# Patient Record
Sex: FEMALE | Race: WHITE | NOT HISPANIC OR LATINO | Employment: OTHER | URBAN - NONMETROPOLITAN AREA
[De-identification: names, ages, dates, MRNs, and addresses within clinical notes are randomized per-mention and may not be internally consistent; named-entity substitution may affect disease eponyms.]

---

## 2017-01-04 RX ORDER — LORAZEPAM 1 MG/1
TABLET ORAL
Qty: 30 TAB | Refills: 3 | Status: SHIPPED
Start: 2017-01-04 | End: 2017-05-03 | Stop reason: SDUPTHER

## 2017-01-30 RX ORDER — BACLOFEN 10 MG/1
TABLET ORAL
Qty: 180 TAB | Refills: 1 | Status: SHIPPED | OUTPATIENT
Start: 2017-01-30 | End: 2017-06-15 | Stop reason: SDUPTHER

## 2017-03-02 ENCOUNTER — OFFICE VISIT (OUTPATIENT)
Dept: MEDICAL GROUP | Facility: PHYSICIAN GROUP | Age: 62
End: 2017-03-02
Payer: COMMERCIAL

## 2017-03-02 VITALS
SYSTOLIC BLOOD PRESSURE: 128 MMHG | DIASTOLIC BLOOD PRESSURE: 76 MMHG | HEIGHT: 59 IN | BODY MASS INDEX: 35.48 KG/M2 | OXYGEN SATURATION: 97 % | WEIGHT: 176 LBS | HEART RATE: 72 BPM | RESPIRATION RATE: 16 BRPM | TEMPERATURE: 98.1 F

## 2017-03-02 DIAGNOSIS — F33.1 MAJOR DEPRESSIVE DISORDER, RECURRENT EPISODE, MODERATE (HCC): ICD-10-CM

## 2017-03-02 DIAGNOSIS — E55.9 VITAMIN D DEFICIENCY DISEASE: ICD-10-CM

## 2017-03-02 DIAGNOSIS — E78.2 MIXED HYPERLIPIDEMIA: ICD-10-CM

## 2017-03-02 DIAGNOSIS — R53.83 FATIGUE, UNSPECIFIED TYPE: ICD-10-CM

## 2017-03-02 DIAGNOSIS — H93.8X2 SENSATION OF FULLNESS IN LEFT EAR: ICD-10-CM

## 2017-03-02 PROCEDURE — 99214 OFFICE O/P EST MOD 30 MIN: CPT | Performed by: INTERNAL MEDICINE

## 2017-03-02 NOTE — MR AVS SNAPSHOT
"        Almaz Cruz Samuel   3/2/2017 2:20 PM   Office Visit   MRN: [id number]    Department:  [facility]   Dept Phone:  [phone]    Description:  Female : 1955   Provider:  Maddie Pedersen M.D.           Reason for Visit     Ear Pain L ear pain, \"popping\"      Allergies as of 3/2/2017     Allergen Noted Reactions    Nkda [No Known Drug Allergy] 2011       Seasonal 2015       Pt states eye irritation      You were diagnosed with     Sensation of fullness in left ear   [056583]       Fatigue, unspecified type   [4617422]       Vitamin D deficiency disease   [810029]       Mixed hyperlipidemia   [272.2.ICD-9-CM]       Major depressive disorder, recurrent episode, moderate (CMS-HCC)   [296.32.ICD-9-CM]         Vital Signs     Blood Pressure Pulse Temperature Respirations Height Weight    128/76 mmHg 72 36.7 °C (98.1 °F) 16 1.499 m (4' 11.02\") 79.833 kg (176 lb)    Body Mass Index Oxygen Saturation Smoking Status             35.53 kg/m2 97% Former Smoker         Basic Information     Date Of Birth Sex Race Ethnicity Preferred Language    1955 Female White Non- English      Your appointments     2017 10:40 AM   Established Patient with Maddie Pedersen M.D.   [address] [address] [phone]           You will be receiving a confirmation call a few days before your appointment from our automated call confirmation system.            2017  2:00 PM   Established Patient with Maddie Pedersen M.D.   [facility] [address] [address] [phone]           You will be receiving a confirmation call a few days before your appointment from our automated call confirmation system.              Problem List              ICD-10-CM Priority Class Noted - Resolved    HTN, goal below 130/80 (Chronic) I10   10/24/2011 - Present    Acquired scoliosis (Chronic) " M41.9   10/24/2011 - Present    Sleep apnea, obstructive (Chronic) G47.33   10/24/2011 - Present    Insomnia, persistent G47.00   10/24/2011 - Present    Moderate major depression (CMS-HCC) F32.1   10/24/2011 - Present    PPD positive R76.11   10/24/2011 - Present    Mixed hyperlipidemia E78.2   12/27/2011 - Present    Post-menopause on HRT (hormone replacement therapy) Z79.890   12/27/2011 - Present    Vitamin D deficiency disease E55.9   9/24/2012 - Present    Carpal tunnel syndrome G56.00   11/14/2012 - Present    Obesity (BMI 30.0-34.9) E66.9   1/25/2013 - Present    Lumbar radiculopathy, chronic M54.16   8/26/2013 - Present    Hypoxemia R09.02   9/1/2013 - Present    Seasonal allergies J30.2   5/2/2014 - Present    Chronic constipation K59.09   9/17/2014 - Present    GERD (gastroesophageal reflux disease) K21.9   9/17/2014 - Present    H/O: CVA (cerebrovascular accident) Z86.73   9/23/2014 - Present    Meniscus tear S83.209A   2/19/2015 - Present    Preop examination Z01.818   3/6/2015 - Present    Mild mitral regurgitation I34.0   3/12/2015 - Present    History of gastrointestinal bleeding Z87.19   6/4/2015 - Present    Hiatal hernia K44.9   6/23/2015 - Present    Diaphragmatic hernia K44.9   6/30/2015 - Present    Memory changes R41.3   8/25/2015 - Present    Stroke-related cognitive dysfunction (CMS-HCC) I63.9, R41.89   8/25/2015 - Present    S/P lumbar fusion Z98.1   10/20/2016 - Present    Cervical radiculopathy M54.12   10/20/2016 - Present    Sensation of fullness in left ear H93.8X2   3/2/2017 - Present    Fatigue R53.83   3/2/2017 - Present      Health Maintenance        Date Due Completion Dates    IMM DTaP/Tdap/Td Vaccine (1 - Tdap) 3/20/1972 ---    IMM ZOSTER VACCINE 3/20/2013 ---    MAMMOGRAM 5/13/2016 5/13/2015, 5/13/2015, 9/17/2013    COLONOSCOPY 10/28/2021 10/28/2011 (Prv Comp)    Override on 10/28/2011: Previously completed (Done at [facility], negative, good for 10 years)               Current Immunizations     Influenza TIV (IM) 10/2/2010    Influenza Vaccine Quad Inj (Preserved) 10/20/2016 10:47 AM, 10/26/2015      Below and/or attached are the medications your provider expects you to take. Review all of your home medications and newly ordered medications with your provider and/or pharmacist. Follow medication instructions as directed by your provider and/or pharmacist. Please keep your medication list with you and share with your provider. Update the information when medications are discontinued, doses are changed, or new medications (including over-the-counter products) are added; and carry medication information at all times in the event of emergency situations     Allergies:  NKDA - (reactions not documented)     SEASONAL - (reactions not documented)               Medications  Valid as of: March 02, 2017 -  2:57 PM    Generic Name Brand Name Tablet Size Instructions for use    Atorvastatin Calcium (Tab) LIPITOR 40 MG TAKE ONE TABLET BY MOUTH ONCE DAILY        Baclofen (Tab) LIORESAL 10 MG TAKE ONE TABLET BY MOUTH TWICE DAILY        Cholecalciferol (Tab) cholecalciferol 1000 UNIT Take 2,000 Units by mouth every day.        Citalopram Hydrobromide (Tab) CELEXA 40 MG TAKE ONE TABLET BY MOUTH ONCE DAILY        Cyanocobalamin   Place 1 Tab under tongue every day.        Digestive Enzymes (Cap) Digestive Enzymes  Take 1 Cap by mouth 2 times a day, with meals.        Docusate Sodium (Cap) DOCQLACE 100 MG TAKE ONE CAPSULE BY MOUTH TWICE DAILY        Furosemide (Tab) LASIX 20 MG TAKE ONE TABLET BY MOUTH ONCE DAILY        Gabapentin (Cap) NEURONTIN 300 MG TAKE ONE CAPSULE BY MOUTH TWICE DAILY        LORazepam (Tab) ATIVAN 1 MG TAKE ONE TABLET BY MOUTH ONCE DAILY AT BEDTIME        Mometasone Furoate (Suspension) NASONEX 50 MCG/ACT USE TWO SPRAY(S) IN EACH NOSTRIL ONCE DAILY        Multiple Vitamins-Minerals   Take 1 Tab by mouth every day.        Pantoprazole Sodium (Tablet Delayed Response) PROTONIX  40 MG TAKE ONE TABLET BY MOUTH ONCE DAILY        Probiotic Product   Take 1 Cap by mouth every day.        Sennosides (Tab) SENOKOT 8.6 MG Take 1 Tab by mouth 2 times a day.        Triamcinolone Acetonide (Cream) KENALOG 0.1 % Apply thin layer to affected area three times daily for one week        .                 Medicines prescribed today were sent to:     [address] [place] [address]    Phone: [phone] Fax: [phone]    Open 24 Hours?: No      Medication refill instructions:       If your prescription bottle indicates you have medication refills left, it is not necessary to call your provider’s office. Please contact your pharmacy and they will refill your medication.    If your prescription bottle indicates you do not have any refills left, you may request refills at any time through one of the following ways: The online [employer] system (except Urgent Care), by calling your provider’s office, or by asking your pharmacy to contact your provider’s office with a refill request. Medication refills are processed only during regular business hours and may not be available until the next business day. Your provider may request additional information or to have a follow-up visit with you prior to refilling your medication.   *Please Note: Medication refills are assigned a new Rx number when refilled electronically. Your pharmacy may indicate that no refills were authorized even though a new prescription for the same medication is available at the pharmacy. Please request the medicine by name with the pharmacy before contacting your provider for a refill.        Your To Do List     Future Labs/Procedures Complete By Expires    CBC WITH DIFFERENTIAL  As directed 3/2/2018    COMP METABOLIC PANEL  As directed 3/2/2018    LIPID PROFILE  As directed 3/2/2018    TSH  As directed 3/2/2018    VITAMIN D,25 HYDROXY  As directed 3/2/2018      Instructions    1.  Have fasting labs checked.    2. Follow up in 3-4 months.       Other Notes About Your Plan     Last UDS: 9/16/2014  Contolled Substance agreement signed: 9/16/2014    Mammogram: 8/2011: normal  GI: Dr. Sindhu Marquez  Neurosurgeon: Dr. Nielsen, Dr. Aracelis Vega  Surgeon: Dr. Kenji Okeefe           MyChart Access Code: Activation code not generated  Current MyChart Status: Active

## 2017-03-03 NOTE — ASSESSMENT & PLAN NOTE
Patient has had sensation of a fullness in her left ear with intermittent popping for the past month. She notes that she is also had some postnasal drip and sinus congestion. She is not had fevers or chills. She previously has used a nasal steroid but is not currently using one. Exam today demonstrates room and impaction. We had her extremities today with a syringe I also recommend she begin Nasonex again.

## 2017-03-03 NOTE — PROGRESS NOTES
"Chief Complaint   Patient presents with   • Ear Pain     L ear pain, \"popping\"       HISTORY OF PRESENT ILLNESS: Patient is a 61 y.o. female established patient who presents today to be seen for acute and chronic issues.    Sensation of fullness in left ear  Patient has had sensation of a fullness in her left ear with intermittent popping for the past month. She notes that she is also had some postnasal drip and sinus congestion. She is not had fevers or chills. She previously has used a nasal steroid but is not currently using one. Exam today demonstrates room and impaction. We had her extremities today with a syringe I also recommend she begin Nasonex again.    Fatigue  Patient has general feelings of malaise and fatigue. She does not always feel well when she wakes up in the morning. She notes her mood has been good. She doesn't history of vitamin D deficiency and we discussed checking the lab. On further questioning, the patient notes that about a month ago she dismantled her CPAP machine to clean it and has not been using it for the past month. I discussed with her that I think that is the most likely cause of her fatigue and recommend she begin using the CPap again.    Moderate major depression  This is a chronic condition which is well controlled on medications. Patient is tolerating medications without side effects.      Patient Active Problem List    Diagnosis Date Noted   • Sensation of fullness in left ear 03/02/2017   • Fatigue 03/02/2017   • S/P lumbar fusion 10/20/2016   • Cervical radiculopathy 10/20/2016   • Memory changes 08/25/2015   • Stroke-related cognitive dysfunction (CMS-HCC) 08/25/2015   • Diaphragmatic hernia 06/30/2015   • Hiatal hernia 06/23/2015   • History of gastrointestinal bleeding 06/04/2015   • Mild mitral regurgitation 03/12/2015   • Preop examination 03/06/2015   • Meniscus tear 02/19/2015   • H/O: CVA (cerebrovascular accident) 09/23/2014   • Chronic constipation 09/17/2014   • " GERD (gastroesophageal reflux disease) 09/17/2014   • Seasonal allergies 05/02/2014   • Hypoxemia 09/01/2013   • Lumbar radiculopathy, chronic 08/26/2013   • Obesity (BMI 30.0-34.9) 01/25/2013   • Carpal tunnel syndrome 11/14/2012   • Vitamin D deficiency disease 09/24/2012   • Mixed hyperlipidemia 12/27/2011   • Post-menopause on HRT (hormone replacement therapy) 12/27/2011   • HTN, goal below 130/80 10/24/2011   • Acquired scoliosis 10/24/2011   • Sleep apnea, obstructive 10/24/2011   • Insomnia, persistent 10/24/2011   • Moderate major depression (CMS-HCC) 10/24/2011   • 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       Allergies:Nkda and Seasonal    Current Outpatient Prescriptions Ordered in [facility]   Medication Sig Dispense Refill   • baclofen (LIORESAL) 10 MG Tab TAKE ONE TABLET BY MOUTH TWICE DAILY 180 Tab 1   • lorazepam (ATIVAN) 1 MG Tab TAKE ONE TABLET BY MOUTH ONCE DAILY AT BEDTIME 30 Tab 3   • pantoprazole (PROTONIX) 40 MG Tablet Delayed Response TAKE ONE TABLET BY MOUTH ONCE DAILY 90 Tab 3   • gabapentin (NEURONTIN) 300 MG Cap TAKE ONE CAPSULE BY MOUTH TWICE DAILY 180 Cap 3   • citalopram (CELEXA) 40 MG Tab TAKE ONE TABLET BY MOUTH ONCE DAILY 90 Tab 3   • DOCQLACE 100 MG Cap TAKE ONE CAPSULE BY MOUTH TWICE DAILY 180 Cap 3   • atorvastatin (LIPITOR) 40 MG Tab TAKE ONE TABLET BY MOUTH ONCE DAILY 90 Tab 3   • furosemide (LASIX) 20 MG Tab TAKE ONE TABLET BY MOUTH ONCE DAILY 90 Tab 3   • NASONEX 50 MCG/ACT nasal spray USE TWO SPRAY(S) IN EACH NOSTRIL ONCE DAILY 17 g 3   • Multiple Vitamins-Minerals (MULTI-VITAMIN/MINERALS PO) Take 1 Tab by mouth every day.     • vitamin D (CHOLECALCIFEROL) 1000 UNIT TABS Take 2,000 Units by mouth every day.     • Cyanocobalamin (VITAMIN B 12 PO) Place 1 Tab under tongue every day.     • Probiotic Product (PROBIOTIC DAILY PO) Take 1 Cap by mouth every day.     • Digestive Enzymes CAPS Take 1 Cap by mouth 2 times a day, with meals.     • triamcinolone acetonide (KENALOG) 0.1 % Cream Apply thin  "layer to affected area three times daily for one week 1 Tube 0   • sennosides (SENOKOT TO GO) 8.6 MG TABS Take 1 Tab by mouth 2 times a day. 60 Tab 6     No current Epic-ordered facility-administered medications on file.       Past Medical History   Diagnosis Date   • Scoliosis    • Diverticulitis    • High cholesterol    • Lung collapse 11     right lung / collpsed    • 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     exposed as a child, told not active   • Mixed hyperlipidemia 2011   • Post-menopause on HRT (hormone replacement therapy) 2011   • Unspecified vitamin D deficiency 2012   • Anesthesia 11     PO N/V, \"slow to come out\"   • Arthritis 11     spine   • Endometriosis of uterus    • HTN, goal below 130/80 10/24/2011   • Hiatus hernia syndrome      not repaired   • Backpain 11     neck and abd. also,    • Fusion of spine    • H/O: CVA (cerebrovascular accident) 2014     Complex event associated with apparent medication reaction but with MRI suggesting possible multiple small infarcts of various ages, UCS   • H/O fall      when in hospital  with low O2 sats   • Breath shortness      with exertion   • Sleep apnea 2015     CPAP   • MRSA exposure 2014     while in hospital   • Infectious disease       Hep C +   • Moderate major depression (CMS-HCC) 10/24/2011       Social History   Substance Use Topics   • Smoking status: Former Smoker -- 0.30 packs/day for 5 years     Types: Cigarettes     Quit date: 1975   • Smokeless tobacco: Never Used      Comment: quit    • Alcohol Use: No       Family Status   Relation Status Death Age   • Mother Alive      84 in    • Father  81     Laryngeal CA   • Sister Alive      56 in      Family History   Problem Relation Age of Onset   • Diabetes     • Heart Disease     • Hypertension     • Lung Disease     • Diabetes Mother    • Cancer Mother 82     breast cancer   • Lung Disease Mother      copd   • " "Hyperlipidemia Mother    • Hypertension Mother    • Cancer Father      Laryngeal CA   • Heart Disease Father 50     CAD with bypasses x 3   • Heart Attack Father    • Hyperlipidemia Father    • Hypertension Father    • Diabetes Sister      type II diabetes       ROS:  Review of Systems   Constitutional: Negative for fever and malaise/fatigue.   HENT: Positive for nasal congestion, ear fullness  Respiratory: Negative for cough  Cardiovascular: Negative for chest pain  Gastrointestinal: Negative for nausea, vomiting and abdominal pain.  All other systems reviewed and are negative except as in HPI.      Exam:  Blood pressure 128/76, pulse 72, temperature 36.7 °C (98.1 °F), resp. rate 16, height 1.499 m (4' 11.02\"), weight 79.833 kg (176 lb), SpO2 97 %.  General: Obese female in NAD  HEENT: Right Tympanic shows clear fluid behind the membrane. Left tympanic membranes occluded by wax. Nasal mucosa is boggy and erythematous. Oropharynx shows moist mucous membranes with mild erythema and no exudate.  Neck: Supple without JVD   Pulmonary: Clear to ausculation and percussion.  Normal effort. No rales, ronchi, or wheezing.  Cardiovascular: Regular rate and rhythm without murmur. Carotid and radial pulses are intact and equal bilaterally.  Extremities: no clubbing, cyanosis, or edema.        Assessment/Plan:  1. Sensation of fullness in left ear      Uncontrolled, will restart nasal steroid   2. Fatigue, unspecified type  CBC WITH DIFFERENTIAL    TSH    Uncontrolled, will check labs   3. Vitamin D deficiency disease  VITAMIN D,25 HYDROXY   4. Mixed hyperlipidemia  LIPID PROFILE    COMP METABOLIC PANEL   5. Major depressive disorder, recurrent episode, moderate (CMS-HCC)      Controlled, on medication     Please note that this dictation was created using voice recognition software. I have made every reasonable attempt to correct obvious errors, but I expect that there are errors of grammar and possibly content that I did not " discover before finalizing the note.

## 2017-03-03 NOTE — ASSESSMENT & PLAN NOTE
Patient has general feelings of malaise and fatigue. She does not always feel well when she wakes up in the morning. She notes her mood has been good. She doesn't history of vitamin D deficiency and we discussed checking the lab. On further questioning, the patient notes that about a month ago she dismantled her CPAP machine to clean it and has not been using it for the past month. I discussed with her that I think that is the most likely cause of her fatigue and recommend she begin using the CPap again.

## 2017-04-20 ENCOUNTER — OFFICE VISIT (OUTPATIENT)
Dept: URGENT CARE | Facility: PHYSICIAN GROUP | Age: 62
End: 2017-04-20
Payer: COMMERCIAL

## 2017-04-20 VITALS
RESPIRATION RATE: 14 BRPM | WEIGHT: 170 LBS | OXYGEN SATURATION: 98 % | DIASTOLIC BLOOD PRESSURE: 80 MMHG | SYSTOLIC BLOOD PRESSURE: 140 MMHG | HEART RATE: 95 BPM | HEIGHT: 59 IN | TEMPERATURE: 99.3 F | BODY MASS INDEX: 34.27 KG/M2

## 2017-04-20 DIAGNOSIS — R50.9 FEVER, UNSPECIFIED FEVER CAUSE: ICD-10-CM

## 2017-04-20 DIAGNOSIS — R06.2 WHEEZE: ICD-10-CM

## 2017-04-20 DIAGNOSIS — J01.90 ACUTE BACTERIAL SINUSITIS: ICD-10-CM

## 2017-04-20 DIAGNOSIS — B96.89 ACUTE BACTERIAL SINUSITIS: ICD-10-CM

## 2017-04-20 DIAGNOSIS — R05.9 COUGH: ICD-10-CM

## 2017-04-20 LAB
FLUAV+FLUBV AG SPEC QL IA: NORMAL
INT CON NEG: NEGATIVE
INT CON POS: POSITIVE

## 2017-04-20 PROCEDURE — 99214 OFFICE O/P EST MOD 30 MIN: CPT | Performed by: PHYSICIAN ASSISTANT

## 2017-04-20 PROCEDURE — 87804 INFLUENZA ASSAY W/OPTIC: CPT | Performed by: PHYSICIAN ASSISTANT

## 2017-04-20 RX ORDER — CODEINE PHOSPHATE AND GUAIFENESIN 10; 100 MG/5ML; MG/5ML
5 SOLUTION ORAL EVERY 4 HOURS PRN
Qty: 120 ML | Refills: 0 | Status: SHIPPED | OUTPATIENT
Start: 2017-04-20 | End: 2017-10-16

## 2017-04-20 RX ORDER — ALBUTEROL SULFATE 90 UG/1
2 AEROSOL, METERED RESPIRATORY (INHALATION) EVERY 4 HOURS PRN
Qty: 1 INHALER | Refills: 0 | Status: SHIPPED | OUTPATIENT
Start: 2017-04-20 | End: 2018-04-11

## 2017-04-20 RX ORDER — AMOXICILLIN AND CLAVULANATE POTASSIUM 875; 125 MG/1; MG/1
1 TABLET, FILM COATED ORAL 2 TIMES DAILY
Qty: 20 TAB | Refills: 0 | Status: SHIPPED | OUTPATIENT
Start: 2017-04-20 | End: 2017-04-28

## 2017-04-20 NOTE — PROGRESS NOTES
Chief Complaint   Patient presents with   • Cough     head ache, ear ache, cough       HISTORY OF PRESENT ILLNESS: Patient is a 62 y.o. female who presents today because she has had wheeze, cough, headache, left ear pain, myalgias, malaise and fatigue and a fever. Her symptoms have been going on for over a week now She states that her temperature is typically 97°, so 98-99° is a fever for her. Nonetheless, she has been taking over-the-counter cough and cold medications without improvement. Feels like she is getting worse instead of better.    Patient Active Problem List    Diagnosis Date Noted   • Sensation of fullness in left ear 03/02/2017   • Fatigue 03/02/2017   • S/P lumbar fusion 10/20/2016   • Cervical radiculopathy 10/20/2016   • Memory changes 08/25/2015   • Stroke-related cognitive dysfunction (CMS-HCC) 08/25/2015   • Diaphragmatic hernia 06/30/2015   • Hiatal hernia 06/23/2015   • History of gastrointestinal bleeding 06/04/2015   • Mild mitral regurgitation 03/12/2015   • Preop examination 03/06/2015   • Meniscus tear 02/19/2015   • H/O: CVA (cerebrovascular accident) 09/23/2014   • Chronic constipation 09/17/2014   • GERD (gastroesophageal reflux disease) 09/17/2014   • Seasonal allergies 05/02/2014   • Hypoxemia 09/01/2013   • Lumbar radiculopathy, chronic 08/26/2013   • Obesity (BMI 30.0-34.9) 01/25/2013   • Carpal tunnel syndrome 11/14/2012   • Vitamin D deficiency disease 09/24/2012   • Mixed hyperlipidemia 12/27/2011   • Post-menopause on HRT (hormone replacement therapy) 12/27/2011   • HTN, goal below 130/80 10/24/2011   • Acquired scoliosis 10/24/2011   • Sleep apnea, obstructive 10/24/2011   • Insomnia, persistent 10/24/2011   • Moderate major depression (CMS-[facility]) 10/24/2011   • 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       Allergies:Nkda and Seasonal    Current Outpatient Prescriptions Ordered in [facility]   Medication Sig Dispense Refill   • amoxicillin-clavulanate (AUGMENTIN) 875-125 MG Tab Take 1 Tab by mouth 2  times a day for 10 days. 20 Tab 0   • guaifenesin-codeine (CHERATUSSIN AC) Solution oral solution Take 5 mL by mouth every four hours as needed for Cough. 120 mL 0   • albuterol 108 (90 BASE) MCG/ACT Aero Soln inhalation aerosol Inhale 2 Puffs by mouth every four hours as needed. 1 Inhaler 0   • baclofen (LIORESAL) 10 MG Tab TAKE ONE TABLET BY MOUTH TWICE DAILY 180 Tab 1   • lorazepam (ATIVAN) 1 MG Tab TAKE ONE TABLET BY MOUTH ONCE DAILY AT BEDTIME 30 Tab 3   • pantoprazole (PROTONIX) 40 MG Tablet Delayed Response TAKE ONE TABLET BY MOUTH ONCE DAILY 90 Tab 3   • gabapentin (NEURONTIN) 300 MG Cap TAKE ONE CAPSULE BY MOUTH TWICE DAILY 180 Cap 3   • citalopram (CELEXA) 40 MG Tab TAKE ONE TABLET BY MOUTH ONCE DAILY 90 Tab 3   • DOCQLACE 100 MG Cap TAKE ONE CAPSULE BY MOUTH TWICE DAILY 180 Cap 3   • atorvastatin (LIPITOR) 40 MG Tab TAKE ONE TABLET BY MOUTH ONCE DAILY 90 Tab 3   • furosemide (LASIX) 20 MG Tab TAKE ONE TABLET BY MOUTH ONCE DAILY 90 Tab 3   • NASONEX 50 MCG/ACT nasal spray USE TWO SPRAY(S) IN EACH NOSTRIL ONCE DAILY 17 g 3   • Multiple Vitamins-Minerals (MULTI-VITAMIN/MINERALS PO) Take 1 Tab by mouth every day.     • vitamin D (CHOLECALCIFEROL) 1000 UNIT TABS Take 2,000 Units by mouth every day.     • Cyanocobalamin (VITAMIN B 12 PO) Place 1 Tab under tongue every day.     • Probiotic Product (PROBIOTIC DAILY PO) Take 1 Cap by mouth every day.     • Digestive Enzymes CAPS Take 1 Cap by mouth 2 times a day, with meals.     • triamcinolone acetonide (KENALOG) 0.1 % Cream Apply thin layer to affected area three times daily for one week 1 Tube 0   • sennosides (SENOKOT TO GO) 8.6 MG TABS Take 1 Tab by mouth 2 times a day. 60 Tab 6     No current Epic-ordered facility-administered medications on file.       Past Medical History   Diagnosis Date   • Scoliosis    • Diverticulitis    • High cholesterol    • Lung collapse 02-14-11     right lung 1/4 collpsed    • 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     exposed as a child, told  "not active   • Mixed hyperlipidemia 2011   • Post-menopause on HRT (hormone replacement therapy) 2011   • Unspecified vitamin D deficiency 2012   • Anesthesia 11     PO N/V, \"slow to come out\"   • Arthritis 11     spine   • Endometriosis of uterus    • HTN, goal below 130/80 10/24/2011   • Hiatus hernia syndrome      not repaired   • Backpain 11     neck and abd. also,    • Fusion of spine    • H/O: CVA (cerebrovascular accident) 2014     Complex event associated with apparent medication reaction but with MRI suggesting possible multiple small infarcts of various ages, [facility]   • H/O fall      when in hospital  with low O2 sats   • Breath shortness      with exertion   • Sleep apnea 2015     CPAP   • MRSA exposure 2014     while in hospital   • Infectious disease       Hep C +   • Moderate major depression (CMS-HCC) 10/24/2011       Social History   Substance Use Topics   • Smoking status: Former Smoker -- 0.30 packs/day for 5 years     Types: Cigarettes     Quit date: 1975   • Smokeless tobacco: Never Used      Comment: quit    • Alcohol Use: No       Family Status   Relation Status Death Age   • Mother Alive      84 in    • Father  81     Laryngeal CA   • Sister Alive      56 in      Family History   Problem Relation Age of Onset   • Diabetes     • Heart Disease     • Hypertension     • Lung Disease     • Diabetes Mother    • Cancer Mother 82     breast cancer   • Lung Disease Mother      copd   • Hyperlipidemia Mother    • Hypertension Mother    • Cancer Father      Laryngeal CA   • Heart Disease Father 50     CAD with bypasses x 3   • Heart Attack Father    • Hyperlipidemia Father    • Hypertension Father    • Diabetes Sister      type II diabetes       ROS:  Review of Systems   Constitutional: Positive for fever, chills, myalgias and malaise/fatigue.   HENT: Negative for ear pain, nosebleeds, positive for nasal and sinus pain, " "pressure, drainage and congestion, sore throat and no neck pain.    Eyes: Negative for blurred vision.   Respiratory: Positive for cough, sputum production, shortness of breath and wheezing.    Cardiovascular: Negative for chest pain, palpitations, orthopnea and leg swelling.   Gastrointestinal: Negative for heartburn, nausea, vomiting and abdominal pain.   Genitourinary: Negative for dysuria, urgency and frequency.     Exam:  Blood pressure 140/80, pulse 95, temperature 37.4 °C (99.3 °F), resp. rate 14, height 1.499 m (4' 11.02\"), weight 77.111 kg (170 lb), SpO2 98 %.  General:  Well nourished, well developed female in NAD, nasal vocal tone, frequent cough  Head:Normocephalic, atraumatic  Eyes: PERRLA, EOM within normal limits, no conjunctival injection, no scleral icterus, visual fields and acuity grossly intact.  Ears: Normal shape and symmetry, no tenderness, no discharge. External canals are without any significant edema or erythema. Tympanic membranes are without any inflammation, however. The left tympanic membrane is withdrawn, no effusion.. Gross auditory acuity is intact  Nose: Symmetrical without tenderness, no discharge. Nasal mucosa is erythematous and edematous on the left, posterior nasal cavity exudate is present  Mouth: reasonable hygiene, no erythema exudates or tonsillar enlargement.  Neck: no masses, range of motion within normal limits, no tracheal deviation. No obvious thyroid enlargement.  Pulmonary: chest is symmetrical with respiration, no wheezes, crackles, or rhonchi.  Cardiovascular: regular rate and rhythm without murmurs, rubs, or gallops.  Extremities: no clubbing, cyanosis, or edema.    Influenza AB test is negative    Please note that this dictation was created using voice recognition software. I have made every reasonable attempt to correct obvious errors, but I expect that there are errors of grammar and possibly content that I did not discover before finalizing the " note.    Assessment/Plan:  1. Cough  POCT Influenza A/B    guaifenesin-codeine (CHERATUSSIN AC) Solution oral solution   2. Fever, unspecified fever cause  POCT Influenza A/B   3. Acute bacterial sinusitis  amoxicillin-clavulanate (AUGMENTIN) 875-125 MG Tab   4. Wheeze  albuterol 108 (90 BASE) MCG/ACT Aero Soln inhalation aerosol    these over-the-counter Tylenol, ibuprofen and decongestant as tolerated    Followup with primary care in the next 7-10 days if not significantly improving, return to the urgent care or go to the emergency room sooner for any worsening of symptoms.

## 2017-04-20 NOTE — MR AVS SNAPSHOT
"        Almaz Samuel   2017 9:55 AM   Office Visit   MRN: [id number]    Department:  [facility]   Dept Phone:  [phone]    Description:  Female : 1955   Provider:  Joseph Aleman PA-C           Reason for Visit     Cough head ache, ear ache, cough      Allergies as of 2017     Allergen Noted Reactions    Nkda [No Known Drug Allergy] 2011       Seasonal 2015       Pt states eye irritation      You were diagnosed with     Cough   [786.2.ICD-9-CM]       Fever, unspecified fever cause   [7364079]       Acute bacterial sinusitis   [352602]       Wheeze   [328967]         Vital Signs     Blood Pressure Pulse Temperature Respirations Height Weight    140/80 mmHg 95 37.4 °C (99.3 °F) 14 1.499 m (4' 11.02\") 77.111 kg (170 lb)    Body Mass Index Oxygen Saturation Smoking Status             34.32 kg/m2 98% Former Smoker         Basic Information     Date Of Birth Sex Race Ethnicity Preferred Language    1955 Female White Non- English      Your appointments     2017  2:00 PM   Established Patient with RESOURCE PROVIDER FERNLEY   [facility]    [address] [place] [id number] [phone]           You will be receiving a confirmation call a few days before your appointment from our automated call confirmation system.              Problem List              ICD-10-CM Priority Class Noted - Resolved    HTN, goal below 130/80 (Chronic) I10   10/24/2011 - Present    Acquired scoliosis (Chronic) M41.9   10/24/2011 - Present    Sleep apnea, obstructive (Chronic) G47.33   10/24/2011 - Present    Insomnia, persistent G47.00   10/24/2011 - Present    Moderate major depression (CMS-HCC) F32.1   10/24/2011 - Present    PPD positive R76.11   10/24/2011 - Present    Mixed hyperlipidemia E78.2   2011 - Present    Post-menopause on HRT (hormone replacement therapy) Z79.890   2011 - Present    Vitamin D deficiency disease " E55.9   9/24/2012 - Present    Carpal tunnel syndrome G56.00   11/14/2012 - Present    Obesity (BMI 30.0-34.9) E66.9   1/25/2013 - Present    Lumbar radiculopathy, chronic M54.16   8/26/2013 - Present    Hypoxemia R09.02   9/1/2013 - Present    Seasonal allergies J30.2   5/2/2014 - Present    Chronic constipation K59.09   9/17/2014 - Present    GERD (gastroesophageal reflux disease) K21.9   9/17/2014 - Present    H/O: CVA (cerebrovascular accident) Z86.73   9/23/2014 - Present    Meniscus tear S83.209A   2/19/2015 - Present    Preop examination Z01.818   3/6/2015 - Present    Mild mitral regurgitation I34.0   3/12/2015 - Present    History of gastrointestinal bleeding Z87.19   6/4/2015 - Present    Hiatal hernia K44.9   6/23/2015 - Present    Diaphragmatic hernia K44.9   6/30/2015 - Present    Memory changes R41.3   8/25/2015 - Present    Stroke-related cognitive dysfunction (CMS-[facility]) I63.9, R41.89   8/25/2015 - Present    S/P lumbar fusion Z98.1   10/20/2016 - Present    Cervical radiculopathy M54.12   10/20/2016 - Present    Sensation of fullness in left ear H93.8X2   3/2/2017 - Present    Fatigue R53.83   3/2/2017 - Present      Health Maintenance        Date Due Completion Dates    IMM DTaP/Tdap/Td Vaccine (1 - Tdap) 3/20/1972 ---    IMM ZOSTER VACCINE 3/20/2013 ---    MAMMOGRAM 5/13/2016 5/13/2015, 5/13/2015, 9/17/2013    COLONOSCOPY 10/28/2021 10/28/2011 (Prv Comp)    Override on 10/28/2011: Previously completed (Done at [facility], negative, good for 10 years)            Current Immunizations     Influenza TIV (IM) 10/2/2010    Influenza Vaccine Quad Inj (Preserved) 10/20/2016 10:47 AM, 10/26/2015      Below and/or attached are the medications your provider expects you to take. Review all of your home medications and newly ordered medications with your provider and/or pharmacist. Follow medication instructions as directed by your provider and/or pharmacist. Please keep your medication list with you and  share with your provider. Update the information when medications are discontinued, doses are changed, or new medications (including over-the-counter products) are added; and carry medication information at all times in the event of emergency situations     Allergies:  NKDA - (reactions not documented)     SEASONAL - (reactions not documented)               Medications  Valid as of: April 20, 2017 - 10:34 AM    Generic Name Brand Name Tablet Size Instructions for use    Albuterol Sulfate (Aero Soln) albuterol 108 (90 BASE) MCG/ACT Inhale 2 Puffs by mouth every four hours as needed.        Amoxicillin-Pot Clavulanate (Tab) AUGMENTIN 875-125 MG Take 1 Tab by mouth 2 times a day for 10 days.        Atorvastatin Calcium (Tab) LIPITOR 40 MG TAKE ONE TABLET BY MOUTH ONCE DAILY        Baclofen (Tab) LIORESAL 10 MG TAKE ONE TABLET BY MOUTH TWICE DAILY        Cholecalciferol (Tab) cholecalciferol 1000 UNIT Take 2,000 Units by mouth every day.        Citalopram Hydrobromide (Tab) CELEXA 40 MG TAKE ONE TABLET BY MOUTH ONCE DAILY        Cyanocobalamin   Place 1 Tab under tongue every day.        Digestive Enzymes (Cap) Digestive Enzymes  Take 1 Cap by mouth 2 times a day, with meals.        Docusate Sodium (Cap) DOCQLACE 100 MG TAKE ONE CAPSULE BY MOUTH TWICE DAILY        Furosemide (Tab) LASIX 20 MG TAKE ONE TABLET BY MOUTH ONCE DAILY        Gabapentin (Cap) NEURONTIN 300 MG TAKE ONE CAPSULE BY MOUTH TWICE DAILY        Guaifenesin-Codeine (Solution) ROBITUSSIN -10 mg/5mL Take 5 mL by mouth every four hours as needed for Cough.        LORazepam (Tab) ATIVAN 1 MG TAKE ONE TABLET BY MOUTH ONCE DAILY AT BEDTIME        Mometasone Furoate (Suspension) NASONEX 50 MCG/ACT USE TWO SPRAY(S) IN EACH NOSTRIL ONCE DAILY        Multiple Vitamins-Minerals   Take 1 Tab by mouth every day.        Pantoprazole Sodium (Tablet Delayed Response) PROTONIX 40 MG TAKE ONE TABLET BY MOUTH ONCE DAILY        Probiotic Product   Take 1 Cap by mouth  every day.        Sennosides (Tab) SENOKOT 8.6 MG Take 1 Tab by mouth 2 times a day.        Triamcinolone Acetonide (Cream) KENALOG 0.1 % Apply thin layer to affected area three times daily for one week        .                 Medicines prescribed today were sent to:     [address] [place] [address]    Phone: [phone] Fax: [phone]    Open 24 Hours?: No      Medication refill instructions:       If your prescription bottle indicates you have medication refills left, it is not necessary to call your provider’s office. Please contact your pharmacy and they will refill your medication.    If your prescription bottle indicates you do not have any refills left, you may request refills at any time through one of the following ways: The online [facility] system (except Urgent Care), by calling your provider’s office, or by asking your pharmacy to contact your provider’s office with a refill request. Medication refills are processed only during regular business hours and may not be available until the next business day. Your provider may request additional information or to have a follow-up visit with you prior to refilling your medication.   *Please Note: Medication refills are assigned a new Rx number when refilled electronically. Your pharmacy may indicate that no refills were authorized even though a new prescription for the same medication is available at the pharmacy. Please request the medicine by name with the pharmacy before contacting your provider for a refill.        Other Notes About Your Plan     Last UDS: 9/16/2014  Contolled Substance agreement signed: 9/16/2014    Mammogram: 8/2011: normal  GI: Dr. Sindhu Marquez  Neurosurgeon: Dr. Nielsen, Dr. Aracelis Vega  Surgeon: Dr. Kenji Okeefe           MyChart Access Code: Activation code not generated  Current [facility] Status: Active

## 2017-04-27 ENCOUNTER — TELEPHONE (OUTPATIENT)
Dept: URGENT CARE | Facility: PHYSICIAN GROUP | Age: 62
End: 2017-04-27

## 2017-04-27 NOTE — TELEPHONE ENCOUNTER
1. Caller Name: Almaz                                         Call Back Number: [phone]      Patient approves a detailed voicemail message: N\A    2. What are the patient's symptoms (location & severity)? Fever, cough, still wheezing    3. Is this a new symptom No    4. When did it start? Started around 2 wks ago.  Saw Greg Aleman PA-C on 4.20.17 . First she thought they were resolving, then it came back worse    5. Action taken per Active Symptom Guide: Urgent Care recommended    6.   Pt called and lm on vm advising us on what was going on.  I returned her call and left her a message to return to urgent care for a re evaluation or to make an appointment with her primary or one of our family group providers.

## 2017-04-28 ENCOUNTER — TELEPHONE (OUTPATIENT)
Dept: MEDICAL GROUP | Facility: PHYSICIAN GROUP | Age: 62
End: 2017-04-28

## 2017-04-28 ENCOUNTER — OFFICE VISIT (OUTPATIENT)
Dept: MEDICAL GROUP | Facility: PHYSICIAN GROUP | Age: 62
End: 2017-04-28
Payer: COMMERCIAL

## 2017-04-28 VITALS
HEIGHT: 59 IN | DIASTOLIC BLOOD PRESSURE: 80 MMHG | HEART RATE: 92 BPM | BODY MASS INDEX: 34.68 KG/M2 | WEIGHT: 172 LBS | RESPIRATION RATE: 16 BRPM | OXYGEN SATURATION: 96 % | SYSTOLIC BLOOD PRESSURE: 124 MMHG | TEMPERATURE: 98.4 F

## 2017-04-28 DIAGNOSIS — B96.89 BACTERIAL SINUSITIS: ICD-10-CM

## 2017-04-28 DIAGNOSIS — J32.9 BACTERIAL SINUSITIS: ICD-10-CM

## 2017-04-28 DIAGNOSIS — J01.00 ACUTE MAXILLARY SINUSITIS, RECURRENCE NOT SPECIFIED: ICD-10-CM

## 2017-04-28 PROCEDURE — 99214 OFFICE O/P EST MOD 30 MIN: CPT | Performed by: NURSE PRACTITIONER

## 2017-04-28 RX ORDER — CLINDAMYCIN HYDROCHLORIDE 300 MG/1
300 CAPSULE ORAL 3 TIMES DAILY
Qty: 21 CAP | Refills: 0 | Status: SHIPPED | OUTPATIENT
Start: 2017-04-28 | End: 2017-04-28

## 2017-04-28 RX ORDER — DOXYCYCLINE HYCLATE 100 MG
100 TABLET ORAL 2 TIMES DAILY
Qty: 14 TAB | Refills: 0 | Status: SHIPPED | OUTPATIENT
Start: 2017-04-28 | End: 2017-05-05

## 2017-04-28 NOTE — TELEPHONE ENCOUNTER
Call patient, doxycycline sent, twice a day with food, do not take with multvitamin or iron, she can resume these after completing antibiotic.

## 2017-04-28 NOTE — PATIENT INSTRUCTIONS
To do:     Nasal wash twice daily - then following with nasonex.     Continue to use theraflu to dry the nose     Albuterol inhaler 4 times a day as needed for cough and wheezing     You can use your liquid cough medicine at night if needed.

## 2017-04-28 NOTE — PROGRESS NOTES
"[facility]  Primary Care Office Visit - Problem-Oriented        History:     Almaz Samuel is a 62 y.o. female who is here today to discuss Sinus Problem      No problem-specific assessment & plan notes found for this encounter.   patient was seen in the urgent care on 4/20/17 and diagnosed with sinusitis, she was given Augmentin and has been taking this daily ×6 days. Since starting Augmentin she has developed diarrhea which is only somewhat resolved with over-the-counter antidiarrheals. She continues to have ear pain, ear pressure, sinus pressure, sinus headaches, nasal congestion, runny nose, cough, postnasal drip.    She has tried Mucinex and antihistamine without improvement. She has also tried Sudafed without improvement in her symptoms.    She does not have a fever.    Due to the diarrhea she has stopped taking all of her normal prescription medications.       Past Medical History   Diagnosis Date   • Scoliosis    • Diverticulitis    • High cholesterol    • Lung collapse 02-14-11     right lung 1/4 collpsed    • 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     exposed as a child, told not active   • Mixed hyperlipidemia 12/27/2011   • Post-menopause on HRT (hormone replacement therapy) 12/27/2011   • Unspecified vitamin D deficiency 9/24/2012   • Anesthesia 02-14-11     PO N/V, \"slow to come out\"   • Arthritis 02-14-11     spine   • Endometriosis of uterus    • HTN, goal below 130/80 10/24/2011   • Hiatus hernia syndrome      not repaired   • Backpain 02-14-11     neck and abd. also,    • Fusion of spine 2014   • H/O: CVA (cerebrovascular accident) 8/22/2014     Complex event associated with apparent medication reaction but with MRI suggesting possible multiple small infarcts of various ages, [facility]   • H/O fall      when in hospital  with low O2 sats   • Breath shortness      with exertion   • Sleep apnea 6/2015     CPAP   • MRSA exposure 8/2014     while in hospital   • Infectious disease       Hep C +   • " Moderate major depression (CMS-HCC) 10/24/2011     Past Surgical History   Procedure Laterality Date   • Tonsillectomy and adenoidectomy  1959   • Tubal ligation  1988   • Low anterior resection laparoscopic  3/2/2011     Performed by AYAZ OKEEFE at SURGERY [facility]   • Cervical disk and fusion anterior  6/22/2010     Performed by JOSEPH DARLING at SURGERY [facility]   • Carpal tunnel release  11/14/2012     Performed by Holly Martin M.D. at SURGERY [facility]   • Gastroscopy-endo  6/24/2015     Procedure: GASTROSCOPY-ENDO;  Surgeon: Ayaz Okeefe M.D.;  Location: ENDOSCOPY [facility];  Service:    • Gyn surgery       partial hysterectomy   • Appendectomy     • Nissen fundoplication laparoscopic N/A 6/30/2015     Procedure: NISSEN FUNDOPLICATION LAPAROSCOPIC;  Surgeon: Ayaz Okeefe M.D.;  Location: SURGERY SAME DAY [facility];  Service:      Social History     Social History   • Marital Status:      Spouse Name: N/A   • Number of Children: N/A   • Years of Education: N/A     Occupational History   • Not on file.     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[employer]     Other Topics Concern   •  Service No   • Blood Transfusions No   • Exercise No   • Bike Helmet No   • Seat Belt Yes   • Self-Exams Yes     Social History Narrative     History   Smoking status   • Former Smoker -- 0.30 packs/day for 5 years   • Types: Cigarettes   • Quit date: 01/01/1975   Smokeless tobacco   • Never Used     Comment: quit 1975     Family History   Problem Relation Age of Onset   • Diabetes     • Heart Disease     • Hypertension     • Lung Disease     • Diabetes Mother    • Cancer Mother 82     breast cancer   • Lung  Disease Mother      copd   • Hyperlipidemia Mother    • Hypertension Mother    • Cancer Father      Laryngeal CA   • Heart Disease Father 50     CAD with bypasses x 3   • Heart Attack Father    • Hyperlipidemia Father    • Hypertension Father    • Diabetes Sister      type II diabetes     Allergies   Allergen Reactions   • Augmentin [Amoxicillin-Pot Clavulanate]    • Nkda [No Known Drug Allergy]    • Seasonal      Pt states eye irritation       Problem List:     Patient Active Problem List    Diagnosis Date Noted   • Sensation of fullness in left ear 03/02/2017   • Fatigue 03/02/2017   • S/P lumbar fusion 10/20/2016   • Cervical radiculopathy 10/20/2016   • Memory changes 08/25/2015   • Stroke-related cognitive dysfunction (CMS-HCC) 08/25/2015   • Diaphragmatic hernia 06/30/2015   • Hiatal hernia 06/23/2015   • History of gastrointestinal bleeding 06/04/2015   • Mild mitral regurgitation 03/12/2015   • Preop examination 03/06/2015   • Meniscus tear 02/19/2015   • H/O: CVA (cerebrovascular accident) 09/23/2014   • Chronic constipation 09/17/2014   • GERD (gastroesophageal reflux disease) 09/17/2014   • Seasonal allergies 05/02/2014   • Hypoxemia 09/01/2013   • Lumbar radiculopathy, chronic 08/26/2013   • Obesity (BMI 30.0-34.9) 01/25/2013   • Carpal tunnel syndrome 11/14/2012   • Vitamin D deficiency disease 09/24/2012   • Mixed hyperlipidemia 12/27/2011   • Post-menopause on HRT (hormone replacement therapy) 12/27/2011   • HTN, goal below 130/80 10/24/2011   • Acquired scoliosis 10/24/2011   • Sleep apnea, obstructive 10/24/2011   • Insomnia, persistent 10/24/2011   • Moderate major depression (CMS-[facility]) 10/24/2011   • 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         Medications:     Current outpatient prescriptions:   •  clindamycin (CLEOCIN) 300 MG Cap, Take 1 Cap by mouth 3 times a day for 7 days., Disp: 21 Cap, Rfl: 0  •  amoxicillin-clavulanate (AUGMENTIN) 875-125 MG Tab, Take 1 Tab by mouth 2 times a day for 10 days., Disp: 20  Tab, Rfl: 0  •  guaifenesin-codeine (CHERATUSSIN AC) Solution oral solution, Take 5 mL by mouth every four hours as needed for Cough., Disp: 120 mL, Rfl: 0  •  albuterol 108 (90 BASE) MCG/ACT Aero Soln inhalation aerosol, Inhale 2 Puffs by mouth every four hours as needed., Disp: 1 Inhaler, Rfl: 0  •  baclofen (LIORESAL) 10 MG Tab, TAKE ONE TABLET BY MOUTH TWICE DAILY, Disp: 180 Tab, Rfl: 1  •  lorazepam (ATIVAN) 1 MG Tab, TAKE ONE TABLET BY MOUTH ONCE DAILY AT BEDTIME, Disp: 30 Tab, Rfl: 3  •  pantoprazole (PROTONIX) 40 MG Tablet Delayed Response, TAKE ONE TABLET BY MOUTH ONCE DAILY, Disp: 90 Tab, Rfl: 3  •  gabapentin (NEURONTIN) 300 MG Cap, TAKE ONE CAPSULE BY MOUTH TWICE DAILY, Disp: 180 Cap, Rfl: 3  •  citalopram (CELEXA) 40 MG Tab, TAKE ONE TABLET BY MOUTH ONCE DAILY, Disp: 90 Tab, Rfl: 3  •  triamcinolone acetonide (KENALOG) 0.1 % Cream, Apply thin layer to affected area three times daily for one week, Disp: 1 Tube, Rfl: 0  •  DOCQLACE 100 MG Cap, TAKE ONE CAPSULE BY MOUTH TWICE DAILY, Disp: 180 Cap, Rfl: 3  •  atorvastatin (LIPITOR) 40 MG Tab, TAKE ONE TABLET BY MOUTH ONCE DAILY, Disp: 90 Tab, Rfl: 3  •  furosemide (LASIX) 20 MG Tab, TAKE ONE TABLET BY MOUTH ONCE DAILY, Disp: 90 Tab, Rfl: 3  •  NASONEX 50 MCG/ACT nasal spray, USE TWO SPRAY(S) IN EACH NOSTRIL ONCE DAILY, Disp: 17 g, Rfl: 3  •  Multiple Vitamins-Minerals (MULTI-VITAMIN/MINERALS PO), Take 1 Tab by mouth every day., Disp: , Rfl:   •  vitamin D (CHOLECALCIFEROL) 1000 UNIT TABS, Take 2,000 Units by mouth every day., Disp: , Rfl:   •  Cyanocobalamin (VITAMIN B 12 PO), Place 1 Tab under tongue every day., Disp: , Rfl:   •  Probiotic Product (PROBIOTIC DAILY PO), Take 1 Cap by mouth every day., Disp: , Rfl:   •  Digestive Enzymes CAPS, Take 1 Cap by mouth 2 times a day, with meals., Disp: , Rfl:   •  sennosides (SENOKOT TO GO) 8.6 MG TABS, Take 1 Tab by mouth 2 times a day., Disp: 60 Tab, Rfl: 6      Review of Systems:   Positives as per HPI, all  "other systems reviewed and WNL       Physical Assessment:     VS: /80 mmHg  Pulse 92  Temp(Src) 36.9 °C (98.4 °F)  Resp 16  Ht 1.499 m (4' 11.02\")  Wt 78.019 kg (172 lb)  BMI 34.72 kg/m2  SpO2 96%    General: Well-developed, well-nourished, female, ill appearing     Head: PERRL, EOMI. Normocephalic,post oropharynx otherwise WNL. Nasal mucosa erythematous and edematous bilaterally, purulent discharge noted. Maxillary sinus pain with tap bilaterally.   Ears:Bilateral TMs wnl.  Bilateral EACs wnl.  Cardiovasc:No JVD.  RRR, no MRG. No thrills or bruits. Pulses 2+ and symmetric at all distal extremities.  Pulmonary: coughing throughout exam, deep breath elicits cough. Normal respiratory effort. No wheeze or crackles.   Neuro: Alert and oriented  Skin:No rashes noted. Skin warm, dry, intact    Lymph: No cervical, pre- or post-auricular, submandibular, occipital lymphadenopathy.    Psych: Dressed appropriately for the weather, pleasant and conversant.  Affect, mood & judgment appropriate.      Assessment/Plan:   Almaz was seen today for sinus problem.    Diagnoses and all orders for this visit:    Bacterial sinusitis, ongoing, uncontrolled   - Discontinue Augmentin, start clindamycin 3 times a day ×7 days, start nasal wash twice a day plus nasal steroid spray daily. May try over-the-counter multisymptom with decongestant. May use albuterol inhaler as needed for cough and wheeze, she also has prescription cough suppressant from urgent care which she may use at night for coughing. She is to contact the office in 3-5 days to let us know how she is doing, she will follow up to the urgent care if her symptoms worsen over the weekend.  -     clindamycin (CLEOCIN) 300 MG Cap; Take 1 Cap by mouth 3 times a day for 7 days.      Patient is agreeable to the above plan and voiced understanding. All questions answered.     Please note that this dictation was created using voice recognition software. I have made every " reasonable attempt to correct obvious errors, but I expect that there are errors of grammar and possibly content that I did not discover before finalizing the note.      JOI Cisneros  4/28/2017, 11:25 AM

## 2017-04-28 NOTE — MR AVS SNAPSHOT
"        Almaz Cruz Jimmie   2017 11:20 AM   Office Visit   MRN: [id number]    Department:  [facility]   Dept Phone:  [phone]    Description:  Female : 1955   Provider:  TASHA Gentile           Reason for Visit     Sinus Problem seen   4.20.17      Allergies as of 2017     Allergen Noted Reactions    Augmentin [Amoxicillin-Pot Clavulanate] 2017       Nkda [No Known Drug Allergy] 2011       Seasonal 2015       Pt states eye irritation      You were diagnosed with     Bacterial sinusitis   [937214]         Vital Signs     Blood Pressure Pulse Temperature Respirations Height Weight    124/80 mmHg 92 36.9 °C (98.4 °F) 16 1.499 m (4' 11.02\") 78.019 kg (172 lb)    Body Mass Index Oxygen Saturation Smoking Status             34.72 kg/m2 96% Former Smoker         Basic Information     Date Of Birth Sex Race Ethnicity Preferred Language    1955 Female White Non- English      Your appointments     2017  2:20 PM   NEW TO YOU with Rudy Olivares M.D.   [facility] [place]    [address] [place] [id number] [phone]              Problem List              ICD-10-CM Priority Class Noted - Resolved    HTN, goal below 130/80 (Chronic) I10   10/24/2011 - Present    Acquired scoliosis (Chronic) M41.9   10/24/2011 - Present    Sleep apnea, obstructive (Chronic) G47.33   10/24/2011 - Present    Insomnia, persistent G47.00   10/24/2011 - Present    Moderate major depression (CMS-HCC) F32.1   10/24/2011 - Present    PPD positive R76.11   10/24/2011 - Present    Mixed hyperlipidemia E78.2   2011 - Present    Post-menopause on HRT (hormone replacement therapy) Z79.890   2011 - Present    Vitamin D deficiency disease E55.9   2012 - Present    Carpal tunnel syndrome G56.00   2012 - Present    Obesity (BMI 30.0-34.9) E66.9   2013 - Present    Lumbar radiculopathy, chronic M54.16   2013 - " Present    Hypoxemia R09.02   9/1/2013 - Present    Seasonal allergies J30.2   5/2/2014 - Present    Chronic constipation K59.09   9/17/2014 - Present    GERD (gastroesophageal reflux disease) K21.9   9/17/2014 - Present    H/O: CVA (cerebrovascular accident) Z86.73   9/23/2014 - Present    Meniscus tear S83.209A   2/19/2015 - Present    Preop examination Z01.818   3/6/2015 - Present    Mild mitral regurgitation I34.0   3/12/2015 - Present    History of gastrointestinal bleeding Z87.19   6/4/2015 - Present    Hiatal hernia K44.9   6/23/2015 - Present    Diaphragmatic hernia K44.9   6/30/2015 - Present    Memory changes R41.3   8/25/2015 - Present    Stroke-related cognitive dysfunction (CMS-HCC) I63.9, R41.89   8/25/2015 - Present    S/P lumbar fusion Z98.1   10/20/2016 - Present    Cervical radiculopathy M54.12   10/20/2016 - Present    Sensation of fullness in left ear H93.8X2   3/2/2017 - Present    Fatigue R53.83   3/2/2017 - Present      Health Maintenance        Date Due Completion Dates    IMM DTaP/Tdap/Td Vaccine (1 - Tdap) 3/20/1972 ---    IMM ZOSTER VACCINE 3/20/2013 ---    MAMMOGRAM 5/13/2016 5/13/2015, 5/13/2015, 9/17/2013    COLONOSCOPY 10/28/2021 10/28/2011 (Prv Comp)    Override on 10/28/2011: Previously completed (Done at [facility], negative, good for 10 years)            Current Immunizations     Influenza TIV (IM) 10/2/2010    Influenza Vaccine Quad Inj (Preserved) 10/20/2016 10:47 AM, 10/26/2015      Below and/or attached are the medications your provider expects you to take. Review all of your home medications and newly ordered medications with your provider and/or pharmacist. Follow medication instructions as directed by your provider and/or pharmacist. Please keep your medication list with you and share with your provider. Update the information when medications are discontinued, doses are changed, or new medications (including over-the-counter products) are added; and carry medication  information at all times in the event of emergency situations     Allergies:  AUGMENTIN - (reactions not documented)     NKDA - (reactions not documented)     SEASONAL - (reactions not documented)               Medications  Valid as of: April 28, 2017 - 11:43 AM    Generic Name Brand Name Tablet Size Instructions for use    Albuterol Sulfate (Aero Soln) albuterol 108 (90 BASE) MCG/ACT Inhale 2 Puffs by mouth every four hours as needed.        Amoxicillin-Pot Clavulanate (Tab) AUGMENTIN 875-125 MG Take 1 Tab by mouth 2 times a day for 10 days.        Atorvastatin Calcium (Tab) LIPITOR 40 MG TAKE ONE TABLET BY MOUTH ONCE DAILY        Baclofen (Tab) LIORESAL 10 MG TAKE ONE TABLET BY MOUTH TWICE DAILY        Cholecalciferol (Tab) cholecalciferol 1000 UNIT Take 2,000 Units by mouth every day.        Citalopram Hydrobromide (Tab) CELEXA 40 MG TAKE ONE TABLET BY MOUTH ONCE DAILY        Clindamycin HCl (Cap) CLEOCIN 300 MG Take 1 Cap by mouth 3 times a day for 7 days.        Cyanocobalamin   Place 1 Tab under tongue every day.        Digestive Enzymes (Cap) Digestive Enzymes  Take 1 Cap by mouth 2 times a day, with meals.        Docusate Sodium (Cap) DOCQLACE 100 MG TAKE ONE CAPSULE BY MOUTH TWICE DAILY        Furosemide (Tab) LASIX 20 MG TAKE ONE TABLET BY MOUTH ONCE DAILY        Gabapentin (Cap) NEURONTIN 300 MG TAKE ONE CAPSULE BY MOUTH TWICE DAILY        Guaifenesin-Codeine (Solution) ROBITUSSIN -10 mg/5mL Take 5 mL by mouth every four hours as needed for Cough.        LORazepam (Tab) ATIVAN 1 MG TAKE ONE TABLET BY MOUTH ONCE DAILY AT BEDTIME        Mometasone Furoate (Suspension) NASONEX 50 MCG/ACT USE TWO SPRAY(S) IN EACH NOSTRIL ONCE DAILY        Multiple Vitamins-Minerals   Take 1 Tab by mouth every day.        Pantoprazole Sodium (Tablet Delayed Response) PROTONIX 40 MG TAKE ONE TABLET BY MOUTH ONCE DAILY        Probiotic Product   Take 1 Cap by mouth every day.        Sennosides (Tab) SENOKOT 8.6 MG Take 1  Tab by mouth 2 times a day.        Triamcinolone Acetonide (Cream) KENALOG 0.1 % Apply thin layer to affected area three times daily for one week        .                 Medicines prescribed today were sent to:     [address] [place] [id number]    Phone: [phone] Fax: [phone]    Open 24 Hours?: No      Medication refill instructions:       If your prescription bottle indicates you have medication refills left, it is not necessary to call your provider’s office. Please contact your pharmacy and they will refill your medication.    If your prescription bottle indicates you do not have any refills left, you may request refills at any time through one of the following ways: The online [employer] system (except Urgent Care), by calling your provider’s office, or by asking your pharmacy to contact your provider’s office with a refill request. Medication refills are processed only during regular business hours and may not be available until the next business day. Your provider may request additional information or to have a follow-up visit with you prior to refilling your medication.   *Please Note: Medication refills are assigned a new Rx number when refilled electronically. Your pharmacy may indicate that no refills were authorized even though a new prescription for the same medication is available at the pharmacy. Please request the medicine by name with the pharmacy before contacting your provider for a refill.        Instructions    To do:     Nasal wash twice daily - then following with nasonex.     Continue to use theraflu to dry the nose     Albuterol inhaler 4 times a day as needed for cough and wheezing     You can use your liquid cough medicine at night if needed.            Other Notes About Your Plan     Last UDS: 9/16/2014  Contolled Substance agreement signed: 9/16/2014    Mammogram: 8/2011: normal  GI: Dr. Sindhu Marquez  Neurosurgeon:  Dr. Nielsen, Dr. Aracelis Vega  Surgeon: Dr. Kenji Okeefe           MyChart Access Code: Activation code not generated  Current MyChart Status: Active

## 2017-04-28 NOTE — TELEPHONE ENCOUNTER
Paulino at [facility] called and stated pt was worried about taking Clindamycin and getting CDiff, because she already has diarrhea, Pt wants to know if there is anything else that can be given. Please advise

## 2017-05-03 ENCOUNTER — PATIENT MESSAGE (OUTPATIENT)
Dept: MEDICAL GROUP | Facility: PHYSICIAN GROUP | Age: 62
End: 2017-05-03

## 2017-05-03 DIAGNOSIS — G47.00 INSOMNIA, PERSISTENT: ICD-10-CM

## 2017-05-03 RX ORDER — LORAZEPAM 1 MG/1
TABLET ORAL
Qty: 30 TAB | Refills: 0 | Status: SHIPPED
Start: 2017-05-03 | End: 2017-06-15 | Stop reason: SDUPTHER

## 2017-05-03 NOTE — TELEPHONE ENCOUNTER
Azalea,  Please get patient an sooner with Dr. Olivares, at least within the month. She has an appointment scheduled with her in July. Dr. Olivares has okayed this.

## 2017-05-05 ENCOUNTER — PATIENT MESSAGE (OUTPATIENT)
Dept: MEDICAL GROUP | Facility: PHYSICIAN GROUP | Age: 62
End: 2017-05-05

## 2017-05-05 RX ORDER — LORAZEPAM 0.5 MG/1
0.5 TABLET ORAL
Qty: 30 TAB | Refills: 1 | Status: SHIPPED | OUTPATIENT
Start: 2017-06-01 | End: 2017-06-15

## 2017-05-05 NOTE — TELEPHONE ENCOUNTER
I've gone ahead and given her enough to last until her appointment with me, but I'll try to talk to her about the addictive nature and see if she can try some other sleep medication.  Rudy Olivares M.D.

## 2017-05-08 ENCOUNTER — TELEPHONE (OUTPATIENT)
Dept: MEDICAL GROUP | Facility: PHYSICIAN GROUP | Age: 62
End: 2017-05-08

## 2017-05-09 NOTE — TELEPHONE ENCOUNTER
Pt called and stated that she has been on a ABX for 2 wks.  She has been off of them since last Friday.  She is breaking out in sweats, lightheaded, nausea.  Drainage from nose is clear.  Allergies?  She said that she has been dealing with all of this for 1 month and it has gotten worse with the later symptoms for a couple of days.      I gave her an appt for 5.10.17 to be seen again, but I told her if it gets even worse then go to ED or urgent care

## 2017-05-10 ENCOUNTER — HOSPITAL ENCOUNTER (OUTPATIENT)
Dept: LAB | Facility: MEDICAL CENTER | Age: 62
End: 2017-05-10
Attending: INTERNAL MEDICINE
Payer: COMMERCIAL

## 2017-05-10 ENCOUNTER — OFFICE VISIT (OUTPATIENT)
Dept: MEDICAL GROUP | Facility: PHYSICIAN GROUP | Age: 62
End: 2017-05-10
Payer: COMMERCIAL

## 2017-05-10 VITALS
WEIGHT: 172 LBS | HEIGHT: 59 IN | HEART RATE: 82 BPM | RESPIRATION RATE: 16 BRPM | DIASTOLIC BLOOD PRESSURE: 76 MMHG | OXYGEN SATURATION: 96 % | BODY MASS INDEX: 34.68 KG/M2 | SYSTOLIC BLOOD PRESSURE: 122 MMHG | TEMPERATURE: 97.9 F

## 2017-05-10 DIAGNOSIS — R42 LIGHTHEADEDNESS: ICD-10-CM

## 2017-05-10 DIAGNOSIS — E55.9 VITAMIN D DEFICIENCY DISEASE: ICD-10-CM

## 2017-05-10 DIAGNOSIS — J30.9 ALLERGIC RHINITIS, UNSPECIFIED ALLERGIC RHINITIS TRIGGER, UNSPECIFIED RHINITIS SEASONALITY: ICD-10-CM

## 2017-05-10 DIAGNOSIS — E78.2 MIXED HYPERLIPIDEMIA: ICD-10-CM

## 2017-05-10 DIAGNOSIS — R53.83 FATIGUE, UNSPECIFIED TYPE: ICD-10-CM

## 2017-05-10 LAB
25(OH)D3 SERPL-MCNC: 30 NG/ML (ref 30–100)
ALBUMIN SERPL BCP-MCNC: 4.5 G/DL (ref 3.2–4.9)
ALBUMIN/GLOB SERPL: 1.5 G/DL
ALP SERPL-CCNC: 100 U/L (ref 30–99)
ALT SERPL-CCNC: 46 U/L (ref 2–50)
ANION GAP SERPL CALC-SCNC: 7 MMOL/L (ref 0–11.9)
AST SERPL-CCNC: 30 U/L (ref 12–45)
BASOPHILS # BLD AUTO: 0.8 % (ref 0–1.8)
BASOPHILS # BLD: 0.04 K/UL (ref 0–0.12)
BILIRUB SERPL-MCNC: 0.6 MG/DL (ref 0.1–1.5)
BUN SERPL-MCNC: 14 MG/DL (ref 8–22)
CALCIUM SERPL-MCNC: 9.7 MG/DL (ref 8.5–10.5)
CHLORIDE SERPL-SCNC: 103 MMOL/L (ref 96–112)
CHOLEST SERPL-MCNC: 312 MG/DL (ref 100–199)
CO2 SERPL-SCNC: 28 MMOL/L (ref 20–33)
CREAT SERPL-MCNC: 0.87 MG/DL (ref 0.5–1.4)
EOSINOPHIL # BLD AUTO: 0.09 K/UL (ref 0–0.51)
EOSINOPHIL NFR BLD: 1.7 % (ref 0–6.9)
ERYTHROCYTE [DISTWIDTH] IN BLOOD BY AUTOMATED COUNT: 47.2 FL (ref 35.9–50)
GFR SERPL CREATININE-BSD FRML MDRD: >60 ML/MIN/1.73 M 2
GLOBULIN SER CALC-MCNC: 3.1 G/DL (ref 1.9–3.5)
GLUCOSE SERPL-MCNC: 105 MG/DL (ref 65–99)
HCT VFR BLD AUTO: 45.6 % (ref 37–47)
HDLC SERPL-MCNC: 55 MG/DL
HGB BLD-MCNC: 15.1 G/DL (ref 12–16)
IMM GRANULOCYTES # BLD AUTO: 0.02 K/UL (ref 0–0.11)
IMM GRANULOCYTES NFR BLD AUTO: 0.4 % (ref 0–0.9)
LDLC SERPL CALC-MCNC: 216 MG/DL
LYMPHOCYTES # BLD AUTO: 2.03 K/UL (ref 1–4.8)
LYMPHOCYTES NFR BLD: 38.5 % (ref 22–41)
MCH RBC QN AUTO: 30.9 PG (ref 27–33)
MCHC RBC AUTO-ENTMCNC: 33.1 G/DL (ref 33.6–35)
MCV RBC AUTO: 93.4 FL (ref 81.4–97.8)
MONOCYTES # BLD AUTO: 0.43 K/UL (ref 0–0.85)
MONOCYTES NFR BLD AUTO: 8.2 % (ref 0–13.4)
NEUTROPHILS # BLD AUTO: 2.66 K/UL (ref 2–7.15)
NEUTROPHILS NFR BLD: 50.4 % (ref 44–72)
NRBC # BLD AUTO: 0 K/UL
NRBC BLD AUTO-RTO: 0 /100 WBC
PLATELET # BLD AUTO: 362 K/UL (ref 164–446)
PMV BLD AUTO: 9.1 FL (ref 9–12.9)
POTASSIUM SERPL-SCNC: 4.2 MMOL/L (ref 3.6–5.5)
PROT SERPL-MCNC: 7.6 G/DL (ref 6–8.2)
RBC # BLD AUTO: 4.88 M/UL (ref 4.2–5.4)
SODIUM SERPL-SCNC: 138 MMOL/L (ref 135–145)
TRIGL SERPL-MCNC: 206 MG/DL (ref 0–149)
TSH SERPL DL<=0.005 MIU/L-ACNC: 1.8 UIU/ML (ref 0.3–3.7)
WBC # BLD AUTO: 5.3 K/UL (ref 4.8–10.8)

## 2017-05-10 PROCEDURE — 84443 ASSAY THYROID STIM HORMONE: CPT

## 2017-05-10 PROCEDURE — 82306 VITAMIN D 25 HYDROXY: CPT

## 2017-05-10 PROCEDURE — 85025 COMPLETE CBC W/AUTO DIFF WBC: CPT

## 2017-05-10 PROCEDURE — 80061 LIPID PANEL: CPT

## 2017-05-10 PROCEDURE — 36415 COLL VENOUS BLD VENIPUNCTURE: CPT

## 2017-05-10 PROCEDURE — 80053 COMPREHEN METABOLIC PANEL: CPT

## 2017-05-10 PROCEDURE — 99214 OFFICE O/P EST MOD 30 MIN: CPT | Performed by: NURSE PRACTITIONER

## 2017-05-10 RX ORDER — CETIRIZINE HYDROCHLORIDE 10 MG/1
10 TABLET ORAL DAILY
Qty: 90 TAB | Refills: 1 | Status: SHIPPED | OUTPATIENT
Start: 2017-05-10 | End: 2017-09-24 | Stop reason: SDUPTHER

## 2017-05-10 RX ORDER — FLUTICASONE PROPIONATE 50 MCG
2 SPRAY, SUSPENSION (ML) NASAL DAILY
Qty: 3 BOTTLE | Refills: 3 | Status: SHIPPED | OUTPATIENT
Start: 2017-05-10 | End: 2018-05-01

## 2017-05-10 NOTE — PATIENT INSTRUCTIONS
Zrytec 10 mg 1 pill daily--after a week can increase to 2 pills daily-take at bedtime    Flonase--2 sprays to each nostril once day    Nasal saline--2 sprays to each nostril up to 3-4 times a day    Call or send a message to me in 5 days, if not better, will call in new antibiotic--likely [id number]    Follow up as needed

## 2017-05-10 NOTE — PROGRESS NOTES
Chief Complaint   Patient presents with   • Sinus Problem     hot/cold sweats         This is a 62 y.o.female patient that presents today with the following: Follow-up visit,  continued symptoms of upper respiratory infection, lightheadedness and sweating    Lightheadedness  Pt recently started on antibiotics for sinusitis, initially Augmentin then Clindamycin, then ultimately doxycycline, as she was not tolerating the clindamycin. She took enough doxycycline to ensure she took 2 weeks worth of antibiotics, thus she did not take the entire course.  Today she complains of lightheadedness, dizziness, headache and occasional sweating. She does not complain of chest pain or shortness of breath. She still has a cough, but states that overall this has improved. She does not complain of fever or chills. She still complains of nasal congestion and clear nasal drainage. She is also sneezing.  Upon exam her nasal mucosa was found to be erythematous and edematous and she had very mild sinus tenderness to her frontal sinuses bilaterally. I discussed with her that her symptoms are very consistent with allergic rhinitis versus lingering sinusitis. I would like to aggressively treat her sinuses for the next 5 days and if her symptoms do not improve I would like her to call or send me a message and we will send over antibiotic. I have advised her to take Zyrtec, 10 mg daily, she can increase this to 2 pills daily after a week or so. I would also like her to take Flonase, 2 sprays to each nostril once a day, she can change this to one spray to each nostril twice a day if she experiences epistaxis. She is also to use nasal saline, 2 sprays each nostril up to 3-4 times a day for congestion and nasal rinsing. I would like her to use acetaminophen or ibuprofen as needed for headache. She is to rest when able and to stay well hydrated. Patient was agreeable to this plan.    Allergic rhinitis  See additional notes some  "lightheadedness.      Office Visit on 04/20/2017   Component Date Value   • Rapid Influenza A-B 04/20/2017 neg    • Internal Control Positive 04/20/2017 Positive    • Internal Control Negative 04/20/2017 Negative          clinical course has fluctuated    Past Medical History   Diagnosis Date   • Scoliosis    • Diverticulitis    • High cholesterol    • Lung collapse 02-14-11     right lung 1/4 collpsed    • 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     exposed as a child, told not active   • Mixed hyperlipidemia 12/27/2011   • Post-menopause on HRT (hormone replacement therapy) 12/27/2011   • Unspecified vitamin D deficiency 9/24/2012   • Anesthesia 02-14-11     PO N/V, \"slow to come out\"   • Arthritis 02-14-11     spine   • Endometriosis of uterus    • HTN, goal below 130/80 10/24/2011   • Hiatus hernia syndrome      not repaired   • Backpain 02-14-11     neck and abd. also,    • Fusion of spine 2014   • H/O: CVA (cerebrovascular accident) 8/22/2014     Complex event associated with apparent medication reaction but with MRI suggesting possible multiple small infarcts of various ages, [facility]   • H/O fall      when in hospital  with low O2 sats   • Breath shortness      with exertion   • Sleep apnea 6/2015     CPAP   • MRSA exposure 8/2014     while in hospital   • Infectious disease       Hep C +   • Moderate major depression (CMS-HCC) 10/24/2011       Past Surgical History   Procedure Laterality Date   • Tonsillectomy and adenoidectomy  1959   • Tubal ligation  1988   • Low anterior resection laparoscopic  3/2/2011     Performed by KENJI OKEEFE at SURGERY [facility]   • Cervical disk and fusion anterior  6/22/2010     Performed by JOSEPH DARLING at SURGERY [facility]   • Carpal tunnel release  11/14/2012     Performed by Holly Martin M.D. at SURGERY [facility]   • Gastroscopy-endo  6/24/2015     Procedure: GASTROSCOPY-ENDO;  Surgeon: Kenji Okeefe M.D.;  Location: ENDOSCOPY [facility];  Service:    • " Gyn surgery       partial hysterectomy   • Appendectomy     • Nissen fundoplication laparoscopic N/A 6/30/2015     Procedure: NISSEN FUNDOPLICATION LAPAROSCOPIC;  Surgeon: Kenji Okeefe M.D.;  Location: SURGERY SAME DAY [facility];  Service:        Family History   Problem Relation Age of Onset   • Diabetes     • Heart Disease     • Hypertension     • Lung Disease     • Diabetes Mother    • Cancer Mother 82     breast cancer   • Lung Disease Mother      copd   • Hyperlipidemia Mother    • Hypertension Mother    • Cancer Father      Laryngeal CA   • Heart Disease Father 50     CAD with bypasses x 3   • Heart Attack Father    • Hyperlipidemia Father    • Hypertension Father    • Diabetes Sister      type II diabetes       Nkda and Seasonal    Current Outpatient Prescriptions Ordered in [facility]   Medication Sig Dispense Refill   • cetirizine (ZYRTEC) 10 MG Tab Take 1 Tab by mouth every day. 90 Tab 1   • fluticasone (FLONASE) 50 MCG/ACT nasal spray Spray 2 Sprays in nose every day. 3 Bottle 3   • [START ON 6/1/2017] lorazepam (ATIVAN) 0.5 MG Tab Take 1 Tab by mouth at bedtime as needed for Anxiety for up to 30 days. 30 Tab 1   • lorazepam (ATIVAN) 1 MG Tab TAKE ONE TABLET BY MOUTH ONCE DAILY AT BEDTIME 30 Tab 0   • baclofen (LIORESAL) 10 MG Tab TAKE ONE TABLET BY MOUTH TWICE DAILY 180 Tab 1   • pantoprazole (PROTONIX) 40 MG Tablet Delayed Response TAKE ONE TABLET BY MOUTH ONCE DAILY 90 Tab 3   • guaifenesin-codeine (CHERATUSSIN AC) Solution oral solution Take 5 mL by mouth every four hours as needed for Cough. 120 mL 0   • albuterol 108 (90 BASE) MCG/ACT Aero Soln inhalation aerosol Inhale 2 Puffs by mouth every four hours as needed. 1 Inhaler 0   • gabapentin (NEURONTIN) 300 MG Cap TAKE ONE CAPSULE BY MOUTH TWICE DAILY 180 Cap 3   • citalopram (CELEXA) 40 MG Tab TAKE ONE TABLET BY MOUTH ONCE DAILY 90 Tab 3   • triamcinolone acetonide (KENALOG) 0.1 % Cream Apply thin layer to affected area three times daily for one week  "1 Tube 0   • DOCQLACE 100 MG Cap TAKE ONE CAPSULE BY MOUTH TWICE DAILY 180 Cap 3   • atorvastatin (LIPITOR) 40 MG Tab TAKE ONE TABLET BY MOUTH ONCE DAILY 90 Tab 3   • furosemide (LASIX) 20 MG Tab TAKE ONE TABLET BY MOUTH ONCE DAILY 90 Tab 3   • NASONEX 50 MCG/ACT nasal spray USE TWO SPRAY(S) IN EACH NOSTRIL ONCE DAILY 17 g 3   • Multiple Vitamins-Minerals (MULTI-VITAMIN/MINERALS PO) Take 1 Tab by mouth every day.     • vitamin D (CHOLECALCIFEROL) 1000 UNIT TABS Take 2,000 Units by mouth every day.     • Cyanocobalamin (VITAMIN B 12 PO) Place 1 Tab under tongue every day.     • Probiotic Product (PROBIOTIC DAILY PO) Take 1 Cap by mouth every day.     • Digestive Enzymes CAPS Take 1 Cap by mouth 2 times a day, with meals.     • sennosides (SENOKOT TO GO) 8.6 MG TABS Take 1 Tab by mouth 2 times a day. 60 Tab 6     No current Epic-ordered facility-administered medications on file.       Constitutional ROS: No unexpected change in weight, No weakness, No unexplained fevers, or chills. Positive for sweats  Pulmonary ROS: Positive per history of present illness  Cardiovascular ROS: No chest pain, No edema, No palpitations  Gastrointestinal ROS: No abdominal pain, No nausea, vomiting, diarrhea, or constipation, no blood in stool  Musculoskeletal/Extremities ROS: No clubbing, No peripheral edema, No pain, redness or swelling on the joints  Neurologic ROS: Normal development, No seizures, No weakness, Positive for headaches     Physical exam:  /76 mmHg  Pulse 82  Temp(Src) 36.6 °C (97.9 °F)  Resp 16  Ht 1.499 m (4' 11.02\")  Wt 78.019 kg (172 lb)  BMI 34.72 kg/m2  SpO2 96%  General Appearance: Older female, alert, no distress, obese, well-groomed  Skin: Skin color, texture, turgor normal. No rashes or lesions.  Eyes: conjunctivae/corneas clear. PERRL, EOM's intact.   Ears: positive findings: R TM - retracted, L TM - retracted--no signs of infection  Nose/Sinuses: positive findings: mucosa erythematous and swollen, " sinus tenderness bilateral bilateral frontal  Oropharynx: positive findings: mild oropharyngeal erythema  Lungs: negative findings: normal respiratory rate and rhythm, lungs clear to auscultation  Heart: negative. RRR without murmur, gallop, or rubs.  No ectopy.  Abdomen: Abdomen soft, non-tender. BS normal. No masses,  No organomegaly  Musculoskeletal: negative findings: no erythema, induration, or nodules, no evidence of joint effusion, strength normal, no deformities present  Neurologic: intact, oriented, and appropriate, judgment intact. Cranial nerves II-12 grossly intact    Medical decision making/discussion: Patient here for follow-up, presenting with signs and symptoms consistent with allergic rhinitis. She is to start Zyrtec, Flonase and nasal saline as discussed. She is to call or send a message to me in 5 days if no improvement, otherwise will call in new antibiotic. She is to stay hydrated, take acetaminophen or ibuprofen for headache. Rest when able. She is to keep her upcoming appointment with new physician provider to establish care.    Almaz was seen today for sinus problem.    Diagnoses and all orders for this visit:    Lightheadedness    Allergic rhinitis, unspecified allergic rhinitis trigger, unspecified rhinitis seasonality  -     cetirizine (ZYRTEC) 10 MG Tab; Take 1 Tab by mouth every day.  -     fluticasone (FLONASE) 50 MCG/ACT nasal spray; Spray 2 Sprays in nose every day.          Please note that this dictation was created using voice recognition software. I have made every reasonable attempt to correct obvious errors, but I expect that there are errors of grammar and possibly content that I did not discover before finalizing the note.

## 2017-05-10 NOTE — ASSESSMENT & PLAN NOTE
Pt recently started on antibiotics for sinusitis, initially Augmentin then Clindamycin, then ultimately doxycycline, as she was not tolerating the clindamycin. She took enough doxycycline to ensure she took 2 weeks worth of antibiotics, thus she did not take the entire course.  Today she complains of lightheadedness, dizziness, headache and occasional sweating. She does not complain of chest pain or shortness of breath. She still has a cough, but states that overall this has improved. She does not complain of fever or chills. She still complains of nasal congestion and clear nasal drainage. She is also sneezing.  Upon exam her nasal mucosa was found to be erythematous and edematous and she had very mild sinus tenderness to her frontal sinuses bilaterally. I discussed with her that her symptoms are very consistent with allergic rhinitis versus lingering sinusitis. I would like to aggressively treat her sinuses for the next 5 days and if her symptoms do not improve I would like her to call or send me a message and we will send over antibiotic. I have advised her to take Zyrtec, 10 mg daily, she can increase this to 2 pills daily after a week or so. I would also like her to take Flonase, 2 sprays to each nostril once a day, she can change this to one spray to each nostril twice a day if she experiences epistaxis. She is also to use nasal saline, 2 sprays each nostril up to 3-4 times a day for congestion and nasal rinsing. I would like her to use acetaminophen or ibuprofen as needed for headache. She is to rest when able and to stay well hydrated. Patient was agreeable to this plan.

## 2017-05-15 ENCOUNTER — TELEPHONE (OUTPATIENT)
Dept: MEDICAL GROUP | Facility: PHYSICIAN GROUP | Age: 62
End: 2017-05-15

## 2017-05-15 NOTE — TELEPHONE ENCOUNTER
----- Message from Rudy Olivares M.D. sent at 5/11/2017  6:41 PM PDT -----  Almaz,  Your labs are all normal except slightly high glucose and significant increase in your cholesterols from 7 months ago. I see that you were on Lipitor 40 mg a day. Did you discontinue this.? Do need a new prescription?  Rudy Olivares M.D.

## 2017-05-16 NOTE — TELEPHONE ENCOUNTER
Message given to Almaz. She became very ill and stopped taking lipitor awhile ago. Almaz has appt scheduled 6/15 and would like to discuss with you at this time

## 2017-06-15 ENCOUNTER — TELEPHONE (OUTPATIENT)
Dept: URGENT CARE | Facility: PHYSICIAN GROUP | Age: 62
End: 2017-06-15

## 2017-06-15 ENCOUNTER — OFFICE VISIT (OUTPATIENT)
Dept: MEDICAL GROUP | Facility: PHYSICIAN GROUP | Age: 62
End: 2017-06-15
Payer: COMMERCIAL

## 2017-06-15 VITALS
DIASTOLIC BLOOD PRESSURE: 78 MMHG | SYSTOLIC BLOOD PRESSURE: 130 MMHG | OXYGEN SATURATION: 95 % | HEART RATE: 80 BPM | TEMPERATURE: 98.2 F | HEIGHT: 59 IN | BODY MASS INDEX: 32.86 KG/M2 | WEIGHT: 163 LBS | RESPIRATION RATE: 14 BRPM

## 2017-06-15 DIAGNOSIS — M41.9 ACQUIRED SCOLIOSIS: Chronic | ICD-10-CM

## 2017-06-15 DIAGNOSIS — E66.09 NON MORBID OBESITY DUE TO EXCESS CALORIES: ICD-10-CM

## 2017-06-15 DIAGNOSIS — J30.1 SEASONAL ALLERGIC RHINITIS DUE TO POLLEN: ICD-10-CM

## 2017-06-15 DIAGNOSIS — F41.9 ANXIETY: ICD-10-CM

## 2017-06-15 DIAGNOSIS — E78.5 DYSLIPIDEMIA: ICD-10-CM

## 2017-06-15 DIAGNOSIS — F32.1 MODERATE MAJOR DEPRESSION (HCC): ICD-10-CM

## 2017-06-15 DIAGNOSIS — E78.2 MIXED HYPERLIPIDEMIA: ICD-10-CM

## 2017-06-15 DIAGNOSIS — I10 HTN, GOAL BELOW 130/80: Chronic | ICD-10-CM

## 2017-06-15 DIAGNOSIS — F33.1 MAJOR DEPRESSIVE DISORDER, RECURRENT EPISODE, MODERATE (HCC): ICD-10-CM

## 2017-06-15 DIAGNOSIS — G47.00 INSOMNIA, PERSISTENT: ICD-10-CM

## 2017-06-15 PROCEDURE — 99214 OFFICE O/P EST MOD 30 MIN: CPT | Performed by: FAMILY MEDICINE

## 2017-06-15 RX ORDER — ATORVASTATIN CALCIUM 20 MG/1
20 TABLET, FILM COATED ORAL DAILY
Qty: 90 TAB | Refills: 3 | Status: SHIPPED | OUTPATIENT
Start: 2017-06-15 | End: 2017-12-15

## 2017-06-15 RX ORDER — CITALOPRAM 40 MG/1
TABLET ORAL
Qty: 90 TAB | Refills: 3 | Status: SHIPPED | OUTPATIENT
Start: 2017-06-15 | End: 2017-07-10 | Stop reason: SDUPTHER

## 2017-06-15 RX ORDER — LORAZEPAM 1 MG/1
TABLET ORAL
Qty: 30 TAB | Refills: 0 | Status: SHIPPED | OUTPATIENT
Start: 2017-08-15 | End: 2017-08-21

## 2017-06-15 RX ORDER — CITALOPRAM 40 MG/1
TABLET ORAL
Qty: 90 TAB | Refills: 3 | Status: SHIPPED | OUTPATIENT
Start: 2017-06-15 | End: 2017-06-15 | Stop reason: SDUPTHER

## 2017-06-15 RX ORDER — LORAZEPAM 1 MG/1
TABLET ORAL
Qty: 30 TAB | Refills: 0 | Status: SHIPPED | OUTPATIENT
Start: 2017-06-15 | End: 2017-06-15 | Stop reason: SDUPTHER

## 2017-06-15 RX ORDER — BACLOFEN 10 MG/1
10 TABLET ORAL DAILY
Qty: 90 TAB | Refills: 0 | Status: SHIPPED | OUTPATIENT
Start: 2017-06-15 | End: 2017-08-01 | Stop reason: SDUPTHER

## 2017-06-15 RX ORDER — LORAZEPAM 1 MG/1
TABLET ORAL
Qty: 30 TAB | Refills: 0 | Status: SHIPPED | OUTPATIENT
Start: 2017-07-15 | End: 2017-06-15 | Stop reason: SDUPTHER

## 2017-06-15 NOTE — MR AVS SNAPSHOT
"        Almaz Samuel   6/15/2017 8:00 AM   Office Visit   MRN: [id number]    Department:  [facility]   Dept Phone:  [phone]    Description:  Female : 1955   Provider:  Rudy Olivares M.D.           Reason for Visit     Medication Refill           Allergies as of 6/15/2017     Allergen Noted Reactions    Nkda [No Known Drug Allergy] 2011       Seasonal 2015       Pt states eye irritation      You were diagnosed with     HTN, goal below 130/80   [402665]       Seasonal allergic rhinitis due to pollen   [9731209]       Anxiety   [660330]       Dyslipidemia   [972057]       Major depressive disorder, recurrent episode, moderate (CMS-HCC)   [296.32.ICD-9-CM]   Controlled, on citalopram    Mixed hyperlipidemia   [272.2.ICD-9-CM]       Insomnia, persistent   [004611]       Non morbid obesity due to excess calories   [1452984]       Acquired scoliosis   [159424]       Moderate major depression (CMS-HCC)   [959681]         Vital Signs     Blood Pressure Pulse Temperature Respirations Height Weight    130/78 mmHg 80 36.8 °C (98.2 °F) 14 1.499 m (4' 11\") 73.936 kg (163 lb)    Body Mass Index Oxygen Saturation Smoking Status             32.90 kg/m2 95% Former Smoker         Basic Information     Date Of Birth Sex Race Ethnicity Preferred Language    1955 Female White Non- English      Your appointments     Jul 10, 2017  7:40 AM   ANNUAL EXAM PREVENTATIVE with CHIARA Bowers [address] [address] [phone]            2017  3:00 PM   FOLLOW UP with Martin Mendoza M.D.   [facility] (--)    [address] [facility] [address] [phone]            Sep 18, 2017  8:20 AM   Established Patient with CHIARA Bowers [facility] [address] [address] [phone]           You " will be receiving a confirmation call a few days before your appointment from our automated call confirmation system.              Problem List              ICD-10-CM Priority Class Noted - Resolved    HTN, goal below 130/80 (Chronic) I10   10/24/2011 - Present    Acquired scoliosis (Chronic) M41.9   10/24/2011 - Present    Sleep apnea, obstructive (Chronic) G47.33   10/24/2011 - Present    Insomnia, persistent G47.00   10/24/2011 - Present    Moderate major depression (CMS-HCC) F32.1   10/24/2011 - Present    PPD positive R76.11   10/24/2011 - Present    Mixed hyperlipidemia E78.2   12/27/2011 - Present    Post-menopause on HRT (hormone replacement therapy) Z79.890   12/27/2011 - Present    Vitamin D deficiency disease E55.9   9/24/2012 - Present    Carpal tunnel syndrome G56.00   11/14/2012 - Present    Obesity (BMI 30.0-34.9) E66.9   1/25/2013 - Present    Lumbar radiculopathy, chronic M54.16   8/26/2013 - Present    Hypoxemia R09.02   9/1/2013 - Present    Seasonal allergies J30.2   5/2/2014 - Present    Chronic constipation K59.09   9/17/2014 - Present    GERD (gastroesophageal reflux disease) K21.9   9/17/2014 - Present    H/O: CVA (cerebrovascular accident) Z86.73   9/23/2014 - Present    Meniscus tear S83.209A   2/19/2015 - Present    Preop examination Z01.818   3/6/2015 - Present    Mild mitral regurgitation I34.0   3/12/2015 - Present    History of gastrointestinal bleeding Z87.19   6/4/2015 - Present    Hiatal hernia K44.9   6/23/2015 - Present    Diaphragmatic hernia K44.9   6/30/2015 - Present    Memory changes R41.3   8/25/2015 - Present    Stroke-related cognitive dysfunction (CMS-HCC) I63.9, R41.89   8/25/2015 - Present    S/P lumbar fusion Z98.1   10/20/2016 - Present    Cervical radiculopathy M54.12   10/20/2016 - Present    Sensation of fullness in left ear H93.8X2   3/2/2017 - Present    Fatigue R53.83   3/2/2017 - Present    Lightheadedness R42   5/10/2017 - Present    Allergic rhinitis J30.9    5/10/2017 - Present    Anxiety F41.9   6/15/2017 - Present      Health Maintenance        Date Due Completion Dates    IMM DTaP/Tdap/Td Vaccine (1 - Tdap) 3/20/1972 ---    IMM ZOSTER VACCINE 3/20/2013 ---    MAMMOGRAM 5/13/2016 5/13/2015, 5/13/2015, 9/17/2013    COLONOSCOPY 10/28/2021 10/28/2011 (Prv Comp)    Override on 10/28/2011: Previously completed (Done at [facility], negative, good for 10 years)            Current Immunizations     Influenza TIV (IM) 10/2/2010    Influenza Vaccine Quad Inj (Preserved) 10/20/2016 10:47 AM, 10/26/2015      Below and/or attached are the medications your provider expects you to take. Review all of your home medications and newly ordered medications with your provider and/or pharmacist. Follow medication instructions as directed by your provider and/or pharmacist. Please keep your medication list with you and share with your provider. Update the information when medications are discontinued, doses are changed, or new medications (including over-the-counter products) are added; and carry medication information at all times in the event of emergency situations     Allergies:  NKDA - (reactions not documented)     SEASONAL - (reactions not documented)               Medications  Valid as of: Trudy 15, 2017 -  9:27 AM    Generic Name Brand Name Tablet Size Instructions for use    Albuterol Sulfate (Aero Soln) albuterol 108 (90 BASE) MCG/ACT Inhale 2 Puffs by mouth every four hours as needed.        Atorvastatin Calcium (Tab) LIPITOR 20 MG Take 1 Tab by mouth every day.        Baclofen (Tab) LIORESAL 10 MG Take 1 Tab by mouth every day.        Cetirizine HCl (Tab) ZYRTEC 10 MG Take 1 Tab by mouth every day.        Cholecalciferol (Tab) cholecalciferol 1000 UNIT Take 2 Tabs by mouth every day.        Citalopram Hydrobromide (Tab) CELEXA 40 MG TAKE ONE TABLET BY MOUTH ONCE DAILY        Cyanocobalamin   Place 1 Tab under tongue every day.        Digestive Enzymes (Cap) Digestive Enzymes   Take 1 Cap by mouth 2 times a day, with meals.        Docusate Sodium (Cap) DOCQLACE 100 MG TAKE ONE CAPSULE BY MOUTH TWICE DAILY        Fluticasone Propionate (Suspension) FLONASE 50 MCG/ACT Spray 2 Sprays in nose every day.        Furosemide (Tab) LASIX 20 MG TAKE ONE TABLET BY MOUTH ONCE DAILY        Gabapentin (Cap) NEURONTIN 300 MG TAKE ONE CAPSULE BY MOUTH TWICE DAILY        Guaifenesin-Codeine (Solution) ROBITUSSIN -10 mg/5mL Take 5 mL by mouth every four hours as needed for Cough.        LORazepam (Tab) ATIVAN 1 MG TAKE ONE TABLET BY MOUTH ONCE DAILY AT BEDTIME        Mometasone Furoate (Suspension) NASONEX 50 MCG/ACT USE TWO SPRAY(S) IN EACH NOSTRIL ONCE DAILY        Multiple Vitamins-Minerals   Take 1 Tab by mouth every day.        Pantoprazole Sodium (Tablet Delayed Response) PROTONIX 40 MG TAKE ONE TABLET BY MOUTH ONCE DAILY        Probiotic Product   Take 1 Cap by mouth every day.        Sennosides (Tab) SENOKOT 8.6 MG Take 1 Tab by mouth 2 times a day.        Triamcinolone Acetonide (Cream) KENALOG 0.1 % Apply thin layer to affected area three times daily for one week        .                 Medicines prescribed today were sent to:     [facility] [address] [place] [id number]    Phone: [phone] Fax: [phone]    Open 24 Hours?: No      Medication refill instructions:       If your prescription bottle indicates you have medication refills left, it is not necessary to call your provider’s office. Please contact your pharmacy and they will refill your medication.    If your prescription bottle indicates you do not have any refills left, you may request refills at any time through one of the following ways: The online [employer] system (except Urgent Care), by calling your provider’s office, or by asking your pharmacy to contact your provider’s office with a refill request. Medication refills are processed only during regular business hours  and may not be available until the next business day. Your provider may request additional information or to have a follow-up visit with you prior to refilling your medication.   *Please Note: Medication refills are assigned a new Rx number when refilled electronically. Your pharmacy may indicate that no refills were authorized even though a new prescription for the same medication is available at the pharmacy. Please request the medicine by name with the pharmacy before contacting your provider for a refill.        Your To Do List     Future Labs/Procedures Complete By Expires    LIPID PROFILE  As directed 6/16/2018      Referral     A referral request has been sent to our patient care coordination department. Please allow 3-5 business days for us to process this request and contact you either by phone or mail. If you do not hear from us by the 5th business day, please call us at [phone].        Other Notes About Your Plan     Last UDS: 9/16/2014  Contolled Substance agreement signed: 9/16/2014    Mammogram: 8/2011: normal  GI: Dr. Sindhu Marquez  Neurosurgeon: Dr. Nielsen, Dr. Aracelis Vega  Surgeon: Dr. Kenji Okeefe           MyChart Access Code: Activation code not generated  Current [employer] Status: Active

## 2017-06-15 NOTE — ASSESSMENT & PLAN NOTE
Continues on citalopram 40 mg. She stopped the citalopram also when she was sick with the flu and had significant emotional distress. I advised her to continue at the same dose.

## 2017-06-15 NOTE — ASSESSMENT & PLAN NOTE
Patient had total fusion T3-S1 and cervical fusion surgery also. Followed by Dr. Sullivan in [email or web address] and was seen last Sept 2016 and told everything was stable and no follow up needed.   She uses baclofen once a day. Refills done today.

## 2017-06-15 NOTE — ASSESSMENT & PLAN NOTE
She was very sick with flu like symptoms and sinusitis and stopped taking her lasix.   Blood pressure today is 130/78  She does not report any LE edema or shortness of breath or chest pain.   She has cardiology follow up next month for mild mitral regurgitation. Was taken off ASA due to bruising. She has no complaints of bleeding and normal H/H and platelets.

## 2017-06-15 NOTE — ASSESSMENT & PLAN NOTE
Was well controlled on atorvastatin 40 mg. Stopped all medications due to illness earlier in the year. Explained benefits of statin and elevated TC and LDL patient wants to start on 20 mg, repeat labs in 6 months then increase dose if needed.

## 2017-06-15 NOTE — TELEPHONE ENCOUNTER
"Almaz wants to know if you can write a \"Plan\" for what you guys are doing and what you want her to do so she can remember it. She wants to know if it can be sent to her mychart so she can access it.   "

## 2017-06-15 NOTE — PROGRESS NOTES
Subjective:   Almaz Samuel is a 62 y.o. female here today for HTN, seasonal allergies, dyslipidemia.     HTN, goal below 130/80  She was very sick with flu like symptoms and sinusitis and stopped taking her lasix.   Blood pressure today is 130/78  She does not report any LE edema or shortness of breath or chest pain.   She has cardiology follow up next month for mild mitral regurgitation. Was taken off ASA due to bruising. She has no complaints of bleeding and normal H/H and platelets.     Seasonal allergies  Controlled on certirizine    Anxiety  She takes lorazepam daily for insomnia.   She refuses to take ambien as she has read side effects of it making people do crazy things.  Inability to sleep causes her stress and anxiety.     Mixed hyperlipidemia  Was well controlled on atorvastatin 40 mg. Stopped all medications due to illness earlier in the year. Explained benefits of statin and elevated TC and LDL patient wants to start on 20 mg, repeat labs in 6 months then increase dose if needed.     Acquired scoliosis  Patient had total fusion T3-S1 and cervical fusion surgery also. Followed by Dr. Sullivan in [facility] and was seen last Sept 2016 and told everything was stable and no follow up needed.   She uses baclofen once a day. Refills done today.     Moderate major depression  Continues on citalopram 40 mg. She stopped the citalopram also when she was sick with the flu and had significant emotional distress. I advised her to continue at the same dose.              Current medicines (including changes today)  Current Outpatient Prescriptions   Medication Sig Dispense Refill   • atorvastatin (LIPITOR) 20 MG Tab Take 1 Tab by mouth every day. 90 Tab 3   • [START ON 8/15/2017] lorazepam (ATIVAN) 1 MG Tab TAKE ONE TABLET BY MOUTH ONCE DAILY AT BEDTIME 30 Tab 0   • baclofen (LIORESAL) 10 MG Tab Take 1 Tab by mouth every day. 90 Tab 0   • vitamin D (CHOLECALCIFEROL) 1000 UNIT Tab Take 2 Tabs by mouth every day. 60 Tab 5   •  citalopram (CELEXA) 40 MG Tab TAKE ONE TABLET BY MOUTH ONCE DAILY 90 Tab 3   • cetirizine (ZYRTEC) 10 MG Tab Take 1 Tab by mouth every day. 90 Tab 1   • albuterol 108 (90 BASE) MCG/ACT Aero Soln inhalation aerosol Inhale 2 Puffs by mouth every four hours as needed. 1 Inhaler 0   • pantoprazole (PROTONIX) 40 MG Tablet Delayed Response TAKE ONE TABLET BY MOUTH ONCE DAILY 90 Tab 3   • NASONEX 50 MCG/ACT nasal spray USE TWO SPRAY(S) IN EACH NOSTRIL ONCE DAILY 17 g 3   • Probiotic Product (PROBIOTIC DAILY PO) Take 1 Cap by mouth every day.     • fluticasone (FLONASE) 50 MCG/ACT nasal spray Spray 2 Sprays in nose every day. 3 Bottle 3   • guaifenesin-codeine (CHERATUSSIN AC) Solution oral solution Take 5 mL by mouth every four hours as needed for Cough. 120 mL 0   • gabapentin (NEURONTIN) 300 MG Cap TAKE ONE CAPSULE BY MOUTH TWICE DAILY 180 Cap 3   • triamcinolone acetonide (KENALOG) 0.1 % Cream Apply thin layer to affected area three times daily for one week 1 Tube 0   • DOCQLACE 100 MG Cap TAKE ONE CAPSULE BY MOUTH TWICE DAILY 180 Cap 3   • furosemide (LASIX) 20 MG Tab TAKE ONE TABLET BY MOUTH ONCE DAILY 90 Tab 3   • Multiple Vitamins-Minerals (MULTI-VITAMIN/MINERALS PO) Take 1 Tab by mouth every day.     • Cyanocobalamin (VITAMIN B 12 PO) Place 1 Tab under tongue every day.     • Digestive Enzymes CAPS Take 1 Cap by mouth 2 times a day, with meals.     • sennosides (SENOKOT TO GO) 8.6 MG TABS Take 1 Tab by mouth 2 times a day. 60 Tab 6     No current facility-administered medications for this visit.     She  has a past medical history of Scoliosis; Diverticulitis; High cholesterol; Lung collapse (02-14-11); PPD positive (10/24/2011); Mixed hyperlipidemia (12/27/2011); Post-menopause on HRT (hormone replacement therapy) (12/27/2011); Unspecified vitamin D deficiency (9/24/2012); Anesthesia (02-14-11); Arthritis (02-14-11); Endometriosis of uterus; HTN, goal below 130/80 (10/24/2011); Hiatus hernia syndrome; Backpain  "(02-14-11); Fusion of spine (2014); H/O: CVA (cerebrovascular accident) (8/22/2014); H/O fall; Breath shortness; Sleep apnea (6/2015); MRSA exposure (8/2014); Infectious disease; and Moderate major depression (CMS-HCC) (10/24/2011). She also has no past medical history of Heart murmur, COPD, or Fall.    ROS   No chest pain, no shortness of breath, no abdominal pain       Objective:     Blood pressure 130/78, pulse 80, temperature 36.8 °C (98.2 °F), resp. rate 14, height 1.499 m (4' 11\"), weight 73.936 kg (163 lb), SpO2 95 %. Body mass index is 32.9 kg/(m^2).   Physical Exam:  Constitutional: Alert, no distress.  Skin: Warm, dry, good turgor, no rashes in visible areas.  Eye: Equal, round and reactive, conjunctiva clear, lids normal.  ENMT: Lips without lesions, good dentition, oropharynx clear.  Neck: Trachea midline, no masses, no thyromegaly. No cervical or supraclavicular lymphadenopathy  Respiratory: Unlabored respiratory effort, lungs clear to auscultation, no wheezes, no ronchi.  Cardiovascular: Normal S1, S2, no murmur, no edema.  Abdomen: Soft, non-tender, no masses, no hepatosplenomegaly.  Psych: Alert and oriented x3, normal affect and mood.        Assessment and Plan:   The following treatment plan was discussed    1. HTN, goal below 130/80  Stable, continue to monitor. Follow up with cardiology.   Patient stopped lasix.     2. Seasonal allergic rhinitis due to pollen  Controlled on cetirizine.     3. Anxiety  Patient is taking lorazepam 1 mg daily. No changes in dose.  reviewed.   Needs UDS updated next visit. 3 month refills done. Advised patient to try to wean down gradually to prn instead of taking it every day.     4. Dyslipidemia  Uncontrolled. Patient took herself off lipitor after the flu. She does not want to go back on 40 mg. I advised her of risks and she agreed to start back on 20 mg and recheck lipids.   - atorvastatin (LIPITOR) 20 MG Tab; Take 1 Tab by mouth every day.  Dispense: 90 Tab; " Refill: 3  - LIPID PROFILE; Future    5. Major depressive disorder, recurrent episode, moderate (CMS-HCC)  Controlled on celexa  - citalopram (CELEXA) 40 MG Tab; TAKE ONE TABLET BY MOUTH ONCE DAILY  Dispense: 90 Tab; Refill: 3    6. Insomnia, persistent  She uses lorazepam for insomnia. Advised patient to try ambien but she currently does not want to try it.   - lorazepam (ATIVAN) 1 MG Tab; TAKE ONE TABLET BY MOUTH ONCE DAILY AT BEDTIME  Dispense: 30 Tab; Refill: 0    8. Non morbid obesity due to excess calories  Advised on food diary, regular activity.   - REFERRAL TO NUTRITION SERVICES    9. Acquired scoliosis  Continue with baclofen once a day.       Followup: Return in about 3 months (around 9/15/2017) for insomnia, stress, dyslipidemia.

## 2017-06-15 NOTE — ASSESSMENT & PLAN NOTE
She takes lorazepam daily for insomnia.   She refuses to take ambien as she has read side effects of it making people do crazy things.  Inability to sleep causes her stress and anxiety.

## 2017-07-10 ENCOUNTER — OFFICE VISIT (OUTPATIENT)
Dept: MEDICAL GROUP | Facility: PHYSICIAN GROUP | Age: 62
End: 2017-07-10
Payer: COMMERCIAL

## 2017-07-10 VITALS
OXYGEN SATURATION: 95 % | SYSTOLIC BLOOD PRESSURE: 128 MMHG | BODY MASS INDEX: 33.87 KG/M2 | TEMPERATURE: 97.8 F | DIASTOLIC BLOOD PRESSURE: 74 MMHG | HEIGHT: 59 IN | RESPIRATION RATE: 16 BRPM | WEIGHT: 168 LBS | HEART RATE: 78 BPM

## 2017-07-10 DIAGNOSIS — E55.9 VITAMIN D DEFICIENCY DISEASE: ICD-10-CM

## 2017-07-10 DIAGNOSIS — F33.1 MAJOR DEPRESSIVE DISORDER, RECURRENT EPISODE, MODERATE (HCC): ICD-10-CM

## 2017-07-10 DIAGNOSIS — R41.3 MEMORY CHANGES: ICD-10-CM

## 2017-07-10 DIAGNOSIS — Z87.19 HISTORY OF GASTROINTESTINAL BLEEDING: ICD-10-CM

## 2017-07-10 DIAGNOSIS — G47.33 SLEEP APNEA, OBSTRUCTIVE: Chronic | ICD-10-CM

## 2017-07-10 DIAGNOSIS — J30.1 SEASONAL ALLERGIC RHINITIS DUE TO POLLEN: ICD-10-CM

## 2017-07-10 DIAGNOSIS — Z98.1 S/P LUMBAR FUSION: ICD-10-CM

## 2017-07-10 DIAGNOSIS — F32.1 MODERATE MAJOR DEPRESSION (HCC): ICD-10-CM

## 2017-07-10 DIAGNOSIS — E78.2 MIXED HYPERLIPIDEMIA: ICD-10-CM

## 2017-07-10 DIAGNOSIS — Z78.0 MENOPAUSE: ICD-10-CM

## 2017-07-10 DIAGNOSIS — Z23 NEED FOR VACCINATION: ICD-10-CM

## 2017-07-10 DIAGNOSIS — I10 HTN, GOAL BELOW 130/80: Chronic | ICD-10-CM

## 2017-07-10 DIAGNOSIS — F41.9 ANXIETY: ICD-10-CM

## 2017-07-10 DIAGNOSIS — R76.11 PPD POSITIVE: ICD-10-CM

## 2017-07-10 PROBLEM — H93.8X2 SENSATION OF FULLNESS IN LEFT EAR: Status: RESOLVED | Noted: 2017-03-02 | Resolved: 2017-07-10

## 2017-07-10 PROCEDURE — 99396 PREV VISIT EST AGE 40-64: CPT | Performed by: FAMILY MEDICINE

## 2017-07-10 RX ORDER — CITALOPRAM 40 MG/1
TABLET ORAL
Qty: 90 TAB | Refills: 3 | Status: SHIPPED | OUTPATIENT
Start: 2017-07-10 | End: 2018-05-01

## 2017-07-10 ASSESSMENT — PATIENT HEALTH QUESTIONNAIRE - PHQ9: CLINICAL INTERPRETATION OF PHQ2 SCORE: 0

## 2017-07-10 NOTE — ASSESSMENT & PLAN NOTE
This is a chronic condition. She has no acute changes, no acute weakness or changes in sensation. She is followed by her neurosurgeon team including Dr. Martin  She had surgery in 2014

## 2017-07-10 NOTE — ASSESSMENT & PLAN NOTE
History of osteopenia  Due for dexa. She had spinal fusion due to scoliosis. Also concerned about bone loss due to prior surgery.

## 2017-07-10 NOTE — ASSESSMENT & PLAN NOTE
She takes lorazepam 1mg daily. I reminded her that it is to be used on an as needed basis. She had uds done today.   Advised on sedating effects and addictive. She takes it for sleep.

## 2017-07-10 NOTE — PATIENT INSTRUCTIONS
1. Monitor for pain with urination, blood in urine or abdominal pain: Those are signs of a urinary tract infection.  2. Take celexa 40 mg daily to help with depression, irritability symptoms.   3. Continue on vitamin D supplementation, get DEXA scan done.

## 2017-07-10 NOTE — ASSESSMENT & PLAN NOTE
Still having some episodes of memory loss. She had a stroke in 2014 during back surgery.  She was interested in a urinalysis today but no acute changes, no dysuria, hematuria, abdominal pain, fevers/chills etc.

## 2017-07-10 NOTE — MR AVS SNAPSHOT
"        Almaz Cruz Jimmie   7/10/2017 7:40 AM   Office Visit   MRN: [id number]    Department:  [facility]   Dept Phone:  [phone]    Description:  Female : 1955   Provider:  Rudy Olivares M.D.           Reason for Visit     Annual Exam           Allergies as of 7/10/2017     Allergen Noted Reactions    Nkda [No Known Drug Allergy] 2011       Seasonal 2015       Pt states eye irritation      You were diagnosed with     Menopause   [501962]       Vitamin D deficiency disease   [014830]       Mixed hyperlipidemia   [272.2.ICD-9-CM]       Memory changes   [039520]       Stroke-related cognitive dysfunction (CMS-HCC)   [174197]       Sleep apnea, obstructive   [707776]       Seasonal allergic rhinitis due to pollen   [5856580]       S/P lumbar fusion   [959103]       PPD positive   [275113]       Moderate major depression (CMS-HCC)   [767706]       HTN, goal below 130/80   [286699]       History of gastrointestinal bleeding   [989600]       Anxiety   [732428]       Major depressive disorder, recurrent episode, moderate (CMS-HCC)   [296.32.ICD-9-CM]   Controlled, on citalopram    Need for vaccination   [351198]         Vital Signs     Blood Pressure Pulse Temperature Respirations Height Weight    128/74 mmHg 78 36.6 °C (97.8 °F) 16 1.499 m (4' 11\") 76.204 kg (168 lb)    Body Mass Index Oxygen Saturation Smoking Status             33.91 kg/m2 95% Former Smoker         Basic Information     Date Of Birth Sex Race Ethnicity Preferred Language    1955 Female White Non- English      Your appointments     2017  3:00 PM   FOLLOW UP with Martin Mendoza M.D.   [facility]    [address] [address] [phone]            Sep 18, 2017  8:20 AM   Established Patient with [facility] ([address] [address] [phone]      "    You will be receiving a confirmation call a few days before your appointment from our automated call confirmation system.            Oct 16, 2017  8:00 AM   Established Patient with Rudy Olivares M.D.   [facility]    [address]  [place] [address] [phone]           You will be receiving a confirmation call a few days before your appointment from our automated call confirmation system.              Problem List              ICD-10-CM Priority Class Noted - Resolved    HTN, goal below 130/80 (Chronic) I10   10/24/2011 - Present    Acquired scoliosis (Chronic) M41.9   10/24/2011 - Present    Sleep apnea, obstructive (Chronic) G47.33   10/24/2011 - Present    Insomnia, persistent G47.00   10/24/2011 - Present    Moderate major depression (CMS-HCC) F32.1   10/24/2011 - Present    PPD positive R76.11   10/24/2011 - Present    Mixed hyperlipidemia E78.2   12/27/2011 - Present    Vitamin D deficiency disease E55.9   9/24/2012 - Present    Carpal tunnel syndrome G56.00   11/14/2012 - Present    Lumbar radiculopathy, chronic M54.16   8/26/2013 - Present    Hypoxemia R09.02   9/1/2013 - Present    Seasonal allergies J30.2   5/2/2014 - Present    Chronic constipation K59.09   9/17/2014 - Present    GERD (gastroesophageal reflux disease) K21.9   9/17/2014 - Present    H/O: CVA (cerebrovascular accident) Z86.73   9/23/2014 - Present    Meniscus tear S83.209A   2/19/2015 - Present    Mild mitral regurgitation I34.0   3/12/2015 - Present    History of gastrointestinal bleeding Z87.19   6/4/2015 - Present    Hiatal hernia K44.9   6/23/2015 - Present    Diaphragmatic hernia K44.9   6/30/2015 - Present    Memory changes R41.3   8/25/2015 - Present    Stroke-related cognitive dysfunction (CMS-HCC) I63.9, R41.89   8/25/2015 - Present    S/P lumbar fusion Z98.1   10/20/2016 - Present    Cervical radiculopathy M54.12   10/20/2016 - Present    Fatigue R53.83   3/2/2017 - Present     Lightheadedness R42   5/10/2017 - Present    Allergic rhinitis J30.9   5/10/2017 - Present    Anxiety F41.9   6/15/2017 - Present    Menopause Z78.0   7/10/2017 - Present      Health Maintenance        Date Due Completion Dates    IMM DTaP/Tdap/Td Vaccine (1 - Tdap) 3/20/1972 ---    IMM ZOSTER VACCINE 3/20/2013 ---    MAMMOGRAM 5/13/2016 5/13/2015, 5/13/2015, 9/17/2013    IMM INFLUENZA (1) 9/1/2017 10/20/2016, 10/26/2015, 10/2/2010    COLONOSCOPY 10/28/2021 10/28/2011 (Prv Comp)    Override on 10/28/2011: Previously completed (Done at [facility], negative, good for 10 years)            Current Immunizations     Influenza TIV (IM) 10/2/2010    Influenza Vaccine Quad Inj (Preserved) 10/20/2016 10:47 AM, 10/26/2015      Below and/or attached are the medications your provider expects you to take. Review all of your home medications and newly ordered medications with your provider and/or pharmacist. Follow medication instructions as directed by your provider and/or pharmacist. Please keep your medication list with you and share with your provider. Update the information when medications are discontinued, doses are changed, or new medications (including over-the-counter products) are added; and carry medication information at all times in the event of emergency situations     Allergies:  NKDA - (reactions not documented)     SEASONAL - (reactions not documented)               Medications  Valid as of: July 10, 2017 -  8:51 AM    Generic Name Brand Name Tablet Size Instructions for use    Albuterol Sulfate (Aero Soln) albuterol 108 (90 BASE) MCG/ACT Inhale 2 Puffs by mouth every four hours as needed.        Atorvastatin Calcium (Tab) LIPITOR 20 MG Take 1 Tab by mouth every day.        Baclofen (Tab) LIORESAL 10 MG Take 1 Tab by mouth every day.        Cetirizine HCl (Tab) ZYRTEC 10 MG Take 1 Tab by mouth every day.        Cholecalciferol (Tab) cholecalciferol 1000 UNIT Take 2 Tabs by mouth every day.        Citalopram  Hydrobromide (Tab) CELEXA 40 MG TAKE ONE TABLET BY MOUTH ONCE DAILY        Cyanocobalamin   Place 1 Tab under tongue every day.        Digestive Enzymes (Cap) Digestive Enzymes  Take 1 Cap by mouth 2 times a day, with meals.        Docusate Sodium (Cap) DOCQLACE 100 MG TAKE ONE CAPSULE BY MOUTH TWICE DAILY        Fluticasone Propionate (Suspension) FLONASE 50 MCG/ACT Spray 2 Sprays in nose every day.        Furosemide (Tab) LASIX 20 MG TAKE ONE TABLET BY MOUTH ONCE DAILY        Gabapentin (Cap) NEURONTIN 300 MG TAKE ONE CAPSULE BY MOUTH TWICE DAILY        Guaifenesin-Codeine (Solution) ROBITUSSIN -10 mg/5mL Take 5 mL by mouth every four hours as needed for Cough.        LORazepam (Tab) ATIVAN 1 MG TAKE ONE TABLET BY MOUTH ONCE DAILY AT BEDTIME        Mometasone Furoate (Suspension) NASONEX 50 MCG/ACT USE TWO SPRAY(S) IN EACH NOSTRIL ONCE DAILY        Multiple Vitamins-Minerals   Take 1 Tab by mouth every day.        Pantoprazole Sodium (Tablet Delayed Response) PROTONIX 40 MG TAKE ONE TABLET BY MOUTH ONCE DAILY        Probiotic Product   Take 1 Cap by mouth every day.        Sennosides (Tab) SENOKOT 8.6 MG Take 1 Tab by mouth 2 times a day.        Triamcinolone Acetonide (Cream) KENALOG 0.1 % Apply thin layer to affected area three times daily for one week        Zoster Vaccine Live (Recon Soln) ZOSTAVAX 52979 UNT/0.65ML Inject 0.65 mL as instructed Once for 1 dose.        .                 Medicines prescribed today were sent to:     [address] [address]    Phone: [phone] Fax: [phone]    Open 24 Hours?: No      Medication refill instructions:       If your prescription bottle indicates you have medication refills left, it is not necessary to call your provider’s office. Please contact your pharmacy and they will refill your medication.    If your prescription bottle indicates you do not have any refills left, you may request  refills at any time through one of the following ways: The online [facility] system (except Urgent Care), by calling your provider’s office, or by asking your pharmacy to contact your provider’s office with a refill request. Medication refills are processed only during regular business hours and may not be available until the next business day. Your provider may request additional information or to have a follow-up visit with you prior to refilling your medication.   *Please Note: Medication refills are assigned a new Rx number when refilled electronically. Your pharmacy may indicate that no refills were authorized even though a new prescription for the same medication is available at the pharmacy. Please request the medicine by name with the pharmacy before contacting your provider for a refill.        Your To Do List     Future Labs/Procedures Complete By Expires    DS-BONE DENSITY STUDY (DEXA)  As directed 7/10/2018      Instructions    1. Monitor for pain with urination, blood in urine or abdominal pain: Those are signs of a urinary tract infection.  2. Take celexa 40 mg daily to help with depression, irritability symptoms.   3. Continue on vitamin D supplementation, get DEXA scan done.          Other Notes About Your Plan     Last UDS: 9/16/2014  Contolled Substance agreement signed: 9/16/2014    Mammogram: 8/2011: normal  GI: Dr. Sindhu Marquez  Neurosurgeon: Dr. Nielsen, Dr. Aracelis Vega  Surgeon: Dr. Kenji Okeefe           [facility] Access Code: Activation code not generated  Current [facility] Status: Active

## 2017-07-10 NOTE — ASSESSMENT & PLAN NOTE
She had a GI bleed in 2015. She currently has no changes in stool color. She does have constipation. She had a colonoscopy in 2015. Her GI specialist stated she needs to follow up with 10 year colonoscopy due in 2025.

## 2017-07-10 NOTE — ASSESSMENT & PLAN NOTE
She was on citalopram 40 mg but had stopped all medications due to recent illness and then started back on 20 mg. But recently feels more irritable and depressed and memory changes. Denies any SI. Advised to go back to 40 mg daily.

## 2017-07-10 NOTE — ASSESSMENT & PLAN NOTE
Chronic episodes of memory loss since 2015. This is a chronic condition. There are no acute changes Recently she had an episode where does not remember picking up her sister's phone and also more irritable and depressed. Memory changes could be related to her prior stroke symptoms are worse. No since she has decreased her citalopram to 20 mg we will see if going back to her regular dose of 40 mg helps.

## 2017-07-10 NOTE — PROGRESS NOTES
Subjective:   Almaz Samuel is a 62 y.o. female here today for evaluation and management of:     Vitamin D deficiency disease  Continues on 3000 units vitamin D supplementation. Last vitamin D level was normal at 30.    Menopause  History of osteopenia  Due for dexa. She had spinal fusion due to scoliosis. Also concerned about bone loss due to prior surgery.    Mixed hyperlipidemia  She was on atorvastatin 40 mg and had stopped it due to illness. Recent ast, alt are normal. She is currently back on 20 mg atorvastatin. Will recheck labs in 6-12 months and increase dose if needed.     Memory changes  Chronic episodes of memory loss since 2015. This is a chronic condition. There are no acute changes Recently she had an episode where does not remember picking up her sister's phone and also more irritable and depressed. Memory changes could be related to her prior stroke symptoms are worse. No since she has decreased her citalopram to 20 mg we will see if going back to her regular dose of 40 mg helps.    Stroke-related cognitive dysfunction  Still having some episodes of memory loss. She had a stroke in 2014 during back surgery.  She was interested in a urinalysis today but no acute changes, no dysuria, hematuria, abdominal pain, fevers/chills etc.     Sleep apnea, obstructive  She is waiting for her cpap machine and equipment from the supplier.    Seasonal allergies  Controlled on zyrtec    S/P lumbar fusion  This is a chronic condition. She has no acute changes, no acute weakness or changes in sensation. She is followed by her neurosurgeon team including Dr. Martin  She had surgery in 2014    PPD positive  Has no cough or sweating or fevers     Moderate major depression  She was on citalopram 40 mg but had stopped all medications due to recent illness and then started back on 20 mg. But recently feels more irritable and depressed and memory changes. Denies any SI. Advised to go back to 40 mg daily.     HTN, goal  below 130/80  Her blood pressure is normal without any blood pressure medications except lasix. She has no LE edema or chest pain.   She follows with her cardiologist for mild mitral regurgitation.     History of gastrointestinal bleeding  She had a GI bleed in 2015. She currently has no changes in stool color. She does have constipation. She had a colonoscopy in 2015. Her GI specialist stated she needs to follow up with 10 year colonoscopy due in 2025.       Anxiety  She takes lorazepam 1mg daily. I reminded her that it is to be used on an as needed basis. She had uds done today.   Advised on sedating effects and addictive. She takes it for sleep.                Current medicines (including changes today)  Current Outpatient Prescriptions   Medication Sig Dispense Refill   • citalopram (CELEXA) 40 MG Tab TAKE ONE TABLET BY MOUTH ONCE DAILY 90 Tab 3   • atorvastatin (LIPITOR) 20 MG Tab Take 1 Tab by mouth every day. 90 Tab 3   • [START ON 8/15/2017] lorazepam (ATIVAN) 1 MG Tab TAKE ONE TABLET BY MOUTH ONCE DAILY AT BEDTIME 30 Tab 0   • baclofen (LIORESAL) 10 MG Tab Take 1 Tab by mouth every day. 90 Tab 0   • vitamin D (CHOLECALCIFEROL) 1000 UNIT Tab Take 2 Tabs by mouth every day. 60 Tab 5   • pantoprazole (PROTONIX) 40 MG Tablet Delayed Response TAKE ONE TABLET BY MOUTH ONCE DAILY 90 Tab 3   • DOCQLACE 100 MG Cap TAKE ONE CAPSULE BY MOUTH TWICE DAILY 180 Cap 3   • furosemide (LASIX) 20 MG Tab TAKE ONE TABLET BY MOUTH ONCE DAILY 90 Tab 3   • NASONEX 50 MCG/ACT nasal spray USE TWO SPRAY(S) IN EACH NOSTRIL ONCE DAILY 17 g 3   • Multiple Vitamins-Minerals (MULTI-VITAMIN/MINERALS PO) Take 1 Tab by mouth every day.     • Cyanocobalamin (VITAMIN B 12 PO) Place 1 Tab under tongue every day.     • Probiotic Product (PROBIOTIC DAILY PO) Take 1 Cap by mouth every day.     • sennosides (SENOKOT TO GO) 8.6 MG TABS Take 1 Tab by mouth 2 times a day. 60 Tab 6   • cetirizine (ZYRTEC) 10 MG Tab Take 1 Tab by mouth every day. 90 Tab  "1   • fluticasone (FLONASE) 50 MCG/ACT nasal spray Spray 2 Sprays in nose every day. 3 Bottle 3   • guaifenesin-codeine (CHERATUSSIN AC) Solution oral solution Take 5 mL by mouth every four hours as needed for Cough. 120 mL 0   • albuterol 108 (90 BASE) MCG/ACT Aero Soln inhalation aerosol Inhale 2 Puffs by mouth every four hours as needed. 1 Inhaler 0   • gabapentin (NEURONTIN) 300 MG Cap TAKE ONE CAPSULE BY MOUTH TWICE DAILY 180 Cap 3   • triamcinolone acetonide (KENALOG) 0.1 % Cream Apply thin layer to affected area three times daily for one week 1 Tube 0   • Digestive Enzymes CAPS Take 1 Cap by mouth 2 times a day, with meals.       No current facility-administered medications for this visit.     She  has a past medical history of Scoliosis; Diverticulitis; High cholesterol; Lung collapse (02-14-11); PPD positive (10/24/2011); Mixed hyperlipidemia (12/27/2011); Post-menopause on HRT (hormone replacement therapy) (12/27/2011); Unspecified vitamin D deficiency (9/24/2012); Anesthesia (02-14-11); Arthritis (02-14-11); Endometriosis of uterus; HTN, goal below 130/80 (10/24/2011); Hiatus hernia syndrome; Backpain (02-14-11); Fusion of spine (2014); H/O: CVA (cerebrovascular accident) (8/22/2014); H/O fall; Breath shortness; Sleep apnea (6/2015); MRSA exposure (8/2014); Infectious disease; and Moderate major depression (CMS-HCC) (10/24/2011). She also has no past medical history of Heart murmur, COPD, or Fall.    ROS  No chest pain, no shortness of breath, no abdominal pain       Objective:     Blood pressure 128/74, pulse 78, temperature 36.6 °C (97.8 °F), resp. rate 16, height 1.499 m (4' 11\"), weight 76.204 kg (168 lb), SpO2 95 %. Body mass index is 33.91 kg/(m^2).   Physical Exam:  Constitutional: Alert, no distress.  Skin: Warm, dry, good turgor, no rashes in visible areas.  Eye: Equal, round and reactive, conjunctiva clear, lids normal.  ENMT: Lips without lesions, good dentition, oropharynx clear.  Neck: " Trachea midline, no masses, no thyromegaly. No cervical or supraclavicular lymphadenopathy  Respiratory: Unlabored respiratory effort, lungs clear to auscultation, no wheezes, no ronchi.  Cardiovascular: Normal S1, S2, no murmur, no edema.  Abdomen: Soft, non-tender, no masses, no hepatosplenomegaly.  Psych: Alert and oriented x3, normal affect and mood.        Assessment and Plan:   The following treatment plan was discussed    1. Menopause  DEXA ordered    2. Vitamin D deficiency disease  Continue supplementation with vitamin D    3. Mixed hyperlipidemia  Continue atorvastatin 20 mg daily    4. Memory changes  No changes. Continue to monitor. Possibly may improve when Celexa increased back to her original dose of 40 mg    5. Stroke-related cognitive dysfunction (CMS-HCC)  Stable.    6. Sleep apnea, obstructive  Patient to follow-up with sleep apnea machine and equipment from supplier    7. Seasonal allergic rhinitis due to pollen  Continue Zyrtec    8. S/P lumbar fusion  Stable. Follow-up with neurosurgeon    9. Moderate major depression (CMS-HCC)  Symptoms of irritability and memory loss  Patient will go back to 40 mg citalopram, which had helped her in the past before.    10. HTN, goal below 130/80  Controlled. She is not on any blood pressure medication. She does take Lasix    11. History of gastrointestinal bleeding  Normal colonoscopy done in 2015. Next, June 2025. Monitor for any changes    12. Anxiety  Will increase Celexa to 40 mg  Continues on lorazepam 1 mg daily as needed      Followup: Return in about 3 months (around 10/10/2017) for refill on antianxiety medication. .

## 2017-07-10 NOTE — ASSESSMENT & PLAN NOTE
She was on atorvastatin 40 mg and had stopped it due to illness. Recent ast, alt are normal. She is currently back on 20 mg atorvastatin. Will recheck labs in 6-12 months and increase dose if needed.

## 2017-07-10 NOTE — ASSESSMENT & PLAN NOTE
Her blood pressure is normal without any blood pressure medications except lasix. She has no LE edema or chest pain.   She follows with her cardiologist for mild mitral regurgitation.

## 2017-07-19 RX ORDER — BACLOFEN 10 MG/1
TABLET ORAL
Refills: 0 | OUTPATIENT
Start: 2017-07-19

## 2017-07-25 RX ORDER — BACLOFEN 10 MG/1
TABLET ORAL
Refills: 0 | OUTPATIENT
Start: 2017-07-25

## 2017-08-02 RX ORDER — BACLOFEN 10 MG/1
TABLET ORAL
Qty: 90 TAB | Refills: 0 | Status: SHIPPED | OUTPATIENT
Start: 2017-08-02 | End: 2017-10-16 | Stop reason: SDUPTHER

## 2017-08-08 ENCOUNTER — HOSPITAL ENCOUNTER (OUTPATIENT)
Dept: RADIOLOGY | Facility: MEDICAL CENTER | Age: 62
End: 2017-08-08
Attending: FAMILY MEDICINE
Payer: COMMERCIAL

## 2017-08-08 DIAGNOSIS — Z78.0 MENOPAUSE: ICD-10-CM

## 2017-08-08 PROCEDURE — 77080 DXA BONE DENSITY AXIAL: CPT

## 2017-08-21 ENCOUNTER — TELEPHONE (OUTPATIENT)
Dept: MEDICAL GROUP | Facility: PHYSICIAN GROUP | Age: 62
End: 2017-08-21

## 2017-08-21 RX ORDER — LORAZEPAM 1 MG/1
TABLET ORAL
Qty: 50 TAB | Refills: 0 | Status: SHIPPED
Start: 2017-08-21 | End: 2017-12-15 | Stop reason: SDUPTHER

## 2017-08-21 RX ORDER — TRAZODONE HYDROCHLORIDE 150 MG/1
TABLET ORAL
Qty: 60 TAB | Refills: 3 | Status: SHIPPED | OUTPATIENT
Start: 2017-08-21 | End: 2017-10-16

## 2017-08-21 NOTE — TELEPHONE ENCOUNTER
VOICEMAIL  1. Caller Name: Almaz                      Call Back Number: [phone] (home)       2. Message: Almaz requesting help with billing on her AWV. Message has been sent to Management. Almaz informed.    3. Patient approves office to leave a detailed voicemail/MyChart message: no

## 2017-08-22 ENCOUNTER — TELEPHONE (OUTPATIENT)
Dept: MEDICAL GROUP | Facility: PHYSICIAN GROUP | Age: 62
End: 2017-08-22

## 2017-08-22 NOTE — TELEPHONE ENCOUNTER
Fatuma Kanh [phone], informing Trazodone [facility] may have some contraindications with other medications. Asking if you still want Almaz to take Trazodone

## 2017-08-25 ENCOUNTER — OFFICE VISIT (OUTPATIENT)
Dept: CARDIOLOGY | Facility: MEDICAL CENTER | Age: 62
End: 2017-08-25
Payer: COMMERCIAL

## 2017-08-25 VITALS
HEART RATE: 82 BPM | OXYGEN SATURATION: 94 % | WEIGHT: 168 LBS | BODY MASS INDEX: 33.87 KG/M2 | SYSTOLIC BLOOD PRESSURE: 122 MMHG | DIASTOLIC BLOOD PRESSURE: 68 MMHG | HEIGHT: 59 IN

## 2017-08-25 DIAGNOSIS — I10 HTN, GOAL BELOW 130/80: Chronic | ICD-10-CM

## 2017-08-25 DIAGNOSIS — Z87.19 HISTORY OF GASTROINTESTINAL BLEEDING: ICD-10-CM

## 2017-08-25 DIAGNOSIS — E78.2 MIXED HYPERLIPIDEMIA: ICD-10-CM

## 2017-08-25 DIAGNOSIS — I34.0 MILD MITRAL REGURGITATION: ICD-10-CM

## 2017-08-25 PROBLEM — R42 LIGHTHEADEDNESS: Status: RESOLVED | Noted: 2017-05-10 | Resolved: 2017-08-25

## 2017-08-25 PROCEDURE — 99213 OFFICE O/P EST LOW 20 MIN: CPT | Performed by: INTERNAL MEDICINE

## 2017-08-25 ASSESSMENT — ENCOUNTER SYMPTOMS
WHEEZING: 0
ORTHOPNEA: 0
PND: 0
COUGH: 0
FALLS: 0
BLOOD IN STOOL: 0
MYALGIAS: 0

## 2017-08-25 ASSESSMENT — LIFESTYLE VARIABLES: SUBSTANCE_ABUSE: 0

## 2017-08-25 NOTE — MR AVS SNAPSHOT
"        Almaz Samuel   2017 9:20 AM   Office Visit   MRN: [id number]    Department:  Heart [facility]   Dept Phone:  [phone]    Description:  Female : 1955   Provider:  Martin Mendoza M.D.           Allergies as of 2017     Allergen Noted Reactions    Nkda [No Known Drug Allergy] 2011       Seasonal 2015       Pt states eye irritation      You were diagnosed with     Mixed hyperlipidemia   [272.2.ICD-9-CM]       HTN, goal below 130/80   [447177]       Mild mitral regurgitation   [384507]         Vital Signs     Blood Pressure Pulse Height Weight Body Mass Index Oxygen Saturation    122/68 mmHg 82 1.499 m (4' 11.02\") 76.204 kg (168 lb) 33.91 kg/m2 94%    Smoking Status                   Former Smoker           Basic Information     Date Of Birth Sex Race Ethnicity Preferred Language    1955 Female White Non- English      Your appointments     Sep 14, 2017  9:15 AM   ECHO with ECHO [facility] [place], [facility] EXAM 10   ECHOCARDIOLOGY [facility])    [address] [address]   [phone]            Oct 16, 2017  8:00 AM   Established Patient with Rudy Olivares M.D.   [address] [address] [phone]           You will be receiving a confirmation call a few days before your appointment from our automated call confirmation system.              Problem List              ICD-10-CM Priority Class Noted - Resolved    HTN, goal below 130/80 (Chronic) I10   10/24/2011 - Present    Acquired scoliosis (Chronic) M41.9   10/24/2011 - Present    Sleep apnea, obstructive (Chronic) G47.33   10/24/2011 - Present    Insomnia, persistent G47.00   10/24/2011 - Present    Moderate major depression (CMS-HCC) F32.1   10/24/2011 - Present    PPD positive R76.11   10/24/2011 - Present    Mixed hyperlipidemia E78.2   2011 - Present    Vitamin D deficiency disease E55.9   2012 - Present    Carpal tunnel " syndrome G56.00   11/14/2012 - Present    Lumbar radiculopathy, chronic M54.16   8/26/2013 - Present    Hypoxemia R09.02   9/1/2013 - Present    Seasonal allergies J30.2   5/2/2014 - Present    Chronic constipation K59.09   9/17/2014 - Present    GERD (gastroesophageal reflux disease) K21.9   9/17/2014 - Present    H/O: CVA (cerebrovascular accident) Z86.73   9/23/2014 - Present    Meniscus tear S83.209A   2/19/2015 - Present    Mild mitral regurgitation I34.0   3/12/2015 - Present    History of gastrointestinal bleeding Z87.19   6/4/2015 - Present    Hiatal hernia K44.9   6/23/2015 - Present    Diaphragmatic hernia K44.9   6/30/2015 - Present    Memory changes R41.3   8/25/2015 - Present    Stroke-related cognitive dysfunction (CMS-HCC) I63.9, R41.89   8/25/2015 - Present    S/P lumbar fusion Z98.1   10/20/2016 - Present    Cervical radiculopathy M54.12   10/20/2016 - Present    Fatigue R53.83   3/2/2017 - Present    Lightheadedness R42   5/10/2017 - Present    Allergic rhinitis J30.9   5/10/2017 - Present    Anxiety F41.9   6/15/2017 - Present    Menopause Z78.0   7/10/2017 - Present    Ischemic stroke (CMS-HCC) I63.9   8/23/2014 - Present    Altered mental status R41.82   8/22/2014 - Present    Spinal stenosis of lumbar region M48.06   3/20/2014 - Present    Anuria and oliguria R34   8/22/2014 - Present    History of arthrodesis Z98.1   12/1/2014 - Present    History of spinal surgery Z98.890   10/6/2014 - Present    Scoliosis M41.9   8/19/2014 - Present      Health Maintenance        Date Due Completion Dates    IMM DTaP/Tdap/Td Vaccine (1 - Tdap) 3/20/1972 ---    IMM ZOSTER VACCINE 3/20/2013 ---    MAMMOGRAM 5/13/2016 5/13/2015, 5/13/2015, 9/17/2013    IMM INFLUENZA (1) 9/1/2017 10/20/2016, 10/26/2015, 10/2/2010    COLONOSCOPY 10/28/2021 10/28/2011 (Prv Comp)    Override on 10/28/2011: Previously completed (Done at [facility], negative, good for 10 years)            Current Immunizations     Influenza TIV  (IM) 10/2/2010    Influenza Vaccine Quad Inj (Preserved) 10/20/2016 10:47 AM, 10/26/2015      Below and/or attached are the medications your provider expects you to take. Review all of your home medications and newly ordered medications with your provider and/or pharmacist. Follow medication instructions as directed by your provider and/or pharmacist. Please keep your medication list with you and share with your provider. Update the information when medications are discontinued, doses are changed, or new medications (including over-the-counter products) are added; and carry medication information at all times in the event of emergency situations     Allergies:  NKDA - (reactions not documented)     SEASONAL - (reactions not documented)               Medications  Valid as of: August 25, 2017 -  9:35 AM    Generic Name Brand Name Tablet Size Instructions for use    Albuterol Sulfate (Aero Soln) albuterol 108 (90 Base) MCG/ACT Inhale 2 Puffs by mouth every four hours as needed.        Atorvastatin Calcium (Tab) LIPITOR 20 MG Take 1 Tab by mouth every day.        Baclofen (Tab) LIORESAL 10 MG TAKE ONE TABLET BY MOUTH ONCE DAILY        Cetirizine HCl (Tab) ZYRTEC 10 MG Take 1 Tab by mouth every day.        Cholecalciferol (Tab) cholecalciferol 1000 UNIT Take 2 Tabs by mouth every day.        Citalopram Hydrobromide (Tab) CELEXA 40 MG TAKE ONE TABLET BY MOUTH ONCE DAILY        Cyanocobalamin   Place 1 Tab under tongue every day.        Digestive Enzymes (Cap) Digestive Enzymes  Take 1 Cap by mouth 2 times a day, with meals.        Docusate Sodium (Cap) DOCQLACE 100 MG TAKE ONE CAPSULE BY MOUTH TWICE DAILY        Fluticasone Propionate (Suspension) FLONASE 50 MCG/ACT Spray 2 Sprays in nose every day.        Furosemide (Tab) LASIX 20 MG TAKE ONE TABLET BY MOUTH ONCE DAILY        Gabapentin (Cap) NEURONTIN 300 MG TAKE ONE CAPSULE BY MOUTH TWICE DAILY        Guaifenesin-Codeine (Solution) ROBITUSSIN -10 mg/5mL Take 5 mL  by mouth every four hours as needed for Cough.        LORazepam (Tab) ATIVAN 1 MG Take half to one tablet by mouth at night as needed for insomnia only if no relief with trazodone. Try to gradually decrease amount of ativan. Use trazodone daily for insomnia.  60 day supply 50 tablets.        Mometasone Furoate (Suspension) NASONEX 50 MCG/ACT USE TWO SPRAY(S) IN EACH NOSTRIL ONCE DAILY        Multiple Vitamins-Minerals   Take 1 Tab by mouth every day.        Pantoprazole Sodium (Tablet Delayed Response) PROTONIX 40 MG TAKE ONE TABLET BY MOUTH ONCE DAILY        Probiotic Product   Take 1 Cap by mouth every day.        Sennosides (Tab) SENOKOT 8.6 MG Take 1 Tab by mouth 2 times a day.        TraZODone HCl (Tab) DESYREL 150 MG Take one to two tablets every night as needed for insomnia.        Triamcinolone Acetonide (Cream) KENALOG 0.1 % Apply thin layer to affected area three times daily for one week        .                 Medicines prescribed today were sent to:     [address] [address]    Phone: [phone] Fax: [phone]    Open 24 Hours?: No      Medication refill instructions:       If your prescription bottle indicates you have medication refills left, it is not necessary to call your provider’s office. Please contact your pharmacy and they will refill your medication.    If your prescription bottle indicates you do not have any refills left, you may request refills at any time through one of the following ways: The online [employer] system (except Urgent Care), by calling your provider’s office, or by asking your pharmacy to contact your provider’s office with a refill request. Medication refills are processed only during regular business hours and may not be available until the next business day. Your provider may request additional information or to have a follow-up visit with you prior to refilling your medication.   *Please Note:  Medication refills are assigned a new Rx number when refilled electronically. Your pharmacy may indicate that no refills were authorized even though a new prescription for the same medication is available at the pharmacy. Please request the medicine by name with the pharmacy before contacting your provider for a refill.        Your To Do List     Future Labs/Procedures Complete By Expires    CBC WITH DIFFERENTIAL  As directed 8/25/2018    COMP METABOLIC PANEL  As directed 8/25/2018    ECHOCARDIOGRAM COMP W/O CONT  As directed 8/26/2018    LIPID PROFILE  As directed 8/25/2018      Other Notes About Your Plan     Last UDS: 9/16/2014  Contolled Substance agreement signed: 9/16/2014    Mammogram: 8/2011: normal  GI: Dr. Sindhu Marquez  Neurosurgeon: Dr. Nielsen, Dr. Aracelis Vega  Surgeon: Dr. Kenji Okeefe           MyChart Access Code: Activation code not generated  Current MyChart Status: Active

## 2017-08-26 NOTE — PROGRESS NOTES
"Subjective:   Almaz Samuel is a 62 y.o. female who presents today for follow-up of hypertension and mitral regurgitation.  Cardiac status has been clinically stable since last clinic visit in March 2016.  No chest pain, palpitations, orthopnea, edema, syncope, or near-syncope.  Exertional capacity has been stable.  No interval change otherwise.  Past Medical History   Diagnosis Date   • Scoliosis    • Diverticulitis    • High cholesterol    • Lung collapse 02-14-11     right lung 1/4 collpsed    • 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     exposed as a child, told not active   • Mixed hyperlipidemia 12/27/2011   • Post-menopause on HRT (hormone replacement therapy) 12/27/2011   • Unspecified vitamin D deficiency 9/24/2012   • Anesthesia 02-14-11     PO N/V, \"slow to come out\"   • Arthritis 02-14-11     spine   • Endometriosis of uterus    • HTN, goal below 130/80 10/24/2011   • Hiatus hernia syndrome      not repaired   • Backpain 02-14-11     neck and abd. also,    • Fusion of spine 2014   • H/O: CVA (cerebrovascular accident) 8/22/2014     Complex event associated with apparent medication reaction but with MRI suggesting possible multiple small infarcts of various ages, [facility]   • H/O fall      when in hospital  with low O2 sats   • Breath shortness      with exertion   • Sleep apnea 6/2015     CPAP   • MRSA exposure 8/2014     while in hospital   • Infectious disease       Hep C +   • Moderate major depression (CMS-HCC) 10/24/2011     Past Surgical History   Procedure Laterality Date   • Tonsillectomy and adenoidectomy  1959   • Tubal ligation  1988   • Low anterior resection laparoscopic  3/2/2011     Performed by AYAZ GARCIA at SURGERY [facility] ORS   • Cervical disk and fusion anterior  6/22/2010     Performed by JOSEPH DARLING at SURGERY [facility] ORS   • Carpal tunnel release  11/14/2012     Performed by Holly Martin M.D. at SURGERY [facility] ORS   • Gastroscopy-endo  6/24/2015     Procedure: " GASTROSCOPY-ENDO;  Surgeon: Kenji Okeefe M.D.;  Location: ENDOSCOPY [facility];  Service:    • Gyn surgery       partial hysterectomy   • Appendectomy     • Nissen fundoplication laparoscopic N/A 6/30/2015     Procedure: NISSEN FUNDOPLICATION LAPAROSCOPIC;  Surgeon: Kenji Okeefe M.D.;  Location: SURGERY SAME DAY [facility] ORS;  Service:      Family History   Problem Relation Age of Onset   • Diabetes     • Heart Disease     • Hypertension     • Lung Disease     • Diabetes Mother    • Cancer Mother 82     breast cancer   • Lung Disease Mother      copd   • Hyperlipidemia Mother    • Hypertension Mother    • Cancer Father      Laryngeal CA   • Heart Disease Father 50     CAD with bypasses x 3   • Heart Attack Father    • Hyperlipidemia Father    • Hypertension Father    • Diabetes Sister      type II diabetes     History   Smoking status   • Former Smoker -- 0.30 packs/day for 5 years   • Types: Cigarettes   • Quit date: 01/01/1975   Smokeless tobacco   • Never Used     Comment: quit 1975     Allergies   Allergen Reactions   • Nkda [No Known Drug Allergy]    • Seasonal      Pt states eye irritation     Outpatient Encounter Prescriptions as of 8/25/2017   Medication Sig Dispense Refill   • lorazepam (ATIVAN) 1 MG Tab Take half to one tablet by mouth at night as needed for insomnia only if no relief with trazodone. Try to gradually decrease amount of ativan. Use trazodone daily for insomnia.  60 day supply 50 tablets. 50 Tab 0   • baclofen (LIORESAL) 10 MG Tab TAKE ONE TABLET BY MOUTH ONCE DAILY 90 Tab 0   • citalopram (CELEXA) 40 MG Tab TAKE ONE TABLET BY MOUTH ONCE DAILY 90 Tab 3   • atorvastatin (LIPITOR) 20 MG Tab Take 1 Tab by mouth every day. 90 Tab 3   • vitamin D (CHOLECALCIFEROL) 1000 UNIT Tab Take 2 Tabs by mouth every day. 60 Tab 5   • cetirizine (ZYRTEC) 10 MG Tab Take 1 Tab by mouth every day. 90 Tab 1   • pantoprazole (PROTONIX) 40 MG Tablet Delayed Response TAKE ONE TABLET BY MOUTH ONCE DAILY 90  "Tab 3   • gabapentin (NEURONTIN) 300 MG Cap TAKE ONE CAPSULE BY MOUTH TWICE DAILY 180 Cap 3   • DOCQLACE 100 MG Cap TAKE ONE CAPSULE BY MOUTH TWICE DAILY 180 Cap 3   • furosemide (LASIX) 20 MG Tab TAKE ONE TABLET BY MOUTH ONCE DAILY 90 Tab 3   • NASONEX 50 MCG/ACT nasal spray USE TWO SPRAY(S) IN EACH NOSTRIL ONCE DAILY 17 g 3   • Multiple Vitamins-Minerals (MULTI-VITAMIN/MINERALS PO) Take 1 Tab by mouth every day.     • Cyanocobalamin (VITAMIN B 12 PO) Place 1 Tab under tongue every day.     • Probiotic Product (PROBIOTIC DAILY PO) Take 1 Cap by mouth every day.     • Digestive Enzymes CAPS Take 1 Cap by mouth 2 times a day, with meals.     • sennosides (SENOKOT TO GO) 8.6 MG TABS Take 1 Tab by mouth 2 times a day. 60 Tab 6   • trazodone (DESYREL) 150 MG Tab Take one to two tablets every night as needed for insomnia. 60 Tab 3   • fluticasone (FLONASE) 50 MCG/ACT nasal spray Spray 2 Sprays in nose every day. 3 Bottle 3   • guaifenesin-codeine (CHERATUSSIN AC) Solution oral solution Take 5 mL by mouth every four hours as needed for Cough. 120 mL 0   • albuterol 108 (90 BASE) MCG/ACT Aero Soln inhalation aerosol Inhale 2 Puffs by mouth every four hours as needed. 1 Inhaler 0   • triamcinolone acetonide (KENALOG) 0.1 % Cream Apply thin layer to affected area three times daily for one week 1 Tube 0     No facility-administered encounter medications on file as of 8/25/2017.     Review of Systems   HENT: Negative for nosebleeds.    Respiratory: Negative for cough and wheezing.    Cardiovascular: Negative for orthopnea and PND.   Gastrointestinal: Negative for blood in stool and melena.   Genitourinary: Negative for hematuria.   Musculoskeletal: Negative for myalgias and falls.   Psychiatric/Behavioral: Negative for substance abuse.        Objective:   /68 mmHg  Pulse 82  Ht 1.499 m (4' 11.02\")  Wt 76.204 kg (168 lb)  BMI 33.91 kg/m2  SpO2 94%    Physical Exam   Constitutional: She is oriented to person, place, " and time. She appears well-developed and well-nourished.   Eyes: Conjunctivae are normal.   Neck: No JVD present.   Cardiovascular: Normal rate, regular rhythm, S1 normal, S2 normal and intact distal pulses.  Exam reveals no gallop and no friction rub.    No murmur heard.  Pulmonary/Chest: Effort normal and breath sounds normal.   Musculoskeletal: She exhibits no edema.   Neurological: She is alert and oriented to person, place, and time.   Skin: Skin is warm and dry.   Psychiatric: She has a normal mood and affect. Her behavior is normal. Judgment and thought content normal.       Assessment:     1. Mixed hyperlipidemia  LIPID PROFILE    COMP METABOLIC PANEL   2. HTN, goal below 130/80  CBC WITH DIFFERENTIAL   3. Mild mitral regurgitation  ECHOCARDIOGRAM COMP W/O CONT   4. History of gastrointestinal bleeding     Her lipids were completely out of control but she had stopped her medicines because of acute gastroenteritis for the month preceding that determination and she is due for follow-up. Blood pressure control is satisfactory. Her mitral valve disease is clinically stable but needs objective reassessment. Recall that she had a comprehensive workup for an unexplained neurological event at [facility] etiology of which was never explained and that she had a transient period of antiplatelet therapy but had to quit that because of severe gastrointestinal bleeding. She has had no recurrent neurological symptoms to date. The problem is we have been unable to get all of the imaging data that were done at [facility] at the time but certainly she had an extensive workup at an excellent institution.  Medical Decision Making:  Today's Assessment / Status / Plan:     We need to retest the data noted above and I will notify her with those results. She will also continue primary follow-up with Dr. Olivares.  Immediate reevaluation if any symptoms at all and I discussed the use of the emergency medical system with her. She will continue  annual follow-up here assuming the echocardiogram does not require closer surveillance.

## 2017-08-30 ENCOUNTER — NON-PROVIDER VISIT (OUTPATIENT)
Dept: HEALTH INFORMATION MANAGEMENT | Facility: MEDICAL CENTER | Age: 62
End: 2017-08-30
Payer: COMMERCIAL

## 2017-08-30 VITALS — HEIGHT: 59 IN | WEIGHT: 167.2 LBS | BODY MASS INDEX: 33.71 KG/M2

## 2017-08-30 DIAGNOSIS — E66.09 NON MORBID OBESITY DUE TO EXCESS CALORIES: ICD-10-CM

## 2017-08-30 PROCEDURE — 97802 MEDICAL NUTRITION INDIV IN: CPT | Performed by: DIETITIAN, REGISTERED

## 2017-08-30 NOTE — PROGRESS NOTES
"8/30/2017 62 y.o.   Referring Provider: Rudy Olivares M.D.       Time in/out: 10:30-11:30am     Anthropometrics/Objective  Vitals:    08/30/17 1129   Weight: 75.8 kg (167 lb 3.2 oz)   Height: 1.499 m (4' 11\")       Body mass index is 33.77 kg/m².    Stated Goal Weight: 120lb   Estimated Daily Caloric needs   4070-3174 Kcal   See comprehensive patient history form for further information     Subjective:  Pt would like an exact meal plan to follow with recipes and a grocery list.   She has a difficult time losing weight. Gained 10lb when starting a medication but cannot remember which medication it was.   Does not eat breakfast  Eats 1-2 meals a day. Snacks in afternoon. Dinner is main meal.   Eats steak and beef a few times a week   Does not do any reduced fat or low fat dairy. Everything is full fat/ regular. Uses real butter.   Uses a pea protein powder to make a shake some days.   Drinks water and coffee with agave 1 tsp x2 cups coffee.   Has sweets some nights after dinner.     Nutrition Diagnosis (PES Statement)  · Food/nutrition knowledge deficit related to no previous nutrition education as evidenced by limited nutrition knowledge per discussion with patient    Client history:  Condition(s) associated with a diagnosis or treatment (specify) obesity, h/o CVA, HTN, HLD, GERD     Biochemical data, medical test and procedures  No results found for: HBA1C@  No results found for: POCGLUCOSE  Lab Results   Component Value Date/Time    CHOLSTRLTOT 312 (H) 05/10/2017 07:00 AM     (H) 05/10/2017 07:00 AM    HDL 55 05/10/2017 07:00 AM    TRIGLYCERIDE 206 (H) 05/10/2017 07:00 AM         Nutrition Intervention       Meal and Snack  Recommend a heart healthy diet    Comprehensive Nutrition education Instruction or training leading to in-depth nutrition related knowledge about:  Meal timing and spacing, Menu Planning, Metabolism of carb, protein, fat, Physical activity/exercise, Portion control, Sweets and alcohol " in moderation, Heart-healthy guidelines, Increasing/Decreasing PO intake and Label Reading    Monitoring & Evaluation Plan    Behavioral-Environmental:  Physical activity : Increase physical activity. Goal should be 5 days per week , [id number] each day    Food / Nutrient Intake:  Fluid/Beverage intake : Do not add agave or sugar to coffee   Food intake: Choose whole grains, low fat dairy, lean meats and increase vegetable consumption.  Use plate method to control portions and balance macronutrients at meals. Eat 3 meals per day and healthy snacks in between as needed to avoid going >4hrs without eating.  Provided sample meal plan from ADA for 1500kcal diet with grocery list. Stop eating sweets after dinner; limit dessert to 1 per week or ideally less. Limit portion of cheese to 1 oz. Limit portion of butter to 1 tsp. Eat 3 meals a day; do not skip meals.     Physical Signs / Symptoms:  Lipid profiles WNL and Weight change -0.5-1lb per week to achieve goal weight     Assessment Notes:   Almaz is currently eating an inconsistent diet. She often skips meals and then over eats at the next meal. Recommended she start eating smaller more frequently for better caloric distrubution. She also consumes high amounts of saturated fat from dairy and butter but she is not willing to change to reduced fat; recommended she limit the portion instead. She also would benefit from starting to exercise and we will discuss that in more detail at her follow up appt. Pt would like assistance with meal planning and sample meal plans as well.     Follow up 4-6 weeks   Daly Meléndez, RD, LD, CDE  [phone]

## 2017-09-14 ENCOUNTER — APPOINTMENT (OUTPATIENT)
Dept: CARDIOLOGY | Facility: MEDICAL CENTER | Age: 62
End: 2017-09-14
Attending: INTERNAL MEDICINE
Payer: COMMERCIAL

## 2017-09-24 DIAGNOSIS — J30.9 ALLERGIC RHINITIS, UNSPECIFIED ALLERGIC RHINITIS TRIGGER, UNSPECIFIED RHINITIS SEASONALITY: ICD-10-CM

## 2017-09-26 RX ORDER — CETIRIZINE HYDROCHLORIDE 10 MG/1
TABLET ORAL
Qty: 90 TAB | Refills: 3 | Status: SHIPPED | OUTPATIENT
Start: 2017-09-26 | End: 2018-05-01

## 2017-09-30 DIAGNOSIS — K59.01 SLOW TRANSIT CONSTIPATION: ICD-10-CM

## 2017-10-02 DIAGNOSIS — E78.5 DYSLIPIDEMIA: ICD-10-CM

## 2017-10-03 RX ORDER — ATORVASTATIN CALCIUM 20 MG/1
20 TABLET, FILM COATED ORAL DAILY
Qty: 90 TAB | Refills: 3 | OUTPATIENT
Start: 2017-10-03

## 2017-10-03 RX ORDER — DOCUSATE SODIUM 100 MG/1
TABLET ORAL
Qty: 180 CAP | Refills: 0 | Status: SHIPPED | OUTPATIENT
Start: 2017-10-03 | End: 2018-02-09 | Stop reason: SDUPTHER

## 2017-10-05 RX ORDER — LORAZEPAM 1 MG/1
TABLET ORAL
Qty: 50 TAB | Refills: 0 | OUTPATIENT
Start: 2017-10-05

## 2017-10-09 ENCOUNTER — HOSPITAL ENCOUNTER (OUTPATIENT)
Dept: LAB | Facility: MEDICAL CENTER | Age: 62
End: 2017-10-09
Attending: INTERNAL MEDICINE
Payer: COMMERCIAL

## 2017-10-09 DIAGNOSIS — I10 HTN, GOAL BELOW 130/80: Chronic | ICD-10-CM

## 2017-10-09 DIAGNOSIS — E78.2 MIXED HYPERLIPIDEMIA: ICD-10-CM

## 2017-10-09 LAB
ALBUMIN SERPL BCP-MCNC: 4.3 G/DL (ref 3.2–4.9)
ALBUMIN/GLOB SERPL: 1.6 G/DL
ALP SERPL-CCNC: 87 U/L (ref 30–99)
ALT SERPL-CCNC: 39 U/L (ref 2–50)
ANION GAP SERPL CALC-SCNC: 8 MMOL/L (ref 0–11.9)
AST SERPL-CCNC: 27 U/L (ref 12–45)
BASOPHILS # BLD AUTO: 0.6 % (ref 0–1.8)
BASOPHILS # BLD: 0.03 K/UL (ref 0–0.12)
BILIRUB SERPL-MCNC: 0.6 MG/DL (ref 0.1–1.5)
BUN SERPL-MCNC: 16 MG/DL (ref 8–22)
CALCIUM SERPL-MCNC: 9.6 MG/DL (ref 8.5–10.5)
CHLORIDE SERPL-SCNC: 107 MMOL/L (ref 96–112)
CHOLEST SERPL-MCNC: 213 MG/DL (ref 100–199)
CO2 SERPL-SCNC: 26 MMOL/L (ref 20–33)
CREAT SERPL-MCNC: 0.84 MG/DL (ref 0.5–1.4)
EOSINOPHIL # BLD AUTO: 0.1 K/UL (ref 0–0.51)
EOSINOPHIL NFR BLD: 1.9 % (ref 0–6.9)
ERYTHROCYTE [DISTWIDTH] IN BLOOD BY AUTOMATED COUNT: 47.4 FL (ref 35.9–50)
GFR SERPL CREATININE-BSD FRML MDRD: >60 ML/MIN/1.73 M 2
GLOBULIN SER CALC-MCNC: 2.7 G/DL (ref 1.9–3.5)
GLUCOSE SERPL-MCNC: 104 MG/DL (ref 65–99)
HCT VFR BLD AUTO: 44.2 % (ref 37–47)
HDLC SERPL-MCNC: 57 MG/DL
HGB BLD-MCNC: 14.9 G/DL (ref 12–16)
IMM GRANULOCYTES # BLD AUTO: 0.01 K/UL (ref 0–0.11)
IMM GRANULOCYTES NFR BLD AUTO: 0.2 % (ref 0–0.9)
LDLC SERPL CALC-MCNC: 120 MG/DL
LYMPHOCYTES # BLD AUTO: 1.63 K/UL (ref 1–4.8)
LYMPHOCYTES NFR BLD: 30.8 % (ref 22–41)
MCH RBC QN AUTO: 31.8 PG (ref 27–33)
MCHC RBC AUTO-ENTMCNC: 33.7 G/DL (ref 33.6–35)
MCV RBC AUTO: 94.2 FL (ref 81.4–97.8)
MONOCYTES # BLD AUTO: 0.37 K/UL (ref 0–0.85)
MONOCYTES NFR BLD AUTO: 7 % (ref 0–13.4)
NEUTROPHILS # BLD AUTO: 3.16 K/UL (ref 2–7.15)
NEUTROPHILS NFR BLD: 59.5 % (ref 44–72)
NRBC # BLD AUTO: 0 K/UL
NRBC BLD AUTO-RTO: 0 /100 WBC
PLATELET # BLD AUTO: 322 K/UL (ref 164–446)
PMV BLD AUTO: 9.1 FL (ref 9–12.9)
POTASSIUM SERPL-SCNC: 3.9 MMOL/L (ref 3.6–5.5)
PROT SERPL-MCNC: 7 G/DL (ref 6–8.2)
RBC # BLD AUTO: 4.69 M/UL (ref 4.2–5.4)
SODIUM SERPL-SCNC: 141 MMOL/L (ref 135–145)
TRIGL SERPL-MCNC: 181 MG/DL (ref 0–149)
WBC # BLD AUTO: 5.3 K/UL (ref 4.8–10.8)

## 2017-10-09 PROCEDURE — 80061 LIPID PANEL: CPT

## 2017-10-09 PROCEDURE — 36415 COLL VENOUS BLD VENIPUNCTURE: CPT

## 2017-10-09 PROCEDURE — 80053 COMPREHEN METABOLIC PANEL: CPT

## 2017-10-09 PROCEDURE — 85025 COMPLETE CBC W/AUTO DIFF WBC: CPT

## 2017-10-10 ENCOUNTER — TELEPHONE (OUTPATIENT)
Dept: CARDIOLOGY | Facility: MEDICAL CENTER | Age: 62
End: 2017-10-10

## 2017-10-10 DIAGNOSIS — I10 ESSENTIAL HYPERTENSION: ICD-10-CM

## 2017-10-10 NOTE — TELEPHONE ENCOUNTER
Message to Dr. Mendoza to review 10/9 labs. Last seen in Aug & was off her statin. This is repeat lab after restarting atorvastatin 20 mg. LDL down to 120 from 216.

## 2017-10-11 NOTE — TELEPHONE ENCOUNTER
Was the patient seen in the last year in this department? Yes     Does patient have an active prescription for medications requested? No     Received Request Via: Pharmacy      Pt met protocol?: Yes pt last ov 7/2017

## 2017-10-12 RX ORDER — FUROSEMIDE 20 MG/1
TABLET ORAL
Qty: 90 TAB | Refills: 0 | Status: SHIPPED | OUTPATIENT
Start: 2017-10-12 | End: 2018-02-13 | Stop reason: SDUPTHER

## 2017-10-16 ENCOUNTER — OFFICE VISIT (OUTPATIENT)
Dept: MEDICAL GROUP | Facility: PHYSICIAN GROUP | Age: 62
End: 2017-10-16
Payer: COMMERCIAL

## 2017-10-16 VITALS
DIASTOLIC BLOOD PRESSURE: 74 MMHG | TEMPERATURE: 98.2 F | OXYGEN SATURATION: 97 % | HEART RATE: 74 BPM | SYSTOLIC BLOOD PRESSURE: 124 MMHG | WEIGHT: 167.6 LBS | RESPIRATION RATE: 16 BRPM | HEIGHT: 59 IN | BODY MASS INDEX: 33.79 KG/M2

## 2017-10-16 DIAGNOSIS — J30.2 CHRONIC SEASONAL ALLERGIC RHINITIS, UNSPECIFIED TRIGGER: ICD-10-CM

## 2017-10-16 DIAGNOSIS — K21.00 GASTROESOPHAGEAL REFLUX DISEASE WITH ESOPHAGITIS: ICD-10-CM

## 2017-10-16 DIAGNOSIS — M54.12 CERVICAL RADICULOPATHY: ICD-10-CM

## 2017-10-16 DIAGNOSIS — Z23 NEED FOR VACCINATION: ICD-10-CM

## 2017-10-16 DIAGNOSIS — G63 POLYNEUROPATHY ASSOCIATED WITH UNDERLYING DISEASE (HCC): ICD-10-CM

## 2017-10-16 PROCEDURE — 99214 OFFICE O/P EST MOD 30 MIN: CPT | Mod: 25 | Performed by: FAMILY MEDICINE

## 2017-10-16 PROCEDURE — 90471 IMMUNIZATION ADMIN: CPT | Performed by: FAMILY MEDICINE

## 2017-10-16 PROCEDURE — 90686 IIV4 VACC NO PRSV 0.5 ML IM: CPT | Performed by: FAMILY MEDICINE

## 2017-10-16 RX ORDER — GABAPENTIN 300 MG/1
CAPSULE ORAL
Qty: 180 CAP | Refills: 3 | Status: SHIPPED | OUTPATIENT
Start: 2017-10-16 | End: 2018-05-01

## 2017-10-16 RX ORDER — MOMETASONE FUROATE 50 UG/1
SPRAY, METERED NASAL
Qty: 17 G | Refills: 3 | Status: SHIPPED | OUTPATIENT
Start: 2017-10-16 | End: 2018-03-13

## 2017-10-16 RX ORDER — LORAZEPAM 0.5 MG/1
0.5 TABLET ORAL EVERY 4 HOURS PRN
Qty: 30 TAB | Refills: 2 | Status: SHIPPED | OUTPATIENT
Start: 2017-10-16 | End: 2017-12-15

## 2017-10-16 RX ORDER — CLOBETASOL PROPIONATE 0.5 MG/G
CREAM TOPICAL
Qty: 1 TUBE | Refills: 1 | Status: SHIPPED | OUTPATIENT
Start: 2017-10-16 | End: 2018-05-01

## 2017-10-16 RX ORDER — ZOLPIDEM TARTRATE 10 MG/1
10 TABLET ORAL NIGHTLY PRN
Qty: 10 TAB | Refills: 0 | Status: SHIPPED | OUTPATIENT
Start: 2017-10-16 | End: 2017-12-15

## 2017-10-16 RX ORDER — BACLOFEN 10 MG/1
TABLET ORAL
Qty: 90 TAB | Refills: 0 | Status: SHIPPED | OUTPATIENT
Start: 2017-10-16 | End: 2018-05-01

## 2017-10-16 NOTE — ASSESSMENT & PLAN NOTE
This is a chronic condition, patient notes that numbness, tingling and pain into her right arm has improved with baclofen. She has had cervical fusion in the past.

## 2017-10-18 NOTE — PROGRESS NOTES
OB Subjective:   Almaz Samuel is a 62 y.o. female here today for evaluation and management of:     Cervical radiculopathy  This is a chronic condition, patient notes that numbness, tingling and pain into her right arm has improved with baclofen. She has had cervical fusion in the past.    Allergic rhinitis  Patient has been using Flonase regularly for nasal congestion during the night causing her to have difficulty breathing. She notes that herfeels very sore and she has occasional nosebleeds since using Flonase. Advised her to stop Flonase for a while. Advised her on using some Vaseline in her nostrils and nasal saline spray.  Advised patient she can continue using antiallergy medication as needed.    GERD (gastroesophageal reflux disease)  Chronic condition, controlled with Protonix.         Current medicines (including changes today)  Current Outpatient Prescriptions   Medication Sig Dispense Refill   • lorazepam (ATIVAN) 0.5 MG Tab Take 1 Tab by mouth every four hours as needed for Anxiety. 30 Tab 2   • baclofen (LIORESAL) 10 MG Tab TAKE ONE TABLET BY MOUTH ONCE DAILY 90 Tab 0   • Clobetasol Prop Emollient Base (CLOBETASOL PROPIONATE E) 0.05 % Cream Use 1 gram in a thin film daily to affected skin 1 Tube 1   • zolpidem (AMBIEN) 10 MG Tab Take 1 Tab by mouth at bedtime as needed for Sleep. 10 Tab 0   • mometasone (NASONEX) 50 MCG/ACT nasal spray USE TWO SPRAY(S) IN EACH NOSTRIL ONCE DAILY 17 g 3   • gabapentin (NEURONTIN) 300 MG Cap TAKE ONE CAPSULE BY MOUTH TWICE DAILY 180 Cap 3   • furosemide (LASIX) 20 MG Tab TAKE ONE TABLET BY MOUTH ONCE DAILY 90 Tab 0   • DOCQLACE 100 MG Cap TAKE ONE CAPSULE BY MOUTH TWICE DAILY 180 Cap 0   • cetirizine (ZYRTEC) 10 MG Tab TAKE ONE TABLET BY MOUTH ONCE DAILY 90 Tab 3   • lorazepam (ATIVAN) 1 MG Tab Take half to one tablet by mouth at night as needed for insomnia only if no relief with trazodone. Try to gradually decrease amount of ativan. Use trazodone daily for  insomnia.  60 day supply 50 tablets. 50 Tab 0   • citalopram (CELEXA) 40 MG Tab TAKE ONE TABLET BY MOUTH ONCE DAILY 90 Tab 3   • atorvastatin (LIPITOR) 20 MG Tab Take 1 Tab by mouth every day. 90 Tab 3   • vitamin D (CHOLECALCIFEROL) 1000 UNIT Tab Take 2 Tabs by mouth every day. 60 Tab 5   • fluticasone (FLONASE) 50 MCG/ACT nasal spray Spray 2 Sprays in nose every day. 3 Bottle 3   • pantoprazole (PROTONIX) 40 MG Tablet Delayed Response TAKE ONE TABLET BY MOUTH ONCE DAILY 90 Tab 3   • Multiple Vitamins-Minerals (MULTI-VITAMIN/MINERALS PO) Take 1 Tab by mouth every day.     • Cyanocobalamin (VITAMIN B 12 PO) Place 1 Tab under tongue every day.     • Probiotic Product (PROBIOTIC DAILY PO) Take 1 Cap by mouth every day.     • Digestive Enzymes CAPS Take 1 Cap by mouth 2 times a day, with meals.     • sennosides (SENOKOT TO GO) 8.6 MG TABS Take 1 Tab by mouth 2 times a day. 60 Tab 6   • albuterol 108 (90 BASE) MCG/ACT Aero Soln inhalation aerosol Inhale 2 Puffs by mouth every four hours as needed. 1 Inhaler 0     No current facility-administered medications for this visit.      She  has a past medical history of Anesthesia (02-14-11); Arthritis (02-14-11); Backpain (02-14-11); Breath shortness; Diverticulitis; Endometriosis of uterus; Fusion of spine (2014); H/O fall; H/O: CVA (cerebrovascular accident) (8/22/2014); Hiatus hernia syndrome; High cholesterol; HTN, goal below 130/80 (10/24/2011); Infectious disease; Lung collapse (02-14-11); Mixed hyperlipidemia (12/27/2011); Moderate major depression (CMS-HCC) (10/24/2011); MRSA exposure (8/2014); Post-menopause on HRT (hormone replacement therapy) (12/27/2011); PPD positive (10/24/2011); Scoliosis; Sleep apnea (6/2015); and Unspecified vitamin D deficiency (9/24/2012). She also has no past medical history of COPD; Fall; or Heart murmur.    ROS  No chest pain, no shortness of breath, no abdominal pain       Objective:     Blood pressure 124/74, pulse 74, temperature 36.8  "°C (98.2 °F), resp. rate 16, height 1.499 m (4' 11\"), weight 76 kg (167 lb 9.6 oz), SpO2 97 %. Body mass index is 33.85 kg/m².   Physical Exam:  Constitutional: Alert, no distress.  Skin: Warm, dry, good turgor, no rashes in visible areas.  Eye: Equal, round and reactive, conjunctiva clear, lids normal.  ENMT: Lips without lesions, good dentition, oropharynx clear.  Neck: Trachea midline, no masses, no thyromegaly. No cervical or supraclavicular lymphadenopathy  Respiratory: Unlabored respiratory effort, lungs clear to auscultation, no wheezes, no ronchi.  Cardiovascular: Normal S1, S2, no murmur, no edema.  Abdomen: Soft, non-tender, no masses, no hepatosplenomegaly.  Psych: Alert and oriented x3, normal affect and mood.        Assessment and Plan:   The following treatment plan was discussed    1. Need for vaccination  - INFLUENZA VACCINE QUAD INJ >3Y(PF)    2. Cervical radiculopathy  Chronic condition, improved with baclofen    3. Chronic seasonal allergic rhinitis, unspecified trigger  Chronic condition. Advised on nasal saline mist, refill times for Nasonex  - mometasone (NASONEX) 50 MCG/ACT nasal spray; USE TWO SPRAY(S) IN EACH NOSTRIL ONCE DAILY  Dispense: 17 g; Refill: 3    4. Polyneuropathy associated with underlying disease (CMS-HCC)  Prescription provided for Neurontin to help with neuropathy.  - gabapentin (NEURONTIN) 300 MG Cap; TAKE ONE CAPSULE BY MOUTH TWICE DAILY  Dispense: 180 Cap; Refill: 3      Followup: Return in about 3 months (around 1/16/2018) for insomnia, rash, chronic neck pain.         "

## 2017-10-18 NOTE — ASSESSMENT & PLAN NOTE
Patient has been using Flonase regularly for nasal congestion during the night causing her to have difficulty breathing. She notes that herfeels very sore and she has occasional nosebleeds since using Flonase. Advised her to stop Flonase for a while. Advised her on using some Vaseline in her nostrils and nasal saline spray.  Advised patient she can continue using antiallergy medication as needed.

## 2017-12-07 ENCOUNTER — TELEPHONE (OUTPATIENT)
Dept: MEDICAL GROUP | Facility: PHYSICIAN GROUP | Age: 62
End: 2017-12-07

## 2017-12-07 NOTE — TELEPHONE ENCOUNTER
1. Caller Name: Almaz                      Call Back Number: [phone] (home)     2. Message: Patient would like another blood panel ordered for her annual wellness she has scheduled.      3. Patient approves office to leave a detailed voicemail/MyChart message: N\A

## 2017-12-08 ENCOUNTER — TELEPHONE (OUTPATIENT)
Dept: CARDIOLOGY | Facility: MEDICAL CENTER | Age: 62
End: 2017-12-08

## 2017-12-08 NOTE — TELEPHONE ENCOUNTER
S/W Pt and she is requesting a code change on labs previously done for JW for her health benefits. Pt said that she had this done previously trough billing. This RN asked Pt to contact billing and if they need anything from us we will be happy to help. Pt agreeable.  Ava WADE RN     ----- Message from Ciara Tsang sent at 12/7/2017  3:10 PM PST -----  Regarding: patient wants her lab order re-coded  JUAN LUIS/Ava      Patient wants to speak to you about getting her lab work re-coded. She can be reached at [phone].

## 2017-12-13 NOTE — TELEPHONE ENCOUNTER
I reviewed labs, no new labs needed as last labs done just recently in October. Next labs would be due in 6-12 months.   Rudy Olivares M.D.

## 2017-12-15 ENCOUNTER — OFFICE VISIT (OUTPATIENT)
Dept: MEDICAL GROUP | Facility: MEDICAL CENTER | Age: 62
End: 2017-12-15
Payer: COMMERCIAL

## 2017-12-15 VITALS
SYSTOLIC BLOOD PRESSURE: 122 MMHG | BODY MASS INDEX: 33.82 KG/M2 | DIASTOLIC BLOOD PRESSURE: 70 MMHG | TEMPERATURE: 99.1 F | HEIGHT: 59 IN | RESPIRATION RATE: 14 BRPM | HEART RATE: 87 BPM | OXYGEN SATURATION: 93 % | WEIGHT: 167.77 LBS

## 2017-12-15 DIAGNOSIS — Z00.00 WELLNESS EXAMINATION: ICD-10-CM

## 2017-12-15 DIAGNOSIS — E78.5 DYSLIPIDEMIA: ICD-10-CM

## 2017-12-15 DIAGNOSIS — F41.9 ANXIETY: ICD-10-CM

## 2017-12-15 DIAGNOSIS — I10 HTN, GOAL BELOW 130/80: ICD-10-CM

## 2017-12-15 DIAGNOSIS — E78.2 MIXED HYPERLIPIDEMIA: ICD-10-CM

## 2017-12-15 DIAGNOSIS — G47.00 INSOMNIA, PERSISTENT: ICD-10-CM

## 2017-12-15 DIAGNOSIS — G47.33 SLEEP APNEA, OBSTRUCTIVE: ICD-10-CM

## 2017-12-15 DIAGNOSIS — F32.1 MODERATE MAJOR DEPRESSION (HCC): ICD-10-CM

## 2017-12-15 PROCEDURE — 99214 OFFICE O/P EST MOD 30 MIN: CPT | Performed by: FAMILY MEDICINE

## 2017-12-15 RX ORDER — TRAZODONE HYDROCHLORIDE 100 MG/1
100 TABLET ORAL
Qty: 90 TAB | Refills: 3 | Status: SHIPPED | OUTPATIENT
Start: 2017-12-15 | End: 2018-12-27 | Stop reason: SDUPTHER

## 2017-12-15 RX ORDER — LORAZEPAM 1 MG/1
1 TABLET ORAL
Qty: 90 TAB | Refills: 1 | Status: SHIPPED | OUTPATIENT
Start: 2017-12-15 | End: 2018-03-13 | Stop reason: SDUPTHER

## 2017-12-15 RX ORDER — ATORVASTATIN CALCIUM 20 MG/1
10 TABLET, FILM COATED ORAL DAILY
Qty: 45 TAB | Refills: 3 | Status: ON HOLD | OUTPATIENT
Start: 2017-12-15 | End: 2018-07-25

## 2017-12-15 ASSESSMENT — PATIENT HEALTH QUESTIONNAIRE - PHQ9
6. FEELING BAD ABOUT YOURSELF - OR THAT YOU ARE A FAILURE OR HAVE LET YOURSELF OR YOUR FAMILY DOWN: 1
SUM OF ALL RESPONSES TO PHQ QUESTIONS 1-9: 12
9. THOUGHTS THAT YOU WOULD BE BETTER OFF DEAD, OR OF HURTING YOURSELF: 0
7. TROUBLE CONCENTRATING ON THINGS, SUCH AS READING THE NEWSPAPER OR WATCHING TELEVISION: 0
2. FEELING DOWN, DEPRESSED, IRRITABLE, OR HOPELESS: 2
SUM OF ALL RESPONSES TO PHQ9 QUESTIONS 1 AND 2: 4
3. TROUBLE FALLING OR STAYING ASLEEP OR SLEEPING TOO MUCH: 2
5. POOR APPETITE OR OVEREATING: 2
4. FEELING TIRED OR HAVING LITTLE ENERGY: 2
8. MOVING OR SPEAKING SO SLOWLY THAT OTHER PEOPLE COULD HAVE NOTICED. OR THE OPPOSITE, BEING SO FIGETY OR RESTLESS THAT YOU HAVE BEEN MOVING AROUND A LOT MORE THAN USUAL: 1
1. LITTLE INTEREST OR PLEASURE IN DOING THINGS: 2

## 2017-12-18 NOTE — ASSESSMENT & PLAN NOTE
This is a chronic health problem for this patient for which she is on atorvastatin 20 mg daily. She will continue to follow with her specialists and we will do blood work for her at least twice a year.

## 2017-12-18 NOTE — PROGRESS NOTES
CC: Reestablish care and discuss chronic health problems listed below.    HISTORY OF PRESENT ILLNESS: Patient is a 62 y.o. female established patient who presents today to [facility] previously having seen Dr. kamala Olivares in [place]. I reviewed those records and discussed them with the patient today.    Health Maintenance: Completed    Insomnia, persistent  This is a chronic health problem for this patient that has made it very difficult for her to achieve good sleep. She is utilized Xanax, Ativan and trazodone in various doses to try and get adequate sleep. She finds when she is on trazodone at 100 mg with a 1 mg lorazepam that she gets her best sleep and does not have a hangover the next morning. She would like a prescription for these medications going forward and I will do that for her today.    HTN, goal below 130/80  This is a chronic health problem for this patient that is adequately controlled with current medications. Her blood pressures 122/70. She's had no side effects from her medications.The patient denies chest pain, shortness of breath or dyspnea on exertion.      Anxiety  This is a chronic health problem for this patient that is adequately controlled if she can get adequate sleep. We will try utilizing lorazepam 1 mg at bedtime along with trazodone 100 mg and she will let us know at her follow-up if this is adequate.    Mixed hyperlipidemia  This is a chronic health problem for this patient for which she is on atorvastatin 20 mg daily. She will continue to follow with her specialists and we will do blood work for her at least twice a year.    Moderate major depression  This is a chronic health problem for this patient that I am worried is actually worse because she is not having time with adequate sleep to rest and recover. She continues on citalopram 40 mg daily. She denies suicidal or homicidal ideation.  Depression Screen (PHQ-2/PHQ-9) 10/20/2016 7/10/2017 12/15/2017   PHQ-2 Total  Score - - 4   PHQ-2 Total Score 0 - -   PHQ-2 Total Score 0 0 -   PHQ-9 Total Score - - 12   Interpretation of PHQ-9 Total Score   Score Severity   1-4 No Depression   5-9 Mild Depression   10-14 Moderate Depression   15-19 Moderately Severe Depression   20-27 Severe Depression        Patient Active Problem List    Diagnosis Date Noted   • Menopause 07/10/2017   • Anxiety 06/15/2017   • Allergic rhinitis 05/10/2017   • Fatigue 03/02/2017   • S/P lumbar fusion 10/20/2016   • Cervical radiculopathy 10/20/2016   • Stroke-related cognitive dysfunction (CMS-HCC) 08/25/2015   • Diaphragmatic hernia 06/30/2015   • Hiatal hernia 06/23/2015   • History of gastrointestinal bleeding 06/04/2015   • Mild mitral regurgitation 03/12/2015   • History of arthrodesis 12/01/2014   • History of spinal surgery 10/06/2014   • H/O: CVA (cerebrovascular accident) 09/23/2014   • Chronic constipation 09/17/2014   • GERD (gastroesophageal reflux disease) 09/17/2014   • Seasonal allergies 05/02/2014   • Spinal stenosis of lumbar region 03/20/2014   • Hypoxemia 09/01/2013   • Lumbar radiculopathy, chronic 08/26/2013   • Carpal tunnel syndrome 11/14/2012   • Vitamin D deficiency disease 09/24/2012   • Mixed hyperlipidemia 12/27/2011   • HTN, goal below 130/80 10/24/2011   • Acquired scoliosis 10/24/2011   • Sleep apnea, obstructive 10/24/2011   • Insomnia, persistent 10/24/2011   • Moderate major depression (CMS-[facility]) 10/24/2011   • 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      Allergies:Nkda [no known drug allergy] and Seasonal    Current Outpatient Prescriptions   Medication Sig Dispense Refill   • atorvastatin (LIPITOR) 20 MG Tab Take 1 Tab by mouth every day. 45 Tab 3   • lorazepam (ATIVAN) 1 MG Tab Take 1 Tab by mouth every bedtime for 90 days. Take half to one tablet by mouth at night as needed for insomnia only if no relief with trazodone. Try to gradually decrease amount of ativan. Use trazodone daily for insomnia.  60 day supply 50 tablets. 90 Tab 1   •  trazodone (DESYREL) 100 MG Tab Take 1 Tab by mouth every bedtime. 90 Tab 3   • baclofen (LIORESAL) 10 MG Tab TAKE ONE TABLET BY MOUTH ONCE DAILY 90 Tab 0   • Clobetasol Prop Emollient Base (CLOBETASOL PROPIONATE E) 0.05 % Cream Use 1 gram in a thin film daily to affected skin 1 Tube 1   • mometasone (NASONEX) 50 MCG/ACT nasal spray USE TWO SPRAY(S) IN EACH NOSTRIL ONCE DAILY 17 g 3   • gabapentin (NEURONTIN) 300 MG Cap TAKE ONE CAPSULE BY MOUTH TWICE DAILY 180 Cap 3   • furosemide (LASIX) 20 MG Tab TAKE ONE TABLET BY MOUTH ONCE DAILY 90 Tab 0   • DOCQLACE 100 MG Cap TAKE ONE CAPSULE BY MOUTH TWICE DAILY 180 Cap 0   • vitamin D (CHOLECALCIFEROL) 1000 UNIT Tab Take 2 Tabs by mouth every day. 60 Tab 5   • pantoprazole (PROTONIX) 40 MG Tablet Delayed Response TAKE ONE TABLET BY MOUTH ONCE DAILY 90 Tab 3   • sennosides (SENOKOT TO GO) 8.6 MG TABS Take 1 Tab by mouth 2 times a day. 60 Tab 6   • cetirizine (ZYRTEC) 10 MG Tab TAKE ONE TABLET BY MOUTH ONCE DAILY 90 Tab 3   • citalopram (CELEXA) 40 MG Tab TAKE ONE TABLET BY MOUTH ONCE DAILY 90 Tab 3   • fluticasone (FLONASE) 50 MCG/ACT nasal spray Spray 2 Sprays in nose every day. 3 Bottle 3   • albuterol 108 (90 BASE) MCG/ACT Aero Soln inhalation aerosol Inhale 2 Puffs by mouth every four hours as needed. 1 Inhaler 0   • Multiple Vitamins-Minerals (MULTI-VITAMIN/MINERALS PO) Take 1 Tab by mouth every day.     • Cyanocobalamin (VITAMIN B 12 PO) Place 1 Tab under tongue every day.     • Probiotic Product (PROBIOTIC DAILY PO) Take 1 Cap by mouth every day.     • Digestive Enzymes CAPS Take 1 Cap by mouth 2 times a day, with meals.       No current facility-administered medications for this visit.        Social History   Substance Use Topics   • Smoking status: Former Smoker     Packs/day: 0.30     Years: 5.00     Types: Cigarettes     Quit date: 1/1/1975   • Smokeless tobacco: Never Used      Comment: quit 1975   • Alcohol use No     Social History     Social History Narrative  "  • No narrative on file       Family History   Problem Relation Age of Onset   • Diabetes Mother    • Cancer Mother 82     breast cancer   • Lung Disease Mother      copd   • Hyperlipidemia Mother    • Hypertension Mother    • Cancer Father      Laryngeal CA   • Heart Disease Father 50     CAD with bypasses x 3   • Heart Attack Father    • Hyperlipidemia Father    • Hypertension Father    • Diabetes Sister      type II diabetes   • Diabetes     • Heart Disease     • Hypertension     • Lung Disease         Review of Systems:      - Constitutional: Negative for fever, chills, unexpected weight change, and fatigue/generalized weakness.     - HEENT: Negative for headaches, vision changes, hearing changes, ear pain, ear discharge, rhinorrhea, sinus congestion, sore throat, and neck pain.      - Respiratory: Negative for cough, sputum production, chest congestion, dyspnea, wheezing, and crackles.      - Cardiovascular: Negative for chest pain, palpitations, orthopnea, and bilateral lower extremity edema.     - Gastrointestinal: Negative for heartburn, nausea, vomiting, abdominal pain, hematochezia, melena, diarrhea, constipation, and greasy/foul-smelling stools.     - Genitourinary: Negative for dysuria, polyuria, hematuria, pyuria, urinary urgency, and urinary incontinence.    - Musculoskeletal: Negative for myalgias, back pain, and joint pain.     - Skin: Negative for rash, itching, cyanotic skin color change.     - Neurological: Negative for dizziness, tingling, tremors, focal sensory deficit, focal weakness and headaches.     - Endo/Heme/Allergies: Does not bruise/bleed easily.     - Psychiatric/Behavioral:Positive for depression which appears to be controlled, denies suicidal/homicidal ideation and memory loss.        - NOTE: All other systems reviewed and are negative, except as in HPI.      Exam:    Blood pressure 122/70, pulse 87, temperature 37.3 °C (99.1 °F), resp. rate 14, height 1.499 m (4' 11\"), weight 76.1 " kg (167 lb 12.3 oz), SpO2 93 %. Body mass index is 33.89 kg/m².    General:  Well nourished, well developed female in NAD  Head is grossly normal.  Neck: Supple without JVD or bruit. Thyroid is not enlarged.  Pulmonary: Clear to ausculation and percussion.  Normal effort. No rales, ronchi, or wheezing.  Cardiovascular: Regular rate and rhythm without murmur. Carotid and radial pulses are intact and equal bilaterally.  Extremities: no clubbing, cyanosis, or edema.  Patient was seen for 45 minutes face to face of which, 35 minutes was spent counseling regarding the above mentioned problems.    Please note that this dictation was created using voice recognition software. I have made every reasonable attempt to correct obvious errors, but I expect that there are errors of grammar and possibly content that I did not discover before finalizing the note.    Assessment/Plan:  1. Dyslipidemia  Patient will continue on atorvastatin 20 mg daily new prescriptions were written.  - atorvastatin (LIPITOR) 20 MG Tab; Take 1 Tab by mouth every day.  Dispense: 45 Tab; Refill: 3    2. Sleep apnea, obstructive  Uncontrolled, patient will utilize Ativan 1 mg daily along with trazodone 100 mg daily and we will recheck things in 1-3 weeks.  - lorazepam (ATIVAN) 1 MG Tab; Take 1 Tab by mouth every bedtime for 90 days. Take half to one tablet by mouth at night as needed for insomnia only if no relief with trazodone. Try to gradually decrease amount of ativan. Use trazodone daily for insomnia.  60 day supply 50 tablets.  Dispense: 90 Tab; Refill: 1    3. Insomnia, persistent  Controlled, prescriptions written  - lorazepam (ATIVAN) 1 MG Tab; Take 1 Tab by mouth every bedtime for 90 days. Take half to one tablet by mouth at night as needed for insomnia only if no relief with trazodone. Try to gradually decrease amount of ativan. Use trazodone daily for insomnia.  60 day supply 50 tablets.  Dispense: 90 Tab; Refill: 1    4. Moderate major  depression (CMS-HCC)  Controlled, continue with current meds and lifestyle.    - lorazepam (ATIVAN) 1 MG Tab; Take 1 Tab by mouth every bedtime for 90 days. Take half to one tablet by mouth at night as needed for insomnia only if no relief with trazodone. Try to gradually decrease amount of ativan. Use trazodone daily for insomnia.  60 day supply 50 tablets.  Dispense: 90 Tab; Refill: 1    5. Wellness examination  Plan will be to do a wellness exam for the patient upon her follow-up she needed labs ordered against her wellness  - LIPID PROFILE; Future  - COMP METABOLIC PANEL; Future    6. HTN, goal below 130/80  Controlled, continue with current meds and lifestyle.      7. Anxiety  Controlled, continue with current meds and lifestyle.      8. Mixed hyperlipidemia  Controlled, continue with current meds and lifestyle.

## 2017-12-18 NOTE — ASSESSMENT & PLAN NOTE
This is a chronic health problem for this patient that is adequately controlled with current medications. Her blood pressures 122/70. She's had no side effects from her medications.The patient denies chest pain, shortness of breath or dyspnea on exertion.

## 2017-12-18 NOTE — ASSESSMENT & PLAN NOTE
This is a chronic health problem for this patient that has made it very difficult for her to achieve good sleep. She is utilized Xanax, Ativan and trazodone in various doses to try and get adequate sleep. She finds when she is on trazodone at 100 mg with a 1 mg lorazepam that she gets her best sleep and does not have a hangover the next morning. She would like a prescription for these medications going forward and I will do that for her today.

## 2017-12-18 NOTE — ASSESSMENT & PLAN NOTE
This is a chronic health problem for this patient that is adequately controlled if she can get adequate sleep. We will try utilizing lorazepam 1 mg at bedtime along with trazodone 100 mg and she will let us know at her follow-up if this is adequate.

## 2017-12-18 NOTE — ASSESSMENT & PLAN NOTE
This is a chronic health problem for this patient that I am worried is actually worse because she is not having time with adequate sleep to rest and recover. She continues on citalopram 40 mg daily. She denies suicidal or homicidal ideation.  Depression Screen (PHQ-2/PHQ-9) 10/20/2016 7/10/2017 12/15/2017   PHQ-2 Total Score - - 4   PHQ-2 Total Score 0 - -   PHQ-2 Total Score 0 0 -   PHQ-9 Total Score - - 12   Interpretation of PHQ-9 Total Score   Score Severity   1-4 No Depression   5-9 Mild Depression   10-14 Moderate Depression   15-19 Moderately Severe Depression   20-27 Severe Depression

## 2017-12-22 ENCOUNTER — HOSPITAL ENCOUNTER (OUTPATIENT)
Dept: LAB | Facility: MEDICAL CENTER | Age: 62
End: 2017-12-22
Attending: FAMILY MEDICINE
Payer: COMMERCIAL

## 2017-12-22 ENCOUNTER — HOSPITAL ENCOUNTER (OUTPATIENT)
Facility: MEDICAL CENTER | Age: 62
End: 2017-12-22
Attending: INTERNAL MEDICINE
Payer: COMMERCIAL

## 2017-12-22 ENCOUNTER — OFFICE VISIT (OUTPATIENT)
Dept: MEDICAL GROUP | Facility: PHYSICIAN GROUP | Age: 62
End: 2017-12-22
Payer: COMMERCIAL

## 2017-12-22 VITALS
BODY MASS INDEX: 33.87 KG/M2 | TEMPERATURE: 97.9 F | DIASTOLIC BLOOD PRESSURE: 90 MMHG | WEIGHT: 168 LBS | SYSTOLIC BLOOD PRESSURE: 134 MMHG | HEIGHT: 59 IN | HEART RATE: 76 BPM | RESPIRATION RATE: 16 BRPM | OXYGEN SATURATION: 93 %

## 2017-12-22 DIAGNOSIS — Z00.00 WELLNESS EXAMINATION: ICD-10-CM

## 2017-12-22 DIAGNOSIS — N30.00 ACUTE CYSTITIS WITHOUT HEMATURIA: ICD-10-CM

## 2017-12-22 LAB
ALBUMIN SERPL BCP-MCNC: 4.3 G/DL (ref 3.2–4.9)
ALBUMIN/GLOB SERPL: 1.4 G/DL
ALP SERPL-CCNC: 99 U/L (ref 30–99)
ALT SERPL-CCNC: 34 U/L (ref 2–50)
ANION GAP SERPL CALC-SCNC: 8 MMOL/L (ref 0–11.9)
APPEARANCE UR: CLEAR
AST SERPL-CCNC: 26 U/L (ref 12–45)
BILIRUB SERPL-MCNC: 0.5 MG/DL (ref 0.1–1.5)
BILIRUB UR STRIP-MCNC: NORMAL MG/DL
BUN SERPL-MCNC: 16 MG/DL (ref 8–22)
CALCIUM SERPL-MCNC: 9.6 MG/DL (ref 8.5–10.5)
CHLORIDE SERPL-SCNC: 101 MMOL/L (ref 96–112)
CHOLEST SERPL-MCNC: 183 MG/DL (ref 100–199)
CO2 SERPL-SCNC: 29 MMOL/L (ref 20–33)
COLOR UR AUTO: YELLOW
CREAT SERPL-MCNC: 0.97 MG/DL (ref 0.5–1.4)
GFR SERPL CREATININE-BSD FRML MDRD: 58 ML/MIN/1.73 M 2
GLOBULIN SER CALC-MCNC: 3.1 G/DL (ref 1.9–3.5)
GLUCOSE SERPL-MCNC: 99 MG/DL (ref 65–99)
GLUCOSE UR STRIP.AUTO-MCNC: NORMAL MG/DL
HDLC SERPL-MCNC: 60 MG/DL
KETONES UR STRIP.AUTO-MCNC: NORMAL MG/DL
LDLC SERPL CALC-MCNC: 93 MG/DL
LEUKOCYTE ESTERASE UR QL STRIP.AUTO: NORMAL
NITRITE UR QL STRIP.AUTO: NORMAL
PH UR STRIP.AUTO: 7.5 [PH] (ref 5–8)
POTASSIUM SERPL-SCNC: 4.3 MMOL/L (ref 3.6–5.5)
PROT SERPL-MCNC: 7.4 G/DL (ref 6–8.2)
PROT UR QL STRIP: NORMAL MG/DL
RBC UR QL AUTO: NORMAL
SODIUM SERPL-SCNC: 138 MMOL/L (ref 135–145)
SP GR UR STRIP.AUTO: 1
TRIGL SERPL-MCNC: 148 MG/DL (ref 0–149)
UROBILINOGEN UR STRIP-MCNC: NORMAL MG/DL

## 2017-12-22 PROCEDURE — 81002 URINALYSIS NONAUTO W/O SCOPE: CPT | Performed by: INTERNAL MEDICINE

## 2017-12-22 PROCEDURE — 87086 URINE CULTURE/COLONY COUNT: CPT

## 2017-12-22 PROCEDURE — 80053 COMPREHEN METABOLIC PANEL: CPT

## 2017-12-22 PROCEDURE — 80061 LIPID PANEL: CPT

## 2017-12-22 PROCEDURE — 99212 OFFICE O/P EST SF 10 MIN: CPT | Performed by: INTERNAL MEDICINE

## 2017-12-22 PROCEDURE — 36415 COLL VENOUS BLD VENIPUNCTURE: CPT

## 2017-12-22 RX ORDER — SULFAMETHOXAZOLE AND TRIMETHOPRIM 800; 160 MG/1; MG/1
1 TABLET ORAL 2 TIMES DAILY
Qty: 10 TAB | Refills: 0 | Status: SHIPPED | OUTPATIENT
Start: 2017-12-22 | End: 2018-03-13

## 2017-12-22 NOTE — ASSESSMENT & PLAN NOTE
patient complains of 5 days of cloudy urine, she has been drinking more water and then symptoms improve, however, she did have nocturia a few times, mild increase in frequency, positive urgency and hesitancy.

## 2017-12-22 NOTE — PROGRESS NOTES
Chief Complaint   Patient presents with   • UTI     cloudy urine, painful urination x2days        HISTORY OF PRESENT ILLNESS: Patient is a 62 y.o. female established patient who presents today to discuss below issues      Acute cystitis without hematuria  patient complains of 5 days of cloudy urine, she has been drinking more water and then symptoms improve, however, she did have nocturia a few times, mild increase in frequency, positive urgency and hesitancy. No abdomen pain fevers or chills.      Patient Active Problem List    Diagnosis Date Noted   • Acute cystitis without hematuria 12/22/2017   • Menopause 07/10/2017   • Anxiety 06/15/2017   • Allergic rhinitis 05/10/2017   • Fatigue 03/02/2017   • S/P lumbar fusion 10/20/2016   • Cervical radiculopathy 10/20/2016   • Stroke-related cognitive dysfunction (CMS-HCC) 08/25/2015   • Diaphragmatic hernia 06/30/2015   • Hiatal hernia 06/23/2015   • History of gastrointestinal bleeding 06/04/2015   • Mild mitral regurgitation 03/12/2015   • History of arthrodesis 12/01/2014   • History of spinal surgery 10/06/2014   • H/O: CVA (cerebrovascular accident) 09/23/2014   • Chronic constipation 09/17/2014   • GERD (gastroesophageal reflux disease) 09/17/2014   • Seasonal allergies 05/02/2014   • Spinal stenosis of lumbar region 03/20/2014   • Hypoxemia 09/01/2013   • Lumbar radiculopathy, chronic 08/26/2013   • Carpal tunnel syndrome 11/14/2012   • Vitamin D deficiency disease 09/24/2012   • Mixed hyperlipidemia 12/27/2011   • HTN, goal below 130/80 10/24/2011   • Acquired scoliosis 10/24/2011   • Sleep apnea, obstructive 10/24/2011   • Insomnia, persistent 10/24/2011   • Moderate major depression (CMS-HCC) 10/24/2011   • 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      Allergies:Nkda [no known drug allergy] and Seasonal    Current Outpatient Prescriptions   Medication Sig Dispense Refill   • sulfamethoxazole-trimethoprim (BACTRIM DS) 800-160 MG tablet Take 1 Tab by mouth 2 times a day. 10 Tab  0   • atorvastatin (LIPITOR) 20 MG Tab Take 1 Tab by mouth every day. 45 Tab 3   • lorazepam (ATIVAN) 1 MG Tab Take 1 Tab by mouth every bedtime for 90 days. Take half to one tablet by mouth at night as needed for insomnia only if no relief with trazodone. Try to gradually decrease amount of ativan. Use trazodone daily for insomnia.  60 day supply 50 tablets. 90 Tab 1   • trazodone (DESYREL) 100 MG Tab Take 1 Tab by mouth every bedtime. 90 Tab 3   • baclofen (LIORESAL) 10 MG Tab TAKE ONE TABLET BY MOUTH ONCE DAILY 90 Tab 0   • Clobetasol Prop Emollient Base (CLOBETASOL PROPIONATE E) 0.05 % Cream Use 1 gram in a thin film daily to affected skin 1 Tube 1   • mometasone (NASONEX) 50 MCG/ACT nasal spray USE TWO SPRAY(S) IN EACH NOSTRIL ONCE DAILY 17 g 3   • gabapentin (NEURONTIN) 300 MG Cap TAKE ONE CAPSULE BY MOUTH TWICE DAILY 180 Cap 3   • furosemide (LASIX) 20 MG Tab TAKE ONE TABLET BY MOUTH ONCE DAILY 90 Tab 0   • DOCQLACE 100 MG Cap TAKE ONE CAPSULE BY MOUTH TWICE DAILY 180 Cap 0   • cetirizine (ZYRTEC) 10 MG Tab TAKE ONE TABLET BY MOUTH ONCE DAILY 90 Tab 3   • citalopram (CELEXA) 40 MG Tab TAKE ONE TABLET BY MOUTH ONCE DAILY 90 Tab 3   • vitamin D (CHOLECALCIFEROL) 1000 UNIT Tab Take 2 Tabs by mouth every day. 60 Tab 5   • pantoprazole (PROTONIX) 40 MG Tablet Delayed Response TAKE ONE TABLET BY MOUTH ONCE DAILY 90 Tab 3   • Multiple Vitamins-Minerals (MULTI-VITAMIN/MINERALS PO) Take 1 Tab by mouth every day.     • Cyanocobalamin (VITAMIN B 12 PO) Place 1 Tab under tongue every day.     • Probiotic Product (PROBIOTIC DAILY PO) Take 1 Cap by mouth every day.     • Digestive Enzymes CAPS Take 1 Cap by mouth 2 times a day, with meals.     • sennosides (SENOKOT TO GO) 8.6 MG TABS Take 1 Tab by mouth 2 times a day. 60 Tab 6   • fluticasone (FLONASE) 50 MCG/ACT nasal spray Spray 2 Sprays in nose every day. 3 Bottle 3   • albuterol 108 (90 BASE) MCG/ACT Aero Soln inhalation aerosol Inhale 2 Puffs by mouth every four hours  "as needed. 1 Inhaler 0     No current facility-administered medications for this visit.        Social History   Substance Use Topics   • Smoking status: Former Smoker     Packs/day: 0.30     Years: 5.00     Types: Cigarettes     Quit date: 1/1/1975   • Smokeless tobacco: Never Used      Comment: quit 1975   • Alcohol use No     Social History     Social History Narrative   • No narrative on file       Family History   Problem Relation Age of Onset   • Diabetes Mother    • Cancer Mother 82     breast cancer   • Lung Disease Mother      copd   • Hyperlipidemia Mother    • Hypertension Mother    • Cancer Father      Laryngeal CA   • Heart Disease Father 50     CAD with bypasses x 3   • Heart Attack Father    • Hyperlipidemia Father    • Hypertension Father    • Diabetes Sister      type II diabetes   • Diabetes     • Heart Disease     • Hypertension     • Lung Disease         ROS:  Per HPI    Exam:  Blood pressure 134/90, pulse 76, temperature 36.6 °C (97.9 °F), resp. rate 16, height 1.499 m (4' 11\"), weight 76.2 kg (168 lb), SpO2 93 %. in no apparent distress  Skin: no rashes in visible areas, warm, dry  Cardiovascular: Regular rate and rhythm without murmur.   Pulmonary: Clear to ausculation. No rales, ronchi, or wheezing.  Abdomen: soft non-tender, no palpable liver, spleen, bladder or masses.  Extremities: no clubbing, cyanosis, or edema.  Psych: alert and oriented x 3, judgement and memory intact    UA: Large leukocytes, trace blood      Assessment/Plan:  1. Acute cystitis without hematuria  Monitor with empiric course of antibiotics of Bactrim DS, culture urine, patient to contact us via my chart or return to office when necessary any persistence of symptoms. Discussed fluid intake discussed vitamin C.  - URINE CULTURE(NEW); Future             "

## 2017-12-24 LAB
BACTERIA UR CULT: NORMAL
SIGNIFICANT IND 70042: NORMAL
SITE SITE: NORMAL
SOURCE SOURCE: NORMAL

## 2017-12-29 ENCOUNTER — PATIENT MESSAGE (OUTPATIENT)
Dept: MEDICAL GROUP | Facility: MEDICAL CENTER | Age: 62
End: 2017-12-29

## 2018-01-02 DIAGNOSIS — K21.9 GASTROESOPHAGEAL REFLUX DISEASE, ESOPHAGITIS PRESENCE NOT SPECIFIED: ICD-10-CM

## 2018-01-02 RX ORDER — PANTOPRAZOLE SODIUM 40 MG/1
TABLET, DELAYED RELEASE ORAL
Qty: 90 TAB | Refills: 3 | Status: SHIPPED | OUTPATIENT
Start: 2018-01-02 | End: 2018-05-01

## 2018-01-02 NOTE — TELEPHONE ENCOUNTER
From: Almaz Samuel  To: Sahra Max M.D.  Sent: 12/29/2017 2:34 PM PST  Subject: Prescription Question    Dr. Bocanegra,  I have 2 other scrips. Citalopran 40 and Pantoprazole 40, both a 90 day supply. I will need refills on these. They are not on my scripts. [facility] has sent in a request, but not sure you will get it. It was perscribed by Dr. Pedersen.   Please confirm you recieved this. [facility] in [place].    Thank you,  Almaz Samuel

## 2018-01-07 ENCOUNTER — PATIENT MESSAGE (OUTPATIENT)
Dept: MEDICAL GROUP | Facility: MEDICAL CENTER | Age: 63
End: 2018-01-07

## 2018-02-09 ENCOUNTER — APPOINTMENT (OUTPATIENT)
Dept: MEDICAL GROUP | Facility: MEDICAL CENTER | Age: 63
End: 2018-02-09
Payer: MEDICARE

## 2018-02-09 DIAGNOSIS — I10 ESSENTIAL HYPERTENSION: ICD-10-CM

## 2018-02-09 DIAGNOSIS — K59.01 SLOW TRANSIT CONSTIPATION: ICD-10-CM

## 2018-02-09 RX ORDER — FUROSEMIDE 20 MG/1
20 TABLET ORAL
Qty: 90 TAB | Refills: 0 | Status: CANCELLED | OUTPATIENT
Start: 2018-02-09

## 2018-02-14 RX ORDER — FUROSEMIDE 20 MG/1
TABLET ORAL
Qty: 90 TAB | Refills: 3 | Status: SHIPPED | OUTPATIENT
Start: 2018-02-14 | End: 2018-05-01

## 2018-02-14 RX ORDER — DOCUSATE SODIUM 100 MG/1
100 TABLET ORAL 2 TIMES DAILY
Qty: 180 CAP | Refills: 3 | Status: SHIPPED | OUTPATIENT
Start: 2018-02-14 | End: 2018-05-01

## 2018-02-14 NOTE — TELEPHONE ENCOUNTER
From: Almaz Samuel  Sent: 2/9/2018 7:35 PM PST  Subject: Medication Renewal Request    Almaz Samuel would like a refill of the following medications:     DOCQLACE 100 MG Cap [Tamie Panchal, A.P.N.]   Patient Comment: is there another refill for this as well?    Preferred pharmacy: [address]        Medication renewals requested in this message routed separately:     furosemide (LASIX) 20 MG Tab [Rudy Olivares M.D.]   Patient Comment: is there another refill?

## 2018-02-14 NOTE — TELEPHONE ENCOUNTER
From: Almaz Samuel  Sent: 2/9/2018 7:35 PM PST  Subject: Medication Renewal Request    Almaz Samuel would like a refill of the following medications:     furosemide (LASIX) 20 MG Tab [Rudy Olivares M.D.]   Patient Comment: is there another refill?    Preferred pharmacy: [address]        Medication renewals requested in this message routed separately:     DOCQLACE 100 MG Cap [Tamie Panchal, A.P.N.]   Patient Comment: is there another refill for this as well?

## 2018-03-13 ENCOUNTER — OFFICE VISIT (OUTPATIENT)
Dept: MEDICAL GROUP | Facility: MEDICAL CENTER | Age: 63
End: 2018-03-13
Payer: MEDICARE

## 2018-03-13 VITALS
HEIGHT: 59 IN | TEMPERATURE: 98.2 F | BODY MASS INDEX: 33.83 KG/M2 | RESPIRATION RATE: 14 BRPM | OXYGEN SATURATION: 92 % | DIASTOLIC BLOOD PRESSURE: 70 MMHG | HEART RATE: 89 BPM | SYSTOLIC BLOOD PRESSURE: 116 MMHG | WEIGHT: 167.8 LBS

## 2018-03-13 DIAGNOSIS — I10 HTN, GOAL BELOW 130/80: ICD-10-CM

## 2018-03-13 DIAGNOSIS — G47.33 SLEEP APNEA, OBSTRUCTIVE: ICD-10-CM

## 2018-03-13 DIAGNOSIS — G63 POLYNEUROPATHY ASSOCIATED WITH UNDERLYING DISEASE (HCC): ICD-10-CM

## 2018-03-13 DIAGNOSIS — F32.1 MODERATE MAJOR DEPRESSION (HCC): ICD-10-CM

## 2018-03-13 DIAGNOSIS — E78.2 MIXED HYPERLIPIDEMIA: ICD-10-CM

## 2018-03-13 DIAGNOSIS — E66.9 OBESITY (BMI 30-39.9): ICD-10-CM

## 2018-03-13 DIAGNOSIS — G47.00 INSOMNIA, PERSISTENT: ICD-10-CM

## 2018-03-13 PROCEDURE — 99214 OFFICE O/P EST MOD 30 MIN: CPT | Performed by: FAMILY MEDICINE

## 2018-03-13 RX ORDER — LORAZEPAM 1 MG/1
1 TABLET ORAL
Qty: 90 TAB | Refills: 1 | Status: SHIPPED | OUTPATIENT
Start: 2018-03-13 | End: 2018-04-24 | Stop reason: SDUPTHER

## 2018-03-13 NOTE — ASSESSMENT & PLAN NOTE
This is a chronic health problem that is well controlled with current medications and lifestyle measures. Patient states that her Celexa 40 mg daily keeps her mood stable. She is able to deal with the multiple stressors she has in her personal life. She feels that she is doing well at this time. Denies suicidal or homicidal ideation.

## 2018-03-13 NOTE — ASSESSMENT & PLAN NOTE
This is a chronic health problem for this patient directly related to her multiple back injuries and surgeries. She utilizes gabapentin and does fairly well with that medication.

## 2018-03-13 NOTE — ASSESSMENT & PLAN NOTE
This is a chronic health problems for this patient for which she requires both trazodone 100 mg and lorazepam 1 mg daily at bedtime. Without this combination she is unable to tolerate her CPAP device. Without her CPAP device she starts to develop right heart failure. We will continue with these medications.Obtained and reviewed patient utilization report from [facility] pharmacy database on 3/13/2018 1:31 PM  prior to writing prescription for controlled substance II, III or IV per [place] bill . Based on assessment of the report, the prescription is medically necessary.

## 2018-03-13 NOTE — ASSESSMENT & PLAN NOTE
This is a chronic health problems for this patient but actually well controlled. Patient has added fish oil and CoQ10 to her medications and her lipids have improved greatly. Her total cholesterol is down 183, triglycerides 148, HDL 60 and LDL is 93. She is congratulated and encouraged to continue with lifestyle management along with her medications. We will plan to recheck in 6 months.

## 2018-03-13 NOTE — ASSESSMENT & PLAN NOTE
This is a chronic health problem that is well controlled with current medications and lifestyle measures. Blood pressure today is 116/70. She is taking her medications as directed. She is doing well.The patient denies chest pain, shortness of breath or dyspnea on exertion.

## 2018-03-13 NOTE — PROGRESS NOTES
CC: Insomnia, hypertension, mixed hyperlipidemia, depression and obesity    HISTORY OF PRESENT ILLNESS: Patient is a 62 y.o. female established patient who presents today to follow-up on her blood work that she did prior to the office visit. She also tells me that she is needing to get a new prescription for lorazepam so we will need to do a controlled substance agreement.    Health Maintenance: Completed    HTN, goal below 130/80  This is a chronic health problem that is well controlled with current medications and lifestyle measures. Blood pressure today is 116/70. She is taking her medications as directed. She is doing well.The patient denies chest pain, shortness of breath or dyspnea on exertion.         Insomnia, persistent  This is a chronic health problems for this patient for which she requires both trazodone 100 mg and lorazepam 1 mg daily at bedtime. Without this combination she is unable to tolerate her CPAP device. Without her CPAP device she starts to develop right heart failure. We will continue with these medications.Obtained and reviewed patient utilization report from [facility] pharmacy database on 3/13/2018 1:31 PM  prior to writing prescription for controlled substance II, III or IV per [place] bill . Based on assessment of the report, the prescription is medically necessary.       Mixed hyperlipidemia  This is a chronic health problems for this patient but actually well controlled. Patient has added fish oil and CoQ10 to her medications and her lipids have improved greatly. Her total cholesterol is down 183, triglycerides 148, HDL 60 and LDL is 93. She is congratulated and encouraged to continue with lifestyle management along with her medications. We will plan to recheck in 6 months.    Moderate major depression  This is a chronic health problem that is well controlled with current medications and lifestyle measures. Patient states that her Celexa 40 mg daily keeps her mood stable. She is  able to deal with the multiple stressors she has in her personal life. She feels that she is doing well at this time. Denies suicidal or homicidal ideation.       Obesity (BMI 30-39.9)  This is a chronic health problem for this patient which she is working on. Her weight continues to fluctuate one or 2 pounds but she is at least stabilized in the upper 160s. She is hoping to have the time to start working on her own weight issues in the coming months.    Polyneuropathy associated with underlying disease (CMS-HCC)  This is a chronic health problem for this patient directly related to her multiple back injuries and surgeries. She utilizes gabapentin and does fairly well with that medication.      Patient Active Problem List    Diagnosis Date Noted   • Polyneuropathy associated with underlying disease (CMS-HCC) 03/13/2018   • Obesity (BMI 30-39.9) 03/13/2018   • Acute cystitis without hematuria 12/22/2017   • Menopause 07/10/2017   • Anxiety 06/15/2017   • Allergic rhinitis 05/10/2017   • Fatigue 03/02/2017   • S/P lumbar fusion 10/20/2016   • Cervical radiculopathy 10/20/2016   • Stroke-related cognitive dysfunction (CMS-HCC) 08/25/2015   • Diaphragmatic hernia 06/30/2015   • Hiatal hernia 06/23/2015   • History of gastrointestinal bleeding 06/04/2015   • Mild mitral regurgitation 03/12/2015   • History of arthrodesis 12/01/2014   • History of spinal surgery 10/06/2014   • H/O: CVA (cerebrovascular accident) 09/23/2014   • Chronic constipation 09/17/2014   • GERD (gastroesophageal reflux disease) 09/17/2014   • Seasonal allergies 05/02/2014   • Spinal stenosis of lumbar region 03/20/2014   • Hypoxemia 09/01/2013   • Lumbar radiculopathy, chronic 08/26/2013   • Carpal tunnel syndrome 11/14/2012   • Vitamin D deficiency disease 09/24/2012   • Mixed hyperlipidemia 12/27/2011   • HTN, goal below 130/80 10/24/2011   • Acquired scoliosis 10/24/2011   • Sleep apnea, obstructive 10/24/2011   • Insomnia, persistent 10/24/2011    • Moderate major depression (CMS-[facility]) 10/24/2011   • 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      Allergies:Nkda [no known drug allergy] and Seasonal    Current Outpatient Prescriptions   Medication Sig Dispense Refill   • LORazepam (ATIVAN) 1 MG Tab Take 1 Tab by mouth every bedtime for 180 days. 90 Tab 1   • DOCQLACE 100 MG Cap Take 1 Cap by mouth 2 times a day. TWICE DAILY 180 Cap 3   • furosemide (LASIX) 20 MG Tab TAKE ONE TABLET BY MOUTH ONCE DAILY 90 Tab 3   • pantoprazole (PROTONIX) 40 MG Tablet Delayed Response TAKE ONE TABLET BY MOUTH ONCE DAILY 90 Tab 3   • atorvastatin (LIPITOR) 20 MG Tab Take 1 Tab by mouth every day. 45 Tab 3   • trazodone (DESYREL) 100 MG Tab Take 1 Tab by mouth every bedtime. 90 Tab 3   • baclofen (LIORESAL) 10 MG Tab TAKE ONE TABLET BY MOUTH ONCE DAILY 90 Tab 0   • Clobetasol Prop Emollient Base (CLOBETASOL PROPIONATE E) 0.05 % Cream Use 1 gram in a thin film daily to affected skin 1 Tube 1   • gabapentin (NEURONTIN) 300 MG Cap TAKE ONE CAPSULE BY MOUTH TWICE DAILY 180 Cap 3   • cetirizine (ZYRTEC) 10 MG Tab TAKE ONE TABLET BY MOUTH ONCE DAILY 90 Tab 3   • citalopram (CELEXA) 40 MG Tab TAKE ONE TABLET BY MOUTH ONCE DAILY 90 Tab 3   • vitamin D (CHOLECALCIFEROL) 1000 UNIT Tab Take 2 Tabs by mouth every day. 60 Tab 5   • fluticasone (FLONASE) 50 MCG/ACT nasal spray Spray 2 Sprays in nose every day. 3 Bottle 3   • albuterol 108 (90 BASE) MCG/ACT Aero Soln inhalation aerosol Inhale 2 Puffs by mouth every four hours as needed. 1 Inhaler 0   • Multiple Vitamins-Minerals (MULTI-VITAMIN/MINERALS PO) Take 1 Tab by mouth every day.     • Cyanocobalamin (VITAMIN B 12 PO) Place 1 Tab under tongue every day.     • Probiotic Product (PROBIOTIC DAILY PO) Take 1 Cap by mouth every day.     • Digestive Enzymes CAPS Take 1 Cap by mouth 2 times a day, with meals.     • sennosides (SENOKOT TO GO) 8.6 MG TABS Take 1 Tab by mouth 2 times a day. 60 Tab 6     No current facility-administered medications for this visit.         Social History   Substance Use Topics   • Smoking status: Former Smoker     Packs/day: 0.30     Years: 5.00     Types: Cigarettes     Quit date: 1/1/1975   • Smokeless tobacco: Never Used      Comment: quit 1975   • Alcohol use No     Social History     Social History Narrative   • No narrative on file       Family History   Problem Relation Age of Onset   • Diabetes Mother    • Cancer Mother 82     breast cancer   • Lung Disease Mother      copd   • Hyperlipidemia Mother    • Hypertension Mother    • Cancer Father      Laryngeal CA   • Heart Disease Father 50     CAD with bypasses x 3   • Heart Attack Father    • Hyperlipidemia Father    • Hypertension Father    • Diabetes Sister      type II diabetes   • Diabetes     • Heart Disease     • Hypertension     • Lung Disease          ROS:     - Constitutional:  Negative for fever, chills, unexpected weight change, and fatigue/generalized weakness.    - HEENT:  Negative for headaches, vision changes, hearing changes, ear pain, ear discharge, rhinorrhea, sinus congestion, sore throat, and neck pain.      - Respiratory: Negative for cough, sputum production, chest congestion, dyspnea, wheezing, and crackles.      - Cardiovascular: Negative for chest pain, palpitations, orthopnea, and bilateral lower extremity edema.     - Gastrointestinal: Negative for heartburn, nausea, vomiting, abdominal pain, hematochezia, melena, diarrhea, constipation, and greasy/foul-smelling stools.     - Genitourinary: Negative for dysuria, polyuria, hematuria, pyuria, urinary urgency, and urinary incontinence.     - Musculoskeletal: Negative for myalgias, back pain, and joint pain.     - Skin: Negative for rash, itching, cyanotic skin color change.     - Neurological: Negative for dizziness, tingling, tremors, focal sensory deficit, focal weakness and headaches.     - Endo/Heme/Allergies: Does not bruise/bleed easily.     - Psychiatric/Behavioral: Negative for depression,  "suicidal/homicidal ideation and memory loss.          - NOTE: All other systems reviewed and are negative, except as in HPI.      Exam:    Blood pressure 116/70, pulse 89, temperature 36.8 °C (98.2 °F), resp. rate 14, height 1.499 m (4' 11\"), weight 76.1 kg (167 lb 12.8 oz), SpO2 92 %, not currently breastfeeding. Body mass index is 33.89 kg/m².    General:  Well nourished, well developed female in NAD  Head is grossly normal.  Neck: Supple without JVD or bruit. Thyroid is not enlarged.  Pulmonary: Clear to ausculation and percussion.  Normal effort. No rales, ronchi, or wheezing.  Cardiovascular: Regular rate and rhythm without murmur. Carotid and radial pulses are intact and equal bilaterally.  Extremities: no clubbing, cyanosis, or edema.  LABS: 12/22/17: Results reviewed and discussed with the patient, questions answered.    Please note that this dictation was created using voice recognition software. I have made every reasonable attempt to correct obvious errors, but I expect that there are errors of grammar and possibly content that I did not discover before finalizing the note.    Assessment/Plan:  1. Insomnia, persistent  Controlled, continue with current meds and lifestyle.  Patient requires lorazepam along with trazodone to be able to tolerate her CPAP and get adequate sleep. I will write that prescription today for 6 month supply.  - CONTROLLED SUBSTANCE TREATMENT AGREEMENT  - LORazepam (ATIVAN) 1 MG Tab; Take 1 Tab by mouth every bedtime for 180 days.  Dispense: 90 Tab; Refill: 1    2. Sleep apnea, obstructive  Controlled, continue with current meds and lifestyle.    - CONTROLLED SUBSTANCE TREATMENT AGREEMENT  - LORazepam (ATIVAN) 1 MG Tab; Take 1 Tab by mouth every bedtime for 180 days.  Dispense: 90 Tab; Refill: 1    3. Moderate major depression (CMS-HCC)  Controlled, continue with current meds and lifestyle.      4. HTN, goal below 130/80  Controlled, continue with current meds and lifestyle.      5. " Mixed hyperlipidemia  Controlled, continue with current meds and lifestyle.      6. Polyneuropathy associated with underlying disease (CMS-HCC)  Controlled, continue with current meds and lifestyle.      7. Obesity (BMI 30-39.9)  Uncontrolled, patient working on weight loss in the coming months. We'll plan to see her back for a welcome to Medicare visit and then set her up on regular follow-up.

## 2018-03-13 NOTE — ASSESSMENT & PLAN NOTE
This is a chronic health problem for this patient which she is working on. Her weight continues to fluctuate one or 2 pounds but she is at least stabilized in the upper 160s. She is hoping to have the time to start working on her own weight issues in the coming months.

## 2018-04-11 ENCOUNTER — OFFICE VISIT (OUTPATIENT)
Dept: MEDICAL GROUP | Facility: PHYSICIAN GROUP | Age: 63
End: 2018-04-11
Payer: MEDICARE

## 2018-04-11 VITALS
TEMPERATURE: 100.1 F | BODY MASS INDEX: 35.68 KG/M2 | SYSTOLIC BLOOD PRESSURE: 124 MMHG | OXYGEN SATURATION: 94 % | HEART RATE: 94 BPM | DIASTOLIC BLOOD PRESSURE: 78 MMHG | RESPIRATION RATE: 16 BRPM | HEIGHT: 58 IN | WEIGHT: 170 LBS

## 2018-04-11 DIAGNOSIS — R06.2 WHEEZE: ICD-10-CM

## 2018-04-11 DIAGNOSIS — R05.9 COUGH: ICD-10-CM

## 2018-04-11 DIAGNOSIS — J40 BRONCHITIS: ICD-10-CM

## 2018-04-11 PROCEDURE — 99214 OFFICE O/P EST MOD 30 MIN: CPT | Mod: 25 | Performed by: NURSE PRACTITIONER

## 2018-04-11 PROCEDURE — 94640 AIRWAY INHALATION TREATMENT: CPT | Performed by: NURSE PRACTITIONER

## 2018-04-11 RX ORDER — ALBUTEROL SULFATE 90 UG/1
2 AEROSOL, METERED RESPIRATORY (INHALATION) EVERY 4 HOURS PRN
Qty: 1 INHALER | Refills: 0 | Status: ON HOLD | OUTPATIENT
Start: 2018-04-11 | End: 2018-07-25

## 2018-04-11 RX ORDER — DOXYCYCLINE HYCLATE 100 MG
100 TABLET ORAL 2 TIMES DAILY
Qty: 14 TAB | Refills: 0 | Status: SHIPPED | OUTPATIENT
Start: 2018-04-11 | End: 2018-04-18

## 2018-04-11 RX ORDER — IPRATROPIUM BROMIDE AND ALBUTEROL SULFATE 2.5; .5 MG/3ML; MG/3ML
3 SOLUTION RESPIRATORY (INHALATION) ONCE
Status: COMPLETED | OUTPATIENT
Start: 2018-04-11 | End: 2018-04-11

## 2018-04-11 RX ORDER — BENZONATATE 100 MG/1
100 CAPSULE ORAL 3 TIMES DAILY PRN
Qty: 60 CAP | Refills: 0 | Status: SHIPPED | OUTPATIENT
Start: 2018-04-11 | End: 2018-04-24

## 2018-04-11 RX ADMIN — IPRATROPIUM BROMIDE AND ALBUTEROL SULFATE 3 ML: 2.5; .5 SOLUTION RESPIRATORY (INHALATION) at 10:05

## 2018-04-11 NOTE — PROGRESS NOTES
"[facility]  Primary Care Office Visit - Problem-Oriented        History:     Almaz Samuel is a 63 y.o. female who is here today to discuss URI (x2 weeks)      Cough  Patient is a 63-year-old female with cough ×2 weeks. She describes a second sickening about a week ago, she now has persistent cough, nasal congestion, fevers, chills. She is not using any over-the-counter multisymptom medications that she is taking Zyrtec daily without improvement in her symptoms. She denies itchy watery eyes, sneezing. She does not smoke. She does not have any underlying lung disease.         Past Medical History:   Diagnosis Date   • Anesthesia 02-14-11    PO N/V, \"slow to come out\"   • Arthritis 02-14-11    spine   • Backpain 02-14-11    neck and abd. also,    • Breath shortness     with exertion   • Diverticulitis    • Endometriosis of uterus    • Fusion of spine 2014   • H/O fall     when in hospital  with low O2 sats   • H/O: CVA (cerebrovascular accident) 8/22/2014    Complex event associated with apparent medication reaction but with MRI suggesting possible multiple small infarcts of various ages, [facility]   • Hiatus hernia syndrome     not repaired   • High cholesterol    • HTN, goal below 130/80 10/24/2011   • Infectious disease      Hep C +   • Lung collapse 02-14-11    right lung 1/4 collpsed    • Mixed hyperlipidemia 12/27/2011   • Moderate major depression (CMS-HCC) 10/24/2011   • MRSA exposure 8/2014    while in hospital   • Post-menopause on HRT (hormone replacement therapy) 12/27/2011   • 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    exposed as a child, told not active   • Scoliosis    • Sleep apnea 6/2015    CPAP   • Unspecified vitamin D deficiency 9/24/2012     Past Surgical History:   Procedure Laterality Date   • NISSEN FUNDOPLICATION LAPAROSCOPIC N/A 6/30/2015    Procedure: NISSEN FUNDOPLICATION LAPAROSCOPIC;  Surgeon: Kenji Okeefe M.D.;  Location: SURGERY SAME DAY [facility];  Service:    • GASTROSCOPY-ENDO  " 6/24/2015    Procedure: GASTROSCOPY-ENDO;  Surgeon: Kenji Garcia M.D.;  Location: ENDOSCOPY [facility];  Service:    • CARPAL TUNNEL RELEASE  11/14/2012    Performed by Holly Martin M.D. at SURGERY [facility]   • LOW ANTERIOR RESECTION LAPAROSCOPIC  3/2/2011    Performed by KENJI GARCIA at SURGERY [facility]   • CERVICAL DISK AND FUSION ANTERIOR  6/22/2010    Performed by JOSEPH DARLING at SURGERY [facility]   • TUBAL LIGATION  1988   • TONSILLECTOMY AND ADENOIDECTOMY  1959   • APPENDECTOMY     • GYN SURGERY      partial hysterectomy     Social History     Social History   • Marital status:      Spouse name: N/A   • Number of children: N/A   • Years of education: N/A     Occupational History   • Not on file.     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[employer]     Other Topics Concern   •  Service No   • Blood Transfusions No   • Exercise No   • Bike Helmet No   • Seat Belt Yes   • Self-Exams Yes     Social History Narrative   • No narrative on file     History   Smoking Status   • Former Smoker   • Packs/day: 0.30   • Years: 5.00   • Types: Cigarettes   • Quit date: 1/1/1975   Smokeless Tobacco   • Never Used     Comment: quit 1975     Family History   Problem Relation Age of Onset   • Diabetes Mother    • Cancer Mother 82     breast cancer   • Lung Disease Mother      copd   • Hyperlipidemia Mother    • Hypertension Mother    • Cancer Father      Laryngeal CA   • Heart Disease Father 50     CAD with bypasses x 3   • Heart Attack Father    • Hyperlipidemia Father    • Hypertension Father    • Diabetes Sister      type II diabetes   • Diabetes     • Heart Disease     • Hypertension     • Lung Disease        Allergies   Allergen Reactions   • Nkda [No Known Drug Allergy]    • Seasonal      Pt states eye irritation       Problem List:     Patient Active Problem List    Diagnosis Date Noted   • Cough 04/11/2018   • Polyneuropathy associated with underlying disease (CMS-HCC) 03/13/2018   • Obesity (BMI 30-39.9) 03/13/2018   • Acute cystitis without hematuria 12/22/2017   • Menopause 07/10/2017   • Anxiety 06/15/2017   • Allergic rhinitis 05/10/2017   • Fatigue 03/02/2017   • S/P lumbar fusion 10/20/2016   • Cervical radiculopathy 10/20/2016   • Stroke-related cognitive dysfunction (CMS-HCC) 08/25/2015   • Diaphragmatic hernia 06/30/2015   • Hiatal hernia 06/23/2015   • History of gastrointestinal bleeding 06/04/2015   • Mild mitral regurgitation 03/12/2015   • History of arthrodesis 12/01/2014   • History of spinal surgery 10/06/2014   • H/O: CVA (cerebrovascular accident) 09/23/2014   • Chronic constipation 09/17/2014   • GERD (gastroesophageal reflux disease) 09/17/2014   • Seasonal allergies 05/02/2014   • Spinal stenosis of lumbar region 03/20/2014   • Hypoxemia 09/01/2013   • Lumbar radiculopathy, chronic 08/26/2013   • Carpal tunnel syndrome 11/14/2012   • Vitamin D deficiency disease 09/24/2012   • Mixed hyperlipidemia 12/27/2011   • HTN, goal below 130/80 10/24/2011   • Acquired scoliosis 10/24/2011   • Sleep apnea, obstructive 10/24/2011   • Insomnia, persistent 10/24/2011   • Moderate major depression (CMS-HCC) 10/24/2011   • 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         Medications:     Current Outpatient Prescriptions:   •  LORazepam (ATIVAN) 1 MG Tab, Take 1 Tab by mouth every bedtime for 180 days., Disp: 90 Tab, Rfl: 1  •  DOCQLACE 100 MG Cap, Take 1 Cap by mouth 2 times a day. TWICE DAILY, Disp: 180 Cap, Rfl: 3  •  furosemide (LASIX) 20 MG Tab, TAKE ONE TABLET BY MOUTH ONCE DAILY, Disp: 90 Tab, Rfl: 3  •  pantoprazole (PROTONIX) 40 MG Tablet Delayed Response, TAKE ONE TABLET BY MOUTH ONCE DAILY, Disp: 90 Tab, Rfl:  "3  •  atorvastatin (LIPITOR) 20 MG Tab, Take 1 Tab by mouth every day., Disp: 45 Tab, Rfl: 3  •  trazodone (DESYREL) 100 MG Tab, Take 1 Tab by mouth every bedtime., Disp: 90 Tab, Rfl: 3  •  baclofen (LIORESAL) 10 MG Tab, TAKE ONE TABLET BY MOUTH ONCE DAILY, Disp: 90 Tab, Rfl: 0  •  cetirizine (ZYRTEC) 10 MG Tab, TAKE ONE TABLET BY MOUTH ONCE DAILY, Disp: 90 Tab, Rfl: 3  •  citalopram (CELEXA) 40 MG Tab, TAKE ONE TABLET BY MOUTH ONCE DAILY, Disp: 90 Tab, Rfl: 3  •  vitamin D (CHOLECALCIFEROL) 1000 UNIT Tab, Take 2 Tabs by mouth every day., Disp: 60 Tab, Rfl: 5  •  fluticasone (FLONASE) 50 MCG/ACT nasal spray, Spray 2 Sprays in nose every day., Disp: 3 Bottle, Rfl: 3  •  Multiple Vitamins-Minerals (MULTI-VITAMIN/MINERALS PO), Take 1 Tab by mouth every day., Disp: , Rfl:   •  Cyanocobalamin (VITAMIN B 12 PO), Place 1 Tab under tongue every day., Disp: , Rfl:   •  Digestive Enzymes CAPS, Take 1 Cap by mouth 2 times a day, with meals., Disp: , Rfl:   •  sennosides (SENOKOT TO GO) 8.6 MG TABS, Take 1 Tab by mouth 2 times a day., Disp: 60 Tab, Rfl: 6  •  Clobetasol Prop Emollient Base (CLOBETASOL PROPIONATE E) 0.05 % Cream, Use 1 gram in a thin film daily to affected skin, Disp: 1 Tube, Rfl: 1  •  gabapentin (NEURONTIN) 300 MG Cap, TAKE ONE CAPSULE BY MOUTH TWICE DAILY, Disp: 180 Cap, Rfl: 3  •  albuterol 108 (90 BASE) MCG/ACT Aero Soln inhalation aerosol, Inhale 2 Puffs by mouth every four hours as needed., Disp: 1 Inhaler, Rfl: 0  •  Probiotic Product (PROBIOTIC DAILY PO), Take 1 Cap by mouth every day., Disp: , Rfl:     Current Facility-Administered Medications:   •  ipratropium-albuterol (DUONEB) nebulizer solution 3 mL, 3 mL, Nebulization, Once, TASHA Gentile      Review of Systems:     Pertinent positives as per HPI, all other systems reviewed and WNL   Physical Assessment:     VS: /78   Pulse 94   Temp 37.8 °C (100.1 °F)   Resp 16   Ht 1.473 m (4' 10\")   Wt 77.1 kg (170 lb)   SpO2 94%   BMI " 35.53 kg/m²     General: Well-developed, well-nourished, female     Head: PERRL, EOMI. Normocephalic, posterior oropharynx injected with postnasal drip. Nasal mucosa erythematous and edematous with discharge noted. No facial asymmetry noted.  Ears: Bilateral tympanic membranes with serous effusion.   Cardiovasc: RRR, no MRG. No thrills or bruits. Pulses 2+ and symmetric at all distal extremities.  Pulmonary: Deep breath provokes cough. Expiratory wheeze predominates lung fields.  Post NEB: Lung sounds relatively unchanged although patient does report subjective improvement in airway compliance. No crackles.  Neuro: Alert and oriented  Skin:No rashes noted. Skin warm, dry, intact    Lymph: No cervical, pre- or post-auricular, submandibular, occipital lymphadenopathy.    Psych: Dressed appropriately for the weather, pleasant and conversant.  Affect, mood & judgment appropriate.    Assessment/Plan:   Almaz was seen today for uri.    Diagnoses and all orders for this visit:    Bronchitis, new   - Patient is clinically stable, pulse ox is 98% on room air at this time we will treat with broad-spectrum antibiotic and inhaled MICHAEL, I see no indication for oral steroid at this time, I will ask that she follow up in 3-4 days for lung recheck, should adventitious air sounds persist we will move forward with oral steroid and chest x-ray. Self care instructions given, recommend multi- symptom OTC medications ATC, push fluid and rest. Patient will follow up sooner if fevers or worsening symptoms. ER precautions discussed.   -     doxycycline (VIBRAMYCIN) 100 MG Tab; Take 1 Tab by mouth 2 times a day for 7 days.  -     albuterol 108 (90 Base) MCG/ACT Aero Soln inhalation aerosol; Inhale 2 Puffs by mouth every four hours as needed for Shortness of Breath.    Cough, uncontrolled, see treatment plan above   -     benzonatate (TESSALON) 100 MG Cap; Take 1 Cap by mouth 3 times a day as needed for Cough.    Wheeze, uncontrolled, see  number 1   -     ipratropium-albuterol (DUONEB) nebulizer solution 3 mL; 3 mL by Nebulization route Once.    Patient is agreeable to the above plan and voiced understanding. All questions answered.     Please note that this dictation was created using voice recognition software. I have made every reasonable attempt to correct obvious errors, but I expect that there are errors of grammar and possibly content that I did not discover before finalizing the note.      JOI Cisneros  4/11/2018, 9:59 AM

## 2018-04-11 NOTE — ASSESSMENT & PLAN NOTE
Patient is a 63-year-old female with cough ×2 weeks. She describes a second sickening about a week ago, she now has persistent cough, nasal congestion, fevers, chills. She is not using any over-the-counter multisymptom medications that she is taking Zyrtec daily without improvement in her symptoms. She denies itchy watery eyes, sneezing. She does not smoke. She does not have any underlying lung disease.

## 2018-04-14 ENCOUNTER — OFFICE VISIT (OUTPATIENT)
Dept: URGENT CARE | Facility: PHYSICIAN GROUP | Age: 63
End: 2018-04-14
Payer: MEDICARE

## 2018-04-14 VITALS
SYSTOLIC BLOOD PRESSURE: 126 MMHG | TEMPERATURE: 99.4 F | BODY MASS INDEX: 35.05 KG/M2 | WEIGHT: 167 LBS | OXYGEN SATURATION: 96 % | DIASTOLIC BLOOD PRESSURE: 72 MMHG | HEART RATE: 115 BPM | HEIGHT: 58 IN | RESPIRATION RATE: 16 BRPM

## 2018-04-14 DIAGNOSIS — J98.8 WHEEZING-ASSOCIATED RESPIRATORY INFECTION (WARI): ICD-10-CM

## 2018-04-14 PROCEDURE — 99214 OFFICE O/P EST MOD 30 MIN: CPT | Mod: 25 | Performed by: PHYSICIAN ASSISTANT

## 2018-04-14 RX ORDER — CEFTRIAXONE 1 G/1
1 INJECTION, POWDER, FOR SOLUTION INTRAMUSCULAR; INTRAVENOUS ONCE
Status: COMPLETED | OUTPATIENT
Start: 2018-04-14 | End: 2018-04-14

## 2018-04-14 RX ORDER — METHYLPREDNISOLONE 4 MG/1
4 TABLET ORAL SEE ADMIN INSTRUCTIONS
Qty: 21 TAB | Refills: 0 | Status: SHIPPED | OUTPATIENT
Start: 2018-04-14 | End: 2018-04-16

## 2018-04-14 RX ADMIN — CEFTRIAXONE 1 G: 1 INJECTION, POWDER, FOR SOLUTION INTRAMUSCULAR; INTRAVENOUS at 12:22

## 2018-04-14 NOTE — PROGRESS NOTES
Chief Complaint   Patient presents with   • Cough     Wanted lungs checked        HISTORY OF PRESENT ILLNESS: Patient is a 63 y.o. female who presents today because she was seen by her primary care provider 3 days ago and diagnosed with a respiratory infection, put on doxycycline. She has not been feeling any better. Denies any nausea, vomiting or diarrhea, has had fever, coughing, wheezing, shortness of breath and phlegm production.    Patient Active Problem List    Diagnosis Date Noted   • Cough 04/11/2018   • Polyneuropathy associated with underlying disease (CMS-HCC) 03/13/2018   • Obesity (BMI 30-39.9) 03/13/2018   • Acute cystitis without hematuria 12/22/2017   • Menopause 07/10/2017   • Anxiety 06/15/2017   • Allergic rhinitis 05/10/2017   • Fatigue 03/02/2017   • S/P lumbar fusion 10/20/2016   • Cervical radiculopathy 10/20/2016   • Stroke-related cognitive dysfunction (CMS-HCC) 08/25/2015   • Diaphragmatic hernia 06/30/2015   • Hiatal hernia 06/23/2015   • History of gastrointestinal bleeding 06/04/2015   • Mild mitral regurgitation 03/12/2015   • History of arthrodesis 12/01/2014   • History of spinal surgery 10/06/2014   • H/O: CVA (cerebrovascular accident) 09/23/2014   • Chronic constipation 09/17/2014   • GERD (gastroesophageal reflux disease) 09/17/2014   • Seasonal allergies 05/02/2014   • Spinal stenosis of lumbar region 03/20/2014   • Hypoxemia 09/01/2013   • Lumbar radiculopathy, chronic 08/26/2013   • Carpal tunnel syndrome 11/14/2012   • Vitamin D deficiency disease 09/24/2012   • Mixed hyperlipidemia 12/27/2011   • HTN, goal below 130/80 10/24/2011   • Acquired scoliosis 10/24/2011   • Sleep apnea, obstructive 10/24/2011   • Insomnia, persistent 10/24/2011   • Moderate major depression (CMS-HCC) 10/24/2011   • 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       Allergies:Nkda [no known drug allergy] and Seasonal    Current Outpatient Prescriptions Ordered in [facility]   Medication Sig Dispense Refill   •  MethylPREDNISolone (MEDROL DOSEPAK) 4 MG Tablet Therapy Pack Take 1 Tab by mouth See Admin Instructions. 21 Tab 0   • doxycycline (VIBRAMYCIN) 100 MG Tab Take 1 Tab by mouth 2 times a day for 7 days. 14 Tab 0   • albuterol 108 (90 Base) MCG/ACT Aero Soln inhalation aerosol Inhale 2 Puffs by mouth every four hours as needed for Shortness of Breath. 1 Inhaler 0   • benzonatate (TESSALON) 100 MG Cap Take 1 Cap by mouth 3 times a day as needed for Cough. 60 Cap 0   • LORazepam (ATIVAN) 1 MG Tab Take 1 Tab by mouth every bedtime for 180 days. 90 Tab 1   • DOCQLACE 100 MG Cap Take 1 Cap by mouth 2 times a day. TWICE DAILY 180 Cap 3   • furosemide (LASIX) 20 MG Tab TAKE ONE TABLET BY MOUTH ONCE DAILY 90 Tab 3   • pantoprazole (PROTONIX) 40 MG Tablet Delayed Response TAKE ONE TABLET BY MOUTH ONCE DAILY 90 Tab 3   • atorvastatin (LIPITOR) 20 MG Tab Take 1 Tab by mouth every day. 45 Tab 3   • trazodone (DESYREL) 100 MG Tab Take 1 Tab by mouth every bedtime. 90 Tab 3   • baclofen (LIORESAL) 10 MG Tab TAKE ONE TABLET BY MOUTH ONCE DAILY 90 Tab 0   • Clobetasol Prop Emollient Base (CLOBETASOL PROPIONATE E) 0.05 % Cream Use 1 gram in a thin film daily to affected skin 1 Tube 1   • gabapentin (NEURONTIN) 300 MG Cap TAKE ONE CAPSULE BY MOUTH TWICE DAILY 180 Cap 3   • cetirizine (ZYRTEC) 10 MG Tab TAKE ONE TABLET BY MOUTH ONCE DAILY 90 Tab 3   • citalopram (CELEXA) 40 MG Tab TAKE ONE TABLET BY MOUTH ONCE DAILY 90 Tab 3   • vitamin D (CHOLECALCIFEROL) 1000 UNIT Tab Take 2 Tabs by mouth every day. 60 Tab 5   • fluticasone (FLONASE) 50 MCG/ACT nasal spray Spray 2 Sprays in nose every day. 3 Bottle 3   • Multiple Vitamins-Minerals (MULTI-VITAMIN/MINERALS PO) Take 1 Tab by mouth every day.     • Cyanocobalamin (VITAMIN B 12 PO) Place 1 Tab under tongue every day.     • Probiotic Product (PROBIOTIC DAILY PO) Take 1 Cap by mouth every day.     • Digestive Enzymes CAPS Take 1 Cap by mouth 2 times a day, with meals.     • sennosides (SENOKOT  "TO GO) 8.6 MG TABS Take 1 Tab by mouth 2 times a day. 60 Tab 6     Current Facility-Administered Medications Ordered in Epic   Medication Dose Route Frequency Provider Last Rate Last Dose   • cefTRIAXone (ROCEPHIN) injection 1 g  1 g Intramuscular Once KIYA PatelA.WALKER.           Past Medical History:   Diagnosis Date   • Anesthesia 11    PO N/V, \"slow to come out\"   • Arthritis 11    spine   • Backpain 11    neck and abd. also,    • Breath shortness     with exertion   • Diverticulitis    • Endometriosis of uterus    • Fusion of spine    • H/O fall     when in hospital  with low O2 sats   • H/O: CVA (cerebrovascular accident) 2014    Complex event associated with apparent medication reaction but with MRI suggesting possible multiple small infarcts of various ages, [facility]   • Hiatus hernia syndrome     not repaired   • High cholesterol    • HTN, goal below 130/80 10/24/2011   • Infectious disease      Hep C +   • Lung collapse 11    right lung  collpsed    • Mixed hyperlipidemia 2011   • Moderate major depression (CMS-HCC) 10/24/2011   • MRSA exposure 2014    while in hospital   • Post-menopause on HRT (hormone replacement therapy) 2011   • 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    exposed as a child, told not active   • Scoliosis    • Sleep apnea 2015    CPAP   • Unspecified vitamin D deficiency 2012       Social History   Substance Use Topics   • Smoking status: Former Smoker     Packs/day: 0.30     Years: 5.00     Types: Cigarettes     Quit date: 1975   • Smokeless tobacco: Never Used      Comment: quit    • Alcohol use No       Family Status   Relation Status   • Mother Alive    84 in    • Father  at age 81    Laryngeal CA   • Sister Alive    56 in    •       Family History   Problem Relation Age of Onset   • Diabetes Mother    • Cancer Mother 82     breast cancer   • Lung Disease Mother      copd   • Hyperlipidemia Mother    • " "Hypertension Mother    • Cancer Father      Laryngeal CA   • Heart Disease Father 50     CAD with bypasses x 3   • Heart Attack Father    • Hyperlipidemia Father    • Hypertension Father    • Diabetes Sister      type II diabetes   • Diabetes     • Heart Disease     • Hypertension     • Lung Disease         ROS:  Review of Systems   Constitutional: Positive for fever, chills, no weight loss and malaise/fatigue.   HENT: Negative for ear pain, nosebleeds, positive nasal congestion, no sore throat and neck pain.    Eyes: Negative for blurred vision.   Respiratory: Positive for cough, sputum production, shortness of breath and wheezing.    Cardiovascular: Negative for chest pain, palpitations, orthopnea and leg swelling.   Gastrointestinal: Negative for heartburn, nausea, vomiting and abdominal pain.   Genitourinary: Negative for dysuria, urgency and frequency.     Exam:  Blood pressure 126/72, pulse (!) 115, temperature 37.4 °C (99.4 °F), resp. rate 16, height 1.473 m (4' 10\"), weight 75.8 kg (167 lb), SpO2 96 %.  General:  Well nourished, well developed female in NAD  Head:Normocephalic, atraumatic  Eyes: PERRLA, EOM within normal limits, no conjunctival injection, no scleral icterus, visual fields and acuity grossly intact.  Ears: Normal shape and symmetry, no tenderness, no discharge. External canals are without any significant edema or erythema. Tympanic membranes are without any inflammation, no effusion. Gross auditory acuity is intact  Nose: Symmetrical without tenderness, no discharge.. Nasal mucosa is edematous bilaterally  Mouth: reasonable hygiene, no erythema exudates or tonsillar enlargement.  Neck: no masses, range of motion within normal limits, no tracheal deviation. No obvious thyroid enlargement.  Pulmonary: chest is symmetrical with respiration, bronchial bilaterally with scattered wheezes bilaterally, no rhonchi   Cardiovascular: Tachycardic rate, regular rhythm without murmurs, rubs, or " gallops.  Extremities: no clubbing, cyanosis, or edema.    Please note that this dictation was created using voice recognition software. I have made every reasonable attempt to correct obvious errors, but I expect that there are errors of grammar and possibly content that I did not discover before finalizing the note.    Assessment/Plan:  1. Wheezing-associated respiratory infection (WARI)  MethylPREDNISolone (MEDROL DOSEPAK) 4 MG Tablet Therapy Pack    cefTRIAXone (ROCEPHIN) injection 1 g   . Continue albuterol and doxycycline.    Followup with primary care in the next 7-10 days if not significantly improving, return to the urgent care or go to the emergency room sooner for any worsening of symptoms.

## 2018-04-16 ENCOUNTER — OFFICE VISIT (OUTPATIENT)
Dept: MEDICAL GROUP | Facility: PHYSICIAN GROUP | Age: 63
End: 2018-04-16
Payer: MEDICARE

## 2018-04-16 ENCOUNTER — APPOINTMENT (OUTPATIENT)
Dept: RADIOLOGY | Facility: IMAGING CENTER | Age: 63
End: 2018-04-16
Attending: NURSE PRACTITIONER
Payer: MEDICARE

## 2018-04-16 ENCOUNTER — NON-PROVIDER VISIT (OUTPATIENT)
Dept: URGENT CARE | Facility: PHYSICIAN GROUP | Age: 63
End: 2018-04-16
Payer: MEDICARE

## 2018-04-16 VITALS
DIASTOLIC BLOOD PRESSURE: 68 MMHG | HEIGHT: 58 IN | RESPIRATION RATE: 16 BRPM | WEIGHT: 165.4 LBS | SYSTOLIC BLOOD PRESSURE: 124 MMHG | HEART RATE: 74 BPM | OXYGEN SATURATION: 95 % | BODY MASS INDEX: 34.72 KG/M2 | TEMPERATURE: 98.9 F

## 2018-04-16 DIAGNOSIS — R05.9 COUGH: ICD-10-CM

## 2018-04-16 DIAGNOSIS — R07.81 PLEURITIC PAIN: ICD-10-CM

## 2018-04-16 DIAGNOSIS — R06.2 WHEEZE: ICD-10-CM

## 2018-04-16 DIAGNOSIS — R06.2 WHEEZING: ICD-10-CM

## 2018-04-16 PROCEDURE — 99214 OFFICE O/P EST MOD 30 MIN: CPT | Performed by: NURSE PRACTITIONER

## 2018-04-16 PROCEDURE — 71046 X-RAY EXAM CHEST 2 VIEWS: CPT | Mod: TC,FY | Performed by: FAMILY MEDICINE

## 2018-04-16 RX ORDER — PREDNISONE 10 MG/1
TABLET ORAL
Qty: 32 TAB | Refills: 0 | Status: SHIPPED | OUTPATIENT
Start: 2018-04-16 | End: 2018-04-24

## 2018-04-16 NOTE — PROGRESS NOTES
"[facility]  Primary Care Office Visit - Problem-Oriented        History:     Almaz Samuel is a 63 y.o. female who is here today to discuss Cough (seen in  4/14/18)      Cough  Patient is a 63-year-old female who is here for ongoing cough. She has had a cough for nearly 3 weeks. At our first office visit on 4/11/18 she was prescribed doxycycline and albuterol. She was seen in the urgent care over the weekend and given a shot of Rocephin and a prescription for albuterol inhaler which she has been using every 4 hours. She is using Tessalon Perles as needed for cough suppression at night. She is no longer febrile. Her cough is dry. She has lost about 5 pounds. She reports that she feels that she can breathe better but is frustrated that her symptoms are taking so long to resolve, she also reports fatigue and right sided rib pain with cough and sneezing. She does not smoke, she does not have underlying lung disease. She has no CP.           Past Medical History:   Diagnosis Date   • Anesthesia 02-14-11    PO N/V, \"slow to come out\"   • Arthritis 02-14-11    spine   • Backpain 02-14-11    neck and abd. also,    • Breath shortness     with exertion   • Diverticulitis    • Endometriosis of uterus    • Fusion of spine 2014   • H/O fall     when in hospital  with low O2 sats   • H/O: CVA (cerebrovascular accident) 8/22/2014    Complex event associated with apparent medication reaction but with MRI suggesting possible multiple small infarcts of various ages, [facility]   • Hiatus hernia syndrome     not repaired   • High cholesterol    • HTN, goal below 130/80 10/24/2011   • Infectious disease      Hep C +   • Lung collapse 02-14-11    right lung 1/4 collpsed    • Mixed hyperlipidemia 12/27/2011   • Moderate major depression (CMS-HCC) 10/24/2011   • MRSA exposure 8/2014    while in hospital   • Post-menopause on HRT (hormone replacement therapy) 12/27/2011   • 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    exposed as a child, " told not active   • Scoliosis    • Sleep apnea 6/2015    CPAP   • Unspecified vitamin D deficiency 9/24/2012     Past Surgical History:   Procedure Laterality Date   • NISSEN FUNDOPLICATION LAPAROSCOPIC N/A 6/30/2015    Procedure: NISSEN FUNDOPLICATION LAPAROSCOPIC;  Surgeon: Kenji Garcia M.D.;  Location: SURGERY SAME DAY [facility];  Service:    • GASTROSCOPY-ENDO  6/24/2015    Procedure: GASTROSCOPY-ENDO;  Surgeon: Kenji Garcia M.D.;  Location: ENDOSCOPY [facility];  Service:    • CARPAL TUNNEL RELEASE  11/14/2012    Performed by Holly Martin M.D. at SURGERY [facility]   • LOW ANTERIOR RESECTION LAPAROSCOPIC  3/2/2011    Performed by KENJI GARCIA at SURGERY [facility]   • CERVICAL DISK AND FUSION ANTERIOR  6/22/2010    Performed by JOSEPH DARLING at SURGERY [facility]   • TUBAL LIGATION  1988   • TONSILLECTOMY AND ADENOIDECTOMY  1959   • APPENDECTOMY     • GYN SURGERY      partial hysterectomy     Social History     Social History   • Marital status:      Spouse name: N/A   • Number of children: N/A   • Years of education: N/A     Occupational History   • Not on file.     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[employer]     Other Topics Concern   •  Service No   • Blood Transfusions No   • Exercise No   • Bike Helmet No   • Seat Belt Yes   • Self-Exams Yes     Social History Narrative   • No narrative on file     History   Smoking Status   • Former Smoker   • Packs/day: 0.30   • Years: 5.00   • Types: Cigarettes   • Quit date: 1/1/1975   Smokeless Tobacco   • Never Used     Comment: quit 1975     Family History   Problem Relation Age of Onset   • Diabetes Mother    • Cancer Mother 82     breast  cancer   • Lung Disease Mother      copd   • Hyperlipidemia Mother    • Hypertension Mother    • Cancer Father      Laryngeal CA   • Heart Disease Father 50     CAD with bypasses x 3   • Heart Attack Father    • Hyperlipidemia Father    • Hypertension Father    • Diabetes Sister      type II diabetes   • Diabetes     • Heart Disease     • Hypertension     • Lung Disease       Allergies   Allergen Reactions   • Nkda [No Known Drug Allergy]    • Seasonal      Pt states eye irritation       Problem List:     Patient Active Problem List    Diagnosis Date Noted   • Cough 04/11/2018   • Polyneuropathy associated with underlying disease (CMS-HCC) 03/13/2018   • Obesity (BMI 30-39.9) 03/13/2018   • Acute cystitis without hematuria 12/22/2017   • Menopause 07/10/2017   • Anxiety 06/15/2017   • Allergic rhinitis 05/10/2017   • Fatigue 03/02/2017   • S/P lumbar fusion 10/20/2016   • Cervical radiculopathy 10/20/2016   • Stroke-related cognitive dysfunction (CMS-HCC) 08/25/2015   • Diaphragmatic hernia 06/30/2015   • Hiatal hernia 06/23/2015   • History of gastrointestinal bleeding 06/04/2015   • Mild mitral regurgitation 03/12/2015   • History of arthrodesis 12/01/2014   • History of spinal surgery 10/06/2014   • H/O: CVA (cerebrovascular accident) 09/23/2014   • Chronic constipation 09/17/2014   • GERD (gastroesophageal reflux disease) 09/17/2014   • Seasonal allergies 05/02/2014   • Spinal stenosis of lumbar region 03/20/2014   • Hypoxemia 09/01/2013   • Lumbar radiculopathy, chronic 08/26/2013   • Carpal tunnel syndrome 11/14/2012   • Vitamin D deficiency disease 09/24/2012   • Mixed hyperlipidemia 12/27/2011   • HTN, goal below 130/80 10/24/2011   • Acquired scoliosis 10/24/2011   • Sleep apnea, obstructive 10/24/2011   • Insomnia, persistent 10/24/2011   • Moderate major depression (CMS-HCC) 10/24/2011   • 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         Medications:     Current Outpatient Prescriptions:   •  predniSONE (DELTASONE) 10 MG  Tab, Take 2 tab twice a day x2 days, 3 tabs daily x2 days, 2 tabs daily x2 days, one tab daily until gone. Take with food., Disp: 32 Tab, Rfl: 0  •  doxycycline (VIBRAMYCIN) 100 MG Tab, Take 1 Tab by mouth 2 times a day for 7 days., Disp: 14 Tab, Rfl: 0  •  albuterol 108 (90 Base) MCG/ACT Aero Soln inhalation aerosol, Inhale 2 Puffs by mouth every four hours as needed for Shortness of Breath., Disp: 1 Inhaler, Rfl: 0  •  benzonatate (TESSALON) 100 MG Cap, Take 1 Cap by mouth 3 times a day as needed for Cough., Disp: 60 Cap, Rfl: 0  •  LORazepam (ATIVAN) 1 MG Tab, Take 1 Tab by mouth every bedtime for 180 days., Disp: 90 Tab, Rfl: 1  •  DOCQLACE 100 MG Cap, Take 1 Cap by mouth 2 times a day. TWICE DAILY, Disp: 180 Cap, Rfl: 3  •  furosemide (LASIX) 20 MG Tab, TAKE ONE TABLET BY MOUTH ONCE DAILY, Disp: 90 Tab, Rfl: 3  •  pantoprazole (PROTONIX) 40 MG Tablet Delayed Response, TAKE ONE TABLET BY MOUTH ONCE DAILY, Disp: 90 Tab, Rfl: 3  •  atorvastatin (LIPITOR) 20 MG Tab, Take 1 Tab by mouth every day., Disp: 45 Tab, Rfl: 3  •  trazodone (DESYREL) 100 MG Tab, Take 1 Tab by mouth every bedtime., Disp: 90 Tab, Rfl: 3  •  baclofen (LIORESAL) 10 MG Tab, TAKE ONE TABLET BY MOUTH ONCE DAILY, Disp: 90 Tab, Rfl: 0  •  Clobetasol Prop Emollient Base (CLOBETASOL PROPIONATE E) 0.05 % Cream, Use 1 gram in a thin film daily to affected skin, Disp: 1 Tube, Rfl: 1  •  gabapentin (NEURONTIN) 300 MG Cap, TAKE ONE CAPSULE BY MOUTH TWICE DAILY, Disp: 180 Cap, Rfl: 3  •  cetirizine (ZYRTEC) 10 MG Tab, TAKE ONE TABLET BY MOUTH ONCE DAILY, Disp: 90 Tab, Rfl: 3  •  citalopram (CELEXA) 40 MG Tab, TAKE ONE TABLET BY MOUTH ONCE DAILY, Disp: 90 Tab, Rfl: 3  •  vitamin D (CHOLECALCIFEROL) 1000 UNIT Tab, Take 2 Tabs by mouth every day., Disp: 60 Tab, Rfl: 5  •  fluticasone (FLONASE) 50 MCG/ACT nasal spray, Spray 2 Sprays in nose every day., Disp: 3 Bottle, Rfl: 3  •  Multiple Vitamins-Minerals (MULTI-VITAMIN/MINERALS PO), Take 1 Tab by mouth every  "day., Disp: , Rfl:   •  Cyanocobalamin (VITAMIN B 12 PO), Place 1 Tab under tongue every day., Disp: , Rfl:   •  Probiotic Product (PROBIOTIC DAILY PO), Take 1 Cap by mouth every day., Disp: , Rfl:   •  Digestive Enzymes CAPS, Take 1 Cap by mouth 2 times a day, with meals., Disp: , Rfl:   •  sennosides (SENOKOT TO GO) 8.6 MG TABS, Take 1 Tab by mouth 2 times a day., Disp: 60 Tab, Rfl: 6      Review of Systems:     Pertinent positives as per HPI, all other systems reviewed and WNL    Physical Assessment:     VS: /68   Pulse 74   Temp 37.2 °C (98.9 °F)   Resp 16   Ht 1.473 m (4' 9.99\")   Wt 75 kg (165 lb 6.4 oz)   SpO2 95%   Breastfeeding? No   BMI 34.58 kg/m²     General: Well-developed, well-nourished, female, appears fatigued      Head: PERRL, EOMI. Normocephalic. No facial asymmetry noted.  Cardiovasc:RRR, no MRG. No thrills or bruits. Pulses 2+ and symmetric at all distal extremities.  Pulmonary: Expiratory and expiratory wheeze predominate lung fields posteriorly. Anteriorly upper lung fields are clear. Deep breath does provoke cough. No crackles. Pain with pressure to anterior right lower ribcage.   Normal respiratory effort.   Neuro: Alert and oriented  Skin:No rashes noted. Skin warm, dry, intact    Psych: Dressed appropriately for the weather, pleasant and conversant.  Affect, mood & judgment appropriate.    Assessment/Plan:   Almaz was seen today for cough.    Diagnoses and all orders for this visit:    Wheeze, uncontrolled   - continue and complete all antibiotics, continue albuterol around-the-clock, given severity of wheezing despite ATC albuterol I will extend her treatment interval on oral prednisone, chest x-ray is negative for infiltrates and she is afebrile. We discussed ER precautions. Follow up in 1 week for lung recheck.   -     DX-CHEST-2 VIEWS; Future  -     predniSONE (DELTASONE) 10 MG Tab; Take 2 tab twice a day x2 days, 3 tabs daily x2 days, 2 tabs daily x2 days, one tab daily " until gone. Take with food.    Pleuritic pain, new   - CXR negative for acute/infectious etiology, likely related to persistent cough, pain is reproducible on exam and thus reassuring. Utilize Tessalon Perles around-the-clock as needed. Follow-up in one week, sooner if needed.  -     DX-CHEST-2 VIEWS; Future        Patient is agreeable to the above plan and voiced understanding. All questions answered.     Please note that this dictation was created using voice recognition software. I have made every reasonable attempt to correct obvious errors, but I expect that there are errors of grammar and possibly content that I did not discover before finalizing the note.      JOI Cisneros  4/16/2018, 11:38 AM

## 2018-04-16 NOTE — ASSESSMENT & PLAN NOTE
Patient is a 63-year-old female who is here for ongoing cough. She has had a cough for nearly 3 weeks. At our first office visit on 4/11/18 she was prescribed doxycycline and albuterol. She was seen in the urgent care over the weekend and given a shot of Rocephin and a prescription for albuterol inhaler which she has been using every 4 hours. She is using Tessalon Perles as needed for cough suppression at night. She is no longer febrile. Her cough is dry. She has lost about 5 pounds. She reports that she feels that she can breathe better but is frustrated that her symptoms are taking so long to resolve, she also reports fatigue and right sided rib pain with cough and sneezing. She does not smoke, she does not have underlying lung disease. She has no CP.

## 2018-04-17 ENCOUNTER — TELEPHONE (OUTPATIENT)
Dept: MEDICAL GROUP | Facility: MEDICAL CENTER | Age: 63
End: 2018-04-17

## 2018-04-17 NOTE — TELEPHONE ENCOUNTER
Future Appointments       Provider Department Center    4/24/2018 10:00 AM Sahra Max M.D.; [facility] Michelle Olea      ANNUAL WELLNESS VISIT PRE-VISIT PLANNING WITH OUTREACH    1.  Immunizations were updated in Epic using WebIZ?:Yes       •  WebIZ Recommendations: PREVNAR (PCV13)  and TDAP       •  Is patient due for Tdap? YES. Patient was not notified of copay/out of pocket cost.       •  Is patient due for Shingles?NO    2.  MDX printed for Provider? NO

## 2018-04-24 ENCOUNTER — OFFICE VISIT (OUTPATIENT)
Dept: MEDICAL GROUP | Facility: MEDICAL CENTER | Age: 63
End: 2018-04-24
Payer: MEDICARE

## 2018-04-24 VITALS
DIASTOLIC BLOOD PRESSURE: 82 MMHG | SYSTOLIC BLOOD PRESSURE: 124 MMHG | HEART RATE: 83 BPM | WEIGHT: 165.79 LBS | OXYGEN SATURATION: 93 % | RESPIRATION RATE: 16 BRPM | BODY MASS INDEX: 34.8 KG/M2 | HEIGHT: 58 IN | TEMPERATURE: 98.3 F

## 2018-04-24 DIAGNOSIS — G63 POLYNEUROPATHY ASSOCIATED WITH UNDERLYING DISEASE (HCC): ICD-10-CM

## 2018-04-24 DIAGNOSIS — G47.00 INSOMNIA, PERSISTENT: ICD-10-CM

## 2018-04-24 DIAGNOSIS — E78.2 MIXED HYPERLIPIDEMIA: ICD-10-CM

## 2018-04-24 DIAGNOSIS — F32.5 MAJOR DEPRESSIVE DISORDER WITH SINGLE EPISODE, IN FULL REMISSION (HCC): ICD-10-CM

## 2018-04-24 DIAGNOSIS — E55.9 VITAMIN D DEFICIENCY DISEASE: ICD-10-CM

## 2018-04-24 DIAGNOSIS — I10 HTN, GOAL BELOW 130/80: ICD-10-CM

## 2018-04-24 DIAGNOSIS — M54.12 CERVICAL RADICULOPATHY: ICD-10-CM

## 2018-04-24 DIAGNOSIS — K21.00 GASTROESOPHAGEAL REFLUX DISEASE WITH ESOPHAGITIS: ICD-10-CM

## 2018-04-24 DIAGNOSIS — F41.9 ANXIETY: ICD-10-CM

## 2018-04-24 DIAGNOSIS — M54.16 LUMBAR RADICULOPATHY, CHRONIC: ICD-10-CM

## 2018-04-24 DIAGNOSIS — G47.33 SLEEP APNEA, OBSTRUCTIVE: ICD-10-CM

## 2018-04-24 DIAGNOSIS — M41.9 ACQUIRED SCOLIOSIS: ICD-10-CM

## 2018-04-24 DIAGNOSIS — M48.062 SPINAL STENOSIS OF LUMBAR REGION WITH NEUROGENIC CLAUDICATION: ICD-10-CM

## 2018-04-24 DIAGNOSIS — Z00.00 MEDICARE ANNUAL WELLNESS VISIT, SUBSEQUENT: ICD-10-CM

## 2018-04-24 DIAGNOSIS — G56.01 CARPAL TUNNEL SYNDROME OF RIGHT WRIST: ICD-10-CM

## 2018-04-24 DIAGNOSIS — E66.9 OBESITY (BMI 30-39.9): ICD-10-CM

## 2018-04-24 PROBLEM — R53.83 FATIGUE: Status: RESOLVED | Noted: 2017-03-02 | Resolved: 2018-04-24

## 2018-04-24 PROBLEM — R05.9 COUGH: Status: RESOLVED | Noted: 2018-04-11 | Resolved: 2018-04-24

## 2018-04-24 PROCEDURE — G0402 INITIAL PREVENTIVE EXAM: HCPCS | Performed by: FAMILY MEDICINE

## 2018-04-24 RX ORDER — LORAZEPAM 1 MG/1
1 TABLET ORAL
Qty: 90 TAB | Refills: 1 | Status: SHIPPED | OUTPATIENT
Start: 2018-04-24 | End: 2018-09-06 | Stop reason: SDUPTHER

## 2018-04-24 ASSESSMENT — ACTIVITIES OF DAILY LIVING (ADL): BATHING_REQUIRES_ASSISTANCE: 0

## 2018-04-24 ASSESSMENT — PATIENT HEALTH QUESTIONNAIRE - PHQ9: CLINICAL INTERPRETATION OF PHQ2 SCORE: 0

## 2018-04-24 NOTE — PROGRESS NOTES
"Chief Complaint   Patient presents with   • Annual Exam         HPI:  Almaz is a 63 y.o. here for Medicare Annual Wellness Visit    Acquired scoliosis  This is a chronic health problem that is well controlled with current medications and lifestyle measures. Does live with some back pain from time to time.    Anxiety  This is a chronic health issue for this patient where she tends to worry about most things. She particularly has trouble going to bed at night and \"turning her brain off\". She utilizes lorazepam 1 mg at bedtime to help her get to sleep and stay asleep. This also helps with her generalized anxiety during the day. We will be prescribing new prescription today.  Obtained and reviewed patient utilization report from [facility] pharmacy database on 4/24/2018 10:25 AM  prior to writing prescription for controlled substance II, III or IV per [place] bill . Based on assessment of the report, the prescription is medically necessary.     Carpal tunnel syndrome  Patient still has the symptomatology in the right wrist but had left wrist fixed. She is going to live with it currently.    Cervical radiculopathy  This is a chronic health problem that is uncontrolled with current medications and lifestyle measures.  Mainly involves the right upper extremity.    GERD (gastroesophageal reflux disease)  This is a chronic health problem that is well controlled with current medications and lifestyle measures.   Patient will continue with omeprazole which makes a significant difference.    HTN, goal below 130/80  This is a chronic health problem that is well controlled with current medications and lifestyle measures.   The patient denies chest pain, shortness of breath or dyspnea on exertion.      Insomnia, persistent  This is a chronic health problem that is well controlled with current medications and lifestyle measures.   Patient will continue on lorazepam 1 mg daily at bedtime. Writing a new prescription today that " will carry her through December 2018.    Lumbar radiculopathy, chronic  Patient had spinal fusion with Dr. Sullivan in 8/2014. She still has numbness in that area but will occasionally get radicular pain shooting across the low back.    Mixed hyperlipidemia  This is a chronic health problem that is well controlled with current medications and lifestyle measures.    Major depressive disorder with single episode, in full remission (CMS-HCC)  This is a chronic health problem for this patient that adequately controlled with current medications. Patient reports she is doing well. Denies suicidal or homicidal ideation. She did not show signs of depression on her annual wellness visit screening tests.    Obesity (BMI 30-39.9)  This is a chronic health problem that is uncontrolled with current medications and lifestyle measures. Patient not working on weight loss at this time.    Polyneuropathy associated with underlying disease (CMS-HCC)  This is a chronic health problem that is well controlled with current medications and lifestyle measures.    Sleep apnea, obstructive  This is a chronic health problem that is well controlled with current medications and lifestyle measures. She will continue to follow with Dr. Siddiqui in  and wears her CPAP.    Vitamin D deficiency disease  This is a chronic health problem well controlled with patient's current vitamin D supplementation.    Spinal stenosis of lumbar region  This is a chronic health problem that stable. Patient will continue to manage for now. If she develops numbness or tingling in the lower extremities that is persistent rather than intermittent we will need to consider further imaging and discuss the possibility of back surgery. Patient is reticent to consider back surgery          Patient Active Problem List    Diagnosis Date Noted   • Cough 04/11/2018   • Polyneuropathy associated with underlying disease (CMS-[facility]) 03/13/2018   • Obesity (BMI 30-39.9) 03/13/2018   • Acute  cystitis without hematuria 12/22/2017   • Menopause 07/10/2017   • Anxiety 06/15/2017   • Allergic rhinitis 05/10/2017   • Fatigue 03/02/2017   • S/P lumbar fusion 10/20/2016   • Cervical radiculopathy 10/20/2016   • Stroke-related cognitive dysfunction (CMS-HCC) 08/25/2015   • Diaphragmatic hernia 06/30/2015   • Hiatal hernia 06/23/2015   • History of gastrointestinal bleeding 06/04/2015   • Mild mitral regurgitation 03/12/2015   • History of arthrodesis 12/01/2014   • History of spinal surgery 10/06/2014   • H/O: CVA (cerebrovascular accident) 09/23/2014   • Chronic constipation 09/17/2014   • GERD (gastroesophageal reflux disease) 09/17/2014   • Seasonal allergies 05/02/2014   • Spinal stenosis of lumbar region 03/20/2014   • Hypoxemia 09/01/2013   • Lumbar radiculopathy, chronic 08/26/2013   • Carpal tunnel syndrome 11/14/2012   • Vitamin D deficiency disease 09/24/2012   • Mixed hyperlipidemia 12/27/2011   • HTN, goal below 130/80 10/24/2011   • Acquired scoliosis 10/24/2011   • Sleep apnea, obstructive 10/24/2011   • Insomnia, persistent 10/24/2011   • Moderate major depression (CMS-[facility]) 10/24/2011   • 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       Current Outpatient Prescriptions   Medication Sig Dispense Refill   • albuterol 108 (90 Base) MCG/ACT Aero Soln inhalation aerosol Inhale 2 Puffs by mouth every four hours as needed for Shortness of Breath. 1 Inhaler 0   • benzonatate (TESSALON) 100 MG Cap Take 1 Cap by mouth 3 times a day as needed for Cough. 60 Cap 0   • LORazepam (ATIVAN) 1 MG Tab Take 1 Tab by mouth every bedtime for 180 days. 90 Tab 1   • DOCQLACE 100 MG Cap Take 1 Cap by mouth 2 times a day. TWICE DAILY 180 Cap 3   • furosemide (LASIX) 20 MG Tab TAKE ONE TABLET BY MOUTH ONCE DAILY 90 Tab 3   • pantoprazole (PROTONIX) 40 MG Tablet Delayed Response TAKE ONE TABLET BY MOUTH ONCE DAILY 90 Tab 3   • atorvastatin (LIPITOR) 20 MG Tab Take 1 Tab by mouth every day. 45 Tab 3   • trazodone (DESYREL) 100 MG Tab Take 1  Tab by mouth every bedtime. 90 Tab 3   • baclofen (LIORESAL) 10 MG Tab TAKE ONE TABLET BY MOUTH ONCE DAILY 90 Tab 0   • Clobetasol Prop Emollient Base (CLOBETASOL PROPIONATE E) 0.05 % Cream Use 1 gram in a thin film daily to affected skin 1 Tube 1   • gabapentin (NEURONTIN) 300 MG Cap TAKE ONE CAPSULE BY MOUTH TWICE DAILY 180 Cap 3   • cetirizine (ZYRTEC) 10 MG Tab TAKE ONE TABLET BY MOUTH ONCE DAILY 90 Tab 3   • citalopram (CELEXA) 40 MG Tab TAKE ONE TABLET BY MOUTH ONCE DAILY 90 Tab 3   • vitamin D (CHOLECALCIFEROL) 1000 UNIT Tab Take 2 Tabs by mouth every day. 60 Tab 5   • fluticasone (FLONASE) 50 MCG/ACT nasal spray Spray 2 Sprays in nose every day. 3 Bottle 3   • Multiple Vitamins-Minerals (MULTI-VITAMIN/MINERALS PO) Take 1 Tab by mouth every day.     • Cyanocobalamin (VITAMIN B 12 PO) Place 1 Tab under tongue every day.     • Probiotic Product (PROBIOTIC DAILY PO) Take 1 Cap by mouth every day.     • Digestive Enzymes CAPS Take 1 Cap by mouth 2 times a day, with meals.     • sennosides (SENOKOT TO GO) 8.6 MG TABS Take 1 Tab by mouth 2 times a day. 60 Tab 6   • predniSONE (DELTASONE) 10 MG Tab Take 2 tab twice a day x2 days, 3 tabs daily x2 days, 2 tabs daily x2 days, one tab daily until gone. Take with food. 32 Tab 0     No current facility-administered medications for this visit.         Patient is taking medications as noted in medication list.  Current supplements as per medication list.     Allergies: Nkda [no known drug allergy] and Seasonal    Current social contact/activities: Patient active in [employer].    Patient's perception of their health: fair    Is patient current with immunizations? No, due for PREVNAR (PCV13)  and TDAP. Patient is interested in receiving NONE today.    She  reports that she quit smoking about 43 years ago. Her smoking use included Cigarettes. She has a 1.50 pack-year smoking history. She has never used smokeless tobacco. She reports that she does not drink alcohol or use  drugs.  Counseling given: Not Answered        DPA/Advanced directive: Patient has Durable Power of  on file.     ROS:    Gait: Uses no assistive device   Ostomy: No   Other tubes: No   Amputations: No   Chronic oxygen use: No   Last eye exam: 1 yr   Wears hearing aids: No   : Denies any urinary leakage during the last 6 months        Depression Screening  Little interest or pleasure in doing things?  0 - not at all  Feeling down, depressed, or hopeless? 0 - not at all  Patient Health Questionnaire Score: 0    If depressive symptoms identified deferred to follow up visit unless specifically addressed in assessment and plan.    Interpretation of PHQ-9 Total Score   Score Severity   1-4 No Depression   5-9 Mild Depression   10-14 Moderate Depression   15-19 Moderately Severe Depression   20-27 Severe Depression    Screening for Cognitive Impairment  Three Minute Recall (apple, watch, vinnie)  3/3 Apple,watch,vinnie  Draw clock face with all 12 numbers set to the hand to show 10 minutes past 11 o'clock   5/5  If cognitive concerns identified, deferred for follow up unless specifically addressed in assessment and plan.    Fall Risk Assessment  Has the patient had two or more falls in the last year or any fall with injury in the last year?  No  If fall risk identified, deferred for follow up unless specifically addressed in assessment and plan.    Safety Assessment  Throw rugs on floor.  Yes  Handrails on all stairs.  Yes  Good lighting in all hallways.  Yes  Difficulty hearing.  No  Patient counseled about all safety risks that were identified.    Functional Assessment ADLs  Are there any barriers preventing you from cooking for yourself or meeting nutritional needs?  No.    Are there any barriers preventing you from driving safely or obtaining transportation?  No.    Are there any barriers preventing you from using a telephone or calling for help?  No.    Are there any barriers preventing you from shopping?  No.     Are there any barriers preventing you from taking care of your own finances?  No.    Are there any barriers preventing you from managing your medications?  No.    Are there any barriers preventing you from showering, bathing or dressing yourself?  No.    Are you currently engaging any exercise or physical activity?  No.  Not currently     Health Maintenance Summary                Annual Wellness Visit Overdue 1955     IMM DTaP/Tdap/Td Vaccine Overdue 3/20/1972     IMM PNEUMOCOCCAL 65+ (ADULT) LOW/MEDIUM RISK SERIES Overdue 3/20/2018     MAMMOGRAM Next Due 6/7/2018      Done 6/7/2017 [id number]     Patient has more history with this topic...    COLONOSCOPY Next Due 10/28/2021      Previously completed 10/28/2011 Done at [facility], negative, good for 10 years    BONE DENSITY Next Due 8/8/2022      Done 8/8/2017 DS-BONE DENSITY STUDY (DEXA)          Patient Care Team:  Sahra Max M.D. as PCP - General (Family Medicine)    Social History   Substance Use Topics   • Smoking status: Former Smoker     Packs/day: 0.30     Years: 5.00     Types: Cigarettes     Quit date: 1/1/1975   • Smokeless tobacco: Never Used      Comment: quit 1975   • Alcohol use No     Family History   Problem Relation Age of Onset   • Diabetes Mother    • Cancer Mother 82     breast cancer   • Lung Disease Mother      copd   • Hyperlipidemia Mother    • Hypertension Mother    • Cancer Father      Laryngeal CA   • Heart Disease Father 50     CAD with bypasses x 3   • Heart Attack Father    • Hyperlipidemia Father    • Hypertension Father    • Diabetes Sister      type II diabetes   • Diabetes     • Heart Disease     • Hypertension     • Lung Disease       She  has a past medical history of Anesthesia (02-14-11); Arthritis (02-14-11); Backpain (02-14-11); Breath shortness; Diverticulitis; Endometriosis of uterus; Fusion of spine (2014); H/O fall; H/O: CVA (cerebrovascular accident) (8/22/2014); Hiatus hernia syndrome; High  "cholesterol; HTN, goal below 130/80 (10/24/2011); Infectious disease; Lung collapse (02-14-11); Mixed hyperlipidemia (12/27/2011); Moderate major depression (CMS-HCC) (10/24/2011); MRSA exposure (8/2014); Post-menopause on HRT (hormone replacement therapy) (12/27/2011); PPD positive (10/24/2011); Scoliosis; Sleep apnea (6/2015); and Unspecified vitamin D deficiency (9/24/2012). She also has no past medical history of COPD; Fall; or Heart murmur.   Past Surgical History:   Procedure Laterality Date   • NISSEN FUNDOPLICATION LAPAROSCOPIC N/A 6/30/2015    Procedure: NISSEN FUNDOPLICATION LAPAROSCOPIC;  Surgeon: Kenji Garcia M.D.;  Location: SURGERY SAME DAY [facility];  Service:    • GASTROSCOPY-ENDO  6/24/2015    Procedure: GASTROSCOPY-ENDO;  Surgeon: Kenji Garcia M.D.;  Location: ENDOSCOPY [facility]  Service:    • CARPAL TUNNEL RELEASE  11/14/2012    Performed by Holly Martin M.D. at SURGERY [facility]   • LOW ANTERIOR RESECTION LAPAROSCOPIC  3/2/2011    Performed by KENJI GARCIA at SURGERY [facility]   • CERVICAL DISK AND FUSION ANTERIOR  6/22/2010    Performed by JOSEPH DARLING at SURGERY [facility]   • TUBAL LIGATION  1988   • TONSILLECTOMY AND ADENOIDECTOMY  1959   • APPENDECTOMY     • GYN SURGERY      partial hysterectomy         Exam:   Blood pressure 124/82, pulse 83, temperature 36.8 °C (98.3 °F), resp. rate 16, height 1.473 m (4' 9.99\"), weight 75.2 kg (165 lb 12.6 oz), SpO2 93 %. Body mass index is 34.66 kg/m².    Hearing good.    Dentition good  Alert, oriented in no acute distress.  Eye contact is good, speech goal directed, affect calm      Assessment and Plan. The following treatment and monitoring plan is recommended:    1. Acquired scoliosis  Stable, continue with current lifestyle  - Initial Wellness Visit - Includes PPPS ()    2. Sleep apnea, obstructive  Stable, continue with current meds, refill provided for the rest of 2018  - LORazepam (ATIVAN) 1 MG Tab; " Take 1 Tab by mouth every bedtime for 180 days.  Dispense: 90 Tab; Refill: 1  - Initial Wellness Visit - Includes PPPS ()    3. Insomnia, persistent  Stable, continue with current meds  - LORazepam (ATIVAN) 1 MG Tab; Take 1 Tab by mouth every bedtime for 180 days.  Dispense: 90 Tab; Refill: 1  - Initial Wellness Visit - Includes PPPS ()    4. Anxiety  Stable, continue with current meds  - Initial Wellness Visit - Includes PPPS ()    5. Carpal tunnel syndrome of right wrist  Stable, patient not interested in pursuing treatment at this time  - Initial Wellness Visit - Includes PPPS ()    6. Cervical radiculopathy  Stable, continue conservative therapy  - Initial Wellness Visit - Includes PPPS ()    7. Gastroesophageal reflux disease with esophagitis  Stable, continue current meds  - Initial Wellness Visit - Includes PPPS ()    8. HTN, goal below 130/80  Controlled, continue with current meds and lifestyle.    - Initial Wellness Visit - Includes PPPS ()    9. Lumbar radiculopathy, chronic  Controlled, continue with current meds and lifestyle.    - Initial Wellness Visit - Includes PPPS ()    10. Mixed hyperlipidemia  Controlled, continue with current meds and lifestyle.    - Initial Wellness Visit - Includes PPPS ()    11. Major depressive disorder with single episode, in full remission (CMS-HCC)  Controlled, continue with current meds and lifestyle.    - Initial Wellness Visit - Includes PPPS ()    12. Obesity (BMI 30-39.9)  Controlled, continue with current meds and lifestyle.    - Initial Wellness Visit - Includes PPPS ()    13. Polyneuropathy associated with underlying disease (CMS-[facility])  Controlled, continue with current meds and lifestyle.    - Initial Wellness Visit - Includes PPPS ()    14. Vitamin D deficiency disease  Controlled, continue with current meds and lifestyle.    - Initial Wellness Visit - Includes PPPS ()    15. Spinal stenosis of  lumbar region with neurogenic claudication  Stable, patient not willing to have further intervention at this time.  - Initial Wellness Visit - Includes PPPS ()    Services suggested: No services needed at this time  Health Care Screening: Age-appropriate preventive services recommended by USPTF and ACIP covered by Medicare were discussed today. Services ordered if indicated and agreed upon by the patient.  Referrals offered: PT/OT/Nutrition counseling/Behavioral Health/Smoking cessation as per orders if indicated.    Discussion today about general wellness and lifestyle habits:    · Prevent falls and reduce trip hazards; Cautioned about securing or removing rugs.  · Have a working fire alarm and carbon monoxide detector;   · Engage in regular physical activity and social activities     Follow-up: No Follow-up on file.

## 2018-04-24 NOTE — ASSESSMENT & PLAN NOTE
Patient still has the symptomatology in the right wrist but had left wrist fixed. She is going to live with it currently.

## 2018-04-24 NOTE — ASSESSMENT & PLAN NOTE
This is a chronic health problem that is well controlled with current medications and lifestyle measures.   The patient denies chest pain, shortness of breath or dyspnea on exertion.

## 2018-04-24 NOTE — ASSESSMENT & PLAN NOTE
"This is a chronic health issue for this patient where she tends to worry about most things. She particularly has trouble going to bed at night and \"turning her brain off\". She utilizes lorazepam 1 mg at bedtime to help her get to sleep and stay asleep. This also helps with her generalized anxiety during the day. We will be prescribing new prescription today.  Obtained and reviewed patient utilization report from [facility] pharmacy database on 4/24/2018 10:25 AM  prior to writing prescription for controlled substance II, III or IV per [place] bill . Based on assessment of the report, the prescription is medically necessary.   "

## 2018-04-24 NOTE — ASSESSMENT & PLAN NOTE
This is a chronic health problem that is well controlled with current medications and lifestyle measures.   Patient will continue on lorazepam 1 mg daily at bedtime. Writing a new prescription today that will carry her through December 2018.

## 2018-04-24 NOTE — ASSESSMENT & PLAN NOTE
This is a chronic health problem that is uncontrolled with current medications and lifestyle measures.  Mainly involves the right upper extremity.

## 2018-04-24 NOTE — ASSESSMENT & PLAN NOTE
This is a chronic health problem that is uncontrolled with current medications and lifestyle measures. Patient not working on weight loss at this time.

## 2018-04-24 NOTE — ASSESSMENT & PLAN NOTE
This is a chronic health problem that is well controlled with current medications and lifestyle measures. She will continue to follow with Dr. Siddiqui in  and wears her CPAP.

## 2018-04-24 NOTE — ASSESSMENT & PLAN NOTE
Patient had spinal fusion with Dr. Sullivan in 8/2014. She still has numbness in that area but will occasionally get radicular pain shooting across the low back.

## 2018-04-24 NOTE — ASSESSMENT & PLAN NOTE
This is a chronic health problem that is well controlled with current medications and lifestyle measures.   Patient will continue with omeprazole which makes a significant difference.

## 2018-04-24 NOTE — ASSESSMENT & PLAN NOTE
This is a chronic health problem that is well controlled with current medications and lifestyle measures. Does live with some back pain from time to time.

## 2018-04-24 NOTE — ASSESSMENT & PLAN NOTE
This is a chronic health problem for this patient that adequately controlled with current medications. Patient reports she is doing well. Denies suicidal or homicidal ideation. She did not show signs of depression on her annual wellness visit screening tests.

## 2018-04-24 NOTE — ASSESSMENT & PLAN NOTE
This is a chronic health problem that stable. Patient will continue to manage for now. If she develops numbness or tingling in the lower extremities that is persistent rather than intermittent we will need to consider further imaging and discuss the possibility of back surgery. Patient is reticent to consider back surgery

## 2018-04-24 NOTE — ASSESSMENT & PLAN NOTE
This is a chronic health problem well controlled with patient's current vitamin D supplementation.

## 2018-05-01 ENCOUNTER — PATIENT MESSAGE (OUTPATIENT)
Dept: MEDICAL GROUP | Facility: MEDICAL CENTER | Age: 63
End: 2018-05-01

## 2018-05-01 ENCOUNTER — HOSPITAL ENCOUNTER (EMERGENCY)
Facility: MEDICAL CENTER | Age: 63
End: 2018-05-01
Attending: EMERGENCY MEDICINE
Payer: MEDICARE

## 2018-05-01 ENCOUNTER — APPOINTMENT (OUTPATIENT)
Dept: RADIOLOGY | Facility: MEDICAL CENTER | Age: 63
End: 2018-05-01
Attending: EMERGENCY MEDICINE
Payer: MEDICARE

## 2018-05-01 VITALS
SYSTOLIC BLOOD PRESSURE: 139 MMHG | HEART RATE: 84 BPM | BODY MASS INDEX: 33.26 KG/M2 | DIASTOLIC BLOOD PRESSURE: 82 MMHG | RESPIRATION RATE: 18 BRPM | TEMPERATURE: 97.9 F | WEIGHT: 165 LBS | HEIGHT: 59 IN

## 2018-05-01 DIAGNOSIS — E87.6 HYPOKALEMIA: ICD-10-CM

## 2018-05-01 DIAGNOSIS — R10.84 GENERALIZED ABDOMINAL PAIN: ICD-10-CM

## 2018-05-01 LAB
ALBUMIN SERPL BCP-MCNC: 4.4 G/DL (ref 3.2–4.9)
ALBUMIN/GLOB SERPL: 1.3 G/DL
ALP SERPL-CCNC: 79 U/L (ref 30–99)
ALT SERPL-CCNC: 50 U/L (ref 2–50)
ANION GAP SERPL CALC-SCNC: 7 MMOL/L (ref 0–11.9)
APPEARANCE UR: CLEAR
AST SERPL-CCNC: 39 U/L (ref 12–45)
BASOPHILS # BLD AUTO: 0.3 % (ref 0–1.8)
BASOPHILS # BLD: 0.03 K/UL (ref 0–0.12)
BILIRUB SERPL-MCNC: 0.8 MG/DL (ref 0.1–1.5)
BILIRUB UR QL STRIP.AUTO: ABNORMAL
BUN SERPL-MCNC: 15 MG/DL (ref 8–22)
CALCIUM SERPL-MCNC: 9.3 MG/DL (ref 8.4–10.2)
CHLORIDE SERPL-SCNC: 106 MMOL/L (ref 96–112)
CO2 SERPL-SCNC: 26 MMOL/L (ref 20–33)
COLOR UR: YELLOW
CREAT SERPL-MCNC: 1.05 MG/DL (ref 0.5–1.4)
EOSINOPHIL # BLD AUTO: 0.17 K/UL (ref 0–0.51)
EOSINOPHIL NFR BLD: 2 % (ref 0–6.9)
ERYTHROCYTE [DISTWIDTH] IN BLOOD BY AUTOMATED COUNT: 45.1 FL (ref 35.9–50)
GLOBULIN SER CALC-MCNC: 3.4 G/DL (ref 1.9–3.5)
GLUCOSE SERPL-MCNC: 141 MG/DL (ref 65–99)
GLUCOSE UR STRIP.AUTO-MCNC: NEGATIVE MG/DL
HCT VFR BLD AUTO: 46 % (ref 37–47)
HGB BLD-MCNC: 15.7 G/DL (ref 12–16)
IMM GRANULOCYTES # BLD AUTO: 0.03 K/UL (ref 0–0.11)
IMM GRANULOCYTES NFR BLD AUTO: 0.3 % (ref 0–0.9)
KETONES UR STRIP.AUTO-MCNC: ABNORMAL MG/DL
LEUKOCYTE ESTERASE UR QL STRIP.AUTO: NEGATIVE
LIPASE SERPL-CCNC: 22 U/L (ref 7–58)
LYMPHOCYTES # BLD AUTO: 1.77 K/UL (ref 1–4.8)
LYMPHOCYTES NFR BLD: 20.4 % (ref 22–41)
MCH RBC QN AUTO: 32 PG (ref 27–33)
MCHC RBC AUTO-ENTMCNC: 34.1 G/DL (ref 33.6–35)
MCV RBC AUTO: 93.7 FL (ref 81.4–97.8)
MICRO URNS: ABNORMAL
MONOCYTES # BLD AUTO: 0.45 K/UL (ref 0–0.85)
MONOCYTES NFR BLD AUTO: 5.2 % (ref 0–13.4)
NEUTROPHILS # BLD AUTO: 6.23 K/UL (ref 2–7.15)
NEUTROPHILS NFR BLD: 71.8 % (ref 44–72)
NITRITE UR QL STRIP.AUTO: NEGATIVE
NRBC # BLD AUTO: 0 K/UL
NRBC BLD-RTO: 0 /100 WBC
PH UR STRIP.AUTO: 5 [PH]
PLATELET # BLD AUTO: 384 K/UL (ref 164–446)
PMV BLD AUTO: 8.9 FL (ref 9–12.9)
POTASSIUM SERPL-SCNC: 3.1 MMOL/L (ref 3.6–5.5)
PROT SERPL-MCNC: 7.8 G/DL (ref 6–8.2)
PROT UR QL STRIP: NEGATIVE MG/DL
RBC # BLD AUTO: 4.91 M/UL (ref 4.2–5.4)
RBC UR QL AUTO: NEGATIVE
SODIUM SERPL-SCNC: 139 MMOL/L (ref 135–145)
SP GR UR REFRACTOMETRY: 1.03
WBC # BLD AUTO: 8.7 K/UL (ref 4.8–10.8)

## 2018-05-01 PROCEDURE — 81003 URINALYSIS AUTO W/O SCOPE: CPT

## 2018-05-01 PROCEDURE — 80053 COMPREHEN METABOLIC PANEL: CPT

## 2018-05-01 PROCEDURE — 700117 HCHG RX CONTRAST REV CODE 255: Performed by: EMERGENCY MEDICINE

## 2018-05-01 PROCEDURE — 36415 COLL VENOUS BLD VENIPUNCTURE: CPT

## 2018-05-01 PROCEDURE — 85025 COMPLETE CBC W/AUTO DIFF WBC: CPT

## 2018-05-01 PROCEDURE — 74177 CT ABD & PELVIS W/CONTRAST: CPT

## 2018-05-01 PROCEDURE — 700102 HCHG RX REV CODE 250 W/ 637 OVERRIDE(OP): Performed by: EMERGENCY MEDICINE

## 2018-05-01 PROCEDURE — 99285 EMERGENCY DEPT VISIT HI MDM: CPT

## 2018-05-01 PROCEDURE — A9270 NON-COVERED ITEM OR SERVICE: HCPCS | Performed by: EMERGENCY MEDICINE

## 2018-05-01 PROCEDURE — 83690 ASSAY OF LIPASE: CPT

## 2018-05-01 PROCEDURE — 93005 ELECTROCARDIOGRAM TRACING: CPT | Performed by: EMERGENCY MEDICINE

## 2018-05-01 RX ORDER — DOCUSATE SODIUM 100 MG/1
100 CAPSULE, LIQUID FILLED ORAL 2 TIMES DAILY PRN
COMMUNITY
End: 2019-09-06

## 2018-05-01 RX ORDER — PREDNISONE 10 MG/1
20-40 TABLET ORAL DAILY
Status: SHIPPED | COMMUNITY
Start: 2018-04-16 | End: 2018-07-18

## 2018-05-01 RX ORDER — FUROSEMIDE 20 MG/1
20 TABLET ORAL DAILY
COMMUNITY
End: 2019-09-06

## 2018-05-01 RX ORDER — CITALOPRAM 40 MG/1
40 TABLET ORAL DAILY
Status: SHIPPED | COMMUNITY
End: 2018-12-06 | Stop reason: SDUPTHER

## 2018-05-01 RX ORDER — CETIRIZINE HYDROCHLORIDE 10 MG/1
10 TABLET ORAL PRN
Status: SHIPPED | COMMUNITY
End: 2018-05-14 | Stop reason: SDUPTHER

## 2018-05-01 RX ORDER — POTASSIUM CHLORIDE 20 MEQ/1
40 TABLET, EXTENDED RELEASE ORAL ONCE
Status: COMPLETED | OUTPATIENT
Start: 2018-05-01 | End: 2018-05-01

## 2018-05-01 RX ORDER — PANTOPRAZOLE SODIUM 40 MG/1
40 TABLET, DELAYED RELEASE ORAL DAILY
Status: SHIPPED | COMMUNITY
End: 2018-12-06

## 2018-05-01 RX ORDER — DOXYCYCLINE HYCLATE 100 MG
100 TABLET ORAL 2 TIMES DAILY
Status: SHIPPED | COMMUNITY
Start: 2018-04-11 | End: 2018-07-18

## 2018-05-01 RX ORDER — BACLOFEN 10 MG/1
10 TABLET ORAL
Status: SHIPPED | COMMUNITY
End: 2018-06-06 | Stop reason: SDUPTHER

## 2018-05-01 RX ADMIN — IOHEXOL 100 ML: 350 INJECTION, SOLUTION INTRAVENOUS at 18:22

## 2018-05-01 RX ADMIN — POTASSIUM CHLORIDE 40 MEQ: 1500 TABLET, EXTENDED RELEASE ORAL at 18:57

## 2018-05-01 ASSESSMENT — PAIN SCALES - GENERAL: PAINLEVEL_OUTOF10: 4

## 2018-05-01 NOTE — ED NOTES
Saline lock with blood draw. Urine spec to lab prior to being seen by erp. States 4/10 pain, in no apparent distress

## 2018-05-01 NOTE — ED NOTES
Patient presents with mid epigastric pain for 1 month. Patient states Dr. May told her that was too long to have abdominal pain and she needed to get a CT and blood work. Patient denies n/v/d

## 2018-05-01 NOTE — ED NOTES
Med rec updated and complete  Allergies reviewed  Pt had a list of medications, went over list of medications and returned list of medications back to pt.

## 2018-05-02 NOTE — ED NOTES
Plan of care discussed with pt and family. Aware of pending ct. Vs as charted, denies further needs. Report to ayana madrigal

## 2018-05-02 NOTE — ED NOTES
Iv noted in [facility] not present on this er visit, therefore dc'd in epic at this time

## 2018-05-02 NOTE — ED PROVIDER NOTES
ED Provider Note    Chief Complaint:   Abdominal pain    HPI:  Almaz Samuel is a 63 y.o. female who presents with abdominal pain. Symptoms began 2 months ago, initially with a persistent cough for which was previously evaluated. Cough has improved, however one month ago she developed some persistent mid abdominal pain, neither worsening nor improving. She has had some associated diarrhea over the past 3 days, has not had any episodes of diarrhea today. Additionally she had one episode of vomiting within the past week, this has not been recurrent. She denies associated fevers, no hematemesis, no hematochezia nor melena is described. Denies any associated vaginal bleeding, no vaginal discharge.     She has not had any abnormal bleeding or bruising. No abnormal rashes or lesions. No headaches, no neck or back pain.    She states today she felt mildly fatigued prompting her to message her primary care physician regarding her abdominal pain for the past 2 months. Additionally, she states she took her temperature at home and found it to be 96.0. She states her temperature is always low, and her family in the same household usually has a low temperature as well. Primary care physician instructed her to present to the emergency department today for blood work and CT.    She is followed by Dr. Gonzalez, gastroenterologist, as well as Dr. Okeefe, bariatric surgeon.    Review of Systems:  See HPI for pertinent positives and negatives. All other systems negative.    Past Medical History:   has a past medical history of Anesthesia (02-14-11); Arthritis (02-14-11); Backpain (02-14-11); Breath shortness; Diverticulitis; Endometriosis of uterus; Fusion of spine (2014); H/O fall; H/O: CVA (cerebrovascular accident) (8/22/2014); Hiatus hernia syndrome; High cholesterol; HTN, goal below 130/80 (10/24/2011); Infectious disease; Lung collapse (02-14-11); Major depressive disorder with single episode, in full remission (HCC)  (10/24/2011); Mixed hyperlipidemia (12/27/2011); Moderate major depression (HCC) (10/24/2011); MRSA exposure (8/2014); Post-menopause on HRT (hormone replacement therapy) (12/27/2011); PPD positive (10/24/2011); Scoliosis; Sleep apnea (6/2015); and Unspecified vitamin D deficiency (9/24/2012).    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    Current Medications:  Home Medications     Reviewed by Sarai Argueta (Pharmacy Tech) on 05/01/18 at 1641  Med List Status: Complete   Medication Last Dose Status   albuterol 108 (90 Base) MCG/ACT Aero Soln inhalation aerosol > 1 week Active   atorvastatin (LIPITOR) 20 MG Tab > 3 days Active   baclofen (LIORESAL) 10 MG Tab 4/30/2018 Active   cetirizine (ZYRTEC) 10 MG Tab > 1 week Active   citalopram (CELEXA) 40 MG Tab > 2 days Active   Cyanocobalamin (VITAMIN B 12 PO) > 1 week Active   Digestive Enzymes CAPS > 1 week Active   docusate sodium (COLACE) 100 MG Cap 4/30/2018 Active   doxycycline (VIBRAMYCIN) 100 MG Tab 4/17/2018 Active   furosemide (LASIX) 20 MG Tab > 3 days Active   LORazepam (ATIVAN) 1 MG Tab 4/30/2018 Active   Multiple Vitamins-Minerals (MULTI-VITAMIN/MINERALS PO) > 1 week Active   pantoprazole (PROTONIX) 40 MG Tablet Delayed Response > 3 days Active   predniSONE  "(DELTASONE) 10 MG Tab 4/21/2018 Active   Probiotic Product (PROBIOTIC DAILY PO) 4/30/2018 Active   trazodone (DESYREL) 100 MG Tab 4/30/2018 Active   vitamin D (CHOLECALCIFEROL) 1000 UNIT Tab > 3 days Active                Allergies:  Allergies   Allergen Reactions   • Seasonal Itching     Pt states eye irritation, pollen        Physical Exam:  Vital Signs: /82   Pulse 84   Temp 36.6 °C (97.9 °F)   Resp 18   Ht 1.499 m (4' 11\")   Wt 74.8 kg (165 lb)   BMI 33.33 kg/m²   Constitutional: Alert, no acute distress  HENT: Moist mucus membranes, normal posterior pharynx, no intraoral lesions  Eyes: Pupils equal and reactive, normal conjunctiva  Neck: Supple, normal range of motion, no stridor  Cardiovascular: Extremities are warm and well perfused, no murmer appreciated, normal cardiac auscultation  Pulmonary: No respiratory distress, normal work of breathing, no accessory muscule usage, breath sounds clear and equal bilaterally  Abdomen: Soft, obese, no localizable tenderness to palpation, no peritoneal signs, very mild discomfort on diffuse abdominal palpation, no localizable right lower quadrant tenderness to palpation, no right upper quadrant tenderness to palpation.  Skin: Warm, dry, no rashes or lesions  Musculoskeletal: Normal range of motion in all extremities, no swelling or deformity noted  Neurologic: Alert, oriented, normal speech, normal motor function  Psychiatric: Normal and appropriate mood and affect    Labs:  Labs Reviewed   CBC WITH DIFFERENTIAL - Abnormal; Notable for the following:        Result Value    MPV 8.9 (*)     Lymphocytes 20.40 (*)     All other components within normal limits   COMP METABOLIC PANEL - Abnormal; Notable for the following:     Potassium 3.1 (*)     Glucose 141 (*)     All other components within normal limits   URINALYSIS,CULTURE IF INDICATED - Abnormal; Notable for the following:     Ketones Trace (*)     Bilirubin Small (*)     All other components within normal " limits   ESTIMATED GFR - Abnormal; Notable for the following:     GFR If Non  53 (*)     All other components within normal limits   LIPASE   REFRACTOMETER SG       Radiology:  CT-ABDOMEN-PELVIS WITH   Final Result      1.  Interval extensive posterior thoracolumbar and sacral fixation, levoscoliosis      2.  New herniation of a portion of the stomach into the distal mediastinum with folding of the gastric fundus that is suspicious for prior Nissen fundoplication and/or paraesophageal hernia. Recommend clinical correlation      3.  Severe colonic diverticulosis without evidence of diverticulitis. Partial sigmoidectomy. Appendectomy      4.  Severe atherosclerosis           Medical records reviewed for continuity of care. Primary care clinic note reviewed from 4/24/18. Patient is noted to have a history of anxiety. History of GERD, currently taking omeprazole. No recent CT scans in our system.    EKG:  Rate 96, normal sinus rhythm, no ST elevation or depression, low voltage in precordial leads, PVC present    Differential diagnosis:  Diverticulitis, chronic abdominal pain, surgical adhesions, aortic aneurysm    MDM:  Patient presents with chronic abdominal pain after being sent by her primary care physician out of concern for low temperature, with history of chronic abdominal pain. She is normothermic on arrival to the emergency department. As all of the members of her household generally have low temperatures, I suspect this may be due to thermometer error. She has no tachycardia, no tachypnea, no evidence of SIRS or sepsis by vital signs on arrival. Her physical exam is benign, she has no peritoneal signs, no localizable tenderness to palpation. Abdomen is soft, nondistended, clinically less concerning for bowel obstruction.    On laboratory evaluation potassium is low at 3.1. This was replaced in the emergency department orally. Suspect this may be the cause of her generalized fatigue. Lipase is  within normal limits, she has no evidence of pancreatitis. Urinalysis negative for evidence of infection. She has a normal white blood count, normal hemoglobin. Again less concerning for infection.    CT abdomen and pelvis ordered with no acute process seen. New herniation of a portion of the stomach noted suspicious for paraesophageal hernia. Patient states that she did have an esophageal hernia repair performed in the interim time period between her current CT and most recent prior CT.    At this time, etiology of her chronic abdominal pain is uncertain. This may represent a peptic ulcer disease or esophageal process. She has not undergone endoscopy at any time. Plan at this time is for discharge home. She will follow up with her surgeon for complete recheck. Additionally she will contact her gastroenterologist for complete recheck and endoscopy.    Blood pressure today is greater than 120/80, patient is instructed to follow up with primary care provider for blood pressure recheck.    Disposition:  Discharge home in stable condition    Final Impression:  1. Generalized abdominal pain    2. Hypokalemia        Electronically signed by: Rosalie Powell, 5/1/2018 5:11 PM

## 2018-05-02 NOTE — DISCHARGE INSTRUCTIONS
Please eat foods high in potassium. There is a list of foods with her potassium content below. Return to the emergency department if he develops any new or worsening symptoms including worsening pain, lightheadedness, fatigue, fevers, nausea, vomiting, or any further concerns. Please call your primary care physician in the morning to schedule a repeat potassium check within 5 days. Additionally, please follow-up with Dr. Okeefe for complete abdominal recheck and review of CT scan performed today. Please call his office in the morning for a follow-up appointment within 24-48 hours.    Potassium Content of Foods  Potassium is a mineral found in many foods and drinks. It helps keep fluids and minerals balanced in your body and affects how steadily your heart beats. Potassium also helps control your blood pressure and keep your muscles and nervous system healthy.  Certain health conditions and medicines may change the balance of potassium in your body. When this happens, you can help balance your level of potassium through the foods that you do or do not eat. Your health care provider or dietitian may recommend an amount of potassium that you should have each day. The following lists of foods provide the amount of potassium (in parentheses) per serving in each item.  High in potassium  The following foods and beverages have 200 mg or more of potassium per serving:  · Apricots, 2 raw or 5 dry (200 mg).  · Artichoke, 1 medium (345 mg).  · Avocado, raw, ¼ each (245 mg).  · Banana, 1 medium (425 mg).  · Beans, lima, or baked beans, canned, ½ cup (280 mg).  · Beans, white, canned, ½ cup (595 mg).  · Beef roast, 3 oz (320 mg).  · Beef, ground, 3 oz (270 mg).  · Beets, raw or cooked, ½ cup (260 mg).  · Bran muffin, 2 oz (300 mg).  · Broccoli, ½ cup (230 mg).  · [place] sprouts, ½ cup (250 mg).  · Cantaloupe, ½ cup (215 mg).  · Cereal, 100% bran, ½ cup (200-400 mg).  · Cheeseburger, single, fast food, 1 each (225-400  mg).  · Chicken, 3 oz (220 mg).  · Clams, canned, 3 oz (535 mg).  · Crab, 3 oz (225 mg).  · Dates, 5 each (270 mg).  · Dried beans and peas, ½ cup (300-475 mg).  · Figs, dried, 2 each (260 mg).  · Fish: halibut, tuna, cod, snapper, 3 oz (480 mg).  · Fish: salmon, emanuel, swordfish, perch, 3 oz (300 mg).  · Fish, tuna, canned 3 oz (200 mg).  · French fries, fast food, 3 oz (470 mg).  · Granola with fruit and nuts, ½ cup (200 mg).  · Grapefruit juice, ½ cup (200 mg).  · Greens, beet, ½ cup (655 mg).  · Honeydew melon, ½ cup (200 mg).  · Kale, raw, 1 cup (300 mg).  · Kiwi, 1 medium (240 mg).  · Kohlrabi, rutabaga, parsnips, ½ cup (280 mg).  · Lentils, ½ cup (365 mg).  · Bran, 1 each (325 mg).  · Milk, chocolate, 1 cup (420 mg).  · Milk: nonfat, low-fat, whole, buttermilk, 1 cup (350-380 mg).  · Molasses, 1 Tbsp (295 mg).  · Mushrooms, ½ cup (280) mg.  · Nectarine, 1 each (275 mg).  · Nuts: almonds, peanuts, hazelnuts, [place], cashew, mixed, 1 oz (200 mg).  · Nuts, pistachios, 1 oz (295 mg).  · Orange, 1 each (240 mg).  · Orange juice, ½ cup (235 mg).  · Papaya, medium, ½ fruit (390 mg).  · Peanut butter, chunky, 2 Tbsp (240 mg).  · Peanut butter, smooth, 2 Tbsp (210 mg).  · Pear, 1 medium (200 mg).  · Pomegranate, 1 whole (400 mg).  · Pomegranate juice, ½ cup (215 mg).  · Pork, 3 oz (350 mg).  · Potato chips, salted, 1 oz (465 mg).  · Potato, baked with skin, 1 medium (925 mg).  · Potatoes, boiled, ½ cup (255 mg).  · Potatoes, mashed, ½ cup (330 mg).  · Prune juice, ½ cup (370 mg).  · Prunes, 5 each (305 mg).  · Pudding, chocolate, ½ cup (230 mg).  · Pumpkin, canned, ½ cup (250 mg).  · Raisins, seedless, ¼ cup (270 mg).  · Seeds, sunflower or pumpkin, 1 oz (240 mg).  · Soy milk, 1 cup (300 mg).  · Spinach, ½ cup (420 mg).  · Spinach, canned, ½ cup (370 mg).  · Sweet potato, baked with skin, 1 medium (450 mg).  · Swiss chard, ½ cup (480 mg).  · Tomato or vegetable juice, ½ cup (275 mg).  · Tomato sauce or puree, ½ cup  (400-550 mg).  · Tomato, raw, 1 medium (290 mg).  · Tomatoes, canned, ½ cup (200-300 mg).  · Turkey, 3 oz (250 mg).  · Wheat germ, 1 oz (250 mg).  · Winter squash, ½ cup (250 mg).  · Yogurt, plain or fruited, 6 oz (260-435 mg).  · Zucchini, ½ cup (220 mg).  Moderate in potassium  The following foods and beverages have  mg of potassium per serving:  · Apple, 1 each (150 mg).  · Apple juice, ½ cup (150 mg).  · Applesauce, ½ cup (90 mg).  · Apricot nectar, ½ cup (140 mg).  · Asparagus, small moncada, ½ cup or 6 moncada (155 mg).  · Bagel, cinnamon raisin, 1 each (130 mg).  · Bagel, egg or plain, 4 in., 1 each (70 mg).  · Beans, green, ½ cup (90 mg).  · Beans, yellow, ½ cup (190 mg).  · Beer, regular, 12 oz (100 mg).  · Beets, canned, ½ cup (125 mg).  · Blackberries, ½ cup (115 mg).  · Blueberries, ½ cup (60 mg).  · Bread, whole wheat, 1 slice (70 mg).  · Broccoli, raw, ½ cup (145 mg).  · Cabbage, ½ cup (150 mg).  · Carrots, cooked or raw, ½ cup (180 mg).  · Cauliflower, raw, ½ cup (150 mg).  · Celery, raw, ½ cup (155 mg).  · Cereal, bran flakes, ½cup (120-150 mg).  · Cheese, cottage, ½ cup (110 mg).  · Cherries, 10 each (150 mg).  · Chocolate, 1½ oz bar (165 mg).  · Coffee, brewed 6 oz (90 mg).  · Corn, ½ cup or 1 ear (195 mg).  · Cucumbers, ½ cup (80 mg).  · Egg, large, 1 each (60 mg).  · Eggplant, ½ cup (60 mg).  · Endive, raw, ½cup (80 mg).  · English muffin, 1 each (65 mg).  · Fish, orange roughy, 3 oz (150 mg).  · Frankfurter, beef or pork, 1 each (75 mg).  · Fruit cocktail, ½ cup (115 mg).  · Grape juice, ½ cup (170 mg).  · Grapefruit, ½ fruit (175 mg).  · Grapes, ½ cup (155 mg).  · Greens: kale, turnip, hina, ½ cup (110-150 mg).  · Ice cream or frozen yogurt, chocolate, ½ cup (175 mg).  · Ice cream or frozen yogurt, vanilla, ½ cup (120-150 mg).  · Trey, limes, 1 each (80 mg).  · Lettuce, all types, 1 cup (100 mg).  · Mixed vegetables, ½ cup (150 mg).  · Mushrooms, raw, ½ cup (110 mg).  · Nuts: walnuts,  pecans, or macadamia, 1 oz (125 mg).  · Oatmeal, ½ cup (80 mg).  · Okra, ½ cup (110 mg).  · Onions, raw, ½ cup (120 mg).  · Peach, 1 each (185 mg).  · Peaches, canned, ½ cup (120 mg).  · Pears, canned, ½ cup (120 mg).  · Peas, green, frozen, ½ cup (90 mg).  · Peppers, green, ½ cup (130 mg).  · Peppers, red, ½ cup (160 mg).  · Pineapple juice, ½ cup (165 mg).  · Pineapple, fresh or canned, ½ cup (100 mg).  · Plums, 1 each (105 mg).  · Pudding, vanilla, ½ cup (150 mg).  · Raspberries, ½ cup (90 mg).  · Rhubarb, ½ cup (115 mg).  · Rice, wild, ½ cup (80 mg).  · Shrimp, 3 oz (155 mg).  · Spinach, raw, 1 cup (170 mg).  · Strawberries, ½ cup (125 mg).  · Summer squash ½ cup (175-200 mg).  · Swiss chard, raw, 1 cup (135 mg).  · Tangerines, 1 each (140 mg).  · Tea, brewed, 6 oz (65 mg).  · Turnips, ½ cup (140 mg).  · Watermelon, ½ cup (85 mg).  · Wine, red, table, 5 oz (180 mg).  · Wine, white, table, 5 oz (100 mg).  Low in potassium  The following foods and beverages have less than 50 mg of potassium per serving.  · Bread, white, 1 slice (30 mg).  · Carbonated beverages, 12 oz (less than 5 mg).  · Cheese, 1 oz (20-30 mg).  · Cranberries, ½ cup (45 mg).  · Cranberry juice cocktail, ½ cup (20 mg).  · Fats and oils, 1 Tbsp (less than 5 mg).  · Hummus, 1 Tbsp (32 mg).  · Nectar: papaya, irasema, or pear, ½ cup (35 mg).  · Rice, white or brown, ½ cup (50 mg).  · Spaghetti or macaroni, ½ cup cooked (30 mg).  · Tortilla, flour or corn, 1 each (50 mg).  · Waffle, 4 in., 1 each (50 mg).  · Water chestnuts, ½ cup (40 mg).  This information is not intended to replace advice given to you by your health care provider. Make sure you discuss any questions you have with your health care provider.  Document Released: 08/01/2006 Document Revised: 05/25/2017 Document Reviewed: 11/14/2014  [employer] Interactive Patient Education © 2017 [employer]

## 2018-05-04 ENCOUNTER — PATIENT MESSAGE (OUTPATIENT)
Dept: MEDICAL GROUP | Facility: MEDICAL CENTER | Age: 63
End: 2018-05-04

## 2018-05-07 ENCOUNTER — HOSPITAL ENCOUNTER (OUTPATIENT)
Dept: LAB | Facility: MEDICAL CENTER | Age: 63
End: 2018-05-07
Attending: PHYSICIAN ASSISTANT
Payer: MEDICARE

## 2018-05-07 LAB
ALBUMIN SERPL BCP-MCNC: 4.2 G/DL (ref 3.2–4.9)
ALBUMIN/GLOB SERPL: 1.4 G/DL
ALP SERPL-CCNC: 74 U/L (ref 30–99)
ALT SERPL-CCNC: 37 U/L (ref 2–50)
ANION GAP SERPL CALC-SCNC: 12 MMOL/L (ref 0–11.9)
AST SERPL-CCNC: 28 U/L (ref 12–45)
BASOPHILS # BLD AUTO: 0.8 % (ref 0–1.8)
BASOPHILS # BLD: 0.04 K/UL (ref 0–0.12)
BILIRUB SERPL-MCNC: 0.8 MG/DL (ref 0.1–1.5)
BUN SERPL-MCNC: 14 MG/DL (ref 8–22)
CALCIUM SERPL-MCNC: 9.5 MG/DL (ref 8.5–10.5)
CHLORIDE SERPL-SCNC: 102 MMOL/L (ref 96–112)
CO2 SERPL-SCNC: 27 MMOL/L (ref 20–33)
CREAT SERPL-MCNC: 1.12 MG/DL (ref 0.5–1.4)
EOSINOPHIL # BLD AUTO: 0.15 K/UL (ref 0–0.51)
EOSINOPHIL NFR BLD: 2.9 % (ref 0–6.9)
ERYTHROCYTE [DISTWIDTH] IN BLOOD BY AUTOMATED COUNT: 45.3 FL (ref 35.9–50)
GLOBULIN SER CALC-MCNC: 3 G/DL (ref 1.9–3.5)
GLUCOSE SERPL-MCNC: 183 MG/DL (ref 65–99)
HCT VFR BLD AUTO: 43.8 % (ref 37–47)
HGB BLD-MCNC: 14.8 G/DL (ref 12–16)
IMM GRANULOCYTES # BLD AUTO: 0.01 K/UL (ref 0–0.11)
IMM GRANULOCYTES NFR BLD AUTO: 0.2 % (ref 0–0.9)
LYMPHOCYTES # BLD AUTO: 1.92 K/UL (ref 1–4.8)
LYMPHOCYTES NFR BLD: 36.5 % (ref 22–41)
MCH RBC QN AUTO: 31.8 PG (ref 27–33)
MCHC RBC AUTO-ENTMCNC: 33.8 G/DL (ref 33.6–35)
MCV RBC AUTO: 94.2 FL (ref 81.4–97.8)
MONOCYTES # BLD AUTO: 0.27 K/UL (ref 0–0.85)
MONOCYTES NFR BLD AUTO: 5.1 % (ref 0–13.4)
NEUTROPHILS # BLD AUTO: 2.87 K/UL (ref 2–7.15)
NEUTROPHILS NFR BLD: 54.5 % (ref 44–72)
NRBC # BLD AUTO: 0 K/UL
NRBC BLD-RTO: 0 /100 WBC
PLATELET # BLD AUTO: 325 K/UL (ref 164–446)
PMV BLD AUTO: 9.1 FL (ref 9–12.9)
POTASSIUM SERPL-SCNC: 3.5 MMOL/L (ref 3.6–5.5)
PROT SERPL-MCNC: 7.2 G/DL (ref 6–8.2)
RBC # BLD AUTO: 4.65 M/UL (ref 4.2–5.4)
SODIUM SERPL-SCNC: 141 MMOL/L (ref 135–145)
WBC # BLD AUTO: 5.3 K/UL (ref 4.8–10.8)

## 2018-05-07 PROCEDURE — 80053 COMPREHEN METABOLIC PANEL: CPT

## 2018-05-07 PROCEDURE — 36415 COLL VENOUS BLD VENIPUNCTURE: CPT

## 2018-05-07 PROCEDURE — 85025 COMPLETE CBC W/AUTO DIFF WBC: CPT

## 2018-05-08 ENCOUNTER — APPOINTMENT (OUTPATIENT)
Dept: MEDICAL GROUP | Facility: MEDICAL CENTER | Age: 63
End: 2018-05-08
Payer: MEDICARE

## 2018-05-09 ENCOUNTER — HOSPITAL ENCOUNTER (OUTPATIENT)
Dept: RADIOLOGY | Facility: MEDICAL CENTER | Age: 63
End: 2018-05-09
Attending: PHYSICIAN ASSISTANT
Payer: MEDICARE

## 2018-05-09 DIAGNOSIS — R10.9 STOMACH ACHE: ICD-10-CM

## 2018-05-09 DIAGNOSIS — K57.32 DIVERTICULITIS OF COLON (WITHOUT MENTION OF HEMORRHAGE)(562.11): ICD-10-CM

## 2018-05-09 DIAGNOSIS — K59.00 CONSTIPATION, UNSPECIFIED CONSTIPATION TYPE: ICD-10-CM

## 2018-05-09 DIAGNOSIS — R11.0 NAUSEA: ICD-10-CM

## 2018-05-09 DIAGNOSIS — R19.7 DIARRHEA OF PRESUMED INFECTIOUS ORIGIN: ICD-10-CM

## 2018-05-09 PROCEDURE — 76700 US EXAM ABDOM COMPLETE: CPT

## 2018-05-11 ENCOUNTER — PATIENT MESSAGE (OUTPATIENT)
Dept: MEDICAL GROUP | Facility: MEDICAL CENTER | Age: 63
End: 2018-05-11

## 2018-05-14 NOTE — TELEPHONE ENCOUNTER
Was the patient seen in the last year in this department? Yes     Does patient have an active prescription for medications requested? No     Received Request Via: Pharmacy      Pt met protocol?: Yes pt last ov 4/18

## 2018-05-28 LAB — EKG IMPRESSION: NORMAL

## 2018-06-06 RX ORDER — BACLOFEN 10 MG/1
10 TABLET ORAL
Qty: 90 TAB | Refills: 3 | Status: SHIPPED | OUTPATIENT
Start: 2018-06-06 | End: 2018-12-06 | Stop reason: SDUPTHER

## 2018-06-13 ENCOUNTER — APPOINTMENT (OUTPATIENT)
Dept: MEDICAL GROUP | Facility: MEDICAL CENTER | Age: 63
End: 2018-06-13
Payer: MEDICARE

## 2018-06-20 ENCOUNTER — HOSPITAL ENCOUNTER (OUTPATIENT)
Dept: RADIOLOGY | Facility: MEDICAL CENTER | Age: 63
End: 2018-06-20
Attending: COLON & RECTAL SURGERY
Payer: MEDICARE

## 2018-06-20 DIAGNOSIS — R10.9 STOMACH ACHE: ICD-10-CM

## 2018-06-20 DIAGNOSIS — K21.9 GASTROESOPHAGEAL REFLUX DISEASE, ESOPHAGITIS PRESENCE NOT SPECIFIED: ICD-10-CM

## 2018-06-20 DIAGNOSIS — R44.0 AUDITORY HALLUCINATION: ICD-10-CM

## 2018-06-20 PROCEDURE — 78227 HEPATOBIL SYST IMAGE W/DRUG: CPT

## 2018-06-20 RX ORDER — SINCALIDE 5 UG/5ML
INJECTION, POWDER, LYOPHILIZED, FOR SOLUTION INTRAVENOUS
Status: DISPENSED
Start: 2018-06-20 | End: 2018-06-20

## 2018-06-27 ENCOUNTER — TELEPHONE (OUTPATIENT)
Dept: MEDICAL GROUP | Facility: MEDICAL CENTER | Age: 63
End: 2018-06-27

## 2018-06-27 NOTE — TELEPHONE ENCOUNTER
----- Message from Erin Cartagena sent at 6/27/2018  1:06 PM PDT -----  Patient called with question about the shingles vaccine. I believe we spoke with her yesterday and she seamed confused. Additionally  I happened to see her next f/v is scheduled the same day as a procedure with Dr. Okeefe. Not sure I you want to bring that to her attention! Thanks!

## 2018-06-27 NOTE — TELEPHONE ENCOUNTER
Phone Number Called: [phone] (home)      Message: I have called patient back about the shingle shot. Once we get it in, we will call patient to let her know and she will come in to get it.     Left Message for patient to call back: N\A

## 2018-07-18 ENCOUNTER — TELEPHONE (OUTPATIENT)
Dept: MEDICAL GROUP | Facility: MEDICAL CENTER | Age: 63
End: 2018-07-18

## 2018-07-18 DIAGNOSIS — Z01.812 PRE-PROCEDURAL LABORATORY EXAMINATION: ICD-10-CM

## 2018-07-18 LAB
ANION GAP SERPL CALC-SCNC: 9 MMOL/L (ref 0–11.9)
BUN SERPL-MCNC: 12 MG/DL (ref 8–22)
CALCIUM SERPL-MCNC: 9.7 MG/DL (ref 8.5–10.5)
CHLORIDE SERPL-SCNC: 104 MMOL/L (ref 96–112)
CO2 SERPL-SCNC: 28 MMOL/L (ref 20–33)
CREAT SERPL-MCNC: 0.96 MG/DL (ref 0.5–1.4)
ERYTHROCYTE [DISTWIDTH] IN BLOOD BY AUTOMATED COUNT: 46.9 FL (ref 35.9–50)
GLUCOSE SERPL-MCNC: 152 MG/DL (ref 65–99)
HCT VFR BLD AUTO: 46.7 % (ref 37–47)
HGB BLD-MCNC: 15.7 G/DL (ref 12–16)
MCH RBC QN AUTO: 32.1 PG (ref 27–33)
MCHC RBC AUTO-ENTMCNC: 33.6 G/DL (ref 33.6–35)
MCV RBC AUTO: 95.5 FL (ref 81.4–97.8)
PLATELET # BLD AUTO: 335 K/UL (ref 164–446)
PMV BLD AUTO: 9.3 FL (ref 9–12.9)
POTASSIUM SERPL-SCNC: 3.6 MMOL/L (ref 3.6–5.5)
RBC # BLD AUTO: 4.89 M/UL (ref 4.2–5.4)
SODIUM SERPL-SCNC: 141 MMOL/L (ref 135–145)
WBC # BLD AUTO: 4.6 K/UL (ref 4.8–10.8)

## 2018-07-18 PROCEDURE — 36415 COLL VENOUS BLD VENIPUNCTURE: CPT

## 2018-07-18 PROCEDURE — 80048 BASIC METABOLIC PNL TOTAL CA: CPT

## 2018-07-18 PROCEDURE — 85027 COMPLETE CBC AUTOMATED: CPT

## 2018-07-18 RX ORDER — CETIRIZINE HYDROCHLORIDE 10 MG/1
10 TABLET ORAL PRN
Qty: 30 TAB | Refills: 11 | Status: ON HOLD | OUTPATIENT
Start: 2018-07-18 | End: 2018-07-25

## 2018-07-18 NOTE — TELEPHONE ENCOUNTER
1. Caller Name: Almaz Samuel                        Call Back Number: [phone] (home)      2. Message: Patient would like a script for the shingrix, please write out a script for patient and she will be here to pick it up later today.    Thank you.     3. Patient approves office to leave a detailed voicemail/MyChart message: N\A

## 2018-07-20 ENCOUNTER — PATIENT MESSAGE (OUTPATIENT)
Dept: MEDICAL GROUP | Facility: MEDICAL CENTER | Age: 63
End: 2018-07-20

## 2018-07-25 ENCOUNTER — HOSPITAL ENCOUNTER (OUTPATIENT)
Facility: MEDICAL CENTER | Age: 63
End: 2018-07-27
Attending: COLON & RECTAL SURGERY | Admitting: COLON & RECTAL SURGERY
Payer: MEDICARE

## 2018-07-25 DIAGNOSIS — G89.18 POSTOPERATIVE PAIN: ICD-10-CM

## 2018-07-25 PROCEDURE — 502000 HCHG MISC OR IMPLANTS RC 0278: Performed by: COLON & RECTAL SURGERY

## 2018-07-25 PROCEDURE — 700101 HCHG RX REV CODE 250

## 2018-07-25 PROCEDURE — 160048 HCHG OR STATISTICAL LEVEL 1-5: Performed by: COLON & RECTAL SURGERY

## 2018-07-25 PROCEDURE — 96375 TX/PRO/DX INJ NEW DRUG ADDON: CPT | Mod: XU

## 2018-07-25 PROCEDURE — 500521 HCHG ENDOSTITCH LOAD UNIT: Performed by: COLON & RECTAL SURGERY

## 2018-07-25 PROCEDURE — 700102 HCHG RX REV CODE 250 W/ 637 OVERRIDE(OP): Performed by: NURSE PRACTITIONER

## 2018-07-25 PROCEDURE — 501570 HCHG TROCAR, SEPARATOR: Performed by: COLON & RECTAL SURGERY

## 2018-07-25 PROCEDURE — 96374 THER/PROPH/DIAG INJ IV PUSH: CPT | Mod: XU

## 2018-07-25 PROCEDURE — A9270 NON-COVERED ITEM OR SERVICE: HCPCS | Performed by: NURSE PRACTITIONER

## 2018-07-25 PROCEDURE — 160029 HCHG SURGERY MINUTES - 1ST 30 MINS LEVEL 4: Performed by: COLON & RECTAL SURGERY

## 2018-07-25 PROCEDURE — 501571 HCHG TROCAR, SEPARATOR 12X100: Performed by: COLON & RECTAL SURGERY

## 2018-07-25 PROCEDURE — 160035 HCHG PACU - 1ST 60 MINS PHASE I: Performed by: COLON & RECTAL SURGERY

## 2018-07-25 PROCEDURE — 700111 HCHG RX REV CODE 636 W/ 250 OVERRIDE (IP)

## 2018-07-25 PROCEDURE — 700105 HCHG RX REV CODE 258: Performed by: NURSE PRACTITIONER

## 2018-07-25 PROCEDURE — 160041 HCHG SURGERY MINUTES - EA ADDL 1 MIN LEVEL 4: Performed by: COLON & RECTAL SURGERY

## 2018-07-25 PROCEDURE — 500522 HCHG ENDOSTITCH SUTURING DEVICE: Performed by: COLON & RECTAL SURGERY

## 2018-07-25 PROCEDURE — 96376 TX/PRO/DX INJ SAME DRUG ADON: CPT | Mod: XU

## 2018-07-25 PROCEDURE — G0378 HOSPITAL OBSERVATION PER HR: HCPCS

## 2018-07-25 PROCEDURE — 700111 HCHG RX REV CODE 636 W/ 250 OVERRIDE (IP): Mod: JG

## 2018-07-25 PROCEDURE — 501838 HCHG SUTURE GENERAL: Performed by: COLON & RECTAL SURGERY

## 2018-07-25 PROCEDURE — 160002 HCHG RECOVERY MINUTES (STAT): Performed by: COLON & RECTAL SURGERY

## 2018-07-25 PROCEDURE — 700111 HCHG RX REV CODE 636 W/ 250 OVERRIDE (IP): Performed by: NURSE PRACTITIONER

## 2018-07-25 PROCEDURE — 160036 HCHG PACU - EA ADDL 30 MINS PHASE I: Performed by: COLON & RECTAL SURGERY

## 2018-07-25 PROCEDURE — 502571 HCHG PACK, LAP CHOLE: Performed by: COLON & RECTAL SURGERY

## 2018-07-25 PROCEDURE — 160009 HCHG ANES TIME/MIN: Performed by: COLON & RECTAL SURGERY

## 2018-07-25 PROCEDURE — 501574 HCHG TROCAR, SMTH CAN&SEAL 5: Performed by: COLON & RECTAL SURGERY

## 2018-07-25 DEVICE — MATRIX PLUS SURGICAL MATRISTEM THICK 7CM X 10CM: Type: IMPLANTABLE DEVICE | Status: FUNCTIONAL

## 2018-07-25 RX ORDER — DIPHENHYDRAMINE HYDROCHLORIDE 50 MG/ML
25 INJECTION INTRAMUSCULAR; INTRAVENOUS EVERY 6 HOURS PRN
Status: DISCONTINUED | OUTPATIENT
Start: 2018-07-25 | End: 2018-07-27 | Stop reason: HOSPADM

## 2018-07-25 RX ORDER — BUPIVACAINE HYDROCHLORIDE AND EPINEPHRINE 5; 5 MG/ML; UG/ML
INJECTION, SOLUTION EPIDURAL; INTRACAUDAL; PERINEURAL
Status: DISCONTINUED | OUTPATIENT
Start: 2018-07-25 | End: 2018-07-25 | Stop reason: HOSPADM

## 2018-07-25 RX ORDER — PROMETHAZINE HYDROCHLORIDE 25 MG/1
12.5 SUPPOSITORY RECTAL EVERY 6 HOURS PRN
Status: DISCONTINUED | OUTPATIENT
Start: 2018-07-25 | End: 2018-07-27 | Stop reason: HOSPADM

## 2018-07-25 RX ORDER — SCOLOPAMINE TRANSDERMAL SYSTEM 1 MG/1
1 PATCH, EXTENDED RELEASE TRANSDERMAL
Status: DISCONTINUED | OUTPATIENT
Start: 2018-07-25 | End: 2018-07-27 | Stop reason: HOSPADM

## 2018-07-25 RX ORDER — OXYCODONE HCL 5 MG/5 ML
10 SOLUTION, ORAL ORAL
Status: DISCONTINUED | OUTPATIENT
Start: 2018-07-25 | End: 2018-07-27 | Stop reason: HOSPADM

## 2018-07-25 RX ORDER — SODIUM CHLORIDE, SODIUM LACTATE, POTASSIUM CHLORIDE, CALCIUM CHLORIDE 600; 310; 30; 20 MG/100ML; MG/100ML; MG/100ML; MG/100ML
INJECTION, SOLUTION INTRAVENOUS CONTINUOUS
Status: DISCONTINUED | OUTPATIENT
Start: 2018-07-25 | End: 2018-07-27 | Stop reason: HOSPADM

## 2018-07-25 RX ORDER — SODIUM CHLORIDE, SODIUM LACTATE, POTASSIUM CHLORIDE, CALCIUM CHLORIDE 600; 310; 30; 20 MG/100ML; MG/100ML; MG/100ML; MG/100ML
INJECTION, SOLUTION INTRAVENOUS CONTINUOUS
Status: DISCONTINUED | OUTPATIENT
Start: 2018-07-25 | End: 2018-07-25

## 2018-07-25 RX ORDER — FUROSEMIDE 20 MG/1
20 TABLET ORAL DAILY
Status: DISCONTINUED | OUTPATIENT
Start: 2018-07-26 | End: 2018-07-27 | Stop reason: HOSPADM

## 2018-07-25 RX ORDER — HALOPERIDOL 5 MG/ML
1 INJECTION INTRAMUSCULAR EVERY 6 HOURS PRN
Status: DISCONTINUED | OUTPATIENT
Start: 2018-07-25 | End: 2018-07-27 | Stop reason: HOSPADM

## 2018-07-25 RX ORDER — CALCIUM CARBONATE 500 MG/1
500 TABLET, CHEWABLE ORAL
Status: DISCONTINUED | OUTPATIENT
Start: 2018-07-25 | End: 2018-07-27 | Stop reason: HOSPADM

## 2018-07-25 RX ORDER — LIDOCAINE HYDROCHLORIDE 10 MG/ML
0.5 INJECTION, SOLUTION INFILTRATION; PERINEURAL
Status: ACTIVE | OUTPATIENT
Start: 2018-07-25 | End: 2018-07-26

## 2018-07-25 RX ORDER — ATORVASTATIN CALCIUM 20 MG/1
20 TABLET, FILM COATED ORAL NIGHTLY
Status: SHIPPED | COMMUNITY
End: 2018-12-06 | Stop reason: SDUPTHER

## 2018-07-25 RX ORDER — ONDANSETRON 2 MG/ML
4 INJECTION INTRAMUSCULAR; INTRAVENOUS
Status: DISCONTINUED | OUTPATIENT
Start: 2018-07-25 | End: 2018-07-27 | Stop reason: HOSPADM

## 2018-07-25 RX ORDER — BACITRACIN 50000 [IU]/1
INJECTION, POWDER, FOR SOLUTION INTRAMUSCULAR
Status: DISCONTINUED | OUTPATIENT
Start: 2018-07-25 | End: 2018-07-25 | Stop reason: HOSPADM

## 2018-07-25 RX ORDER — ONDANSETRON 2 MG/ML
INJECTION INTRAMUSCULAR; INTRAVENOUS
Status: COMPLETED
Start: 2018-07-25 | End: 2018-07-25

## 2018-07-25 RX ORDER — OXYCODONE HCL 5 MG/5 ML
5 SOLUTION, ORAL ORAL
Status: DISCONTINUED | OUTPATIENT
Start: 2018-07-25 | End: 2018-07-27 | Stop reason: HOSPADM

## 2018-07-25 RX ORDER — KETOROLAC TROMETHAMINE 30 MG/ML
INJECTION, SOLUTION INTRAMUSCULAR; INTRAVENOUS
Status: COMPLETED
Start: 2018-07-25 | End: 2018-07-25

## 2018-07-25 RX ORDER — ENALAPRILAT 1.25 MG/ML
1.25 INJECTION INTRAVENOUS EVERY 6 HOURS PRN
Status: DISCONTINUED | OUTPATIENT
Start: 2018-07-25 | End: 2018-07-27 | Stop reason: HOSPADM

## 2018-07-25 RX ORDER — LIDOCAINE HYDROCHLORIDE 10 MG/ML
INJECTION, SOLUTION INFILTRATION; PERINEURAL
Status: COMPLETED
Start: 2018-07-25 | End: 2018-07-25

## 2018-07-25 RX ORDER — ACETAMINOPHEN 500 MG
1000 TABLET ORAL EVERY 6 HOURS
Status: DISCONTINUED | OUTPATIENT
Start: 2018-07-25 | End: 2018-07-27 | Stop reason: HOSPADM

## 2018-07-25 RX ORDER — HYDROXYZINE HYDROCHLORIDE 25 MG/1
25 TABLET, FILM COATED ORAL EVERY 6 HOURS PRN
Status: DISCONTINUED | OUTPATIENT
Start: 2018-07-25 | End: 2018-07-27 | Stop reason: HOSPADM

## 2018-07-25 RX ORDER — OMEPRAZOLE 20 MG/1
20 CAPSULE, DELAYED RELEASE ORAL DAILY
Status: DISCONTINUED | OUTPATIENT
Start: 2018-07-26 | End: 2018-07-27 | Stop reason: HOSPADM

## 2018-07-25 RX ORDER — CITALOPRAM 40 MG/1
40 TABLET ORAL DAILY
Status: DISCONTINUED | OUTPATIENT
Start: 2018-07-26 | End: 2018-07-27 | Stop reason: HOSPADM

## 2018-07-25 RX ADMIN — FENTANYL CITRATE 50 MCG: 50 INJECTION, SOLUTION INTRAMUSCULAR; INTRAVENOUS at 11:27

## 2018-07-25 RX ADMIN — HYDROMORPHONE HYDROCHLORIDE 0.5 MG: 10 INJECTION, SOLUTION INTRAMUSCULAR; INTRAVENOUS; SUBCUTANEOUS at 11:54

## 2018-07-25 RX ADMIN — HYDROMORPHONE HYDROCHLORIDE 0.5 MG: 10 INJECTION, SOLUTION INTRAMUSCULAR; INTRAVENOUS; SUBCUTANEOUS at 11:40

## 2018-07-25 RX ADMIN — FENTANYL CITRATE 50 MCG: 50 INJECTION, SOLUTION INTRAMUSCULAR; INTRAVENOUS at 11:15

## 2018-07-25 RX ADMIN — SODIUM CHLORIDE, SODIUM LACTATE, POTASSIUM CHLORIDE, CALCIUM CHLORIDE: 600; 310; 30; 20 INJECTION, SOLUTION INTRAVENOUS at 08:30

## 2018-07-25 RX ADMIN — SODIUM CHLORIDE, POTASSIUM CHLORIDE, SODIUM LACTATE AND CALCIUM CHLORIDE: 600; 310; 30; 20 INJECTION, SOLUTION INTRAVENOUS at 17:53

## 2018-07-25 RX ADMIN — HYDROMORPHONE HYDROCHLORIDE 0.5 MG: 10 INJECTION, SOLUTION INTRAMUSCULAR; INTRAVENOUS; SUBCUTANEOUS at 13:47

## 2018-07-25 RX ADMIN — OXYCODONE HYDROCHLORIDE 5 MG: 5 SOLUTION ORAL at 17:52

## 2018-07-25 RX ADMIN — ONDANSETRON HYDROCHLORIDE 4 MG: 2 INJECTION, SOLUTION INTRAMUSCULAR; INTRAVENOUS at 22:25

## 2018-07-25 RX ADMIN — ONDANSETRON HYDROCHLORIDE 4 MG: 2 INJECTION, SOLUTION INTRAMUSCULAR; INTRAVENOUS at 17:53

## 2018-07-25 RX ADMIN — LIDOCAINE HYDROCHLORIDE 0.5 ML: 10 INJECTION, SOLUTION INFILTRATION; PERINEURAL at 08:15

## 2018-07-25 RX ADMIN — ONDANSETRON 4 MG: 2 INJECTION INTRAMUSCULAR; INTRAVENOUS at 11:16

## 2018-07-25 RX ADMIN — OXYCODONE HYDROCHLORIDE 5 MG: 5 SOLUTION ORAL at 22:30

## 2018-07-25 RX ADMIN — HYDROMORPHONE HYDROCHLORIDE 0.5 MG: 10 INJECTION, SOLUTION INTRAMUSCULAR; INTRAVENOUS; SUBCUTANEOUS at 12:18

## 2018-07-25 RX ADMIN — KETOROLAC TROMETHAMINE 15 MG: 30 INJECTION, SOLUTION INTRAMUSCULAR at 11:13

## 2018-07-25 RX ADMIN — FAMOTIDINE 20 MG: 10 INJECTION, SOLUTION INTRAVENOUS at 17:52

## 2018-07-25 RX ADMIN — HYDROMORPHONE HYDROCHLORIDE 0.5 MG: 10 INJECTION, SOLUTION INTRAMUSCULAR; INTRAVENOUS; SUBCUTANEOUS at 15:24

## 2018-07-25 RX ADMIN — ACETAMINOPHEN 1000 MG: 500 TABLET, FILM COATED ORAL at 17:53

## 2018-07-25 ASSESSMENT — COGNITIVE AND FUNCTIONAL STATUS - GENERAL
DRESSING REGULAR LOWER BODY CLOTHING: A LITTLE
MOBILITY SCORE: 24
SUGGESTED CMS G CODE MODIFIER MOBILITY: CH
HELP NEEDED FOR BATHING: A LITTLE
DAILY ACTIVITIY SCORE: 22
SUGGESTED CMS G CODE MODIFIER DAILY ACTIVITY: CJ

## 2018-07-25 ASSESSMENT — COPD QUESTIONNAIRES
DO YOU EVER COUGH UP ANY MUCUS OR PHLEGM?: NO/ONLY WITH OCCASIONAL COLDS OR INFECTIONS
COPD SCREENING SCORE: 4
HAVE YOU SMOKED AT LEAST 100 CIGARETTES IN YOUR ENTIRE LIFE: YES
DURING THE PAST 4 WEEKS HOW MUCH DID YOU FEEL SHORT OF BREATH: NONE/LITTLE OF THE TIME

## 2018-07-25 ASSESSMENT — PAIN SCALES - GENERAL
PAINLEVEL_OUTOF10: 7
PAINLEVEL_OUTOF10: 5
PAINLEVEL_OUTOF10: 1
PAINLEVEL_OUTOF10: 6
PAINLEVEL_OUTOF10: 10
PAINLEVEL_OUTOF10: 5
PAINLEVEL_OUTOF10: 5
PAINLEVEL_OUTOF10: 9
PAINLEVEL_OUTOF10: 6
PAINLEVEL_OUTOF10: 5
PAINLEVEL_OUTOF10: 2
PAINLEVEL_OUTOF10: 5
PAINLEVEL_OUTOF10: 9
PAINLEVEL_OUTOF10: 7
PAINLEVEL_OUTOF10: 5

## 2018-07-25 ASSESSMENT — LIFESTYLE VARIABLES
ALCOHOL_USE: NO
EVER_SMOKED: YES

## 2018-07-25 ASSESSMENT — PATIENT HEALTH QUESTIONNAIRE - PHQ9
2. FEELING DOWN, DEPRESSED, IRRITABLE, OR HOPELESS: NOT AT ALL
1. LITTLE INTEREST OR PLEASURE IN DOING THINGS: NOT AT ALL
SUM OF ALL RESPONSES TO PHQ9 QUESTIONS 1 AND 2: 0

## 2018-07-25 NOTE — OR NURSING
Pt remains stable in PACU. Reports improvement in discomfort at this time. Reports some referred pain to L shoulder. Educated that this is normal for this type of surgery. Assisted to reposition on gurney for comfort. Nausea remains mild. VSS on 2L FiO2

## 2018-07-25 NOTE — OP REPORT
NAME:  Almaz Samuel  MRN:  [id number]  :  1955      DATE OF OPERATION: 2018    PREOPERATIVE DIAGNOSIS:  Recurrent paraesophageal hiatal hernia    POSTOPERATIVE DIAGNOSIS: Recurrent paraesophageal hiatal hernia     OPERATION PERFORMED:   1. 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   2. Recurrent Hiatal hernia repair with xenograft placement.   3. Laparoscopic Re-do Nissen fundoplication.    SURGEON: Kenji Okeefe MD    ASSISTANT:  Fabienne Blunt PA-C    ANESTHESIOLOGIST:  Prakash Child MD., MD    ANESTHESIA: General endotracheal anesthesia.     SPECIMEN: none    ESTIMATED BLOOD LOSS: <10cc.     INDICATIONS: The patient is a 63 y.o. female with a diagnosis of recurrent hiatal hernia with GERD symptoms. She comes today for surgical repair.  She is taken to the operating room today for Re-do Laparoscopic Nissen Fundoplication.    DETAILS OF PROCEDURE: After an extensive informed consent discussion process, the patient was brought to the operating room. She was placed in the supine position on the operating table. After induction of general anesthesia and placement of an endotracheal tube, the abdomen was prepped and draped in the usual sterile fashion. After administration of intravenous antibiotics, a bladeless optical entry trocar was carefully inserted into the abdomen. Pneumoperitoneum was established in the usual fashion. A bladeless 5 mm separator trocar was introduced. The laparoscope was introduced. Three additional separator trocars were placed in the upper abdomen and a 5 mm epigastric Delia-type liver retractor was placed to elevate the left lateral segment of the liver,   it was secured to the patient's right side with a robot arm.     Adhesiolysis was carfeully performed with scissors. Gradually we were able to expose the hiataus and the wrap. The prior defect had been very large, and the recurrence has occurred anteriorly with evidence of good  Ethibond sutures that had remained in place as the tissues pulled apart. The posterio repair remained intact with a largely intact wrap. Careful examination demonstrated a 4cm anterior recurrent hiatal hernia. The table was placed in reverse Trendelenburg position and gradually the stomach and omentum were reduced. The attachments were slowly divided with the LigaSure device and the hernia sac was gradually freed allowing us to fully reduce the stomach. There was no esophageal shortening. A 42-Vietnamese blunt tipped bougie was passed down well into the stomach. The right jimmy especially exhibited stiffness and poor quality myofascial tissue for the wrap. The left and   right crura were well exposed circumferentially. A series of anterior crural approximating sutures were placed, and a releasing incision was made to the right of the right jimmy to allow some mobility to enable approximation.      A 6-layer Gentrix biological graft was soaked in saline solution and then fashioned with a keyhole cut, so that it would reside nicely around the gastroesophageal junction. It was laparoscopically placed and maneuvered into position, so as to help reinforce the crural closure. The graft was sutured to the anterior crural closure with Polysorb Endo stitches with the rough regenerative side facing toward the muscle closure and the smooth side facing toward the gastric serosa, and then brought down around laterally and approximately 270 degrees wrap around the esophagus, sutured to the posterolateral crura. . The greater curvature attachments and all the short gastric vessels were all confirmed to have been previously  divided with the LigaSure device. We had nice mobility of the greater curve of the stomach, which was well vascularized and still intact and adherent  posterior to the gastroesophageal junction region. At this time, the fundoplication was now reinforced. A very loose floppy Nissen fundoplication was created over the  bougie and loose seromuscular sutures were used to approximate it anteriorly as well as some anchoring sutures from the wrap to the graft and crura.     After final inspections demonstrated a nice result with excellent hemostasis and floppy comfortable wrap, the bougie was removed. The liver retractor was then removed. The pneumoperitoneum was allowed to escape. The ports were removed under direct vision. The port sites were irrigated and closed with Vicryl sutures. Steri-Strips and sterile dressings were applied.    The patient tolerated the procedure well and there were no apparent complications. All sponge, needle, and instrument counts were correct on 2 separate occasions. She was awakened, extubated, and transferred to the recovery room in satisfactory condition.       ____________________________________   Kenji Okeefe MD  DD: 7/25/2018  10:55 AM    CC:  [facility];

## 2018-07-25 NOTE — OR NURSING
Pt to recovery, sleeping, rouses to speech. Medicated per MAR for reported pain and nausea. Incision sites x 5 to upper abdomen with steri-strips and band-aids in place, scant bloody drainage present on arrival to PACU. Cold pack applied. VSS. Pt's  updated by telephone to pt condition and POC.

## 2018-07-26 LAB
ALBUMIN SERPL BCP-MCNC: 4 G/DL (ref 3.2–4.9)
ALBUMIN SERPL BCP-MCNC: 4.3 G/DL (ref 3.2–4.9)
ALBUMIN/GLOB SERPL: 1.7 G/DL
ALBUMIN/GLOB SERPL: 1.7 G/DL
ALP SERPL-CCNC: 62 U/L (ref 30–99)
ALP SERPL-CCNC: 63 U/L (ref 30–99)
ALT SERPL-CCNC: 106 U/L (ref 2–50)
ALT SERPL-CCNC: 85 U/L (ref 2–50)
ANION GAP SERPL CALC-SCNC: 7 MMOL/L (ref 0–11.9)
ANION GAP SERPL CALC-SCNC: 8 MMOL/L (ref 0–11.9)
AST SERPL-CCNC: 63 U/L (ref 12–45)
AST SERPL-CCNC: 90 U/L (ref 12–45)
BILIRUB SERPL-MCNC: 0.8 MG/DL (ref 0.1–1.5)
BILIRUB SERPL-MCNC: 0.9 MG/DL (ref 0.1–1.5)
BUN SERPL-MCNC: 10 MG/DL (ref 8–22)
BUN SERPL-MCNC: 12 MG/DL (ref 8–22)
CALCIUM SERPL-MCNC: 8.8 MG/DL (ref 8.5–10.5)
CALCIUM SERPL-MCNC: 9 MG/DL (ref 8.5–10.5)
CHLORIDE SERPL-SCNC: 101 MMOL/L (ref 96–112)
CHLORIDE SERPL-SCNC: 103 MMOL/L (ref 96–112)
CO2 SERPL-SCNC: 24 MMOL/L (ref 20–33)
CO2 SERPL-SCNC: 27 MMOL/L (ref 20–33)
CREAT SERPL-MCNC: 0.81 MG/DL (ref 0.5–1.4)
CREAT SERPL-MCNC: 0.83 MG/DL (ref 0.5–1.4)
ERYTHROCYTE [DISTWIDTH] IN BLOOD BY AUTOMATED COUNT: 46.7 FL (ref 35.9–50)
GLOBULIN SER CALC-MCNC: 2.4 G/DL (ref 1.9–3.5)
GLOBULIN SER CALC-MCNC: 2.5 G/DL (ref 1.9–3.5)
GLUCOSE SERPL-MCNC: 101 MG/DL (ref 65–99)
GLUCOSE SERPL-MCNC: 97 MG/DL (ref 65–99)
HCT VFR BLD AUTO: 40.7 % (ref 37–47)
HGB BLD-MCNC: 13.6 G/DL (ref 12–16)
MCH RBC QN AUTO: 31.9 PG (ref 27–33)
MCHC RBC AUTO-ENTMCNC: 33.4 G/DL (ref 33.6–35)
MCV RBC AUTO: 95.5 FL (ref 81.4–97.8)
PLATELET # BLD AUTO: 301 K/UL (ref 164–446)
PMV BLD AUTO: 9 FL (ref 9–12.9)
POTASSIUM SERPL-SCNC: 3.6 MMOL/L (ref 3.6–5.5)
POTASSIUM SERPL-SCNC: 4.2 MMOL/L (ref 3.6–5.5)
PROT SERPL-MCNC: 6.4 G/DL (ref 6–8.2)
PROT SERPL-MCNC: 6.8 G/DL (ref 6–8.2)
RBC # BLD AUTO: 4.26 M/UL (ref 4.2–5.4)
SODIUM SERPL-SCNC: 135 MMOL/L (ref 135–145)
SODIUM SERPL-SCNC: 135 MMOL/L (ref 135–145)
WBC # BLD AUTO: 9.2 K/UL (ref 4.8–10.8)

## 2018-07-26 PROCEDURE — 700102 HCHG RX REV CODE 250 W/ 637 OVERRIDE(OP): Performed by: PHYSICIAN ASSISTANT

## 2018-07-26 PROCEDURE — 96372 THER/PROPH/DIAG INJ SC/IM: CPT

## 2018-07-26 PROCEDURE — 36415 COLL VENOUS BLD VENIPUNCTURE: CPT

## 2018-07-26 PROCEDURE — 85027 COMPLETE CBC AUTOMATED: CPT

## 2018-07-26 PROCEDURE — 96375 TX/PRO/DX INJ NEW DRUG ADDON: CPT

## 2018-07-26 PROCEDURE — A9270 NON-COVERED ITEM OR SERVICE: HCPCS | Performed by: NURSE PRACTITIONER

## 2018-07-26 PROCEDURE — 80053 COMPREHEN METABOLIC PANEL: CPT | Mod: 91

## 2018-07-26 PROCEDURE — 96374 THER/PROPH/DIAG INJ IV PUSH: CPT

## 2018-07-26 PROCEDURE — 700105 HCHG RX REV CODE 258: Performed by: NURSE PRACTITIONER

## 2018-07-26 PROCEDURE — 96376 TX/PRO/DX INJ SAME DRUG ADON: CPT

## 2018-07-26 PROCEDURE — G0378 HOSPITAL OBSERVATION PER HR: HCPCS

## 2018-07-26 PROCEDURE — 700102 HCHG RX REV CODE 250 W/ 637 OVERRIDE(OP): Performed by: NURSE PRACTITIONER

## 2018-07-26 PROCEDURE — 700111 HCHG RX REV CODE 636 W/ 250 OVERRIDE (IP): Performed by: NURSE PRACTITIONER

## 2018-07-26 PROCEDURE — A9270 NON-COVERED ITEM OR SERVICE: HCPCS | Performed by: PHYSICIAN ASSISTANT

## 2018-07-26 RX ORDER — ONDANSETRON 4 MG/1
4 TABLET, ORALLY DISINTEGRATING ORAL EVERY 6 HOURS PRN
Qty: 10 TAB | Refills: 0 | Status: SHIPPED | OUTPATIENT
Start: 2018-07-26 | End: 2020-12-03

## 2018-07-26 RX ORDER — OXYCODONE HYDROCHLORIDE 5 MG/1
5 TABLET ORAL EVERY 4 HOURS PRN
Qty: 20 TAB | Refills: 0 | Status: SHIPPED | OUTPATIENT
Start: 2018-07-26 | End: 2018-07-29

## 2018-07-26 RX ORDER — LORAZEPAM 1 MG/1
1 TABLET ORAL
Status: DISCONTINUED | OUTPATIENT
Start: 2018-07-26 | End: 2018-07-26

## 2018-07-26 RX ORDER — LORAZEPAM 1 MG/1
1 TABLET ORAL
Status: DISCONTINUED | OUTPATIENT
Start: 2018-07-26 | End: 2018-07-27 | Stop reason: HOSPADM

## 2018-07-26 RX ADMIN — ONDANSETRON HYDROCHLORIDE 4 MG: 2 INJECTION, SOLUTION INTRAMUSCULAR; INTRAVENOUS at 21:56

## 2018-07-26 RX ADMIN — CITALOPRAM HYDROBROMIDE 40 MG: 40 TABLET ORAL at 05:30

## 2018-07-26 RX ADMIN — ACETAMINOPHEN 1000 MG: 500 TABLET, FILM COATED ORAL at 15:04

## 2018-07-26 RX ADMIN — SODIUM CHLORIDE, POTASSIUM CHLORIDE, SODIUM LACTATE AND CALCIUM CHLORIDE: 600; 310; 30; 20 INJECTION, SOLUTION INTRAVENOUS at 02:55

## 2018-07-26 RX ADMIN — ONDANSETRON HYDROCHLORIDE 4 MG: 2 INJECTION, SOLUTION INTRAMUSCULAR; INTRAVENOUS at 05:31

## 2018-07-26 RX ADMIN — FUROSEMIDE 20 MG: 20 TABLET ORAL at 05:30

## 2018-07-26 RX ADMIN — ONDANSETRON HYDROCHLORIDE 4 MG: 2 INJECTION, SOLUTION INTRAMUSCULAR; INTRAVENOUS at 15:00

## 2018-07-26 RX ADMIN — ONDANSETRON HYDROCHLORIDE 4 MG: 2 INJECTION, SOLUTION INTRAMUSCULAR; INTRAVENOUS at 08:27

## 2018-07-26 RX ADMIN — ENOXAPARIN SODIUM 30 MG: 100 INJECTION SUBCUTANEOUS at 15:00

## 2018-07-26 RX ADMIN — FAMOTIDINE 20 MG: 10 INJECTION, SOLUTION INTRAVENOUS at 17:46

## 2018-07-26 RX ADMIN — OXYCODONE HYDROCHLORIDE 5 MG: 5 SOLUTION ORAL at 02:55

## 2018-07-26 RX ADMIN — ONDANSETRON HYDROCHLORIDE 4 MG: 2 INJECTION, SOLUTION INTRAMUSCULAR; INTRAVENOUS at 12:15

## 2018-07-26 RX ADMIN — PROMETHAZINE HYDROCHLORIDE 12.5 MG: 25 SUPPOSITORY RECTAL at 18:39

## 2018-07-26 RX ADMIN — SODIUM CHLORIDE, POTASSIUM CHLORIDE, SODIUM LACTATE AND CALCIUM CHLORIDE: 600; 310; 30; 20 INJECTION, SOLUTION INTRAVENOUS at 12:17

## 2018-07-26 RX ADMIN — OMEPRAZOLE 20 MG: 20 CAPSULE, DELAYED RELEASE ORAL at 05:29

## 2018-07-26 RX ADMIN — ONDANSETRON HYDROCHLORIDE 4 MG: 2 INJECTION, SOLUTION INTRAMUSCULAR; INTRAVENOUS at 17:47

## 2018-07-26 RX ADMIN — ENOXAPARIN SODIUM 30 MG: 100 INJECTION SUBCUTANEOUS at 05:29

## 2018-07-26 RX ADMIN — ACETAMINOPHEN 1000 MG: 500 TABLET, FILM COATED ORAL at 05:30

## 2018-07-26 RX ADMIN — ACETAMINOPHEN 1000 MG: 500 TABLET, FILM COATED ORAL at 23:43

## 2018-07-26 RX ADMIN — FAMOTIDINE 20 MG: 10 INJECTION, SOLUTION INTRAVENOUS at 05:29

## 2018-07-26 RX ADMIN — LORAZEPAM 1 MG: 1 TABLET ORAL at 23:43

## 2018-07-26 RX ADMIN — SODIUM CHLORIDE, POTASSIUM CHLORIDE, SODIUM LACTATE AND CALCIUM CHLORIDE: 600; 310; 30; 20 INJECTION, SOLUTION INTRAVENOUS at 21:57

## 2018-07-26 RX ADMIN — ACETAMINOPHEN 1000 MG: 500 TABLET, FILM COATED ORAL at 00:08

## 2018-07-26 ASSESSMENT — PAIN SCALES - GENERAL
PAINLEVEL_OUTOF10: 0
PAINLEVEL_OUTOF10: 2
PAINLEVEL_OUTOF10: 2
PAINLEVEL_OUTOF10: 3
PAINLEVEL_OUTOF10: 4
PAINLEVEL_OUTOF10: 1
PAINLEVEL_OUTOF10: 6
PAINLEVEL_OUTOF10: 2

## 2018-07-26 ASSESSMENT — ENCOUNTER SYMPTOMS
HEARTBURN: 0
FEVER: 0
PALPITATIONS: 0
CHILLS: 0
SHORTNESS OF BREATH: 0
VOMITING: 0
ABDOMINAL PAIN: 1
NAUSEA: 1
DIARRHEA: 0
COUGH: 0

## 2018-07-26 NOTE — PROGRESS NOTES
Unable to wean pt off O2. Encouraging IS use. Pt continuing to report nausea. Pt requesting to stay another night. Will page Dr. Okeefe to update.

## 2018-07-26 NOTE — PROGRESS NOTES
Assumed care of patient at 1900    Pt is A&O 4  Pain at comfort level  denies nausea  Tolerating Clear Liquid Diet  Abdominal lap sites with steris and bandaids, CDI  Voiding without difficulty  + flatus  Up with 1 assist  SCD's on  Reviewed plan of care with patient, bed in lowest position and locked, pt resting comfortably now, call light within reach, all needs met at this time

## 2018-07-26 NOTE — DISCHARGE INSTRUCTIONS
Discharge Instructions    Discharged to home by car with relative. Discharged via wheelchair, hospital escort: Yes.  Special equipment needed: Not Applicable    Be sure to schedule a follow-up appointment with your primary care doctor or any specialists as instructed.     Discharge Plan:   Pneumococcal Vaccine Administered/Refused: Not given - Patient refused pneumococcal vaccine  Influenza Vaccine Indication: Patient Refuses    I understand that a diet low in cholesterol, fat, and sodium is recommended for good health. Unless I have been given specific instructions below for another diet, I accept this instruction as my diet prescription.   Other diet: As tolerated      Special Instructions:   Post-Nissen Discharge Instructions:     Please schedule an appointment to be seen in Dr. Okeefe's office in 2 weeks after surgery. It is crucial that you come in for this visit.     Call [phone] for an appointment.     Diet:     Day 1-2: Clear Liquids     These are liquids that are transparent. Examples are broth, water, clear juices like apple and grape, low-calorie, non-carbonated dfrinks such as Crystal Light, warm or cold decaf tea, sugar free popsicles or jello, and clear protein drinks such as Isopure (found at [facility] and other retail stores). Sip small amounts throughout the day, aim for 48 oz. Do not attempt to swallow more than a teaspoon at a time. Avoid carbonated beverages and sodas.     Day 3-7: Full Liquids     Full liquids can pour off a spoon and contain no lumps or food pieces. Examples are all the drinks from the clear liquid list, meal replacement of protein shakes (iMetabolic Perfect Meal), milk, tomato juice, mashed potatoes, thinned with milk or broth, cream soups withouht lumps, yogurt, thinned to pour off a spoon, warm cereal such as cream of wheat, thinned to pour off a spoon. Drink water as well as full liquids for optimial hydration, sip small amounts throughout the day. Do not attempt to swallow  more than a teaspoon at a time.     Next 2-3 weeks: Soft Foods     Soft foods are just that - soft. They should be easy to chew and easy to mash with a fork. You may need to grind, blend, peel or finely chop foods to achieve a soft texture. Chew well. Examples are cottage cheese, soft cheese, refried and other soft cooked beans, white flaky fish, eggs and egg substitutes, oatmeal and cream of wheat,m ground lean meats, potato without skin, soft cooked vegetables, unsweetened canned fruit, soft fresh fruit such as bananas, and whole grain crackers. Start with very soft foods and progress slowly, chewing your food very well. Eat 5-6 small meals a day, no more than one cup of food total each meal.     Week 4 and beyond:     Now you should be resuming a regular diet. Remember to chew your food well and don't introduce more than one or two new foods into your diet per day. You may have difficulty or inability to belch after surgery. This is a normal consequence of the procedure. To decrease symptoms, avoid carbonated beverages and sodas.     Activity:   In general, you may resume normal activity including sports and sex as soon as you are up to it. A few activities that suddenly increase pressure in the abdominal cavity (e.g., popping wheelies on bikes, abdominal crunches) should be avoided for 6 weeks after surgery. You should restrict heavy lifting for 6 weeks (over 15 lbs.). A bloated sensation is common and loose clothes are needed for a few days or week.       Chest and Shoulder Pains:   Sometimes patients will experience shoulder pain, or deep pain in the chest after surgery. This is due in part to the gas used at laparoscopy, but more so to the sutures placed in the diaphragm muscle; and should gradually resolve.     If Food Sticks:   It is not uncommon for patients to experience food sticking -sometimes the only thing you feel is severe pain on swallowing - for a while after surgery. When this happens the best  things to do are to stand up, to walkaround slowly, and to try sipping some lukewarm water. Generally these pains will pass within 10-15 minutes; if they persist longer you should call Dr. Okeefe's office.     Medications:   You have been given a prescription for a pain medication. If you don't need as much pain medicine, you can take two extra strength Tylenol every 6 hours. Drink plenty of liquids. If you don't have a bowel movement for two or three days take Metamucil (one tablespoon in 16 oz. water or juice twice a day), or Mineral Oil (2 tablespoons by mouth twice a day), or Milk of Magnesia (1-2 tablespoons once a day). You may resume other medications you were on prior to surgery. Continue any heartburn medication: Prilosec, Prevacid, Axid, Pepcid, Tagamet, Zantac.       Incisions:   Remove the gauze covered with tape 48 hours after surgery. Leave on the small strips of tape (steri-strips). They will fall of in 5-7 days. You may shower. Some swelling and a lump under the incision will develop and is part of the natural healing process; you needn't be alarmed unless there is drainage more than a Band-Aid will handle. Bruising may occur here too. Do not soak in a bathtub or swimming pool for 2 weeks. After 48 hours it is not necessary to keep your incision covered unless it makes you more comfortable.     Call for:     Call if you have (1) Fevers to more than 101.50 F, (2) Unusual chest or leg pain, (3) Drainage or fluid from incision that may be foul smelling, increased tenderness or soreness at the wound or the wound edges are no longer together, redness or swelling at the incision site. Please do not hesitate to call with any other questions.     Office address:   [address] [place] [address], [address], [place] [address]     [facility]   [phone]    · Is patient discharged on Warfarin / Coumadin?   No     Depression / Suicide Risk    As you are discharged from this [facility],  it is important to learn how to keep safe from harming yourself.    Recognize the warning signs:  · Abrupt changes in personality, positive or negative- including increase in energy   · Giving away possessions  · Change in eating patterns- significant weight changes-  positive or negative  · Change in sleeping patterns- unable to sleep or sleeping all the time   · Unwillingness or inability to communicate  · Depression  · Unusual sadness, discouragement and loneliness  · Talk of wanting to die  · Neglect of personal appearance   · Rebelliousness- reckless behavior  · Withdrawal from people/activities they love  · Confusion- inability to concentrate     If you or a loved one observes any of these behaviors or has concerns about self-harm, here's what you can do:  · Talk about it- your feelings and reasons for harming yourself  · Remove any means that you might use to hurt yourself (examples: pills, rope, extension cords, firearm)  · Get professional help from the community (Mental Health, Substance Abuse, psychological counseling)  · Do not be alone:Call your Safe Contact- someone whom you trust who will be there for you.  · Call your local CRISIS HOTLINE 450-1649 or 497-765-4564  · Call your local Children's Mobile Crisis Response Team [place] (491) 035-3242 or www.Springr  · Call the toll free National Suicide Prevention Hotlines   · National Suicide Prevention Lifeline 184-702-XZZW (1556)  · National Hope Line Network 800-SUICIDE (322-7764)

## 2018-07-26 NOTE — PROGRESS NOTES
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 Pt lying in bed this morning. She admits to nausea after her last dose of pain meds early this morning. She is sipping on clears, no vomiting. Admits to incisional abdominal pain, controlled with medications. She is ambulating some. She is voiding.     Review of Systems   Constitutional: Negative for chills and fever.   Respiratory: Negative for cough and shortness of breath.    Cardiovascular: Negative for chest pain and palpitations.   Gastrointestinal: Positive for abdominal pain (incisional) and nausea (little earlier this morning). Negative for diarrhea, heartburn and vomiting.   Genitourinary: Negative for dysuria.     Hemodynamics:  Temp (24hrs), Av.5 °C (97.7 °F), Min:36.2 °C (97.1 °F), Max:36.9 °C (98.5 °F)  Temperature: 36.3 °C (97.3 °F)  Pulse  Av.8  Min: 61  Max: 80Heart Rate (Monitored): 69  Blood Pressure : 104/78, NIBP: 129/81     Respiratory:    Respiration: 18, Pulse Oximetry: 97 %, O2 Daily Delivery Respiratory : Silicone Nasal Cannula     Work Of Breathing / Effort: Mild  RUL Breath Sounds: Clear, RML Breath Sounds: Clear, RLL Breath Sounds: Diminished, JT Breath Sounds: Clear, LLL Breath Sounds: Diminished  Neuro:  GCS       Fluids:    Intake/Output Summary (Last 24 hours) at 18 0813  Last data filed at 18 0400   Gross per 24 hour   Intake             4400 ml   Output                0 ml   Net             4400 ml        Current Diet Order   Procedures   • Diet Order Clear Liquid     Physical Exam   Constitutional: She is oriented to person, place, and time. She appears well-developed and well-nourished. No distress.   HENT:   Head: Normocephalic and atraumatic.   Eyes: Conjunctivae  are normal.   Neck: Normal range of motion. Neck supple.   Cardiovascular: Normal rate and regular rhythm.    Pulmonary/Chest: Effort normal. No respiratory distress.   Abdominal: Soft. She exhibits distension (mild). She exhibits no mass. There is tenderness (incisional). There is no rebound and no guarding.   Musculoskeletal: Normal range of motion.   Neurological: She is alert and oriented to person, place, and time.   Skin: Skin is warm and dry. No rash noted. No erythema. No pallor.   Incisions with minimal serosanguinous ooze, dressings intact   Psychiatric: She has a normal mood and affect.     Labs:  Recent Results (from the past 24 hour(s))   CBC without Differential (blood)    Collection Time: 07/26/18  2:47 AM   Result Value Ref Range    WBC 9.2 4.8 - 10.8 K/uL    RBC 4.26 4.20 - 5.40 M/uL    Hemoglobin 13.6 12.0 - 16.0 g/dL    Hematocrit 40.7 37.0 - 47.0 %    MCV 95.5 81.4 - 97.8 fL    MCH 31.9 27.0 - 33.0 pg    MCHC 33.4 (L) 33.6 - 35.0 g/dL    RDW 46.7 35.9 - 50.0 fL    Platelet Count 301 164 - 446 K/uL    MPV 9.0 9.0 - 12.9 fL   Comp Metabolic Panel (CMP)    Collection Time: 07/26/18  2:47 AM   Result Value Ref Range    Sodium 135 135 - 145 mmol/L    Potassium 4.2 3.6 - 5.5 mmol/L    Chloride 101 96 - 112 mmol/L    Co2 27 20 - 33 mmol/L    Anion Gap 7.0 0.0 - 11.9    Glucose 101 (H) 65 - 99 mg/dL    Bun 12 8 - 22 mg/dL    Creatinine 0.83 0.50 - 1.40 mg/dL    Calcium 9.0 8.5 - 10.5 mg/dL    AST(SGOT) 90 (H) 12 - 45 U/L    ALT(SGPT) 106 (H) 2 - 50 U/L    Alkaline Phosphatase 63 30 - 99 U/L    Total Bilirubin 0.8 0.1 - 1.5 mg/dL    Albumin 4.3 3.2 - 4.9 g/dL    Total Protein 6.8 6.0 - 8.2 g/dL    Globulin 2.5 1.9 - 3.5 g/dL    A-G Ratio 1.7 g/dL   ESTIMATED GFR    Collection Time: 07/26/18  2:47 AM   Result Value Ref Range    GFR If African American >60 >60 mL/min/1.73 m 2    GFR If Non African American >60 >60 mL/min/1.73 m 2     Medical Decision Making, by Problem:  There are no active hospital  problems to display for this patient.    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 Pt is alert and oriented, NAD. Breathing unlabored. Tolerating PO.  Abdomen is soft, mildly distended, tender at incision sites. Incisions ok. VS stable. Labs reviewed. Encouraged ambulation and incentive spirometry.  Wean O2. Pt may need to stay another night for nausea, hypoxia, will see how the day progresses. Otherwise okay for discharge later this afternoon if tolerating PO with good control of nausea. Pt also seen and examined by Dr. Okeefe.      Quality Measures:  Quality-Core Measures   Reviewed items::  Labs reviewed  Lao catheter::  No Lao  DVT prophylaxis pharmacological::  Enoxaparin (Lovenox)  DVT prophylaxis - mechanical:  SCDs      Discussed patient condition with RN, Patient and Dr. Okeefe

## 2018-07-26 NOTE — CARE PLAN
Problem: Bowel/Gastric:  Goal: Normal bowel function is maintained or improved  Outcome: PROGRESSING AS EXPECTED  Pt on clear liquid diet. +nausea. Anti emetics given. Encouraging ambulation. Pt up ambulating unit. Other PRN anti emetics ordered in case zofran is ineffective.     Problem: Pain Management  Goal: Pain level will decrease to patient's comfort goal  Outcome: PROGRESSING AS EXPECTED  Pain well controlled. Ice pack provided and in use. Providing rest and quiet environment. PRN pain medication available.

## 2018-07-26 NOTE — PROGRESS NOTES
Pt A&o x 4  Declines pain at this time.  Abd lap stab x 5 with bandaids.   Hypoactive bowel sounds upon auscultation. +flatus. +void Pr reporting nausea. Pt medicated with antiemtic.   Pt on 1L O2. Attempted to wean off O2, pt sating 87% on RA. Encouraging use of  IS.  Pt on clear liquid diet. Stating she has no appetite at this time. Encouraging PO use.   IVF running at this time.  Anticipated d/c home when medically cleared.  POC discussed with pt. All needs addressed at this time.

## 2018-07-26 NOTE — CARE PLAN
Problem: Communication  Goal: The ability to communicate needs accurately and effectively will improve  Outcome: PROGRESSING AS EXPECTED  Reviewed plan of care with patient at the beginning of shift    Problem: Safety  Goal: Will remain free from injury  Outcome: PROGRESSING AS EXPECTED  Call light use reinforced. Bed in lowest position and locked. Frequent rounding in place.

## 2018-07-26 NOTE — PROGRESS NOTES
Report received from PACU RN    Assessment complete.  A&O x 4. Patient calls appropriately.  Patient ambualtes with standby assist.   Patient has 2/10 pain. Declines pain medication at this time  Denies N&V. Tolerating ice chips diet.  Surgical lap sites x5 on abdomen.  + void, - flatus  Patient denies SOB.    Review plan with of care with patient. Call light and personal belongings with in reach. Hourly rounding in place. All needs met at this time.

## 2018-07-26 NOTE — PROGRESS NOTES
Pt ambulated around zapata with [id number]. Stable back in bed w/ 1L NC. No complaints at this time.

## 2018-07-27 VITALS
SYSTOLIC BLOOD PRESSURE: 119 MMHG | HEART RATE: 75 BPM | WEIGHT: 166.01 LBS | OXYGEN SATURATION: 92 % | RESPIRATION RATE: 18 BRPM | DIASTOLIC BLOOD PRESSURE: 68 MMHG | BODY MASS INDEX: 33.47 KG/M2 | TEMPERATURE: 98.5 F | HEIGHT: 59 IN

## 2018-07-27 LAB
ALBUMIN SERPL BCP-MCNC: 4 G/DL (ref 3.2–4.9)
ALBUMIN/GLOB SERPL: 1.7 G/DL
ALP SERPL-CCNC: 58 U/L (ref 30–99)
ALT SERPL-CCNC: 59 U/L (ref 2–50)
ANION GAP SERPL CALC-SCNC: 7 MMOL/L (ref 0–11.9)
AST SERPL-CCNC: 33 U/L (ref 12–45)
BILIRUB SERPL-MCNC: 0.8 MG/DL (ref 0.1–1.5)
BUN SERPL-MCNC: 8 MG/DL (ref 8–22)
CALCIUM SERPL-MCNC: 8.8 MG/DL (ref 8.5–10.5)
CHLORIDE SERPL-SCNC: 102 MMOL/L (ref 96–112)
CO2 SERPL-SCNC: 31 MMOL/L (ref 20–33)
CREAT SERPL-MCNC: 0.76 MG/DL (ref 0.5–1.4)
ERYTHROCYTE [DISTWIDTH] IN BLOOD BY AUTOMATED COUNT: 44.6 FL (ref 35.9–50)
GLOBULIN SER CALC-MCNC: 2.4 G/DL (ref 1.9–3.5)
GLUCOSE SERPL-MCNC: 99 MG/DL (ref 65–99)
HCT VFR BLD AUTO: 34.3 % (ref 37–47)
HGB BLD-MCNC: 11.7 G/DL (ref 12–16)
MCH RBC QN AUTO: 32.1 PG (ref 27–33)
MCHC RBC AUTO-ENTMCNC: 34.1 G/DL (ref 33.6–35)
MCV RBC AUTO: 94 FL (ref 81.4–97.8)
PLATELET # BLD AUTO: 218 K/UL (ref 164–446)
PMV BLD AUTO: 9.1 FL (ref 9–12.9)
POTASSIUM SERPL-SCNC: 3.8 MMOL/L (ref 3.6–5.5)
PROT SERPL-MCNC: 6.4 G/DL (ref 6–8.2)
RBC # BLD AUTO: 3.65 M/UL (ref 4.2–5.4)
SODIUM SERPL-SCNC: 140 MMOL/L (ref 135–145)
WBC # BLD AUTO: 6.3 K/UL (ref 4.8–10.8)

## 2018-07-27 PROCEDURE — 96372 THER/PROPH/DIAG INJ SC/IM: CPT

## 2018-07-27 PROCEDURE — G0378 HOSPITAL OBSERVATION PER HR: HCPCS

## 2018-07-27 PROCEDURE — 80053 COMPREHEN METABOLIC PANEL: CPT

## 2018-07-27 PROCEDURE — 36415 COLL VENOUS BLD VENIPUNCTURE: CPT

## 2018-07-27 PROCEDURE — 96376 TX/PRO/DX INJ SAME DRUG ADON: CPT

## 2018-07-27 PROCEDURE — 700111 HCHG RX REV CODE 636 W/ 250 OVERRIDE (IP): Performed by: NURSE PRACTITIONER

## 2018-07-27 PROCEDURE — 700102 HCHG RX REV CODE 250 W/ 637 OVERRIDE(OP): Performed by: NURSE PRACTITIONER

## 2018-07-27 PROCEDURE — 700105 HCHG RX REV CODE 258: Performed by: NURSE PRACTITIONER

## 2018-07-27 PROCEDURE — 85027 COMPLETE CBC AUTOMATED: CPT

## 2018-07-27 PROCEDURE — A9270 NON-COVERED ITEM OR SERVICE: HCPCS | Performed by: NURSE PRACTITIONER

## 2018-07-27 RX ADMIN — ACETAMINOPHEN 1000 MG: 500 TABLET, FILM COATED ORAL at 12:33

## 2018-07-27 RX ADMIN — FAMOTIDINE 20 MG: 10 INJECTION, SOLUTION INTRAVENOUS at 05:24

## 2018-07-27 RX ADMIN — ACETAMINOPHEN 1000 MG: 500 TABLET, FILM COATED ORAL at 05:24

## 2018-07-27 RX ADMIN — ONDANSETRON HYDROCHLORIDE 4 MG: 2 INJECTION, SOLUTION INTRAMUSCULAR; INTRAVENOUS at 09:43

## 2018-07-27 RX ADMIN — SODIUM CHLORIDE, POTASSIUM CHLORIDE, SODIUM LACTATE AND CALCIUM CHLORIDE: 600; 310; 30; 20 INJECTION, SOLUTION INTRAVENOUS at 07:48

## 2018-07-27 RX ADMIN — OMEPRAZOLE 20 MG: 20 CAPSULE, DELAYED RELEASE ORAL at 05:24

## 2018-07-27 RX ADMIN — ONDANSETRON HYDROCHLORIDE 4 MG: 2 INJECTION, SOLUTION INTRAMUSCULAR; INTRAVENOUS at 05:24

## 2018-07-27 RX ADMIN — ENOXAPARIN SODIUM 30 MG: 100 INJECTION SUBCUTANEOUS at 05:24

## 2018-07-27 RX ADMIN — ONDANSETRON HYDROCHLORIDE 4 MG: 2 INJECTION, SOLUTION INTRAMUSCULAR; INTRAVENOUS at 12:33

## 2018-07-27 ASSESSMENT — ENCOUNTER SYMPTOMS
PALPITATIONS: 0
ABDOMINAL PAIN: 1
COUGH: 0
SHORTNESS OF BREATH: 0
VOMITING: 0
NAUSEA: 0
DIARRHEA: 0
HEARTBURN: 0
FEVER: 0
CHILLS: 0

## 2018-07-27 ASSESSMENT — PAIN SCALES - GENERAL
PAINLEVEL_OUTOF10: 2
PAINLEVEL_OUTOF10: 2

## 2018-07-27 NOTE — PROGRESS NOTES
O2 sats monitored on RA while ambulating and resting. Pt stat she is a normally a shallow breather.    At rest on RA: 92%  Ambulating on RA: 91%  Pt had desat x1 to 87% while ambulating. Encouraged deep breathing. O2 sats back up to 91%.

## 2018-07-27 NOTE — CARE PLAN
Problem: Communication  Goal: The ability to communicate needs accurately and effectively will improve  Outcome: PROGRESSING AS EXPECTED  Plan of care discussed at the beginning of shift    Problem: Safety  Goal: Will remain free from injury  Outcome: PROGRESSING AS EXPECTED  Bed in lowest position and locked, call light within reach, frequent rounding in place    Problem: Bowel/Gastric:  Goal: Normal bowel function is maintained or improved  Outcome: PROGRESSING SLOWER THAN EXPECTED  Pt experiencing continuous nausea. Nausea medication schedule discussed as well as non-pharmacologic measures to reduce nausea.

## 2018-07-27 NOTE — CARE PLAN
Problem: Discharge Barriers/Planning  Goal: Patient's continuum of care needs will be met  Outcome: PROGRESSING AS EXPECTED  Plan for pt to d/c home when cleared. Unable to wean off O2 at this time. Possibly d/c home with O2. Pt tolerating clear liquid diet. Pain well controlled.     Problem: Respiratory:  Goal: Respiratory status will improve  Outcome: PROGRESSING AS EXPECTED  Encouraging incentive spirometer. Pt up ambulating this A.M. Attempting to wean off O2 at this time.pt up to chair for breakfast. Will assess walking O2 if unable to get pt off O2 by noon.

## 2018-07-27 NOTE — PROGRESS NOTES
Discharge instructions reviewed with pt. Prescriptions given. Pt dressed ready got d/c home. Pt attempting to get a hold of  to pick her up. Will d/c IV prior to d/c.

## 2018-07-27 NOTE — PROGRESS NOTES
"Pt has flat affect. Encouraged to get up and walk. Standby x1 assist half lap around floor. Pt stated \"I'm starting to feel a little nauseas\" upon returning to the room. Denies SOB. Unable to wean off of oxygen-on 1L NC. Pt left up in chair for dinner. Call light within reach  "

## 2018-07-27 NOTE — PROGRESS NOTES
at bedside. IV d/c. All personal belongings packed. Pt escorted out via wheelchair with this RN.

## 2018-07-27 NOTE — PROGRESS NOTES
Pt A&O x 4.  Reporting 2/10 pain. Ice pack provided.  Abd lap stab x 5 with bandaids. No drainage noted at this time.  +bowel sounds upon ausculation. +flatus. +void.  Nausea improved. Pt denies nausea at this time. Encouraging sips of clears for breakfast.  Pt weaned down to 0.5L O2, sating 92%. Encouraging use of IS/deep breathing and coughing.   Faith IBRAHIM at bedside. Orders for walking O2 study if unable to wean pt off O2 by noon.  Anticipated d/c home later today.  POC discussed with pt. All needs addressed at this time.

## 2018-08-03 NOTE — DISCHARGE SUMMARY
Discharge Summary    CHIEF COMPLAINT ON ADMISSION  No chief complaint on file.      Reason for Admission  HIATAL HERNIA      Admission Date  7/25/2018    CODE STATUS  Prior    HPI & HOSPITAL COURSE  This is a 63 y.o. female here with recurrent paraesophageal hiatal hernia. She was admitted to the hospital and underwent the procedure below. Initially she struggled with N/V. With the help of antinausea medications, she was able to tolerate more oral intake. She gradually ambulated more. On POD 1, we were unable to wean O2 to RA. On the morning of POD1, she required 0.5lpm of oxygen. Nursing was able to wean O2 to room air with good oxygen saturations during the course of the morning. She was discharged home in good condition, tolerating PO, pain controlled, ambulating, voiding, and Oxygen weaned to Room air. Prescriptions given for pain medication and antinausea medication. She should follow the postop diet plan.         Therefore, she is discharged in good and stable condition to home with close outpatient follow-up.    The patient met 2-midnight criteria for an inpatient stay at the time of discharge.    Discharge Date  7/27/2018    FOLLOW UP ITEMS POST DISCHARGE  Follow up in Dr. Okeefe's office in 1-2 weeks.    DISCHARGE DIAGNOSES  Active Problems:    * No active hospital problems. *  Resolved Problems:    * No resolved hospital problems. *      FOLLOW UP  No future appointments.  Kenji Okeefe M.D.  [address] [facility] [address]  [facility] [address]  [phone]    In 2 weeks  Follow up appointment    Sahra Max M.D.  [address] [facility]  [address]  [facility] [id number] [phone]            MEDICATIONS ON DISCHARGE     Medication List      START taking these medications      Instructions   ondansetron 4 MG Tbdp  Commonly known as:  ZOFRAN ODT   Take 1 Tab by mouth every 6 hours as needed for Nausea.  Dose:  4 mg        CONTINUE taking these medications      Instructions   atorvastatin 20 MG Tabs  Commonly known as:   LIPITOR   Take 20 mg by mouth every evening.  Dose:  20 mg     baclofen 10 MG Tabs  Commonly known as:  LIORESAL   Take 1 Tab by mouth every bedtime.  Dose:  10 mg     citalopram 40 MG Tabs  Commonly known as:  CELEXA   Take 40 mg by mouth every day.  Dose:  40 mg     docusate sodium 100 MG Caps  Commonly known as:  COLACE   Take 100 mg by mouth 2 times a day as needed.  Dose:  100 mg     furosemide 20 MG Tabs  Commonly known as:  LASIX   Take 20 mg by mouth every day.  Dose:  20 mg     LORazepam 1 MG Tabs  Commonly known as:  ATIVAN   Take 1 Tab by mouth every bedtime for 180 days.  Dose:  1 mg     MULTI-VITAMIN/MINERALS PO   Take 1 Tab by mouth every day.  Dose:  1 Tab     pantoprazole 40 MG Tbec  Commonly known as:  PROTONIX   Take 40 mg by mouth every day.  Dose:  40 mg     PROBIOTIC DAILY PO   Take 1 Cap by mouth every day.  Dose:  1 Cap     TEARS NATURALE OP   Place 2 Drops in both eyes 2 times a day as needed.  Dose:  2 Drop     traZODone 100 MG Tabs  Commonly known as:  DESYREL   Take 1 Tab by mouth every bedtime.  Dose:  100 mg     VITAMIN B 12 PO   Place 1 Tab under tongue every day.  Dose:  1 Tab     Vitamin D-3 5000 units Tabs   Take 1 Tab by mouth every day.  Dose:  1 Tab        ASK your doctor about these medications      Instructions   oxyCODONE immediate-release 5 MG Tabs  Commonly known as:  ROXICODONE  Ask about: Should I take this medication?   Take 1 Tab by mouth every four hours as needed (pain) for up to 3 days.  Dose:  5 mg            Allergies  Allergies   Allergen Reactions   • Seasonal Itching     Pt states eye irritation, pollen        DIET  No orders of the defined types were placed in this encounter.      ACTIVITY  As tolerated.  20-lb lifting restriction    CONSULTATIONS  none    PROCEDURES  1. 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   2. Recurrent Hiatal hernia repair with xenograft placement.   3. Laparoscopic Re-do Nissen  fundoplication.    LABORATORY  Lab Results   Component Value Date    SODIUM 140 07/27/2018    POTASSIUM 3.8 07/27/2018    CHLORIDE 102 07/27/2018    CO2 31 07/27/2018    GLUCOSE 99 07/27/2018    BUN 8 07/27/2018    CREATININE 0.76 07/27/2018        Lab Results   Component Value Date    WBC 6.3 07/27/2018    HEMOGLOBIN 11.7 (L) 07/27/2018    HEMATOCRIT 34.3 (L) 07/27/2018    PLATELETCT 218 07/27/2018        Total time of the discharge process exceeds 30 minutes.

## 2018-08-10 ENCOUNTER — HOSPITAL ENCOUNTER (OUTPATIENT)
Dept: LAB | Facility: MEDICAL CENTER | Age: 63
End: 2018-08-10
Attending: FAMILY MEDICINE
Payer: MEDICARE

## 2018-08-10 DIAGNOSIS — E78.2 MIXED HYPERLIPIDEMIA: ICD-10-CM

## 2018-08-10 LAB
ALBUMIN SERPL BCP-MCNC: 4.5 G/DL (ref 3.2–4.9)
ALBUMIN/GLOB SERPL: 1.6 G/DL
ALP SERPL-CCNC: 80 U/L (ref 30–99)
ALT SERPL-CCNC: 27 U/L (ref 2–50)
ANION GAP SERPL CALC-SCNC: 10 MMOL/L (ref 0–11.9)
AST SERPL-CCNC: 22 U/L (ref 12–45)
BILIRUB SERPL-MCNC: 0.5 MG/DL (ref 0.1–1.5)
BUN SERPL-MCNC: 11 MG/DL (ref 8–22)
CALCIUM SERPL-MCNC: 9.6 MG/DL (ref 8.5–10.5)
CHLORIDE SERPL-SCNC: 106 MMOL/L (ref 96–112)
CHOLEST SERPL-MCNC: 257 MG/DL (ref 100–199)
CO2 SERPL-SCNC: 26 MMOL/L (ref 20–33)
CREAT SERPL-MCNC: 0.92 MG/DL (ref 0.5–1.4)
GLOBULIN SER CALC-MCNC: 2.9 G/DL (ref 1.9–3.5)
GLUCOSE SERPL-MCNC: 102 MG/DL (ref 65–99)
HDLC SERPL-MCNC: 43 MG/DL
LDLC SERPL CALC-MCNC: 166 MG/DL
POTASSIUM SERPL-SCNC: 4.2 MMOL/L (ref 3.6–5.5)
PROT SERPL-MCNC: 7.4 G/DL (ref 6–8.2)
SODIUM SERPL-SCNC: 142 MMOL/L (ref 135–145)
TRIGL SERPL-MCNC: 238 MG/DL (ref 0–149)

## 2018-08-10 PROCEDURE — 36415 COLL VENOUS BLD VENIPUNCTURE: CPT

## 2018-08-10 PROCEDURE — 80053 COMPREHEN METABOLIC PANEL: CPT

## 2018-08-10 PROCEDURE — 80061 LIPID PANEL: CPT

## 2018-09-06 ENCOUNTER — OFFICE VISIT (OUTPATIENT)
Dept: MEDICAL GROUP | Facility: MEDICAL CENTER | Age: 63
End: 2018-09-06
Payer: MEDICARE

## 2018-09-06 VITALS
TEMPERATURE: 98.7 F | HEIGHT: 58 IN | DIASTOLIC BLOOD PRESSURE: 78 MMHG | HEART RATE: 90 BPM | RESPIRATION RATE: 12 BRPM | SYSTOLIC BLOOD PRESSURE: 108 MMHG | OXYGEN SATURATION: 93 % | WEIGHT: 157.4 LBS | BODY MASS INDEX: 33.04 KG/M2

## 2018-09-06 DIAGNOSIS — I10 HTN, GOAL BELOW 130/80: ICD-10-CM

## 2018-09-06 DIAGNOSIS — E66.9 OBESITY (BMI 30-39.9): ICD-10-CM

## 2018-09-06 DIAGNOSIS — G47.00 INSOMNIA, PERSISTENT: ICD-10-CM

## 2018-09-06 DIAGNOSIS — F41.1 GAD (GENERALIZED ANXIETY DISORDER): ICD-10-CM

## 2018-09-06 DIAGNOSIS — E78.2 MIXED HYPERLIPIDEMIA: ICD-10-CM

## 2018-09-06 PROCEDURE — 99214 OFFICE O/P EST MOD 30 MIN: CPT | Performed by: FAMILY MEDICINE

## 2018-09-06 RX ORDER — LORAZEPAM 1 MG/1
1 TABLET ORAL
Qty: 90 TAB | Refills: 0 | Status: SHIPPED | OUTPATIENT
Start: 2018-09-06 | End: 2019-03-12 | Stop reason: SDUPTHER

## 2018-09-06 NOTE — ASSESSMENT & PLAN NOTE
This is a chronic health condition for this patient for which he utilizes lorazepam 1 mg nightly.  She will be in need of a refill shortly.  We will go ahead and provide that today.  Obtained and reviewed patient utilization report from [facility] pharmacy database on 9/6/2018 1:42 PM  prior to writing prescription for controlled substance II, III or IV per [place] bill . Based on assessment of the report,my physical exam if necessary and the patient's health problem, the prescription is medically necessary.

## 2018-09-06 NOTE — PATIENT INSTRUCTIONS
1.  Start on 9/15/18 with atorvastatin 20 mg on Monday, Wednesday, Friday for 2 weeks.  If tolerating then go up to daily, if not tolerating daily dosing at least stay with Monday Wednesday Friday.  Plan will be recheck cholesterol level before you return in 3 months.  2.  Check blood pressure periodically and as long as it is less than 130/85 you can stay off of Lasix.

## 2018-09-06 NOTE — ASSESSMENT & PLAN NOTE
This is a chronic health problem for which the patient has been unable to take her atorvastatin 20 mg daily.  Her cholesterol level did go up with a total cholesterol of 257, triglycerides 238, HDL 43 and her LDL went up to 166.  Patient is still having some postoperative nausea and really not feeling that great.  She is starting to see some improvement.  September 15 she will start back on her atorvastatin taking it on Monday Wednesday Friday for 2 weeks and then going up to every day after that if she is tolerating it.  If she does not tolerate daily dosing at least stay with 3 times a week and we will plan to recheck things in 3 months and see if we got adequate control.

## 2018-09-06 NOTE — PROGRESS NOTES
CC:Diagnoses of HTN, goal below 130/80, ROBERT (generalized anxiety disorder), Insomnia, persistent, Mixed hyperlipidemia, and Obesity (BMI 30-39.9) were pertinent to this visit.      HISTORY OF PRESENT ILLNESS: Patient is a 63 y.o. female established patient who presents today to follow-up on her chronic health problems, get refills of her meds and go over her blood work.    Health Maintenance: Completed    HTN, goal below 130/80  This is a chronic health problem that is well controlled without current medications but lifestyle measures.  Her bp is good at 108/78.  Patient had a surgery in July where she had a hiatal hernia repair and ended up with quite a bit of nausea after surgery.  Therefore she has lost weight and is down almost 10 pounds from where she previously was.  She is not taking her Lasix at this time.  Her blood pressure is adequately controlled and if she can maintain this weight she could stay off of the medication.  The patient denies chest pain, shortness of breath or dyspnea on exertion.      ROBERT (generalized anxiety disorder)  This is a chronic health condition for this patient for which he utilizes lorazepam 1 mg nightly.  She will be in need of a refill shortly.  We will go ahead and provide that today.  Obtained and reviewed patient utilization report from [facility] pharmacy database on 9/6/2018 1:42 PM  prior to writing prescription for controlled substance II, III or IV per [place] bill . Based on assessment of the report,my physical exam if necessary and the patient's health problem, the prescription is medically necessary.       Mixed hyperlipidemia  This is a chronic health problem for which the patient has been unable to take her atorvastatin 20 mg daily.  Her cholesterol level did go up with a total cholesterol of 257, triglycerides 238, HDL 43 and her LDL went up to 166.  Patient is still having some postoperative nausea and really not feeling that great.  She is starting to see  some improvement.  September 15 she will start back on her atorvastatin taking it on Monday Wednesday Friday for 2 weeks and then going up to every day after that if she is tolerating it.  If she does not tolerate daily dosing at least stay with 3 times a week and we will plan to recheck things in 3 months and see if we got adequate control.    Obesity (BMI 30-39.9)  Uncontrolled, working on weight loss      Patient Active Problem List    Diagnosis Date Noted   • Polyneuropathy associated with underlying disease ([facility]) 03/13/2018   • Obesity (BMI 30-39.9) 03/13/2018   • Acute cystitis without hematuria 12/22/2017   • Menopause 07/10/2017   • ROBERT (generalized anxiety disorder) 06/15/2017   • Allergic rhinitis 05/10/2017   • S/P lumbar fusion 10/20/2016   • Cervical radiculopathy 10/20/2016   • Diaphragmatic hernia 06/30/2015   • Mild mitral regurgitation 03/12/2015   • H/O: CVA (cerebrovascular accident) 09/23/2014   • Chronic constipation 09/17/2014   • GERD (gastroesophageal reflux disease) 09/17/2014   • Spinal stenosis of lumbar region 03/20/2014   • Lumbar radiculopathy, chronic 08/26/2013   • Carpal tunnel syndrome 11/14/2012   • Vitamin D deficiency disease 09/24/2012   • Mixed hyperlipidemia 12/27/2011   • HTN, goal below 130/80 10/24/2011   • Acquired scoliosis 10/24/2011   • Sleep apnea, obstructive 10/24/2011   • Insomnia, persistent 10/24/2011   • Major depressive disorder with single episode, in full remission ([facility]) 10/24/2011   • 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      Allergies:Seasonal    Current Outpatient Prescriptions   Medication Sig Dispense Refill   • LORazepam (ATIVAN) 1 MG Tab Take 1 Tab by mouth every bedtime for 90 days. 90 Tab 0   • baclofen (LIORESAL) 10 MG Tab Take 1 Tab by mouth every bedtime. 90 Tab 3   • trazodone (DESYREL) 100 MG Tab Take 1 Tab by mouth every bedtime. 90 Tab 3   • Probiotic Product (PROBIOTIC DAILY PO) Take 1 Cap by mouth every day.     • ondansetron (ZOFRAN ODT) 4 MG TABLET  DISPERSIBLE Take 1 Tab by mouth every 6 hours as needed for Nausea. 10 Tab 0   • atorvastatin (LIPITOR) 20 MG Tab Take 20 mg by mouth every evening.     • Artificial Tear Solution (TEARS NATURALE OP) Place 2 Drops in both eyes 2 times a day as needed.     • Cholecalciferol (VITAMIN D-3) 5000 units Tab Take 1 Tab by mouth every day.     • citalopram (CELEXA) 40 MG Tab Take 40 mg by mouth every day.     • docusate sodium (COLACE) 100 MG Cap Take 100 mg by mouth 2 times a day as needed.     • furosemide (LASIX) 20 MG Tab Take 20 mg by mouth every day.     • pantoprazole (PROTONIX) 40 MG Tablet Delayed Response Take 40 mg by mouth every day.     • Multiple Vitamins-Minerals (MULTI-VITAMIN/MINERALS PO) Take 1 Tab by mouth every day.     • Cyanocobalamin (VITAMIN B 12 PO) Place 1 Tab under tongue every day.       No current facility-administered medications for this visit.        Social History   Substance Use Topics   • Smoking status: Former Smoker     Packs/day: 0.30     Years: 5.00     Types: Cigarettes     Quit date: 1/1/1975   • Smokeless tobacco: Never Used      Comment: quit 1975   • Alcohol use No     Social History     Social History Narrative   • No narrative on file       Family History   Problem Relation Age of Onset   • Diabetes Mother    • Cancer Mother 82        breast cancer   • Lung Disease Mother         copd   • Hyperlipidemia Mother    • Hypertension Mother    • Cancer Father         Laryngeal CA   • Heart Disease Father 50        CAD with bypasses x 3   • Heart Attack Father    • Hyperlipidemia Father    • Hypertension Father    • Diabetes Sister         type II diabetes   • Diabetes Unknown    • Heart Disease Unknown    • Hypertension Unknown    • Lung Disease Unknown        Review of Systems:      - Constitutional: Negative for fever, chills, unexpected weight change, and fatigue/generalized weakness.     - HEENT: Negative for headaches, vision changes, hearing changes, ear pain, ear discharge,  "rhinorrhea, sinus congestion, sore throat, and neck pain.      - Respiratory: Negative for cough, sputum production, chest congestion, dyspnea, wheezing, and crackles.      - Cardiovascular: Negative for chest pain, palpitations, orthopnea, and bilateral lower extremity edema.     - Gastrointestinal: Recent nausea now seems to have improved in the last week otherwise denies heartburn, vomiting, abdominal pain, hematochezia, melena, diarrhea, constipation, and greasy/foul-smelling stools.     - Genitourinary: Negative for dysuria, polyuria, hematuria, pyuria, urinary urgency, and urinary incontinence.    - Musculoskeletal: Negative for myalgias, back pain, and joint pain.     - Skin: Negative for rash, itching, cyanotic skin color change.     - Neurological: Negative for dizziness, tingling, tremors, focal sensory deficit, focal weakness and headaches.     - Endo/Heme/Allergies: Does not bruise/bleed easily.     - Psychiatric/Behavioral: Negative for depression, suicidal/homicidal ideation and memory loss.          - NOTE: All other systems reviewed and are negative, except as in HPI.    Exam:    Blood pressure 108/78, pulse 90, temperature 37.1 °C (98.7 °F), resp. rate 12, height 1.473 m (4' 9.99\"), weight 71.4 kg (157 lb 6.4 oz), SpO2 93 %. Body mass index is 32.91 kg/m².    General:  Well nourished, well developed female in NAD  LABS: 8/10/18: Results reviewed and discussed with the patient, questions answered.    Patient was seen for 30 minutes face to face of which, 25 minutes was spent counseling regarding her hypercholesterolemia, hypertension and changes in medication along with need for refill on her lorazepam.  Discussion of alternative choices.  Assessment/Plan:  1. HTN, goal below 130/80  Well-controlled without medication secondary to her recent weight loss.  Patient will continue to check her blood pressure 3 times per week and if it is consistently less than 130/80 she does not need to go back on her " Lasix.    2. ROBERT (generalized anxiety disorder)  Controlled, patient needing a refill of her lorazepam.  That prescription was written today after consulting her  report.    3. Insomnia, persistent  Controlled with use of lorazepam.   report consulted and medication is appropriate.  - LORazepam (ATIVAN) 1 MG Tab; Take 1 Tab by mouth every bedtime for 90 days.  Dispense: 90 Tab; Refill: 0    4. Mixed hyperlipidemia  Uncontrolled, patient went off her atorvastatin because of her nausea.  She is ready to try going back on the medication.  We will start with Monday Wednesday Friday dosing and if she tolerates that after 2 weeks she will go to daily dosing.  Plan will be to recheck in 3 months.  Goal is to get her LDL less than 100.  - Patient identified as having weight management issue.  Appropriate orders and counseling given.  - COMP METABOLIC PANEL; Future  - LIPID PROFILE; Future    5. Obesity (BMI 30-39.9)  Patient had recent weight loss after hospitalization and surgery.  She is planning on maintaining that weight loss and trying to achieve more.  - Patient identified as having weight management issue.  Appropriate orders and counseling given.

## 2018-09-06 NOTE — ASSESSMENT & PLAN NOTE
This is a chronic health problem that is well controlled without current medications but lifestyle measures.  Her bp is good at 108/78.  Patient had a surgery in July where she had a hiatal hernia repair and ended up with quite a bit of nausea after surgery.  Therefore she has lost weight and is down almost 10 pounds from where she previously was.  She is not taking her Lasix at this time.  Her blood pressure is adequately controlled and if she can maintain this weight she could stay off of the medication.  The patient denies chest pain, shortness of breath or dyspnea on exertion.

## 2018-09-29 RX ORDER — FLUTICASONE PROPIONATE 50 MCG
SPRAY, SUSPENSION (ML) NASAL
Qty: 48 G | Refills: 0 | Status: SHIPPED | OUTPATIENT
Start: 2018-09-29 | End: 2018-12-06 | Stop reason: SDUPTHER

## 2018-11-28 ENCOUNTER — HOSPITAL ENCOUNTER (OUTPATIENT)
Dept: LAB | Facility: MEDICAL CENTER | Age: 63
End: 2018-11-28
Attending: FAMILY MEDICINE
Payer: MEDICARE

## 2018-11-28 DIAGNOSIS — E78.2 MIXED HYPERLIPIDEMIA: ICD-10-CM

## 2018-11-28 LAB
ALBUMIN SERPL BCP-MCNC: 4.3 G/DL (ref 3.2–4.9)
ALBUMIN/GLOB SERPL: 1.3 G/DL
ALP SERPL-CCNC: 88 U/L (ref 30–99)
ALT SERPL-CCNC: 34 U/L (ref 2–50)
ANION GAP SERPL CALC-SCNC: 7 MMOL/L (ref 0–11.9)
AST SERPL-CCNC: 24 U/L (ref 12–45)
BILIRUB SERPL-MCNC: 0.6 MG/DL (ref 0.1–1.5)
BUN SERPL-MCNC: 13 MG/DL (ref 8–22)
CALCIUM SERPL-MCNC: 9.5 MG/DL (ref 8.5–10.5)
CHLORIDE SERPL-SCNC: 103 MMOL/L (ref 96–112)
CHOLEST SERPL-MCNC: 233 MG/DL (ref 100–199)
CO2 SERPL-SCNC: 29 MMOL/L (ref 20–33)
CREAT SERPL-MCNC: 0.96 MG/DL (ref 0.5–1.4)
FASTING STATUS PATIENT QL REPORTED: NORMAL
GLOBULIN SER CALC-MCNC: 3.2 G/DL (ref 1.9–3.5)
GLUCOSE SERPL-MCNC: 95 MG/DL (ref 65–99)
HDLC SERPL-MCNC: 54 MG/DL
LDLC SERPL CALC-MCNC: 128 MG/DL
POTASSIUM SERPL-SCNC: 4.3 MMOL/L (ref 3.6–5.5)
PROT SERPL-MCNC: 7.5 G/DL (ref 6–8.2)
SODIUM SERPL-SCNC: 139 MMOL/L (ref 135–145)
TRIGL SERPL-MCNC: 257 MG/DL (ref 0–149)

## 2018-11-28 PROCEDURE — 36415 COLL VENOUS BLD VENIPUNCTURE: CPT

## 2018-11-28 PROCEDURE — 80061 LIPID PANEL: CPT

## 2018-11-28 PROCEDURE — 80053 COMPREHEN METABOLIC PANEL: CPT

## 2018-12-06 ENCOUNTER — HOSPITAL ENCOUNTER (OUTPATIENT)
Dept: LAB | Facility: MEDICAL CENTER | Age: 63
End: 2018-12-06
Attending: FAMILY MEDICINE
Payer: MEDICARE

## 2018-12-06 ENCOUNTER — OFFICE VISIT (OUTPATIENT)
Dept: MEDICAL GROUP | Facility: MEDICAL CENTER | Age: 63
End: 2018-12-06
Payer: MEDICARE

## 2018-12-06 VITALS
SYSTOLIC BLOOD PRESSURE: 120 MMHG | WEIGHT: 156.31 LBS | TEMPERATURE: 98.2 F | OXYGEN SATURATION: 98 % | BODY MASS INDEX: 32.81 KG/M2 | HEIGHT: 58 IN | RESPIRATION RATE: 14 BRPM | HEART RATE: 100 BPM | DIASTOLIC BLOOD PRESSURE: 62 MMHG

## 2018-12-06 DIAGNOSIS — M54.16 LUMBAR RADICULOPATHY, CHRONIC: ICD-10-CM

## 2018-12-06 DIAGNOSIS — I10 HTN, GOAL BELOW 130/80: ICD-10-CM

## 2018-12-06 DIAGNOSIS — G47.00 INSOMNIA, PERSISTENT: ICD-10-CM

## 2018-12-06 DIAGNOSIS — L65.9 HAIR LOSS: ICD-10-CM

## 2018-12-06 DIAGNOSIS — F41.1 GAD (GENERALIZED ANXIETY DISORDER): ICD-10-CM

## 2018-12-06 DIAGNOSIS — Z23 NEED FOR VACCINATION: ICD-10-CM

## 2018-12-06 DIAGNOSIS — F32.5 MAJOR DEPRESSIVE DISORDER WITH SINGLE EPISODE, IN FULL REMISSION (HCC): ICD-10-CM

## 2018-12-06 DIAGNOSIS — G47.33 SLEEP APNEA, OBSTRUCTIVE: ICD-10-CM

## 2018-12-06 DIAGNOSIS — E78.2 MIXED HYPERLIPIDEMIA: ICD-10-CM

## 2018-12-06 LAB — TSH SERPL DL<=0.005 MIU/L-ACNC: 2.08 UIU/ML (ref 0.38–5.33)

## 2018-12-06 PROCEDURE — 99214 OFFICE O/P EST MOD 30 MIN: CPT | Mod: 25 | Performed by: FAMILY MEDICINE

## 2018-12-06 PROCEDURE — G0008 ADMIN INFLUENZA VIRUS VAC: HCPCS | Performed by: FAMILY MEDICINE

## 2018-12-06 PROCEDURE — 90662 IIV NO PRSV INCREASED AG IM: CPT | Performed by: FAMILY MEDICINE

## 2018-12-06 PROCEDURE — 84443 ASSAY THYROID STIM HORMONE: CPT

## 2018-12-06 PROCEDURE — 36415 COLL VENOUS BLD VENIPUNCTURE: CPT

## 2018-12-06 RX ORDER — CITALOPRAM 40 MG/1
40 TABLET ORAL DAILY
Qty: 90 TAB | Refills: 3 | Status: SHIPPED | OUTPATIENT
Start: 2018-12-06 | End: 2019-12-04 | Stop reason: SDUPTHER

## 2018-12-06 RX ORDER — FLUTICASONE PROPIONATE 50 MCG
2 SPRAY, SUSPENSION (ML) NASAL
Qty: 48 G | Refills: 11 | Status: SHIPPED | OUTPATIENT
Start: 2018-12-06 | End: 2019-09-06

## 2018-12-06 RX ORDER — LORAZEPAM 1 MG/1
1 TABLET ORAL
Qty: 90 TAB | Refills: 0 | Status: SHIPPED | OUTPATIENT
Start: 2018-12-06 | End: 2019-03-06

## 2018-12-06 RX ORDER — BACLOFEN 10 MG/1
10 TABLET ORAL
Qty: 90 TAB | Refills: 3 | Status: SHIPPED | OUTPATIENT
Start: 2018-12-06 | End: 2019-03-12 | Stop reason: SDUPTHER

## 2018-12-06 RX ORDER — ATORVASTATIN CALCIUM 20 MG/1
20 TABLET, FILM COATED ORAL DAILY
Qty: 90 TAB | Refills: 3 | Status: SHIPPED | OUTPATIENT
Start: 2018-12-06 | End: 2019-01-07

## 2018-12-06 ASSESSMENT — PATIENT HEALTH QUESTIONNAIRE - PHQ9
8. MOVING OR SPEAKING SO SLOWLY THAT OTHER PEOPLE COULD HAVE NOTICED. OR THE OPPOSITE, BEING SO FIGETY OR RESTLESS THAT YOU HAVE BEEN MOVING AROUND A LOT MORE THAN USUAL: 1
7. TROUBLE CONCENTRATING ON THINGS, SUCH AS READING THE NEWSPAPER OR WATCHING TELEVISION: 3
SUM OF ALL RESPONSES TO PHQ9 QUESTIONS 1 AND 2: 5
5. POOR APPETITE OR OVEREATING: 2
6. FEELING BAD ABOUT YOURSELF - OR THAT YOU ARE A FAILURE OR HAVE LET YOURSELF OR YOUR FAMILY DOWN: 2
4. FEELING TIRED OR HAVING LITTLE ENERGY: 3
3. TROUBLE FALLING OR STAYING ASLEEP OR SLEEPING TOO MUCH: 3
SUM OF ALL RESPONSES TO PHQ QUESTIONS 1-9: 19
2. FEELING DOWN, DEPRESSED, IRRITABLE, OR HOPELESS: 2
9. THOUGHTS THAT YOU WOULD BE BETTER OFF DEAD, OR OF HURTING YOURSELF: 0
1. LITTLE INTEREST OR PLEASURE IN DOING THINGS: 3

## 2018-12-06 NOTE — ASSESSMENT & PLAN NOTE
This is a chronic health problem for this patient for which she was to be on atorvastatin 20 mg daily.  Unfortunately she just realized she never restarted the medication.  Her total cholesterol is 233, triglycerides 257, HDL 54 and her LDL is elevated at 128.  We will get her back on her meds and plan to recheck things 3 months down the road.

## 2018-12-06 NOTE — ASSESSMENT & PLAN NOTE
This is a chronic health problem for this patient for which she has to utilize baclofen.  She needs a refill at this time.  We will send that to her pharmacy.

## 2018-12-06 NOTE — ASSESSMENT & PLAN NOTE
This is a new problem for this patient where she has noted over the last few months that she is having problems with hair loss, dry skin, her nails are cracking and she is having some memory loss.  She is worried that she may be developing a hypothyroid problem.  We did lab work on her at the end of November but did not include thyroid testing.  We will get that done for her and then can communicate with her via my chart whether or not she needs to be on thyroid medication.  In talking with her I believe she has confused her medications and this may be side effects of that.

## 2018-12-06 NOTE — PATIENT INSTRUCTIONS
1.  Okay to take lorazepam at bedtime with TRAZODONE  2.  Absolutely never take tramadol or any pain medication with lorazepam it can lead to death.  3.  Restart your atorvastatin for cholesterol.  I sent refills to make certain you had the medication.  4.  Do blood tests and I will notify you via my chart if there is abnormalities.  I am concerned that her memory loss is directly related to medication interactions not thyroid.  5.  Restart your Celexa 40 mg daily I have sent a new prescription to the pharmacy.

## 2018-12-06 NOTE — ASSESSMENT & PLAN NOTE
This is a chronic problem for this patient for which she takes lorazepam on a nightly basis.  She takes this along with trazodone to try and get adequate sleep.  She has a very bad back for which she is also on baclofen.  There is some concern that she possibly had been taking lorazepam with tramadol which may be explaining some of her complaints of memory loss and excessive fatigue.  I have written out instructions for her to be certain that she is not taking tramadol in place of her trazodone.  Patient will check her meds as soon as she gets home.

## 2018-12-06 NOTE — ASSESSMENT & PLAN NOTE
This is a chronic health problem for this patient that is no longer under control.  Patient had stopped taking her Celexa 40 mg daily.  She did not realize that she had stopped taking it until we started questioning her.  She has a PHQ 9 is gone up from 12-19.  We will get her back on her Celexa at 40 mg daily. I will send that rx today.  I want to see her back in 6 weeks to be certain everything is working okay.

## 2018-12-06 NOTE — PROGRESS NOTES
CC:Diagnoses of Hair loss, HTN, goal below 130/80, Insomnia, persistent, Major depressive disorder with single episode, in full remission (HCC), Lumbar radiculopathy, chronic, Sleep apnea, obstructive, ROBERT (generalized anxiety disorder), and Mixed hyperlipidemia were pertinent to this visit.    HISTORY OF PRESENT ILLNESS: Patient is a 63 y.o. female established patient who presents today to complain that she is having problems with hair loss, fatigue, memory loss and fingernails that are splitting and she thinks she may be having a thyroid problem.  But in further clarification of her medications patient has confused her medications    Health Maintenance: Completed    Hair loss  This is a new problem for this patient where she has noted over the last few months that she is having problems with hair loss, dry skin, her nails are cracking and she is having some memory loss.  She is worried that she may be developing a hypothyroid problem.  We did lab work on her at the end of November but did not include thyroid testing.  We will get that done for her and then can communicate with her via my chart whether or not she needs to be on thyroid medication.  In talking with her I believe she has confused her medications and this may be side effects of that.    Insomnia, persistent  This is a chronic problem for this patient for which she takes lorazepam on a nightly basis.  She takes this along with trazodone to try and get adequate sleep.  She has a very bad back for which she is also on baclofen.  There is some concern that she possibly had been taking lorazepam with tramadol which may be explaining some of her complaints of memory loss and excessive fatigue.  I have written out instructions for her to be certain that she is not taking tramadol in place of her trazodone.  Patient will check her meds as soon as she gets home.    Lumbar radiculopathy, chronic  This is a chronic health problem for this patient for which she  has to utilize baclofen.  She needs a refill at this time.  We will send that to her pharmacy.    ROBERT (generalized anxiety disorder)  This is a chronic health problem for this patient for which she utilizes lorazepam 1 mg at bedtime.  She also takes a trazodone as well.  This keeps her anxiety under adequate control.  We will see her every 90 days to continue this medication.    Major depressive disorder with single episode, in full remission ([facility])  This is a chronic health problem for this patient that is no longer under control.  Patient had stopped taking her Celexa 40 mg daily.  She did not realize that she had stopped taking it until we started questioning her.  She has a PHQ 9 is gone up from 12-19.  We will get her back on her Celexa at 40 mg daily. I will send that rx today.  I want to see her back in 6 weeks to be certain everything is working okay.    Mixed hyperlipidemia  This is a chronic health problem for this patient for which she was to be on atorvastatin 20 mg daily.  Unfortunately she just realized she never restarted the medication.  Her total cholesterol is 233, triglycerides 257, HDL 54 and her LDL is elevated at 128.  We will get her back on her meds and plan to recheck things 3 months down the road.    Sleep apnea, obstructive  Chronic problem requiring Lorazepam to be able to use her CPAP.      Patient Active Problem List    Diagnosis Date Noted   • Hair loss 12/06/2018   • Polyneuropathy associated with underlying disease ([facility]) 03/13/2018   • Obesity (BMI 30-39.9) 03/13/2018   • Acute cystitis without hematuria 12/22/2017   • Menopause 07/10/2017   • ROBERT (generalized anxiety disorder) 06/15/2017   • Allergic rhinitis 05/10/2017   • S/P lumbar fusion 10/20/2016   • Cervical radiculopathy 10/20/2016   • Diaphragmatic hernia 06/30/2015   • Mild mitral regurgitation 03/12/2015   • H/O: CVA (cerebrovascular accident) 09/23/2014   • Chronic constipation 09/17/2014   • GERD (gastroesophageal reflux  disease) 09/17/2014   • Spinal stenosis of lumbar region 03/20/2014   • Lumbar radiculopathy, chronic 08/26/2013   • Carpal tunnel syndrome 11/14/2012   • Vitamin D deficiency disease 09/24/2012   • Mixed hyperlipidemia 12/27/2011   • HTN, goal below 130/80 10/24/2011   • Acquired scoliosis 10/24/2011   • Sleep apnea, obstructive 10/24/2011   • Insomnia, persistent 10/24/2011   • Major depressive disorder with single episode, in full remission ([facility]) 10/24/2011   • 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      Allergies:Seasonal    Current Outpatient Prescriptions   Medication Sig Dispense Refill   • fluticasone (FLONASE) 50 MCG/ACT nasal spray Spray 2 Sprays in nose every day. 48 g 11   • baclofen (LIORESAL) 10 MG Tab Take 1 Tab by mouth every bedtime. 90 Tab 3   • citalopram (CELEXA) 40 MG Tab Take 1 Tab by mouth every day. 90 Tab 3   • LORazepam (ATIVAN) 1 MG Tab Take 1 Tab by mouth every bedtime for 90 days. 90 Tab 0   • atorvastatin (LIPITOR) 20 MG Tab Take 1 Tab by mouth every day. 90 Tab 3   • ondansetron (ZOFRAN ODT) 4 MG TABLET DISPERSIBLE Take 1 Tab by mouth every 6 hours as needed for Nausea. 10 Tab 0   • Artificial Tear Solution (TEARS NATURALE OP) Place 2 Drops in both eyes 2 times a day as needed.     • Cholecalciferol (VITAMIN D-3) 5000 units Tab Take 1 Tab by mouth every day.     • docusate sodium (COLACE) 100 MG Cap Take 100 mg by mouth 2 times a day as needed.     • furosemide (LASIX) 20 MG Tab Take 20 mg by mouth every day.     • trazodone (DESYREL) 100 MG Tab Take 1 Tab by mouth every bedtime. 90 Tab 3   • Multiple Vitamins-Minerals (MULTI-VITAMIN/MINERALS PO) Take 1 Tab by mouth every day.     • Cyanocobalamin (VITAMIN B 12 PO) Place 1 Tab under tongue every day.     • Probiotic Product (PROBIOTIC DAILY PO) Take 1 Cap by mouth every day.       No current facility-administered medications for this visit.        Social History   Substance Use Topics   • Smoking status: Former Smoker     Packs/day: 0.30     Years:  "5.00     Types: Cigarettes     Quit date: 1/1/1975   • Smokeless tobacco: Never Used      Comment: quit 1975   • Alcohol use No     Social History     Social History Narrative   • No narrative on file       Family History   Problem Relation Age of Onset   • Diabetes Mother    • Cancer Mother 82        breast cancer   • Lung Disease Mother         copd   • Hyperlipidemia Mother    • Hypertension Mother    • Cancer Father         Laryngeal CA   • Heart Disease Father 50        CAD with bypasses x 3   • Heart Attack Father    • Hyperlipidemia Father    • Hypertension Father    • Diabetes Sister         type II diabetes   • Diabetes Unknown    • Heart Disease Unknown    • Hypertension Unknown    • Lung Disease Unknown         ROS:     - Constitutional: Patient complaining of fatigue but denies fevers and chills.     - HEENT:  Negative for headaches, vision changes, hearing changes, ear pain, ear discharge, rhinorrhea, sinus congestion, sore throat, and neck pain.      - Respiratory: Negative for cough, sputum production, chest congestion, dyspnea, wheezing, and crackles.      - Cardiovascular: Negative for chest pain, palpitations, orthopnea, and bilateral lower extremity edema.     - Gastrointestinal: Negative for heartburn, nausea, vomiting, abdominal pain, hematochezia, melena, diarrhea, constipation, and greasy/foul-smelling stools.      - Skin: Positive for hair loss and dry splitting nails.      - Neurological: Positive for confusion and short-term memory loss otherwise denies dizziness, tingling, tremors, focal sensory deficit, focal weakness and headaches.      Exam:    Blood pressure 120/62, pulse 100, temperature 36.8 °C (98.2 °F), temperature source Temporal, resp. rate 14, height 1.473 m (4' 9.99\"), weight 70.9 kg (156 lb 4.9 oz), SpO2 98 %, not currently breastfeeding. Body mass index is 32.68 kg/m².    General:  Well nourished, well developed female in NAD  Head is grossly normal.  Neck: Supple without JVD " or bruit. Thyroid is not enlarged.  Pulmonary: Clear to ausculation and percussion.  Normal effort. No rales, ronchi, or wheezing.  Cardiovascular: Regular rate and rhythm without murmur. Carotid and radial pulses are intact and equal bilaterally.  Extremities: no clubbing, cyanosis, or edema.  LABS: 11/28/18: Results reviewed and discussed with the patient, questions answered.    Please note that this dictation was created using voice recognition software. I have made every reasonable attempt to correct obvious errors, but I expect that there are errors of grammar and possibly content that I did not discover before finalizing the note.    Assessment/Plan:  1. Hair loss  Uncontrolled, I am not certain if this is due to thyroid problems or the fact that the patient has truly mixed up her medications and the way she is taking them.  I recommended that we have her do some blood work and we will notify her of results via my chart  - TSH WITH REFLEX TO FT4; Future    2. HTN, goal below 130/80  Controlled, continue with current meds and lifestyle.      3. Insomnia, persistent  Uncontrolled, I am very concerned that this patient has been taking lorazepam along with tramadol which may be contributing to her short-term memory loss and confusion.  We will write out her discharge instructions and have her check her medications very carefully.  She will let me know if she sees any improvement with just being on trazodone and lorazepam rather than tramadol.  - LORazepam (ATIVAN) 1 MG Tab; Take 1 Tab by mouth every bedtime for 90 days.  Dispense: 90 Tab; Refill: 0    4. Major depressive disorder with single episode, in full remission (HCC)  Uncontrolled, patient stopped her Celexa and I believe that is why worsening short-term memory get back on her medications.  Sent refills of all of her meds.     .    5. Lumbar radiculopathy, chronic  Adequately controlled with baclofen    6. Sleep apnea, obstructive  Uncontrolled, without  lorazepam this patient cannot tolerate her CPAP.  We will have her continue to utilize that.  - LORazepam (ATIVAN) 1 MG Tab; Take 1 Tab by mouth every bedtime for 90 days.  Dispense: 90 Tab; Refill: 0    7. ROBERT (generalized anxiety disorder)  Adequately controlled with lorazepam    8. Mixed hyperlipidemia  Uncontrolled, patient will get back on her atorvastatin.  We had a discussion that she is increasing her risk of stroke by not getting her lipids under control.  We will plan to see her back in short follow-up and then again in 90 days with blood work prior to the visit 90 days.

## 2018-12-27 RX ORDER — TRAZODONE HYDROCHLORIDE 100 MG/1
TABLET ORAL
Qty: 90 TAB | Refills: 2 | Status: SHIPPED | OUTPATIENT
Start: 2018-12-27 | End: 2019-08-08 | Stop reason: SDUPTHER

## 2019-01-07 ENCOUNTER — OFFICE VISIT (OUTPATIENT)
Dept: CARDIOLOGY | Facility: PHYSICIAN GROUP | Age: 64
End: 2019-01-07
Payer: MEDICARE

## 2019-01-07 VITALS
HEIGHT: 58 IN | WEIGHT: 159 LBS | DIASTOLIC BLOOD PRESSURE: 80 MMHG | SYSTOLIC BLOOD PRESSURE: 126 MMHG | BODY MASS INDEX: 33.37 KG/M2 | OXYGEN SATURATION: 95 % | HEART RATE: 80 BPM

## 2019-01-07 DIAGNOSIS — I34.0 MILD MITRAL REGURGITATION: ICD-10-CM

## 2019-01-07 DIAGNOSIS — E78.5 DYSLIPIDEMIA: ICD-10-CM

## 2019-01-07 DIAGNOSIS — I70.0 AORTIC ATHEROSCLEROSIS (HCC): Chronic | ICD-10-CM

## 2019-01-07 DIAGNOSIS — E78.01 FAMILIAL HYPERCHOLESTEREMIA: Chronic | ICD-10-CM

## 2019-01-07 DIAGNOSIS — R74.8 ELEVATED LIVER ENZYMES: ICD-10-CM

## 2019-01-07 DIAGNOSIS — I10 HTN, GOAL BELOW 130/80: ICD-10-CM

## 2019-01-07 DIAGNOSIS — Z86.73 H/O: CVA (CEREBROVASCULAR ACCIDENT): ICD-10-CM

## 2019-01-07 PROCEDURE — 99214 OFFICE O/P EST MOD 30 MIN: CPT | Performed by: INTERNAL MEDICINE

## 2019-01-07 RX ORDER — CHLORAL HYDRATE 500 MG
1000 CAPSULE ORAL 2 TIMES DAILY
COMMUNITY
Start: 2019-01-07 | End: 2020-12-03

## 2019-01-07 RX ORDER — VITAMIN B COMPLEX
300 TABLET ORAL DAILY
Qty: 30 CAP | COMMUNITY
Start: 2019-01-07 | End: 2019-09-06

## 2019-01-07 RX ORDER — ROSUVASTATIN CALCIUM 10 MG/1
10 TABLET, COATED ORAL EVERY EVENING
Qty: 90 TAB | Refills: 3 | Status: SHIPPED | OUTPATIENT
Start: 2019-01-07 | End: 2019-11-22

## 2019-01-07 ASSESSMENT — ENCOUNTER SYMPTOMS
BRUISES/BLEEDS EASILY: 0
ABDOMINAL PAIN: 0
CHILLS: 0
SHORTNESS OF BREATH: 0
WEAKNESS: 0
FEVER: 0
CLAUDICATION: 0
NAUSEA: 0
FOCAL WEAKNESS: 0
SORE THROAT: 0
DIZZINESS: 0
ROS GI COMMENTS: DYSPEPSIA
BLURRED VISION: 0
PND: 0
PALPITATIONS: 0
COUGH: 0
FALLS: 0

## 2019-01-07 NOTE — PATIENT INSTRUCTIONS
Please look into the following diets and incorporate them into your diet    FOR TREATMENT OR PREVENTION OF CORONARY ARTERY DISEASE    Michelle - [facility]    Dr East - [place] over [place] (book and documentary)    Dr Johnny Lau's Cardiologist    FOR TREATMENT OF BLOOD PRESSURE  DASH DIET - American Heart Association for treatment of HYPERTENSION    KEEP YOUR SODIUM EQUAL TO CALORIES AND NO MORE THAN DOUBLE THE CALORIES FOR A LOW SALT DIET    FOR TREATMENT OF BAD CHOLESTEROL/FATS  REDUCE PROCESSED SUGAR AS MUCH AS POSSIBLE  INCREASE WHOLE GRAINS/VEGETABLES    Lowering total cholesterol and LDL (bad) cholesterol:  - Eat leaner cuts of meat, or eliminate altogether if possible red meat, and frequently substitute fish or chicken.  - Limit saturated fat to no more than 7-10% of total calories no more than 10 g per day is recommended. Some sources of saturated fat include butter, animal fats, hydrogenated vegetable fats and oils, many desserts, whole milk dairy products.  - Replaced saturated fats with polyunsaturated fats and monounsaturated fats. Foods high in monounsaturated fat include nuts, although well, canola oil, avocados, and olives.  - Limit trans fat (processed foods) and replaced with fresh fruits and vegetables  - Recommend nonfat dairy products  - Increase substantially the amount of soluble fiber intake (legumes such as beans, fruit, whole grains).  - Consider nutritional supplements: plant sterile spreads such as Benecol, fish oil,  flaxseed oil, omega-3 acids capsules 1000 mg twice a day, or viscous fiber such as Metamucil  - Attain ideal weight and regular exercise (at least 30 minutes per day of walking)    Lowering triglycerides:  - Reduce intake of simple sugar: Desserts, candy, pastries, honey, sodas, sugared cereals, yogurt, Gatorade, sports bars, canned fruit, smoothies, fruit juice, coffee drinks  - Reduced intake of refined starches: Refined Pasta  - Reduce or  abstain from alcohol  - Increase omega-3 fatty acids: [place], Trout, Mackerel, [place], [place] tuna and supplements  - Attain ideal weight and regular exercise (at least 30 minutes per day of walking)      Elevating HDL (good) cholesterol:  - Increase physical activity  - Seasoned foods with garlic and onions  - Increase omega-3 fatty acids and supplements as listed above  - Incorporating appropriate amounts of monounsaturated fats such as nuts, olive oil, canola oil, avocados, olives  - Stop smoking  - Attain ideal weight and regular exercise (at least 30 minutes per day of walking)

## 2019-01-07 NOTE — PROGRESS NOTES
"Chief Complaint   Patient presents with   • Hypertension     Follow up       Subjective:   Almaz Samuel is a 63 y.o. female who presents today for follow-up of her history of stroke in 2014 with familial dyslipidemia and quite high LDL off medicine    She had repeat Nissen which she reports has been better for her stomach upset she is concerned that her atorvastatin contributes to dyspepsia based on her labs she can see her intermittent compliance but overall mildly on the medicine since the stroke      Past Medical History:   Diagnosis Date   • Anesthesia 02-14-11    PO N/V, \"slow to come out\"   • Aortic atherosclerosis (HCC)    • Arthritis 02-14-11    spine   • Backpain 02-14-11    neck and abd. also,    • Breath shortness     with exertion   • Bronchitis 05/2018   • Cancer (HCC) 07/18/2018    Skin - 15 years ago.   • Cancer (HCC)     April/May 2018   • Diverticulitis    • Endometriosis of uterus    • Familial hypercholesteremia    • Fusion of spine 2014   • ROBERT (generalized anxiety disorder) 6/15/2017   • H/O fall     when in hospital  with low O2 sats   • H/O: CVA (cerebrovascular accident) 8/22/2014    Complex event associated with apparent medication reaction but with MRI suggesting possible multiple small infarcts of various ages, [facility]   • Hair loss 12/6/2018   • Heart burn    • Hiatus hernia syndrome     not repaired   • High cholesterol    • HTN, goal below 130/80 10/24/2011   • Infectious disease      Hep C +   • Lung collapse 02-14-11    right lung 1/4 collpsed    • Major depressive disorder with single episode, in full remission ([facility]) 10/24/2011   • Mixed hyperlipidemia 12/27/2011   • Moderate major depression ([facility]) 10/24/2011   • MRSA exposure 8/2014    while in hospital   • Post-menopause on HRT (hormone replacement therapy) 12/27/2011   • 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    exposed as a child, told not active   • Scoliosis    • Sleep apnea 6/2015    CPAP   • Snoring    • Unspecified vitamin D " deficiency 9/24/2012     Past Surgical History:   Procedure Laterality Date   • NISSEN FUNDOPLICATION LAPAROSCOPIC  7/25/2018    Procedure: NISSEN FUNDOPLICATION LAPAROSCOPIC;  Surgeon: Kenji Garcia M.D.;  Location: SURGERY [facility];  Service: General   • NISSEN FUNDOPLICATION LAPAROSCOPIC N/A 6/30/2015    Procedure: NISSEN FUNDOPLICATION LAPAROSCOPIC;  Surgeon: Kenji Garcia M.D.;  Location: SURGERY SAME DAY [facility];  Service:    • GASTROSCOPY-ENDO  6/24/2015    Procedure: GASTROSCOPY-ENDO;  Surgeon: Kenji Garcia M.D.;  Location: ENDOSCOPY [facility];  Service:    • CARPAL TUNNEL RELEASE  11/14/2012    Performed by Holly Martin M.D. at SURGERY [facility]   • LOW ANTERIOR RESECTION LAPAROSCOPIC  3/2/2011    Performed by KENJI GARCIA at SURGERY [facility]   • CERVICAL DISK AND FUSION ANTERIOR  6/22/2010    Performed by JOSEPH DARLING at SURGERY [facility]   • TUBAL LIGATION  1988   • TONSILLECTOMY AND ADENOIDECTOMY  1959   • APPENDECTOMY     • GYN SURGERY      partial hysterectomy     Family History   Problem Relation Age of Onset   • Diabetes Mother    • Cancer Mother 82        breast cancer   • Lung Disease Mother         copd   • Hyperlipidemia Mother    • Hypertension Mother    • Cancer Father         Laryngeal CA   • Heart Disease Father 50        CAD with bypasses x 3   • Heart Attack Father    • Hyperlipidemia Father    • Hypertension Father    • Diabetes Sister         type II diabetes   • Diabetes Unknown    • Heart Disease Unknown    • Hypertension Unknown    • Lung Disease Unknown      Social History     Social History   • Marital status:      Spouse name: N/A   • Number of children: N/A   • Years of education: N/A     Occupational History   • Not on file.     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 30 Cap    • rosuvastatin (CRESTOR) 10 MG Tab Take 1 Tab by mouth every evening. 90 Tab 3   • Omega-3 Fatty Acids (FISH OIL) 1000 MG Cap capsule Take 1 Cap by mouth 2 Times a Day.     • traZODone (DESYREL) 100 MG Tab TAKE ONE TABLET BY MOUTH AT BEDTIME 90 Tab 2   • fluticasone (FLONASE) 50 MCG/ACT nasal spray Spray 2 Sprays in nose every day. 48 g 11   • baclofen (LIORESAL) 10 MG Tab Take 1 Tab by mouth every bedtime. 90 Tab 3   • citalopram (CELEXA) 40 MG Tab Take 1 Tab by mouth every day. 90 Tab 3   • LORazepam (ATIVAN) 1 MG Tab Take 1 Tab by mouth every bedtime for 90 days. 90 Tab 0   • ondansetron (ZOFRAN ODT) 4 MG TABLET DISPERSIBLE Take 1 Tab by mouth every 6 hours as needed for Nausea. 10 Tab 0   • Artificial Tear Solution (TEARS NATURALE OP) Place 2 Drops in both eyes 2 times a day as needed.     • Cholecalciferol (VITAMIN D-3) 5000 units Tab Take 1 Tab by mouth every day.     • Multiple Vitamins-Minerals (MULTI-VITAMIN/MINERALS PO) Take 1 Tab by mouth every day.     • Cyanocobalamin (VITAMIN B 12 PO) Place 1 Tab under tongue every day.     • Probiotic Product (PROBIOTIC DAILY PO) Take 1 Cap by mouth every day.     • [DISCONTINUED] atorvastatin (LIPITOR) 20 MG Tab Take 1 Tab by mouth every day. 90 Tab 3   • docusate sodium (COLACE) 100 MG Cap Take 100 mg by mouth 2 times a day as needed.     • furosemide (LASIX) 20 MG Tab Take 20 mg by mouth  "every day.       No facility-administered encounter medications on file as of 1/7/2019.      Review of Systems   Constitutional: Negative for chills and fever.   HENT: Negative for sore throat.    Eyes: Negative for blurred vision.   Respiratory: Negative for cough and shortness of breath.    Cardiovascular: Negative for chest pain, palpitations, claudication, leg swelling and PND.   Gastrointestinal: Negative for abdominal pain and nausea.        Dyspepsia   Musculoskeletal: Negative for falls and joint pain.   Skin: Negative for rash.   Neurological: Negative for dizziness, focal weakness and weakness.   Endo/Heme/Allergies: Does not bruise/bleed easily.        Objective:   /80 (BP Location: Left arm, Patient Position: Sitting, BP Cuff Size: Adult)   Pulse 80   Ht 1.473 m (4' 10\")   Wt 72.1 kg (159 lb)   SpO2 95%   BMI 33.23 kg/m²     Physical Exam   Constitutional: No distress.   HENT:   Mouth/Throat: Oropharynx is clear and moist. No oropharyngeal exudate.   Eyes: No scleral icterus.   Neck: No JVD present.   Cardiovascular: Normal rate and normal heart sounds.  Exam reveals no gallop and no friction rub.    No murmur heard.  Pulmonary/Chest: No respiratory distress. She has no wheezes. She has no rales.   Abdominal: Soft. Bowel sounds are normal.   Musculoskeletal: She exhibits no edema.   Neurological: She is alert.   Skin: No rash noted. She is not diaphoretic.   Psychiatric: She has a normal mood and affect.     We reviewed her extensive labs including mild transaminitis back in July up to the 90s her LDL is around 100 on therapy over 200 off of therapy HDL is normal in the 50s      We personally reviewed her CAT scan images from May which show atherosclerosis of the aorta of the coronary arteries were not seen in 2010 she did not have significant coronary calcification      Assessment:     1. H/O: CVA (cerebrovascular accident)     2. HTN, goal below 130/80     3. Dyslipidemia     4. Mild mitral " regurgitation     5. Aortic atherosclerosis (HCC)     6. Familial hypercholesteremia  Lipid Profile    COMP METABOLIC PANEL   7. Elevated liver enzymes  COMP METABOLIC PANEL       Medical Decision Making:  Today's Assessment / Status / Plan:     It was my pleasure to meet with Ms. Samuel.    Given her significant elevation in LDL she would be recommended high-dose statin based on the most recent recommendations she is advised high-dose statin therapy she is concerned that the atorvastatin is contributed to dyspepsia so asked her to switch to rosuvastatin and will monitor the labs transaminases and she is concerned about side effect as well    We did review the report of her echocardiogram at [facility] which was normal    She is unable to tolerate aspirin therapy we did discuss that perhaps there is a role of Plavix but for now she feels comfortable just on fish oil and co-Q10 which is reasonable    I gave her specific advice on diet and lifestyle unfortunately she does not qualify for a cardiac rehab program    I will see Ms. Samuel back in 1 year time and encouraged her to follow up with us over the phone or e-mail using my MyChart as issues arise.    It is my pleasure to participate in the care of Ms. Samuel.  Please do not hesitate to contact me with questions or concerns.    Leonel Bridges MD PhD FACC  Cardiologist [facility]

## 2019-01-07 NOTE — LETTER
"     [facility]-[address] [address]  Phone: [phone]  Fax: [phone]              Almaz Samuel  1955    Encounter Date: 1/7/2019    Leonel Bridges M.D.          PROGRESS NOTE:  Chief Complaint   Patient presents with   • Hypertension     Follow up       Subjective:   Almaz Samuel is a 63 y.o. female who presents today for follow-up of her history of stroke in 2014 with familial dyslipidemia and quite high LDL off medicine    She had repeat Nissen which she reports has been better for her stomach upset she is concerned that her atorvastatin contributes to dyspepsia based on her labs she can see her intermittent compliance but overall mildly on the medicine since the stroke      Past Medical History:   Diagnosis Date   • Anesthesia 02-14-11    PO N/V, \"slow to come out\"   • Aortic atherosclerosis (HCC)    • Arthritis 02-14-11    spine   • Backpain 02-14-11    neck and abd. also,    • Breath shortness     with exertion   • Bronchitis 05/2018   • Cancer (HCC) 07/18/2018    Skin - 15 years ago.   • Cancer (HCC)     April/May 2018   • Diverticulitis    • Endometriosis of uterus    • Familial hypercholesteremia    • Fusion of spine 2014   • ROBERT (generalized anxiety disorder) 6/15/2017   • H/O fall     when in hospital  with low O2 sats   • H/O: CVA (cerebrovascular accident) 8/22/2014    Complex event associated with apparent medication reaction but with MRI suggesting possible multiple small infarcts of various ages, [facility]   • Hair loss 12/6/2018   • Heart burn    • Hiatus hernia syndrome     not repaired   • High cholesterol    • HTN, goal below 130/80 10/24/2011   • Infectious disease      Hep C +   • Lung collapse 02-14-11    right lung 1/4 collpsed    • Major depressive disorder with single episode, in full remission ([facility]) 10/24/2011   • Mixed hyperlipidemia 12/27/2011   • Moderate major depression ([facility]) " 10/24/2011   • MRSA exposure 8/2014    while in hospital   • Post-menopause on HRT (hormone replacement therapy) 12/27/2011   • 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    exposed as a child, told not active   • Scoliosis    • Sleep apnea 6/2015    CPAP   • Snoring    • Unspecified vitamin D deficiency 9/24/2012     Past Surgical History:   Procedure Laterality Date   • NISSEN FUNDOPLICATION LAPAROSCOPIC  7/25/2018    Procedure: NISSEN FUNDOPLICATION LAPAROSCOPIC;  Surgeon: Kenji Garcia M.D.;  Location: SURGERY [facility];  Service: General   • NISSEN FUNDOPLICATION LAPAROSCOPIC N/A 6/30/2015    Procedure: NISSEN FUNDOPLICATION LAPAROSCOPIC;  Surgeon: Kenji Garcia M.D.;  Location: SURGERY SAME DAY [facility];  Service:    • GASTROSCOPY-ENDO  6/24/2015    Procedure: GASTROSCOPY-ENDO;  Surgeon: Kenji Garcia M.D.;  Location: ENDOSCOPY [facility];  Service:    • CARPAL TUNNEL RELEASE  11/14/2012    Performed by Holly Martin M.D. at SURGERY [facility]   • LOW ANTERIOR RESECTION LAPAROSCOPIC  3/2/2011    Performed by KENJI GARCIA at SURGERY [facility]   • CERVICAL DISK AND FUSION ANTERIOR  6/22/2010    Performed by JOSEPH DARLING at SURGERY [facility]   • TUBAL LIGATION  1988   • TONSILLECTOMY AND ADENOIDECTOMY  1959   • APPENDECTOMY     • GYN SURGERY      partial hysterectomy     Family History   Problem Relation Age of Onset   • Diabetes Mother    • Cancer Mother 82        breast cancer   • Lung Disease Mother         copd   • Hyperlipidemia Mother    • Hypertension Mother    • Cancer Father         Laryngeal CA   • Heart Disease Father 50        CAD with bypasses x 3   • Heart Attack Father    • Hyperlipidemia Father    • Hypertension Father    • Diabetes Sister         type II diabetes   • Diabetes Unknown    • Heart Disease Unknown    • Hypertension Unknown    • Lung Disease Unknown      Social History     Social History   • Marital status:      Spouse name: N/A   • Number of children: N/A     • Years of education: N/A     Occupational History   • Not on file.     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 30 Cap    • rosuvastatin (CRESTOR) 10 MG Tab Take 1 Tab by mouth every evening. 90 Tab 3   • Omega-3 Fatty Acids (FISH OIL) 1000 MG Cap capsule Take 1 Cap by mouth 2 Times a Day.     • traZODone (DESYREL) 100 MG Tab TAKE ONE TABLET BY MOUTH AT BEDTIME 90 Tab 2   • fluticasone (FLONASE) 50 MCG/ACT nasal spray Spray 2 Sprays in nose every day. 48 g 11   • baclofen (LIORESAL) 10 MG Tab Take 1 Tab by mouth every bedtime. 90 Tab 3   • citalopram (CELEXA) 40 MG Tab Take 1 Tab by mouth every day. 90 Tab 3   • LORazepam (ATIVAN) 1 MG Tab Take 1 Tab by mouth every bedtime for 90 days. 90 Tab 0   • ondansetron (ZOFRAN ODT) 4 MG TABLET DISPERSIBLE Take 1 Tab by mouth every 6 hours as needed for Nausea. 10 Tab 0   • Artificial Tear Solution (TEARS NATURALE OP) Place 2 Drops in both eyes 2 times a day as needed.     • Cholecalciferol (VITAMIN D-3) 5000 units Tab Take 1 Tab by mouth every day.     • Multiple Vitamins-Minerals (MULTI-VITAMIN/MINERALS PO) Take 1 Tab by mouth every day.     • Cyanocobalamin (VITAMIN B 12 PO) Place 1 Tab under tongue every day.     • Probiotic Product (PROBIOTIC  "DAILY PO) Take 1 Cap by mouth every day.     • [DISCONTINUED] atorvastatin (LIPITOR) 20 MG Tab Take 1 Tab by mouth every day. 90 Tab 3   • docusate sodium (COLACE) 100 MG Cap Take 100 mg by mouth 2 times a day as needed.     • furosemide (LASIX) 20 MG Tab Take 20 mg by mouth every day.       No facility-administered encounter medications on file as of 1/7/2019.      Review of Systems   Constitutional: Negative for chills and fever.   HENT: Negative for sore throat.    Eyes: Negative for blurred vision.   Respiratory: Negative for cough and shortness of breath.    Cardiovascular: Negative for chest pain, palpitations, claudication, leg swelling and PND.   Gastrointestinal: Negative for abdominal pain and nausea.        Dyspepsia   Musculoskeletal: Negative for falls and joint pain.   Skin: Negative for rash.   Neurological: Negative for dizziness, focal weakness and weakness.   Endo/Heme/Allergies: Does not bruise/bleed easily.        Objective:   /80 (BP Location: Left arm, Patient Position: Sitting, BP Cuff Size: Adult)   Pulse 80   Ht 1.473 m (4' 10\")   Wt 72.1 kg (159 lb)   SpO2 95%   BMI 33.23 kg/m²      Physical Exam   Constitutional: No distress.   HENT:   Mouth/Throat: Oropharynx is clear and moist. No oropharyngeal exudate.   Eyes: No scleral icterus.   Neck: No JVD present.   Cardiovascular: Normal rate and normal heart sounds.  Exam reveals no gallop and no friction rub.    No murmur heard.  Pulmonary/Chest: No respiratory distress. She has no wheezes. She has no rales.   Abdominal: Soft. Bowel sounds are normal.   Musculoskeletal: She exhibits no edema.   Neurological: She is alert.   Skin: No rash noted. She is not diaphoretic.   Psychiatric: She has a normal mood and affect.     We reviewed her extensive labs including mild transaminitis back in July up to the 90s her LDL is around 100 on therapy over 200 off of therapy HDL is normal in the 50s      We personally reviewed her CAT scan images " from May which show atherosclerosis of the aorta of the coronary arteries were not seen in 2010 she did not have significant coronary calcification      Assessment:     1. H/O: CVA (cerebrovascular accident)     2. HTN, goal below 130/80     3. Dyslipidemia     4. Mild mitral regurgitation     5. Aortic atherosclerosis (HCC)     6. Familial hypercholesteremia  Lipid Profile    COMP METABOLIC PANEL   7. Elevated liver enzymes  COMP METABOLIC PANEL       Medical Decision Making:  Today's Assessment / Status / Plan:     It was my pleasure to meet with Ms. Samuel.    Given her significant elevation in LDL she would be recommended high-dose statin based on the most recent recommendations she is advised high-dose statin therapy she is concerned that the atorvastatin is contributed to dyspepsia so asked her to switch to rosuvastatin and will monitor the labs transaminases and she is concerned about side effect as well    We did review the report of her echocardiogram at [facility] which was normal    She is unable to tolerate aspirin therapy we did discuss that perhaps there is a role of Plavix but for now she feels comfortable just on fish oil and co-Q10 which is reasonable    I gave her specific advice on diet and lifestyle unfortunately she does not qualify for a cardiac rehab program    I will see Ms. Samuel back in 1 year time and encouraged her to follow up with us over the phone or e-mail using my MyChart as issues arise.    It is my pleasure to participate in the care of Ms. Samuel.  Please do not hesitate to contact me with questions or concerns.    Leonel Bridges MD PhD FACC  Cardiologist [facility]        Jada Marquez M.D.  [address] [place] [id number]  VIA Facsimile: [phone]

## 2019-01-17 ENCOUNTER — APPOINTMENT (OUTPATIENT)
Dept: MEDICAL GROUP | Facility: MEDICAL CENTER | Age: 64
End: 2019-01-17
Payer: MEDICARE

## 2019-03-12 ENCOUNTER — OFFICE VISIT (OUTPATIENT)
Dept: MEDICAL GROUP | Facility: MEDICAL CENTER | Age: 64
End: 2019-03-12
Payer: MEDICARE

## 2019-03-12 VITALS
OXYGEN SATURATION: 92 % | BODY MASS INDEX: 34.43 KG/M2 | RESPIRATION RATE: 14 BRPM | DIASTOLIC BLOOD PRESSURE: 64 MMHG | HEART RATE: 86 BPM | TEMPERATURE: 98.2 F | HEIGHT: 58 IN | SYSTOLIC BLOOD PRESSURE: 120 MMHG | WEIGHT: 164 LBS

## 2019-03-12 DIAGNOSIS — M54.16 LUMBAR RADICULOPATHY, CHRONIC: ICD-10-CM

## 2019-03-12 DIAGNOSIS — E78.01 FAMILIAL HYPERCHOLESTEREMIA: Chronic | ICD-10-CM

## 2019-03-12 DIAGNOSIS — M48.062 SPINAL STENOSIS OF LUMBAR REGION WITH NEUROGENIC CLAUDICATION: ICD-10-CM

## 2019-03-12 DIAGNOSIS — Z12.31 SCREENING MAMMOGRAM, ENCOUNTER FOR: ICD-10-CM

## 2019-03-12 DIAGNOSIS — Z12.31 ENCOUNTER FOR SCREENING MAMMOGRAM FOR MALIGNANT NEOPLASM OF BREAST: ICD-10-CM

## 2019-03-12 DIAGNOSIS — G47.00 INSOMNIA, PERSISTENT: ICD-10-CM

## 2019-03-12 PROCEDURE — 99214 OFFICE O/P EST MOD 30 MIN: CPT | Performed by: FAMILY MEDICINE

## 2019-03-12 RX ORDER — LORAZEPAM 1 MG/1
1 TABLET ORAL
Qty: 90 TAB | Refills: 0 | Status: SHIPPED | OUTPATIENT
Start: 2019-03-12 | End: 2019-06-10

## 2019-03-12 RX ORDER — BACLOFEN 10 MG/1
10 TABLET ORAL
Qty: 90 TAB | Refills: 3 | Status: SHIPPED | OUTPATIENT
Start: 2019-03-12 | End: 2019-06-11 | Stop reason: SDUPTHER

## 2019-03-12 NOTE — ASSESSMENT & PLAN NOTE
This is a chronic health problem for this patient for which she had just started back on rosuvastatin 10 mg daily when we did her blood work back in November.  We have not recheck to make certain that her labs are at acceptable levels.  Were going to see her in 90 days we will order labs prior to that visit.

## 2019-03-12 NOTE — ASSESSMENT & PLAN NOTE
Chronic in nature, stable problem.  Patient is taking baclofen as needed, reports no issues today.  Requesting refill.  report reviewed.

## 2019-03-12 NOTE — PROGRESS NOTES
Chief Complaint   Patient presents with   • Medication Refill   IRASEMA Mcgee and I completed this visit and chart together as part of her training in EPIC.  Sahra Max MD  Provider /educator  [facility]          HISTORY OF PRESENT ILLNESS: Patient is a 63 y.o. female established patient who presents today for the following complaints:    Health Maintenance:  COMPLETED. Mammo ordered.    Familial hypercholesteremia  This is a chronic health problem for this patient for which she had just started back on rosuvastatin 10 mg daily when we did her blood work back in November.  We have not recheck to make certain that her labs are at acceptable levels.  Were going to see her in 90 days we will order labs prior to that visit.    Insomnia, persistent  Chronic in nature.  Patient takes lorazepam nightly.  No complaints of side effects. Requesting refill today.  report reviewed, pt eligible for refill.Obtained and reviewed patient utilization report from [facility] pharmacy database on 3/12/2019 10:21 AM  prior to writing prescription for controlled substance II, III or IV per [place] bill . Based on assessment of the report,my physical exam if necessary and the patient's health problem, the prescription is medically necessary.       Lumbar radiculopathy, chronic  Chronic in nature. Taking baclofen nightly as needed.  Requesting a refill today.  repot reviewed.     Spinal stenosis of lumbar region  Chronic in nature, stable problem.  Patient is taking baclofen as needed, reports no issues today.  Requesting refill.  report reviewed.       Patient Active Problem List    Diagnosis Date Noted   • Aortic atherosclerosis (HCC)    • Familial hypercholesteremia    • Hair loss 12/06/2018   • Polyneuropathy associated with underlying disease (HCC) 03/13/2018   • Obesity (BMI 30-39.9) 03/13/2018   • Acute cystitis without hematuria 12/22/2017   • Menopause 07/10/2017   • ROBERT  (generalized anxiety disorder) 06/15/2017   • Allergic rhinitis 05/10/2017   • S/P lumbar fusion 10/20/2016   • Cervical radiculopathy 10/20/2016   • Diaphragmatic hernia 06/30/2015   • Mild mitral regurgitation 03/12/2015   • H/O: CVA (cerebrovascular accident) 09/23/2014   • Chronic constipation 09/17/2014   • GERD (gastroesophageal reflux disease) 09/17/2014   • Spinal stenosis of lumbar region 03/20/2014   • Lumbar radiculopathy, chronic 08/26/2013   • Carpal tunnel syndrome 11/14/2012   • Vitamin D deficiency disease 09/24/2012   • Dyslipidemia 12/27/2011   • HTN, goal below 130/80 10/24/2011   • Acquired scoliosis 10/24/2011   • Sleep apnea, obstructive 10/24/2011   • Insomnia, persistent 10/24/2011   • Major depressive disorder with single episode, in full remission (HCC) 10/24/2011   • 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        Allergies: Albumin and Seasonal    Current Outpatient Prescriptions   Medication Sig Dispense Refill   • baclofen (LIORESAL) 10 MG Tab Take 1 Tab by mouth every bedtime. 90 Tab 3   • LORazepam (ATIVAN) 1 MG Tab Take 1 Tab by mouth every bedtime for 90 days. 90 Tab 0   • Coenzyme Q10 (COQ10) 100 MG Cap Take 300 mg by mouth every day. 30 Cap    • rosuvastatin (CRESTOR) 10 MG Tab Take 1 Tab by mouth every evening. 90 Tab 3   • Omega-3 Fatty Acids (FISH OIL) 1000 MG Cap capsule Take 1 Cap by mouth 2 Times a Day.     • traZODone (DESYREL) 100 MG Tab TAKE ONE TABLET BY MOUTH AT BEDTIME 90 Tab 2   • fluticasone (FLONASE) 50 MCG/ACT nasal spray Spray 2 Sprays in nose every day. 48 g 11   • citalopram (CELEXA) 40 MG Tab Take 1 Tab by mouth every day. 90 Tab 3   • ondansetron (ZOFRAN ODT) 4 MG TABLET DISPERSIBLE Take 1 Tab by mouth every 6 hours as needed for Nausea. 10 Tab 0   • Artificial Tear Solution (TEARS NATURALE OP) Place 2 Drops in both eyes 2 times a day as needed.     • Cholecalciferol (VITAMIN D-3) 5000 units Tab Take 1 Tab by mouth every day.     • docusate sodium (COLACE) 100 MG Cap Take 100  mg by mouth 2 times a day as needed.     • furosemide (LASIX) 20 MG Tab Take 20 mg by mouth every day.     • Multiple Vitamins-Minerals (MULTI-VITAMIN/MINERALS PO) Take 1 Tab by mouth every day.     • Cyanocobalamin (VITAMIN B 12 PO) Place 1 Tab under tongue every day.     • Probiotic Product (PROBIOTIC DAILY PO) Take 1 Cap by mouth every day.       No current facility-administered medications for this visit.        Social History   Substance Use Topics   • Smoking status: Former Smoker     Packs/day: 0.30     Years: 5.00     Types: Cigarettes     Quit date: 1/1/1975   • Smokeless tobacco: Never Used      Comment: quit 1975   • Alcohol use No     Social History     Social History Narrative   • No narrative on file       Family History   Problem Relation Age of Onset   • Diabetes Mother    • Cancer Mother 82        breast cancer   • Lung Disease Mother         copd   • Hyperlipidemia Mother    • Hypertension Mother    • Cancer Father         Laryngeal CA   • Heart Disease Father 50        CAD with bypasses x 3   • Heart Attack Father    • Hyperlipidemia Father    • Hypertension Father    • Diabetes Sister         type II diabetes   • Diabetes Unknown    • Heart Disease Unknown    • Hypertension Unknown    • Lung Disease Unknown        Allergies, past medical history, past surgical history, family history, social history reviewed and updated.    Review of Systems:      - Constitutional: Negative for fever, chills, unexpected weight change, and fatigue/generalized weakness.     - Respiratory: Negative for cough, sputum production, chest congestion, dyspnea, wheezing, and crackles.      - Cardiovascular: Negative for chest pain, palpitations, orthopnea, and bilateral lower extremity edema.     - Musculoskeletal: Negative for myalgias, back pain, and joint pain.     - Neurological: Negative for dizziness, tingling, tremors, focal sensory deficit, focal weakness and headaches.     All other systems reviewed and are  "negative    Exam:    /64 (BP Location: Left arm, Patient Position: Sitting, BP Cuff Size: Adult)   Pulse 86   Temp 36.8 °C (98.2 °F) (Temporal)   Resp 14   Ht 1.473 m (4' 10\")   Wt 74.4 kg (164 lb)   SpO2 92%   BMI 34.28 kg/m²  Body mass index is 34.28 kg/m².    Physical Exam:  Constitutional: Well-developed and well-nourished. Not diaphoretic. No distress.   Cardiovascular: Regular rate and rhythm, S1 and S2 without murmur, rubs, or gallops.    Chest: Effort normal. Clear to auscultation throughout. No adventitious sounds. No CVA tenderness.  Neurological: Alert and oriented x 3.   Psychiatric:  Behavior, mood, and affect are appropriate.    LABS: 1/7/2019 Results reviewed and discussed with the patient, questions answered.    Assessment/Plan:  1. Insomnia, persistent  Stable, controlled.  Lorazepam reordered.  To reassess in 90 days.  In addition to prescribed medication discussed nonpharmacological management of insomnia, relaxation techniques, deep breathing, meditation.  Discussed avoiding caffeinated beverages, stop consuming caffeine before 2 PM and keep well-hydrated with water.  - LORazepam (ATIVAN) 1 MG Tab; Take 1 Tab by mouth every bedtime for 90 days.  Dispense: 90 Tab; Refill: 0    2. Screening mammogram, encounter for  Last screen completed in 2017.  New order placed.  - MA-SCREENING MAMMO BILAT W/TOMOSYNTHESIS W/CAD; Future    3. Lumbar radiculopathy, chronic  Stable, controlled.  Continue current management.  Reordered baclofen 10 mg at bedtime as needed.  Discussed nonpharmacological management, such as stretching and yoga.    4. Spinal stenosis of lumbar region with neurogenic claudication  Stable, controlled.  Continue current management.  Reordered baclofen 10 mg at bedtime as needed.  Discussed nonpharmacological management, such as stretching and yoga.    5. Familial hypercholesteremia  Uncontrolled.  Patient started taking Crestor 10 mg nightly.  Discussed the importance of " taking this medication, patient verbalizes understanding and interest compliance.  Labs ordered, will reassess next appointment in 3 months.  - Comp Metabolic Panel; Future  - Lipid Profile; Future      Reviewed indication, dosage, usage and potential adverse effects of prescribed medications. Reviewed risks and benefits of treatment plan. Patient verbalizes understanding and verbally agrees to plan of care.    Return in about 3 months (around 6/12/2019).

## 2019-03-12 NOTE — ASSESSMENT & PLAN NOTE
Chronic in nature.  Patient takes lorazepam nightly.  No complaints of side effects. Requesting refill today.  report reviewed, pt eligible for refill.

## 2019-03-12 NOTE — ASSESSMENT & PLAN NOTE
Chronic in nature. Taking baclofen nightly as needed.  Requesting a refill today.  repot reviewed.

## 2019-03-27 ENCOUNTER — HOSPITAL ENCOUNTER (OUTPATIENT)
Dept: RADIOLOGY | Facility: MEDICAL CENTER | Age: 64
End: 2019-03-27

## 2019-04-02 ENCOUNTER — HOSPITAL ENCOUNTER (OUTPATIENT)
Dept: RADIOLOGY | Facility: MEDICAL CENTER | Age: 64
End: 2019-04-02
Attending: FAMILY MEDICINE
Payer: MEDICARE

## 2019-04-02 DIAGNOSIS — Z12.31 SCREENING MAMMOGRAM, ENCOUNTER FOR: ICD-10-CM

## 2019-04-02 PROCEDURE — 77063 BREAST TOMOSYNTHESIS BI: CPT

## 2019-06-05 ENCOUNTER — HOSPITAL ENCOUNTER (OUTPATIENT)
Dept: LAB | Facility: MEDICAL CENTER | Age: 64
End: 2019-06-05
Attending: FAMILY MEDICINE
Payer: MEDICARE

## 2019-06-05 DIAGNOSIS — E78.01 FAMILIAL HYPERCHOLESTEREMIA: Chronic | ICD-10-CM

## 2019-06-05 LAB
ALBUMIN SERPL BCP-MCNC: 4.4 G/DL (ref 3.2–4.9)
ALBUMIN/GLOB SERPL: 1.7 G/DL
ALP SERPL-CCNC: 71 U/L (ref 30–99)
ALT SERPL-CCNC: 31 U/L (ref 2–50)
ANION GAP SERPL CALC-SCNC: 7 MMOL/L (ref 0–11.9)
AST SERPL-CCNC: 26 U/L (ref 12–45)
BILIRUB SERPL-MCNC: 0.7 MG/DL (ref 0.1–1.5)
BUN SERPL-MCNC: 18 MG/DL (ref 8–22)
CALCIUM SERPL-MCNC: 9.6 MG/DL (ref 8.5–10.5)
CHLORIDE SERPL-SCNC: 102 MMOL/L (ref 96–112)
CHOLEST SERPL-MCNC: 146 MG/DL (ref 100–199)
CO2 SERPL-SCNC: 28 MMOL/L (ref 20–33)
CREAT SERPL-MCNC: 0.93 MG/DL (ref 0.5–1.4)
FASTING STATUS PATIENT QL REPORTED: NORMAL
GLOBULIN SER CALC-MCNC: 2.6 G/DL (ref 1.9–3.5)
GLUCOSE SERPL-MCNC: 96 MG/DL (ref 65–99)
HDLC SERPL-MCNC: 47 MG/DL
LDLC SERPL CALC-MCNC: 80 MG/DL
POTASSIUM SERPL-SCNC: 4.2 MMOL/L (ref 3.6–5.5)
PROT SERPL-MCNC: 7 G/DL (ref 6–8.2)
SODIUM SERPL-SCNC: 137 MMOL/L (ref 135–145)
TRIGL SERPL-MCNC: 94 MG/DL (ref 0–149)

## 2019-06-05 PROCEDURE — 80061 LIPID PANEL: CPT

## 2019-06-05 PROCEDURE — 80053 COMPREHEN METABOLIC PANEL: CPT

## 2019-06-05 PROCEDURE — 36415 COLL VENOUS BLD VENIPUNCTURE: CPT

## 2019-06-07 ENCOUNTER — PATIENT MESSAGE (OUTPATIENT)
Dept: MEDICAL GROUP | Facility: MEDICAL CENTER | Age: 64
End: 2019-06-07

## 2019-06-11 ENCOUNTER — OFFICE VISIT (OUTPATIENT)
Dept: MEDICAL GROUP | Facility: MEDICAL CENTER | Age: 64
End: 2019-06-11
Payer: MEDICARE

## 2019-06-11 VITALS
HEART RATE: 69 BPM | DIASTOLIC BLOOD PRESSURE: 72 MMHG | BODY MASS INDEX: 31.79 KG/M2 | TEMPERATURE: 98.2 F | HEIGHT: 58 IN | SYSTOLIC BLOOD PRESSURE: 110 MMHG | RESPIRATION RATE: 12 BRPM | WEIGHT: 151.46 LBS | OXYGEN SATURATION: 95 %

## 2019-06-11 DIAGNOSIS — G47.00 INSOMNIA, PERSISTENT: ICD-10-CM

## 2019-06-11 DIAGNOSIS — I10 HTN, GOAL BELOW 130/80: ICD-10-CM

## 2019-06-11 DIAGNOSIS — E78.5 DYSLIPIDEMIA: ICD-10-CM

## 2019-06-11 DIAGNOSIS — E66.9 OBESITY (BMI 30-39.9): ICD-10-CM

## 2019-06-11 DIAGNOSIS — E78.01 FAMILIAL HYPERCHOLESTEREMIA: Chronic | ICD-10-CM

## 2019-06-11 PROCEDURE — 99214 OFFICE O/P EST MOD 30 MIN: CPT | Performed by: FAMILY MEDICINE

## 2019-06-11 RX ORDER — BACLOFEN 10 MG/1
10 TABLET ORAL
Qty: 90 TAB | Refills: 3 | Status: SHIPPED | OUTPATIENT
Start: 2019-06-11 | End: 2019-12-31 | Stop reason: SDUPTHER

## 2019-06-11 RX ORDER — LORAZEPAM 1 MG/1
1 TABLET ORAL
Qty: 90 TAB | Refills: 0 | Status: SHIPPED | OUTPATIENT
Start: 2019-06-11 | End: 2019-09-06 | Stop reason: SDUPTHER

## 2019-06-11 NOTE — ASSESSMENT & PLAN NOTE
This is a chronic health problem that is well controlled with current medications and lifestyle measures.  Will continue with current meds, BP is excellent at 110/72.

## 2019-06-11 NOTE — ASSESSMENT & PLAN NOTE
This is been a problem for this patient in the past but she is now doing a new program called [employer] which has her doing protein shakes and eating 1 meal a day.  She is last Pap approximately 15 pounds and improved her numbers overall.  She will plan to continue with this program.

## 2019-06-11 NOTE — PROGRESS NOTES
CC:Diagnoses of Insomnia, persistent, Familial hypercholesteremia, HTN, goal below 130/80, Obesity (BMI 30-39.9), and Dyslipidemia were pertinent to this visit.    HISTORY OF PRESENT ILLNESS: Patient is a 64 y.o. female established patient who presents today to talk about her chronic health problems as outlined below.  Patient did her blood work prior to the visit and it shows excellent results from her recent changes in lifestyle.  Comprehensive metabolic panel was completely normal and her lipid panel has now normalized also her kidney function has normalized.  Patient is doing a great job with her new eating program.    Health Maintenance: Completed    Dyslipidemia  This is now under excellent control with meds and medication.  Her total cholesterol is down to 146, triglycerides down to 94, HDL good at 47 and LDL excellent at 80.  Will recheck in 3 months.    HTN, goal below 130/80  This is a chronic health problem that is well controlled with current medications and lifestyle measures.  Will continue with current meds, BP is excellent at 110/72.    Obesity (BMI 30-39.9)  This is been a problem for this patient in the past but she is now doing a new program called [employer] which has her doing protein shakes and eating 1 meal a day.  She is last Pap approximately 15 pounds and improved her numbers overall.  She will plan to continue with this program.    Familial hypercholesteremia  This is a chronic health problem for this patient that her changes in eating habits have made a remarkable improvement in her lipids.  Her total cholesterol came down from a level of 233 to a level of 146, LDL came down from 128 to a level of 80 and her triglycerides came down from 257 to a level of 94.      Patient Active Problem List    Diagnosis Date Noted   • Aortic atherosclerosis (HCC)    • Familial hypercholesteremia    • Hair loss 12/06/2018   • Polyneuropathy associated with underlying disease (HCC) 03/13/2018   • Obesity (BMI  30-39.9) 03/13/2018   • Acute cystitis without hematuria 12/22/2017   • Menopause 07/10/2017   • ROBERT (generalized anxiety disorder) 06/15/2017   • Allergic rhinitis 05/10/2017   • S/P lumbar fusion 10/20/2016   • Cervical radiculopathy 10/20/2016   • Diaphragmatic hernia 06/30/2015   • Mild mitral regurgitation 03/12/2015   • H/O: CVA (cerebrovascular accident) 09/23/2014   • Chronic constipation 09/17/2014   • GERD (gastroesophageal reflux disease) 09/17/2014   • Spinal stenosis of lumbar region 03/20/2014   • Lumbar radiculopathy, chronic 08/26/2013   • Carpal tunnel syndrome 11/14/2012   • Vitamin D deficiency disease 09/24/2012   • Dyslipidemia 12/27/2011   • HTN, goal below 130/80 10/24/2011   • Acquired scoliosis 10/24/2011   • Sleep apnea, obstructive 10/24/2011   • Insomnia, persistent 10/24/2011   • Major depressive disorder with single episode, in full remission (HCC) 10/24/2011   • 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      Allergies:Albumin and Seasonal    Current Outpatient Prescriptions   Medication Sig Dispense Refill   • LORazepam (ATIVAN) 1 MG Tab Take 1 Tab by mouth every bedtime for 90 days. 90 Tab 0   • baclofen (LIORESAL) 10 MG Tab Take 1 Tab by mouth every bedtime. 90 Tab 3   • Coenzyme Q10 (COQ10) 100 MG Cap Take 300 mg by mouth every day. 30 Cap    • rosuvastatin (CRESTOR) 10 MG Tab Take 1 Tab by mouth every evening. 90 Tab 3   • Omega-3 Fatty Acids (FISH OIL) 1000 MG Cap capsule Take 1 Cap by mouth 2 Times a Day.     • traZODone (DESYREL) 100 MG Tab TAKE ONE TABLET BY MOUTH AT BEDTIME 90 Tab 2   • fluticasone (FLONASE) 50 MCG/ACT nasal spray Spray 2 Sprays in nose every day. 48 g 11   • citalopram (CELEXA) 40 MG Tab Take 1 Tab by mouth every day. 90 Tab 3   • ondansetron (ZOFRAN ODT) 4 MG TABLET DISPERSIBLE Take 1 Tab by mouth every 6 hours as needed for Nausea. 10 Tab 0   • Artificial Tear Solution (TEARS NATURALE OP) Place 2 Drops in both eyes 2 times a day as needed.     • Cholecalciferol (VITAMIN D-3)  5000 units Tab Take 1 Tab by mouth every day.     • docusate sodium (COLACE) 100 MG Cap Take 100 mg by mouth 2 times a day as needed.     • furosemide (LASIX) 20 MG Tab Take 20 mg by mouth every day.     • Multiple Vitamins-Minerals (MULTI-VITAMIN/MINERALS PO) Take 1 Tab by mouth every day.     • Cyanocobalamin (VITAMIN B 12 PO) Place 1 Tab under tongue every day.     • Probiotic Product (PROBIOTIC DAILY PO) Take 1 Cap by mouth every day.       No current facility-administered medications for this visit.        Social History   Substance Use Topics   • Smoking status: Former Smoker     Packs/day: 0.30     Years: 5.00     Types: Cigarettes     Quit date: 1/1/1975   • Smokeless tobacco: Never Used      Comment: quit 1975   • Alcohol use No     Social History     Social History Narrative   • No narrative on file       Family History   Problem Relation Age of Onset   • Diabetes Mother    • Cancer Mother 82        breast cancer   • Lung Disease Mother         copd   • Hyperlipidemia Mother    • Hypertension Mother    • Cancer Father         Laryngeal CA   • Heart Disease Father 50        CAD with bypasses x 3   • Heart Attack Father    • Hyperlipidemia Father    • Hypertension Father    • Diabetes Sister         type II diabetes   • Diabetes Unknown    • Heart Disease Unknown    • Hypertension Unknown    • Lung Disease Unknown         ROS:     - Constitutional:  Negative for fever, chills, unexpected weight change, and fatigue/generalized weakness.    - HEENT:  Negative for headaches, vision changes, hearing changes, ear pain, ear discharge, rhinorrhea, sinus congestion, sore throat, and neck pain.      - Respiratory: Negative for cough, sputum production, chest congestion, dyspnea, wheezing, and crackles.      - Cardiovascular: Negative for chest pain, palpitations, orthopnea, and bilateral lower extremity edema.     - Gastrointestinal: Negative for heartburn, nausea, vomiting, abdominal pain, hematochezia, melena,  "diarrhea, constipation, and greasy/foul-smelling stools.     - Genitourinary: Negative for dysuria, polyuria, hematuria, pyuria, urinary urgency, and urinary incontinence.     - Musculoskeletal: Negative for myalgias, back pain, and joint pain.     - Skin: Negative for rash, itching, cyanotic skin color change.     - Neurological: Negative for dizziness, tingling, tremors, focal sensory deficit, focal weakness and headaches.     - Endo/Heme/Allergies: Does not bruise/bleed easily.     - Psychiatric/Behavioral: Negative for depression, suicidal/homicidal ideation and memory loss.          - NOTE: All other systems reviewed and are negative, except as in HPI.      Exam:    /72 (BP Location: Left arm, Patient Position: Sitting, BP Cuff Size: Adult)   Pulse 69   Temp 36.8 °C (98.2 °F) (Temporal)   Resp 12   Ht 1.473 m (4' 10\")   Wt 68.7 kg (151 lb 7.3 oz)   SpO2 95%  Body mass index is 31.65 kg/m².    General:  Well nourished, well developed female in NAD    LABS: 6/5/2019: Results reviewed and discussed with the patient, questions answered.    Please note that this dictation was created using voice recognition software. I have made every reasonable attempt to correct obvious errors, but I expect that there are errors of grammar and possibly content that I did not discover before finalizing the note.    Assessment/Plan:  1. Insomnia, persistent  Uncontrolled without lorazepam.  Patient due for her refill for the coming 90 days.  - LORazepam (ATIVAN) 1 MG Tab; Take 1 Tab by mouth every bedtime for 90 days.  Dispense: 90 Tab; Refill: 0    2. Familial hypercholesteremia  Well-controlled with medication and lifestyle changes.  She will continue these long-term.  - Lipid Profile; Future    3. HTN, goal below 130/80  Controlled, continue with current meds and lifestyle.      4. Obesity (BMI 30-39.9)  Patient showing remarkable improvement in her weight loss efforts with starting [employer].  She is planning to " continue that long-term.    5. Dyslipidemia  Controlled, continue with current meds and lifestyle.

## 2019-06-11 NOTE — ASSESSMENT & PLAN NOTE
This is now under excellent control with meds and medication.  Her total cholesterol is down to 146, triglycerides down to 94, HDL good at 47 and LDL excellent at 80.  Will recheck in 3 months.

## 2019-06-11 NOTE — ASSESSMENT & PLAN NOTE
This is a chronic health problem for this patient that her changes in eating habits have made a remarkable improvement in her lipids.  Her total cholesterol came down from a level of 233 to a level of 146, LDL came down from 128 to a level of 80 and her triglycerides came down from 257 to a level of 94.

## 2019-06-11 NOTE — ASSESSMENT & PLAN NOTE
This is a chronic health problem for this patient for which she utilizes lorazepam 1 mg at bedtime.  Without this medication the patient does not sleep.  Despite all the other lifestyle changes she has made she has not been able to get off this medication.  We will write a new prescription for the coming 90 days.  Obtained and reviewed patient utilization report from [facility] pharmacy database on 6/11/2019 9:48 AM  prior to writing prescription for controlled substance II, III or IV per [place] bill . Based on assessment of the report,my physical exam if necessary and the patient's health problem, the prescription is medically necessary.

## 2019-07-08 ENCOUNTER — OFFICE VISIT (OUTPATIENT)
Dept: URGENT CARE | Facility: PHYSICIAN GROUP | Age: 64
End: 2019-07-08
Payer: MEDICARE

## 2019-07-08 VITALS
HEIGHT: 58 IN | DIASTOLIC BLOOD PRESSURE: 80 MMHG | OXYGEN SATURATION: 94 % | HEART RATE: 84 BPM | WEIGHT: 146 LBS | RESPIRATION RATE: 16 BRPM | TEMPERATURE: 98.5 F | BODY MASS INDEX: 30.64 KG/M2 | SYSTOLIC BLOOD PRESSURE: 122 MMHG

## 2019-07-08 DIAGNOSIS — T25.222A PARTIAL THICKNESS BURN OF LEFT FOOT, INITIAL ENCOUNTER: ICD-10-CM

## 2019-07-08 PROCEDURE — 99214 OFFICE O/P EST MOD 30 MIN: CPT | Performed by: PHYSICIAN ASSISTANT

## 2019-07-08 NOTE — PROGRESS NOTES
Chief Complaint   Patient presents with   • Burn       HISTORY OF PRESENT ILLNESS: Patient is a 64 y.o. female who presents today because she spilled hot coffee on her left foot this morning and put some aloe on it.  However she notes that the blister got larger over the course of the day.  It is tender but the blister has not popped    Patient Active Problem List    Diagnosis Date Noted   • Aortic atherosclerosis ([facility])    • Familial hypercholesteremia    • Hair loss 12/06/2018   • Polyneuropathy associated with underlying disease ([facility]) 03/13/2018   • Obesity (BMI 30-39.9) 03/13/2018   • Acute cystitis without hematuria 12/22/2017   • Menopause 07/10/2017   • ROBERT (generalized anxiety disorder) 06/15/2017   • Allergic rhinitis 05/10/2017   • S/P lumbar fusion 10/20/2016   • Cervical radiculopathy 10/20/2016   • Diaphragmatic hernia 06/30/2015   • Mild mitral regurgitation 03/12/2015   • H/O: CVA (cerebrovascular accident) 09/23/2014   • Chronic constipation 09/17/2014   • GERD (gastroesophageal reflux disease) 09/17/2014   • Spinal stenosis of lumbar region 03/20/2014   • Lumbar radiculopathy, chronic 08/26/2013   • Carpal tunnel syndrome 11/14/2012   • Vitamin D deficiency disease 09/24/2012   • Dyslipidemia 12/27/2011   • HTN, goal below 130/80 10/24/2011   • Acquired scoliosis 10/24/2011   • Sleep apnea, obstructive 10/24/2011   • Insomnia, persistent 10/24/2011   • Major depressive disorder with single episode, in full remission ([facility]) 10/24/2011   • 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       Allergies:Albumin and Seasonal    Current Outpatient Prescriptions Ordered in [facility]   Medication Sig Dispense Refill   • silver sulfADIAZINE (SILVADENE) 1 % Cream Apply 4 g to affected area(s) every day. 400 g 0   • LORazepam (ATIVAN) 1 MG Tab Take 1 Tab by mouth every bedtime for 90 days. 90 Tab 0   • baclofen (LIORESAL) 10 MG Tab Take 1 Tab by mouth every bedtime. 90 Tab 3   • Coenzyme Q10 (COQ10) 100 MG Cap Take 300 mg by mouth every  "day. 30 Cap    • rosuvastatin (CRESTOR) 10 MG Tab Take 1 Tab by mouth every evening. 90 Tab 3   • Omega-3 Fatty Acids (FISH OIL) 1000 MG Cap capsule Take 1 Cap by mouth 2 Times a Day.     • traZODone (DESYREL) 100 MG Tab TAKE ONE TABLET BY MOUTH AT BEDTIME 90 Tab 2   • fluticasone (FLONASE) 50 MCG/ACT nasal spray Spray 2 Sprays in nose every day. 48 g 11   • citalopram (CELEXA) 40 MG Tab Take 1 Tab by mouth every day. 90 Tab 3   • ondansetron (ZOFRAN ODT) 4 MG TABLET DISPERSIBLE Take 1 Tab by mouth every 6 hours as needed for Nausea. 10 Tab 0   • Artificial Tear Solution (TEARS NATURALE OP) Place 2 Drops in both eyes 2 times a day as needed.     • Cholecalciferol (VITAMIN D-3) 5000 units Tab Take 1 Tab by mouth every day.     • docusate sodium (COLACE) 100 MG Cap Take 100 mg by mouth 2 times a day as needed.     • furosemide (LASIX) 20 MG Tab Take 20 mg by mouth every day.     • Multiple Vitamins-Minerals (MULTI-VITAMIN/MINERALS PO) Take 1 Tab by mouth every day.     • Cyanocobalamin (VITAMIN B 12 PO) Place 1 Tab under tongue every day.     • Probiotic Product (PROBIOTIC DAILY PO) Take 1 Cap by mouth every day.       No current [facility]-ordered facility-administered medications on file.        Past Medical History:   Diagnosis Date   • Anesthesia 02-14-11    PO N/V, \"slow to come out\"   • Aortic atherosclerosis (HCC)    • Arthritis 02-14-11    spine   • Backpain 02-14-11    neck and abd. also,    • Breath shortness     with exertion   • Bronchitis 05/2018   • Cancer (HCC) 07/18/2018    Skin - 15 years ago.   • Cancer (HCC)     April/May 2018   • Diverticulitis    • Endometriosis of uterus    • Familial hypercholesteremia    • Fusion of spine 2014   • ROBERT (generalized anxiety disorder) 6/15/2017   • H/O fall     when in hospital  with low O2 sats   • H/O: CVA (cerebrovascular accident) 8/22/2014    Complex event associated with apparent medication reaction but with MRI suggesting possible multiple small infarcts of " various ages, [facility]   • Hair loss 2018   • Heart burn    • Hiatus hernia syndrome     not repaired   • High cholesterol    • HTN, goal below 130/80 10/24/2011   • Infectious disease      Hep C +   • Lung collapse 11    right lung  collpsed    • Major depressive disorder with single episode, in full remission (HCC) 10/24/2011   • Mixed hyperlipidemia 2011   • Moderate major depression (HCC) 10/24/2011   • MRSA exposure 2014    while in hospital   • Post-menopause on HRT (hormone replacement therapy) 2011   • 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    exposed as a child, told not active   • Scoliosis    • Sleep apnea 2015    CPAP   • Snoring    • Unspecified vitamin D deficiency 2012       Social History   Substance Use Topics   • Smoking status: Former Smoker     Packs/day: 0.30     Years: 5.00     Types: Cigarettes     Quit date: 1975   • Smokeless tobacco: Never Used      Comment: quit    • Alcohol use No       Family Status   Relation Status   • Mo Alive        84 in    • Fa  at age 81        Laryngeal CA   • Sis Alive        56 in    • Unknown (Not Specified)     Family History   Problem Relation Age of Onset   • Diabetes Mother    • Cancer Mother 82        breast cancer   • Lung Disease Mother         copd   • Hyperlipidemia Mother    • Hypertension Mother    • Cancer Father         Laryngeal CA   • Heart Disease Father 50        CAD with bypasses x 3   • Heart Attack Father    • Hyperlipidemia Father    • Hypertension Father    • Diabetes Sister         type II diabetes   • Diabetes Unknown    • Heart Disease Unknown    • Hypertension Unknown    • Lung Disease Unknown        ROS:  Review of Systems   Constitutional: Negative for fever, chills, weight loss and malaise/fatigue.   HENT: Negative for ear pain, nosebleeds, congestion, sore throat and neck pain.    Eyes: Negative for blurred vision.   Respiratory: Negative for cough, sputum production, shortness of  "breath and wheezing.    Cardiovascular: Negative for chest pain, palpitations, orthopnea and leg swelling.   Gastrointestinal: Negative for heartburn, nausea, vomiting and abdominal pain.   Genitourinary: Negative for dysuria, urgency and frequency.     Exam:  /80 (BP Location: Right arm, Patient Position: Sitting, BP Cuff Size: Adult)   Pulse 84   Temp 36.9 °C (98.5 °F) (Temporal)   Resp 16   Ht 1.473 m (4' 10\")   Wt 66.2 kg (146 lb)   SpO2 94%   General:  Well nourished, well developed female in NAD  Head:Normocephalic, atraumatic  Eyes: PERRLA, EOM within normal limits, no conjunctival injection, no scleral icterus, visual fields and acuity grossly intact.  Extremities: no clubbing, cyanosis, or edema.  On the top of her left foot she has a 2 x 6 cm semi-linear area of erythema with intact blister    Please note that this dictation was created using voice recognition software. I have made every reasonable attempt to correct obvious errors, but I expect that there are errors of grammar and possibly content that I did not discover before finalizing the note.    Assessment/Plan:  1. Partial thickness burn of left foot, initial encounter  silver sulfADIAZINE (SILVADENE) 1 % Cream   Discussed keeping the blister intact as long as possible, use the Silvadene blister should pop or puncture    Followup with primary care in the next 7-10 days if not significantly improving, return to the urgent care or go to the emergency room sooner for any worsening of symptoms.       "

## 2019-08-09 RX ORDER — TRAZODONE HYDROCHLORIDE 100 MG/1
TABLET ORAL
Qty: 90 TAB | Refills: 3 | Status: SHIPPED | OUTPATIENT
Start: 2019-08-09 | End: 2019-12-06 | Stop reason: SINTOL

## 2019-08-29 ENCOUNTER — HOSPITAL ENCOUNTER (OUTPATIENT)
Dept: LAB | Facility: MEDICAL CENTER | Age: 64
End: 2019-08-29
Attending: FAMILY MEDICINE
Payer: MEDICARE

## 2019-08-29 DIAGNOSIS — E78.01 FAMILIAL HYPERCHOLESTEREMIA: Chronic | ICD-10-CM

## 2019-08-29 LAB
CHOLEST SERPL-MCNC: 235 MG/DL (ref 100–199)
FASTING STATUS PATIENT QL REPORTED: NORMAL
HDLC SERPL-MCNC: 54 MG/DL
LDLC SERPL CALC-MCNC: 155 MG/DL
TRIGL SERPL-MCNC: 132 MG/DL (ref 0–149)

## 2019-08-29 PROCEDURE — 36415 COLL VENOUS BLD VENIPUNCTURE: CPT

## 2019-08-29 PROCEDURE — 80061 LIPID PANEL: CPT

## 2019-09-06 ENCOUNTER — OFFICE VISIT (OUTPATIENT)
Dept: MEDICAL GROUP | Facility: MEDICAL CENTER | Age: 64
End: 2019-09-06
Payer: MEDICARE

## 2019-09-06 VITALS
DIASTOLIC BLOOD PRESSURE: 82 MMHG | TEMPERATURE: 97.9 F | OXYGEN SATURATION: 92 % | WEIGHT: 138 LBS | HEART RATE: 87 BPM | SYSTOLIC BLOOD PRESSURE: 100 MMHG | HEIGHT: 58 IN | RESPIRATION RATE: 12 BRPM | BODY MASS INDEX: 28.97 KG/M2

## 2019-09-06 DIAGNOSIS — I10 HTN, GOAL BELOW 130/80: ICD-10-CM

## 2019-09-06 DIAGNOSIS — F41.1 GAD (GENERALIZED ANXIETY DISORDER): ICD-10-CM

## 2019-09-06 DIAGNOSIS — E78.01 FAMILIAL HYPERCHOLESTEREMIA: Chronic | ICD-10-CM

## 2019-09-06 DIAGNOSIS — Z23 NEED FOR VACCINATION: ICD-10-CM

## 2019-09-06 DIAGNOSIS — G47.00 INSOMNIA, PERSISTENT: ICD-10-CM

## 2019-09-06 PROCEDURE — G0009 ADMIN PNEUMOCOCCAL VACCINE: HCPCS | Performed by: FAMILY MEDICINE

## 2019-09-06 PROCEDURE — 99214 OFFICE O/P EST MOD 30 MIN: CPT | Mod: 25 | Performed by: FAMILY MEDICINE

## 2019-09-06 PROCEDURE — 90670 PCV13 VACCINE IM: CPT | Performed by: FAMILY MEDICINE

## 2019-09-06 RX ORDER — LORAZEPAM 1 MG/1
1 TABLET ORAL
Qty: 90 TAB | Refills: 0 | Status: SHIPPED | OUTPATIENT
Start: 2019-09-06 | End: 2019-12-06 | Stop reason: SDUPTHER

## 2019-09-06 RX ORDER — CEFUROXIME AXETIL 250 MG/1
250 TABLET ORAL 2 TIMES DAILY
Qty: 20 TAB | Refills: 0 | Status: SHIPPED | OUTPATIENT
Start: 2019-09-06 | End: 2019-09-16

## 2019-09-06 NOTE — ASSESSMENT & PLAN NOTE
This is a chronic health problem for this patient for which she utilizes lorazepam 1 mg at bedtime.  It is time for refill of this medication.  Her  report is appropriate for refill.  Obtained and reviewed patient utilization report from [facility] pharmacy database on 9/6/2019 11:30 AM  prior to writing prescription for controlled substance II, III or IV per [place] bill . Based on assessment of the report,my physical exam if necessary and the patient's health problem, the prescription is medically necessary.

## 2019-09-06 NOTE — ASSESSMENT & PLAN NOTE
This is a chronic health problem that is well controlled with current medications and lifestyle measures.  Her blood pressure is excellent 100/82.  She will continue with current medications and lifestyle management.

## 2019-09-06 NOTE — ASSESSMENT & PLAN NOTE
This is a chronic health problem that is well controlled with current medications and lifestyle measures.  Patient utilizes lorazepam 1 mg at bedtime.  We will provide that refill today.  Obtained and reviewed patient utilization report from [facility] pharmacy database on 9/6/2019 11:37 AM  prior to writing prescription for controlled substance II, III or IV per [place] bill . Based on assessment of the report,my physical exam if necessary and the patient's health problem, the prescription is medically necessary.

## 2019-09-06 NOTE — ASSESSMENT & PLAN NOTE
This is a chronic health problem for this patient that she was doing a great job managing this through her lifestyle.  She is lost 26 pounds over the last 6 months.  Her cholesterol though went up to 235, triglycerides 132, HDL 54 and LDL was 155.  Unfortunately the patient forgot she was doing blood work and had a cup of coffee that morning with creamer and sugar.  We will plan to recheck this about 3 months down the road before we see her back.

## 2019-09-09 ENCOUNTER — PATIENT MESSAGE (OUTPATIENT)
Dept: MEDICAL GROUP | Facility: MEDICAL CENTER | Age: 64
End: 2019-09-09

## 2019-09-09 NOTE — TELEPHONE ENCOUNTER
"From: Almaz Samuel  To: Sahra Max M.D.  Sent: 9/9/2019 10:10 AM PDT  Subject: Procedure Question    Good morning,  On the 6 th I received booster shoot. I have a reaction, the shoot sight has about a 2-3\" area. Swelling and several hard lumps within the red and swollen area. Saturday AM I suddenly got sick and dizzy. Arm is still lumpy and swollen. Not as painful now. Should I be worried about this?  Thank you,  Almaz  "

## 2019-09-17 NOTE — PROGRESS NOTES
CC:Diagnoses of ROBERT (generalized anxiety disorder), Familial hypercholesteremia, HTN, goal below 130/80, Insomnia, persistent, and Need for vaccination were pertinent to this visit.    HISTORY OF PRESENT ILLNESS: Patient is a 64 y.o. female established patient who presents today to talk about her chronic health problems as outlined below.  Patient also needs a refill of her lorazepam which is a controlled substance.  Her  report is appropriate.      ROBERT (generalized anxiety disorder)  This is a chronic health problem for this patient for which she utilizes lorazepam 1 mg at bedtime.  It is time for refill of this medication.  Her  report is appropriate for refill.  Obtained and reviewed patient utilization report from [facility] pharmacy database on 9/6/2019 11:30 AM  prior to writing prescription for controlled substance II, III or IV per [place] bill . Based on assessment of the report,my physical exam if necessary and the patient's health problem, the prescription is medically necessary.       Familial hypercholesteremia  This is a chronic health problem for this patient that she was doing a great job managing this through her lifestyle.  She is lost 26 pounds over the last 6 months.  Her cholesterol though went up to 235, triglycerides 132, HDL 54 and LDL was 155.  Unfortunately the patient forgot she was doing blood work and had a cup of coffee that morning with creamer and sugar.  We will plan to recheck this about 3 months down the road before we see her back.    HTN, goal below 130/80  This is a chronic health problem that is well controlled with current medications and lifestyle measures.  Her blood pressure is excellent 100/82.  She will continue with current medications and lifestyle management.    Insomnia, persistent  This is a chronic health problem that is well controlled with current medications and lifestyle measures.  Patient utilizes lorazepam 1 mg at bedtime.  We will provide that  refill today.  Obtained and reviewed patient utilization report from [facility] pharmacy database on 9/6/2019 11:37 AM  prior to writing prescription for controlled substance II, III or IV per [place] bill . Based on assessment of the report,my physical exam if necessary and the patient's health problem, the prescription is medically necessary.         Patient Active Problem List    Diagnosis Date Noted   • Aortic atherosclerosis (HCC)    • Familial hypercholesteremia    • Hair loss 12/06/2018   • Polyneuropathy associated with underlying disease (HCC) 03/13/2018   • Obesity (BMI 30-39.9) 03/13/2018   • Acute cystitis without hematuria 12/22/2017   • Menopause 07/10/2017   • ROBERT (generalized anxiety disorder) 06/15/2017   • Allergic rhinitis 05/10/2017   • S/P lumbar fusion 10/20/2016   • Cervical radiculopathy 10/20/2016   • Diaphragmatic hernia 06/30/2015   • Mild mitral regurgitation 03/12/2015   • H/O: CVA (cerebrovascular accident) 09/23/2014   • Chronic constipation 09/17/2014   • GERD (gastroesophageal reflux disease) 09/17/2014   • Spinal stenosis of lumbar region 03/20/2014   • Lumbar radiculopathy, chronic 08/26/2013   • Carpal tunnel syndrome 11/14/2012   • Vitamin D deficiency disease 09/24/2012   • Dyslipidemia 12/27/2011   • HTN, goal below 130/80 10/24/2011   • Acquired scoliosis 10/24/2011   • Sleep apnea, obstructive 10/24/2011   • Insomnia, persistent 10/24/2011   • Major depressive disorder with single episode, in full remission ([facility]) 10/24/2011   • 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      Allergies:Albumin and Seasonal    Current Outpatient Medications   Medication Sig Dispense Refill   • LORazepam (ATIVAN) 1 MG Tab Take 1 Tab by mouth every bedtime for 90 days. 90 Tab 0   • traZODone (DESYREL) 100 MG Tab TAKE ONE TABLET BY MOUTH AT BEDTIME 90 Tab 3   • baclofen (LIORESAL) 10 MG Tab Take 1 Tab by mouth every bedtime. 90 Tab 3   • rosuvastatin (CRESTOR) 10 MG Tab Take 1 Tab by mouth every evening. 90  Tab 3   • Omega-3 Fatty Acids (FISH OIL) 1000 MG Cap capsule Take 1 Cap by mouth 2 Times a Day.     • citalopram (CELEXA) 40 MG Tab Take 1 Tab by mouth every day. 90 Tab 3   • ondansetron (ZOFRAN ODT) 4 MG TABLET DISPERSIBLE Take 1 Tab by mouth every 6 hours as needed for Nausea. 10 Tab 0   • Artificial Tear Solution (TEARS NATURALE OP) Place 2 Drops in both eyes 2 times a day as needed.       No current facility-administered medications for this visit.        Social History     Tobacco Use   • Smoking status: Former Smoker     Packs/day: 0.30     Years: 5.00     Pack years: 1.50     Types: Cigarettes     Last attempt to quit: 1975     Years since quittin.7   • Smokeless tobacco: Never Used   • Tobacco comment: quit    Substance Use Topics   • Alcohol use: No     Alcohol/week: 0.0 oz   • Drug use: No     Social History     Social History Narrative   • Not on file       Family History   Problem Relation Age of Onset   • Diabetes Mother    • Cancer Mother 82        breast cancer   • Lung Disease Mother         copd   • Hyperlipidemia Mother    • Hypertension Mother    • Cancer Father         Laryngeal CA   • Heart Disease Father 50        CAD with bypasses x 3   • Heart Attack Father    • Hyperlipidemia Father    • Hypertension Father    • Diabetes Sister         type II diabetes   • Diabetes Unknown    • Heart Disease Unknown    • Hypertension Unknown    • Lung Disease Unknown         ROS:     - Constitutional:  Negative for fever, chills, unexpected weight change, and fatigue/generalized weakness.    - HEENT:  Negative for headaches, vision changes, hearing changes, ear pain, ear discharge, rhinorrhea, sinus congestion, sore throat, and neck pain.      - Respiratory: Negative for cough, sputum production, chest congestion, dyspnea, wheezing, and crackles.      - Cardiovascular: Negative for chest pain, palpitations, orthopnea, and bilateral lower extremity edema.     - Gastrointestinal: Negative for  "heartburn, nausea, vomiting, abdominal pain, hematochezia, melena, diarrhea, constipation, and greasy/foul-smelling stools.     - Genitourinary: Negative for dysuria, polyuria, hematuria, pyuria, urinary urgency, and urinary incontinence.     - Musculoskeletal: Patient dealing with osteoarthritis in multiple joints including low back but for the most part doing fairly well.      - Skin: Negative for rash, itching, cyanotic skin color change.     - Neurological: Negative for dizziness, tingling, tremors, focal sensory deficit, focal weakness and headaches.     - Endo/Heme/Allergies: Does not bruise/bleed easily.     - Psychiatric/Behavioral: Negative for depression, suicidal/homicidal ideation and memory loss.          - NOTE: All other systems reviewed and are negative, except as in HPI.      Exam:    /82 (BP Location: Left arm, Patient Position: Sitting, BP Cuff Size: Adult)   Pulse 87   Temp 36.6 °C (97.9 °F) (Temporal)   Resp 12   Ht 1.473 m (4' 10\")   Wt 62.6 kg (138 lb)   SpO2 92%  Body mass index is 28.84 kg/m².    General:  Well nourished, well developed female in NAD  Head is grossly normal.  Neck: Supple without JVD or bruit. Thyroid is not enlarged.  Pulmonary: Clear to ausculation and percussion.  Normal effort. No rales, ronchi, or wheezing.  Cardiovascular: Regular rate and rhythm without murmur. Carotid and radial pulses are intact and equal bilaterally.  Extremities: no clubbing, cyanosis, or edema.  LABS: 8/29/2019: Results reviewed and discussed with the patient, questions answered.    Please note that this dictation was created using voice recognition software. I have made every reasonable attempt to correct obvious errors, but I expect that there are errors of grammar and possibly content that I did not discover before finalizing the note.    Assessment/Plan:  1. ROBERT (generalized anxiety disorder)  Adequately controlled with current meds.  Renewed for the coming 90 days.  - LORazepam " (ATIVAN) 1 MG Tab; Take 1 Tab by mouth every bedtime for 90 days.  Dispense: 90 Tab; Refill: 0    2. Familial hypercholesteremia  Continued improvement.  Patient will work on this and we will recheck in 3 months.  - Lipid Profile; Future    3. HTN, goal below 130/80  Controlled, continue with current meds and lifestyle.      4. Insomnia, persistent  Adequately controlled with lorazepam given today refill for 90 days.  - LORazepam (ATIVAN) 1 MG Tab; Take 1 Tab by mouth every bedtime for 90 days.  Dispense: 90 Tab; Refill: 0    5. Need for vaccination  Given today  - Prevnar 13 PCV-13

## 2019-10-08 ENCOUNTER — OFFICE VISIT (OUTPATIENT)
Dept: URGENT CARE | Facility: PHYSICIAN GROUP | Age: 64
End: 2019-10-08
Payer: MEDICARE

## 2019-10-08 ENCOUNTER — HOSPITAL ENCOUNTER (OUTPATIENT)
Facility: MEDICAL CENTER | Age: 64
End: 2019-10-08
Attending: PHYSICIAN ASSISTANT
Payer: MEDICARE

## 2019-10-08 VITALS
OXYGEN SATURATION: 94 % | RESPIRATION RATE: 14 BRPM | BODY MASS INDEX: 28.55 KG/M2 | TEMPERATURE: 97.8 F | DIASTOLIC BLOOD PRESSURE: 70 MMHG | WEIGHT: 136 LBS | HEIGHT: 58 IN | HEART RATE: 73 BPM | SYSTOLIC BLOOD PRESSURE: 132 MMHG

## 2019-10-08 DIAGNOSIS — R30.0 DYSURIA: ICD-10-CM

## 2019-10-08 DIAGNOSIS — N30.00 ACUTE CYSTITIS WITHOUT HEMATURIA: ICD-10-CM

## 2019-10-08 LAB
APPEARANCE UR: CLEAR
BILIRUB UR STRIP-MCNC: NORMAL MG/DL
COLOR UR AUTO: YELLOW
GLUCOSE UR STRIP.AUTO-MCNC: NORMAL MG/DL
KETONES UR STRIP.AUTO-MCNC: NORMAL MG/DL
LEUKOCYTE ESTERASE UR QL STRIP.AUTO: NORMAL
NITRITE UR QL STRIP.AUTO: NORMAL
PH UR STRIP.AUTO: 7 [PH] (ref 5–8)
PROT UR QL STRIP: NORMAL MG/DL
RBC UR QL AUTO: NORMAL
SP GR UR STRIP.AUTO: 1.01
UROBILINOGEN UR STRIP-MCNC: 0.2 MG/DL

## 2019-10-08 PROCEDURE — 87086 URINE CULTURE/COLONY COUNT: CPT

## 2019-10-08 PROCEDURE — 99214 OFFICE O/P EST MOD 30 MIN: CPT | Performed by: PHYSICIAN ASSISTANT

## 2019-10-08 PROCEDURE — 81002 URINALYSIS NONAUTO W/O SCOPE: CPT | Performed by: PHYSICIAN ASSISTANT

## 2019-10-08 RX ORDER — NITROFURANTOIN 25; 75 MG/1; MG/1
100 CAPSULE ORAL EVERY 12 HOURS
Qty: 10 CAP | Refills: 0 | Status: SHIPPED | OUTPATIENT
Start: 2019-10-08 | End: 2019-10-13

## 2019-10-08 RX ORDER — PHENAZOPYRIDINE HYDROCHLORIDE 200 MG/1
200 TABLET, FILM COATED ORAL 3 TIMES DAILY
Qty: 6 TAB | Refills: 0 | Status: SHIPPED | OUTPATIENT
Start: 2019-10-08 | End: 2019-10-10

## 2019-10-08 NOTE — PROGRESS NOTES
Chief Complaint   Patient presents with   • Dysuria     x2wks abd pain       HISTORY OF PRESENT ILLNESS: Patient is a 64 y.o. female who presents today because She has a 2-week history of waxing and waning increased urinary urgency, frequency, dysuria.  She has been trying to flushwith water, but that has not been helping.  Denies any fevers, chills, nausea, vomiting or diarrhea.    Patient Active Problem List    Diagnosis Date Noted   • Aortic atherosclerosis ([facility])    • Familial hypercholesteremia    • Hair loss 12/06/2018   • Polyneuropathy associated with underlying disease ([facility]) 03/13/2018   • Obesity (BMI 30-39.9) 03/13/2018   • Acute cystitis without hematuria 12/22/2017   • Menopause 07/10/2017   • ROBERT (generalized anxiety disorder) 06/15/2017   • Allergic rhinitis 05/10/2017   • S/P lumbar fusion 10/20/2016   • Cervical radiculopathy 10/20/2016   • Diaphragmatic hernia 06/30/2015   • Mild mitral regurgitation 03/12/2015   • H/O: CVA (cerebrovascular accident) 09/23/2014   • Chronic constipation 09/17/2014   • GERD (gastroesophageal reflux disease) 09/17/2014   • Spinal stenosis of lumbar region 03/20/2014   • Lumbar radiculopathy, chronic 08/26/2013   • Carpal tunnel syndrome 11/14/2012   • Vitamin D deficiency disease 09/24/2012   • Dyslipidemia 12/27/2011   • HTN, goal below 130/80 10/24/2011   • Acquired scoliosis 10/24/2011   • Sleep apnea, obstructive 10/24/2011   • Insomnia, persistent 10/24/2011   • Major depressive disorder with single episode, in full remission ([facility]) 10/24/2011   • 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       Allergies:Albumin and Seasonal    Current Outpatient Medications Ordered in Epic   Medication Sig Dispense Refill   • nitrofurantoin monohyd macro (MACROBID) 100 MG Cap Take 1 Cap by mouth every 12 hours for 5 days. 10 Cap 0   • phenazopyridine (PYRIDIUM) 200 MG Tab Take 1 Tab by mouth 3 times a day for 2 days. 6 Tab 0   • LORazepam (ATIVAN) 1 MG Tab Take 1 Tab by mouth every bedtime for 90  "days. 90 Tab 0   • traZODone (DESYREL) 100 MG Tab TAKE ONE TABLET BY MOUTH AT BEDTIME 90 Tab 3   • baclofen (LIORESAL) 10 MG Tab Take 1 Tab by mouth every bedtime. 90 Tab 3   • rosuvastatin (CRESTOR) 10 MG Tab Take 1 Tab by mouth every evening. 90 Tab 3   • Omega-3 Fatty Acids (FISH OIL) 1000 MG Cap capsule Take 1 Cap by mouth 2 Times a Day.     • citalopram (CELEXA) 40 MG Tab Take 1 Tab by mouth every day. 90 Tab 3   • ondansetron (ZOFRAN ODT) 4 MG TABLET DISPERSIBLE Take 1 Tab by mouth every 6 hours as needed for Nausea. 10 Tab 0   • Artificial Tear Solution (TEARS NATURALE OP) Place 2 Drops in both eyes 2 times a day as needed.       No current Epic-ordered facility-administered medications on file.        Past Medical History:   Diagnosis Date   • Anesthesia 02-14-11    PO N/V, \"slow to come out\"   • Aortic atherosclerosis (HCC)    • Arthritis 02-14-11    spine   • Backpain 02-14-11    neck and abd. also,    • Breath shortness     with exertion   • Bronchitis 05/2018   • Cancer (HCC) 07/18/2018    Skin - 15 years ago.   • Cancer (HCC)     April/May 2018   • Diverticulitis    • Endometriosis of uterus    • Familial hypercholesteremia    • Fusion of spine 2014   • ROBERT (generalized anxiety disorder) 6/15/2017   • H/O fall     when in hospital  with low O2 sats   • H/O: CVA (cerebrovascular accident) 8/22/2014    Complex event associated with apparent medication reaction but with MRI suggesting possible multiple small infarcts of various ages, [facility]   • Hair loss 12/6/2018   • Heart burn    • Hiatus hernia syndrome     not repaired   • High cholesterol    • HTN, goal below 130/80 10/24/2011   • Infectious disease      Hep C +   • Lung collapse 02-14-11    right lung 1/4 collpsed    • Major depressive disorder with single episode, in full remission ([facility]) 10/24/2011   • Mixed hyperlipidemia 12/27/2011   • Moderate major depression ([facility]) 10/24/2011   • MRSA exposure 8/2014    while in hospital   • Post-menopause " on HRT (hormone replacement therapy) 2011   • 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    exposed as a child, told not active   • Scoliosis    • Sleep apnea 2015    CPAP   • Snoring    • Unspecified vitamin D deficiency 2012       Social History     Tobacco Use   • Smoking status: Former Smoker     Packs/day: 0.30     Years: 5.00     Pack years: 1.50     Types: Cigarettes     Last attempt to quit: 1975     Years since quittin.7   • Smokeless tobacco: Never Used   • Tobacco comment: quit    Substance Use Topics   • Alcohol use: No     Alcohol/week: 0.0 oz   • Drug use: No       Family Status   Relation Name Status   • Mo  Alive        84 in    • Fa   at age 81        Laryngeal CA   • Sis  Alive        56 in    • OTHER  (Not Specified)     Family History   Problem Relation Age of Onset   • Diabetes Mother    • Cancer Mother 82        breast cancer   • Lung Disease Mother         copd   • Hyperlipidemia Mother    • Hypertension Mother    • Cancer Father         Laryngeal CA   • Heart Disease Father 50        CAD with bypasses x 3   • Heart Attack Father    • Hyperlipidemia Father    • Hypertension Father    • Diabetes Sister         type II diabetes   • Diabetes Other    • Heart Disease Other    • Hypertension Other    • Lung Disease Other        ROS:  Review of Systems   Constitutional: Negative for fever, chills, weight loss and malaise/fatigue.   HENT: Negative for ear pain, nosebleeds, congestion, sore throat and neck pain.    Eyes: Negative for blurred vision.   Respiratory: Negative for cough, sputum production, shortness of breath and wheezing.    Cardiovascular: Negative for chest pain, palpitations, orthopnea and leg swelling.   Gastrointestinal: Negative for heartburn, nausea, vomiting and abdominal pain.   Genitourinary: Positive for dysuria, urgency and frequency.     Exam:  /70 (BP Location: Right arm, Patient Position: Sitting, BP Cuff Size: Adult)   Pulse 73   Temp 36.6  "°C (97.8 °F)   Resp 14   Ht 1.473 m (4' 10\")   Wt 61.7 kg (136 lb)   SpO2 94%   General:  Well nourished, well developed female in NAD  Head:Normocephalic, atraumatic  Eyes: PERRLA, EOM within normal limits, no conjunctival injection, no scleral icterus, visual fields and acuity grossly intact.  Nose: Symmetrical without tenderness, no discharge.  Mouth: reasonable hygiene, no erythema exudates or tonsillar enlargement.  Neck: no masses, range of motion within normal limits, no tracheal deviation. No obvious thyroid enlargement.  Extremities: no clubbing, cyanosis, or edema.    Urinalysis has moderate amount of leukocyte esterase, no abnormalities    Please note that this dictation was created using voice recognition software. I have made every reasonable attempt to correct obvious errors, but I expect that there are errors of grammar and possibly content that I did not discover before finalizing the note.    Assessment/Plan:  1. Dysuria  POCT Urinalysis    phenazopyridine (PYRIDIUM) 200 MG Tab   2. Acute cystitis without hematuria  Urine Culture    nitrofurantoin monohyd macro (MACROBID) 100 MG Cap   Increase p.o. fluids.    Followup with primary care in the next 7-10 days if not significantly improving, return to the urgent care or go to the emergency room sooner for any worsening of symptoms.       "

## 2019-10-10 LAB
BACTERIA UR CULT: NORMAL
SIGNIFICANT IND 70042: NORMAL
SITE SITE: NORMAL
SOURCE SOURCE: NORMAL

## 2019-11-22 ENCOUNTER — HOSPITAL ENCOUNTER (OUTPATIENT)
Dept: LAB | Facility: MEDICAL CENTER | Age: 64
End: 2019-11-22
Attending: INTERNAL MEDICINE
Payer: MEDICARE

## 2019-11-22 ENCOUNTER — OFFICE VISIT (OUTPATIENT)
Dept: CARDIOLOGY | Facility: PHYSICIAN GROUP | Age: 64
End: 2019-11-22
Payer: MEDICARE

## 2019-11-22 VITALS
DIASTOLIC BLOOD PRESSURE: 70 MMHG | HEART RATE: 68 BPM | SYSTOLIC BLOOD PRESSURE: 128 MMHG | HEIGHT: 59 IN | WEIGHT: 137 LBS | BODY MASS INDEX: 27.62 KG/M2 | OXYGEN SATURATION: 97 %

## 2019-11-22 DIAGNOSIS — R74.8 ELEVATED LIVER ENZYMES: ICD-10-CM

## 2019-11-22 DIAGNOSIS — E78.5 DYSLIPIDEMIA: ICD-10-CM

## 2019-11-22 DIAGNOSIS — I34.0 MILD MITRAL REGURGITATION: ICD-10-CM

## 2019-11-22 DIAGNOSIS — E78.01 FAMILIAL HYPERCHOLESTEREMIA: Chronic | ICD-10-CM

## 2019-11-22 DIAGNOSIS — I10 HTN, GOAL BELOW 130/80: ICD-10-CM

## 2019-11-22 LAB
ALBUMIN SERPL BCP-MCNC: 4.4 G/DL (ref 3.2–4.9)
ALBUMIN/GLOB SERPL: 1.8 G/DL
ALP SERPL-CCNC: 72 U/L (ref 30–99)
ALT SERPL-CCNC: 21 U/L (ref 2–50)
ANION GAP SERPL CALC-SCNC: 8 MMOL/L (ref 0–11.9)
AST SERPL-CCNC: 19 U/L (ref 12–45)
BILIRUB SERPL-MCNC: 0.7 MG/DL (ref 0.1–1.5)
BUN SERPL-MCNC: 19 MG/DL (ref 8–22)
CALCIUM SERPL-MCNC: 9.4 MG/DL (ref 8.5–10.5)
CHLORIDE SERPL-SCNC: 106 MMOL/L (ref 96–112)
CHOLEST SERPL-MCNC: 268 MG/DL (ref 100–199)
CO2 SERPL-SCNC: 29 MMOL/L (ref 20–33)
CREAT SERPL-MCNC: 0.87 MG/DL (ref 0.5–1.4)
FASTING STATUS PATIENT QL REPORTED: NORMAL
GLOBULIN SER CALC-MCNC: 2.4 G/DL (ref 1.9–3.5)
GLUCOSE SERPL-MCNC: 90 MG/DL (ref 65–99)
HDLC SERPL-MCNC: 63 MG/DL
LDLC SERPL CALC-MCNC: 179 MG/DL
POTASSIUM SERPL-SCNC: 4.4 MMOL/L (ref 3.6–5.5)
PROT SERPL-MCNC: 6.8 G/DL (ref 6–8.2)
SODIUM SERPL-SCNC: 143 MMOL/L (ref 135–145)
TRIGL SERPL-MCNC: 131 MG/DL (ref 0–149)

## 2019-11-22 PROCEDURE — 99214 OFFICE O/P EST MOD 30 MIN: CPT | Performed by: INTERNAL MEDICINE

## 2019-11-22 PROCEDURE — 80053 COMPREHEN METABOLIC PANEL: CPT

## 2019-11-22 PROCEDURE — 80061 LIPID PANEL: CPT

## 2019-11-22 PROCEDURE — 36415 COLL VENOUS BLD VENIPUNCTURE: CPT

## 2019-11-22 RX ORDER — SIMVASTATIN 10 MG
10 TABLET ORAL EVERY EVENING
Qty: 30 TAB | Refills: 11 | Status: SHIPPED | OUTPATIENT
Start: 2019-11-22 | End: 2020-03-06 | Stop reason: SDUPTHER

## 2019-11-22 ASSESSMENT — ENCOUNTER SYMPTOMS
FOCAL WEAKNESS: 0
CHILLS: 0
PALPITATIONS: 0
FALLS: 0
DIZZINESS: 0
PND: 0
FEVER: 0
CLAUDICATION: 0
BRUISES/BLEEDS EASILY: 0
SHORTNESS OF BREATH: 0
WEAKNESS: 0
COUGH: 0
NAUSEA: 0
SORE THROAT: 0
BLURRED VISION: 0
ABDOMINAL PAIN: 0

## 2019-11-22 NOTE — PATIENT INSTRUCTIONS
Please look into the following diets and incorporate them into your diet    LOW SALT DIET   KEEP YOUR SODIUM EQUAL TO CALORIES AND NO MORE THAN DOUBLE THE CALORIES FOR A LOW SALT DIET    FOR TREATMENT OR PREVENTION OF CORONARY ARTERY DISEASE    [facility]le - [facility]    Dr. [facility] Reversing [facility] Disease - Russell Lau's Cardiologist    [facility]    Dr [place] - [place] [facility] [place] (book and documentary)      FOR TREATMENT OF BLOOD PRESSURE  DASH DIET - American Heart Association for treatment of HYPERTENSION    FOR TREATMENT OF BAD CHOLESTEROL/FATS  REDUCE PROCESSED SUGAR AS MUCH AS POSSIBLE  INCREASE WHOLE GRAINS/VEGETABLES    Lowering total cholesterol and LDL (bad) cholesterol:  - Eat leaner cuts of meat, or eliminate altogether if possible red meat, and frequently substitute fish or chicken.  - Limit saturated fat to no more than 7-10% of total calories no more than 10 g per day is recommended. Some sources of saturated fat include butter, animal fats, hydrogenated vegetable fats and oils, many desserts, whole milk dairy products.  - Replaced saturated fats with polyunsaturated fats and monounsaturated fats. Foods high in monounsaturated fat include nuts, although well, canola oil, avocados, and olives.  - Limit trans fat (processed foods) and replaced with fresh fruits and vegetables  - Recommend nonfat dairy products  - Increase substantially the amount of soluble fiber intake (legumes such as beans, fruit, whole grains).  - Consider nutritional supplements: plant sterile spreads such as Benecol, fish oil,  flaxseed oil, omega-3 acids capsules 1000 mg twice a day, or viscous fiber such as Metamucil  - Attain ideal weight and regular exercise (at least 30 minutes per day of walking)    Lowering triglycerides:  - Reduce intake of simple sugar: Desserts, candy, pastries, honey, sodas, sugared cereals, yogurt, Gatorade, sports bars, canned  fruit, smoothies, fruit juice, coffee drinks  - Reduced intake of refined starches: Refined Pasta  - Reduce or abstain from alcohol  - Increase omega-3 fatty acids: [place], Trout, Mackerel, Herring, [place] tuna and supplements  - Attain ideal weight and regular exercise (at least 30 minutes per day of walking)      Elevating HDL (good) cholesterol:  - Increase physical activity  - Seasoned foods with garlic and onions  - Increase omega-3 fatty acids and supplements as listed above  - Incorporating appropriate amounts of monounsaturated fats such as nuts, olive oil, canola oil, avocados, olives  - Stop smoking  - Attain ideal weight and regular exercise (at least 30 minutes per day of walking)

## 2019-11-22 NOTE — PROGRESS NOTES
"Chief Complaint   Patient presents with   • Follow-Up       Subjective:   Almaz Samuel is a 64 y.o. female who presents today for follow-up of her history of stroke with familial dyslipidemia    Been doing okay overall tolerating her medications well      Has lost signifiat weight    Rosuvastatin was too expensive      Past Medical History:   Diagnosis Date   • Anesthesia 02-14-11    PO N/V, \"slow to come out\"   • Aortic atherosclerosis (HCC)    • Arthritis 02-14-11    spine   • Backpain 02-14-11    neck and abd. also,    • Breath shortness     with exertion   • Bronchitis 05/2018   • Cancer (HCC) 07/18/2018    Skin - 15 years ago.   • Cancer (HCC)     April/May 2018   • Diverticulitis    • Endometriosis of uterus    • Familial hypercholesteremia    • Fusion of spine 2014   • ROBERT (generalized anxiety disorder) 6/15/2017   • H/O fall     when in hospital  with low O2 sats   • H/O: CVA (cerebrovascular accident) 8/22/2014    Complex event associated with apparent medication reaction but with MRI suggesting possible multiple small infarcts of various ages, [facility]   • Hair loss 12/6/2018   • Heart burn    • Hiatus hernia syndrome     not repaired   • High cholesterol    • HTN, goal below 130/80 10/24/2011   • Infectious disease      Hep C +   • Lung collapse 02-14-11    right lung 1/4 collpsed    • Major depressive disorder with single episode, in full remission ([facility]) 10/24/2011   • Mixed hyperlipidemia 12/27/2011   • Moderate major depression ([facility]) 10/24/2011   • MRSA exposure 8/2014    while in hospital   • Post-menopause on HRT (hormone replacement therapy) 12/27/2011   • 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    exposed as a child, told not active   • Scoliosis    • Sleep apnea 6/2015    CPAP   • Snoring    • Unspecified vitamin D deficiency 9/24/2012     Past Surgical History:   Procedure Laterality Date   • NISSEN FUNDOPLICATION LAPAROSCOPIC  7/25/2018    Procedure: NISSEN FUNDOPLICATION LAPAROSCOPIC;  Surgeon: Kenji MERCER" CHIARA Garcia;  Location: SURGERY [facility];  Service: General   • NISSEN FUNDOPLICATION LAPAROSCOPIC N/A 6/30/2015    Procedure: NISSEN FUNDOPLICATION LAPAROSCOPIC;  Surgeon: Kenji Garcia M.D.;  Location: SURGERY SAME DAY [facility];  Service:    • GASTROSCOPY-ENDO  6/24/2015    Procedure: GASTROSCOPY-ENDO;  Surgeon: Kenij Garcia M.D.;  Location: ENDOSCOPY [facility];  Service:    • CARPAL TUNNEL RELEASE  11/14/2012    Performed by Holly Martin M.D. at SURGERY [facility]   • LOW ANTERIOR RESECTION LAPAROSCOPIC  3/2/2011    Performed by KENJI GARCIA at SURGERY [facility]   • CERVICAL DISK AND FUSION ANTERIOR  6/22/2010    Performed by JOSEPH DARLING at SURGERY [facility]   • TUBAL LIGATION  1988   • TONSILLECTOMY AND ADENOIDECTOMY  1959   • APPENDECTOMY     • GYN SURGERY      partial hysterectomy     Family History   Problem Relation Age of Onset   • Diabetes Mother    • Cancer Mother 82        breast cancer   • Lung Disease Mother         copd   • Hyperlipidemia Mother    • Hypertension Mother    • Cancer Father         Laryngeal CA   • Heart Disease Father 50        CAD with bypasses x 3   • Heart Attack Father    • Hyperlipidemia Father    • Hypertension Father    • Diabetes Sister         type II diabetes   • Diabetes Other    • Heart Disease Other    • Hypertension Other    • Lung Disease Other      Social History     Socioeconomic History   • Marital status:      Spouse name: Not on file   • Number of children: Not on file   • Years of education: Not on file   • Highest education level: Not on file   Occupational History   • Not on file   Social Needs   • Financial resource strain: Not on file   • Food insecurity:     Worry: Not on file     Inability: Not on file   • Transportation needs:     Medical: Not on file     Non-medical: Not on file   Tobacco Use   • Smoking status: Former Smoker     Packs/day: 0.30     Years: 5.00     Pack years: 1.50     Types: Cigarettes     " Last attempt to quit: 1975     Years since quittin.9   • Smokeless tobacco: Never Used   • Tobacco comment: quit    Substance and Sexual Activity   • Alcohol use: No     Alcohol/week: 0.0 oz   • Drug use: No   • Sexual activity: Yes     Partners: Male     Comment: , 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[employer]   Lifestyle   • Physical activity:     Days per week: Not on file     Minutes per session: Not on file   • Stress: Not on file   Relationships   • Social connections:     Talks on phone: Not on file     Gets together: Not on file     Attends [employer] service: Not on file     Active member of club or organization: Not on file     Attends meetings of clubs or organizations: Not on file     Relationship status: Not on file   • Intimate partner violence:     Fear of current or ex partner: Not on file     Emotionally abused: Not on file     Physically abused: Not on file     Forced sexual activity: Not on file   Other Topics Concern   •  Service No   • Blood Transfusions No   • Caffeine Concern Not Asked   • Occupational Exposure Not Asked   • Hobby Hazards Not Asked   • Sleep Concern Not Asked   • Stress Concern Not Asked   • Weight Concern Not Asked   • Special Diet Not Asked   • Back Care Not Asked   • Exercise No   • Bike Helmet No   • Seat Belt Yes   • Self-Exams Yes   Social History Narrative   • Not on file     Allergies   Allergen Reactions   • Albumin      Other reaction(s): Other (See Comments)  \"egg intolerance\"  Reaction:stomach cramps,throwing up for 12 hours,cold sweats   • Seasonal Itching     Pt states eye irritation, pollen      Outpatient Encounter Medications as of 2019   Medication Sig Dispense Refill   • LORazepam (ATIVAN) 1 MG Tab Take 1 Tab by mouth every bedtime for 90 days. 90 Tab 0   • traZODone (DESYREL) 100 MG Tab TAKE ONE TABLET BY MOUTH AT BEDTIME 90 Tab 3   • baclofen (LIORESAL) 10 MG Tab Take 1 Tab by mouth every bedtime. 90 Tab 3   • citalopram (CELEXA) 40 MG Tab Take " "1 Tab by mouth every day. 90 Tab 3   • ondansetron (ZOFRAN ODT) 4 MG TABLET DISPERSIBLE Take 1 Tab by mouth every 6 hours as needed for Nausea. 10 Tab 0   • Artificial Tear Solution (TEARS NATURALE OP) Place 2 Drops in both eyes 2 times a day as needed.     • rosuvastatin (CRESTOR) 10 MG Tab Take 1 Tab by mouth every evening. (Patient not taking: Reported on 11/22/2019) 90 Tab 3   • Omega-3 Fatty Acids (FISH OIL) 1000 MG Cap capsule Take 1 Cap by mouth 2 Times a Day.       No facility-administered encounter medications on file as of 11/22/2019.      Review of Systems   Constitutional: Negative for chills and fever.   HENT: Negative for sore throat.    Eyes: Negative for blurred vision.   Respiratory: Negative for cough and shortness of breath.    Cardiovascular: Negative for chest pain, palpitations, claudication, leg swelling and PND.   Gastrointestinal: Negative for abdominal pain and nausea.   Musculoskeletal: Negative for falls and joint pain.   Skin: Negative for rash.   Neurological: Negative for dizziness, focal weakness and weakness.   Endo/Heme/Allergies: Does not bruise/bleed easily.        Objective:   /70 (BP Location: Left arm, Patient Position: Sitting, BP Cuff Size: Adult)   Pulse 68   Ht 1.499 m (4' 11\")   Wt 62.1 kg (137 lb)   SpO2 97%   BMI 27.67 kg/m²     Physical Exam      We reviewed in person the most recent labs  Recent Results (from the past 4032 hour(s))   FASTING STATUS    Collection Time: 08/29/19  7:04 AM   Result Value Ref Range    Fasting Status Fasting    Lipid Profile    Collection Time: 08/29/19  7:05 AM   Result Value Ref Range    Cholesterol,Tot 235 (H) 100 - 199 mg/dL    Triglycerides 132 0 - 149 mg/dL    HDL 54 >=40 mg/dL     (H) <100 mg/dL   POCT Urinalysis    Collection Time: 10/08/19 12:34 PM   Result Value Ref Range    POC Color yellow Negative    POC Appearance clear Negative    POC Leukocyte Esterase mod Negative    POC Nitrites neg Negative    POC " Urobiligen 0.2 Negative (0.2) mg/dL    POC Protein neg Negative mg/dL    POC Urine PH 7.0 5.0 - 8.0    POC Blood neg Negative    POC Specific Gravity 1.010 <1.005 - >1.030    POC Ketones neg Negative mg/dL    POC Bilirubin neg Negative mg/dL    POC Glucose neg Negative mg/dL   Urine Culture    Collection Time: 10/08/19 12:44 PM   Result Value Ref Range    Significant Indicator NEG     Source UR     Site -     Culture Result Mixed skin mariana 10-50,000 cfu/mL        Assessment:     1. HTN, goal below 130/80     2. Mild mitral regurgitation     3. Dyslipidemia         Medical Decision Making:  Today's Assessment / Status / Plan:     It was my pleasure to meet with Ms. Samuel.    Blood pressure is well controlled.      She will consider statin will try simvastatin for cost issues perhaps best tolerated    I will see Ms. Samuel back in 1 year time and encouraged her to follow up with us over the phone or electronically using my MyChart as issues arise.    It is my pleasure to participate in the care of Ms. Samuel.  Please do not hesitate to contact me with questions or concerns.    Leonel Bridges MD PhD FAC  Cardiologist [facility]    Please note that this dictation was created using voice recognition software. I have worked with consultants from the vendor as well as technical experts from [facility] to optimize the interface. I have made every reasonable attempt to correct obvious errors, but I expect that there are errors of grammar and possibly content I did not discover before finalizing the note.

## 2019-11-25 ENCOUNTER — TELEPHONE (OUTPATIENT)
Dept: CARDIOLOGY | Facility: MEDICAL CENTER | Age: 64
End: 2019-11-25

## 2019-11-25 NOTE — TELEPHONE ENCOUNTER
Associated Results   Result Notes for COMP METABOLIC PANEL   Notes recorded by Leonel Bridges M.D. on 11/25/2019 at 9:54 AM PST    Labs look good except for the LDL, encouraged her to work on diet    please let her know     Thank you     Attempted to call patient, unable to reach.  Left voicemail on pt home voicemail to return this call at her earliest convenience.    Called pt mobile phone, unable to reach.  Left detailed voicemail regarding MD recommendations.    Letter printed with MD recommendations and mailed to pt mailing address.

## 2019-11-25 NOTE — LETTER
November 25, 2019        Almaz Samuel  [address] [place] [address]          Dear Almaz,    We have received the results of your recent: Lab Results.    Your test came back and Dr. Bridges reviewed your results.  Your labs looks good except your LDL, also known as your bad cholesterol.  Pt please follow up as previously discussed with your physician.      Feel free to call us with any questions.        Sincerely,          Jocelynn Flores R.N.  Electronically Signed

## 2019-12-05 RX ORDER — CITALOPRAM 40 MG/1
TABLET ORAL
Qty: 90 TAB | Refills: 2 | Status: SHIPPED | OUTPATIENT
Start: 2019-12-05 | End: 2020-03-06

## 2019-12-06 ENCOUNTER — OFFICE VISIT (OUTPATIENT)
Dept: MEDICAL GROUP | Facility: MEDICAL CENTER | Age: 64
End: 2019-12-06
Payer: MEDICARE

## 2019-12-06 VITALS
TEMPERATURE: 98.2 F | WEIGHT: 138 LBS | DIASTOLIC BLOOD PRESSURE: 64 MMHG | SYSTOLIC BLOOD PRESSURE: 122 MMHG | BODY MASS INDEX: 27.82 KG/M2 | HEART RATE: 80 BPM | OXYGEN SATURATION: 96 % | RESPIRATION RATE: 12 BRPM | HEIGHT: 59 IN

## 2019-12-06 DIAGNOSIS — E78.01 FAMILIAL HYPERCHOLESTEREMIA: Chronic | ICD-10-CM

## 2019-12-06 DIAGNOSIS — I70.0 AORTIC ATHEROSCLEROSIS (HCC): Chronic | ICD-10-CM

## 2019-12-06 DIAGNOSIS — I10 HTN, GOAL BELOW 130/80: ICD-10-CM

## 2019-12-06 DIAGNOSIS — G47.00 INSOMNIA, PERSISTENT: ICD-10-CM

## 2019-12-06 DIAGNOSIS — F41.1 GAD (GENERALIZED ANXIETY DISORDER): ICD-10-CM

## 2019-12-06 DIAGNOSIS — Z23 NEED FOR VACCINATION: ICD-10-CM

## 2019-12-06 PROBLEM — N30.00 ACUTE CYSTITIS WITHOUT HEMATURIA: Status: RESOLVED | Noted: 2017-12-22 | Resolved: 2019-12-06

## 2019-12-06 PROCEDURE — G0008 ADMIN INFLUENZA VIRUS VAC: HCPCS | Performed by: FAMILY MEDICINE

## 2019-12-06 PROCEDURE — 99214 OFFICE O/P EST MOD 30 MIN: CPT | Performed by: FAMILY MEDICINE

## 2019-12-06 PROCEDURE — 90662 IIV NO PRSV INCREASED AG IM: CPT | Performed by: FAMILY MEDICINE

## 2019-12-06 RX ORDER — LORAZEPAM 1 MG/1
1 TABLET ORAL
Qty: 30 TAB | Refills: 2 | Status: SHIPPED
Start: 2019-12-06 | End: 2020-03-06 | Stop reason: SDUPTHER

## 2019-12-06 NOTE — ASSESSMENT & PLAN NOTE
This is a chronic health problem for this patient that she had come off of statins because we thought she was going to be able to conquer this with just diet.  She did talk with her cardiologist who agrees that she needs to be on low-dose statin to stabilize any plaque that she may have.  She is now going to be on simvastatin 10 mg daily which should work for that.  She does not smoke and she has excellent blood pressure control.  We will plan to recheck in 3 months.

## 2019-12-06 NOTE — ASSESSMENT & PLAN NOTE
This is a chronic health problem that is well controlled with current medications and lifestyle measures.  Her BP is excellent at 122/64.  She only gained 2 lbs but her lipids have gotten really bad.

## 2019-12-06 NOTE — ASSESSMENT & PLAN NOTE
"This is a chronic health problem for this patient that she had done a great job getting this under control with a complete lifestyle change.  She realizes that she has been overeating and eating mostly \"junk\".  Her total cholesterol went up to 268, triglycerides are still normal at 131, HDL went up from 54 to a level of 63 but her LDL also went up from 1 55-1 79.  She is already visited with Dr. Bridges who is asking her to go on simvastatin 10 mg and she will start that on Monday.  We will plan to see her back in about 3 months.  In the meantime she is also can get back on her program.  "

## 2019-12-06 NOTE — PROGRESS NOTES
"CC:Diagnoses of Need for vaccination, Familial hypercholesteremia, HTN, goal below 130/80, and Aortic atherosclerosis (HCC) were pertinent to this visit.    HISTORY OF PRESENT ILLNESS: Patient is a 64 y.o. female established patient who presents today to talk about her chronic health problems as outlined below.  She tells me that she is already seen a cardiologist Dr. Bridges who recommended that we get her back on cholesterol-lowering medications.  We had further discussions regarding this problem.      Familial hypercholesteremia  This is a chronic health problem for this patient that she had done a great job getting this under control with a complete lifestyle change.  She realizes that she has been overeating and eating mostly \"junk\".  Her total cholesterol went up to 268, triglycerides are still normal at 131, HDL went up from 54 to a level of 63 but her LDL also went up from 1 55-1 79.  She is already visited with Dr. Bridges who is asking her to go on simvastatin 10 mg and she will start that on Monday.  We will plan to see her back in about 3 months.  In the meantime she is also can get back on her program.    HTN, goal below 130/80  This is a chronic health problem that is well controlled with current medications and lifestyle measures.  Her BP is excellent at 122/64.  She only gained 2 lbs but her lipids have gotten really bad.    Aortic atherosclerosis (HCC)  This is a chronic health problem for this patient that she had come off of statins because we thought she was going to be able to conquer this with just diet.  She did talk with her cardiologist who agrees that she needs to be on low-dose statin to stabilize any plaque that she may have.  She is now going to be on simvastatin 10 mg daily which should work for that.  She does not smoke and she has excellent blood pressure control.  We will plan to recheck in 3 months.      Patient Active Problem List    Diagnosis Date Noted   • Aortic atherosclerosis " ([facility])    • Familial hypercholesteremia    • Hair loss 12/06/2018   • Polyneuropathy associated with underlying disease ([facility]) 03/13/2018   • Obesity (BMI 30-39.9) 03/13/2018   • Menopause 07/10/2017   • ROBERT (generalized anxiety disorder) 06/15/2017   • Allergic rhinitis 05/10/2017   • S/P lumbar fusion 10/20/2016   • Cervical radiculopathy 10/20/2016   • Diaphragmatic hernia 06/30/2015   • Mild mitral regurgitation 03/12/2015   • H/O: CVA (cerebrovascular accident) 09/23/2014   • Chronic constipation 09/17/2014   • GERD (gastroesophageal reflux disease) 09/17/2014   • Spinal stenosis of lumbar region 03/20/2014   • Lumbar radiculopathy, chronic 08/26/2013   • Carpal tunnel syndrome 11/14/2012   • Vitamin D deficiency disease 09/24/2012   • Dyslipidemia 12/27/2011   • HTN, goal below 130/80 10/24/2011   • Acquired scoliosis 10/24/2011   • Sleep apnea, obstructive 10/24/2011   • Insomnia, persistent 10/24/2011   • Major depressive disorder with single episode, in full remission ([facility]) 10/24/2011   • 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      Allergies:Albumin and Seasonal    Current Outpatient Medications   Medication Sig Dispense Refill   • citalopram (CELEXA) 40 MG Tab TAKE 1 TABLET DAILY 90 Tab 2   • simvastatin (ZOCOR) 10 MG Tab Take 1 Tab by mouth every evening. 30 Tab 11   • baclofen (LIORESAL) 10 MG Tab Take 1 Tab by mouth every bedtime. 90 Tab 3   • Omega-3 Fatty Acids (FISH OIL) 1000 MG Cap capsule Take 1 Cap by mouth 2 Times a Day.     • ondansetron (ZOFRAN ODT) 4 MG TABLET DISPERSIBLE Take 1 Tab by mouth every 6 hours as needed for Nausea. 10 Tab 0   • Artificial Tear Solution (TEARS NATURALE OP) Place 2 Drops in both eyes 2 times a day as needed.       No current facility-administered medications for this visit.        Social History     Tobacco Use   • Smoking status: Former Smoker     Packs/day: 0.30     Years: 5.00     Pack years: 1.50     Types: Cigarettes     Last attempt to quit: 1/1/1975     Years since quitting:  44.9   • Smokeless tobacco: Never Used   • Tobacco comment: quit 1975   Substance Use Topics   • Alcohol use: No     Alcohol/week: 0.0 oz   • Drug use: No     Social History     Patient does not qualify to have social determinant information on file (likely too young).   Social History Narrative   • Not on file       Family History   Problem Relation Age of Onset   • Diabetes Mother    • Cancer Mother 82        breast cancer   • Lung Disease Mother         copd   • Hyperlipidemia Mother    • Hypertension Mother    • Cancer Father         Laryngeal CA   • Heart Disease Father 50        CAD with bypasses x 3   • Heart Attack Father    • Hyperlipidemia Father    • Hypertension Father    • Diabetes Sister         type II diabetes   • Diabetes Other    • Heart Disease Other    • Hypertension Other    • Lung Disease Other         ROS:     - Constitutional:  Negative for fever, chills, unexpected weight change, and fatigue/generalized weakness.    - HEENT:  Negative for headaches, vision changes, hearing changes, ear pain, ear discharge, rhinorrhea, sinus congestion, sore throat, and neck pain.      - Respiratory: Negative for cough, sputum production, chest congestion, dyspnea, wheezing, and crackles.      - Cardiovascular: Negative for chest pain, palpitations, orthopnea, and bilateral lower extremity edema.     - Gastrointestinal: Negative for heartburn, nausea, vomiting, abdominal pain, hematochezia, melena, diarrhea, constipation, and greasy/foul-smelling stools.     - Genitourinary: Negative for dysuria, polyuria, hematuria, pyuria, urinary urgency, and urinary incontinence.     - Musculoskeletal: Negative for myalgias, back pain, and joint pain.     - Skin: Negative for rash, itching, cyanotic skin color change.     - Neurological: Negative for dizziness, tingling, tremors, focal sensory deficit, focal weakness and headaches.     - Endo/Heme/Allergies: Does not bruise/bleed easily.     - Psychiatric/Behavioral:  "Negative for depression, suicidal/homicidal ideation and memory loss.          - NOTE: All other systems reviewed and are negative, except as in HPI.      Exam:    /64 (BP Location: Left arm, Patient Position: Sitting, BP Cuff Size: Adult)   Pulse 80   Temp 36.8 °C (98.2 °F) (Temporal)   Resp 12   Ht 1.499 m (4' 11\")   Wt 62.6 kg (138 lb)   SpO2 96%  Body mass index is 27.87 kg/m².    General:  Well nourished, well developed female in NAD  Head is grossly normal.  Neck: Supple without JVD or bruit. Thyroid is not enlarged.  Pulmonary: Clear to ausculation and percussion.  Normal effort. No rales, ronchi, or wheezing.  Cardiovascular: Regular rate and rhythm without murmur. Carotid and radial pulses are intact and equal bilaterally.  Extremities: no clubbing, cyanosis, or edema.  LABS: 11/22/2019: Results reviewed and discussed with the patient, questions answered.    Please note that this dictation was created using voice recognition software. I have made every reasonable attempt to correct obvious errors, but I expect that there are errors of grammar and possibly content that I did not discover before finalizing the note.    Assessment/Plan:  1. Need for vaccination  Given today  - Influenza Vaccine, High Dose (65+ Only)    2. Familial hypercholesteremia  Uncontrolled, patient will start back on simvastatin 10 mg daily.  We will recheck in 3 months  - Lipid Profile; Future  - Comp Metabolic Panel; Future    3. HTN, goal below 130/80  Controlled, continue with current meds and lifestyle.      4. Aortic atherosclerosis (HCC)  This is uncontrolled but the patient now willing to go back on a statin which is the last part of her secondary prevention that is needed.    Addendum    ROBERT (generalized anxiety disorder)  This is a chronic health problem for this patient for which she utilizes lorazepam to try and help keep her anxiety under control.  Currently she has just run out of that prescription.  We will " provide that to her today.

## 2019-12-06 NOTE — ASSESSMENT & PLAN NOTE
This is a chronic health problem for this patient for which she utilizes lorazepam to try and help keep her anxiety under control.  Currently she has just run out of that prescription.  We will provide that to her today.

## 2019-12-30 ENCOUNTER — PATIENT MESSAGE (OUTPATIENT)
Dept: MEDICAL GROUP | Facility: MEDICAL CENTER | Age: 64
End: 2019-12-30

## 2019-12-31 RX ORDER — BACLOFEN 10 MG/1
10 TABLET ORAL
Qty: 90 TAB | Refills: 3 | Status: SHIPPED | OUTPATIENT
Start: 2019-12-31 | End: 2020-12-28

## 2020-03-06 ENCOUNTER — OFFICE VISIT (OUTPATIENT)
Dept: MEDICAL GROUP | Facility: MEDICAL CENTER | Age: 65
End: 2020-03-06
Payer: MEDICARE

## 2020-03-06 VITALS
OXYGEN SATURATION: 92 % | SYSTOLIC BLOOD PRESSURE: 128 MMHG | TEMPERATURE: 99.8 F | WEIGHT: 143.3 LBS | RESPIRATION RATE: 12 BRPM | DIASTOLIC BLOOD PRESSURE: 76 MMHG | HEART RATE: 94 BPM | BODY MASS INDEX: 28.89 KG/M2 | HEIGHT: 59 IN

## 2020-03-06 DIAGNOSIS — F41.1 GAD (GENERALIZED ANXIETY DISORDER): ICD-10-CM

## 2020-03-06 DIAGNOSIS — G47.00 INSOMNIA, PERSISTENT: ICD-10-CM

## 2020-03-06 DIAGNOSIS — I10 HTN, GOAL BELOW 130/80: ICD-10-CM

## 2020-03-06 PROCEDURE — 99214 OFFICE O/P EST MOD 30 MIN: CPT | Performed by: FAMILY MEDICINE

## 2020-03-06 RX ORDER — CITALOPRAM 40 MG/1
TABLET ORAL
Qty: 90 TAB | Refills: 2 | Status: SHIPPED | OUTPATIENT
Start: 2020-03-06 | End: 2020-10-14 | Stop reason: SDUPTHER

## 2020-03-06 RX ORDER — TRIAMCINOLONE ACETONIDE 55 UG/1
2 SPRAY, METERED NASAL DAILY
Qty: 1 BOTTLE | Refills: 3 | Status: SHIPPED | OUTPATIENT
Start: 2020-03-06 | End: 2020-06-02

## 2020-03-06 RX ORDER — LORAZEPAM 1 MG/1
1 TABLET ORAL
Qty: 30 TAB | Refills: 2 | Status: SHIPPED | OUTPATIENT
Start: 2020-03-06 | End: 2020-06-05 | Stop reason: SDUPTHER

## 2020-03-06 RX ORDER — MECLIZINE HCL 12.5 MG/1
12.5 TABLET ORAL 3 TIMES DAILY PRN
Qty: 30 TAB | Refills: 0 | Status: SHIPPED | OUTPATIENT
Start: 2020-03-06 | End: 2020-12-03

## 2020-03-06 RX ORDER — SIMVASTATIN 10 MG
10 TABLET ORAL EVERY EVENING
Qty: 90 TAB | Refills: 3 | Status: SHIPPED | OUTPATIENT
Start: 2020-03-06 | End: 2020-12-03

## 2020-03-06 ASSESSMENT — PATIENT HEALTH QUESTIONNAIRE - PHQ9
8. MOVING OR SPEAKING SO SLOWLY THAT OTHER PEOPLE COULD HAVE NOTICED. OR THE OPPOSITE, BEING SO FIGETY OR RESTLESS THAT YOU HAVE BEEN MOVING AROUND A LOT MORE THAN USUAL: NOT AT ALL
6. FEELING BAD ABOUT YOURSELF - OR THAT YOU ARE A FAILURE OR HAVE LET YOURSELF OR YOUR FAMILY DOWN: NOT AL ALL
SUM OF ALL RESPONSES TO PHQ9 QUESTIONS 1 AND 2: 2
5. POOR APPETITE OR OVEREATING: SEVERAL DAYS
SUM OF ALL RESPONSES TO PHQ QUESTIONS 1-9: 5
1. LITTLE INTEREST OR PLEASURE IN DOING THINGS: SEVERAL DAYS
4. FEELING TIRED OR HAVING LITTLE ENERGY: SEVERAL DAYS
2. FEELING DOWN, DEPRESSED, IRRITABLE, OR HOPELESS: SEVERAL DAYS
7. TROUBLE CONCENTRATING ON THINGS, SUCH AS READING THE NEWSPAPER OR WATCHING TELEVISION: NOT AT ALL
3. TROUBLE FALLING OR STAYING ASLEEP OR SLEEPING TOO MUCH: SEVERAL DAYS
9. THOUGHTS THAT YOU WOULD BE BETTER OFF DEAD, OR OF HURTING YOURSELF: NOT AT ALL

## 2020-03-06 ASSESSMENT — FIBROSIS 4 INDEX: FIB4 SCORE: 1.26

## 2020-03-06 NOTE — ASSESSMENT & PLAN NOTE
This is a chronic health problem that is well controlled with current medications and lifestyle measures.  This patient utilizes lorazepam both to help with sleep and anxiety.  It works well for her.  It is time for her refill.  She typically takes 1 mg at bedtime.  We will renew that prescription for the coming 90 days.  Obtained and reviewed patient utilization report from [facility] pharmacy database on 3/6/2020 11:13 AM  prior to writing prescription for controlled substance II, III or IV per [place] bill . Based on assessment of the report,my physical exam if necessary and the patient's health problem, the prescription is medically necessary.

## 2020-03-06 NOTE — ASSESSMENT & PLAN NOTE
This is a chronic health problem that is well controlled with current medications and lifestyle measures. She finds that the lorazepam is needed to help with insomnia.  Obtained and reviewed patient utilization report from [facility] pharmacy database on 3/6/2020 11:10 AM  prior to writing prescription for controlled substance II, III or IV per [place] bill . Based on assessment of the report,my physical exam if necessary and the patient's health problem, the prescription is medically necessary.

## 2020-03-06 NOTE — PROGRESS NOTES
CC:Diagnoses of Insomnia, persistent, ROBERT (generalized anxiety disorder), and HTN, goal below 130/80 were pertinent to this visit.    HISTORY OF PRESENT ILLNESS: Patient is a 64 y.o. female established patient who presents today to talk about her chronic health problems and some recent intermittent dizziness she is having with change of positions.  It appears that she is having problems with a little bit of labyrinthitis.  We will give her Antivert she is on the tail end of this problem.      Insomnia, persistent  This is a chronic health problem that is well controlled with current medications and lifestyle measures. She finds that the lorazepam is needed to help with insomnia.  Obtained and reviewed patient utilization report from [facility] pharmacy database on 3/6/2020 11:10 AM  prior to writing prescription for controlled substance II, III or IV [facility] . Based on assessment of the report,my physical exam if necessary and the patient's health problem, the prescription is medically necessary.       ROBERT (generalized anxiety disorder)  This is a chronic health problem that is well controlled with current medications and lifestyle measures.  This patient utilizes lorazepam both to help with sleep and anxiety.  It works well for her.  It is time for her refill.  She typically takes 1 mg at bedtime.  We will renew that prescription for the coming 90 days.  Obtained and reviewed patient utilization report from [facility] pharmacy database on 3/6/2020 11:13 AM  prior to writing prescription for controlled substance II, III or IV per [place] bill . Based on assessment of the report,my physical exam if necessary and the patient's health problem, the prescription is medically necessary.       HTN, goal below 130/80  This is a chronic health problem that is well controlled with current medications and lifestyle measures. Her BP is excellent at 128/76.      Patient Active Problem List    Diagnosis Date  Noted   • Aortic atherosclerosis (HCC)    • Familial hypercholesteremia    • Hair loss 12/06/2018   • Polyneuropathy associated with underlying disease ([facility]) 03/13/2018   • Obesity (BMI 30-39.9) 03/13/2018   • Menopause 07/10/2017   • ROBERT (generalized anxiety disorder) 06/15/2017   • Allergic rhinitis 05/10/2017   • S/P lumbar fusion 10/20/2016   • Cervical radiculopathy 10/20/2016   • Diaphragmatic hernia 06/30/2015   • Mild mitral regurgitation 03/12/2015   • H/O: CVA (cerebrovascular accident) 09/23/2014   • Chronic constipation 09/17/2014   • GERD (gastroesophageal reflux disease) 09/17/2014   • Spinal stenosis of lumbar region 03/20/2014   • Lumbar radiculopathy, chronic 08/26/2013   • Carpal tunnel syndrome 11/14/2012   • Vitamin D deficiency disease 09/24/2012   • Dyslipidemia 12/27/2011   • HTN, goal below 130/80 10/24/2011   • Acquired scoliosis 10/24/2011   • Sleep apnea, obstructive 10/24/2011   • Insomnia, persistent 10/24/2011   • Major depressive disorder with single episode, in full remission ([facility]) 10/24/2011   • 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      Allergies:Albumin and Seasonal    Current Outpatient Medications   Medication Sig Dispense Refill   • LORazepam (ATIVAN) 1 MG Tab Take 1 Tab by mouth every bedtime for 90 days. 30 Tab 2   • meclizine (ANTIVERT) 12.5 MG Tab Take 1 Tab by mouth 3 times a day as needed. 30 Tab 0   • triamcinolone (NASACORT) 55 MCG/ACT nasal inhaler Spray 2 Sprays in nose every day. 1 Bottle 3   • simvastatin (ZOCOR) 10 MG Tab Take 1 Tab by mouth every evening. 90 Tab 3   • citalopram (CELEXA) 40 MG Tab TAKE 1 TABLET DAILY 90 Tab 2   • baclofen (LIORESAL) 10 MG Tab Take 1 Tab by mouth every bedtime. 90 Tab 3   • Omega-3 Fatty Acids (FISH OIL) 1000 MG Cap capsule Take 1 Cap by mouth 2 Times a Day.     • ondansetron (ZOFRAN ODT) 4 MG TABLET DISPERSIBLE Take 1 Tab by mouth every 6 hours as needed for Nausea. 10 Tab 0   • Artificial Tear Solution (TEARS NATURALE OP) Place 2 Drops in both  eyes 2 times a day as needed.       No current facility-administered medications for this visit.        Social History     Tobacco Use   • Smoking status: Former Smoker     Packs/day: 0.30     Years: 5.00     Pack years: 1.50     Types: Cigarettes     Last attempt to quit: 1975     Years since quittin.2   • Smokeless tobacco: Never Used   • Tobacco comment: quit    Substance Use Topics   • Alcohol use: No     Alcohol/week: 0.0 oz   • Drug use: No     Social History     Social History Narrative   • Not on file       Family History   Problem Relation Age of Onset   • Diabetes Mother    • Cancer Mother 82        breast cancer   • Lung Disease Mother         copd   • Hyperlipidemia Mother    • Hypertension Mother    • Cancer Father         Laryngeal CA   • Heart Disease Father 50        CAD with bypasses x 3   • Heart Attack Father    • Hyperlipidemia Father    • Hypertension Father    • Diabetes Sister         type II diabetes   • Diabetes Other    • Heart Disease Other    • Hypertension Other    • Lung Disease Other         ROS:     - Constitutional:  Negative for fever, chills, unexpected weight change, and fatigue/generalized weakness.    - HEENT:  Negative for headaches, vision changes, hearing changes, ear pain, ear discharge, rhinorrhea, sinus congestion, sore throat, and neck pain.      - Respiratory: Negative for cough, sputum production, chest congestion, dyspnea, wheezing, and crackles.      - Cardiovascular: Negative for chest pain, palpitations, orthopnea, and bilateral lower extremity edema.     - Gastrointestinal: Negative for heartburn, nausea, vomiting, abdominal pain, hematochezia, melena, diarrhea, constipation, and greasy/foul-smelling stools.     - Genitourinary: Negative for dysuria, polyuria, hematuria, pyuria, urinary urgency, and urinary incontinence.     - Musculoskeletal: Negative for myalgias, back pain, and joint pain.     - Skin: Negative for rash, itching, cyanotic skin color  "change.     - Neurological: Positive for intermittent dizziness with changing positions over the last 5-7 days.  Otherwise denies tingling, tremors, focal sensory deficit, focal weakness and headaches.     - Endo/Heme/Allergies: Does not bruise/bleed easily.     - Psychiatric/Behavioral: Negative for depression, suicidal/homicidal ideation and memory loss.          - NOTE: All other systems reviewed and are negative, except as in HPI.      Exam:    /76 (BP Location: Left arm, Patient Position: Standing, BP Cuff Size: Adult)   Pulse 94   Temp 37.7 °C (99.8 °F)   Resp 12   Ht 1.499 m (4' 11\")   Wt 65 kg (143 lb 4.8 oz)   SpO2 92%  Body mass index is 28.94 kg/m².    General:  Well nourished, well developed female in NAD  Head is grossly normal.  Neck: Supple without JVD or bruit. Thyroid is not enlarged.  Pulmonary: Clear to ausculation and percussion.  Normal effort. No rales, ronchi, or wheezing.  Cardiovascular: Regular rate and rhythm without murmur. Carotid and radial pulses are intact and equal bilaterally.  Extremities: no clubbing, cyanosis, or edema.  Patient was seen for 25 minutes face to face of which, 20 minutes was spent counseling regarding medications interactions and side effects as well as use of benzodiazepines..    Please note that this dictation was created using voice recognition software. I have made every reasonable attempt to correct obvious errors, but I expect that there are errors of grammar and possibly content that I did not discover before finalizing the note.    Assessment/Plan:  1. Insomnia, persistent  Controlled, continue with current meds and lifestyle.    - LORazepam (ATIVAN) 1 MG Tab; Take 1 Tab by mouth every bedtime for 90 days.  Dispense: 30 Tab; Refill: 2    2. ROBERT (generalized anxiety disorder)  Controlled, continue with current meds and lifestyle.    - LORazepam (ATIVAN) 1 MG Tab; Take 1 Tab by mouth every bedtime for 90 days.  Dispense: 30 Tab; Refill: 2    3. " HTN, goal below 130/80  Controlled, continue with current meds and lifestyle.      Plan will be to see this patient back in 3 months and we will see her down at the [place] office and she is well aware of that move.

## 2020-03-06 NOTE — ASSESSMENT & PLAN NOTE
This is a chronic health problem that is well controlled with current medications and lifestyle measures. Her BP is excellent at 128/76.

## 2020-05-25 ENCOUNTER — HOSPITAL ENCOUNTER (OUTPATIENT)
Facility: MEDICAL CENTER | Age: 65
End: 2020-05-25
Attending: PHYSICIAN ASSISTANT
Payer: MEDICARE

## 2020-05-25 ENCOUNTER — OFFICE VISIT (OUTPATIENT)
Dept: URGENT CARE | Facility: PHYSICIAN GROUP | Age: 65
End: 2020-05-25
Payer: MEDICARE

## 2020-05-25 VITALS
WEIGHT: 149 LBS | HEART RATE: 85 BPM | TEMPERATURE: 98.6 F | RESPIRATION RATE: 16 BRPM | OXYGEN SATURATION: 97 % | BODY MASS INDEX: 30.04 KG/M2 | SYSTOLIC BLOOD PRESSURE: 130 MMHG | HEIGHT: 59 IN | DIASTOLIC BLOOD PRESSURE: 82 MMHG

## 2020-05-25 DIAGNOSIS — N30.00 ACUTE CYSTITIS WITHOUT HEMATURIA: ICD-10-CM

## 2020-05-25 DIAGNOSIS — R30.0 DYSURIA: ICD-10-CM

## 2020-05-25 LAB
APPEARANCE UR: NORMAL
BILIRUB UR STRIP-MCNC: NEGATIVE MG/DL
COLOR UR AUTO: YELLOW
GLUCOSE UR STRIP.AUTO-MCNC: NEGATIVE MG/DL
KETONES UR STRIP.AUTO-MCNC: NEGATIVE MG/DL
LEUKOCYTE ESTERASE UR QL STRIP.AUTO: NORMAL
NITRITE UR QL STRIP.AUTO: NEGATIVE
PH UR STRIP.AUTO: 6.5 [PH] (ref 5–8)
PROT UR QL STRIP: NEGATIVE MG/DL
RBC UR QL AUTO: NEGATIVE
SP GR UR STRIP.AUTO: 1.01
UROBILINOGEN UR STRIP-MCNC: 0.2 MG/DL

## 2020-05-25 PROCEDURE — 87086 URINE CULTURE/COLONY COUNT: CPT

## 2020-05-25 PROCEDURE — 99214 OFFICE O/P EST MOD 30 MIN: CPT | Performed by: PHYSICIAN ASSISTANT

## 2020-05-25 PROCEDURE — 81002 URINALYSIS NONAUTO W/O SCOPE: CPT | Performed by: PHYSICIAN ASSISTANT

## 2020-05-25 RX ORDER — NITROFURANTOIN 25; 75 MG/1; MG/1
100 CAPSULE ORAL EVERY 12 HOURS
Qty: 10 CAP | Refills: 0 | Status: SHIPPED | OUTPATIENT
Start: 2020-05-25 | End: 2020-05-30

## 2020-05-25 RX ORDER — NITROFURANTOIN 25; 75 MG/1; MG/1
100 CAPSULE ORAL EVERY 12 HOURS
Qty: 10 CAP | Refills: 0 | Status: SHIPPED | OUTPATIENT
Start: 2020-05-25 | End: 2020-05-25 | Stop reason: SDUPTHER

## 2020-05-25 RX ORDER — PHENAZOPYRIDINE HYDROCHLORIDE 200 MG/1
200 TABLET, FILM COATED ORAL 3 TIMES DAILY
Qty: 6 TAB | Refills: 0 | Status: SHIPPED | OUTPATIENT
Start: 2020-05-25 | End: 2020-05-27

## 2020-05-25 ASSESSMENT — FIBROSIS 4 INDEX: FIB4 SCORE: 1.27

## 2020-05-25 NOTE — PROGRESS NOTES
Chief Complaint   Patient presents with   • UTI     Pt report pelvic pain, foul odor. Onset 4 days.        HISTORY OF PRESENT ILLNESS: Patient is a 65 y.o. female who presents today because She has a 4-day history of urgency and dysuria.  Started with 1 or 2 days of fever which has now resolved.  She has not been taking any medications for it.  Denies any nausea, vomiting or diarrhea.    Patient Active Problem List    Diagnosis Date Noted   • Aortic atherosclerosis ([facility])    • Familial hypercholesteremia    • Hair loss 12/06/2018   • Polyneuropathy associated with underlying disease ([facility]) 03/13/2018   • Obesity (BMI 30-39.9) 03/13/2018   • Menopause 07/10/2017   • ROBERT (generalized anxiety disorder) 06/15/2017   • Allergic rhinitis 05/10/2017   • S/P lumbar fusion 10/20/2016   • Cervical radiculopathy 10/20/2016   • Diaphragmatic hernia 06/30/2015   • Mild mitral regurgitation 03/12/2015   • H/O: CVA (cerebrovascular accident) 09/23/2014   • Chronic constipation 09/17/2014   • GERD (gastroesophageal reflux disease) 09/17/2014   • Spinal stenosis of lumbar region 03/20/2014   • Lumbar radiculopathy, chronic 08/26/2013   • Carpal tunnel syndrome 11/14/2012   • Vitamin D deficiency disease 09/24/2012   • Dyslipidemia 12/27/2011   • HTN, goal below 130/80 10/24/2011   • Acquired scoliosis 10/24/2011   • Sleep apnea, obstructive 10/24/2011   • Insomnia, persistent 10/24/2011   • Major depressive disorder with single episode, in full remission ([facility]) 10/24/2011   • 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       Allergies:Albumin and Seasonal    Current Outpatient Medications Ordered in Epic   Medication Sig Dispense Refill   • phenazopyridine (PYRIDIUM) 200 MG Tab Take 1 Tab by mouth 3 times a day for 2 days. 6 Tab 0   • nitrofurantoin (MACROBID) 100 MG Cap Take 1 Cap by mouth every 12 hours for 5 days. 10 Cap 0   • LORazepam (ATIVAN) 1 MG Tab Take 1 Tab by mouth every bedtime for 90 days. 30 Tab 2   • meclizine (ANTIVERT) 12.5 MG Tab Take 1  "Tab by mouth 3 times a day as needed. 30 Tab 0   • triamcinolone (NASACORT) 55 MCG/ACT nasal inhaler Spray 2 Sprays in nose every day. 1 Bottle 3   • simvastatin (ZOCOR) 10 MG Tab Take 1 Tab by mouth every evening. 90 Tab 3   • citalopram (CELEXA) 40 MG Tab TAKE 1 TABLET DAILY 90 Tab 2   • baclofen (LIORESAL) 10 MG Tab Take 1 Tab by mouth every bedtime. 90 Tab 3   • Omega-3 Fatty Acids (FISH OIL) 1000 MG Cap capsule Take 1 Cap by mouth 2 Times a Day.     • ondansetron (ZOFRAN ODT) 4 MG TABLET DISPERSIBLE Take 1 Tab by mouth every 6 hours as needed for Nausea. 10 Tab 0   • Artificial Tear Solution (TEARS NATURALE OP) Place 2 Drops in both eyes 2 times a day as needed.       No current Epic-ordered facility-administered medications on file.        Past Medical History:   Diagnosis Date   • Anesthesia 02-14-11    PO N/V, \"slow to come out\"   • Aortic atherosclerosis ([facility])    • Arthritis 02-14-11    spine   • Backpain 02-14-11    neck and abd. also,    • Breath shortness     with exertion   • Bronchitis 05/2018   • Cancer ([facility]) 07/18/2018    Skin - 15 years ago.   • Cancer ([facility])     April/May 2018   • Diverticulitis    • Endometriosis of uterus    • Familial hypercholesteremia    • Fusion of spine 2014   • ROBERT (generalized anxiety disorder) 6/15/2017   • H/O fall     when in hospital  with low O2 sats   • H/O: CVA (cerebrovascular accident) 8/22/2014    Complex event associated with apparent medication reaction but with MRI suggesting possible multiple small infarcts of various ages, [facility]   • Hair loss 12/6/2018   • Heart burn    • Hiatus hernia syndrome     not repaired   • High cholesterol    • HTN, goal below 130/80 10/24/2011   • Infectious disease      Hep C +   • Lung collapse 02-14-11    right lung 1/4 collpsed    • Major depressive disorder with single episode, in full remission ([facility]) 10/24/2011   • Mixed hyperlipidemia 12/27/2011   • Moderate major depression ([facility]) 10/24/2011   • MRSA exposure 8/2014    " while in hospital   • Post-menopause on HRT (hormone replacement therapy) 2011   • 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    exposed as a child, told not active   • Scoliosis    • Sleep apnea 2015    CPAP   • Snoring    • Unspecified vitamin D deficiency 2012       Social History     Tobacco Use   • Smoking status: Former Smoker     Packs/day: 0.30     Years: 5.00     Pack years: 1.50     Types: Cigarettes     Last attempt to quit: 1975     Years since quittin.4   • Smokeless tobacco: Never Used   • Tobacco comment: quit    Substance Use Topics   • Alcohol use: No     Alcohol/week: 0.0 oz   • Drug use: No       Family Status   Relation Name Status   • Mo  Alive        84 in    • Fa   at age 81        Laryngeal CA   • Sis  Alive        56 in    • OTHER  (Not Specified)     Family History   Problem Relation Age of Onset   • Diabetes Mother    • Cancer Mother 82        breast cancer   • Lung Disease Mother         copd   • Hyperlipidemia Mother    • Hypertension Mother    • Cancer Father         Laryngeal CA   • Heart Disease Father 50        CAD with bypasses x 3   • Heart Attack Father    • Hyperlipidemia Father    • Hypertension Father    • Diabetes Sister         type II diabetes   • Diabetes Other    • Heart Disease Other    • Hypertension Other    • Lung Disease Other        ROS:  Review of Systems   Constitutional: Positive for resolved fever, Nochills, weight loss and malaise/fatigue.   HENT: Negative for ear pain, nosebleeds, congestion, sore throat and neck pain.    Eyes: Negative for blurred vision.   Respiratory: Negative for cough, sputum production, shortness of breath and wheezing.    Cardiovascular: Negative for chest pain, palpitations, orthopnea and leg swelling.   Gastrointestinal: Negative for heartburn, nausea, vomiting and abdominal pain.   Genitourinary:Positive for dysuria, urgency and frequency.     Exam:  /82 (BP Location: Right arm, Patient Position: Sitting,  "BP Cuff Size: Adult)   Pulse 85   Temp 37 °C (98.6 °F) (Temporal)   Resp 16   Ht 1.499 m (4' 11\")   Wt 67.6 kg (149 lb)   SpO2 97%   General:  Well nourished, well developed female in NAD  Head:Normocephalic, atraumatic  Eyes: PERRLA, EOM within normal limits, no conjunctival injection, no scleral icterus, visual fields and acuity grossly intact.  Nose: Symmetrical without tenderness, no discharge.  Mouth: reasonable hygiene, no erythema exudates or tonsillar enlargement.  Neck: no masses, range of motion within normal limits, no tracheal deviation. No obvious thyroid enlargement.  Cardiovascular: regular rate and rhythm without murmurs, rubs, or gallops.  Extremities: no clubbing, cyanosis, or edema.    Urinalysis has leuks    Please note that this dictation was created using voice recognition software. I have made every reasonable attempt to correct obvious errors, but I expect that there are errors of grammar and possibly content that I did not discover before finalizing the note.    Assessment/Plan:  1. Acute cystitis without hematuria  Urine Culture    nitrofurantoin (MACROBID) 100 MG Cap    DISCONTINUED: nitrofurantoin (MACROBID) 100 MG Cap   2. Dysuria  POCT Urinalysis    phenazopyridine (PYRIDIUM) 200 MG Tab   Increase p.o. fluids    Followup with primary care in the next 7-10 days if not significantly improving, return to the urgent care or go to the emergency room sooner for any worsening of symptoms.       "

## 2020-05-26 DIAGNOSIS — N30.00 ACUTE CYSTITIS WITHOUT HEMATURIA: ICD-10-CM

## 2020-05-28 ENCOUNTER — HOSPITAL ENCOUNTER (OUTPATIENT)
Dept: LAB | Facility: MEDICAL CENTER | Age: 65
End: 2020-05-28
Attending: FAMILY MEDICINE
Payer: MEDICARE

## 2020-05-28 DIAGNOSIS — E78.01 FAMILIAL HYPERCHOLESTEREMIA: Chronic | ICD-10-CM

## 2020-05-28 LAB
ALBUMIN SERPL BCP-MCNC: 4.5 G/DL (ref 3.2–4.9)
ALBUMIN/GLOB SERPL: 1.6 G/DL
ALP SERPL-CCNC: 86 U/L (ref 30–99)
ALT SERPL-CCNC: 33 U/L (ref 2–50)
ANION GAP SERPL CALC-SCNC: 12 MMOL/L (ref 7–16)
AST SERPL-CCNC: 27 U/L (ref 12–45)
BILIRUB SERPL-MCNC: 0.4 MG/DL (ref 0.1–1.5)
BUN SERPL-MCNC: 14 MG/DL (ref 8–22)
CALCIUM SERPL-MCNC: 9.5 MG/DL (ref 8.5–10.5)
CHLORIDE SERPL-SCNC: 99 MMOL/L (ref 96–112)
CHOLEST SERPL-MCNC: 279 MG/DL (ref 100–199)
CO2 SERPL-SCNC: 28 MMOL/L (ref 20–33)
CREAT SERPL-MCNC: 0.72 MG/DL (ref 0.5–1.4)
FASTING STATUS PATIENT QL REPORTED: NORMAL
GLOBULIN SER CALC-MCNC: 2.9 G/DL (ref 1.9–3.5)
GLUCOSE SERPL-MCNC: 101 MG/DL (ref 65–99)
HDLC SERPL-MCNC: 56 MG/DL
LDLC SERPL CALC-MCNC: 177 MG/DL
POTASSIUM SERPL-SCNC: 4.3 MMOL/L (ref 3.6–5.5)
PROT SERPL-MCNC: 7.4 G/DL (ref 6–8.2)
SODIUM SERPL-SCNC: 139 MMOL/L (ref 135–145)
TRIGL SERPL-MCNC: 229 MG/DL (ref 0–149)

## 2020-05-28 PROCEDURE — 80053 COMPREHEN METABOLIC PANEL: CPT

## 2020-05-28 PROCEDURE — 80061 LIPID PANEL: CPT

## 2020-05-28 PROCEDURE — 36415 COLL VENOUS BLD VENIPUNCTURE: CPT

## 2020-05-29 LAB
BACTERIA UR CULT: NORMAL
SIGNIFICANT IND 70042: NORMAL
SITE SITE: NORMAL
SOURCE SOURCE: NORMAL

## 2020-06-01 ENCOUNTER — PATIENT MESSAGE (OUTPATIENT)
Dept: MEDICAL GROUP | Facility: PHYSICIAN GROUP | Age: 65
End: 2020-06-01

## 2020-06-02 RX ORDER — FLUTICASONE PROPIONATE 50 MCG
2 SPRAY, SUSPENSION (ML) NASAL
Qty: 48 G | Refills: 11 | Status: SHIPPED | OUTPATIENT
Start: 2020-06-02 | End: 2022-09-01

## 2020-06-05 ENCOUNTER — OFFICE VISIT (OUTPATIENT)
Dept: MEDICAL GROUP | Facility: PHYSICIAN GROUP | Age: 65
End: 2020-06-05
Payer: MEDICARE

## 2020-06-05 VITALS
WEIGHT: 151.4 LBS | HEIGHT: 59 IN | TEMPERATURE: 98.4 F | OXYGEN SATURATION: 93 % | HEART RATE: 88 BPM | RESPIRATION RATE: 16 BRPM | SYSTOLIC BLOOD PRESSURE: 126 MMHG | DIASTOLIC BLOOD PRESSURE: 88 MMHG | BODY MASS INDEX: 30.52 KG/M2

## 2020-06-05 DIAGNOSIS — F41.1 GENERALIZED ANXIETY DISORDER: ICD-10-CM

## 2020-06-05 DIAGNOSIS — G47.00 INSOMNIA, PERSISTENT: ICD-10-CM

## 2020-06-05 DIAGNOSIS — E66.9 OBESITY (BMI 30-39.9): ICD-10-CM

## 2020-06-05 DIAGNOSIS — Z12.31 ENCOUNTER FOR SCREENING MAMMOGRAM FOR BREAST CANCER: ICD-10-CM

## 2020-06-05 DIAGNOSIS — E78.01 FAMILIAL HYPERCHOLESTEREMIA: Chronic | ICD-10-CM

## 2020-06-05 DIAGNOSIS — I10 HTN, GOAL BELOW 130/80: ICD-10-CM

## 2020-06-05 PROCEDURE — 99214 OFFICE O/P EST MOD 30 MIN: CPT | Performed by: FAMILY MEDICINE

## 2020-06-05 RX ORDER — LORAZEPAM 1 MG/1
1 TABLET ORAL
Qty: 30 TAB | Refills: 2 | Status: SHIPPED | OUTPATIENT
Start: 2020-06-05 | End: 2020-08-28 | Stop reason: SDUPTHER

## 2020-06-05 ASSESSMENT — FIBROSIS 4 INDEX: FIB4 SCORE: 1.44

## 2020-06-05 NOTE — ASSESSMENT & PLAN NOTE
Chronic health problem for this patient that she had stopped taking her simvastatin 10 mg daily she had been doing very well with weight loss but now the [place] crisis is hit and she is mostly home and she is finding she is eating more carbohydrates.  Total cholesterol went up to 279, triglycerides 229, HDL still good at 56 but her LDL is up to 177.  We will plan to recheck a lipid panel when we see her back in 3 months.

## 2020-06-05 NOTE — ASSESSMENT & PLAN NOTE
This is a chronic health problem for which patient utilizes lorazepam 1 mg at bedtime.  This has worked well for her for a number of years.  She is due for her refill at this time and uses the medication appropriately.  Obtained and reviewed patient utilization report from [facility] pharmacy database on 6/5/2020 9:39 AM  prior to writing prescription for controlled substance II, III or IV per [place] bill . Based on assessment of the report,my physical exam if necessary and the patient's health problem, the prescription is medically necessary.

## 2020-06-05 NOTE — ASSESSMENT & PLAN NOTE
Is a chronic health problem for this patient that has been very difficult to manage.  She has been mainly staying at home isolating.  She is gained approximately 8 pounds.  She is getting get back on track and get more serious about her weight loss.  She knows how to eat to do that.

## 2020-06-05 NOTE — ASSESSMENT & PLAN NOTE
This is a chronic health problem that is well controlled with current medications and lifestyle measures.  Patient utilizes lorazepam 1 mg for at bedtime to try and help with both her anxiety and her sleep.  She finds it works fairly well.  She is due for refill her last prescription ended yesterday.  We will will refill and send it directly to her pharmacy.  Obtained and reviewed patient utilization report from [facility] pharmacy database on 6/5/2020 9:38 AM  prior to writing prescription for controlled substance II, III or IV per [place] bill . Based on assessment of the report,my physical exam if necessary and the patient's health problem, the prescription is medically necessary.

## 2020-06-05 NOTE — ASSESSMENT & PLAN NOTE
This is a chronic health problem that is well controlled with current medications and lifestyle measures. Her BP is excellent 126/88.

## 2020-06-05 NOTE — PROGRESS NOTES
CC:Diagnoses of Encounter for screening mammogram for breast cancer, Insomnia, persistent, ROBERT (generalized anxiety disorder), Obesity (BMI 30-39.9), HTN, goal below 130/80, and Familial hypercholesteremia were pertinent to this visit.    HISTORY OF PRESENT ILLNESS: Patient is a 65 y.o. female established patient who presents today to follow-up on blood work and get her refills as needed for her generalized anxiety disorder and insomnia.      ROBERT (generalized anxiety disorder)  This is a chronic health problem that is well controlled with current medications and lifestyle measures.  Patient utilizes lorazepam 1 mg for at bedtime to try and help with both her anxiety and her sleep.  She finds it works fairly well.  She is due for refill her last prescription ended yesterday.  We will will refill and send it directly to her pharmacy.  Obtained and reviewed patient utilization report from [facility] pharmacy database on 6/5/2020 9:38 AM  prior to writing prescription for controlled substance II, III or IV [facility] . Based on assessment of the report,my physical exam if necessary and the patient's health problem, the prescription is medically necessary.       Insomnia, persistent  This is a chronic health problem for which patient utilizes lorazepam 1 mg at bedtime.  This has worked well for her for a number of years.  She is due for her refill at this time and uses the medication appropriately.  Obtained and reviewed patient utilization report from [facility] pharmacy database on 6/5/2020 9:39 AM  prior to writing prescription for controlled substance II, III or IV [facility] . Based on assessment of the report,my physical exam if necessary and the patient's health problem, the prescription is medically necessary.       Obesity (BMI 30-39.9)  Is a chronic health problem for this patient that has been very difficult to manage.  She has been mainly staying at home isolating.  She is gained  approximately 8 pounds.  She is getting get back on track and get more serious about her weight loss.  She knows how to eat to do that.    HTN, goal below 130/80  This is a chronic health problem that is well controlled with current medications and lifestyle measures. Her BP is excellent 126/88.    Familial hypercholesteremia  Chronic health problem for this patient that she had stopped taking her simvastatin 10 mg daily she had been doing very well with weight loss but now the [place] crisis is hit and she is mostly home and she is finding she is eating more carbohydrates.  Total cholesterol went up to 279, triglycerides 229, HDL still good at 56 but her LDL is up to 177.  We will plan to recheck a lipid panel when we see her back in 3 months.      Patient Active Problem List    Diagnosis Date Noted   • Aortic atherosclerosis ([facility])    • Familial hypercholesteremia    • Hair loss 12/06/2018   • Polyneuropathy associated with underlying disease ([facility]) 03/13/2018   • Obesity (BMI 30-39.9) 03/13/2018   • Menopause 07/10/2017   • ROBERT (generalized anxiety disorder) 06/15/2017   • Allergic rhinitis 05/10/2017   • S/P lumbar fusion 10/20/2016   • Cervical radiculopathy 10/20/2016   • Diaphragmatic hernia 06/30/2015   • Mild mitral regurgitation 03/12/2015   • H/O: CVA (cerebrovascular accident) 09/23/2014   • Chronic constipation 09/17/2014   • GERD (gastroesophageal reflux disease) 09/17/2014   • Spinal stenosis of lumbar region 03/20/2014   • Lumbar radiculopathy, chronic 08/26/2013   • Carpal tunnel syndrome 11/14/2012   • Vitamin D deficiency disease 09/24/2012   • Dyslipidemia 12/27/2011   • HTN, goal below 130/80 10/24/2011   • Acquired scoliosis 10/24/2011   • Sleep apnea, obstructive 10/24/2011   • Insomnia, persistent 10/24/2011   • Major depressive disorder with single episode, in full remission ([facility]) 10/24/2011   • 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      Allergies:Albumin and Seasonal    Current Outpatient Medications    Medication Sig Dispense Refill   • LORazepam (ATIVAN) 1 MG Tab Take 1 Tab by mouth every bedtime for 90 days. 30 Tab 2   • fluticasone (FLONASE) 50 MCG/ACT nasal spray Spray 2 Sprays in nose every day. INHALE 2 SPRAYS IN EACH NOSTRIL 48 g 11   • meclizine (ANTIVERT) 12.5 MG Tab Take 1 Tab by mouth 3 times a day as needed. 30 Tab 0   • simvastatin (ZOCOR) 10 MG Tab Take 1 Tab by mouth every evening. 90 Tab 3   • citalopram (CELEXA) 40 MG Tab TAKE 1 TABLET DAILY 90 Tab 2   • baclofen (LIORESAL) 10 MG Tab Take 1 Tab by mouth every bedtime. 90 Tab 3   • Omega-3 Fatty Acids (FISH OIL) 1000 MG Cap capsule Take 1 Cap by mouth 2 Times a Day.     • ondansetron (ZOFRAN ODT) 4 MG TABLET DISPERSIBLE Take 1 Tab by mouth every 6 hours as needed for Nausea. 10 Tab 0   • Artificial Tear Solution (TEARS NATURALE OP) Place 2 Drops in both eyes 2 times a day as needed.       No current facility-administered medications for this visit.        Social History     Tobacco Use   • Smoking status: Former Smoker     Packs/day: 0.30     Years: 5.00     Pack years: 1.50     Types: Cigarettes     Last attempt to quit: 1975     Years since quittin.4   • Smokeless tobacco: Never Used   • Tobacco comment: quit    Substance Use Topics   • Alcohol use: No     Alcohol/week: 0.0 oz   • Drug use: No     Social History     Social History Narrative   • Not on file       Family History   Problem Relation Age of Onset   • Diabetes Mother    • Cancer Mother 82        breast cancer   • Lung Disease Mother         copd   • Hyperlipidemia Mother    • Hypertension Mother    • Cancer Father         Laryngeal CA   • Heart Disease Father 50        CAD with bypasses x 3   • Heart Attack Father    • Hyperlipidemia Father    • Hypertension Father    • Diabetes Sister         type II diabetes   • Diabetes Other    • Heart Disease Other    • Hypertension Other    • Lung Disease Other         ROS:     - Constitutional:  Negative for fever, chills,  "unexpected weight change, and fatigue/generalized weakness.    - HEENT:  Negative for headaches, vision changes, hearing changes, ear pain, ear discharge, rhinorrhea, sinus congestion, sore throat, and neck pain.      - Respiratory: Negative for cough, sputum production, chest congestion, dyspnea, wheezing, and crackles.      - Cardiovascular: Negative for chest pain, palpitations, orthopnea, and bilateral lower extremity edema.     - Gastrointestinal: Negative for heartburn, nausea, vomiting, abdominal pain, hematochezia, melena, diarrhea, constipation, and greasy/foul-smelling stools.     - Genitourinary: Negative for dysuria, polyuria, hematuria, pyuria, urinary urgency, and urinary incontinence.     - Musculoskeletal: Negative for myalgias, back pain, and joint pain.     - Skin: Negative for rash, itching, cyanotic skin color change.     - Neurological: Negative for dizziness, tingling, tremors, focal sensory deficit, focal weakness and headaches.     - Endo/Heme/Allergies: Does not bruise/bleed easily.     - Psychiatric/Behavioral: Negative for depression, suicidal/homicidal ideation and memory loss.          - NOTE: All other systems reviewed and are negative, except as in HPI.      Exam:    /88 (BP Location: Left arm, Patient Position: Sitting, BP Cuff Size: Adult)   Pulse 88   Temp 36.9 °C (98.4 °F) (Temporal)   Resp 16   Ht 1.499 m (4' 11\")   Wt 68.7 kg (151 lb 6.4 oz)   SpO2 93%  Body mass index is 30.58 kg/m².    General:  Well nourished, well developed female in NAD  Head is grossly normal.  Neck: Supple without JVD or bruit. Thyroid is not enlarged.  Pulmonary: Clear to ausculation and percussion.  Normal effort. No rales, ronchi, or wheezing.  Cardiovascular: Regular rate and rhythm without murmur. Carotid and radial pulses are intact and equal bilaterally.  Extremities: no clubbing, cyanosis, or edema.  LABS: 5/28/2020: Results reviewed and discussed with the patient, questions " answered.    Please note that this dictation was created using voice recognition software. I have made every reasonable attempt to correct obvious errors, but I expect that there are errors of grammar and possibly content that I did not discover before finalizing the note.    Assessment/Plan:  1. Encounter for screening mammogram for breast cancer  Patient will schedule her own mammogram  - MA-SCREENING MAMMO BILAT W/TOMOSYNTHESIS W/CAD; Future    2. Insomnia, persistent  Feels provided for the coming 3 months  - LORazepam (ATIVAN) 1 MG Tab; Take 1 Tab by mouth every bedtime for 90 days.  Dispense: 30 Tab; Refill: 2    3. ROBERT (generalized anxiety disorder)  Feels provided for the coming 3 months    4. Obesity (BMI 30-39.9)  Working on weight loss and will get back on track.  - Patient identified as having weight management issue.  Appropriate orders and counseling given.    5. HTN, goal below 130/80  Controlled, continue with current meds and lifestyle.      6. Familial hypercholesteremia  Uncontrolled but patient had gone off of her medication.  She is just refilled it and we will recheck in 3 months.

## 2020-08-21 ENCOUNTER — HOSPITAL ENCOUNTER (OUTPATIENT)
Dept: LAB | Facility: MEDICAL CENTER | Age: 65
End: 2020-08-21
Attending: FAMILY MEDICINE
Payer: MEDICARE

## 2020-08-21 DIAGNOSIS — E78.01 FAMILIAL HYPERCHOLESTEREMIA: Chronic | ICD-10-CM

## 2020-08-21 LAB
ALBUMIN SERPL BCP-MCNC: 4.5 G/DL (ref 3.2–4.9)
ALBUMIN/GLOB SERPL: 1.7 G/DL
ALP SERPL-CCNC: 81 U/L (ref 30–99)
ALT SERPL-CCNC: 21 U/L (ref 2–50)
ANION GAP SERPL CALC-SCNC: 13 MMOL/L (ref 7–16)
AST SERPL-CCNC: 19 U/L (ref 12–45)
BILIRUB SERPL-MCNC: 0.5 MG/DL (ref 0.1–1.5)
BUN SERPL-MCNC: 15 MG/DL (ref 8–22)
CALCIUM SERPL-MCNC: 9.4 MG/DL (ref 8.5–10.5)
CHLORIDE SERPL-SCNC: 103 MMOL/L (ref 96–112)
CHOLEST SERPL-MCNC: 229 MG/DL (ref 100–199)
CO2 SERPL-SCNC: 26 MMOL/L (ref 20–33)
CREAT SERPL-MCNC: 0.76 MG/DL (ref 0.5–1.4)
FASTING STATUS PATIENT QL REPORTED: NORMAL
GLOBULIN SER CALC-MCNC: 2.6 G/DL (ref 1.9–3.5)
GLUCOSE SERPL-MCNC: 97 MG/DL (ref 65–99)
HDLC SERPL-MCNC: 57 MG/DL
LDLC SERPL CALC-MCNC: 146 MG/DL
POTASSIUM SERPL-SCNC: 4.2 MMOL/L (ref 3.6–5.5)
PROT SERPL-MCNC: 7.1 G/DL (ref 6–8.2)
SODIUM SERPL-SCNC: 142 MMOL/L (ref 135–145)
TRIGL SERPL-MCNC: 131 MG/DL (ref 0–149)

## 2020-08-21 PROCEDURE — 36415 COLL VENOUS BLD VENIPUNCTURE: CPT

## 2020-08-21 PROCEDURE — 80061 LIPID PANEL: CPT

## 2020-08-21 PROCEDURE — 80053 COMPREHEN METABOLIC PANEL: CPT

## 2020-08-28 ENCOUNTER — OFFICE VISIT (OUTPATIENT)
Dept: MEDICAL GROUP | Facility: PHYSICIAN GROUP | Age: 65
End: 2020-08-28
Payer: MEDICARE

## 2020-08-28 VITALS
HEIGHT: 59 IN | TEMPERATURE: 98.2 F | SYSTOLIC BLOOD PRESSURE: 120 MMHG | DIASTOLIC BLOOD PRESSURE: 76 MMHG | OXYGEN SATURATION: 96 % | HEART RATE: 69 BPM | BODY MASS INDEX: 30.84 KG/M2 | RESPIRATION RATE: 16 BRPM | WEIGHT: 153 LBS

## 2020-08-28 DIAGNOSIS — I70.0 ATHEROSCLEROSIS OF AORTA (HCC): Chronic | ICD-10-CM

## 2020-08-28 DIAGNOSIS — G47.00 INSOMNIA, PERSISTENT: ICD-10-CM

## 2020-08-28 DIAGNOSIS — F41.1 GENERALIZED ANXIETY DISORDER: ICD-10-CM

## 2020-08-28 DIAGNOSIS — F32.5 MAJOR DEPRESSIVE DISORDER WITH SINGLE EPISODE, IN FULL REMISSION (HCC): ICD-10-CM

## 2020-08-28 PROCEDURE — 99214 OFFICE O/P EST MOD 30 MIN: CPT | Performed by: FAMILY MEDICINE

## 2020-08-28 RX ORDER — LORAZEPAM 1 MG/1
1 TABLET ORAL
Qty: 30 TAB | Refills: 2 | Status: SHIPPED | OUTPATIENT
Start: 2020-09-03 | End: 2020-11-20 | Stop reason: SDUPTHER

## 2020-08-28 NOTE — ASSESSMENT & PLAN NOTE
This is a chronic problem that is in secondary prevention with simvastatin and excellent BP control.

## 2020-08-28 NOTE — ASSESSMENT & PLAN NOTE
This is a chronic health problem that is well controlled with current medications and lifestyle measures. She has gotten a new puppy and this is helping with her  Isolation at [facility] for the [place] crisis.  She will continue with Citalopram 400 mg daily.

## 2020-08-28 NOTE — ASSESSMENT & PLAN NOTE
Is a chronic health problem for this patient for which she utilizes lorazepam 1 mg every night at bedtime.  Been no it is been a really tough time with the [place] crisis and people having to isolate at home she still doing well as long as she has that to help her get to sleep.  We will renew for the coming 90 days.  Obtained and reviewed patient utilization report from [facility] [place] pharmacy database on 8/28/2020 10:07 AM  prior to writing prescription for controlled substance II, III or IV per [place] bill . Based on assessment of the report,my physical exam if necessary and the patient's health problem, the prescription is medically necessary.

## 2020-08-28 NOTE — PROGRESS NOTES
CC:Diagnoses of ROBERT (generalized anxiety disorder), Insomnia, persistent, Major depressive disorder with single episode, in full remission (HCC), and Aortic atherosclerosis (HCC) were pertinent to this visit.    HISTORY OF PRESENT ILLNESS: Patient is a 65 y.o. female established patient who presents today to get her refills on her Lorazepam and to discuss her current health problems.      ROBERT (generalized anxiety disorder)  Is a chronic health problem for this patient for which she utilizes lorazepam 1 mg every night at bedtime.  Been no it is been a really tough time with the [place] crisis and people having to isolate at home she still doing well as long as she has that to help her get to sleep.  We will renew for the coming 90 days.  Obtained and reviewed patient utilization report from [facility] pharmacy database on 8/28/2020 10:07 AM  prior to writing prescription for controlled substance II, III or IV per [place] bill . Based on assessment of the report,my physical exam if necessary and the patient's health problem, the prescription is medically necessary.       Insomnia, persistent  This is a chronic health problem that is well controlled with current medications and lifestyle measures. She continues to use Lorazepam 1mg nightly.  Without this medication she finds that she cannot get adequate sleep without this medication.  Obtained and reviewed patient utilization report from [facility] pharmacy database on 8/28/2020 10:11 AM  prior to writing prescription for controlled substance II, III or IV per [place] bill . Based on assessment of the report,my physical exam if necessary and the patient's health problem, the prescription is medically necessary.       Major depressive disorder with single episode, in full remission (HCC)  This is a chronic health problem that is well controlled with current medications and lifestyle measures. She has gotten a new puppy and this is helping with her  Isolation  at [facility] for the [place] crisis.  She will continue with Citalopram 40 mg daily.    Aortic atherosclerosis (HCC)  This is a chronic problem that is in secondary prevention with simvastatin and excellent BP control.      Patient Active Problem List    Diagnosis Date Noted   • Aortic atherosclerosis (HCC)    • Familial hypercholesteremia    • Hair loss 12/06/2018   • Polyneuropathy associated with underlying disease ([facility]) 03/13/2018   • Obesity (BMI 30-39.9) 03/13/2018   • Menopause 07/10/2017   • ROBERT (generalized anxiety disorder) 06/15/2017   • Allergic rhinitis 05/10/2017   • S/P lumbar fusion 10/20/2016   • Cervical radiculopathy 10/20/2016   • Diaphragmatic hernia 06/30/2015   • Mild mitral regurgitation 03/12/2015   • H/O: CVA (cerebrovascular accident) 09/23/2014   • Chronic constipation 09/17/2014   • GERD (gastroesophageal reflux disease) 09/17/2014   • Spinal stenosis of lumbar region 03/20/2014   • Lumbar radiculopathy, chronic 08/26/2013   • Carpal tunnel syndrome 11/14/2012   • Vitamin D deficiency disease 09/24/2012   • Dyslipidemia 12/27/2011   • HTN, goal below 130/80 10/24/2011   • Acquired scoliosis 10/24/2011   • Sleep apnea, obstructive 10/24/2011   • Insomnia, persistent 10/24/2011   • Major depressive disorder with single episode, in full remission ([facility]) 10/24/2011   • 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      Allergies:Albumin and Seasonal    Current Outpatient Medications   Medication Sig Dispense Refill   • [START ON 9/3/2020] LORazepam (ATIVAN) 1 MG Tab Take 1 Tab by mouth every bedtime for 90 days. 30 Tab 2   • fluticasone (FLONASE) 50 MCG/ACT nasal spray Spray 2 Sprays in nose every day. INHALE 2 SPRAYS IN EACH NOSTRIL 48 g 11   • meclizine (ANTIVERT) 12.5 MG Tab Take 1 Tab by mouth 3 times a day as needed. 30 Tab 0   • simvastatin (ZOCOR) 10 MG Tab Take 1 Tab by mouth every evening. 90 Tab 3   • citalopram (CELEXA) 40 MG Tab TAKE 1 TABLET DAILY 90 Tab 2   • baclofen (LIORESAL) 10 MG Tab Take 1 Tab by  mouth every bedtime. 90 Tab 3   • Omega-3 Fatty Acids (FISH OIL) 1000 MG Cap capsule Take 1 Cap by mouth 2 Times a Day.     • ondansetron (ZOFRAN ODT) 4 MG TABLET DISPERSIBLE Take 1 Tab by mouth every 6 hours as needed for Nausea. 10 Tab 0   • Artificial Tear Solution (TEARS NATURALE OP) Place 2 Drops in both eyes 2 times a day as needed.       No current facility-administered medications for this visit.        Social History     Tobacco Use   • Smoking status: Former Smoker     Packs/day: 0.30     Years: 5.00     Pack years: 1.50     Types: Cigarettes     Quit date: 1975     Years since quittin.6   • Smokeless tobacco: Never Used   • Tobacco comment: quit    Substance Use Topics   • Alcohol use: No     Alcohol/week: 0.0 oz   • Drug use: No     Social History     Social History Narrative   • Not on file       Family History   Problem Relation Age of Onset   • Diabetes Mother    • Cancer Mother 82        breast cancer   • Lung Disease Mother         copd   • Hyperlipidemia Mother    • Hypertension Mother    • Cancer Father         Laryngeal CA   • Heart Disease Father 50        CAD with bypasses x 3   • Heart Attack Father    • Hyperlipidemia Father    • Hypertension Father    • Diabetes Sister         type II diabetes   • Diabetes Other    • Heart Disease Other    • Hypertension Other    • Lung Disease Other         ROS:     - Constitutional:  Negative for fever, chills, unexpected weight change, and fatigue/generalized weakness.    - HEENT:  Negative for headaches, vision changes, hearing changes, ear pain, ear discharge, rhinorrhea, sinus congestion, sore throat, and neck pain.      - Respiratory: Negative for cough, sputum production, chest congestion, dyspnea, wheezing, and crackles.      - Cardiovascular: Negative for chest pain, palpitations, orthopnea, and bilateral lower extremity edema.     - Gastrointestinal: Negative for heartburn, nausea, vomiting, abdominal pain, hematochezia, melena,  "diarrhea, constipation, and greasy/foul-smelling stools.     - Genitourinary: Negative for dysuria, polyuria, hematuria, pyuria, urinary urgency, and urinary incontinence.     - Musculoskeletal: Negative for myalgias, back pain, and joint pain.     - Skin: Negative for rash, itching, cyanotic skin color change.     - Neurological: Negative for dizziness, tingling, tremors, focal sensory deficit, focal weakness and headaches.     - Endo/Heme/Allergies: Does not bruise/bleed easily.     - Psychiatric/Behavioral: Negative for depression, suicidal/homicidal ideation and memory loss.          - NOTE: All other systems reviewed and are negative, except as in HPI.      Exam:    /76 (BP Location: Right arm, Patient Position: Sitting, BP Cuff Size: Adult)   Pulse 69   Temp 36.8 °C (98.2 °F) (Temporal)   Resp 16   Ht 1.499 m (4' 11\")   Wt 69.4 kg (153 lb)   SpO2 96%  Body mass index is 30.9 kg/m².    General:  Well nourished, well developed female in NAD  Head is grossly normal.  Neck: Supple without JVD or bruit. Thyroid is not enlarged.  Pulmonary: Clear to ausculation and percussion.  Normal effort. No rales, ronchi, or wheezing.  Cardiovascular: Regular rate and rhythm without murmur. Carotid and radial pulses are intact and equal bilaterally.  Extremities: no clubbing, cyanosis, or edema.  Patient was seen for 25 minutes face to face of which, 20 minutes was spent counseling regarding discussion of her lorazepam usage and other medications whether or not she feels she has adequate control of her anxiety and depression particularly in light of the corona crisis..    Please note that this dictation was created using voice recognition software. I have made every reasonable attempt to correct obvious errors, but I expect that there are errors of grammar and possibly content that I did not discover before finalizing the note.    Assessment/Plan:  1. ROBERT (generalized anxiety disorder)  Controlled, continue with " current meds and lifestyle.      2. Insomnia, persistent  Controlled, continue with current meds and lifestyle.    - LORazepam (ATIVAN) 1 MG Tab; Take 1 Tab by mouth every bedtime for 90 days.  Dispense: 30 Tab; Refill: 2    3. Major depressive disorder with single episode, in full remission (HCC)  Controlled, continue with current meds and lifestyle.      4. Aortic atherosclerosis (HCC)  Controlled, continue with current meds and lifestyle.

## 2020-08-28 NOTE — ASSESSMENT & PLAN NOTE
This is a chronic health problem that is well controlled with current medications and lifestyle measures. She continues to use Lorazepam 1mg nightly.  Without this medication she finds that she cannot get adequate sleep without this medication.  Obtained and reviewed patient utilization report from [facility] pharmacy database on 8/28/2020 10:11 AM  prior to writing prescription for controlled substance II, III or IV per [place] bill . Based on assessment of the report,my physical exam if necessary and the patient's health problem, the prescription is medically necessary.

## 2020-09-26 ENCOUNTER — OFFICE VISIT (OUTPATIENT)
Dept: URGENT CARE | Facility: PHYSICIAN GROUP | Age: 65
End: 2020-09-26
Payer: MEDICARE

## 2020-09-26 VITALS
HEART RATE: 92 BPM | WEIGHT: 152 LBS | OXYGEN SATURATION: 93 % | TEMPERATURE: 98.2 F | BODY MASS INDEX: 30.64 KG/M2 | SYSTOLIC BLOOD PRESSURE: 136 MMHG | RESPIRATION RATE: 16 BRPM | HEIGHT: 59 IN | DIASTOLIC BLOOD PRESSURE: 72 MMHG

## 2020-09-26 DIAGNOSIS — R06.2 WHEEZE: ICD-10-CM

## 2020-09-26 DIAGNOSIS — R05.9 COUGH: ICD-10-CM

## 2020-09-26 DIAGNOSIS — J06.9 UPPER RESPIRATORY TRACT INFECTION, UNSPECIFIED TYPE: ICD-10-CM

## 2020-09-26 PROCEDURE — 99214 OFFICE O/P EST MOD 30 MIN: CPT | Performed by: PHYSICIAN ASSISTANT

## 2020-09-26 RX ORDER — METHYLPREDNISOLONE 4 MG/1
4 TABLET ORAL SEE ADMIN INSTRUCTIONS
Qty: 21 TAB | Refills: 0 | Status: SHIPPED | OUTPATIENT
Start: 2020-09-26 | End: 2020-12-03

## 2020-09-26 RX ORDER — BENZONATATE 200 MG/1
200 CAPSULE ORAL 3 TIMES DAILY PRN
Qty: 60 CAP | Refills: 0 | Status: SHIPPED | OUTPATIENT
Start: 2020-09-26 | End: 2020-12-03

## 2020-09-26 RX ORDER — ALBUTEROL SULFATE 90 UG/1
2 AEROSOL, METERED RESPIRATORY (INHALATION) EVERY 4 HOURS PRN
Qty: 18 G | Refills: 0 | Status: SHIPPED | OUTPATIENT
Start: 2020-09-26 | End: 2020-12-03

## 2020-09-26 NOTE — PROGRESS NOTES
Chief Complaint   Patient presents with   • Cough       HISTORY OF PRESENT ILLNESS: Patient is a 65 y.o. female who presents today because is a 4-day history of her symptoms.  It started with a 2-day history of sore throat, now resolved.  However now she has had some mild nasal congestion and a cough.  She has been using some holistic medications without improvement    Patient Active Problem List    Diagnosis Date Noted   • Aortic atherosclerosis ([facility])    • Familial hypercholesteremia    • Hair loss 12/06/2018   • Polyneuropathy associated with underlying disease ([facility]) 03/13/2018   • Obesity (BMI 30-39.9) 03/13/2018   • Menopause 07/10/2017   • ROBERT (generalized anxiety disorder) 06/15/2017   • Allergic rhinitis 05/10/2017   • S/P lumbar fusion 10/20/2016   • Cervical radiculopathy 10/20/2016   • Diaphragmatic hernia 06/30/2015   • Mild mitral regurgitation 03/12/2015   • H/O: CVA (cerebrovascular accident) 09/23/2014   • Chronic constipation 09/17/2014   • GERD (gastroesophageal reflux disease) 09/17/2014   • Spinal stenosis of lumbar region 03/20/2014   • Lumbar radiculopathy, chronic 08/26/2013   • Carpal tunnel syndrome 11/14/2012   • Vitamin D deficiency disease 09/24/2012   • Dyslipidemia 12/27/2011   • HTN, goal below 130/80 10/24/2011   • Acquired scoliosis 10/24/2011   • Sleep apnea, obstructive 10/24/2011   • Insomnia, persistent 10/24/2011   • Major depressive disorder with single episode, in full remission ([facility]) 10/24/2011   • 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       Allergies:Albumin and Seasonal    Current Outpatient Medications Ordered in Epic   Medication Sig Dispense Refill   • albuterol 108 (90 Base) MCG/ACT Aero Soln inhalation aerosol Inhale 2 Puffs by mouth every four hours as needed. 18 g 0   • benzonatate (TESSALON) 200 MG capsule Take 1 Cap by mouth 3 times a day as needed for Cough. 60 Cap 0   • methylPREDNISolone (MEDROL DOSEPAK) 4 MG Tablet Therapy Pack Take 1 Tab by mouth See Admin Instructions. 21  "Tab 0   • LORazepam (ATIVAN) 1 MG Tab Take 1 Tab by mouth every bedtime for 90 days. 30 Tab 2   • fluticasone (FLONASE) 50 MCG/ACT nasal spray Spray 2 Sprays in nose every day. INHALE 2 SPRAYS IN EACH NOSTRIL 48 g 11   • simvastatin (ZOCOR) 10 MG Tab Take 1 Tab by mouth every evening. 90 Tab 3   • citalopram (CELEXA) 40 MG Tab TAKE 1 TABLET DAILY 90 Tab 2   • baclofen (LIORESAL) 10 MG Tab Take 1 Tab by mouth every bedtime. 90 Tab 3   • Artificial Tear Solution (TEARS NATURALE OP) Place 2 Drops in both eyes 2 times a day as needed.     • meclizine (ANTIVERT) 12.5 MG Tab Take 1 Tab by mouth 3 times a day as needed. (Patient not taking: Reported on 9/26/2020) 30 Tab 0   • Omega-3 Fatty Acids (FISH OIL) 1000 MG Cap capsule Take 1 Cap by mouth 2 Times a Day.     • ondansetron (ZOFRAN ODT) 4 MG TABLET DISPERSIBLE Take 1 Tab by mouth every 6 hours as needed for Nausea. 10 Tab 0     No current Epic-ordered facility-administered medications on file.        Past Medical History:   Diagnosis Date   • Anesthesia 02-14-11    PO N/V, \"slow to come out\"   • Aortic atherosclerosis (HCC)    • Arthritis 02-14-11    spine   • Backpain 02-14-11    neck and abd. also,    • Breath shortness     with exertion   • Bronchitis 05/2018   • Cancer (HCC) 07/18/2018    Skin - 15 years ago.   • Cancer (HCC)     April/May 2018   • Diverticulitis    • Endometriosis of uterus    • Familial hypercholesteremia    • Fusion of spine 2014   • ROBERT (generalized anxiety disorder) 6/15/2017   • H/O fall     when in hospital  with low O2 sats   • H/O: CVA (cerebrovascular accident) 8/22/2014    Complex event associated with apparent medication reaction but with MRI suggesting possible multiple small infarcts of various ages, [facility]   • Hair loss 12/6/2018   • Heart burn    • Hiatus hernia syndrome     not repaired   • High cholesterol    • HTN, goal below 130/80 10/24/2011   • Infectious disease      Hep C +   • Lung collapse 02-14-11    right lung 1/4 " collpsed    • Major depressive disorder with single episode, in full remission (HCC) 10/24/2011   • Mixed hyperlipidemia 2011   • Moderate major depression (HCC) 10/24/2011   • MRSA exposure 2014    while in hospital   • Post-menopause on HRT (hormone replacement therapy) 2011   • 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    exposed as a child, told not active   • Scoliosis    • Sleep apnea 2015    CPAP   • Snoring    • Unspecified vitamin D deficiency 2012       Social History     Tobacco Use   • Smoking status: Former Smoker     Packs/day: 0.30     Years: 5.00     Pack years: 1.50     Types: Cigarettes     Quit date: 1975     Years since quittin.7   • Smokeless tobacco: Never Used   • Tobacco comment: quit    Substance Use Topics   • Alcohol use: No     Alcohol/week: 0.0 oz   • Drug use: No       Family Status   Relation Name Status   • Mo  Alive        84 in    • Fa   at age 81        Laryngeal CA   • Sis  Alive        56 in    • OTHER  (Not Specified)     Family History   Problem Relation Age of Onset   • Diabetes Mother    • Cancer Mother 82        breast cancer   • Lung Disease Mother         copd   • Hyperlipidemia Mother    • Hypertension Mother    • Cancer Father         Laryngeal CA   • Heart Disease Father 50        CAD with bypasses x 3   • Heart Attack Father    • Hyperlipidemia Father    • Hypertension Father    • Diabetes Sister         type II diabetes   • Diabetes Other    • Heart Disease Other    • Hypertension Other    • Lung Disease Other        ROS:  Review of Systems   Constitutional: Negative for fever, chills, weight loss and malaise/fatigue.   HENT: Negative for ear pain, nosebleeds, positive for congestion, resolved sore throat and no neck pain.    Eyes: Negative for blurred vision.   Respiratory: Positive for cough, no sputum production, shortness of breath and positive for wheezing.    Cardiovascular: Negative for chest pain, palpitations, orthopnea and leg  "swelling.   Gastrointestinal: Negative for heartburn, nausea, vomiting and abdominal pain.   Genitourinary: Negative for dysuria, urgency and frequency.     Exam:  /72 (BP Location: Right arm, Patient Position: Sitting, BP Cuff Size: Adult)   Pulse 92   Temp 36.8 °C (98.2 °F) (Temporal)   Resp 16   Ht 1.499 m (4' 11\")   Wt 68.9 kg (152 lb)   SpO2 93%   General:  Well nourished, well developed female in NAD  Head:Normocephalic, atraumatic  Eyes: PERRLA, EOM within normal limits, no conjunctival injection, no scleral icterus, visual fields and acuity grossly intact.  Ears: Normal shape and symmetry, no tenderness, no discharge. External canals are without any significant edema or erythema. Tympanic membranes are without any inflammation, no effusion. Gross auditory acuity is intact  Nose: Symmetrical without tenderness, no discharge.  Mouth: reasonable hygiene, no erythema exudates or tonsillar enlargement.  Neck: no masses, range of motion within normal limits, no tracheal deviation. No obvious thyroid enlargement.  Pulmonary: chest is symmetrical with respiration, scattered wheezes and bronchial breath sounds bilaterally, no rales or rhonchi   cardiovascular: regular rate and rhythm without murmurs, rubs, or gallops.  Extremities: no clubbing, cyanosis, or edema.    Please note that this dictation was created using voice recognition software. I have made every reasonable attempt to correct obvious errors, but I expect that there are errors of grammar and possibly content that I did not discover before finalizing the note.    Assessment/Plan:  1. Cough  benzonatate (TESSALON) 200 MG capsule    methylPREDNISolone (MEDROL DOSEPAK) 4 MG Tablet Therapy Pack   2. Upper respiratory tract infection, unspecified type  methylPREDNISolone (MEDROL DOSEPAK) 4 MG Tablet Therapy Pack   3. Wheeze  albuterol 108 (90 Base) MCG/ACT Aero Soln inhalation aerosol    methylPREDNISolone (MEDROL DOSEPAK) 4 MG Tablet Therapy Pack "       Followup with primary care in the next 7-10 days if not significantly improving, return to the urgent care or go to the emergency room sooner for any worsening of symptoms.

## 2020-10-08 ENCOUNTER — NON-PROVIDER VISIT (OUTPATIENT)
Dept: MEDICAL GROUP | Facility: PHYSICIAN GROUP | Age: 65
End: 2020-10-08
Payer: MEDICARE

## 2020-10-08 DIAGNOSIS — Z23 NEED FOR VACCINATION: ICD-10-CM

## 2020-10-08 PROCEDURE — G0008 ADMIN INFLUENZA VIRUS VAC: HCPCS | Performed by: NURSE PRACTITIONER

## 2020-10-08 PROCEDURE — 90662 IIV NO PRSV INCREASED AG IM: CPT | Performed by: NURSE PRACTITIONER

## 2020-10-09 ENCOUNTER — APPOINTMENT (OUTPATIENT)
Dept: MEDICAL GROUP | Facility: PHYSICIAN GROUP | Age: 65
End: 2020-10-09
Payer: MEDICARE

## 2020-10-14 RX ORDER — CITALOPRAM 40 MG/1
TABLET ORAL
Qty: 90 TAB | Refills: 2 | Status: SHIPPED | OUTPATIENT
Start: 2020-10-14 | End: 2021-11-17 | Stop reason: SDUPTHER

## 2020-11-20 ENCOUNTER — OFFICE VISIT (OUTPATIENT)
Dept: MEDICAL GROUP | Facility: PHYSICIAN GROUP | Age: 65
End: 2020-11-20
Payer: MEDICARE

## 2020-11-20 VITALS
HEIGHT: 59 IN | SYSTOLIC BLOOD PRESSURE: 122 MMHG | OXYGEN SATURATION: 96 % | RESPIRATION RATE: 12 BRPM | BODY MASS INDEX: 31.25 KG/M2 | DIASTOLIC BLOOD PRESSURE: 72 MMHG | WEIGHT: 155 LBS | HEART RATE: 83 BPM | TEMPERATURE: 98.4 F

## 2020-11-20 DIAGNOSIS — I10 HTN, GOAL BELOW 130/80: ICD-10-CM

## 2020-11-20 DIAGNOSIS — F41.1 GENERALIZED ANXIETY DISORDER: ICD-10-CM

## 2020-11-20 DIAGNOSIS — Z23 NEED FOR VACCINATION: ICD-10-CM

## 2020-11-20 DIAGNOSIS — G47.00 INSOMNIA, PERSISTENT: ICD-10-CM

## 2020-11-20 PROCEDURE — G0009 ADMIN PNEUMOCOCCAL VACCINE: HCPCS | Performed by: FAMILY MEDICINE

## 2020-11-20 PROCEDURE — 90732 PPSV23 VACC 2 YRS+ SUBQ/IM: CPT | Performed by: FAMILY MEDICINE

## 2020-11-20 PROCEDURE — 99214 OFFICE O/P EST MOD 30 MIN: CPT | Mod: 25 | Performed by: FAMILY MEDICINE

## 2020-11-20 RX ORDER — LORAZEPAM 1 MG/1
1 TABLET ORAL
Qty: 30 TAB | Refills: 2 | Status: SHIPPED | OUTPATIENT
Start: 2020-12-02 | End: 2021-07-16 | Stop reason: SDUPTHER

## 2020-11-20 NOTE — ASSESSMENT & PLAN NOTE
This is a chronic health problem that is well controlled with current medications and lifestyle measures.  Pt continues to use Lorazepam with good effects.  It is time to refill her meds today.  Obtained and reviewed patient utilization report from [facility] pharmacy database on 11/20/2020 9:49 AM  prior to writing prescription for controlled substance II, III or IV per [place] bill . Based on assessment of the report,my physical exam if necessary and the patient's health problem, the prescription is medically necessary.

## 2020-11-20 NOTE — PROGRESS NOTES
CC:Diagnoses of Need for vaccination, HTN, goal below 130/80, Insomnia, persistent, and ROBERT (generalized anxiety disorder) were pertinent to this visit.    HISTORY OF PRESENT ILLNESS: Patient is a 65 y.o. female established patient who presents today to talk about her chronic health problems and to renew her controlled substance that she uses for anxiety and insomnia.      HTN, goal below 130/80  This is a chronic health problem for this patient that is well controlled with current medications.  Her blood pressure is excellent 122/72.  She is also maintaining her weight loss which is helping.    ROBERT (generalized anxiety disorder)  This is a chronic health problem that is well controlled with current medications and lifestyle measures.  Pt continues to use Lorazepam with good effects.  It is time to refill her meds today.  Obtained and reviewed patient utilization report from [facility] pharmacy database on 11/20/2020 9:49 AM  prior to writing prescription for controlled substance II, III or IV per [place] bill . Based on assessment of the report,my physical exam if necessary and the patient's health problem, the prescription is medically necessary.     Insomnia, persistent  This is a chronic health problem that is well controlled with current medications and lifestyle measures. She uses lorazepam for adequate sleep and to control her anxiety which seems to worsen at night.      Patient Active Problem List    Diagnosis Date Noted   • Aortic atherosclerosis (HCC)    • Familial hypercholesteremia    • Hair loss 12/06/2018   • Polyneuropathy associated with underlying disease (HCC) 03/13/2018   • Obesity (BMI 30-39.9) 03/13/2018   • Menopause 07/10/2017   • ROBERT (generalized anxiety disorder) 06/15/2017   • Allergic rhinitis 05/10/2017   • S/P lumbar fusion 10/20/2016   • Cervical radiculopathy 10/20/2016   • Diaphragmatic hernia 06/30/2015   • Mild mitral regurgitation 03/12/2015   • H/O: CVA (cerebrovascular  accident) 09/23/2014   • Chronic constipation 09/17/2014   • GERD (gastroesophageal reflux disease) 09/17/2014   • Spinal stenosis of lumbar region 03/20/2014   • Lumbar radiculopathy, chronic 08/26/2013   • Carpal tunnel syndrome 11/14/2012   • Vitamin D deficiency disease 09/24/2012   • Dyslipidemia 12/27/2011   • HTN, goal below 130/80 10/24/2011   • Acquired scoliosis 10/24/2011   • Sleep apnea, obstructive 10/24/2011   • Insomnia, persistent 10/24/2011   • Major depressive disorder with single episode, in full remission (HCC) 10/24/2011   • 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      Allergies:Albumin and Seasonal    Current Outpatient Medications   Medication Sig Dispense Refill   • [START ON 12/2/2020] LORazepam (ATIVAN) 1 MG Tab Take 1 Tab by mouth every bedtime for 90 days. 30 Tab 2   • citalopram (CELEXA) 40 MG Tab TAKE 1 TABLET DAILY 90 Tab 2   • albuterol 108 (90 Base) MCG/ACT Aero Soln inhalation aerosol Inhale 2 Puffs by mouth every four hours as needed. 18 g 0   • benzonatate (TESSALON) 200 MG capsule Take 1 Cap by mouth 3 times a day as needed for Cough. 60 Cap 0   • methylPREDNISolone (MEDROL DOSEPAK) 4 MG Tablet Therapy Pack Take 1 Tab by mouth See Admin Instructions. 21 Tab 0   • fluticasone (FLONASE) 50 MCG/ACT nasal spray Spray 2 Sprays in nose every day. INHALE 2 SPRAYS IN EACH NOSTRIL 48 g 11   • simvastatin (ZOCOR) 10 MG Tab Take 1 Tab by mouth every evening. 90 Tab 3   • baclofen (LIORESAL) 10 MG Tab Take 1 Tab by mouth every bedtime. 90 Tab 3   • Omega-3 Fatty Acids (FISH OIL) 1000 MG Cap capsule Take 1 Cap by mouth 2 Times a Day.     • ondansetron (ZOFRAN ODT) 4 MG TABLET DISPERSIBLE Take 1 Tab by mouth every 6 hours as needed for Nausea. 10 Tab 0   • Artificial Tear Solution (TEARS NATURALE OP) Place 2 Drops in both eyes 2 times a day as needed.     • meclizine (ANTIVERT) 12.5 MG Tab Take 1 Tab by mouth 3 times a day as needed. (Patient not taking: Reported on 9/26/2020) 30 Tab 0     No current  facility-administered medications for this visit.        Social History     Tobacco Use   • Smoking status: Former Smoker     Packs/day: 0.30     Years: 5.00     Pack years: 1.50     Types: Cigarettes     Quit date: 1975     Years since quittin.9   • Smokeless tobacco: Never Used   • Tobacco comment: quit    Substance Use Topics   • Alcohol use: No     Alcohol/week: 0.0 oz   • Drug use: No     Social History     Social History Narrative   • Not on file       Family History   Problem Relation Age of Onset   • Diabetes Mother    • Cancer Mother 82        breast cancer   • Lung Disease Mother         copd   • Hyperlipidemia Mother    • Hypertension Mother    • Cancer Father         Laryngeal CA   • Heart Disease Father 50        CAD with bypasses x 3   • Heart Attack Father    • Hyperlipidemia Father    • Hypertension Father    • Diabetes Sister         type II diabetes   • Diabetes Other    • Heart Disease Other    • Hypertension Other    • Lung Disease Other         ROS:     - Constitutional:  Negative for fever, chills, unexpected weight change, and fatigue/generalized weakness.    - HEENT:  Negative for headaches, vision changes, hearing changes, ear pain, ear discharge, rhinorrhea, sinus congestion, sore throat, and neck pain.      - Respiratory: Negative for cough, sputum production, chest congestion, dyspnea, wheezing, and crackles.      - Cardiovascular: Negative for chest pain, palpitations, orthopnea, and bilateral lower extremity edema.     - Gastrointestinal: Negative for heartburn, nausea, vomiting, abdominal pain, hematochezia, melena, diarrhea, constipation, and greasy/foul-smelling stools.     - Genitourinary: Negative for dysuria, polyuria, hematuria, pyuria, urinary urgency, and urinary incontinence.     - Musculoskeletal: Negative for myalgias, back pain, and joint pain.     - Skin: Negative for rash, itching, cyanotic skin color change.     - Neurological: Negative for dizziness,  "tingling, tremors, focal sensory deficit, focal weakness and headaches.     - Endo/Heme/Allergies: Does not bruise/bleed easily.     - Psychiatric/Behavioral: Negative for depression, suicidal/homicidal ideation and memory loss.          - NOTE: All other systems reviewed and are negative, except as in HPI.      Exam:    /72 (BP Location: Left arm, Patient Position: Sitting, BP Cuff Size: Adult)   Pulse 83   Temp 36.9 °C (98.4 °F) (Temporal)   Resp 12   Ht 1.499 m (4' 11\")   Wt 70.3 kg (155 lb)   SpO2 96%  Body mass index is 31.31 kg/m².    General:  Well nourished, well developed female in NAD  Head is grossly normal.  Neck: Supple without JVD or bruit. Thyroid is not enlarged.  Pulmonary: Clear to ausculation and percussion.  Normal effort. No rales, ronchi, or wheezing.  Cardiovascular: Regular rate and rhythm without murmur. Carotid and radial pulses are intact and equal bilaterally.  Extremities: no clubbing, cyanosis, or edema.    Please note that this dictation was created using voice recognition software. I have made every reasonable attempt to correct obvious errors, but I expect that there are errors of grammar and possibly content that I did not discover before finalizing the note.    Assessment/Plan:  1. Need for vaccination  Given today  - PneumoVax PPV23 =>1yo    2. HTN, goal below 130/80  Controlled, continue with current meds and lifestyle.      3. Insomnia, persistent  Controlled, continue with current meds and lifestyle.    - LORazepam (ATIVAN) 1 MG Tab; Take 1 Tab by mouth every bedtime for 90 days.  Dispense: 30 Tab; Refill: 2    4. ROBERT (generalized anxiety disorder)  Controlled, continue with current meds and lifestyle.           "

## 2020-11-20 NOTE — ASSESSMENT & PLAN NOTE
This is a chronic health problem that is well controlled with current medications and lifestyle measures. She uses lorazepam for adequate sleep and to control her anxiety which seems to worsen at night.

## 2020-11-20 NOTE — ASSESSMENT & PLAN NOTE
This is a chronic health problem for this patient that is well controlled with current medications.  Her blood pressure is excellent 122/72.  She is also maintaining her weight loss which is helping.

## 2020-12-03 ENCOUNTER — OFFICE VISIT (OUTPATIENT)
Dept: CARDIOLOGY | Facility: PHYSICIAN GROUP | Age: 65
End: 2020-12-03
Payer: MEDICARE

## 2020-12-03 VITALS
HEART RATE: 95 BPM | DIASTOLIC BLOOD PRESSURE: 68 MMHG | HEIGHT: 59 IN | WEIGHT: 154 LBS | OXYGEN SATURATION: 96 % | SYSTOLIC BLOOD PRESSURE: 122 MMHG | BODY MASS INDEX: 31.04 KG/M2

## 2020-12-03 DIAGNOSIS — I70.0 ATHEROSCLEROSIS OF AORTA (HCC): Chronic | ICD-10-CM

## 2020-12-03 DIAGNOSIS — Z86.73 H/O: CVA (CEREBROVASCULAR ACCIDENT): ICD-10-CM

## 2020-12-03 DIAGNOSIS — I34.0 MILD MITRAL REGURGITATION: ICD-10-CM

## 2020-12-03 DIAGNOSIS — E78.5 DYSLIPIDEMIA: ICD-10-CM

## 2020-12-03 DIAGNOSIS — E78.01 FAMILIAL HYPERCHOLESTEREMIA: Chronic | ICD-10-CM

## 2020-12-03 DIAGNOSIS — I10 HTN, GOAL BELOW 130/80: ICD-10-CM

## 2020-12-03 PROCEDURE — 99214 OFFICE O/P EST MOD 30 MIN: CPT | Performed by: INTERNAL MEDICINE

## 2020-12-03 RX ORDER — ATORVASTATIN CALCIUM 40 MG/1
40 TABLET, FILM COATED ORAL DAILY
Qty: 90 TAB | Refills: 3 | Status: SHIPPED | OUTPATIENT
Start: 2020-12-03 | End: 2023-08-08 | Stop reason: SDUPTHER

## 2020-12-03 ASSESSMENT — ENCOUNTER SYMPTOMS
FALLS: 0
BRUISES/BLEEDS EASILY: 0
SORE THROAT: 0
ABDOMINAL PAIN: 0
CLAUDICATION: 0
CHILLS: 0
NAUSEA: 0
FEVER: 0
FOCAL WEAKNESS: 0
DIZZINESS: 0
PALPITATIONS: 0
PND: 0
WEAKNESS: 0
BLURRED VISION: 0
COUGH: 0
SHORTNESS OF BREATH: 0

## 2020-12-03 NOTE — PATIENT INSTRUCTIONS
Work on at least 1.5 - 5 hours a week of moderate exercise (typical brisk walking or similar activity)    If you have had a heart attack, stent, bypass or reduced heart strength (EF <35%): cardiac rehab may reduce your risk of dying by 13-24% and need to go to the hospital by 30% within the first year (1)    Please look into the following diets and incorporate them into your diet    LOW SALT DIET   KEEP YOUR SODIUM EQUAL TO CALORIES AND NO MORE THAN DOUBLE THE CALORIES FOR A LOW SALT DIET    [employer]org - great resource for American College of Cardiology on heart disease prevention and treatment    FOR TREATMENT OR PREVENTION OF CORONARY ARTERY DISEASE  These three programs are approved by Medicare/Insurers for those with heart disease  [facility] - [facility]  Dr. Turner[facility] for Reversing Heart Disease - Russell Lau's Cardiologist vegetarian-based  [facility] - [place]-based mind-body Program    This is a commonly referenced Program  Dr East - [place] [facility] [place] (book and documentary) - vegetarian-based    FOR TREATMENT OF BLOOD PRESSURE  DASH DIET - American Heart Association for treatment of HYPERTENSION    FOR TREATMENT OF BAD CHOLESTEROL/FATS  REDUCE PROCESSED SUGAR AS MUCH AS POSSIBLE  INCREASE WHOLE GRAINS/VEGETABLES  INCREASE FIBER    Lowering total cholesterol and LDL (bad) cholesterol:  - Eat leaner cuts of meat, or eliminate altogether if possible red meat, and frequently substitute fish or chicken.  - Limit saturated fat to no more than 7-10% of total calories no more than 10 g per day is recommended. Some sources of saturated fat include butter, animal fats, hydrogenated vegetable fats and oils, many desserts, whole milk dairy products.  - Replaced saturated fats with polyunsaturated fats and monounsaturated fats. Foods high in monounsaturated fat include nuts, canola oil, avocados, and olives.  - Limit trans fat (processed foods)  and replaced with fresh fruits and vegetables  - Recommend nonfat dairy products  - Increase substantially the amount of soluble fiber intake (legumes such as beans, fruit, whole grains).  - Consider nutritional supplements: plant sterile spreads such as Benecol, fish oil,  flaxseed oil, omega-3 acids capsules 1000 mg twice a day, or viscous fiber such as Metamucil  - Attain ideal weight and regular exercise (at least 30 minutes per day of moderate exercise)  ASK ABOUT STATIN OR NON STATIN MEDICATION TO REDUCE YOUR LDL AND HEART RISK    Lowering triglycerides:  - Reduce intake of simple sugar: Desserts, candy, pastries, honey, sodas, sugared cereals, yogurt, Gatorade, sports bars, canned fruit, smoothies, fruit juice, coffee drinks  - Reduced intake of refined starches: Refined Pasta, most bread  - Reduce or abstain from alcohol  - Increase omega-3 fatty acids: [place], Trout, Mackerel, [place], [place] tuna and supplements  - Attain ideal weight and regular exercise (at least 30 minutes per day of moderate exercise)  ASK ABOUT PURIFIED OMEGA 3 EPA or FISH OIL TO REDUCE YOUR TG AND HEART RISK    Elevating HDL (good) cholesterol:  - Increase physical activity  - Increase omega-3 fatty acids and supplements as listed above  - Incorporating appropriate amounts of monounsaturated fats such as nuts, olive oil, canola oil, avocados, olives  - Stop smoking  - Attain ideal weight and regular exercise (at least 30 minutes per day of moderate exercise)

## 2020-12-28 RX ORDER — BACLOFEN 10 MG/1
TABLET ORAL
Qty: 90 TAB | Refills: 0 | Status: SHIPPED | OUTPATIENT
Start: 2020-12-28 | End: 2021-09-09 | Stop reason: SDUPTHER

## 2020-12-31 ENCOUNTER — HOSPITAL ENCOUNTER (OUTPATIENT)
Dept: LAB | Facility: MEDICAL CENTER | Age: 65
End: 2020-12-31
Attending: FAMILY MEDICINE
Payer: MEDICARE

## 2020-12-31 DIAGNOSIS — E78.01 FAMILIAL HYPERCHOLESTEREMIA: Chronic | ICD-10-CM

## 2020-12-31 LAB
ALBUMIN SERPL BCP-MCNC: 4.5 G/DL (ref 3.2–4.9)
ALBUMIN/GLOB SERPL: 1.7 G/DL
ALP SERPL-CCNC: 78 U/L (ref 30–99)
ALT SERPL-CCNC: 31 U/L (ref 2–50)
ANION GAP SERPL CALC-SCNC: 8 MMOL/L (ref 7–16)
AST SERPL-CCNC: 31 U/L (ref 12–45)
BILIRUB SERPL-MCNC: 0.4 MG/DL (ref 0.1–1.5)
BUN SERPL-MCNC: 13 MG/DL (ref 8–22)
CALCIUM SERPL-MCNC: 9.4 MG/DL (ref 8.5–10.5)
CHLORIDE SERPL-SCNC: 104 MMOL/L (ref 96–112)
CHOLEST SERPL-MCNC: 306 MG/DL (ref 100–199)
CO2 SERPL-SCNC: 29 MMOL/L (ref 20–33)
CREAT SERPL-MCNC: 0.81 MG/DL (ref 0.5–1.4)
FASTING STATUS PATIENT QL REPORTED: NORMAL
GLOBULIN SER CALC-MCNC: 2.7 G/DL (ref 1.9–3.5)
GLUCOSE SERPL-MCNC: 102 MG/DL (ref 65–99)
HDLC SERPL-MCNC: 65 MG/DL
LDLC SERPL CALC-MCNC: 208 MG/DL
POTASSIUM SERPL-SCNC: 4.1 MMOL/L (ref 3.6–5.5)
PROT SERPL-MCNC: 7.2 G/DL (ref 6–8.2)
SODIUM SERPL-SCNC: 141 MMOL/L (ref 135–145)
TRIGL SERPL-MCNC: 165 MG/DL (ref 0–149)

## 2020-12-31 PROCEDURE — 80061 LIPID PANEL: CPT

## 2020-12-31 PROCEDURE — 80053 COMPREHEN METABOLIC PANEL: CPT

## 2020-12-31 PROCEDURE — 36415 COLL VENOUS BLD VENIPUNCTURE: CPT

## 2021-05-25 ENCOUNTER — HOSPITAL ENCOUNTER (OUTPATIENT)
Dept: LAB | Facility: MEDICAL CENTER | Age: 66
End: 2021-05-25
Attending: FAMILY MEDICINE
Payer: MEDICARE

## 2021-05-25 PROCEDURE — 80061 LIPID PANEL: CPT

## 2021-05-25 PROCEDURE — 36415 COLL VENOUS BLD VENIPUNCTURE: CPT

## 2021-05-25 PROCEDURE — 80053 COMPREHEN METABOLIC PANEL: CPT

## 2021-05-26 LAB
ALBUMIN SERPL BCP-MCNC: 4.4 G/DL (ref 3.2–4.9)
ALBUMIN/GLOB SERPL: 1.5 G/DL
ALP SERPL-CCNC: 79 U/L (ref 30–99)
ALT SERPL-CCNC: 22 U/L (ref 2–50)
ANION GAP SERPL CALC-SCNC: 9 MMOL/L (ref 7–16)
AST SERPL-CCNC: 25 U/L (ref 12–45)
BILIRUB SERPL-MCNC: 0.4 MG/DL (ref 0.1–1.5)
BUN SERPL-MCNC: 13 MG/DL (ref 8–22)
CALCIUM SERPL-MCNC: 9.3 MG/DL (ref 8.5–10.5)
CHLORIDE SERPL-SCNC: 105 MMOL/L (ref 96–112)
CHOLEST SERPL-MCNC: 285 MG/DL (ref 100–199)
CO2 SERPL-SCNC: 26 MMOL/L (ref 20–33)
CREAT SERPL-MCNC: 0.85 MG/DL (ref 0.5–1.4)
FASTING STATUS PATIENT QL REPORTED: NORMAL
GLOBULIN SER CALC-MCNC: 2.9 G/DL (ref 1.9–3.5)
GLUCOSE SERPL-MCNC: 98 MG/DL (ref 65–99)
HDLC SERPL-MCNC: 67 MG/DL
LDLC SERPL CALC-MCNC: 188 MG/DL
POTASSIUM SERPL-SCNC: 4.1 MMOL/L (ref 3.6–5.5)
PROT SERPL-MCNC: 7.3 G/DL (ref 6–8.2)
SODIUM SERPL-SCNC: 140 MMOL/L (ref 135–145)
TRIGL SERPL-MCNC: 148 MG/DL (ref 0–149)

## 2021-07-16 ENCOUNTER — OFFICE VISIT (OUTPATIENT)
Dept: MEDICAL GROUP | Facility: PHYSICIAN GROUP | Age: 66
End: 2021-07-16
Payer: MEDICARE

## 2021-07-16 ENCOUNTER — HOSPITAL ENCOUNTER (OUTPATIENT)
Dept: LAB | Facility: MEDICAL CENTER | Age: 66
End: 2021-07-16
Attending: NURSE PRACTITIONER
Payer: MEDICARE

## 2021-07-16 VITALS
HEIGHT: 59 IN | TEMPERATURE: 98.9 F | BODY MASS INDEX: 31.29 KG/M2 | RESPIRATION RATE: 16 BRPM | OXYGEN SATURATION: 95 % | WEIGHT: 155.2 LBS | HEART RATE: 73 BPM | SYSTOLIC BLOOD PRESSURE: 110 MMHG | DIASTOLIC BLOOD PRESSURE: 68 MMHG

## 2021-07-16 DIAGNOSIS — R53.83 FATIGUE, UNSPECIFIED TYPE: ICD-10-CM

## 2021-07-16 DIAGNOSIS — F32.5 MAJOR DEPRESSIVE DISORDER WITH SINGLE EPISODE, IN FULL REMISSION (HCC): ICD-10-CM

## 2021-07-16 DIAGNOSIS — G63 POLYNEUROPATHY ASSOCIATED WITH UNDERLYING DISEASE (HCC): ICD-10-CM

## 2021-07-16 DIAGNOSIS — G47.33 SLEEP APNEA, OBSTRUCTIVE: ICD-10-CM

## 2021-07-16 DIAGNOSIS — Z79.899 CHRONIC USE OF BENZODIAZEPINE FOR THERAPEUTIC PURPOSE: ICD-10-CM

## 2021-07-16 DIAGNOSIS — H60.502 ACUTE OTITIS EXTERNA OF LEFT EAR, UNSPECIFIED TYPE: ICD-10-CM

## 2021-07-16 DIAGNOSIS — M48.062 SPINAL STENOSIS OF LUMBAR REGION WITH NEUROGENIC CLAUDICATION: ICD-10-CM

## 2021-07-16 DIAGNOSIS — I70.0 ATHEROSCLEROSIS OF AORTA (HCC): Chronic | ICD-10-CM

## 2021-07-16 DIAGNOSIS — E78.01 FAMILIAL HYPERCHOLESTEREMIA: Chronic | ICD-10-CM

## 2021-07-16 DIAGNOSIS — J30.9 ALLERGIC RHINITIS, UNSPECIFIED SEASONALITY, UNSPECIFIED TRIGGER: ICD-10-CM

## 2021-07-16 DIAGNOSIS — K58.2 IRRITABLE BOWEL SYNDROME WITH BOTH CONSTIPATION AND DIARRHEA: ICD-10-CM

## 2021-07-16 DIAGNOSIS — G47.00 INSOMNIA, PERSISTENT: ICD-10-CM

## 2021-07-16 LAB
ERYTHROCYTE [DISTWIDTH] IN BLOOD BY AUTOMATED COUNT: 46.4 FL (ref 35.9–50)
HCT VFR BLD AUTO: 46.5 % (ref 37–47)
HGB BLD-MCNC: 15.3 G/DL (ref 12–16)
MCH RBC QN AUTO: 31 PG (ref 27–33)
MCHC RBC AUTO-ENTMCNC: 32.9 G/DL (ref 33.6–35)
MCV RBC AUTO: 94.1 FL (ref 81.4–97.8)
PLATELET # BLD AUTO: 321 K/UL (ref 164–446)
PMV BLD AUTO: 9.2 FL (ref 9–12.9)
RBC # BLD AUTO: 4.94 M/UL (ref 4.2–5.4)
TSH SERPL DL<=0.005 MIU/L-ACNC: 2 UIU/ML (ref 0.38–5.33)
WBC # BLD AUTO: 4.9 K/UL (ref 4.8–10.8)

## 2021-07-16 PROCEDURE — 84443 ASSAY THYROID STIM HORMONE: CPT

## 2021-07-16 PROCEDURE — 85027 COMPLETE CBC AUTOMATED: CPT

## 2021-07-16 PROCEDURE — 36415 COLL VENOUS BLD VENIPUNCTURE: CPT

## 2021-07-16 PROCEDURE — 99214 OFFICE O/P EST MOD 30 MIN: CPT | Performed by: NURSE PRACTITIONER

## 2021-07-16 RX ORDER — LORAZEPAM 1 MG/1
TABLET ORAL
COMMUNITY
Start: 2021-07-09 | End: 2021-07-16

## 2021-07-16 RX ORDER — DICYCLOMINE HYDROCHLORIDE 10 MG/1
10 CAPSULE ORAL
COMMUNITY
End: 2021-09-09

## 2021-07-16 RX ORDER — MONTELUKAST SODIUM 10 MG/1
TABLET ORAL
COMMUNITY
Start: 2021-06-15 | End: 2021-11-17

## 2021-07-16 RX ORDER — AZELASTINE HYDROCHLORIDE 137 UG/1
SPRAY, METERED NASAL
COMMUNITY
Start: 2021-06-15 | End: 2021-11-27

## 2021-07-16 RX ORDER — ROSUVASTATIN CALCIUM 5 MG/1
5 TABLET, COATED ORAL
COMMUNITY
Start: 2021-05-27 | End: 2021-07-16

## 2021-07-16 RX ORDER — OFLOXACIN 3 MG/ML
5 SOLUTION AURICULAR (OTIC) DAILY
Qty: 10 ML | Refills: 0 | Status: SHIPPED | OUTPATIENT
Start: 2021-07-16 | End: 2021-07-23

## 2021-07-16 RX ORDER — LORAZEPAM 1 MG/1
1 TABLET ORAL
Qty: 30 TABLET | Refills: 2 | Status: SHIPPED | OUTPATIENT
Start: 2021-09-06 | End: 2021-11-17 | Stop reason: SDUPTHER

## 2021-07-16 NOTE — ASSESSMENT & PLAN NOTE
Patient 66-year-old female here to establish care with me today.  She has been struggling with dyslipidemia/familial hypercholesterolemia for several years.  Her atorvastatin was increased to 40 mg 2 months ago.  She is only able to take statins about 3 times a week due to stomach upset.  Component      Latest Ref Rng & Units 12/31/2020 5/25/2021           7:09 AM  7:19 AM   Cholesterol,Tot      100 - 199 mg/dL 306 (H) 285 (H)   Triglycerides      0 - 149 mg/dL 165 (H) 148   HDL      >=40 mg/dL 65 67   LDL      <100 mg/dL 208 (H) 188 (H)

## 2021-07-16 NOTE — ASSESSMENT & PLAN NOTE
Patient 66-year-old female here to establish care with me today.  Reports this is a chronic health problem that does not bother her very much.  Will take a gabapentin very rarely for symptoms.

## 2021-07-16 NOTE — ASSESSMENT & PLAN NOTE
Patient is 66-year-old female here to establish care with me today.  This is a chronic health problem that is preventatively managed with atorvastatin.

## 2021-07-16 NOTE — PATIENT INSTRUCTIONS
Take generic Claritan, or Zyrtec, or Allegra daily    Take sudafed before going over high altitude

## 2021-07-16 NOTE — ASSESSMENT & PLAN NOTE
Patient 66-year-old female here to establish care with me today. This is a chronic health problem that is well controlled with current medications and lifestyle measures.  Taking citalopram 40 mg daily.

## 2021-07-16 NOTE — PROGRESS NOTES
CC: Establish care, medication refill    HISTORY OF THE PRESENT ILLNESS: Patient is a 66 y.o. female. This pleasant patient is here today to establish care and for evaluation and management of the following health problems.    Patient's previous primary care provider is Dr. hWalen.      Familial hypercholesteremia  Patient 66-year-old female here to establish care with me today.  She has been struggling with dyslipidemia/familial hypercholesterolemia for several years.  Her atorvastatin was increased to 40 mg 2 months ago.  She is only able to take statins about 3 times a week due to stomach upset.  Component      Latest Ref Rng & Units 12/31/2020 5/25/2021           7:09 AM  7:19 AM   Cholesterol,Tot      100 - 199 mg/dL 306 (H) 285 (H)   Triglycerides      0 - 149 mg/dL 165 (H) 148   HDL      >=40 mg/dL 65 67   LDL      <100 mg/dL 208 (H) 188 (H)       Major depressive disorder with single episode, in full remission ([facility])  Patient 66-year-old female here to establish care with me today. This is a chronic health problem that is well controlled with current medications and lifestyle measures.  Taking citalopram 40 mg daily.      Insomnia, persistent  Patient 66-year-old female here to establish care with me today.  This is a chronic health problem for patient.  She has been taking lorazepam 1 mg at bedtime for several years to help with sleep and to control her anxiety which is worse at night.  Patient has 1 more refill from her previous provider and will not need new prescription until early September.  She does not drink alcohol.  Denies any adverse effects from medication.    Aortic atherosclerosis ([facility])  Patient is 66-year-old female here to establish care with me today.  This is a chronic health problem that is preventatively managed with atorvastatin.      Polyneuropathy associated with underlying disease (CMS-HCC) (HCC)  Patient 66-year-old female here to establish care with me today.  Reports this is a  chronic health problem that does not bother her very much.  Will take a gabapentin very rarely for symptoms.    Sleep apnea, obstructive  Patient 66-year-old female here to establish care with me today.  She uses CPAP nightly.  Managed by pulmonology, Dr. Siddiqui.    Irritable bowel syndrome with both constipation and diarrhea  Patient is 66-year-old female who to establish care with me today.  Reports history of irritable bowel syndrome that is well managed with Bentyl as needed.  She is not needing refill today.  Not established with gastroenterology.    Allergic rhinitis  Patient is 66-year-old female here to establish care with me today.  Allergic rhinitis is chronic health problem.  She is working with Dr. Siddiqui her sleep management provider on good control.  She started taking Singulair last month, not sure if it is helping.  Has not taken second-generation antihistamine in the past.  Also manages with Flonase and Astelin nasal spray.  Reports allergies have been worse this spring and summer.    Fatigue  Patient reports worsening fatigue in the last few months.  She thinks it may have to do with Covid vaccine as fatigue started shortly after vaccination.  Denies chest pain, shortness of breath.  Sleeping well with sleep aid and Ativan.    Acute otitis externa of left ear  Patient 66-year-old female here to establish care with me today.  Reports left ear fullness and pain intermittently for the last few weeks.  Hurts when her swelling going over a past traveling out of state.  Denies sore throat, fever.  Does not have worsening allergies in the last few months.    Spinal stenosis of lumbar region  Patient is 66-year-old female here to establish care with me today.  Has chronic low back and neck pain.  History of lumbar fusion.  Takes baclofen once daily with good relief.      Allergies: Albumin and Seasonal    Current Outpatient Medications Ordered in Epic   Medication Sig Dispense Refill   • montelukast  "(SINGULAIR) 10 MG Tab Take  by mouth.     • Azelastine HCl 137 MCG/SPRAY Solution USE 2 SPRAY(S) IN EACH NOSTRIL TWICE DAILY     • dicyclomine (BENTYL) 10 MG Cap Take 10 mg by mouth 4 Times a Day,Before Meals and at Bedtime.     • [START ON 9/6/2021] LORazepam (ATIVAN) 1 MG Tab Take 1 tablet by mouth at bedtime for 90 days. 30 tablet 2   • ofloxacin otic sol (FLOXIN OTIC) 0.3 % Solution Administer 5 Drops into the left ear every day for 7 days. Administer drops to both ears. 10 mL 0   • baclofen (LIORESAL) 10 MG Tab TAKE 1 TABLET BY MOUTH EVERY DAY AT BEDTIME 90 Tab 0   • atorvastatin (LIPITOR) 40 MG Tab Take 1 Tab by mouth every day. 90 Tab 3   • citalopram (CELEXA) 40 MG Tab TAKE 1 TABLET DAILY 90 Tab 2   • fluticasone (FLONASE) 50 MCG/ACT nasal spray Spray 2 Sprays in nose every day. INHALE 2 SPRAYS IN EACH NOSTRIL 48 g 11   • Artificial Tear Solution (TEARS NATURALE OP) Place 2 Drops in both eyes 2 times a day as needed.       No current Epic-ordered facility-administered medications on file.       Past Medical History:   Diagnosis Date   • Anesthesia 02-14-11    PO N/V, \"slow to come out\"   • Aortic atherosclerosis (HCC)    • Arthritis 02-14-11    spine   • Backpain 02-14-11    neck and abd. also,    • Breath shortness     with exertion   • Bronchitis 05/2018   • Cancer (HCC) 07/18/2018    Skin - 15 years ago.   • Cancer (HCC)     April/May 2018   • Diverticulitis    • Endometriosis of uterus    • Familial hypercholesteremia    • Fusion of spine 2014   • ROBERT (generalized anxiety disorder) 6/15/2017   • H/O fall     when in hospital  with low O2 sats   • H/O: CVA (cerebrovascular accident) 8/22/2014    Complex event associated with apparent medication reaction but with MRI suggesting possible multiple small infarcts of various ages, [facility]   • Hair loss 12/6/2018   • Heart burn    • Hiatus hernia syndrome     not repaired   • High cholesterol    • HTN, goal below 130/80 10/24/2011   • Infectious disease     "  Hep C +   • Lung collapse 11    right lung  collpsed    • Major depressive disorder with single episode, in full remission (HCC) 10/24/2011   • Mixed hyperlipidemia 2011   • Moderate major depression (HCC) 10/24/2011   • MRSA exposure 2014    while in hospital   • Post-menopause on HRT (hormone replacement therapy) 2011   • 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    exposed as a child, told not active   • Scoliosis    • Sleep apnea 2015    CPAP   • Snoring    • Unspecified vitamin D deficiency 2012       Past Surgical History:   Procedure Laterality Date   • NISSEN FUNDOPLICATION LAPAROSCOPIC  2018    Procedure: NISSEN FUNDOPLICATION LAPAROSCOPIC;  Surgeon: Kenji Garcia M.D.;  Location: SURGERY [facility];  Service: General   • NISSEN FUNDOPLICATION LAPAROSCOPIC N/A 2015    Procedure: NISSEN FUNDOPLICATION LAPAROSCOPIC;  Surgeon: Kenji Garcia M.D.;  Location: SURGERY SAME DAY [facility];  Service:    • GASTROSCOPY-ENDO  2015    Procedure: GASTROSCOPY-ENDO;  Surgeon: Kenji Garcia M.D.;  Location: ENDOSCOPY [facility];  Service:    • CARPAL TUNNEL RELEASE  2012    Performed by Holly Martin M.D. at SURGERY [facility]   • LOW ANTERIOR RESECTION LAPAROSCOPIC  3/2/2011    Performed by KENJI GARCIA at SURGERY [facility]   • CERVICAL DISK AND FUSION ANTERIOR  2010    Performed by JOSEPH DARLING at SURGERY [facility]   • TUBAL LIGATION     • TONSILLECTOMY AND ADENOIDECTOMY     • APPENDECTOMY     • GYN SURGERY      partial hysterectomy       Social History     Tobacco Use   • Smoking status: Former Smoker     Packs/day: 0.30     Years: 5.00     Pack years: 1.50     Types: Cigarettes     Quit date: 1975     Years since quittin.5   • Smokeless tobacco: Never Used   • Tobacco comment: quit    Vaping Use   • Vaping Use: Never used   Substance Use Topics   • Alcohol use: No     Alcohol/week: 0.0 oz   • Drug use: No  "      Family History   Problem Relation Age of Onset   • Diabetes Mother    • Cancer Mother 82        breast cancer   • Lung Disease Mother         copd   • Hyperlipidemia Mother    • Hypertension Mother    • Cancer Father         Laryngeal CA   • Heart Disease Father 50        CAD with bypasses x 3   • Heart Attack Father    • Hyperlipidemia Father    • Hypertension Father    • Diabetes Sister         type II diabetes   • Diabetes Other    • Heart Disease Other    • Hypertension Other    • Lung Disease Other        ROS:   As in HPI, otherwise negative for chest pain, dyspnea, abdominal pain, dysuria, blood in stool, fever           Exam: /68 (BP Location: Left arm, Patient Position: Sitting, BP Cuff Size: Adult)   Pulse 73   Temp 37.2 °C (98.9 °F) (Temporal)   Resp 16   Ht 1.499 m (4' 11\")   Wt 70.4 kg (155 lb 3.2 oz)   SpO2 95%  Body mass index is 31.35 kg/m².    General: Alert, pleasant, well nourished, well developed female in NAD  HEENT: Normocephalic. Eyes conjunctiva clear lids without ptosis, pupils equal and reactive to light, ears normal shape and contour.  Left ear canal mildly edematous with mild erythema and drainage.  Right canal clear, tympanic membranes are pearly gray cloudy fluid posteriorly, nasal mucosa without erythema and drainage, oropharynx is without erythema, edema or exudates.   Neck: Supple without bruit. Thyroid is not enlarged.  Pulmonary: Clear to ausculation.  Normal effort. No rales, ronchi, or wheezing.  Cardiovascular: Normal rate and rhythm without murmur. Carotid and radial pulses are intact and equal bilaterally.  No lower extremity edema.  Abdomen: Soft, nontender, nondistended. Normal bowel sounds. Liver and spleen are not palpable  Neurologic: Grossly nonfocal  Lymph: No cervical or supraclavicular lymph nodes are palpable  Skin: Warm and dry.    Musculoskeletal: Normal gait.   Psych: Normal mood and affect. Alert and oriented. Judgment and insight is " normal.    Please note that this dictation was created using voice recognition software. I have made every reasonable attempt to correct obvious errors, but I expect that there are errors of grammar and possibly content that I did not discover before finalizing the note.      Assessment/Plan  1. Familial hypercholesteremia  Chronic health problem.  Able to tolerate atorvastatin 3 times a week, continue.  We will continue to monitor.  Continue follow-up with cardiology    2. Major depressive disorder with single episode, in full remission (HCC)  Well-controlled.  Continue citalopram, no changes.    3. Insomnia, persistent  Chronic health problem that is well managed with lorazepam.  Will refill medication to start in early September for 3-month supply.   reviewed.  Previous records reviewed.  Ativan is medically necessary.  - LORazepam (ATIVAN) 1 MG Tab; Take 1 tablet by mouth at bedtime for 90 days.  Dispense: 30 tablet; Refill: 2    4. Aortic atherosclerosis (HCC)  Continue with atorvastatin.    5. Polyneuropathy associated with underlying disease (HCC)  Intermittent problem well managed with gabapentin which she has to take rarely.    6. Acute otitis externa of left ear, unspecified type  Ear canal mildly edematous with erythema.  Will prescribe ofloxacin eardrops.  Advised on Flonase nasal spray daily to help with ear fullness and serous fluid posteriorly bilateral ear drums.  Also advised on Sudafed an hour prior to travel over the past to see if this helps.  Patient will message me in a week if symptoms have not improved.  At that time consider oral antibiotic.  - ofloxacin otic sol (FLOXIN OTIC) 0.3 % Solution; Administer 5 Drops into the left ear every day for 7 days. Administer drops to both ears.  Dispense: 10 mL; Refill: 0    7. Fatigue, unspecified type  Patient having fatigue.  Will rule out anemia and thyroid disease.  If it is from vaccine, will take some time.  - TSH WITH REFLEX TO FT4; Future  -  CBC WITHOUT DIFFERENTIAL; Future    8. Chronic use of benzodiazepine for therapeutic purpose  Reviewed risks of benzodiazepines including addiction and respiratory depression.  Reviewed that risk for respiratory depression is increased taken with alcohol or opioids which patient should not be taking.  Patient reports she is not taking.    Reviewed controlled substance agreement and patient signed.  - Controlled Substance Treatment Agreement    9. Sleep apnea, obstructive  Continue follow-up with sleep management/pulmonologist, CPAP    10. Irritable bowel syndrome with both constipation and diarrhea  Well-controlled with Bentyl as needed.    11. Allergic rhinitis, unspecified seasonality, unspecified trigger  Continue with Singulair, Flonase, Astelin nasal spray.  Advised on second-generation antihistamine.  Continue follow-up with pulmonology.    12. Spinal stenosis of lumbar region with neurogenic claudication  Fairly well controlled with baclofen.  Continue.    Patient not needing refills of medications today.  Patient will return to clinic in 4 months for medication refill or sooner if needed.

## 2021-07-16 NOTE — ASSESSMENT & PLAN NOTE
Patient 66-year-old female here to establish care with me today.  This is a chronic health problem for patient.  She has been taking lorazepam 1 mg at bedtime for several years to help with sleep and to control her anxiety which is worse at night.  Patient has 1 more refill from her previous provider and will not need new prescription until early September.  She does not drink alcohol.  Denies any adverse effects from medication.

## 2021-07-17 PROBLEM — K58.2 IRRITABLE BOWEL SYNDROME WITH BOTH CONSTIPATION AND DIARRHEA: Status: ACTIVE | Noted: 2021-07-17

## 2021-07-17 PROBLEM — L65.9 HAIR LOSS: Status: RESOLVED | Noted: 2018-12-06 | Resolved: 2021-07-17

## 2021-07-17 PROBLEM — H60.502 ACUTE OTITIS EXTERNA OF LEFT EAR: Status: ACTIVE | Noted: 2021-07-17

## 2021-07-17 NOTE — ASSESSMENT & PLAN NOTE
Patient is 66-year-old female here to establish care with me today.  Allergic rhinitis is chronic health problem.  She is working with Dr. Siddiqui her sleep management provider on good control.  She started taking Singulair last month, not sure if it is helping.  Has not taken second-generation antihistamine in the past.  Also manages with Flonase and Astelin nasal spray.  Reports allergies have been worse this spring and summer.

## 2021-07-17 NOTE — ASSESSMENT & PLAN NOTE
Patient 66-year-old female here to establish care with me today.  Reports left ear fullness and pain intermittently for the last few weeks.  Hurts when her swelling going over a past traveling out of state.  Denies sore throat, fever.  Does not have worsening allergies in the last few months.

## 2021-07-17 NOTE — ASSESSMENT & PLAN NOTE
Patient 66-year-old female here to establish care with me today.  She uses CPAP nightly.  Managed by pulmonology, Dr. Siddiqui.

## 2021-07-17 NOTE — ASSESSMENT & PLAN NOTE
Patient reports worsening fatigue in the last few months.  She thinks it may have to do with Covid vaccine as fatigue started shortly after vaccination.  Denies chest pain, shortness of breath.  Sleeping well with sleep aid and Ativan.

## 2021-07-17 NOTE — ASSESSMENT & PLAN NOTE
Patient is 66-year-old female here to establish care with me today.  Has chronic low back and neck pain.  History of lumbar fusion.  Takes baclofen once daily with good relief.

## 2021-07-17 NOTE — ASSESSMENT & PLAN NOTE
Patient is 66-year-old female who to establish care with me today.  Reports history of irritable bowel syndrome that is well managed with Bentyl as needed.  She is not needing refill today.  Not established with gastroenterology.

## 2021-08-03 ENCOUNTER — OFFICE VISIT (OUTPATIENT)
Dept: MEDICAL GROUP | Facility: PHYSICIAN GROUP | Age: 66
End: 2021-08-03
Payer: MEDICARE

## 2021-08-03 VITALS
OXYGEN SATURATION: 94 % | TEMPERATURE: 99.7 F | SYSTOLIC BLOOD PRESSURE: 134 MMHG | BODY MASS INDEX: 30.96 KG/M2 | HEART RATE: 74 BPM | DIASTOLIC BLOOD PRESSURE: 72 MMHG | RESPIRATION RATE: 16 BRPM | HEIGHT: 59 IN | WEIGHT: 153.6 LBS

## 2021-08-03 DIAGNOSIS — R11.0 NAUSEA: ICD-10-CM

## 2021-08-03 DIAGNOSIS — K29.00 ACUTE GASTRITIS WITHOUT HEMORRHAGE, UNSPECIFIED GASTRITIS TYPE: ICD-10-CM

## 2021-08-03 DIAGNOSIS — R53.83 FATIGUE, UNSPECIFIED TYPE: ICD-10-CM

## 2021-08-03 DIAGNOSIS — K58.2 IRRITABLE BOWEL SYNDROME WITH BOTH CONSTIPATION AND DIARRHEA: ICD-10-CM

## 2021-08-03 DIAGNOSIS — R10.32 LEFT LOWER QUADRANT PAIN: ICD-10-CM

## 2021-08-03 PROCEDURE — 99214 OFFICE O/P EST MOD 30 MIN: CPT | Performed by: NURSE PRACTITIONER

## 2021-08-03 RX ORDER — ONDANSETRON 4 MG/1
4 TABLET, ORALLY DISINTEGRATING ORAL EVERY 6 HOURS PRN
Qty: 10 TABLET | Refills: 0 | Status: SHIPPED | OUTPATIENT
Start: 2021-08-03 | End: 2021-09-01 | Stop reason: SDUPTHER

## 2021-08-03 RX ORDER — OMEPRAZOLE 20 MG/1
20 CAPSULE, DELAYED RELEASE ORAL DAILY
Qty: 14 CAPSULE | Refills: 0 | Status: SHIPPED | OUTPATIENT
Start: 2021-08-03 | End: 2021-08-17

## 2021-08-03 ASSESSMENT — FIBROSIS 4 INDEX: FIB4 SCORE: 1.13

## 2021-08-03 NOTE — PROGRESS NOTES
CC: Abdominal pain, nausea, intermittent bilateral ear pain, continued fatigue    HISTORY OF THE PRESENT ILLNESS: Patient is a 66 y.o. female. This pleasant patient is here today for evaluation and management of the following health problems.    Patient reports intermittent bilateral ear pain      Irritable bowel syndrome with both constipation and diarrhea  Patient reports more frequent episodes of IBS.  Taking Bentyl as needed.  Would like referral back to GI, Dr. Gonzalez.    Left lower quadrant pain  Patient reports 2-day onset of left lower quadrant pain associated with nausea.  Has been able to sip liquids and eat some fruit.  Had small bowel movement this morning.  Denies emesis, blood in stool, dark black stool. Nausea mildly improved today.  History of diverticulosis, and bowel resection.  Had episode of cold sweat yesterday.  Low grade fever today.    Fatigue  Patient continues with fatigue.  CBC and thyroid panel normal.  Consider vitamin B12 level, iron level, echocardiogram.      Allergies: Albumin and Seasonal    Current Outpatient Medications Ordered in Epic   Medication Sig Dispense Refill   • omeprazole (PRILOSEC) 20 MG delayed-release capsule Take 1 capsule by mouth every day for 14 days. 14 capsule 0   • ondansetron (ZOFRAN ODT) 4 MG TABLET DISPERSIBLE Take 1 tablet by mouth every 6 hours as needed for Nausea. 10 tablet 0   • montelukast (SINGULAIR) 10 MG Tab Take  by mouth.     • [START ON 9/6/2021] LORazepam (ATIVAN) 1 MG Tab Take 1 tablet by mouth at bedtime for 90 days. 30 tablet 2   • baclofen (LIORESAL) 10 MG Tab TAKE 1 TABLET BY MOUTH EVERY DAY AT BEDTIME 90 Tab 0   • atorvastatin (LIPITOR) 40 MG Tab Take 1 Tab by mouth every day. 90 Tab 3   • citalopram (CELEXA) 40 MG Tab TAKE 1 TABLET DAILY 90 Tab 2   • fluticasone (FLONASE) 50 MCG/ACT nasal spray Spray 2 Sprays in nose every day. INHALE 2 SPRAYS IN EACH NOSTRIL 48 g 11   • Artificial Tear Solution (TEARS NATURALE OP) Place 2 Drops in  "both eyes 2 times a day as needed.     • Azelastine HCl 137 MCG/SPRAY Solution USE 2 SPRAY(S) IN EACH NOSTRIL TWICE DAILY     • dicyclomine (BENTYL) 10 MG Cap Take 10 mg by mouth 4 Times a Day,Before Meals and at Bedtime.       No current Epic-ordered facility-administered medications on file.       Past Medical History:   Diagnosis Date   • Anesthesia 02-14-11    PO N/V, \"slow to come out\"   • Aortic atherosclerosis (HCC)    • Arthritis 02-14-11    spine   • Backpain 02-14-11    neck and abd. also,    • Breath shortness     with exertion   • Bronchitis 05/2018   • Cancer (HCC) 07/18/2018    Skin - 15 years ago.   • Cancer (HCC)     April/May 2018   • Diverticulitis    • Endometriosis of uterus    • Familial hypercholesteremia    • Fusion of spine 2014   • ROBERT (generalized anxiety disorder) 6/15/2017   • H/O fall     when in hospital  with low O2 sats   • H/O: CVA (cerebrovascular accident) 8/22/2014    Complex event associated with apparent medication reaction but with MRI suggesting possible multiple small infarcts of various ages, [facility]   • Hair loss 12/6/2018   • Heart burn    • Hiatus hernia syndrome     not repaired   • High cholesterol    • HTN, goal below 130/80 10/24/2011   • Infectious disease      Hep C +   • Lung collapse 02-14-11    right lung 1/4 collpsed    • Major depressive disorder with single episode, in full remission ([facility]) 10/24/2011   • Mixed hyperlipidemia 12/27/2011   • Moderate major depression ([facility]) 10/24/2011   • MRSA exposure 8/2014    while in hospital   • Post-menopause on HRT (hormone replacement therapy) 12/27/2011   • 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    exposed as a child, told not active   • Scoliosis    • Sleep apnea 6/2015    CPAP   • Snoring    • Unspecified vitamin D deficiency 9/24/2012       Past Surgical History:   Procedure Laterality Date   • NISSEN FUNDOPLICATION LAPAROSCOPIC  7/25/2018    Procedure: NISSEN FUNDOPLICATION LAPAROSCOPIC;  Surgeon: Kenji Okeefe M.D.;  Location: " SURGERY [facility];  Service: General   • NISSEN FUNDOPLICATION LAPAROSCOPIC N/A 2015    Procedure: NISSEN FUNDOPLICATION LAPAROSCOPIC;  Surgeon: Kenji Garcia M.D.;  Location: SURGERY SAME DAY [facility];  Service:    • GASTROSCOPY-ENDO  2015    Procedure: GASTROSCOPY-ENDO;  Surgeon: Kenji Garcia M.D.;  Location: ENDOSCOPY [facility];  Service:    • CARPAL TUNNEL RELEASE  2012    Performed by Holly Martin M.D. at SURGERY [facility]   • LOW ANTERIOR RESECTION LAPAROSCOPIC  3/2/2011    Performed by KENJI GARCIA at SURGERY [facility]   • CERVICAL DISK AND FUSION ANTERIOR  2010    Performed by JOSEPH DARLING at SURGERY [facility]   • TUBAL LIGATION     • TONSILLECTOMY AND ADENOIDECTOMY     • APPENDECTOMY     • GYN SURGERY      partial hysterectomy       Social History     Tobacco Use   • Smoking status: Former Smoker     Packs/day: 0.30     Years: 5.00     Pack years: 1.50     Types: Cigarettes     Quit date: 1975     Years since quittin.6   • Smokeless tobacco: Never Used   • Tobacco comment: quit    Vaping Use   • Vaping Use: Never used   Substance Use Topics   • Alcohol use: No     Alcohol/week: 0.0 oz   • Drug use: No       Family History   Problem Relation Age of Onset   • Diabetes Mother    • Cancer Mother 82        breast cancer   • Lung Disease Mother         copd   • Hyperlipidemia Mother    • Hypertension Mother    • Cancer Father         Laryngeal CA   • Heart Disease Father 50        CAD with bypasses x 3   • Heart Attack Father    • Hyperlipidemia Father    • Hypertension Father    • Diabetes Sister         type II diabetes   • Diabetes Other    • Heart Disease Other    • Hypertension Other    • Lung Disease Other        ROS: As in HPI, otherwise negative for chest pain, dyspnea, abdominal pain, dysuria, blood in stool, fever           Exam: /72 (BP Location: Right arm, Patient Position: Sitting, BP Cuff Size: Adult)    "Pulse 74   Temp 37.6 °C (99.7 °F) (Temporal)   Resp 16   Ht 1.499 m (4' 11\")   Wt 69.7 kg (153 lb 9.6 oz)   SpO2 94%  Body mass index is 31.02 kg/m².    General: Alert, pleasant, well nourished, well developed female in NAD  HEENT: Normocephalic. Eyes conjunctiva clear lids without ptosis, ears normal shape and contour, canals are clear bilaterally, tympanic membranes are pearly gray with good light reflex, nasal mucosa without erythema and drainage, oropharynx is without erythema, edema or exudates.   Neck: Supple without bruit. Thyroid is not enlarged.  Pulmonary: Clear to ausculation.  Normal effort. No rales, ronchi, or wheezing.  Cardiovascular: Normal rate and rhythm without murmur.   Abdomen: Soft, moderate tenderness to epigastrium and left upper quadrant, no tenderness in left lower quadrant, nondistended. Normal bowel sounds. Liver and spleen are not palpable.  No rebound tenderness.  Neurologic: Grossly nonfocal  Lymph: No cervical or supraclavicular lymph nodes are palpable  Skin: Warm and dry.    Musculoskeletal: Normal gait.   Psych: Normal mood and affect. Alert and oriented. Judgment and insight is normal.    Component      Latest Ref Rng & Units 7/16/2021   WBC      4.8 - 10.8 K/uL 4.9   RBC      4.20 - 5.40 M/uL 4.94   Hemoglobin      12.0 - 16.0 g/dL 15.3   Hematocrit      37.0 - 47.0 % 46.5   MCV      81.4 - 97.8 fL 94.1   MCH      27.0 - 33.0 pg 31.0   MCHC      33.6 - 35.0 g/dL 32.9 (L)   RDW      35.9 - 50.0 fL 46.4   Platelet Count      164 - 446 K/uL 321   MPV      9.0 - 12.9 fL 9.2   TSH      0.380 - 5.330 uIU/mL 2.000       Please note that this dictation was created using voice recognition software. I have made every reasonable attempt to correct obvious errors, but I expect that there are errors of grammar and possibly content that I did not discover before finalizing the note.      Assessment/Plan  1. Acute gastritis without hemorrhage, unspecified gastritis type  Patient had some " nausea and moderate tenderness to epigastrium and left upper quadrant.  Will treat with omeprazole burst.  We will also treat nausea with ondansetron.  - omeprazole (PRILOSEC) 20 MG delayed-release capsule; Take 1 capsule by mouth every day for 14 days.  Dispense: 14 capsule; Refill: 0    2. Nausea  In the #1  - ondansetron (ZOFRAN ODT) 4 MG TABLET DISPERSIBLE; Take 1 tablet by mouth every 6 hours as needed for Nausea.  Dispense: 10 tablet; Refill: 0    3. Left lower quadrant pain  Patient having left lower quadrant pain and associative nausea.  Differentials include diverticulitis, bowel obstruction, worsening irritable bowel syndrome, constipation, urinary tract infection.  Will get CT of abdomen.  Reviewed ER precautions with patient including worsening abdominal pain, fever above 100 degrees or chills.   - REFERRAL TO GASTROENTEROLOGY  - CT-ABDOMEN W/O; Future  -URINALYSIS WITH CULTURE IF NEEDED    4. Irritable bowel syndrome with both constipation and diarrhea    - REFERRAL TO GASTROENTEROLOGY    5. Fatigue, unspecified type  Continues with fatigue.  Consider further labs including vitamin B12, iron level, echocardiogram.    Discussed referral to ENT for intermittent bilateral ear pain.  No abnormalities on exam today.  Patient will consider and let me know.    After patient left, ordered urinalysis.  Patient notified of lab order via phone call by me personally.

## 2021-08-04 ENCOUNTER — HOSPITAL ENCOUNTER (OUTPATIENT)
Facility: MEDICAL CENTER | Age: 66
End: 2021-08-04
Attending: NURSE PRACTITIONER
Payer: MEDICARE

## 2021-08-04 DIAGNOSIS — R10.32 LEFT LOWER QUADRANT PAIN: ICD-10-CM

## 2021-08-04 LAB
APPEARANCE UR: CLEAR
BACTERIA #/AREA URNS HPF: NEGATIVE /HPF
BILIRUB UR QL STRIP.AUTO: NEGATIVE
COLOR UR: YELLOW
EPI CELLS #/AREA URNS HPF: NEGATIVE /HPF
GLUCOSE UR STRIP.AUTO-MCNC: NEGATIVE MG/DL
HYALINE CASTS #/AREA URNS LPF: NORMAL /LPF
KETONES UR STRIP.AUTO-MCNC: NEGATIVE MG/DL
LEUKOCYTE ESTERASE UR QL STRIP.AUTO: ABNORMAL
MICRO URNS: ABNORMAL
NITRITE UR QL STRIP.AUTO: NEGATIVE
PH UR STRIP.AUTO: 7.5 [PH] (ref 5–8)
PROT UR QL STRIP: NEGATIVE MG/DL
RBC # URNS HPF: NORMAL /HPF
RBC UR QL AUTO: NEGATIVE
SP GR UR STRIP.AUTO: 1
UROBILINOGEN UR STRIP.AUTO-MCNC: 0.2 MG/DL
WBC #/AREA URNS HPF: NORMAL /HPF

## 2021-08-04 PROCEDURE — 81001 URINALYSIS AUTO W/SCOPE: CPT

## 2021-08-04 NOTE — ASSESSMENT & PLAN NOTE
Patient continues with fatigue.  CBC and thyroid panel normal.  Consider vitamin B12 level, iron level, echocardiogram.

## 2021-08-04 NOTE — ASSESSMENT & PLAN NOTE
Patient reports more frequent episodes of IBS.  Taking Bentyl as needed.  Would like referral back to GI, Dr. Gonzalez.

## 2021-08-04 NOTE — ASSESSMENT & PLAN NOTE
Patient reports 2-day onset of left lower quadrant pain associated with nausea.  Has been able to sip liquids and eat some fruit.  Had small bowel movement this morning.  Denies emesis, blood in stool, dark black stool. Nausea mildly improved today.  History of diverticulosis, and bowel resection.  Had episode of cold sweat yesterday.  Low grade fever today.

## 2021-08-05 ENCOUNTER — HOSPITAL ENCOUNTER (OUTPATIENT)
Dept: RADIOLOGY | Facility: MEDICAL CENTER | Age: 66
End: 2021-08-05
Attending: NURSE PRACTITIONER
Payer: MEDICARE

## 2021-08-05 DIAGNOSIS — R10.32 LEFT LOWER QUADRANT PAIN: ICD-10-CM

## 2021-08-05 PROCEDURE — 74176 CT ABD & PELVIS W/O CONTRAST: CPT | Mod: MG

## 2021-09-01 ENCOUNTER — OFFICE VISIT (OUTPATIENT)
Dept: URGENT CARE | Facility: PHYSICIAN GROUP | Age: 66
End: 2021-09-01
Payer: MEDICARE

## 2021-09-01 VITALS
TEMPERATURE: 97.3 F | HEART RATE: 74 BPM | WEIGHT: 150 LBS | BODY MASS INDEX: 30.24 KG/M2 | DIASTOLIC BLOOD PRESSURE: 80 MMHG | SYSTOLIC BLOOD PRESSURE: 124 MMHG | OXYGEN SATURATION: 95 % | HEIGHT: 59 IN | RESPIRATION RATE: 16 BRPM

## 2021-09-01 DIAGNOSIS — R11.0 NAUSEA: ICD-10-CM

## 2021-09-01 DIAGNOSIS — K21.9 GASTROESOPHAGEAL REFLUX DISEASE, UNSPECIFIED WHETHER ESOPHAGITIS PRESENT: ICD-10-CM

## 2021-09-01 PROCEDURE — 99213 OFFICE O/P EST LOW 20 MIN: CPT | Performed by: PHYSICIAN ASSISTANT

## 2021-09-01 RX ORDER — OMEPRAZOLE 20 MG/1
20 CAPSULE, DELAYED RELEASE ORAL DAILY
Qty: 30 CAPSULE | Refills: 0 | Status: SHIPPED | OUTPATIENT
Start: 2021-09-01 | End: 2021-09-09 | Stop reason: SDUPTHER

## 2021-09-01 RX ORDER — NITROFURANTOIN 25; 75 MG/1; MG/1
CAPSULE ORAL
COMMUNITY
Start: 2021-08-30 | End: 2021-09-09

## 2021-09-01 RX ORDER — POLYETHYLENE GLYCOL 3350, SODIUM SULFATE ANHYDROUS, SODIUM BICARBONATE, SODIUM CHLORIDE, POTASSIUM CHLORIDE 236; 22.74; 6.74; 5.86; 2.97 G/4L; G/4L; G/4L; G/4L; G/4L
POWDER, FOR SOLUTION ORAL
COMMUNITY
Start: 2021-08-17 | End: 2021-09-09

## 2021-09-01 RX ORDER — ONDANSETRON 4 MG/1
4 TABLET, ORALLY DISINTEGRATING ORAL EVERY 6 HOURS PRN
Qty: 10 TABLET | Refills: 2 | Status: SHIPPED | OUTPATIENT
Start: 2021-09-01 | End: 2022-09-29 | Stop reason: SDUPTHER

## 2021-09-01 RX ORDER — ONDANSETRON 4 MG/1
TABLET, FILM COATED ORAL
COMMUNITY
Start: 2021-08-28 | End: 2021-09-09

## 2021-09-01 ASSESSMENT — FIBROSIS 4 INDEX: FIB4 SCORE: 1.13

## 2021-09-01 NOTE — PROGRESS NOTES
Chief Complaint   Patient presents with   • GI Problem     ER on thursday,  pt, refill of omeprazole        HISTORY OF PRESENT ILLNESS: Patient is a 66 y.o. female who presents today because She has been having GI upset, nausea and vomiting.  She does have a gastroenterologist and is awaiting an appointment but is requesting refill on her omeprazole and Zofran.  She went to a local emergency room about 4 days ago and was prescribed a few more Zofran but she is out of it.    Patient Active Problem List    Diagnosis Date Noted   • Left lower quadrant pain 08/03/2021   • Irritable bowel syndrome with both constipation and diarrhea 07/17/2021   • Acute otitis externa of left ear 07/17/2021   • Aortic atherosclerosis ([facility])    • Familial hypercholesteremia    • Polyneuropathy associated with underlying disease ([facility]) 03/13/2018   • Obesity (BMI 30-39.9) 03/13/2018   • Menopause 07/10/2017   • ROBERT (generalized anxiety disorder) 06/15/2017   • Allergic rhinitis 05/10/2017   • Fatigue 03/02/2017   • S/P lumbar fusion 10/20/2016   • Cervical radiculopathy 10/20/2016   • Diaphragmatic hernia 06/30/2015   • Mild mitral regurgitation 03/12/2015   • H/O: CVA (cerebrovascular accident) 09/23/2014   • GERD (gastroesophageal reflux disease) 09/17/2014   • Spinal stenosis of lumbar region 03/20/2014   • Lumbar radiculopathy, chronic 08/26/2013   • Carpal tunnel syndrome 11/14/2012   • Vitamin D deficiency disease 09/24/2012   • Dyslipidemia 12/27/2011   • HTN, goal below 130/80 10/24/2011   • Acquired scoliosis 10/24/2011   • Sleep apnea, obstructive 10/24/2011   • Insomnia, persistent 10/24/2011   • Major depressive disorder with single episode, in full remission ([facility]) 10/24/2011   • 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       Allergies:Albumin and Seasonal    Current Outpatient Medications Ordered in Epic   Medication Sig Dispense Refill   • ondansetron (ZOFRAN ODT) 4 MG TABLET DISPERSIBLE Take 1 Tablet by mouth every 6 hours as needed for  "Nausea. 10 Tablet 2   • omeprazole (PRILOSEC) 20 MG delayed-release capsule Take 1 Capsule by mouth every day. 30 Capsule 0   • ondansetron (ZOFRAN) 4 MG Tab tablet TAKE 1 TABLET BY MOUTH EVERY 8 HOURS INTERVAL     • PEG 3350-KCl-NaBcb-NaCl-NaSulf (PEG-3350/ELECTROLYTES) 236 g Recon Soln FOLLOW [facility]S HANDOUT GIVEN     • nitrofurantoin (MACROBID) 100 MG Cap TAKE 1 CAPSULE BY MOUTH TWICE DAILY FOR 7 DAYS     • montelukast (SINGULAIR) 10 MG Tab Take  by mouth.     • Azelastine HCl 137 MCG/SPRAY Solution USE 2 SPRAY(S) IN EACH NOSTRIL TWICE DAILY     • dicyclomine (BENTYL) 10 MG Cap Take 10 mg by mouth 4 Times a Day,Before Meals and at Bedtime.     • [START ON 9/6/2021] LORazepam (ATIVAN) 1 MG Tab Take 1 tablet by mouth at bedtime for 90 days. 30 tablet 2   • baclofen (LIORESAL) 10 MG Tab TAKE 1 TABLET BY MOUTH EVERY DAY AT BEDTIME 90 Tab 0   • atorvastatin (LIPITOR) 40 MG Tab Take 1 Tab by mouth every day. 90 Tab 3   • citalopram (CELEXA) 40 MG Tab TAKE 1 TABLET DAILY 90 Tab 2   • fluticasone (FLONASE) 50 MCG/ACT nasal spray Spray 2 Sprays in nose every day. INHALE 2 SPRAYS IN EACH NOSTRIL 48 g 11   • Artificial Tear Solution (TEARS NATURALE OP) Place 2 Drops in both eyes 2 times a day as needed.       No current Epic-ordered facility-administered medications on file.       Past Medical History:   Diagnosis Date   • Anesthesia 02-14-11    PO N/V, \"slow to come out\"   • Aortic atherosclerosis (HCC)    • Arthritis 02-14-11    spine   • Backpain 02-14-11    neck and abd. also,    • Breath shortness     with exertion   • Bronchitis 05/2018   • Cancer (HCC) 07/18/2018    Skin - 15 years ago.   • Cancer (HCC)     April/May 2018   • Diverticulitis    • Endometriosis of uterus    • Familial hypercholesteremia    • Fusion of spine 2014   • ROBERT (generalized anxiety disorder) 6/15/2017   • H/O fall     when in hospital  with low O2 sats   • H/O: CVA (cerebrovascular accident) 8/22/2014    Complex event associated with " apparent medication reaction but with MRI suggesting possible multiple small infarcts of various ages, [facility]   • Hair loss 2018   • Heart burn    • Hiatus hernia syndrome     not repaired   • High cholesterol    • HTN, goal below 130/80 10/24/2011   • Infectious disease      Hep C +   • Lung collapse 11    right lung  collpsed    • Major depressive disorder with single episode, in full remission (HCC) 10/24/2011   • Mixed hyperlipidemia 2011   • Moderate major depression (HCC) 10/24/2011   • MRSA exposure 2014    while in hospital   • Post-menopause on HRT (hormone replacement therapy) 2011   • 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    exposed as a child, told not active   • Scoliosis    • Sleep apnea 2015    CPAP   • Snoring    • Unspecified vitamin D deficiency 2012       Social History     Tobacco Use   • Smoking status: Former Smoker     Packs/day: 0.30     Years: 5.00     Pack years: 1.50     Types: Cigarettes     Quit date: 1975     Years since quittin.6   • Smokeless tobacco: Never Used   • Tobacco comment: quit    Vaping Use   • Vaping Use: Never used   Substance Use Topics   • Alcohol use: No     Alcohol/week: 0.0 oz   • Drug use: No       Family Status   Relation Name Status   • Mo  Alive        84 in    • Fa   at age 81        Laryngeal CA   • Sis  Alive        56 in    • OTHER  (Not Specified)     Family History   Problem Relation Age of Onset   • Diabetes Mother    • Cancer Mother 82        breast cancer   • Lung Disease Mother         copd   • Hyperlipidemia Mother    • Hypertension Mother    • Cancer Father         Laryngeal CA   • Heart Disease Father 50        CAD with bypasses x 3   • Heart Attack Father    • Hyperlipidemia Father    • Hypertension Father    • Diabetes Sister         type II diabetes   • Diabetes Other    • Heart Disease Other    • Hypertension Other    • Lung Disease Other        ROS:  Review of Systems   Constitutional:  "Negative for fever, chills, weight loss and malaise/fatigue.   HENT: Negative for ear pain, nosebleeds, congestion, sore throat and neck pain.    Eyes: Negative for blurred vision.   Respiratory: Negative for cough, sputum production, shortness of breath and wheezing.    Cardiovascular: Negative for chest pain, palpitations, orthopnea and leg swelling.   Gastrointestinal: Negative for heartburn, positive for nausea, vomiting and abdominal pain.   Genitourinary: Negative for dysuria, urgency and frequency.     Exam:  /80 (BP Location: Right arm, Patient Position: Sitting, BP Cuff Size: Adult)   Pulse 74   Temp 36.3 °C (97.3 °F) (Temporal)   Resp 16   Ht 1.499 m (4' 11\")   Wt 68 kg (150 lb)   SpO2 95%   General:  Well nourished, well developed female in NAD  Head:Normocephalic, atraumatic  Eyes: PERRLA, EOM within normal limits, no conjunctival injection, no scleral icterus, visual fields and acuity grossly intact.  Nose: Symmetrical without tenderness, no discharge.  Mouth: reasonable hygiene, no erythema exudates or tonsillar enlargement.  Neck: no masses, range of motion within normal limits, no tracheal deviation. No obvious thyroid enlargement.  Extremities: no clubbing, cyanosis, or edema.    Please note that this dictation was created using voice recognition software. I have made every reasonable attempt to correct obvious errors, but I expect that there are errors of grammar and possibly content that I did not discover before finalizing the note.    Assessment/Plan:  1. Nausea  ondansetron (ZOFRAN ODT) 4 MG TABLET DISPERSIBLE   2. Gastroesophageal reflux disease, unspecified whether esophagitis present  omeprazole (PRILOSEC) 20 MG delayed-release capsule     Follow-up with GI as scheduled.  Followup with primary care in the next 7-10 days if not significantly improving, return to the urgent care or go to the emergency room sooner for any worsening of symptoms.       "

## 2021-09-09 ENCOUNTER — OFFICE VISIT (OUTPATIENT)
Dept: MEDICAL GROUP | Facility: PHYSICIAN GROUP | Age: 66
End: 2021-09-09
Payer: MEDICARE

## 2021-09-09 VITALS
SYSTOLIC BLOOD PRESSURE: 116 MMHG | RESPIRATION RATE: 16 BRPM | OXYGEN SATURATION: 95 % | WEIGHT: 150.2 LBS | DIASTOLIC BLOOD PRESSURE: 68 MMHG | TEMPERATURE: 98.1 F | HEART RATE: 88 BPM | BODY MASS INDEX: 30.28 KG/M2 | HEIGHT: 59 IN

## 2021-09-09 DIAGNOSIS — N30.00 ACUTE CYSTITIS WITHOUT HEMATURIA: ICD-10-CM

## 2021-09-09 DIAGNOSIS — K21.9 GASTROESOPHAGEAL REFLUX DISEASE, UNSPECIFIED WHETHER ESOPHAGITIS PRESENT: ICD-10-CM

## 2021-09-09 DIAGNOSIS — M54.16 LUMBAR RADICULOPATHY, CHRONIC: ICD-10-CM

## 2021-09-09 PROCEDURE — 99213 OFFICE O/P EST LOW 20 MIN: CPT | Performed by: NURSE PRACTITIONER

## 2021-09-09 RX ORDER — OMEPRAZOLE 20 MG/1
20 CAPSULE, DELAYED RELEASE ORAL DAILY
Qty: 30 CAPSULE | Refills: 1 | Status: SHIPPED | OUTPATIENT
Start: 2021-09-09 | End: 2021-12-22

## 2021-09-09 RX ORDER — BACLOFEN 10 MG/1
10 TABLET ORAL
Qty: 90 TABLET | Refills: 3 | Status: SHIPPED | OUTPATIENT
Start: 2021-09-09 | End: 2022-12-01 | Stop reason: SDUPTHER

## 2021-09-09 ASSESSMENT — FIBROSIS 4 INDEX: FIB4 SCORE: 1.13

## 2021-09-09 NOTE — LETTER
[facility]  TASHA Mahan  [address] [place] [address]  Fax: [phone]   Authorization for Release/Disclosure of   Protected Health Information   Name: LOW SAMUEL : 1955 SSN: [id number]   Address: [facility] [place] [address] Phone:    [phone] (home)    I authorize the entity listed below to release/disclose the PHI below to:   [facility]/TASHA Mahan and TASHA Mahan   Provider or Entity Name:     Address   City, State, Zip   Phone:      Fax:     Reason for request: continuity of care   Information to be released:    [  ] LAST COLONOSCOPY,  including any PATH REPORT and follow-up  [  ] LAST FIT/COLOGUARD RESULT [  ] LAST DEXA  [  ] LAST MAMMOGRAM  [  ] LAST PAP  [  ] LAST LABS [  ] RETINA EXAM REPORT  [  ] IMMUNIZATION RECORDS  [  ] Release all info      [  ] Check here and initial the line next to each item to release ALL health information INCLUDING  _____ Care and treatment for drug and / or alcohol abuse  _____ HIV testing, infection status, or AIDS  _____ Genetic Testing    DATES OF SERVICE OR TIME PERIOD TO BE DISCLOSED: _____________  I understand and acknowledge that:  * This Authorization may be revoked at any time by you in writing, except if your health information has already been used or disclosed.  * Your health information that will be used or disclosed as a result of you signing this authorization could be re-disclosed by the recipient. If this occurs, your re-disclosed health information may no longer be protected by State or Federal laws.  * You may refuse to sign this Authorization. Your refusal will not affect your ability to obtain treatment.  * This Authorization becomes effective upon signing and will  on (date) __________.      If no date is indicated, this Authorization will  one (1) year from the signature date.    Name: Low Samuel    Signature:   Date:     2021        PLEASE FAX REQUESTED RECORDS BACK TO: [phone]

## 2021-09-09 NOTE — PROGRESS NOTES
CC: ER follow-up, medication refill    HISTORY OF THE PRESENT ILLNESS: Patient is a 66 y.o. female. This pleasant patient is here today for evaluation and management of the following health problems.      Acute cystitis without hematuria  Patient reports that she just completed 7-day antibiotic regimen, unclear of which antibiotic, for a urinary tract infection.  Was prescribed by ER provider at [place].  I have reviewed records, to be scanned into chart.  She was seen at [place] ER on 8/27/2021 for left lower quadrant pain, nausea, vomiting.  CT scan ruled out acute findings.  Did show diverticulosis without diverticulitis, mild hepatic steatosis, small hiatal hernia.  Urine was sent for culture.  Patient got a call a few days later saying that culture was positive and was prescribed antibiotic.  She reports that symptoms have greatly improved, but still feels very fatigued and weak.  Still having occasional nausea.  Managed with ondansetron.  Continues with omeprazole.  Consulted with gastroenterology consultants recently and has pending colonoscopy and EGD scheduled for early October.  Patient is uncertain if she needs to have Covid test done prior to colonoscopy and cannot get a hold of anyone at [facility].  We will try to get a hold of [facility] and let patient know.  Reports she is now having some constipation, taking MiraLAX and yogurt with probiotic.      Allergies: Albumin and Seasonal    Current Outpatient Medications Ordered in Epic   Medication Sig Dispense Refill   • baclofen (LIORESAL) 10 MG Tab Take 1 Tablet by mouth at bedtime. 90 Tablet 3   • omeprazole (PRILOSEC) 20 MG delayed-release capsule Take 1 Capsule by mouth every day. 30 Capsule 1   • ondansetron (ZOFRAN ODT) 4 MG TABLET DISPERSIBLE Take 1 Tablet by mouth every 6 hours as needed for Nausea. 10 Tablet 2   • Azelastine HCl 137 MCG/SPRAY Solution USE 2 SPRAY(S) IN EACH NOSTRIL TWICE DAILY     • LORazepam (ATIVAN) 1 MG Tab Take 1  "tablet by mouth at bedtime for 90 days. 30 tablet 2   • atorvastatin (LIPITOR) 40 MG Tab Take 1 Tab by mouth every day. 90 Tab 3   • citalopram (CELEXA) 40 MG Tab TAKE 1 TABLET DAILY 90 Tab 2   • fluticasone (FLONASE) 50 MCG/ACT nasal spray Spray 2 Sprays in nose every day. INHALE 2 SPRAYS IN EACH NOSTRIL 48 g 11   • Artificial Tear Solution (TEARS NATURALE OP) Place 2 Drops in both eyes 2 times a day as needed.     • montelukast (SINGULAIR) 10 MG Tab Take  by mouth.       No current Epic-ordered facility-administered medications on file.       Past Medical History:   Diagnosis Date   • Anesthesia 02-14-11    PO N/V, \"slow to come out\"   • Aortic atherosclerosis (HCC)    • Arthritis 02-14-11    spine   • Backpain 02-14-11    neck and abd. also,    • Breath shortness     with exertion   • Bronchitis 05/2018   • Cancer (Formerly [facility]) 07/18/2018    Skin - 15 years ago.   • Cancer ([facility])     April/May 2018   • Diverticulitis    • Endometriosis of uterus    • Familial hypercholesteremia    • Fusion of spine 2014   • ROBERT (generalized anxiety disorder) 6/15/2017   • H/O fall     when in hospital  with low O2 sats   • H/O: CVA (cerebrovascular accident) 8/22/2014    Complex event associated with apparent medication reaction but with MRI suggesting possible multiple small infarcts of various ages, [facility]   • Hair loss 12/6/2018   • Heart burn    • Hiatus hernia syndrome     not repaired   • High cholesterol    • HTN, goal below 130/80 10/24/2011   • Infectious disease      Hep C +   • Lung collapse 02-14-11    right lung 1/4 collpsed    • Major depressive disorder with single episode, in full remission ([facility]) 10/24/2011   • Mixed hyperlipidemia 12/27/2011   • Moderate major depression ([facility]) 10/24/2011   • MRSA exposure 8/2014    while in hospital   • Post-menopause on HRT (hormone replacement therapy) 12/27/2011   • 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    exposed as a child, told not active   • Scoliosis    • Sleep apnea 6/2015    CPAP   • " Snoring    • Unspecified vitamin D deficiency 2012       Past Surgical History:   Procedure Laterality Date   • NISSEN FUNDOPLICATION LAPAROSCOPIC  2018    Procedure: NISSEN FUNDOPLICATION LAPAROSCOPIC;  Surgeon: Kenji Garcia M.D.;  Location: SURGERY [facility];  Service: General   • NISSEN FUNDOPLICATION LAPAROSCOPIC N/A 2015    Procedure: NISSEN FUNDOPLICATION LAPAROSCOPIC;  Surgeon: Kenji Garcia M.D.;  Location: SURGERY SAME DAY [facility];  Service:    • GASTROSCOPY-ENDO  2015    Procedure: GASTROSCOPY-ENDO;  Surgeon: Kenji Garcia M.D.;  Location: ENDOSCOPY [facility];  Service:    • CARPAL TUNNEL RELEASE  2012    Performed by Holly Martin M.D. at SURGERY [facility]   • LOW ANTERIOR RESECTION LAPAROSCOPIC  3/2/2011    Performed by KENJI GARCIA at SURGERY [facility]   • CERVICAL DISK AND FUSION ANTERIOR  2010    Performed by JOSEPH DARLING at SURGERY [facility]   • TUBAL LIGATION     • TONSILLECTOMY AND ADENOIDECTOMY     • APPENDECTOMY     • GYN SURGERY      partial hysterectomy       Social History     Tobacco Use   • Smoking status: Former Smoker     Packs/day: 0.30     Years: 5.00     Pack years: 1.50     Types: Cigarettes     Quit date: 1975     Years since quittin.7   • Smokeless tobacco: Never Used   • Tobacco comment: quit    Vaping Use   • Vaping Use: Never used   Substance Use Topics   • Alcohol use: No     Alcohol/week: 0.0 oz   • Drug use: No       Family History   Problem Relation Age of Onset   • Diabetes Mother    • Cancer Mother 82        breast cancer   • Lung Disease Mother         copd   • Hyperlipidemia Mother    • Hypertension Mother    • Cancer Father         Laryngeal CA   • Heart Disease Father 50        CAD with bypasses x 3   • Heart Attack Father    • Hyperlipidemia Father    • Hypertension Father    • Diabetes Sister         type II diabetes   • Diabetes Other    • Heart Disease Other    •  "Hypertension Other    • Lung Disease Other        ROS:   As in HPI, otherwise negative for chest pain, dyspnea, abdominal pain, dysuria, blood in stool, fever           Exam: /68 (BP Location: Right arm, Patient Position: Sitting, BP Cuff Size: Adult)   Pulse 88   Temp 36.7 °C (98.1 °F) (Temporal)   Resp 16   Ht 1.499 m (4' 11\")   Wt 68.1 kg (150 lb 3.2 oz)   SpO2 95%  Body mass index is 30.34 kg/m².    General: Alert, pleasant, well nourished, well developed female in NAD  Neck: Supple without bruit. Thyroid is not enlarged.  Pulmonary: Clear to ausculation.  Normal effort. No rales, ronchi, or wheezing.  Cardiovascular: Normal rate and rhythm without murmur. Carotid and radial pulses are intact and equal bilaterally.  No lower extremity edema.  Abdomen: Soft, mild tenderness to left lower quadrant and left upper quadrant, nondistended. Normal bowel sounds. Liver and spleen are not palpable  Neurologic: Grossly nonfocal  Skin: Warm and dry.   Musculoskeletal: Normal gait.   Psych: Normal mood and affect. Alert and oriented. Judgment and insight is normal.    Please note that this dictation was created using voice recognition software. I have made every reasonable attempt to correct obvious errors, but I expect that there are errors of grammar and possibly content that I did not discover before finalizing the note.      Assessment/Plan  1. Lumbar radiculopathy, chronic  Patient requesting refill today.  - baclofen (LIORESAL) 10 MG Tab; Take 1 Tablet by mouth at bedtime.  Dispense: 90 Tablet; Refill: 3    2. Acute cystitis without hematuria  Likely that symptoms were coming from acute cystitis.  However, she will keep upcoming colonoscopy and EGD with gastroenterology.  We will call to find out if she needs Covid test prior to procedure as patient has been unable to get a hold of them.  We will get updated urinalysis next week.  Advised on Metamucil for constipation.  - URINALYSIS; Future  - URINE " CULTURE(NEW); Future    3. Gastroesophageal reflux disease, unspecified whether esophagitis present    - omeprazole (PRILOSEC) 20 MG delayed-release capsule; Take 1 Capsule by mouth every day.  Dispense: 30 Capsule; Refill: 1

## 2021-09-10 NOTE — ASSESSMENT & PLAN NOTE
Patient reports that she just completed 7-day antibiotic regimen, unclear of which antibiotic, for a urinary tract infection.  Was prescribed by ER provider at [place].  I have reviewed records, to be scanned into chart.  She was seen at [place] ER on 8/27/2021 for left lower quadrant pain, nausea, vomiting.  CT scan ruled out acute findings.  Did show diverticulosis without diverticulitis, mild hepatic steatosis, small hiatal hernia.  Urine was sent for culture.  Patient got a call a few days later saying that culture was positive and was prescribed antibiotic.  She reports that symptoms have greatly improved, but still feels very fatigued and weak.  Still having occasional nausea.  Managed with ondansetron.  Continues with omeprazole.  Consulted with gastroenterology consultants recently and has pending colonoscopy and EGD scheduled for early October.  Patient is uncertain if she needs to have Covid test done prior to colonoscopy and cannot get a hold of [facility].  We will try to get a hold of [facility] and let patient know.  Reports she is now having some constipation, taking MiraLAX and yogurt with probiotic.

## 2021-09-15 ENCOUNTER — TELEPHONE (OUTPATIENT)
Dept: MEDICAL GROUP | Facility: PHYSICIAN GROUP | Age: 66
End: 2021-09-15

## 2021-10-20 ENCOUNTER — HOSPITAL ENCOUNTER (OUTPATIENT)
Facility: MEDICAL CENTER | Age: 66
End: 2021-10-20
Attending: PHYSICIAN ASSISTANT
Payer: MEDICARE

## 2021-10-20 ENCOUNTER — OFFICE VISIT (OUTPATIENT)
Dept: URGENT CARE | Facility: PHYSICIAN GROUP | Age: 66
End: 2021-10-20
Payer: MEDICARE

## 2021-10-20 VITALS
BODY MASS INDEX: 30.24 KG/M2 | SYSTOLIC BLOOD PRESSURE: 108 MMHG | RESPIRATION RATE: 16 BRPM | HEIGHT: 59 IN | WEIGHT: 150 LBS | OXYGEN SATURATION: 95 % | HEART RATE: 109 BPM | DIASTOLIC BLOOD PRESSURE: 66 MMHG | TEMPERATURE: 99.7 F

## 2021-10-20 DIAGNOSIS — J06.9 UPPER RESPIRATORY TRACT INFECTION, UNSPECIFIED TYPE: ICD-10-CM

## 2021-10-20 DIAGNOSIS — J02.9 PHARYNGITIS, UNSPECIFIED ETIOLOGY: ICD-10-CM

## 2021-10-20 LAB
COVID ORDER STATUS COVID19: NORMAL
FLUAV+FLUBV AG SPEC QL IA: NEGATIVE
INT CON NEG: NEGATIVE
INT CON NEG: NEGATIVE
INT CON POS: POSITIVE
INT CON POS: POSITIVE
S PYO AG THROAT QL: NEGATIVE

## 2021-10-20 PROCEDURE — U0005 INFEC AGEN DETEC AMPLI PROBE: HCPCS

## 2021-10-20 PROCEDURE — 87804 INFLUENZA ASSAY W/OPTIC: CPT | Performed by: PHYSICIAN ASSISTANT

## 2021-10-20 PROCEDURE — 99213 OFFICE O/P EST LOW 20 MIN: CPT | Performed by: PHYSICIAN ASSISTANT

## 2021-10-20 PROCEDURE — U0003 INFECTIOUS AGENT DETECTION BY NUCLEIC ACID (DNA OR RNA); SEVERE ACUTE RESPIRATORY SYNDROME CORONAVIRUS 2 (SARS-COV-2) (CORONAVIRUS DISEASE [COVID-19]), AMPLIFIED PROBE TECHNIQUE, MAKING USE OF HIGH THROUGHPUT TECHNOLOGIES AS DESCRIBED BY CMS-2020-01-R: HCPCS

## 2021-10-20 PROCEDURE — 87880 STREP A ASSAY W/OPTIC: CPT | Performed by: PHYSICIAN ASSISTANT

## 2021-10-20 RX ORDER — BENZONATATE 100 MG/1
100 CAPSULE ORAL 3 TIMES DAILY PRN
Qty: 30 CAPSULE | Refills: 0 | Status: SHIPPED | OUTPATIENT
Start: 2021-10-20 | End: 2021-11-27

## 2021-10-20 RX ORDER — ALBUTEROL SULFATE 90 UG/1
2 AEROSOL, METERED RESPIRATORY (INHALATION) EVERY 6 HOURS PRN
Qty: 8.5 G | Refills: 0 | Status: SHIPPED | OUTPATIENT
Start: 2021-10-20 | End: 2021-11-27

## 2021-10-20 ASSESSMENT — ENCOUNTER SYMPTOMS
SWOLLEN GLANDS: 1
MYALGIAS: 1
SORE THROAT: 1
VOMITING: 0
FEVER: 0
NAUSEA: 0
DIARRHEA: 0
COUGH: 1
WHEEZING: 1
HEADACHES: 1
RHINORRHEA: 1
CHILLS: 0
SHORTNESS OF BREATH: 0
ABDOMINAL PAIN: 0

## 2021-10-20 ASSESSMENT — FIBROSIS 4 INDEX: FIB4 SCORE: 1.13

## 2021-10-20 NOTE — PROGRESS NOTES
Subjective     Almaz Samuel is a 66 y.o. female who presents with Cough (x 3 days with nasal congestion, HA, body aches and ST)    URI   This is a new problem. The current episode started in the past 7 days (started ~ 3 days ago). The problem has been unchanged. There has been no fever. Associated symptoms include chest pain (only when coughing), congestion, coughing, headaches, rhinorrhea, a sore throat, swollen glands and wheezing (last night, while laying down). Pertinent negatives include no abdominal pain, diarrhea, nausea, plugged ear sensation, rash or vomiting. She has tried increased fluids, decongestant and steam (Mucinex, vitamin C) for the symptoms. The treatment provided mild relief.       Review of Systems   Constitutional: Positive for malaise/fatigue. Negative for chills and fever.   HENT: Positive for congestion, rhinorrhea and sore throat.    Respiratory: Positive for cough and wheezing (last night, while laying down). Negative for shortness of breath.    Cardiovascular: Positive for chest pain (only when coughing).   Gastrointestinal: Negative for abdominal pain, diarrhea, nausea and vomiting.   Musculoskeletal: Positive for myalgias.   Skin: Negative for rash.   Neurological: Positive for headaches.         PMH:  has a past medical history of Anesthesia (02-14-11), Aortic atherosclerosis (HCC), Arthritis (02-14-11), Backpain (02-14-11), Breath shortness, Bronchitis (05/2018), Cancer (HCC) (07/18/2018), Cancer ([facility]), Diverticulitis, Endometriosis of uterus, Familial hypercholesteremia, Fusion of spine (2014), ROBERT (generalized anxiety disorder) (6/15/2017), H/O fall, H/O: CVA (cerebrovascular accident) (8/22/2014), Hair loss (12/6/2018), Heart burn, Hiatus hernia syndrome, High cholesterol, HTN, goal below 130/80 (10/24/2011), Infectious disease, Lung collapse (02-14-11), Major depressive disorder with single episode, in full remission (HCC) (10/24/2011), Mixed hyperlipidemia (12/27/2011),  "Moderate major depression (HCC) (10/24/2011), MRSA exposure (8/2014), Post-menopause on HRT (hormone replacement therapy) (12/27/2011), PPD positive (10/24/2011), Scoliosis, Sleep apnea (6/2015), Snoring, and Unspecified vitamin D deficiency (9/24/2012). She also has no past medical history of COPD or Fall.  MEDS:   Current Outpatient Medications:   •  benzonatate (TESSALON) 100 MG Cap, Take 1 Capsule by mouth 3 times a day as needed for Cough., Disp: 30 Capsule, Rfl: 0  •  albuterol 108 (90 Base) MCG/ACT Aero Soln inhalation aerosol, Inhale 2 Puffs every 6 hours as needed for Shortness of Breath., Disp: 8.5 g, Rfl: 0  •  baclofen (LIORESAL) 10 MG Tab, Take 1 Tablet by mouth at bedtime., Disp: 90 Tablet, Rfl: 3  •  omeprazole (PRILOSEC) 20 MG delayed-release capsule, Take 1 Capsule by mouth every day., Disp: 30 Capsule, Rfl: 1  •  ondansetron (ZOFRAN ODT) 4 MG TABLET DISPERSIBLE, Take 1 Tablet by mouth every 6 hours as needed for Nausea., Disp: 10 Tablet, Rfl: 2  •  montelukast (SINGULAIR) 10 MG Tab, Take  by mouth., Disp: , Rfl:   •  LORazepam (ATIVAN) 1 MG Tab, Take 1 tablet by mouth at bedtime for 90 days., Disp: 30 tablet, Rfl: 2  •  atorvastatin (LIPITOR) 40 MG Tab, Take 1 Tab by mouth every day., Disp: 90 Tab, Rfl: 3  •  citalopram (CELEXA) 40 MG Tab, TAKE 1 TABLET DAILY, Disp: 90 Tab, Rfl: 2  •  fluticasone (FLONASE) 50 MCG/ACT nasal spray, Spray 2 Sprays in nose every day. INHALE 2 SPRAYS IN EACH NOSTRIL, Disp: 48 g, Rfl: 11  •  Artificial Tear Solution (TEARS NATURALE OP), Place 2 Drops in both eyes 2 times a day as needed., Disp: , Rfl:   •  Azelastine HCl 137 MCG/SPRAY Solution, USE 2 SPRAY(S) IN EACH NOSTRIL TWICE DAILY (Patient not taking: Reported on 10/20/2021), Disp: , Rfl:   ALLERGIES:   Allergies   Allergen Reactions   • Albumin      Other reaction(s): Other (See Comments)  \"egg intolerance\"  Reaction:stomach cramps,throwing up for 12 hours,cold sweats   • Seasonal Itching     Pt states eye " "irritation, pollen      SURGHX:   Past Surgical History:   Procedure Laterality Date   • NISSEN FUNDOPLICATION LAPAROSCOPIC  7/25/2018    Procedure: NISSEN FUNDOPLICATION LAPAROSCOPIC;  Surgeon: Kenji Garcia M.D.;  Location: SURGERY [facility];  Service: General   • NISSEN FUNDOPLICATION LAPAROSCOPIC N/A 6/30/2015    Procedure: NISSEN FUNDOPLICATION LAPAROSCOPIC;  Surgeon: Kenji Garcia M.D.;  Location: SURGERY SAME DAY [facility];  Service:    • GASTROSCOPY-ENDO  6/24/2015    Procedure: GASTROSCOPY-ENDO;  Surgeon: Kenji Garcia M.D.;  Location: ENDOSCOPY [facility];  Service:    • CARPAL TUNNEL RELEASE  11/14/2012    Performed by Holly Martin M.D. at SURGERY [facility]   • LOW ANTERIOR RESECTION LAPAROSCOPIC  3/2/2011    Performed by KENJI GARCIA at SURGERY [facility]   • CERVICAL DISK AND FUSION ANTERIOR  6/22/2010    Performed by JOSEPH DARLING at SURGERY [facility]   • TUBAL LIGATION  1988   • TONSILLECTOMY AND ADENOIDECTOMY  1959   • APPENDECTOMY     • GYN SURGERY      partial hysterectomy     SOCHX:  reports that she quit smoking about 46 years ago. Her smoking use included cigarettes. She has a 1.50 pack-year smoking history. She has never used smokeless tobacco. She reports that she does not drink alcohol and does not use drugs.  FH: Family history was reviewed, no pertinent findings to report      Objective     /66   Pulse (!) 109   Temp 37.6 °C (99.7 °F) (Temporal)   Resp 16   Ht 1.499 m (4' 11\")   Wt 68 kg (150 lb)   SpO2 95%   BMI 30.30 kg/m²      Physical Exam  Constitutional:       Appearance: She is well-developed.   HENT:      Head: Normocephalic and atraumatic.      Right Ear: Tympanic membrane, ear canal and external ear normal.      Left Ear: Tympanic membrane, ear canal and external ear normal.      Nose: Congestion and rhinorrhea present. Rhinorrhea is clear.      Mouth/Throat:      Lips: Pink.      Mouth: Mucous membranes are moist.      Pharynx: " Uvula midline. Posterior oropharyngeal erythema present. No uvula swelling.   Eyes:      Conjunctiva/sclera: Conjunctivae normal.      Pupils: Pupils are equal, round, and reactive to light.   Cardiovascular:      Rate and Rhythm: Normal rate and regular rhythm.      Heart sounds: Normal heart sounds. No murmur heard.     Pulmonary:      Effort: Pulmonary effort is normal.      Breath sounds: Normal breath sounds. No decreased breath sounds, wheezing, rhonchi or rales.   Musculoskeletal:      Cervical back: Normal range of motion.   Lymphadenopathy:      Cervical: Cervical adenopathy present.   Skin:     General: Skin is warm and dry.      Capillary Refill: Capillary refill takes less than 2 seconds.   Neurological:      Mental Status: She is alert and oriented to person, place, and time.   Psychiatric:         Behavior: Behavior normal.         Judgment: Judgment normal.       POCT Rapid Strep A - Negative    POCT Influenza A/B - Negative    Assessment & Plan     1. Pharyngitis, unspecified etiology  - POCT Rapid Strep A  - POCT Influenza A/B  - COVID/SARS CoV-2 PCR; Future    2. Upper respiratory tract infection, unspecified type  - POCT Influenza A/B  - COVID/SARS CoV-2 PCR; Future  - benzonatate (TESSALON) 100 MG Cap; Take 1 Capsule by mouth 3 times a day as needed for Cough.  Dispense: 30 Capsule; Refill: 0  - albuterol 108 (90 Base) MCG/ACT Aero Soln inhalation aerosol; Inhale 2 Puffs every 6 hours as needed for Shortness of Breath.  Dispense: 8.5 g; Refill: 0  - OTC cold/flu medications  - PO fluids  - Rest  - Tylenol or ibuprofen as needed for fever > 100.4 F        *Patient had a nasal swab to test for COVID-19 virus.  Patient was advised to stay home and self isolate/self quarantine while awaiting the results.  Supportive care was reiterated.  Return/ER precautions discussed.               Differential Diagnosis, natural history, and supportive care discussed. Return to the Urgent Care or follow up with  your PCP if symptoms fail to resolve, or for any new or worsening symptoms. Emergency room precautions discussed. Patient and/or family appears understanding of information.

## 2021-10-21 LAB
SARS-COV-2 RNA RESP QL NAA+PROBE: NOTDETECTED
SPECIMEN SOURCE: NORMAL

## 2021-11-17 ENCOUNTER — OFFICE VISIT (OUTPATIENT)
Dept: MEDICAL GROUP | Facility: PHYSICIAN GROUP | Age: 66
End: 2021-11-17
Payer: MEDICARE

## 2021-11-17 ENCOUNTER — HOSPITAL ENCOUNTER (OUTPATIENT)
Facility: MEDICAL CENTER | Age: 66
End: 2021-11-17
Attending: NURSE PRACTITIONER
Payer: MEDICARE

## 2021-11-17 VITALS
HEIGHT: 59 IN | TEMPERATURE: 98.8 F | WEIGHT: 148.8 LBS | RESPIRATION RATE: 16 BRPM | OXYGEN SATURATION: 96 % | HEART RATE: 85 BPM | BODY MASS INDEX: 30 KG/M2 | DIASTOLIC BLOOD PRESSURE: 72 MMHG | SYSTOLIC BLOOD PRESSURE: 118 MMHG

## 2021-11-17 DIAGNOSIS — F41.1 GENERALIZED ANXIETY DISORDER: ICD-10-CM

## 2021-11-17 DIAGNOSIS — N89.8 VAGINAL DISCHARGE: ICD-10-CM

## 2021-11-17 DIAGNOSIS — G47.00 INSOMNIA, PERSISTENT: ICD-10-CM

## 2021-11-17 DIAGNOSIS — R30.0 DYSURIA: ICD-10-CM

## 2021-11-17 DIAGNOSIS — R10.32 LEFT LOWER QUADRANT PAIN: ICD-10-CM

## 2021-11-17 DIAGNOSIS — F32.5 MAJOR DEPRESSIVE DISORDER WITH SINGLE EPISODE, IN FULL REMISSION (HCC): ICD-10-CM

## 2021-11-17 DIAGNOSIS — Z79.899 CHRONIC USE OF BENZODIAZEPINE FOR THERAPEUTIC PURPOSE: ICD-10-CM

## 2021-11-17 DIAGNOSIS — Z23 NEED FOR INFLUENZA VACCINATION: ICD-10-CM

## 2021-11-17 LAB
APPEARANCE UR: CLEAR
BACTERIA #/AREA URNS HPF: NEGATIVE /HPF
BILIRUB UR QL STRIP.AUTO: NEGATIVE
COLOR UR: YELLOW
EPI CELLS #/AREA URNS HPF: NEGATIVE /HPF
GLUCOSE UR STRIP.AUTO-MCNC: NEGATIVE MG/DL
HYALINE CASTS #/AREA URNS LPF: ABNORMAL /LPF
KETONES UR STRIP.AUTO-MCNC: NEGATIVE MG/DL
LEUKOCYTE ESTERASE UR QL STRIP.AUTO: ABNORMAL
MICRO URNS: ABNORMAL
NITRITE UR QL STRIP.AUTO: NEGATIVE
PH UR STRIP.AUTO: 6.5 [PH] (ref 5–8)
PROT UR QL STRIP: NEGATIVE MG/DL
RBC # URNS HPF: ABNORMAL /HPF
RBC UR QL AUTO: NEGATIVE
SP GR UR STRIP.AUTO: 1
UROBILINOGEN UR STRIP.AUTO-MCNC: 0.2 MG/DL
WBC #/AREA URNS HPF: ABNORMAL /HPF

## 2021-11-17 PROCEDURE — 81001 URINALYSIS AUTO W/SCOPE: CPT | Mod: XU

## 2021-11-17 PROCEDURE — 99214 OFFICE O/P EST MOD 30 MIN: CPT | Mod: 25 | Performed by: NURSE PRACTITIONER

## 2021-11-17 PROCEDURE — 87077 CULTURE AEROBIC IDENTIFY: CPT

## 2021-11-17 PROCEDURE — 87510 GARDNER VAG DNA DIR PROBE: CPT

## 2021-11-17 PROCEDURE — G0008 ADMIN INFLUENZA VIRUS VAC: HCPCS | Performed by: NURSE PRACTITIONER

## 2021-11-17 PROCEDURE — 87086 URINE CULTURE/COLONY COUNT: CPT

## 2021-11-17 PROCEDURE — 87480 CANDIDA DNA DIR PROBE: CPT

## 2021-11-17 PROCEDURE — 90662 IIV NO PRSV INCREASED AG IM: CPT | Performed by: NURSE PRACTITIONER

## 2021-11-17 PROCEDURE — 87660 TRICHOMONAS VAGIN DIR PROBE: CPT

## 2021-11-17 PROCEDURE — 80307 DRUG TEST PRSMV CHEM ANLYZR: CPT

## 2021-11-17 RX ORDER — HYDROXYZINE HYDROCHLORIDE 25 MG/1
12.5-25 TABLET, FILM COATED ORAL 3 TIMES DAILY PRN
Qty: 30 TABLET | Refills: 1 | Status: SHIPPED | OUTPATIENT
Start: 2021-11-17 | End: 2022-04-27

## 2021-11-17 RX ORDER — CITALOPRAM HYDROBROMIDE 10 MG/1
TABLET ORAL
Qty: 30 TABLET | Refills: 2 | Status: SHIPPED | OUTPATIENT
Start: 2021-11-17 | End: 2022-03-09

## 2021-11-17 RX ORDER — LORAZEPAM 1 MG/1
1 TABLET ORAL
Qty: 30 TABLET | Refills: 2 | Status: SHIPPED | OUTPATIENT
Start: 2021-12-05 | End: 2022-02-22 | Stop reason: SDUPTHER

## 2021-11-17 ASSESSMENT — FIBROSIS 4 INDEX: FIB4 SCORE: 1.13

## 2021-11-17 ASSESSMENT — PATIENT HEALTH QUESTIONNAIRE - PHQ9
5. POOR APPETITE OR OVEREATING: 1 - SEVERAL DAYS
SUM OF ALL RESPONSES TO PHQ QUESTIONS 1-9: 11
CLINICAL INTERPRETATION OF PHQ2 SCORE: 3

## 2021-11-17 NOTE — PATIENT INSTRUCTIONS
Start citalopram once a day routinely.    Take 1/2 or 1 tab of hydroxyzine up to 3 times a day if needed for anxiety episodes

## 2021-11-17 NOTE — ASSESSMENT & PLAN NOTE
Consulted with GI, colonoscopy. Upcoming follow up appt next month.  Working on high-fiber diet.  Stressful last 2 weeks with daughter, so not eating right.

## 2021-11-17 NOTE — PROGRESS NOTES
"CC: Medication refill, dysuria and vaginal discharge, anxiety    HISTORY OF THE PRESENT ILLNESS: Patient is a 66 y.o. female. This pleasant patient is here today for evaluation and management of the following health problems.    Health Maintenance:due      Left lower quadrant pain  Consulted with GI, colonoscopy. Upcoming follow up appt next month.  Working on high-fiber diet.  Stressful last 2 weeks with daughter, so not eating right.      Dysuria  Patient reports 2 to 3-day onset of dysuria and odorous yellow vaginal discharge.  Denies itching, flank pain, abdominal pain, fever, hematuria.    Insomnia, persistent  Continues with lorazepam 1 mg at bedtime for sleep and help with control of anxiety at night.  She is getting 7 hours of sleep at night.  Does not feel groggy the next day.  Does not drink alcohol.  Requesting refill today.    Major depressive disorder with single episode, in full remission (HCC)  Please see additional notes under general anxiety disorder.    ROBERT (generalized anxiety disorder)  Patient reports today that she has not taken her Celexa in almost a year, though at last appointment she confirmed that she was still taking it.  She says she was mistaken at last appointment.  She is unsure of why she discontinued it.  She has noticed her anxiety has very much increased in the last few weeks.  She is having a lot of stressors at home and cannot seem to \"let it go.\"  She is focusing on small things that really should not matter.  Then it turns into a mild anxiety attack.  Denies SI/HI.      Allergies: Albumin and Seasonal    Current Outpatient Medications Ordered in Epic   Medication Sig Dispense Refill   • [START ON 12/5/2021] LORazepam (ATIVAN) 1 MG Tab Take 1 Tablet by mouth at bedtime for 90 days. 30 Tablet 2   • citalopram (CELEXA) 10 MG tablet TAKE 1 TABLET DAILY 30 Tablet 2   • hydrOXYzine HCl (ATARAX) 25 MG Tab Take 0.5-1 Tablets by mouth 3 times a day as needed for Anxiety. 30 Tablet 1 " "  • benzonatate (TESSALON) 100 MG Cap Take 1 Capsule by mouth 3 times a day as needed for Cough. 30 Capsule 0   • albuterol 108 (90 Base) MCG/ACT Aero Soln inhalation aerosol Inhale 2 Puffs every 6 hours as needed for Shortness of Breath. 8.5 g 0   • baclofen (LIORESAL) 10 MG Tab Take 1 Tablet by mouth at bedtime. 90 Tablet 3   • omeprazole (PRILOSEC) 20 MG delayed-release capsule Take 1 Capsule by mouth every day. 30 Capsule 1   • ondansetron (ZOFRAN ODT) 4 MG TABLET DISPERSIBLE Take 1 Tablet by mouth every 6 hours as needed for Nausea. 10 Tablet 2   • atorvastatin (LIPITOR) 40 MG Tab Take 1 Tab by mouth every day. 90 Tab 3   • fluticasone (FLONASE) 50 MCG/ACT nasal spray Spray 2 Sprays in nose every day. INHALE 2 SPRAYS IN EACH NOSTRIL 48 g 11   • Artificial Tear Solution (TEARS NATURALE OP) Place 2 Drops in both eyes 2 times a day as needed.     • Azelastine HCl 137 MCG/SPRAY Solution USE 2 SPRAY(S) IN EACH NOSTRIL TWICE DAILY (Patient not taking: Reported on 10/20/2021)       No current [facility]-ordered facility-administered medications on file.       Past Medical History:   Diagnosis Date   • Anesthesia 02-14-11    PO N/V, \"slow to come out\"   • Aortic atherosclerosis (HCC)    • Arthritis 02-14-11    spine   • Backpain 02-14-11    neck and abd. also,    • Breath shortness     with exertion   • Bronchitis 05/2018   • Cancer (HCC) 07/18/2018    Skin - 15 years ago.   • Cancer (HCC)     April/May 2018   • Diverticulitis    • Endometriosis of uterus    • Familial hypercholesteremia    • Fusion of spine 2014   • ROBERT (generalized anxiety disorder) 6/15/2017   • H/O fall     when in hospital  with low O2 sats   • H/O: CVA (cerebrovascular accident) 8/22/2014    Complex event associated with apparent medication reaction but with MRI suggesting possible multiple small infarcts of various ages, [email or web address]   • Hair loss 12/6/2018   • Heart burn    • Hiatus hernia syndrome     not repaired   • High cholesterol    • HTN, goal below " 130/80 10/24/2011   • Infectious disease      Hep C +   • Lung collapse 11    right lung  collpsed    • Major depressive disorder with single episode, in full remission (HCC) 10/24/2011   • Mixed hyperlipidemia 2011   • Moderate major depression (HCC) 10/24/2011   • MRSA exposure 2014    while in hospital   • Post-menopause on HRT (hormone replacement therapy) 2011   • 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    exposed as a child, told not active   • Scoliosis    • Sleep apnea 2015    CPAP   • Snoring    • Unspecified vitamin D deficiency 2012       Past Surgical History:   Procedure Laterality Date   • NISSEN FUNDOPLICATION LAPAROSCOPIC  2018    Procedure: NISSEN FUNDOPLICATION LAPAROSCOPIC;  Surgeon: Kenji Garcia M.D.;  Location: SURGERY [facility];  Service: General   • NISSEN FUNDOPLICATION LAPAROSCOPIC N/A 2015    Procedure: NISSEN FUNDOPLICATION LAPAROSCOPIC;  Surgeon: Kenji Garcia M.D.;  Location: SURGERY SAME DAY [facility];  Service:    • GASTROSCOPY-ENDO  2015    Procedure: GASTROSCOPY-ENDO;  Surgeon: Kenji Garcia M.D.;  Location: ENDOSCOPY [facility];  Service:    • CARPAL TUNNEL RELEASE  2012    Performed by Holly Martin M.D. at SURGERY [facility]   • LOW ANTERIOR RESECTION LAPAROSCOPIC  3/2/2011    Performed by KENJI GARCIA at SURGERY [facility]   • CERVICAL DISK AND FUSION ANTERIOR  2010    Performed by JOSEPH DARLING at SURGERY [facility]   • TUBAL LIGATION     • TONSILLECTOMY AND ADENOIDECTOMY     • APPENDECTOMY     • GYN SURGERY      partial hysterectomy       Social History     Tobacco Use   • Smoking status: Former Smoker     Packs/day: 0.30     Years: 5.00     Pack years: 1.50     Types: Cigarettes     Quit date: 1975     Years since quittin.9   • Smokeless tobacco: Never Used   • Tobacco comment: quit    Vaping Use   • Vaping Use: Never used   Substance Use Topics   • Alcohol use: No      "Alcohol/week: 0.0 oz   • Drug use: No       Family History   Problem Relation Age of Onset   • Diabetes Mother    • Cancer Mother 82        breast cancer   • Lung Disease Mother         copd   • Hyperlipidemia Mother    • Hypertension Mother    • Cancer Father         Laryngeal CA   • Heart Disease Father 50        CAD with bypasses x 3   • Heart Attack Father    • Hyperlipidemia Father    • Hypertension Father    • Diabetes Sister         type II diabetes   • Diabetes Other    • Heart Disease Other    • Hypertension Other    • Lung Disease Other        ROS: As in HPI, otherwise negative for chest pain, dyspnea, abdominal pain, dysuria, blood in stool, fever           Exam: /72 (BP Location: Left arm, Patient Position: Sitting, BP Cuff Size: Adult)   Pulse 85   Temp 37.1 °C (98.8 °F) (Temporal)   Resp 16   Ht 1.499 m (4' 11\")   Wt 67.5 kg (148 lb 12.8 oz)   SpO2 96%  Body mass index is 30.05 kg/m².    General: Alert, pleasant, well nourished, well developed female in NAD  Neck: Supple without bruit. Thyroid is not enlarged.  Pulmonary: Clear to ausculation.  Normal effort. No rales, ronchi, or wheezing.  Cardiovascular: Normal rate and rhythm without murmur.   Abdomen: Soft, nontender, nondistended. Normal bowel sounds. Liver and spleen are not palpable  Neurologic: Grossly nonfocal  Lymph: No cervical or supraclavicular lymph nodes are palpable  Skin: Warm and dry.   Musculoskeletal: Normal gait.   Psych: Normal mood and affect. Alert and oriented. Judgment and insight is normal.    Please note that this dictation was created using voice recognition software. I have made every reasonable attempt to correct obvious errors, but I expect that there are errors of grammar and possibly content that I did not discover before finalizing the note.      Assessment/Plan  1. Insomnia, persistent  Well-controlled.  Continue with lorazepam.  Continue with good sleep hygiene.  Reviewed risks of benzodiazepines " including addiction and respiratory depression.  - LORazepam (ATIVAN) 1 MG Tab; Take 1 Tablet by mouth at bedtime for 90 days.  Dispense: 30 Tablet; Refill: 2    2. Chronic use of benzodiazepine for therapeutic purpose  This patient is continuing to use a controlled substance (CS) on a long term basis.  The patient is thoroughly aware of the goals of treatment with the CS  The patient is aware that yearly and random urine drug screens are required.  The patient has been instructed to take the CS only as prescribed.  The patient is prohibited from sharing the CS with any other person.  The patient is instructed to inform the provider if any other CS is taken, of any alcohol or cannabis or other recreational drug use, any treatment for side effects of the CS or complications, if they have CS active rx in other states  The patient has evidence for a reason for the CS  The treatment plan has been discussed with the patient  The  report has been reviewed      - Pain Management Screen; Future    3. Left lower quadrant pain  Continue follow-up with gastroenterology.  Continue with omeprazole and high-fiber diet.    4. Need for influenza vaccination  Given  - INFLUENZA VACCINE, HIGH DOSE (65+ ONLY)    5. Dysuria  We will get urinalysis and culture in addition to vaginal pathogens.  Will wait for results before treating symptoms.  - URINE CULTURE(NEW); Future  - URINALYSIS; Future    6. Vaginal discharge  As in #5  - VAGINAL PATHOGENS DNA PANEL; Future    7. ROBERT (generalized anxiety disorder)  We will restart citalopram at low dose as patient has not taken it in almost a year.  She understands it will take 4 weeks to reach full therapeutic effect.  We will add hydroxyzine to take as needed during the day for anxiety attacks.  Reviewed instructions and side effects including sleepiness.  Patient understands not to drive if it makes her sleepy.  - citalopram (CELEXA) 10 MG tablet; TAKE 1 TABLET DAILY  Dispense: 30 Tablet;  Refill: 2  - hydrOXYzine HCl (ATARAX) 25 MG Tab; Take 0.5-1 Tablets by mouth 3 times a day as needed for Anxiety.  Dispense: 30 Tablet; Refill: 1    8. Major depressive disorder with single episode, in full remission (HCC)  As in #7    Patient will return to clinic in 3 months or sooner if needed.

## 2021-11-18 ENCOUNTER — TELEPHONE (OUTPATIENT)
Dept: MEDICAL GROUP | Facility: PHYSICIAN GROUP | Age: 66
End: 2021-11-18

## 2021-11-18 DIAGNOSIS — B96.89 BACTERIAL VAGINITIS: ICD-10-CM

## 2021-11-18 DIAGNOSIS — N76.0 BACTERIAL VAGINITIS: ICD-10-CM

## 2021-11-18 PROBLEM — H60.502 ACUTE OTITIS EXTERNA OF LEFT EAR: Status: RESOLVED | Noted: 2021-07-17 | Resolved: 2021-11-18

## 2021-11-18 PROBLEM — N30.00 ACUTE CYSTITIS WITHOUT HEMATURIA: Status: RESOLVED | Noted: 2017-12-22 | Resolved: 2021-11-18

## 2021-11-18 PROBLEM — R30.0 DYSURIA: Status: ACTIVE | Noted: 2021-11-18

## 2021-11-18 LAB
CANDIDA DNA VAG QL PROBE+SIG AMP: NEGATIVE
G VAGINALIS DNA VAG QL PROBE+SIG AMP: POSITIVE
T VAGINALIS DNA VAG QL PROBE+SIG AMP: NEGATIVE

## 2021-11-18 RX ORDER — METRONIDAZOLE 500 MG/1
500 TABLET ORAL 2 TIMES DAILY
Qty: 14 TABLET | Refills: 0 | Status: SHIPPED | OUTPATIENT
Start: 2021-11-18 | End: 2021-11-26

## 2021-11-18 NOTE — ASSESSMENT & PLAN NOTE
Patient reports 2 to 3-day onset of dysuria and odorous yellow vaginal discharge.  Denies itching, flank pain, abdominal pain, fever, hematuria.

## 2021-11-18 NOTE — ASSESSMENT & PLAN NOTE
Continues with lorazepam 1 mg at bedtime for sleep and help with control of anxiety at night.  She is getting 7 hours of sleep at night.  Does not feel groggy the next day.  Does not drink alcohol.  Requesting refill today.

## 2021-11-18 NOTE — ASSESSMENT & PLAN NOTE
"Patient reports today that she has not taken her Celexa in almost a year, though at last appointment she confirmed that she was still taking it.  She says she was mistaken at last appointment.  She is unsure of why she discontinued it.  She has noticed her anxiety has very much increased in the last few weeks.  She is having a lot of stressors at home and cannot seem to \"let it go.\"  She is focusing on small things that really should not matter.  Then it turns into a mild anxiety attack.  Denies SI/HI.  "

## 2021-11-20 LAB
BACTERIA UR CULT: NORMAL
SIGNIFICANT IND 70042: NORMAL
SITE SITE: NORMAL
SOURCE SOURCE: NORMAL

## 2021-11-22 LAB
1OH-MIDAZOLAM UR QL SCN: NOT DETECTED
6MAM UR QL: NOT DETECTED
7AMINOCLONAZEPAM UR QL: NOT DETECTED
A-OH ALPRAZ UR QL: NOT DETECTED
ALPRAZ UR QL: NOT DETECTED
AMPHET UR QL SCN: NOT DETECTED
ANNOTATION COMMENT IMP: NORMAL
ANNOTATION COMMENT IMP: NORMAL
BARBITURATES UR QL: NOT DETECTED
BUPRENORPHINE UR QL: NOT DETECTED
BZE UR QL: NOT DETECTED
CARBOXYTHC UR QL: NOT DETECTED
CARISOPRODOL UR QL: NOT DETECTED
CLONAZEPAM UR QL: NOT DETECTED
CODEINE UR QL: NOT DETECTED
DIAZEPAM UR QL: NOT DETECTED
ETHYL GLUCURONIDE UR QL: NOT DETECTED
FENTANYL UR QL: NOT DETECTED
GABAPENTIN UR QL: NOT DETECTED
HYDROCODONE UR QL: NOT DETECTED
HYDROMORPHONE UR QL: NOT DETECTED
LORAZEPAM UR QL: PRESENT
MDA UR QL: NOT DETECTED
MDEA UR QL: NOT DETECTED
MDMA UR QL: NOT DETECTED
MEPERIDINE UR QL: NOT DETECTED
METHADONE UR QL: NOT DETECTED
METHAMPHET UR QL: NOT DETECTED
MIDAZOLAM UR QL SCN: NOT DETECTED
MORPHINE UR QL: NOT DETECTED
NALOXONE UR QL SCN: NOT DETECTED
NORBUPRENORPHINE UR QL CFM: NOT DETECTED
NORDIAZEPAM UR QL: NOT DETECTED
NORFENTANYL UR QL: NOT DETECTED
NORHYDROCODONE UR QL CFM: NOT DETECTED
NOROXYCODONE UR QL CFM: NOT DETECTED
NOROXYMORPH CO100 Q0458: NOT DETECTED
OXAZEPAM UR QL: NOT DETECTED
OXYCODONE UR QL: NOT DETECTED
OXYMORPHONE UR QL: NOT DETECTED
PATHOLOGY STUDY: NORMAL
PCP UR QL: NOT DETECTED
PHENTERMINE UR QL: NOT DETECTED
PPAA UR QL: NOT DETECTED
PREGABALIN UR QL SCN: NOT DETECTED
SERVICE CMNT-IMP: NORMAL
TAPENADOL OSULF CO200 Q0473: NOT DETECTED
TAPENTADOL UR QL SCN: NOT DETECTED
TEMAZEPAM UR QL: NOT DETECTED
TRAMADOL UR QL: NOT DETECTED
ZOLPIDEM PHENYL-4-CARB UR QL SCN: NOT DETECTED
ZOLPIDEM UR QL: NOT DETECTED

## 2021-11-26 ENCOUNTER — HOSPITAL ENCOUNTER (OUTPATIENT)
Facility: MEDICAL CENTER | Age: 66
End: 2021-11-26
Attending: NURSE PRACTITIONER
Payer: MEDICARE

## 2021-11-26 ENCOUNTER — OFFICE VISIT (OUTPATIENT)
Dept: URGENT CARE | Facility: PHYSICIAN GROUP | Age: 66
End: 2021-11-26
Payer: MEDICARE

## 2021-11-26 VITALS
DIASTOLIC BLOOD PRESSURE: 70 MMHG | HEART RATE: 74 BPM | WEIGHT: 150 LBS | HEIGHT: 59 IN | SYSTOLIC BLOOD PRESSURE: 122 MMHG | RESPIRATION RATE: 16 BRPM | BODY MASS INDEX: 30.24 KG/M2 | OXYGEN SATURATION: 94 % | TEMPERATURE: 99.3 F

## 2021-11-26 DIAGNOSIS — N30.01 ACUTE CYSTITIS WITH HEMATURIA: ICD-10-CM

## 2021-11-26 DIAGNOSIS — Z20.822 EXPOSURE TO COVID-19 VIRUS: ICD-10-CM

## 2021-11-26 DIAGNOSIS — N76.0 ACUTE VAGINITIS: ICD-10-CM

## 2021-11-26 LAB
APPEARANCE UR: CLEAR
BILIRUB UR STRIP-MCNC: NEGATIVE MG/DL
CANDIDA DNA VAG QL PROBE+SIG AMP: NEGATIVE
COLOR UR AUTO: YELLOW
COVID ORDER STATUS COVID19: NORMAL
G VAGINALIS DNA VAG QL PROBE+SIG AMP: NEGATIVE
GLUCOSE UR STRIP.AUTO-MCNC: NEGATIVE MG/DL
KETONES UR STRIP.AUTO-MCNC: NEGATIVE MG/DL
LEUKOCYTE ESTERASE UR QL STRIP.AUTO: NORMAL
NITRITE UR QL STRIP.AUTO: NEGATIVE
PH UR STRIP.AUTO: 7 [PH] (ref 5–8)
PROT UR QL STRIP: NEGATIVE MG/DL
RBC UR QL AUTO: NORMAL
SP GR UR STRIP.AUTO: 1.01
T VAGINALIS DNA VAG QL PROBE+SIG AMP: NEGATIVE
UROBILINOGEN UR STRIP-MCNC: 0.2 MG/DL

## 2021-11-26 PROCEDURE — 81002 URINALYSIS NONAUTO W/O SCOPE: CPT | Performed by: NURSE PRACTITIONER

## 2021-11-26 PROCEDURE — 87077 CULTURE AEROBIC IDENTIFY: CPT

## 2021-11-26 PROCEDURE — 87660 TRICHOMONAS VAGIN DIR PROBE: CPT

## 2021-11-26 PROCEDURE — U0005 INFEC AGEN DETEC AMPLI PROBE: HCPCS

## 2021-11-26 PROCEDURE — 99214 OFFICE O/P EST MOD 30 MIN: CPT | Mod: CS | Performed by: NURSE PRACTITIONER

## 2021-11-26 PROCEDURE — 87086 URINE CULTURE/COLONY COUNT: CPT

## 2021-11-26 PROCEDURE — 87510 GARDNER VAG DNA DIR PROBE: CPT

## 2021-11-26 PROCEDURE — 87480 CANDIDA DNA DIR PROBE: CPT

## 2021-11-26 PROCEDURE — U0003 INFECTIOUS AGENT DETECTION BY NUCLEIC ACID (DNA OR RNA); SEVERE ACUTE RESPIRATORY SYNDROME CORONAVIRUS 2 (SARS-COV-2) (CORONAVIRUS DISEASE [COVID-19]), AMPLIFIED PROBE TECHNIQUE, MAKING USE OF HIGH THROUGHPUT TECHNOLOGIES AS DESCRIBED BY CMS-2020-01-R: HCPCS

## 2021-11-26 PROCEDURE — 87186 SC STD MICRODIL/AGAR DIL: CPT

## 2021-11-26 ASSESSMENT — FIBROSIS 4 INDEX: FIB4 SCORE: 1.13

## 2021-11-27 ENCOUNTER — TELEPHONE (OUTPATIENT)
Dept: URGENT CARE | Facility: PHYSICIAN GROUP | Age: 66
End: 2021-11-27

## 2021-11-27 LAB
SARS-COV-2 RNA RESP QL NAA+PROBE: NOTDETECTED
SPECIMEN SOURCE: NORMAL

## 2021-11-27 RX ORDER — CEFDINIR 300 MG/1
300 CAPSULE ORAL 2 TIMES DAILY
Qty: 10 CAPSULE | Refills: 0 | Status: SHIPPED | OUTPATIENT
Start: 2021-11-27 | End: 2021-12-02

## 2021-11-27 ASSESSMENT — ENCOUNTER SYMPTOMS
EYE PAIN: 0
SHORTNESS OF BREATH: 0
MYALGIAS: 0
DIZZINESS: 0
FATIGUE: 0
FEVER: 0
ABDOMINAL PAIN: 0
FLANK PAIN: 0
VOMITING: 0
CHANGE IN BOWEL HABIT: 0
NAUSEA: 0
CHILLS: 0
SORE THROAT: 0

## 2021-11-27 NOTE — PROGRESS NOTES
Subjective:   Almaz Samuel is a 66 y.o. female who presents for UTI (pt states she ended RX on wed. PCP wanted a recheck today. )      UTI  This is a new problem. The current episode started in the past 7 days. The problem occurs constantly. The problem has been gradually worsening. Associated symptoms include urinary symptoms. Pertinent negatives include no abdominal pain, change in bowel habit, chest pain, chills, fatigue, fever, myalgias, nausea, rash, sore throat or vomiting. Nothing aggravates the symptoms. Treatments tried: flagyl. The treatment provided no relief.       Review of Systems   Constitutional: Negative for chills, fatigue and fever.   HENT: Negative for sore throat.    Eyes: Negative for pain.   Respiratory: Negative for shortness of breath.    Cardiovascular: Negative for chest pain.   Gastrointestinal: Negative for abdominal pain, change in bowel habit, nausea and vomiting.   Genitourinary: Positive for dysuria and urgency. Negative for flank pain, frequency and hematuria.   Musculoskeletal: Negative for myalgias.   Skin: Negative for rash.   Neurological: Negative for dizziness.       Medications:    • albuterol Aers  • atorvastatin Tabs  • Azelastine HCl Soln  • baclofen Tabs  • benzonatate Caps  • citalopram  • fluticasone  • hydrOXYzine HCl Tabs  • LORazepam Tabs  • omeprazole  • ondansetron Tbdp  • TEARS NATURALE OP    Allergies: Albumin and Seasonal    Problem List: Almaz Samuel does not have any pertinent problems on file.    Surgical History:  Past Surgical History:   Procedure Laterality Date   • NISSEN FUNDOPLICATION LAPAROSCOPIC  7/25/2018    Procedure: NISSEN FUNDOPLICATION LAPAROSCOPIC;  Surgeon: Kenji Okeefe M.D.;  Location: SURGERY [facility];  Service: General   • NISSEN FUNDOPLICATION LAPAROSCOPIC N/A 6/30/2015    Procedure: NISSEN FUNDOPLICATION LAPAROSCOPIC;  Surgeon: Kenji Okeefe M.D.;  Location: SURGERY SAME DAY [facility];  Service:    • GASTROSCOPY-ENDO   "6/24/2015    Procedure: GASTROSCOPY-ENDO;  Surgeon: Kenji Okeefe M.D.;  Location: ENDOSCOPY [facility];  Service:    • CARPAL TUNNEL RELEASE  11/14/2012    Performed by Holly Martin M.D. at SURGERY [facility]   • LOW ANTERIOR RESECTION LAPAROSCOPIC  3/2/2011    Performed by KENJI OKEEFE at SURGERY [facility]   • CERVICAL DISK AND FUSION ANTERIOR  6/22/2010    Performed by JOSEPH DARLING at SURGERY [facility]   • TUBAL LIGATION  1988   • TONSILLECTOMY AND ADENOIDECTOMY  1959   • APPENDECTOMY     • GYN SURGERY      partial hysterectomy       Past Social Hx: Almaz Samuel  reports that she quit smoking about 46 years ago. Her smoking use included cigarettes. She has a 1.50 pack-year smoking history. She has never used smokeless tobacco. She reports that she does not drink alcohol and does not use drugs.     Past Family Hx:  Almaz Samuel family history includes Cancer in her father; Cancer (age of onset: 82) in her mother; Diabetes in her mother, sister, and another family member; Heart Attack in her father; Heart Disease in an other family member; Heart Disease (age of onset: 50) in her father; Hyperlipidemia in her father and mother; Hypertension in her father, mother, and another family member; Lung Disease in her mother and another family member.     Problem list, medications, and allergies reviewed by myself today in Epic.     Objective:     /70   Pulse 74   Temp 37.4 °C (99.3 °F) (Temporal)   Resp 16   Ht 1.499 m (4' 11\")   Wt 68 kg (150 lb)   SpO2 94%   BMI 30.30 kg/m²     Physical Exam  Constitutional:       Appearance: Normal appearance. She is not ill-appearing or toxic-appearing.   HENT:      Head: Normocephalic.      Right Ear: External ear normal.      Left Ear: External ear normal.      Nose: Nose normal.      Mouth/Throat:      Lips: Pink.      Mouth: Mucous membranes are moist.   Eyes:      General: Lids are normal.         Right eye: No discharge.         " Left eye: No discharge.   Pulmonary:      Effort: Pulmonary effort is normal. No accessory muscle usage or respiratory distress.   Abdominal:      General: Bowel sounds are normal.      Tenderness: There is no abdominal tenderness. There is no right CVA tenderness or left CVA tenderness.   Genitourinary:     Comments: Deferred exam   Musculoskeletal:         General: Normal range of motion.      Cervical back: Full passive range of motion without pain.   Skin:     Coloration: Skin is not pale.   Neurological:      Mental Status: She is alert and oriented to person, place, and time.   Psychiatric:         Mood and Affect: Mood normal.         Thought Content: Thought content normal.         Assessment/Plan:     Diagnosis and associated orders:     1. Acute vaginitis  VAGINAL PATHOGENS DNA PANEL    POCT Urinalysis    URINE CULTURE(NEW)   2. Exposure to COVID-19 virus  SARS-CoV-2 PCR (24 hour In-House): Collect NP swab in VTM   3. Acute cystitis with hematuria  cefdinir (OMNICEF) 300 MG Cap        Comments/MDM:   Results for orders placed or performed in visit on 11/26/21   POCT Urinalysis   Result Value Ref Range    POC Color yellow Negative    POC Appearance clear Negative    POC Leukocyte Esterase small Negative    POC Nitrites negative Negative    POC Urobiligen 0.2 Negative (0.2) mg/dL    POC Protein negative Negative mg/dL    POC Urine PH 7.0 5.0 - 8.0    POC Blood trace Negative    POC Specific Gravity 1.015 <1.005 - >1.030    POC Ketones negative Negative mg/dL    POC Bilirubin negative Negative mg/dL    POC Glucose negative Negative mg/dL     I personally reviewed prior external notes and prior test results pertinent to today's visit.   Discussed management options, risks and benefits, and alternatives to treatment plan agreed upon.   Red flags discussed and indications to immediately call 911 or present to the Emergency Department.   Supportive care, differential diagnoses, and indications for immediate  follow-up discussed with patient.    Patient expresses understanding and agrees to plan. Patient denies any other questions or concerns.                      Please note that this dictation was created using voice recognition software. I have made a reasonable attempt to correct obvious errors, but I expect that there are errors of grammar and possibly content that I did not discover before finalizing the note.    This note was electronically signed by Jeyson VILLALPANDO.

## 2021-11-28 LAB
BACTERIA UR CULT: ABNORMAL
BACTERIA UR CULT: ABNORMAL
SIGNIFICANT IND 70042: ABNORMAL
SITE SITE: ABNORMAL
SOURCE SOURCE: ABNORMAL

## 2021-11-29 ENCOUNTER — PATIENT MESSAGE (OUTPATIENT)
Dept: MEDICAL GROUP | Facility: PHYSICIAN GROUP | Age: 66
End: 2021-11-29

## 2021-11-30 ENCOUNTER — HOSPITAL ENCOUNTER (OUTPATIENT)
Dept: LAB | Facility: MEDICAL CENTER | Age: 66
End: 2021-11-30
Attending: INTERNAL MEDICINE
Payer: MEDICARE

## 2021-11-30 DIAGNOSIS — E78.01 FAMILIAL HYPERCHOLESTEREMIA: Chronic | ICD-10-CM

## 2021-11-30 DIAGNOSIS — E78.5 DYSLIPIDEMIA: ICD-10-CM

## 2021-11-30 LAB
CHOLEST SERPL-MCNC: 293 MG/DL (ref 100–199)
FASTING STATUS PATIENT QL REPORTED: NORMAL
HDLC SERPL-MCNC: 64 MG/DL
LDLC SERPL CALC-MCNC: 198 MG/DL
TRIGL SERPL-MCNC: 155 MG/DL (ref 0–149)

## 2021-11-30 PROCEDURE — 80061 LIPID PANEL: CPT

## 2021-11-30 PROCEDURE — 36415 COLL VENOUS BLD VENIPUNCTURE: CPT

## 2021-12-02 ENCOUNTER — TELEPHONE (OUTPATIENT)
Dept: CARDIOLOGY | Facility: MEDICAL CENTER | Age: 66
End: 2021-12-02

## 2021-12-02 NOTE — TELEPHONE ENCOUNTER
----- Message from Roseline Ware R.N. sent at 12/1/2021  8:34 AM PST -----  Please advise on what to say to ptKortney HARRIS with you 12/16/21. Thank you.

## 2021-12-16 ENCOUNTER — OFFICE VISIT (OUTPATIENT)
Dept: CARDIOLOGY | Facility: PHYSICIAN GROUP | Age: 66
End: 2021-12-16
Payer: MEDICARE

## 2021-12-16 VITALS
BODY MASS INDEX: 30.64 KG/M2 | DIASTOLIC BLOOD PRESSURE: 66 MMHG | OXYGEN SATURATION: 95 % | RESPIRATION RATE: 14 BRPM | HEART RATE: 76 BPM | SYSTOLIC BLOOD PRESSURE: 114 MMHG | WEIGHT: 152 LBS | HEIGHT: 59 IN

## 2021-12-16 DIAGNOSIS — E78.01 FAMILIAL HYPERCHOLESTEREMIA: Chronic | ICD-10-CM

## 2021-12-16 DIAGNOSIS — I34.0 MILD MITRAL REGURGITATION: ICD-10-CM

## 2021-12-16 DIAGNOSIS — I70.0 ATHEROSCLEROSIS OF AORTA (HCC): Chronic | ICD-10-CM

## 2021-12-16 DIAGNOSIS — E78.5 DYSLIPIDEMIA: ICD-10-CM

## 2021-12-16 DIAGNOSIS — I10 HTN, GOAL BELOW 130/80: ICD-10-CM

## 2021-12-16 PROCEDURE — 99214 OFFICE O/P EST MOD 30 MIN: CPT | Performed by: INTERNAL MEDICINE

## 2021-12-16 ASSESSMENT — FIBROSIS 4 INDEX: FIB4 SCORE: 1.13

## 2021-12-16 NOTE — PATIENT INSTRUCTIONS
Look into Repatha or Praulent which are injections for cholesterol (PCSK9 inhibitors)    nexzilent      Work on at least 1.5 - 5 hours a week of moderate exercise (typical brisk walking or similar activity)    If you have had a heart attack, stent, bypass or reduced heart strength (EF <35%): cardiac rehab may reduce your risk of dying by 13-24% and need to go to the hospital by 30% within the first year (1)    Please look into the following diets and incorporate them into your diet    LOW SALT DIET   KEEP YOUR SODIUM EQUAL TO CALORIES AND NO MORE THAN DOUBLE THE CALORIES FOR A LOW SALT DIET    Cardiosmart.org - great resource for American College of Cardiology on heart disease prevention and treatment    FOR TREATMENT OR PREVENTION OF CORONARY ARTERY DISEASE  These three programs are approved by Medicare/Insurers for those with heart disease  [facility]  [facility] for Reversing Heart Disease - Russell Cagles Cardiologist vegetarian-based  [facility] - [place]-based mind-body Program    This is a commonly referenced Program  [facility] - [place] over [place] (book and documentary) - vegetarian-based    FOR TREATMENT OF BLOOD PRESSURE  DASH DIET - American Heart Association for treatment of HYPERTENSION    FOR TREATMENT OF BAD CHOLESTEROL/FATS  REDUCE PROCESSED SUGAR AS MUCH AS POSSIBLE  INCREASE WHOLE GRAINS/VEGETABLES  INCREASE FIBER    Lowering total cholesterol and LDL (bad) cholesterol:  - Eat leaner cuts of meat, or eliminate altogether if possible red meat, and frequently substitute fish or chicken.  - Limit saturated fat to no more than 7-10% of total calories no more than 10 g per day is recommended. Some sources of saturated fat include butter, animal fats, hydrogenated vegetable fats and oils, many desserts, whole milk dairy products.  - Replaced saturated fats with polyunsaturated fats and monounsaturated fats. Foods high in  monounsaturated fat include nuts, canola oil, avocados, and olives.  - Limit trans fat (processed foods) and replaced with fresh fruits and vegetables  - Recommend nonfat dairy products  - Increase substantially the amount of soluble fiber intake (legumes such as beans, fruit, whole grains).  - Consider nutritional supplements: plant sterile spreads such as Benecol, fish oil,  flaxseed oil, omega-3 acids capsules 1000 mg twice a day, or viscous fiber such as Metamucil  - Attain ideal weight and regular exercise (at least 30 minutes per day of moderate exercise)  ASK ABOUT STATIN OR NON STATIN MEDICATION TO REDUCE YOUR LDL AND HEART RISK    Lowering triglycerides:  - Reduce intake of simple sugar: Desserts, candy, pastries, honey, sodas, sugared cereals, yogurt, Gatorade, sports bars, canned fruit, smoothies, fruit juice, coffee drinks  - Reduced intake of refined starches: Refined Pasta, most bread  - Reduce or abstain from alcohol  - Increase omega-3 fatty acids: [place], Trout, Mackerel, Herring, [place] tuna and supplements  - Attain ideal weight and regular exercise (at least 30 minutes per day of moderate exercise)  ASK ABOUT PURIFIED OMEGA 3 EPA or FISH OIL TO REDUCE YOUR TG AND HEART RISK    Elevating HDL (good) cholesterol:  - Increase physical activity  - Increase omega-3 fatty acids and supplements as listed above  - Incorporating appropriate amounts of monounsaturated fats such as nuts, olive oil, canola oil, avocados, olives  - Stop smoking  - Attain ideal weight and regular exercise (at least 30 minutes per day of moderate exercise)

## 2021-12-17 NOTE — PROGRESS NOTES
"Chief Complaint   Patient presents with   • Aortic Stenosis   • Dyslipidemia       Subjective     Almaz Samuel is a 66 y.o. female who presents today for follow-up of her history of stroke    She had recent labs LDL still quite high off of her statin    Past Medical History:   Diagnosis Date   • Anesthesia 02-14-11    PO N/V, \"slow to come out\"   • Aortic atherosclerosis (HCC)    • Arthritis 02-14-11    spine   • Backpain 02-14-11    neck and abd. also,    • Breath shortness     with exertion   • Bronchitis 05/2018   • Cancer (HCC) 07/18/2018    Skin - 15 years ago.   • Cancer (HCC)     April/May 2018   • Diverticulitis    • Endometriosis of uterus    • Familial hypercholesteremia    • Fusion of spine 2014   • ROBERT (generalized anxiety disorder) 6/15/2017   • H/O fall     when in hospital  with low O2 sats   • H/O: CVA (cerebrovascular accident) 8/22/2014    Complex event associated with apparent medication reaction but with MRI suggesting possible multiple small infarcts of various ages, [facility]   • Hair loss 12/6/2018   • Heart burn    • Hiatus hernia syndrome     not repaired   • High cholesterol    • HTN, goal below 130/80 10/24/2011   • Infectious disease      Hep C +   • Lung collapse 02-14-11    right lung 1/4 collpsed    • Major depressive disorder with single episode, in full remission ([facility]) 10/24/2011   • Mixed hyperlipidemia 12/27/2011   • Moderate major depression ([facility]) 10/24/2011   • MRSA exposure 8/2014    while in hospital   • Post-menopause on HRT (hormone replacement therapy) 12/27/2011   • 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    exposed as a child, told not active   • Scoliosis    • Sleep apnea 6/2015    CPAP   • Snoring    • Unspecified vitamin D deficiency 9/24/2012     Past Surgical History:   Procedure Laterality Date   • NISSEN FUNDOPLICATION LAPAROSCOPIC  7/25/2018    Procedure: NISSEN FUNDOPLICATION LAPAROSCOPIC;  Surgeon: Kenji Okeefe M.D.;  Location: SURGERY [facility];  Service: General "   • NISSEN FUNDOPLICATION LAPAROSCOPIC N/A 2015    Procedure: NISSEN FUNDOPLICATION LAPAROSCOPIC;  Surgeon: Kenji Garcia M.D.;  Location: SURGERY SAME DAY [facility];  Service:    • GASTROSCOPY-ENDO  2015    Procedure: GASTROSCOPY-ENDO;  Surgeon: Kenji Garcia M.D.;  Location: ENDOSCOPY [facility];  Service:    • CARPAL TUNNEL RELEASE  2012    Performed by Holly Martin M.D. at SURGERY [facility]   • LOW ANTERIOR RESECTION LAPAROSCOPIC  3/2/2011    Performed by KENJI GARCIA at SURGERY [facility]   • CERVICAL DISK AND FUSION ANTERIOR  2010    Performed by JOSEPH DARLING at SURGERY [facility]   • TUBAL LIGATION     • TONSILLECTOMY AND ADENOIDECTOMY     • APPENDECTOMY     • GYN SURGERY      partial hysterectomy     Family History   Problem Relation Age of Onset   • Diabetes Mother    • Cancer Mother 82        breast cancer   • Lung Disease Mother         copd   • Hyperlipidemia Mother    • Hypertension Mother    • Cancer Father         Laryngeal CA   • Heart Disease Father 50        CAD with bypasses x 3   • Heart Attack Father    • Hyperlipidemia Father    • Hypertension Father    • Diabetes Sister         type II diabetes   • Diabetes Other    • Heart Disease Other    • Hypertension Other    • Lung Disease Other      Social History     Socioeconomic History   • Marital status:      Spouse name: Not on file   • Number of children: Not on file   • Years of education: Not on file   • Highest education level: Not on file   Occupational History   • Not on file   Tobacco Use   • Smoking status: Former Smoker     Packs/day: 0.30     Years: 5.00     Pack years: 1.50     Types: Cigarettes     Quit date: 1975     Years since quittin.9   • Smokeless tobacco: Never Used   • Tobacco comment: quit    Vaping Use   • Vaping Use: Never used   Substance and Sexual Activity   • Alcohol use: No     Alcohol/week: 0.0 oz   • Drug use: No   • Sexual activity: Yes  "    Partners: Male     Comment: , 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[employer]   Other Topics Concern   •  Service No   • Blood Transfusions No   • Caffeine Concern Not Asked   • Occupational Exposure Not Asked   • Hobby Hazards Not Asked   • Sleep Concern Not Asked   • Stress Concern Not Asked   • Weight Concern Not Asked   • Special Diet Not Asked   • Back Care Not Asked   • Exercise No   • Bike Helmet No   • Seat Belt Yes   • Self-Exams Yes   Social History Narrative   • Not on file     Social Determinants of Health     Financial Resource Strain:    • Difficulty of Paying Living Expenses: Not on file   Food Insecurity:    • Worried About Running Out of Food in the Last Year: Not on file   • Ran Out of Food in the Last Year: Not on file   Transportation Needs:    • Lack of Transportation (Medical): Not on file   • Lack of Transportation (Non-Medical): Not on file   Physical Activity:    • Days of Exercise per Week: Not on file   • Minutes of Exercise per Session: Not on file   Stress:    • Feeling of Stress : Not on file   Social Connections:    • Frequency of Communication with Friends and Family: Not on file   • Frequency of Social Gatherings with Friends and Family: Not on file   • Attends [employer] Services: Not on file   • Active Member of Clubs or Organizations: Not on file   • Attends Club or Organization Meetings: Not on file   • Marital Status: Not on file   Intimate Partner Violence:    • Fear of Current or Ex-Partner: Not on file   • Emotionally Abused: Not on file   • Physically Abused: Not on file   • Sexually Abused: Not on file   Housing Stability:    • Unable to Pay for Housing in the Last Year: Not on file   • Number of Places Lived in the Last Year: Not on file   • Unstable Housing in the Last Year: Not on file     Allergies   Allergen Reactions   • Albumin      Other reaction(s): Other (See Comments)  \"egg intolerance\"  Reaction:stomach cramps,throwing up for 12 hours,cold sweats   • Rosuvastatin    • " "Simvastatin    • Seasonal Itching     Pt states eye irritation, pollen      Outpatient Encounter Medications as of 12/16/2021   Medication Sig Dispense Refill   • Coenzyme Q10 300 MG Cap Take 1 Capsule by mouth every day. 30 Capsule    • LORazepam (ATIVAN) 1 MG Tab Take 1 Tablet by mouth at bedtime for 90 days. 30 Tablet 2   • citalopram (CELEXA) 10 MG tablet TAKE 1 TABLET DAILY 30 Tablet 2   • hydrOXYzine HCl (ATARAX) 25 MG Tab Take 0.5-1 Tablets by mouth 3 times a day as needed for Anxiety. 30 Tablet 1   • baclofen (LIORESAL) 10 MG Tab Take 1 Tablet by mouth at bedtime. 90 Tablet 3   • omeprazole (PRILOSEC) 20 MG delayed-release capsule Take 1 Capsule by mouth every day. 30 Capsule 1   • ondansetron (ZOFRAN ODT) 4 MG TABLET DISPERSIBLE Take 1 Tablet by mouth every 6 hours as needed for Nausea. 10 Tablet 2   • atorvastatin (LIPITOR) 40 MG Tab Take 1 Tab by mouth every day. 90 Tab 3   • fluticasone (FLONASE) 50 MCG/ACT nasal spray Spray 2 Sprays in nose every day. INHALE 2 SPRAYS IN EACH NOSTRIL 48 g 11   • Artificial Tear Solution (TEARS NATURALE OP) Place 2 Drops in both eyes 2 times a day as needed.       No facility-administered encounter medications on file as of 12/16/2021.     ROS           Objective     /66 (BP Location: Left arm, Patient Position: Sitting, BP Cuff Size: Adult)   Pulse 76   Resp 14   Ht 1.499 m (4' 11\")   Wt 68.9 kg (152 lb)   SpO2 95%   BMI 30.70 kg/m²     Physical Exam  Constitutional:       General: She is not in acute distress.     Appearance: She is not diaphoretic.   Eyes:      General: No scleral icterus.  Neck:      Vascular: No JVD.   Cardiovascular:      Rate and Rhythm: Normal rate.      Heart sounds: Normal heart sounds. No murmur heard.  No friction rub. No gallop.    Pulmonary:      Effort: No respiratory distress.      Breath sounds: No wheezing or rales.   Abdominal:      General: Bowel sounds are normal.      Palpations: Abdomen is soft.   Skin:     Findings: No " rash.   Neurological:      Mental Status: She is alert.            We reviewed in person the most recent labs  Recent Results (from the past 5040 hour(s))   Lipid Profile    Collection Time: 05/25/21  7:19 AM   Result Value Ref Range    Cholesterol,Tot 285 (H) 100 - 199 mg/dL    Triglycerides 148 0 - 149 mg/dL    HDL 67 >=40 mg/dL     (H) <100 mg/dL   Comp Metabolic Panel    Collection Time: 05/25/21  7:19 AM   Result Value Ref Range    Sodium 140 135 - 145 mmol/L    Potassium 4.1 3.6 - 5.5 mmol/L    Chloride 105 96 - 112 mmol/L    Co2 26 20 - 33 mmol/L    Anion Gap 9.0 7.0 - 16.0    Glucose 98 65 - 99 mg/dL    Bun 13 8 - 22 mg/dL    Creatinine 0.85 0.50 - 1.40 mg/dL    Calcium 9.3 8.5 - 10.5 mg/dL    AST(SGOT) 25 12 - 45 U/L    ALT(SGPT) 22 2 - 50 U/L    Alkaline Phosphatase 79 30 - 99 U/L    Total Bilirubin 0.4 0.1 - 1.5 mg/dL    Albumin 4.4 3.2 - 4.9 g/dL    Total Protein 7.3 6.0 - 8.2 g/dL    Globulin 2.9 1.9 - 3.5 g/dL    A-G Ratio 1.5 g/dL   FASTING STATUS    Collection Time: 05/25/21  7:19 AM   Result Value Ref Range    Fasting Status Fasting    ESTIMATED GFR    Collection Time: 05/25/21  7:19 AM   Result Value Ref Range    GFR If African American >60 >60 mL/min/1.73 m 2    GFR If Non African American >60 >60 mL/min/1.73 m 2   CBC WITHOUT DIFFERENTIAL    Collection Time: 07/16/21  9:41 AM   Result Value Ref Range    WBC 4.9 4.8 - 10.8 K/uL    RBC 4.94 4.20 - 5.40 M/uL    Hemoglobin 15.3 12.0 - 16.0 g/dL    Hematocrit 46.5 37.0 - 47.0 %    MCV 94.1 81.4 - 97.8 fL    MCH 31.0 27.0 - 33.0 pg    MCHC 32.9 (L) 33.6 - 35.0 g/dL    RDW 46.4 35.9 - 50.0 fL    Platelet Count 321 164 - 446 K/uL    MPV 9.2 9.0 - 12.9 fL   TSH WITH REFLEX TO FT4    Collection Time: 07/16/21  9:41 AM   Result Value Ref Range    TSH 2.000 0.380 - 5.330 uIU/mL   URINALYSIS,CULTURE IF INDICATED    Collection Time: 08/04/21 11:00 AM    Specimen: Urine   Result Value Ref Range    Color Yellow     Character Clear     Specific Gravity  1.005 <1.035    Ph 7.5 5.0 - 8.0    Glucose Negative Negative mg/dL    Ketones Negative Negative mg/dL    Protein Negative Negative mg/dL    Bilirubin Negative Negative    Urobilinogen, Urine 0.2 Negative    Nitrite Negative Negative    Leukocyte Esterase Moderate (A) Negative    Occult Blood Negative Negative    Micro Urine Req Microscopic    URINE MICROSCOPIC (W/UA)    Collection Time: 08/04/21 11:00 AM   Result Value Ref Range    WBC 2-5 /hpf    RBC 0-2 /hpf    Bacteria Negative None /hpf    Epithelial Cells Negative /hpf    Hyaline Cast 0-2 /lpf   POCT Influenza A/B    Collection Time: 10/20/21 12:03 PM   Result Value Ref Range    Rapid Influenza A-B negative     Internal Control Positive Positive     Internal Control Negative Negative    POCT Rapid Strep A    Collection Time: 10/20/21 12:04 PM   Result Value Ref Range    Rapid Strep Screen negative     Internal Control Positive Positive     Internal Control Negative Negative    COVID/SARS CoV-2 PCR    Collection Time: 10/20/21 12:05 PM    Specimen: Nasopharyngeal; Respirate   Result Value Ref Range    COVID Order Status Received    SARS-CoV-2, PCR (In-House)    Collection Time: 10/20/21 12:05 PM   Result Value Ref Range    SARS-CoV-2 Source Nasal Swab     SARS-CoV-2 by PCR NotDetected    VAGINAL PATHOGENS DNA PANEL    Collection Time: 11/17/21  9:43 AM   Result Value Ref Range    Candida species DNA Probe Negative Negative    Trichamonas vaginalis DNA Probe Negative Negative    Gardnerella vaginalis DNA Probe POSITIVE (A) Negative   URINALYSIS    Collection Time: 11/17/21  9:43 AM    Specimen: Urine   Result Value Ref Range    Color Yellow     Character Clear     Specific Gravity 1.005 <1.035    Ph 6.5 5.0 - 8.0    Glucose Negative Negative mg/dL    Ketones Negative Negative mg/dL    Protein Negative Negative mg/dL    Bilirubin Negative Negative    Urobilinogen, Urine 0.2 Negative    Nitrite Negative Negative    Leukocyte Esterase Moderate (A) Negative     Occult Blood Negative Negative    Micro Urine Req Microscopic    Pain Management Screen    Collection Time: 11/17/21  9:43 AM   Result Value Ref Range    Drugs Expected See Inter     Pain Management Drug Panel Interp Consistent     Codeine (cutoff 40 ng/mL) Not Detected     Morphine (cutoff 20 ng/mL) Not Detected     6­acetylmorphine (cutoff 20 ng/mL) Not Detected     Oxycodone (cutoff 40 ng/mL) Not Detected     Noroxycodone (cutoff 100 ng/mL) Not Detected     Oxymorphone (cutoff 40 ng/mL) Not Detected     Noroxymorphone (cutoff 100 ng/mL) Not Detected     Hydrocodone (cutoff 40 ng/mL) Not Detected     Norhydrocodone (cutoff 100 ng/mL) Not Detected     Hydromorphone (cutoff 40 ng/mL) Not Detected     Buprenorphine (cutoff 5 ng/mL) Not Detected     Naloxone (cutoff 100 ng/mL) Not Detected     Norbuprenorphine (cutoff 20 ng/mL) Not Detected     Fentanyl (cutoff 2 ng/mL) Not Detected     Norfentanyl (cutoff 2 ng/mL) Not Detected     Meperidine metabolite (cutoff 50 ng/mL) Not Detected     Tapentadol (cutoff 100 ng/mL) Not Detected     Tapentadol­o­Sulf (cutoff 200 ng/mL) Not Detected     Methadone (cutoff 150 ng/mL) Not Detected     Tramadol (cutoff 200 ng/mL) Not Detected     Amphetamine (cutoff 50 ng/mL) Not Detected     Methamphetamine (cutoff 200 ng/mL) Not Detected     MDMA­ Ecstasy (cutoff 200 ng/mL) Not Detected     MDA (cutoff 200 ng/mL) Not Detected     MDEA­ Kindra (cutoff 200 ng/mL) Not Detected     Ritalinic Acid (cutoff 100 ng/mL) Not Detected     Phentermine (cutoff 100 ng/mL) Not Detected     Benzoylecgonine (cutoff 150 ng/mL) Not Detected     Alprazolam (cutoff 40 ng/mL) Not Detected     Alpha­OH­Alprazolam (cutoff 20 ng/mL) Not Detected     Clonazepam (cutoff 20 ng/mL) Not Detected     7­Aminoclonazepam (cutoff 40 ng/mL) Not Detected     Diazepam (cutoff 50 ng/mL) Not Detected     Gabapentin (cutoff 100 ng/mL) Not Detected     Pregabalin (cutoff 100 ng/mL) Not Detected     Nordiazepam (cutoff 50 ng/mL)  Not Detected     Oxazepam (cutoff 50 ng/mL) Not Detected     Temazepam (cutoff 50 ng/mL) Not Detected     Lorazepam (cutoff 60 ng/mL) Present     Midazolam (cutoff 20 ng/mL) Not Detected     Alpha-OH-Midazolam (cutoff 20 ng/mL) Not Detected     Zolpidem (cutoff 20 ng/mL) Not Detected     Zolpidem Metabolite (cutoff 100 ng/mL) Not Detected     Barbiturates (cutoff 200 ng/mL) Not Detected     Ethyl Glucuronide (cutoff 500 ng/mL) Not Detected     Marijuana Metabolite (cutoff 20 ng/mL) Not Detected     PCP (cutoff 25 ng/mL) Not Detected     Carisoprodol (cut­off 100 ng/mL) Not Detected     Pain Management Drug Panel See Below     [facility] Pain Mgt [place], High Res/EMIT, Interp See Note    URINE CULTURE(NEW)    Collection Time: 11/17/21  9:43 AM    Specimen: Urine   Result Value Ref Range    Significant Indicator NEG     Source UR     Site -     Culture Result Usual urogenital mariana 10-50,000 cfu/mL    URINE MICROSCOPIC (W/UA)    Collection Time: 11/17/21  9:43 AM   Result Value Ref Range    WBC 20-50 (A) /hpf    RBC 0-2 /hpf    Bacteria Negative None /hpf    Epithelial Cells Negative /hpf    Hyaline Cast 0-2 /lpf   POCT Urinalysis    Collection Time: 11/26/21 11:30 AM   Result Value Ref Range    POC Color yellow Negative    POC Appearance clear Negative    POC Leukocyte Esterase small Negative    POC Nitrites negative Negative    POC Urobiligen 0.2 Negative (0.2) mg/dL    POC Protein negative Negative mg/dL    POC Urine PH 7.0 5.0 - 8.0    POC Blood trace Negative    POC Specific Gravity 1.015 <1.005 - >1.030    POC Ketones negative Negative mg/dL    POC Bilirubin negative Negative mg/dL    POC Glucose negative Negative mg/dL   VAGINAL PATHOGENS DNA PANEL    Collection Time: 11/26/21 12:05 PM   Result Value Ref Range    Candida species DNA Probe Negative Negative    Trichamonas vaginalis DNA Probe Negative Negative    Gardnerella vaginalis DNA Probe Negative Negative   URINE CULTURE(NEW)    Collection Time: 11/26/21 12:05 PM     Specimen: Urine   Result Value Ref Range    Significant Indicator POS (POS)     Source UR     Site -     Culture Result - (A)     Culture Result Escherichia coli  >100,000 cfu/mL   (A)        Susceptibility    Escherichia coli - TRACE     Ampicillin <=8 Sensitive mcg/mL     Ceftriaxone <=1 Sensitive mcg/mL     Cefazolin <=2 Sensitive mcg/mL     Ciprofloxacin <=0.25 Sensitive mcg/mL     Cefepime <=2 Sensitive mcg/mL     Cefuroxime <=4 Sensitive mcg/mL     Ampicillin/sulbactam <=4/2 Sensitive mcg/mL     Tobramycin <=2 Sensitive mcg/mL     Nitrofurantoin 64 Intermediate mcg/mL     Gentamicin <=2 Sensitive mcg/mL     Levofloxacin <=0.5 Sensitive mcg/mL     Minocycline <=4 Sensitive mcg/mL     Pip/Tazobactam <=8 Sensitive mcg/mL     Trimeth/Sulfa <=0.5/9.5 Sensitive mcg/mL     Tigecycline <=2 Sensitive mcg/mL   SARS-CoV-2 PCR (24 hour In-House): Collect NP swab in VTM    Collection Time: 11/26/21 12:05 PM    Specimen: Respirate   Result Value Ref Range    SARS-CoV-2 Source NP Swab     SARS-CoV-2 by PCR NotDetected    COVID/SARS CoV-2 PCR    Collection Time: 11/26/21 12:05 PM   Result Value Ref Range    COVID Order Status Received    Lipid Profile    Collection Time: 11/30/21  7:13 AM   Result Value Ref Range    Cholesterol,Tot 293 (H) 100 - 199 mg/dL    Triglycerides 155 (H) 0 - 149 mg/dL    HDL 64 >=40 mg/dL     (H) <100 mg/dL   FASTING STATUS    Collection Time: 11/30/21  7:13 AM   Result Value Ref Range    Fasting Status Fasting        Assessment & Plan     1. Aortic atherosclerosis (HCC)     2. Dyslipidemia     3. Familial hypercholesteremia     4. HTN, goal below 130/80     5. Mild mitral regurgitation         Medical Decision Making: Today's Assessment/Status/Plan:        It was my pleasure to meet with Ms. Samuel.    We addressed the management of hypertension at today's visit. Blood pressure is well controlled.  We specifically assessed the labs on hypertension treatment    She will try co-Q10 with statin  otherwise consider PCSK9 or Nexzilent    I will see Ms. Samuel back in 1 year time and encouraged her to follow up with us over the phone or electronically using my MyChart as issues arise.    It is my pleasure to participate in the care of Ms. Samuel.  Please do not hesitate to contact me with questions or concerns.    Leonel Bridges MD PhD [facility]  Cardiologist [facility]    Please note that this dictation was created using voice recognition software. There may be errors I did not discover before finalizing the note.

## 2021-12-20 DIAGNOSIS — K21.9 GASTROESOPHAGEAL REFLUX DISEASE, UNSPECIFIED WHETHER ESOPHAGITIS PRESENT: ICD-10-CM

## 2021-12-22 RX ORDER — OMEPRAZOLE 20 MG/1
CAPSULE, DELAYED RELEASE ORAL
Qty: 30 CAPSULE | Refills: 5 | Status: SHIPPED | OUTPATIENT
Start: 2021-12-22 | End: 2022-08-08

## 2022-02-22 ENCOUNTER — OFFICE VISIT (OUTPATIENT)
Dept: MEDICAL GROUP | Facility: PHYSICIAN GROUP | Age: 67
End: 2022-02-22
Payer: MEDICARE

## 2022-02-22 VITALS
BODY MASS INDEX: 31.55 KG/M2 | OXYGEN SATURATION: 94 % | WEIGHT: 156.5 LBS | HEART RATE: 73 BPM | SYSTOLIC BLOOD PRESSURE: 122 MMHG | HEIGHT: 59 IN | DIASTOLIC BLOOD PRESSURE: 64 MMHG | RESPIRATION RATE: 16 BRPM | TEMPERATURE: 99.5 F

## 2022-02-22 DIAGNOSIS — G63 POLYNEUROPATHY ASSOCIATED WITH UNDERLYING DISEASE (HCC): ICD-10-CM

## 2022-02-22 DIAGNOSIS — G47.00 INSOMNIA, PERSISTENT: ICD-10-CM

## 2022-02-22 DIAGNOSIS — E66.9 OBESITY (BMI 30-39.9): ICD-10-CM

## 2022-02-22 DIAGNOSIS — F32.5 MAJOR DEPRESSIVE DISORDER WITH SINGLE EPISODE, IN FULL REMISSION (HCC): ICD-10-CM

## 2022-02-22 DIAGNOSIS — F41.1 GENERALIZED ANXIETY DISORDER: ICD-10-CM

## 2022-02-22 DIAGNOSIS — M54.12 CERVICAL RADICULOPATHY: ICD-10-CM

## 2022-02-22 DIAGNOSIS — I70.0 ATHEROSCLEROSIS OF AORTA (HCC): Chronic | ICD-10-CM

## 2022-02-22 DIAGNOSIS — Z12.31 ENCOUNTER FOR SCREENING MAMMOGRAM FOR BREAST CANCER: ICD-10-CM

## 2022-02-22 PROCEDURE — 99214 OFFICE O/P EST MOD 30 MIN: CPT | Performed by: NURSE PRACTITIONER

## 2022-02-22 RX ORDER — GABAPENTIN 300 MG/1
300 CAPSULE ORAL NIGHTLY PRN
Qty: 90 CAPSULE | Refills: 1 | Status: SHIPPED | OUTPATIENT
Start: 2022-02-22 | End: 2022-04-27

## 2022-02-22 RX ORDER — LORAZEPAM 1 MG/1
1 TABLET ORAL
Qty: 30 TABLET | Refills: 2 | Status: SHIPPED | OUTPATIENT
Start: 2022-03-08 | End: 2022-05-25 | Stop reason: SDUPTHER

## 2022-02-22 ASSESSMENT — PATIENT HEALTH QUESTIONNAIRE - PHQ9
5. POOR APPETITE OR OVEREATING: 1 - SEVERAL DAYS
CLINICAL INTERPRETATION OF PHQ2 SCORE: 1
SUM OF ALL RESPONSES TO PHQ QUESTIONS 1-9: 10

## 2022-02-22 ASSESSMENT — FIBROSIS 4 INDEX: FIB4 SCORE: 1.13

## 2022-02-22 NOTE — ASSESSMENT & PLAN NOTE
Chronic health problem.  Continues with lorazepam 1 mg at bedtime for sleep and anxiety.  Has been taking lorazepam for many years.  Feels rested the next day.  Does not drink alcohol.  UDS consistent.   reviewed and consistent.

## 2022-02-22 NOTE — PROGRESS NOTES
CC: Medication refill, bilateral arms numb and painful    HISTORY OF THE PRESENT ILLNESS: Patient is a 66 y.o. female. This pleasant patient is here today for evaluation and management of the following health problems.    Health Maintenance: due      Cervical radiculopathy  Patient reports worsening bilateral upper extremity numbness and pain in the last couple months, right worse than left.  Extends from upper arm down to hands.   Associative intermittent upper extremity weakness in  strength.  Has mild chronic neck pain.  Aggravated mostly at night.   Started some old gabapentin at bedtime this week and has noticed some relief and able to sleep better.  Has not tried ice or heat.  Denies injury.  History of cervical spine fusion several years ago.  History of carpal tunnel release and left wrist.  She is requesting referral back to Dr. Martin who she used to see for chronic neck and back pain.    ROBERT (generalized anxiety disorder)  Patient restarted Celexa 3 months ago.  Has noticed some improvement.  PHQ-9 score is mildly elevated today.  Patient reports this is due to not getting good sleep for the last couple months due to bilateral upper extremity pain.  Please see additional notes.  Denies SI/HI.  Takes lorazepam at bedtime.    Major depressive disorder with single episode, in full remission (HCC)  Please see additional notes under general anxiety disorder.    Polyneuropathy associated with underlying disease (CMS-HCC) (HCC)  Please see additional notes under cervical radiculopathy.    Aortic atherosclerosis (HCC)  Chronic health problem.  Taking atorvastatin daily.    Insomnia, persistent  Chronic health problem.  Continues with lorazepam 1 mg at bedtime for sleep and anxiety.  Has been taking lorazepam for many years.  Feels rested the next day.  Does not drink alcohol.  UDS consistent.   reviewed and consistent.      Allergies: Albumin, Rosuvastatin, Simvastatin, and Seasonal    Current Outpatient  "Medications Ordered in Epic   Medication Sig Dispense Refill   • [START ON 3/8/2022] LORazepam (ATIVAN) 1 MG Tab Take 1 Tablet by mouth at bedtime for 90 days. 30 Tablet 2   • gabapentin (NEURONTIN) 300 MG Cap Take 1 Capsule by mouth at bedtime as needed. 90 Capsule 1   • omeprazole (PRILOSEC) 20 MG delayed-release capsule Take 1 capsule by mouth once daily 30 Capsule 5   • Coenzyme Q10 300 MG Cap Take 1 Capsule by mouth every day. 30 Capsule    • citalopram (CELEXA) 10 MG tablet TAKE 1 TABLET DAILY 30 Tablet 2   • hydrOXYzine HCl (ATARAX) 25 MG Tab Take 0.5-1 Tablets by mouth 3 times a day as needed for Anxiety. 30 Tablet 1   • baclofen (LIORESAL) 10 MG Tab Take 1 Tablet by mouth at bedtime. 90 Tablet 3   • ondansetron (ZOFRAN ODT) 4 MG TABLET DISPERSIBLE Take 1 Tablet by mouth every 6 hours as needed for Nausea. 10 Tablet 2   • atorvastatin (LIPITOR) 40 MG Tab Take 1 Tab by mouth every day. 90 Tab 3   • fluticasone (FLONASE) 50 MCG/ACT nasal spray Spray 2 Sprays in nose every day. INHALE 2 SPRAYS IN EACH NOSTRIL 48 g 11   • Artificial Tear Solution (TEARS NATURALE OP) Place 2 Drops in both eyes 2 times a day as needed.       No current Epic-ordered facility-administered medications on file.       Past Medical History:   Diagnosis Date   • Anesthesia 02-14-11    PO N/V, \"slow to come out\"   • Aortic atherosclerosis (HCC)    • Arthritis 02-14-11    spine   • Backpain 02-14-11    neck and abd. also,    • Breath shortness     with exertion   • Bronchitis 05/2018   • Cancer (HCC) 07/18/2018    Skin - 15 years ago.   • Cancer (HCC)     April/May 2018   • Diverticulitis    • Endometriosis of uterus    • Familial hypercholesteremia    • Fusion of spine 2014   • ROBERT (generalized anxiety disorder) 6/15/2017   • H/O fall     when in hospital  with low O2 sats   • H/O: CVA (cerebrovascular accident) 8/22/2014    Complex event associated with apparent medication reaction but with MRI suggesting possible multiple small infarcts " of various ages, [facility]   • Hair loss 2018   • Heart burn    • Hiatus hernia syndrome     not repaired   • High cholesterol    • HTN, goal below 130/80 10/24/2011   • Infectious disease      Hep C +   • Lung collapse 11    right lung  collpsed    • Major depressive disorder with single episode, in full remission (HCC) 10/24/2011   • Mixed hyperlipidemia 2011   • Moderate major depression (HCC) 10/24/2011   • MRSA exposure 2014    while in hospital   • Post-menopause on HRT (hormone replacement therapy) 2011   • 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    exposed as a child, told not active   • Scoliosis    • Sleep apnea 2015    CPAP   • Snoring    • Unspecified vitamin D deficiency 2012       Past Surgical History:   Procedure Laterality Date   • NISSEN FUNDOPLICATION LAPAROSCOPIC  2018    Procedure: NISSEN FUNDOPLICATION LAPAROSCOPIC;  Surgeon: Kenji Garcia M.D.;  Location: SURGERY [facility];  Service: General   • NISSEN FUNDOPLICATION LAPAROSCOPIC N/A 2015    Procedure: NISSEN FUNDOPLICATION LAPAROSCOPIC;  Surgeon: Kenji Garcia M.D.;  Location: SURGERY SAME DAY [facility];  Service:    • GASTROSCOPY-ENDO  2015    Procedure: GASTROSCOPY-ENDO;  Surgeon: Kenji Garcia M.D.;  Location: ENDOSCOPY [facility];  Service:    • CARPAL TUNNEL RELEASE  2012    Performed by Holly Martin M.D. at SURGERY [facility]   • LOW ANTERIOR RESECTION LAPAROSCOPIC  3/2/2011    Performed by KENJI GARCIA at SURGERY [facility]   • CERVICAL DISK AND FUSION ANTERIOR  2010    Performed by JOSEPH DARLING at SURGERY [facility]   • TUBAL LIGATION     • TONSILLECTOMY AND ADENOIDECTOMY     • APPENDECTOMY     • GYN SURGERY      partial hysterectomy       Social History     Tobacco Use   • Smoking status: Former Smoker     Packs/day: 0.30     Years: 5.00     Pack years: 1.50     Types: Cigarettes     Quit date: 1975     Years since quittin.1   •  "Smokeless tobacco: Never Used   • Tobacco comment: quit 1975   Vaping Use   • Vaping Use: Never used   Substance Use Topics   • Alcohol use: No     Alcohol/week: 0.0 oz   • Drug use: No       Family History   Problem Relation Age of Onset   • Diabetes Mother    • Cancer Mother 82        breast cancer   • Lung Disease Mother         copd   • Hyperlipidemia Mother    • Hypertension Mother    • Cancer Father         Laryngeal CA   • Heart Disease Father 50        CAD with bypasses x 3   • Heart Attack Father    • Hyperlipidemia Father    • Hypertension Father    • Diabetes Sister         type II diabetes   • Diabetes Other    • Heart Disease Other    • Hypertension Other    • Lung Disease Other        ROS:   As in HPI, otherwise negative for chest pain, dyspnea, abdominal pain, dysuria, blood in stool, fever          Exam: /64 (BP Location: Right arm, Patient Position: Sitting, BP Cuff Size: Adult)   Pulse 73   Temp 37.5 °C (99.5 °F) (Temporal)   Resp 16   Ht 1.499 m (4' 11\")   Wt 71 kg (156 lb 8 oz)   SpO2 94%  Body mass index is 31.61 kg/m².    General: Alert, pleasant, well nourished, well developed female in NAD  Neck: Supple without bruit.  Pulmonary: Clear to ausculation.  Normal effort. No rales, ronchi, or wheezing.  Cardiovascular: Normal rate and rhythm without murmur.   Cervical spine: No erythema or edema, palpable hardware/scoliosis to the right, nontender, bilateral upper extremity strength 4/5 and equal bilaterally, negative Tinel's, radial pulses palpable and equal bilaterally  Neurologic: Grossly nonfocal  Skin: Warm and dry.  Musculoskeletal: Normal gait.   Psych: Normal mood and affect. Alert and oriented. Judgment and insight is normal.    Please note that this dictation was created using voice recognition software. I have made every reasonable attempt to correct obvious errors, but I expect that there are errors of grammar and possibly content that I did not discover before finalizing " the note.      Assessment/Plan  1. Insomnia, persistent  Well-controlled.  Continue with lorazepam at bedtime.  Reviewed risks of benzodiazepines.  She is aware not to drink alcohol while taking it; does not drink alcohol.   and UDS reviewed.  - LORazepam (ATIVAN) 1 MG Tab; Take 1 Tablet by mouth at bedtime for 90 days.  Dispense: 30 Tablet; Refill: 2    2. Encounter for screening mammogram for breast cancer  Ordered.  - MA-SCREENING MAMMO BILAT W/TOMOSYNTHESIS W/CAD; Future    3. Cervical radiculopathy  Symptoms likely coming from cervical spine.  Negative Tinel's.  We will get cervical spine x-ray and refer to orthopedics.  In the meantime continue with gabapentin, Aleve.  Start ice and heat.  Consider MRI.  - Referral to Orthopedics  - DX-CERVICAL SPINE-2 OR 3 VIEWS; Future  - gabapentin (NEURONTIN) 300 MG Cap; Take 1 Capsule by mouth at bedtime as needed.  Dispense: 90 Capsule; Refill: 1    4. ROBERT (generalized anxiety disorder)  Improved control.  Continue with citalopram at current dose.    5. Major depressive disorder with single episode, in full remission (HCC)  Improved control.  Continue with citalopram at current dose.  Reports worsening symptoms are due to poor sleep secondary to bilateral upper extremity pain.  - Patient has been identified as having a positive depression screening. Appropriate orders and counseling have been given.    6. Polyneuropathy associated with underlying disease (HCC)  As in #3    7. Aortic atherosclerosis (HCC)  Continue with statin.    8. Obesity (BMI 30-39.9)    - Patient identified as having weight management issue.  Appropriate orders and counseling given.    Patient will return to clinic in 3 months or sooner if needed.

## 2022-02-22 NOTE — ASSESSMENT & PLAN NOTE
Patient restarted Celexa 3 months ago.  Has noticed some improvement.  PHQ-9 score is mildly elevated today.  Patient reports this is due to not getting good sleep for the last couple months due to bilateral upper extremity pain.  Please see additional notes.  Denies SI/HI.  Takes lorazepam at bedtime.

## 2022-02-22 NOTE — ASSESSMENT & PLAN NOTE
Patient reports worsening bilateral upper extremity numbness and pain in the last couple months, right worse than left.  Extends from upper arm down to hands.   Associative intermittent upper extremity weakness in  strength.  Has mild chronic neck pain.  Aggravated mostly at night.   Started some old gabapentin at bedtime this week and has noticed some relief and able to sleep better.  Has not tried ice or heat.  Denies injury.  History of cervical spine fusion several years ago.  History of carpal tunnel release and left wrist.  She is requesting referral back to Dr. Martin who she used to see for chronic neck and back pain.

## 2022-02-24 ENCOUNTER — APPOINTMENT (OUTPATIENT)
Dept: URGENT CARE | Facility: PHYSICIAN GROUP | Age: 67
End: 2022-02-24
Payer: MEDICARE

## 2022-02-24 ENCOUNTER — APPOINTMENT (OUTPATIENT)
Dept: RADIOLOGY | Facility: IMAGING CENTER | Age: 67
End: 2022-02-24
Attending: NURSE PRACTITIONER
Payer: MEDICARE

## 2022-02-24 DIAGNOSIS — M54.12 CERVICAL RADICULOPATHY: ICD-10-CM

## 2022-02-24 PROCEDURE — 72040 X-RAY EXAM NECK SPINE 2-3 VW: CPT | Mod: TC,FY | Performed by: RADIOLOGY

## 2022-03-06 ENCOUNTER — PATIENT MESSAGE (OUTPATIENT)
Dept: MEDICAL GROUP | Facility: PHYSICIAN GROUP | Age: 67
End: 2022-03-06
Payer: MEDICARE

## 2022-03-09 DIAGNOSIS — F41.1 GENERALIZED ANXIETY DISORDER: ICD-10-CM

## 2022-03-09 RX ORDER — CITALOPRAM HYDROBROMIDE 10 MG/1
TABLET ORAL
Qty: 90 TABLET | Refills: 3 | Status: SHIPPED | OUTPATIENT
Start: 2022-03-09 | End: 2023-05-16 | Stop reason: SDUPTHER

## 2022-03-15 ENCOUNTER — OFFICE VISIT (OUTPATIENT)
Dept: URGENT CARE | Facility: PHYSICIAN GROUP | Age: 67
End: 2022-03-15
Payer: MEDICARE

## 2022-03-15 VITALS
TEMPERATURE: 98.5 F | HEIGHT: 59 IN | OXYGEN SATURATION: 99 % | RESPIRATION RATE: 16 BRPM | DIASTOLIC BLOOD PRESSURE: 100 MMHG | BODY MASS INDEX: 31.25 KG/M2 | HEART RATE: 84 BPM | WEIGHT: 155 LBS | SYSTOLIC BLOOD PRESSURE: 160 MMHG

## 2022-03-15 DIAGNOSIS — J01.40 ACUTE NON-RECURRENT PANSINUSITIS: ICD-10-CM

## 2022-03-15 PROCEDURE — 99213 OFFICE O/P EST LOW 20 MIN: CPT | Performed by: NURSE PRACTITIONER

## 2022-03-15 RX ORDER — AMOXICILLIN AND CLAVULANATE POTASSIUM 875; 125 MG/1; MG/1
1 TABLET, FILM COATED ORAL 2 TIMES DAILY
Qty: 10 TABLET | Refills: 0 | Status: SHIPPED | OUTPATIENT
Start: 2022-03-15 | End: 2022-03-20

## 2022-03-15 ASSESSMENT — ENCOUNTER SYMPTOMS
HEMOPTYSIS: 0
COUGH: 0
SHORTNESS OF BREATH: 0
SORE THROAT: 1
WHEEZING: 0
CHILLS: 0
FEVER: 0
SPUTUM PRODUCTION: 0
SINUS PAIN: 1

## 2022-03-15 ASSESSMENT — FIBROSIS 4 INDEX: FIB4 SCORE: 1.13

## 2022-03-15 NOTE — PROGRESS NOTES
"Subjective     Almaz Samuel is a 66 y.o. female who presents with Cough (Fever , congestion )            Almaz comes in today with a 2 week history of nasal congestion.  She notes worsening of symptoms x 5 days with ear fullness and sore throat.  She notes a cough intermittently.  No fever, chills, myalgias, or chest pain.  Not taking any meds to treat the symptoms.        Review of Systems   Constitutional: Positive for malaise/fatigue. Negative for chills and fever.   HENT: Positive for congestion, ear pain, sinus pain and sore throat.    Respiratory: Negative for cough, hemoptysis, sputum production, shortness of breath and wheezing.    Skin: Negative for rash.     Medications, Allergies, and current problem list reviewed today in Epic         Objective     Blood Pressure 160/100   Pulse 84   Temperature 36.9 °C (98.5 °F) (Temporal)   Respiration 16   Height 1.499 m (4' 11\")   Weight 70.3 kg (155 lb)   Oxygen Saturation 99%   Body Mass Index 31.31 kg/m²      Physical Exam  Vitals reviewed.   Constitutional:       General: She is not in acute distress.     Appearance: Normal appearance. She is well-developed. She is not ill-appearing, toxic-appearing or diaphoretic.   HENT:      Head: Normocephalic.      Right Ear: Ear canal and external ear normal. There is no impacted cerumen.      Left Ear: Ear canal and external ear normal. There is no impacted cerumen.      Ears:      Comments: Bilateral clear middle ear effusion with slight TM bulge.      Nose: Congestion present. No rhinorrhea.      Comments: Nares patent but congested.  Mild diffuse sinus TTP.     Mouth/Throat:      Mouth: Mucous membranes are moist.      Pharynx: No oropharyngeal exudate or posterior oropharyngeal erythema.      Comments: Post nasal drainage streaking noted.  Eyes:      General: No scleral icterus.        Right eye: No discharge.         Left eye: No discharge.      Conjunctiva/sclera: Conjunctivae normal.   Neck:      Thyroid: " No thyromegaly.      Vascular: No JVD.      Trachea: No tracheal deviation.   Cardiovascular:      Rate and Rhythm: Normal rate and regular rhythm.      Heart sounds: Normal heart sounds. No murmur heard.    No friction rub. No gallop.   Pulmonary:      Effort: Pulmonary effort is normal. No respiratory distress.      Breath sounds: Normal breath sounds. No stridor. No wheezing, rhonchi or rales.   Chest:      Chest wall: No tenderness.   Musculoskeletal:      Cervical back: Neck supple.   Lymphadenopathy:      Cervical: No cervical adenopathy.   Skin:     General: Skin is warm and dry.      Findings: No erythema or rash.   Neurological:      Mental Status: She is alert and oriented to person, place, and time.                             Assessment & Plan        1. Acute non-recurrent pansinusitis  - amoxicillin-clavulanate (AUGMENTIN) 875-125 MG Tab; Take 1 Tablet by mouth 2 times a day for 5 days.  Dispense: 10 Tablet; Refill: 0    Discussed exam findings with Almaz. Differential reviewed.  Take full course of antibiotics.  Maintain adequate po hydration.  RTC in 5-7 days if symptoms persist, sooner if worse.  She verbalized understanding of and agreed with plan of care.

## 2022-04-27 ENCOUNTER — TELEPHONE (OUTPATIENT)
Dept: MEDICAL GROUP | Facility: PHYSICIAN GROUP | Age: 67
End: 2022-04-27
Payer: MEDICARE

## 2022-04-27 DIAGNOSIS — Z11.1 SCREENING FOR TUBERCULOSIS: ICD-10-CM

## 2022-04-27 NOTE — TELEPHONE ENCOUNTER
Pt is having a a carpal tunnel procedure done. PT is needing an order for TB Quantiferon due to inactive TB as a child. They are requesting this be ordered through primary care doc.

## 2022-04-29 ENCOUNTER — HOSPITAL ENCOUNTER (OUTPATIENT)
Dept: LAB | Facility: MEDICAL CENTER | Age: 67
End: 2022-04-29
Attending: ANESTHESIOLOGY
Payer: MEDICARE

## 2022-04-29 ENCOUNTER — HOSPITAL ENCOUNTER (OUTPATIENT)
Dept: LAB | Facility: MEDICAL CENTER | Age: 67
End: 2022-04-29
Payer: MEDICARE

## 2022-04-29 ENCOUNTER — HOSPITAL ENCOUNTER (OUTPATIENT)
Dept: LAB | Facility: MEDICAL CENTER | Age: 67
End: 2022-04-29
Attending: NURSE PRACTITIONER
Payer: MEDICARE

## 2022-04-29 DIAGNOSIS — Z11.1 SCREENING FOR TUBERCULOSIS: ICD-10-CM

## 2022-04-29 DIAGNOSIS — Z01.812 PRE-OPERATIVE LABORATORY EXAMINATION: ICD-10-CM

## 2022-04-29 LAB
ALBUMIN SERPL BCP-MCNC: 4.6 G/DL (ref 3.2–4.9)
ALBUMIN/GLOB SERPL: 1.6 G/DL
ALP SERPL-CCNC: 84 U/L (ref 30–99)
ALT SERPL-CCNC: 25 U/L (ref 2–50)
ANION GAP SERPL CALC-SCNC: 15 MMOL/L (ref 7–16)
AST SERPL-CCNC: 26 U/L (ref 12–45)
BILIRUB SERPL-MCNC: 0.6 MG/DL (ref 0.1–1.5)
BUN SERPL-MCNC: 18 MG/DL (ref 8–22)
CALCIUM SERPL-MCNC: 9.4 MG/DL (ref 8.5–10.5)
CHLORIDE SERPL-SCNC: 102 MMOL/L (ref 96–112)
CO2 SERPL-SCNC: 24 MMOL/L (ref 20–33)
CREAT SERPL-MCNC: 0.89 MG/DL (ref 0.5–1.4)
ERYTHROCYTE [DISTWIDTH] IN BLOOD BY AUTOMATED COUNT: 47.4 FL (ref 35.9–50)
FASTING STATUS PATIENT QL REPORTED: NORMAL
GFR SERPLBLD CREATININE-BSD FMLA CKD-EPI: 70 ML/MIN/1.73 M 2
GLOBULIN SER CALC-MCNC: 2.8 G/DL (ref 1.9–3.5)
GLUCOSE SERPL-MCNC: 80 MG/DL (ref 65–99)
HCT VFR BLD AUTO: 45.1 % (ref 37–47)
HGB BLD-MCNC: 15.2 G/DL (ref 12–16)
INR PPP: 0.94 (ref 0.87–1.13)
MCH RBC QN AUTO: 31 PG (ref 27–33)
MCHC RBC AUTO-ENTMCNC: 33.7 G/DL (ref 33.6–35)
MCV RBC AUTO: 92 FL (ref 81.4–97.8)
PLATELET # BLD AUTO: 351 K/UL (ref 164–446)
PMV BLD AUTO: 9.3 FL (ref 9–12.9)
POTASSIUM SERPL-SCNC: 4 MMOL/L (ref 3.6–5.5)
PROT SERPL-MCNC: 7.4 G/DL (ref 6–8.2)
PROTHROMBIN TIME: 12.3 SEC (ref 12–14.6)
RBC # BLD AUTO: 4.9 M/UL (ref 4.2–5.4)
SODIUM SERPL-SCNC: 141 MMOL/L (ref 135–145)
T4 FREE SERPL-MCNC: 1.08 NG/DL (ref 0.93–1.7)
TSH SERPL DL<=0.005 MIU/L-ACNC: 3.5 UIU/ML (ref 0.38–5.33)
WBC # BLD AUTO: 6 K/UL (ref 4.8–10.8)

## 2022-04-29 PROCEDURE — 84439 ASSAY OF FREE THYROXINE: CPT

## 2022-04-29 PROCEDURE — 85027 COMPLETE CBC AUTOMATED: CPT | Mod: GA

## 2022-04-29 PROCEDURE — 80053 COMPREHEN METABOLIC PANEL: CPT

## 2022-04-29 PROCEDURE — 86480 TB TEST CELL IMMUN MEASURE: CPT | Mod: GY

## 2022-04-29 PROCEDURE — 84443 ASSAY THYROID STIM HORMONE: CPT

## 2022-04-29 PROCEDURE — 36415 COLL VENOUS BLD VENIPUNCTURE: CPT | Mod: GA

## 2022-04-29 PROCEDURE — 85610 PROTHROMBIN TIME: CPT | Mod: GA

## 2022-05-02 LAB
GAMMA INTERFERON BACKGROUND BLD IA-ACNC: 0.04 IU/ML
M TB IFN-G BLD-IMP: NEGATIVE
M TB IFN-G CD4+ BCKGRND COR BLD-ACNC: 0.02 IU/ML
MITOGEN IGNF BCKGRD COR BLD-ACNC: >10 IU/ML
QFT TB2 - NIL TBQ2: -0.01 IU/ML

## 2022-05-25 ENCOUNTER — OFFICE VISIT (OUTPATIENT)
Dept: MEDICAL GROUP | Facility: PHYSICIAN GROUP | Age: 67
End: 2022-05-25
Payer: MEDICARE

## 2022-05-25 VITALS
OXYGEN SATURATION: 96 % | SYSTOLIC BLOOD PRESSURE: 132 MMHG | HEART RATE: 71 BPM | HEIGHT: 59 IN | TEMPERATURE: 98.6 F | BODY MASS INDEX: 33.08 KG/M2 | WEIGHT: 164.1 LBS | RESPIRATION RATE: 12 BRPM | DIASTOLIC BLOOD PRESSURE: 80 MMHG

## 2022-05-25 DIAGNOSIS — G56.03 BILATERAL CARPAL TUNNEL SYNDROME: ICD-10-CM

## 2022-05-25 DIAGNOSIS — M54.12 CERVICAL RADICULOPATHY: ICD-10-CM

## 2022-05-25 DIAGNOSIS — G47.00 INSOMNIA, PERSISTENT: ICD-10-CM

## 2022-05-25 DIAGNOSIS — E78.01 FAMILIAL HYPERCHOLESTEREMIA: Chronic | ICD-10-CM

## 2022-05-25 DIAGNOSIS — R10.32 LEFT LOWER QUADRANT PAIN: ICD-10-CM

## 2022-05-25 PROCEDURE — 99213 OFFICE O/P EST LOW 20 MIN: CPT | Performed by: NURSE PRACTITIONER

## 2022-05-25 RX ORDER — DIAZEPAM 2 MG/1
TABLET ORAL
COMMUNITY
Start: 2022-03-15 | End: 2022-05-25

## 2022-05-25 RX ORDER — LORAZEPAM 1 MG/1
1 TABLET ORAL
Qty: 30 TABLET | Refills: 2 | Status: SHIPPED | OUTPATIENT
Start: 2022-05-25 | End: 2022-09-01 | Stop reason: SDUPTHER

## 2022-05-25 RX ORDER — AZELASTINE HYDROCHLORIDE 137 UG/1
2 SPRAY, METERED NASAL 2 TIMES DAILY
COMMUNITY
Start: 2022-05-02 | End: 2022-12-01

## 2022-05-25 ASSESSMENT — FIBROSIS 4 INDEX: FIB4 SCORE: 1.022222222222222222

## 2022-05-25 NOTE — PROGRESS NOTES
CC: Medication refill, updates    HISTORY OF THE PRESENT ILLNESS: Patient is a 67 y.o. female. This pleasant patient is here today for evaluation and management of the following health problems.        Insomnia, persistent  This is a chronic health problem that is well controlled with current medications and lifestyle measures.  Taking lorazepam 1 mg at bedtime for sleep and anxiety.  Has been taking lorazepam for several years.  Denies adverse effects.  Feels rested the next day.  Does not drink alcohol or take opioids.  Requesting refill today.    Patient does have upcoming carpal tunnel surgery scheduled.  I advised her not to take pain medication and lorazepam within 6 to 8 hours of each other due to risk of respiratory depression.  Patient verbalizes understanding.    Carpal tunnel syndrome  Having right carpal tunnel release surgery next week.  Reports she also has some cubital tunnel syndrome.    Cervical radiculopathy  Now established with spine surgeon.  Will be having some facet injections.  Also taking gabapentin at bedtime.    Left lower quadrant pain  Established with gastroenterology.  Has upcoming gastric emptying study scheduled.    Familial hypercholesteremia  Chronic health problem, uncontrolled.  Established with cardiology.  Patient restarted on atorvastatin a month ago, taking it a few times a week in addition to co-Q10.  Component      Latest Ref Rng & Units 11/30/2021           7:13 AM   Cholesterol,Tot      100 - 199 mg/dL 293 (H)   Triglycerides      0 - 149 mg/dL 155 (H)   HDL      >=40 mg/dL 64   LDL      <100 mg/dL 198 (H)       Allergies: Albumin, Rosuvastatin, Simvastatin, and Seasonal    Current Outpatient Medications Ordered in Epic   Medication Sig Dispense Refill   • Azelastine HCl 137 MCG/SPRAY Solution Administer 2 Sprays into each nostril 2 times a day.     • LORazepam (ATIVAN) 1 MG Tab Take 1 Tablet by mouth at bedtime for 90 days. 30 Tablet 2   • meloxicam (MOBIC) 15 MG tablet  "Take one tablet with breakfast as needed for pain for 14 days, then as needed. No advil/aleve/motrin/ibuprofen on same day. 60 Tablet 5   • gabapentin (NEURONTIN) 300 MG Cap Take 300 mg twice a day. Increase by one cap every 4-5 days to a max of 3 in the morning and 3 at night. 90 Capsule 5   • citalopram (CELEXA) 10 MG tablet Take 1 tablet by mouth once daily 90 Tablet 3   • omeprazole (PRILOSEC) 20 MG delayed-release capsule Take 1 capsule by mouth once daily 30 Capsule 5   • baclofen (LIORESAL) 10 MG Tab Take 1 Tablet by mouth at bedtime. 90 Tablet 3   • ondansetron (ZOFRAN ODT) 4 MG TABLET DISPERSIBLE Take 1 Tablet by mouth every 6 hours as needed for Nausea. 10 Tablet 2   • atorvastatin (LIPITOR) 40 MG Tab Take 1 Tab by mouth every day. 90 Tab 3   • Artificial Tear Solution (TEARS NATURALE OP) Place 2 Drops in both eyes 2 times a day as needed.     • fluticasone (FLONASE) 50 MCG/ACT nasal spray Spray 2 Sprays in nose every day. INHALE 2 SPRAYS IN EACH NOSTRIL (Patient not taking: Reported on 5/25/2022) 48 g 11     No current [facility]-ordered facility-administered medications on file.       Past Medical History:   Diagnosis Date   • Anesthesia 02-14-11    PO N/V, \"slow to come out\"   • Aortic atherosclerosis (HCC)    • Arthritis 02-14-11    spine   • Backpain 02-14-11    neck and abd. also,    • Breath shortness     with exertion   • Bronchitis 05/2018   • Cancer (HCC) 07/18/2018    Skin - 15 years ago.   • Cancer (HCC)     April/May 2018   • Complication of anesthesia    • Diverticulitis    • Endometriosis of uterus    • Familial hypercholesteremia    • Fusion of spine 2014   • ROBERT (generalized anxiety disorder) 6/15/2017   • H/O fall     when in hospital  with low O2 sats   • H/O: CVA (cerebrovascular accident) 8/22/2014    Complex event associated with apparent medication reaction but with MRI suggesting possible multiple small infarcts of various ages, [facility]   • Hair loss 12/6/2018   • Heart burn    • Hiatus " hernia syndrome     not repaired   • High cholesterol    • HTN, goal below 130/80 10/24/2011   • Infectious disease      Hep C +   • Lung collapse 11    right lung  collpsed    • Major depressive disorder with single episode, in full remission (HCC) 10/24/2011   • Mixed hyperlipidemia 2011   • Moderate major depression (HCC) 10/24/2011   • MRSA exposure 2014    while in hospital   • Post-menopause on HRT (hormone replacement therapy) 2011   • 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    exposed as a child, told not active   • Scoliosis    • Sleep apnea 2015    CPAP   • Snoring    • Unspecified vitamin D deficiency 2012       Past Surgical History:   Procedure Laterality Date   • NISSEN FUNDOPLICATION LAPAROSCOPIC  2018    Procedure: NISSEN FUNDOPLICATION LAPAROSCOPIC;  Surgeon: Kenji Garcia M.D.;  Location: SURGERY [facility];  Service: General   • NISSEN FUNDOPLICATION LAPAROSCOPIC N/A 2015    Procedure: NISSEN FUNDOPLICATION LAPAROSCOPIC;  Surgeon: Kenji Garcia M.D.;  Location: SURGERY SAME DAY [facility];  Service:    • GASTROSCOPY-ENDO  2015    Procedure: GASTROSCOPY-ENDO;  Surgeon: Kenji Garcia M.D.;  Location: ENDOSCOPY [facility];  Service:    • CARPAL TUNNEL RELEASE  2012    Performed by Holly Martin M.D. at SURGERY [facility]   • LOW ANTERIOR RESECTION LAPAROSCOPIC  3/2/2011    Performed by KENJI GARCIA at SURGERY [facility]   • CERVICAL DISK AND FUSION ANTERIOR  2010    Performed by JOSEPH DARLING at SURGERY [facility]   • TUBAL LIGATION     • TONSILLECTOMY AND ADENOIDECTOMY     • APPENDECTOMY     • GYN SURGERY      partial hysterectomy       Social History     Tobacco Use   • Smoking status: Former Smoker     Packs/day: 0.30     Years: 5.00     Pack years: 1.50     Types: Cigarettes     Quit date: 1975     Years since quittin.4   • Smokeless tobacco: Never Used   • Tobacco comment: quit    Vaping Use   •  "Vaping Use: Never used   Substance Use Topics   • Alcohol use: No     Alcohol/week: 0.0 oz   • Drug use: No       Family History   Problem Relation Age of Onset   • Diabetes Mother    • Cancer Mother 82        breast cancer   • Lung Disease Mother         copd   • Hyperlipidemia Mother    • Hypertension Mother    • Cancer Father         Laryngeal CA   • Heart Disease Father 50        CAD with bypasses x 3   • Heart Attack Father    • Hyperlipidemia Father    • Hypertension Father    • Diabetes Sister         type II diabetes   • Diabetes Other    • Heart Disease Other    • Hypertension Other    • Lung Disease Other        ROS:   As in HPI, otherwise negative for chest pain, dyspnea, abdominal pain, dysuria, blood in stool, fever          Exam: /80 (BP Location: Left arm, Patient Position: Sitting, BP Cuff Size: Adult)   Pulse 71   Temp 37 °C (98.6 °F) (Temporal)   Resp 12   Ht 1.499 m (4' 11\")   Wt 74.4 kg (164 lb 1.6 oz)   SpO2 96%  Body mass index is 33.14 kg/m².    General: Alert, pleasant, well nourished, well developed female in NAD  Neck: Supple without bruit. Thyroid is not enlarged.  Pulmonary: Clear to ausculation.  Normal effort. No rales, ronchi, or wheezing.  Cardiovascular: Normal rate and rhythm without murmur.  Neurologic: Grossly nonfocal  Skin: Warm and dry.  Musculoskeletal: Normal gait.   Psych: Normal mood and affect. Alert and oriented. Judgment and insight is normal.    Component      Latest Ref Rng & Units 4/29/2022   Sodium      135 - 145 mmol/L 141   Potassium      3.6 - 5.5 mmol/L 4.0   Chloride      96 - 112 mmol/L 102   Co2      20 - 33 mmol/L 24   Anion Gap      7.0 - 16.0 15.0   Glucose      65 - 99 mg/dL 80   Bun      8 - 22 mg/dL 18   Creatinine      0.50 - 1.40 mg/dL 0.89   Calcium      8.5 - 10.5 mg/dL 9.4   AST(SGOT)      12 - 45 U/L 26   ALT(SGPT)      2 - 50 U/L 25   Alkaline Phosphatase      30 - 99 U/L 84   Total Bilirubin      0.1 - 1.5 mg/dL 0.6   Albumin      3.2 " - 4.9 g/dL 4.6   Total Protein      6.0 - 8.2 g/dL 7.4   Globulin      1.9 - 3.5 g/dL 2.8   A-G Ratio      g/dL 1.6   WBC      4.8 - 10.8 K/uL 6.0   RBC      4.20 - 5.40 M/uL 4.90   Hemoglobin      12.0 - 16.0 g/dL 15.2   Hematocrit      37.0 - 47.0 % 45.1   MCV      81.4 - 97.8 fL 92.0   MCH      27.0 - 33.0 pg 31.0   MCHC      33.6 - 35.0 g/dL 33.7   RDW      35.9 - 50.0 fL 47.4   Platelet Count      164 - 446 K/uL 351   MPV      9.0 - 12.9 fL 9.3   QFT NIL      IU/mL 0.04   QFT TB1 - NIL      <=0.34 IU/mL 0.02   QFT TB2 - NIL      <=0.34 IU/mL -0.01   QFT Mitogen - NIL      IU/mL >10.00   QFT Gold Plus      Negative Negative   Cholesterol,Tot      100 - 199 mg/dL    Triglycerides      0 - 149 mg/dL    HDL      >=40 mg/dL    LDL      <100 mg/dL    TSH      0.380 - 5.330 uIU/mL 3.500   Free T-4      0.93 - 1.70 ng/dL 1.08   GFR (CKD-EPI)      >60 mL/min/1.73 m 2 70       Please note that this dictation was created using voice recognition software. I have made every reasonable attempt to correct obvious errors, but I expect that there are errors of grammar and possibly content that I did not discover before finalizing the note.      Assessment/Plan  1. Insomnia, persistent  Well-controlled.  Continue with lorazepam and good sleep hygiene.   reviewed.  UDS up-to-date.  Advised patient on the risks of taking lorazepam with either opiates or alcohol.  Patient verbalizes understanding.    This patient is continuing to use a controlled substance (CS) on a long term basis.  The patient is thoroughly aware of the goals of treatment with the CS  The patient is aware that yearly and random urine drug screens are required.  The patient has been instructed to take the CS only as prescribed.  The patient is prohibited from sharing the CS with any other person.  The patient is instructed to inform the provider if any other CS is taken, of any alcohol or cannabis or other recreational drug use, any treatment for side effects  of the CS or complications, if they have CS active rx in other states  The patient has evidence for a reason for the CS  The treatment plan has been discussed with the patient  The  report has been reviewed      - LORazepam (ATIVAN) 1 MG Tab; Take 1 Tablet by mouth at bedtime for 90 days.  Dispense: 30 Tablet; Refill: 2    2. Bilateral carpal tunnel syndrome  Scheduled for right carpal tunnel release next week.  Patient understands if they are requesting surgical clearance, she will need to get clearance from her cardiologist also.  Otherwise, patient is cleared for surgery.    3. Cervical radiculopathy  Pending facet injections.    4. Left lower quadrant pain  Pending gastric emptying study.    5. Familial hypercholesteremia  Advised patient to titrate up on statin as tolerated.  Continue with co-Q10 and follow-up with cardiology.  Advised on lifestyle measures.    Patient will return to clinic in 3 months for medication refill or sooner if needed.

## 2022-05-26 NOTE — ASSESSMENT & PLAN NOTE
Having right carpal tunnel release surgery next week.  Reports she also has some cubital tunnel syndrome.

## 2022-05-26 NOTE — ASSESSMENT & PLAN NOTE
Now established with spine surgeon.  Will be having some facet injections.  Also taking gabapentin at bedtime.

## 2022-05-26 NOTE — ASSESSMENT & PLAN NOTE
Chronic health problem, uncontrolled.  Established with cardiology.  Patient restarted on atorvastatin a month ago, taking it a few times a week in addition to co-Q10.  Component      Latest Ref Rng & Units 11/30/2021           7:13 AM   Cholesterol,Tot      100 - 199 mg/dL 293 (H)   Triglycerides      0 - 149 mg/dL 155 (H)   HDL      >=40 mg/dL 64   LDL      <100 mg/dL 198 (H)

## 2022-05-26 NOTE — ASSESSMENT & PLAN NOTE
This is a chronic health problem that is well controlled with current medications and lifestyle measures.  Taking lorazepam 1 mg at bedtime for sleep and anxiety.  Has been taking lorazepam for several years.  Denies adverse effects.  Feels rested the next day.  Does not drink alcohol or take opioids.  Requesting refill today.    Patient does have upcoming carpal tunnel surgery scheduled.  I advised her not to take pain medication and lorazepam within 6 to 8 hours of each other due to risk of respiratory depression.  Patient verbalizes understanding.

## 2022-05-31 PROBLEM — G56.01 RIGHT CARPAL TUNNEL SYNDROME: Status: ACTIVE | Noted: 2022-05-31

## 2022-08-06 ENCOUNTER — HOSPITAL ENCOUNTER (OUTPATIENT)
Dept: RADIOLOGY | Facility: MEDICAL CENTER | Age: 67
End: 2022-08-06
Attending: SURGERY
Payer: MEDICARE

## 2022-08-06 DIAGNOSIS — K59.00 CONSTIPATION, UNSPECIFIED CONSTIPATION TYPE: ICD-10-CM

## 2022-08-06 PROCEDURE — 72195 MRI PELVIS W/O DYE: CPT | Mod: MH

## 2022-08-07 DIAGNOSIS — K21.9 GASTROESOPHAGEAL REFLUX DISEASE, UNSPECIFIED WHETHER ESOPHAGITIS PRESENT: ICD-10-CM

## 2022-08-08 RX ORDER — OMEPRAZOLE 20 MG/1
CAPSULE, DELAYED RELEASE ORAL
Qty: 30 CAPSULE | Refills: 11 | Status: SHIPPED | OUTPATIENT
Start: 2022-08-08 | End: 2023-08-08 | Stop reason: SDUPTHER

## 2022-08-08 NOTE — TELEPHONE ENCOUNTER
Received request via: Pharmacy    Was the patient seen in the last year in this department? Yes    Does the patient have an active prescription (recently filled or refills available) for medication(s) requested? No     Last OV 5/28/2022  Next OV 9/1/2022

## 2022-09-01 ENCOUNTER — HOSPITAL ENCOUNTER (OUTPATIENT)
Facility: MEDICAL CENTER | Age: 67
End: 2022-09-01
Attending: NURSE PRACTITIONER
Payer: MEDICARE

## 2022-09-01 ENCOUNTER — OFFICE VISIT (OUTPATIENT)
Dept: MEDICAL GROUP | Facility: PHYSICIAN GROUP | Age: 67
End: 2022-09-01
Payer: MEDICARE

## 2022-09-01 VITALS
OXYGEN SATURATION: 93 % | TEMPERATURE: 98.3 F | RESPIRATION RATE: 14 BRPM | WEIGHT: 166.5 LBS | DIASTOLIC BLOOD PRESSURE: 78 MMHG | HEIGHT: 60 IN | BODY MASS INDEX: 32.69 KG/M2 | SYSTOLIC BLOOD PRESSURE: 128 MMHG | HEART RATE: 75 BPM

## 2022-09-01 DIAGNOSIS — R60.9 FLUID RETENTION: ICD-10-CM

## 2022-09-01 DIAGNOSIS — G47.00 INSOMNIA, PERSISTENT: ICD-10-CM

## 2022-09-01 DIAGNOSIS — Z12.31 ENCOUNTER FOR SCREENING MAMMOGRAM FOR BREAST CANCER: ICD-10-CM

## 2022-09-01 DIAGNOSIS — Z78.0 POST-MENOPAUSAL: ICD-10-CM

## 2022-09-01 DIAGNOSIS — E78.01 FAMILIAL HYPERCHOLESTEREMIA: Chronic | ICD-10-CM

## 2022-09-01 DIAGNOSIS — Z79.899 CHRONIC USE OF BENZODIAZEPINE FOR THERAPEUTIC PURPOSE: ICD-10-CM

## 2022-09-01 DIAGNOSIS — Z23 NEED FOR TDAP VACCINATION: ICD-10-CM

## 2022-09-01 DIAGNOSIS — E78.5 DYSLIPIDEMIA: ICD-10-CM

## 2022-09-01 DIAGNOSIS — M54.12 CERVICAL RADICULOPATHY: ICD-10-CM

## 2022-09-01 DIAGNOSIS — R10.32 LEFT LOWER QUADRANT PAIN: ICD-10-CM

## 2022-09-01 DIAGNOSIS — F41.1 GENERALIZED ANXIETY DISORDER: ICD-10-CM

## 2022-09-01 DIAGNOSIS — G56.01 RIGHT CARPAL TUNNEL SYNDROME: ICD-10-CM

## 2022-09-01 PROBLEM — R30.0 DYSURIA: Status: RESOLVED | Noted: 2021-11-18 | Resolved: 2022-09-01

## 2022-09-01 PROCEDURE — G0481 DRUG TEST DEF 8-14 CLASSES: HCPCS

## 2022-09-01 PROCEDURE — 99214 OFFICE O/P EST MOD 30 MIN: CPT | Mod: 25 | Performed by: NURSE PRACTITIONER

## 2022-09-01 PROCEDURE — 90715 TDAP VACCINE 7 YRS/> IM: CPT | Performed by: NURSE PRACTITIONER

## 2022-09-01 PROCEDURE — 90471 IMMUNIZATION ADMIN: CPT | Performed by: NURSE PRACTITIONER

## 2022-09-01 RX ORDER — LORAZEPAM 1 MG/1
1 TABLET ORAL
Qty: 30 TABLET | Refills: 2 | Status: SHIPPED | OUTPATIENT
Start: 2022-09-05 | End: 2022-12-01 | Stop reason: SDUPTHER

## 2022-09-01 ASSESSMENT — FIBROSIS 4 INDEX: FIB4 SCORE: 1.022222222222222222

## 2022-09-01 NOTE — PROGRESS NOTES
CC: Medication refill, catching up on all changes made    HISTORY OF THE PRESENT ILLNESS: Patient is a 67 y.o. female. This pleasant patient is here today for evaluation and management of the following health problems.    Health Maintenance: due      Insomnia, persistent  Please see additional notes under anxiety.    Dyslipidemia        Right carpal tunnel syndrome  Had carpal tunnel release done in 4/2022.    Left lower quadrant pain  Patient had complete work-up by gastroenterology.  Found to have pelvic floor dysfunction.  Now going to pelvic floor physical therapy and has upcoming appointment with urogynecology for cystocele and rectocele.    Familial hypercholesteremia  Chronic health problem, uncertain of control.  Taking atorvastatin a couple times a week.  We will get updated lipid profile.    Cervical radiculopathy  Patient consulted with spine surgeon at [facility].  Now seeing providers in [place] [facility].  Has pending ablation.    ROBERT (generalized anxiety disorder)  This is a chronic health problem that is well controlled with current medications and lifestyle measures.  Taking Celexa 10 mg daily.  Also taking lorazepam at bedtime for anxiety and sleep.  Sleeps about 5 to 6 hours a night.  Denies adverse effects.  Does not drink alcohol within 6 hours of taking lorazepam.    Allergies: Albumin, Rosuvastatin, Simvastatin, and Seasonal    Current Outpatient Medications Ordered in Epic   Medication Sig Dispense Refill    [START ON 9/5/2022] LORazepam (ATIVAN) 1 MG Tab Take 1 Tablet by mouth at bedtime for 90 days. 30 Tablet 2    omeprazole (PRILOSEC) 20 MG delayed-release capsule Take 1 capsule by mouth once daily 30 Capsule 11    Azelastine HCl 137 MCG/SPRAY Solution Administer 2 Sprays into each nostril 2 times a day.      gabapentin (NEURONTIN) 300 MG Cap Take 300 mg twice a day. Increase by one cap every 4-5 days to a max of 3 in the morning and 3 at night. 90 Capsule 5    citalopram (CELEXA) 10 MG  "tablet Take 1 tablet by mouth once daily 90 Tablet 3    baclofen (LIORESAL) 10 MG Tab Take 1 Tablet by mouth at bedtime. 90 Tablet 3    ondansetron (ZOFRAN ODT) 4 MG TABLET DISPERSIBLE Take 1 Tablet by mouth every 6 hours as needed for Nausea. 10 Tablet 2    atorvastatin (LIPITOR) 40 MG Tab Take 1 Tab by mouth every day. 90 Tab 3    Artificial Tear Solution (TEARS NATURALE OP) Place 2 Drops in both eyes 2 times a day as needed.       No current Epic-ordered facility-administered medications on file.       Past Medical History:   Diagnosis Date    Anesthesia 02-14-11    PO N/V, \"slow to come out\"    Aortic atherosclerosis (HCC)     Arthritis 02-14-11    spine    Backpain 02-14-11    neck and abd. also,     Breath shortness     with exertion    Bronchitis 05/2018    Cancer ([facility]) 07/18/2018    Skin - 15 years ago.    Cancer (HCC)     April/May 2018    Complication of anesthesia     Diverticulitis     Endometriosis of uterus     Familial hypercholesteremia     Fusion of spine 2014    ROBERT (generalized anxiety disorder) 6/15/2017    H/O fall     when in hospital  with low O2 sats    H/O: CVA (cerebrovascular accident) 8/22/2014    Complex event associated with apparent medication reaction but with MRI suggesting possible multiple small infarcts of various ages, [facility]    Hair loss 12/6/2018    Heart burn     Hiatus hernia syndrome     not repaired    High cholesterol     HTN, goal below 130/80 10/24/2011    Infectious disease      Hep C +    Lung collapse 02-14-11    right lung 1/4 collpsed     Major depressive disorder with single episode, in full remission (HCC) 10/24/2011    Mixed hyperlipidemia 12/27/2011    Moderate major depression (HCC) 10/24/2011    MRSA exposure 8/2014    while in hospital    Post-menopause on HRT (hormone replacement therapy) 12/27/2011    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    exposed as a child, told not active    Scoliosis     Sleep apnea 6/2015    CPAP    Snoring     Unspecified vitamin D " deficiency 2012       Past Surgical History:   Procedure Laterality Date    SD NEUROPLASTY & OR TRANSPOS MEDIAN NRV CARPAL SABRINA Right 2022    Procedure: RIGHT HAND OPEN CARPAL TUNNEL DECOMPRESSION;  Surgeon: Holly Martin M.D.;  Location: [facility];  Service: Orthopedics    NISSEN FUNDOPLICATION LAPAROSCOPIC  2018    Procedure: NISSEN FUNDOPLICATION LAPAROSCOPIC;  Surgeon: Ayaz Garcia M.D.;  Location: SURGERY [facility];  Service: General    NISSEN FUNDOPLICATION LAPAROSCOPIC N/A 2015    Procedure: NISSEN FUNDOPLICATION LAPAROSCOPIC;  Surgeon: Ayaz Garcia M.D.;  Location: SURGERY SAME DAY [facility];  Service:     GASTROSCOPY-ENDO  2015    Procedure: GASTROSCOPY-ENDO;  Surgeon: Ayaz Garcia M.D.;  Location: ENDOSCOPY [facility];  Service:     CARPAL TUNNEL RELEASE  2012    Performed by Holly Martin M.D. at SURGERY [facility]    LOW ANTERIOR RESECTION LAPAROSCOPIC  3/2/2011    Performed by AYAZ GARCIA at SURGERY [facility]    CERVICAL DISK AND FUSION ANTERIOR  2010    Performed by JOSEPH DARLING at SURGERY [facility]    TUBAL LIGATION      TONSILLECTOMY AND ADENOIDECTOMY      APPENDECTOMY      GYN SURGERY      partial hysterectomy       Social History     Tobacco Use    Smoking status: Former     Packs/day: 0.30     Years: 5.00     Pack years: 1.50     Types: Cigarettes     Quit date: 1975     Years since quittin.6    Smokeless tobacco: Never    Tobacco comments:     quit    Vaping Use    Vaping Use: Never used   Substance Use Topics    Alcohol use: No     Alcohol/week: 0.0 oz    Drug use: No       Family History   Problem Relation Age of Onset    Diabetes Mother     Cancer Mother 82        breast cancer    Lung Disease Mother         copd    Hyperlipidemia Mother     Hypertension Mother     Cancer Father         Laryngeal CA    Heart Disease Father 50        CAD with bypasses x 3    Heart Attack  Father     Hyperlipidemia Father     Hypertension Father     Diabetes Sister         type II diabetes    Diabetes Other     Heart Disease Other     Hypertension Other     Lung Disease Other        ROS: As in HPI, otherwise negative for chest pain, dyspnea, abdominal pain, dysuria, blood in stool, fever          Exam: /78 (BP Location: Left arm, Patient Position: Sitting, BP Cuff Size: Adult)   Pulse 75   Temp 36.8 °C (98.3 °F) (Temporal)   Resp 14   Ht 1.524 m (5')   Wt 75.5 kg (166 lb 8 oz)   SpO2 93%  Body mass index is 32.52 kg/m².    General: Alert, pleasant, well nourished, well developed female in NAD  Neck: Supple without bruit. Thyroid is not enlarged.  Pulmonary: Clear to ausculation.  Normal effort. No rales, ronchi, or wheezing.  Cardiovascular: Normal rate and rhythm without murmur. Carotid and radial pulses are intact and equal bilaterally.  No lower extremity edema.  Abdomen: firm, nontender, nondistended. Normal bowel sounds. Liver and spleen are not palpable  Neurologic: Grossly nonfocal  Lymph: No cervical or supraclavicular lymph nodes are palpable  Skin: Warm and dry.    Musculoskeletal: Normal gait.   Psych: Normal mood and affect. Alert and oriented. Judgment and insight is normal.    Please note that this dictation was created using voice recognition software. I have made every reasonable attempt to correct obvious errors, but I expect that there are errors of grammar and possibly content that I did not discover before finalizing the note.      Assessment/Plan  1. Insomnia, persistent  Well-controlled.  Continue with lorazepam, medically necessary.  - LORazepam (ATIVAN) 1 MG Tab; Take 1 Tablet by mouth at bedtime for 90 days.  Dispense: 30 Tablet; Refill: 2    2. Chronic use of benzodiazepine for therapeutic purpose  This patient is continuing to use a controlled substance (CS) on a long term basis.  The patient is thoroughly aware of the goals of treatment with the CS  The patient  is aware that yearly and random urine drug screens are required.  The patient has been instructed to take the CS only as prescribed.  The patient is prohibited from sharing the CS with any other person.  The patient is instructed to inform the provider if any other CS is taken, of any alcohol or cannabis or other recreational drug use, any treatment for side effects of the CS or complications, if they have CS active rx in other states  The patient has evidence for a reason for the CS  The treatment plan has been discussed with the patient  The  report has been reviewed    - Controlled Substance Treatment Agreement  - Pain Management Screen; Future    3. Post-menopausal  Ordered  - DS-BONE DENSITY STUDY (DEXA); Future    4. Need for Tdap vaccination  Given  - Tdap =>6yo IM    5. Dyslipidemia  We will get updated lipid profile.  Continue with atorvastatin as tolerated in the meantime  - Comp Metabolic s[place]; Future  - ESTIMATED GFR; Future  - Lipid Profile; Future    6. Encounter for screening mammogram for breast cancer  Ordered  - MA-SCREENING MAMMO BILAT W/TOMOSYNTHESIS W/CAD; Future    7. Fluid retention  Patient reports abdominal swelling.  - proBrain Natriuretic Peptide, NT; Future    8. Right carpal tunnel syndrome  Resolved after surgery    9. Left lower quadrant pain  Continue follow-up with pelvic floor physical therapy and keep upcoming appointment with urogynecology.    10. Familial hypercholesteremia      11. Cervical radiculopathy  Continue follow-up with [place] [facility].    12. ROBERT (generalized anxiety disorder)  Well-controlled.  Continue with citalopram.  Continue with lorazepam at bedtime.    Patient will return to clinic in 4 weeks for lab review or sooner if needed.

## 2022-09-02 NOTE — ASSESSMENT & PLAN NOTE
Chronic health problem, uncertain of control.  Taking atorvastatin a couple times a week.  We will get updated lipid profile.

## 2022-09-02 NOTE — ASSESSMENT & PLAN NOTE
This is a chronic health problem that is well controlled with current medications and lifestyle measures.  Taking Celexa 10 mg daily.  Also taking lorazepam at bedtime for anxiety and sleep.  Sleeps about 5 to 6 hours a night.  Denies adverse effects.  Does not drink alcohol within 6 hours of taking lorazepam.

## 2022-09-02 NOTE — ASSESSMENT & PLAN NOTE
Patient had complete work-up by gastroenterology.  Found to have pelvic floor dysfunction.  Now going to pelvic floor physical therapy and has upcoming appointment with urogynecology for cystocele and rectocele.  Reports some abdominal swelling.

## 2022-09-02 NOTE — ASSESSMENT & PLAN NOTE
Patient consulted with spine surgeon at [facility].  Now seeing providers in [place] [facility].  Has pending ablation.

## 2022-09-05 LAB
1OH-MIDAZOLAM UR QL SCN: NOT DETECTED
6MAM UR QL: NOT DETECTED
7AMINOCLONAZEPAM UR QL: NOT DETECTED
A-OH ALPRAZ UR QL: NOT DETECTED
ALPRAZ UR QL: NOT DETECTED
AMPHET UR QL SCN: NOT DETECTED
ANNOTATION COMMENT IMP: NORMAL
ANNOTATION COMMENT IMP: NORMAL
BARBITURATES UR QL: NOT DETECTED
BUPRENORPHINE UR QL: NOT DETECTED
BZE UR QL: NOT DETECTED
CARBOXYTHC UR QL: NOT DETECTED
CARISOPRODOL UR QL: NOT DETECTED
CLONAZEPAM UR QL: NOT DETECTED
CODEINE UR QL: NOT DETECTED
DIAZEPAM UR QL: NOT DETECTED
ETHYL GLUCURONIDE UR QL: NOT DETECTED
FENTANYL UR QL: NOT DETECTED
GABAPENTIN UR QL: PRESENT
HYDROCODONE UR QL: NOT DETECTED
HYDROMORPHONE UR QL: NOT DETECTED
LORAZEPAM UR QL: PRESENT
MDA UR QL: NOT DETECTED
MDEA UR QL: NOT DETECTED
MDMA UR QL: NOT DETECTED
MEPERIDINE UR QL: NOT DETECTED
METHADONE UR QL: NOT DETECTED
METHAMPHET UR QL: NOT DETECTED
MIDAZOLAM UR QL SCN: NOT DETECTED
MORPHINE UR QL: NOT DETECTED
NALOXONE UR QL SCN: NOT DETECTED
NORBUPRENORPHINE UR QL CFM: NOT DETECTED
NORDIAZEPAM UR QL: NOT DETECTED
NORFENTANYL UR QL: NOT DETECTED
NORHYDROCODONE UR QL CFM: NOT DETECTED
NOROXYCODONE UR QL CFM: NOT DETECTED
NOROXYMORPH CO100 Q0458: NOT DETECTED
OXAZEPAM UR QL: NOT DETECTED
OXYCODONE UR QL: NOT DETECTED
OXYMORPHONE UR QL: NOT DETECTED
PATHOLOGY STUDY: NORMAL
PCP UR QL: NOT DETECTED
PHENTERMINE UR QL: NOT DETECTED
PPAA UR QL: NOT DETECTED
PREGABALIN UR QL SCN: NOT DETECTED
SERVICE CMNT-IMP: NORMAL
TAPENADOL OSULF CO200 Q0473: NOT DETECTED
TAPENTADOL UR QL SCN: NOT DETECTED
TEMAZEPAM UR QL: NOT DETECTED
TRAMADOL UR QL: NOT DETECTED
ZOLPIDEM PHENYL-4-CARB UR QL SCN: NOT DETECTED
ZOLPIDEM UR QL: NOT DETECTED

## 2022-09-06 ENCOUNTER — TELEPHONE (OUTPATIENT)
Dept: HEALTH INFORMATION MANAGEMENT | Facility: OTHER | Age: 67
End: 2022-09-06
Payer: MEDICARE

## 2022-09-06 NOTE — TELEPHONE ENCOUNTER
1. Caller Name: Almaz LOPEZ Jimmie                          Call Back Number: [phone]      How would the patient prefer to be contacted with a response: MyChart message    Member sent a clinical question via the scheduling workbasket.    Good morning Nguyen,  I didn't get the Hep B vac??? I did have one [id number]. Do I need this now? My hushban had Hep C and was treated at that time. So they advised me to get a Hep B shot then.  Thank you,  Almaz Samuel

## 2022-09-08 ENCOUNTER — HOSPITAL ENCOUNTER (OUTPATIENT)
Dept: RADIOLOGY | Facility: MEDICAL CENTER | Age: 67
End: 2022-09-08
Payer: MEDICARE

## 2022-09-09 ENCOUNTER — HOSPITAL ENCOUNTER (OUTPATIENT)
Dept: LAB | Facility: MEDICAL CENTER | Age: 67
End: 2022-09-09
Attending: INTERNAL MEDICINE
Payer: MEDICARE

## 2022-09-09 ENCOUNTER — HOSPITAL ENCOUNTER (OUTPATIENT)
Dept: LAB | Facility: MEDICAL CENTER | Age: 67
End: 2022-09-09
Attending: NURSE PRACTITIONER
Payer: MEDICARE

## 2022-09-09 ENCOUNTER — HOSPITAL ENCOUNTER (OUTPATIENT)
Facility: MEDICAL CENTER | Age: 67
End: 2022-09-09
Attending: INTERNAL MEDICINE
Payer: MEDICARE

## 2022-09-09 DIAGNOSIS — E78.5 DYSLIPIDEMIA: ICD-10-CM

## 2022-09-09 DIAGNOSIS — R60.9 FLUID RETENTION: ICD-10-CM

## 2022-09-09 LAB
ALBUMIN SERPL BCP-MCNC: 4.4 G/DL (ref 3.2–4.9)
ALBUMIN/GLOB SERPL: 1.6 G/DL
ALP SERPL-CCNC: 85 U/L (ref 30–99)
ALT SERPL-CCNC: 23 U/L (ref 2–50)
ANION GAP SERPL CALC-SCNC: 10 MMOL/L (ref 7–16)
AST SERPL-CCNC: 21 U/L (ref 12–45)
BASOPHILS # BLD AUTO: 0.5 % (ref 0–1.8)
BASOPHILS # BLD: 0.04 K/UL (ref 0–0.12)
BILIRUB SERPL-MCNC: 0.8 MG/DL (ref 0.1–1.5)
BUN SERPL-MCNC: 10 MG/DL (ref 8–22)
CALCIUM SERPL-MCNC: 9.5 MG/DL (ref 8.5–10.5)
CHLORIDE SERPL-SCNC: 103 MMOL/L (ref 96–112)
CHOLEST SERPL-MCNC: 264 MG/DL (ref 100–199)
CO2 SERPL-SCNC: 27 MMOL/L (ref 20–33)
CREAT SERPL-MCNC: 0.81 MG/DL (ref 0.5–1.4)
EOSINOPHIL # BLD AUTO: 0.09 K/UL (ref 0–0.51)
EOSINOPHIL NFR BLD: 1.2 % (ref 0–6.9)
ERYTHROCYTE [DISTWIDTH] IN BLOOD BY AUTOMATED COUNT: 45.8 FL (ref 35.9–50)
FASTING STATUS PATIENT QL REPORTED: NORMAL
GFR SERPLBLD CREATININE-BSD FMLA CKD-EPI: 78 ML/MIN/1.73 M 2
GLOBULIN SER CALC-MCNC: 2.8 G/DL (ref 1.9–3.5)
GLUCOSE SERPL-MCNC: 100 MG/DL (ref 65–99)
HCT VFR BLD AUTO: 45.4 % (ref 37–47)
HDLC SERPL-MCNC: 64 MG/DL
HGB BLD-MCNC: 15.2 G/DL (ref 12–16)
IMM GRANULOCYTES # BLD AUTO: 0.04 K/UL (ref 0–0.11)
IMM GRANULOCYTES NFR BLD AUTO: 0.5 % (ref 0–0.9)
LDLC SERPL CALC-MCNC: 172 MG/DL
LYMPHOCYTES # BLD AUTO: 1.58 K/UL (ref 1–4.8)
LYMPHOCYTES NFR BLD: 21.1 % (ref 22–41)
MCH RBC QN AUTO: 31.3 PG (ref 27–33)
MCHC RBC AUTO-ENTMCNC: 33.5 G/DL (ref 33.6–35)
MCV RBC AUTO: 93.6 FL (ref 81.4–97.8)
MONOCYTES # BLD AUTO: 0.5 K/UL (ref 0–0.85)
MONOCYTES NFR BLD AUTO: 6.7 % (ref 0–13.4)
NEUTROPHILS # BLD AUTO: 5.23 K/UL (ref 2–7.15)
NEUTROPHILS NFR BLD: 70 % (ref 44–72)
NRBC # BLD AUTO: 0 K/UL
NRBC BLD-RTO: 0 /100 WBC
NT-PROBNP SERPL IA-MCNC: 69 PG/ML (ref 0–125)
PLATELET # BLD AUTO: 328 K/UL (ref 164–446)
PMV BLD AUTO: 8.9 FL (ref 9–12.9)
POTASSIUM SERPL-SCNC: 4.1 MMOL/L (ref 3.6–5.5)
PROT SERPL-MCNC: 7.2 G/DL (ref 6–8.2)
RBC # BLD AUTO: 4.85 M/UL (ref 4.2–5.4)
SODIUM SERPL-SCNC: 140 MMOL/L (ref 135–145)
TRIGL SERPL-MCNC: 142 MG/DL (ref 0–149)
WBC # BLD AUTO: 7.5 K/UL (ref 4.8–10.8)

## 2022-09-09 PROCEDURE — 83516 IMMUNOASSAY NONANTIBODY: CPT

## 2022-09-09 PROCEDURE — 82784 ASSAY IGA/IGD/IGG/IGM EACH: CPT

## 2022-09-09 PROCEDURE — 36415 COLL VENOUS BLD VENIPUNCTURE: CPT

## 2022-09-09 PROCEDURE — 80053 COMPREHEN METABOLIC PANEL: CPT

## 2022-09-09 PROCEDURE — 85025 COMPLETE CBC W/AUTO DIFF WBC: CPT

## 2022-09-09 PROCEDURE — 87507 IADNA-DNA/RNA PROBE TQ 12-25: CPT

## 2022-09-09 PROCEDURE — 83880 ASSAY OF NATRIURETIC PEPTIDE: CPT | Mod: GA

## 2022-09-09 PROCEDURE — 80061 LIPID PANEL: CPT

## 2022-09-11 LAB — IGA SERPL-MCNC: 181 MG/DL (ref 68–408)

## 2022-09-12 LAB
ADV 40+41 DNA STL QL NAA+NON-PROBE: NOT DETECTED
ASTRO TYP 1-8 RNA STL QL NAA+NON-PROBE: NOT DETECTED
C CAYETANENSIS DNA STL QL NAA+NON-PROBE: NOT DETECTED
C COLI+JEJ+UPSA DNA STL QL NAA+NON-PROBE: NOT DETECTED
C DIF TOX TCDA+TCDB STL QL NAA+NON-PROBE: NOT DETECTED
CRYPTOSP DNA STL QL NAA+NON-PROBE: NOT DETECTED
E COLI O157 DNA STL QL NAA+NON-PROBE: ABNORMAL
E HISTOLYT DNA STL QL NAA+NON-PROBE: NOT DETECTED
EAEC PAA PLAS AGGR+AATA ST NAA+NON-PRB: NOT DETECTED
EC STX1+STX2 GENES STL QL NAA+NON-PROBE: NOT DETECTED
EPEC EAE GENE STL QL NAA+NON-PROBE: NOT DETECTED
ETEC LTA+ST1A+ST1B TOX ST NAA+NON-PROBE: NOT DETECTED
G LAMBLIA DNA STL QL NAA+NON-PROBE: NOT DETECTED
GLIADIN PEPTIDE+TTG IGA+IGG SER QL IA: 6 UNITS (ref 0–19)
NOROVIRUS GI+II RNA STL QL NAA+NON-PROBE: ABNORMAL
P SHIGELLOIDES DNA STL QL NAA+NON-PROBE: NOT DETECTED
RVA RNA STL QL NAA+NON-PROBE: ABNORMAL
S ENT+BONG DNA STL QL NAA+NON-PROBE: DETECTED
SAPO I+II+IV+V RNA STL QL NAA+NON-PROBE: ABNORMAL
SHIGELLA SP+EIEC IPAH ST NAA+NON-PROBE: NOT DETECTED
V CHOL+PARA+VUL DNA STL QL NAA+NON-PROBE: NOT DETECTED
V CHOLERAE DNA STL QL NAA+NON-PROBE: NOT DETECTED
Y ENTEROCOL DNA STL QL NAA+NON-PROBE: NOT DETECTED

## 2022-09-29 ENCOUNTER — OFFICE VISIT (OUTPATIENT)
Dept: MEDICAL GROUP | Facility: PHYSICIAN GROUP | Age: 67
End: 2022-09-29
Payer: MEDICARE

## 2022-09-29 VITALS
SYSTOLIC BLOOD PRESSURE: 112 MMHG | HEIGHT: 60 IN | WEIGHT: 162 LBS | HEART RATE: 96 BPM | OXYGEN SATURATION: 96 % | TEMPERATURE: 97.5 F | RESPIRATION RATE: 18 BRPM | BODY MASS INDEX: 31.8 KG/M2 | DIASTOLIC BLOOD PRESSURE: 72 MMHG

## 2022-09-29 DIAGNOSIS — R11.0 NAUSEA: ICD-10-CM

## 2022-09-29 DIAGNOSIS — E78.01 FAMILIAL HYPERCHOLESTEREMIA: Chronic | ICD-10-CM

## 2022-09-29 DIAGNOSIS — A02.9 SALMONELLA: ICD-10-CM

## 2022-09-29 DIAGNOSIS — R10.30 LOWER ABDOMINAL PAIN: ICD-10-CM

## 2022-09-29 DIAGNOSIS — R19.7 DIARRHEA, UNSPECIFIED TYPE: ICD-10-CM

## 2022-09-29 DIAGNOSIS — R68.83 CHILLS: ICD-10-CM

## 2022-09-29 LAB
EXTERNAL QUALITY CONTROL: NORMAL
FLUAV+FLUBV AG SPEC QL IA: NEGATIVE
INT CON NEG: NORMAL
INT CON NEG: NORMAL
INT CON POS: NORMAL
INT CON POS: NORMAL
SARS-COV+SARS-COV-2 AG RESP QL IA.RAPID: NEGATIVE

## 2022-09-29 PROCEDURE — 87426 SARSCOV CORONAVIRUS AG IA: CPT | Performed by: NURSE PRACTITIONER

## 2022-09-29 PROCEDURE — 99214 OFFICE O/P EST MOD 30 MIN: CPT | Mod: CS | Performed by: NURSE PRACTITIONER

## 2022-09-29 PROCEDURE — 87804 INFLUENZA ASSAY W/OPTIC: CPT | Performed by: NURSE PRACTITIONER

## 2022-09-29 RX ORDER — ONDANSETRON 4 MG/1
4 TABLET, ORALLY DISINTEGRATING ORAL EVERY 6 HOURS PRN
Qty: 10 TABLET | Refills: 2 | Status: SHIPPED | OUTPATIENT
Start: 2022-09-29 | End: 2023-08-08 | Stop reason: SDUPTHER

## 2022-09-29 RX ORDER — SULFAMETHOXAZOLE AND TRIMETHOPRIM 800; 160 MG/1; MG/1
1 TABLET ORAL 2 TIMES DAILY
Refills: 0 | Status: CANCELLED | OUTPATIENT
Start: 2022-09-29

## 2022-09-29 RX ORDER — DICYCLOMINE HYDROCHLORIDE 10 MG/1
CAPSULE ORAL
COMMUNITY
Start: 2022-09-07

## 2022-09-29 RX ORDER — CIPROFLOXACIN 500 MG/1
500 TABLET, FILM COATED ORAL 2 TIMES DAILY
Qty: 6 TABLET | Refills: 0 | Status: SHIPPED | OUTPATIENT
Start: 2022-09-29 | End: 2022-12-01

## 2022-09-29 ASSESSMENT — FIBROSIS 4 INDEX: FIB4 SCORE: 0.92

## 2022-09-29 NOTE — PROGRESS NOTES
CC: Lab review, abdominal pain, diarrhea    HISTORY OF THE PRESENT ILLNESS: Patient is a 67 y.o. female. This pleasant patient is here today for evaluation and management of the following health problems.        Lower abdominal pain  Patient reports increase in lower abdominal pain, both sides about 4 days ago.  Accompanied with diarrhea, chills, nausea, malaise, sinus congestion.  Uncertain if has fever.  Pain was severe 2 days ago, mildly improved.  Tolerating liquids and small bites today.  Took 1 Bentyl with mild relief.  Has been seeing gastroenterology for chronic abdominal pain.  I have reviewed most recent note from 9/8/2022.  Recent work-up showed positive for Salmonella.  Patient wonders if this is related to that.  She has not heard back from her GI regarding the results.  Has follow-up appointment with GI in 2 weeks.  Denies dysuria, hematuria.    Familial hypercholesteremia  Chronic health problem.  Has not taken atorvastatin in a couple months.  Lipid levels are elevated.  Reports that any statin makes her mildly nauseated.  Trying to figure out when is a good time to take it in regards to having nausea.  Managed by cardiology.    Allergies: Albumin, Rosuvastatin, Simvastatin, and Seasonal    Current Outpatient Medications Ordered in Epic   Medication Sig Dispense Refill    ondansetron (ZOFRAN ODT) 4 MG TABLET DISPERSIBLE Take 1 Tablet by mouth every 6 hours as needed for Nausea. 10 Tablet 2    ciprofloxacin (CIPRO) 500 MG Tab Take 1 Tablet by mouth 2 times a day. 6 Tablet 0    LORazepam (ATIVAN) 1 MG Tab Take 1 Tablet by mouth at bedtime for 90 days. 30 Tablet 2    omeprazole (PRILOSEC) 20 MG delayed-release capsule Take 1 capsule by mouth once daily 30 Capsule 11    Azelastine HCl 137 MCG/SPRAY Solution Administer 2 Sprays into each nostril 2 times a day.      gabapentin (NEURONTIN) 300 MG Cap Take 300 mg twice a day. Increase by one cap every 4-5 days to a max of 3 in the morning and 3 at night. 90  "Capsule 5    citalopram (CELEXA) 10 MG tablet Take 1 tablet by mouth once daily 90 Tablet 3    baclofen (LIORESAL) 10 MG Tab Take 1 Tablet by mouth at bedtime. 90 Tablet 3    atorvastatin (LIPITOR) 40 MG Tab Take 1 Tab by mouth every day. 90 Tab 3    Artificial Tear Solution (TEARS NATURALE OP) Place 2 Drops in both eyes 2 times a day as needed.       No current Epic-ordered facility-administered medications on file.       Past Medical History:   Diagnosis Date    Anesthesia 02-14-11    PO N/V, \"slow to come out\"    Aortic atherosclerosis (HCC)     Arthritis 02-14-11    spine    Backpain 02-14-11    neck and abd. also,     Breath shortness     with exertion    Bronchitis 05/2018    Cancer ([facility]) 07/18/2018    Skin - 15 years ago.    Cancer (HCC)     April/May 2018    Complication of anesthesia     Diverticulitis     Endometriosis of uterus     Familial hypercholesteremia     Fusion of spine 2014    ROBERT (generalized anxiety disorder) 6/15/2017    H/O fall     when in hospital  with low O2 sats    H/O: CVA (cerebrovascular accident) 8/22/2014    Complex event associated with apparent medication reaction but with MRI suggesting possible multiple small infarcts of various ages, [facility]    Hair loss 12/6/2018    Heart burn     Hiatus hernia syndrome     not repaired    High cholesterol     HTN, goal below 130/80 10/24/2011    Infectious disease      Hep C +    Lung collapse 02-14-11    right lung 1/4 collpsed     Major depressive disorder with single episode, in full remission (HCC) 10/24/2011    Mixed hyperlipidemia 12/27/2011    Moderate major depression (HCC) 10/24/2011    MRSA exposure 8/2014    while in hospital    Post-menopause on HRT (hormone replacement therapy) 12/27/2011    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    exposed as a child, told not active    Scoliosis     Sleep apnea 6/2015    CPAP    Snoring     Unspecified vitamin D deficiency 9/24/2012       Past Surgical History:   Procedure Laterality Date    NE " NEUROPLASTY & OR TRANSPOS MEDIAN NRV CARPAL SABRINA Right 2022    Procedure: RIGHT HAND OPEN CARPAL TUNNEL DECOMPRESSION;  Surgeon: Holly Martin M.D.;  Location: [facility] [place];  Service: Orthopedics    NISSEN FUNDOPLICATION LAPAROSCOPIC  2018    Procedure: NISSEN FUNDOPLICATION LAPAROSCOPIC;  Surgeon: Ayaz Garcia M.D.;  Location: SURGERY [facility];  Service: General    NISSEN FUNDOPLICATION LAPAROSCOPIC N/A 2015    Procedure: NISSEN FUNDOPLICATION LAPAROSCOPIC;  Surgeon: Ayaz Garcia M.D.;  Location: SURGERY SAME DAY [facility] ORS;  Service:     GASTROSCOPY-ENDO  2015    Procedure: GASTROSCOPY-ENDO;  Surgeon: Ayaz Garcia M.D.;  Location: ENDOSCOPY [facility];  Service:     CARPAL TUNNEL RELEASE  2012    Performed by Holly Martin M.D. at SURGERY [facility] ORS    LOW ANTERIOR RESECTION LAPAROSCOPIC  3/2/2011    Performed by AYAZ GARCIA at SURGERY [facility] ORS    CERVICAL DISK AND FUSION ANTERIOR  2010    Performed by JOSEPH DARLING at SURGERY [facility] ORS    TUBAL LIGATION      TONSILLECTOMY AND ADENOIDECTOMY      APPENDECTOMY      GYN SURGERY      partial hysterectomy       Social History     Tobacco Use    Smoking status: Former     Packs/day: 0.30     Years: 5.00     Pack years: 1.50     Types: Cigarettes     Quit date: 1975     Years since quittin.7    Smokeless tobacco: Never    Tobacco comments:     quit    Vaping Use    Vaping Use: Never used   Substance Use Topics    Alcohol use: No     Alcohol/week: 0.0 oz    Drug use: No       Family History   Problem Relation Age of Onset    Diabetes Mother     Cancer Mother 82        breast cancer    Lung Disease Mother         copd    Hyperlipidemia Mother     Hypertension Mother     Cancer Father         Laryngeal CA    Heart Disease Father 50        CAD with bypasses x 3    Heart Attack Father     Hyperlipidemia Father     Hypertension Father     Diabetes Sister          type II diabetes    Diabetes Other     Heart Disease Other     Hypertension Other     Lung Disease Other        ROS: As in HPI, otherwise negative for chest pain, dyspnea, dysuria, blood in stool, fever         Exam: /72 (BP Location: Left arm, Patient Position: Sitting, BP Cuff Size: Adult)   Pulse 96   Temp 36.4 °C (97.5 °F) (Temporal)   Resp 18   Ht 1.524 m (5')   Wt 73.5 kg (162 lb)   SpO2 96%  Body mass index is 31.64 kg/m².    General: Alert, pleasant, well nourished, well developed female in NAD  Neck: Supple without bruit. Thyroid is not enlarged.  Pulmonary: Clear to ausculation.  Normal effort. No rales, ronchi, or wheezing.  Cardiovascular: Normal rate and rhythm without murmur. Carotid and radial pulses are intact and equal bilaterally.  No lower extremity edema.  Abdomen: Soft, diffuse tenderness, nondistended. Normal bowel sounds.   Neurologic: Grossly nonfocal  Lymph: No cervical or supraclavicular lymph nodes are palpable  Skin: Warm and dry.    Musculoskeletal: Normal gait.   Psych: Normal mood and affect. Alert and oriented. Judgment and insight is normal.    Component      Latest Ref Rng & Units 9/9/2022   WBC      4.8 - 10.8 K/uL 7.5   RBC      4.20 - 5.40 M/uL 4.85   Hemoglobin      12.0 - 16.0 g/dL 15.2   Hematocrit      37.0 - 47.0 % 45.4   MCV      81.4 - 97.8 fL 93.6   MCH      27.0 - 33.0 pg 31.3   MCHC      33.6 - 35.0 g/dL 33.5 (L)   RDW      35.9 - 50.0 fL 45.8   Platelet Count      164 - 446 K/uL 328   MPV      9.0 - 12.9 fL 8.9 (L)   Neutrophils-Polys      44.00 - 72.00 % 70.00   Lymphocytes      22.00 - 41.00 % 21.10 (L)   Monocytes      0.00 - 13.40 % 6.70   Eosinophils      0.00 - 6.90 % 1.20   Basophils      0.00 - 1.80 % 0.50   Immature Granulocytes      0.00 - 0.90 % 0.50   Nucleated RBC      /100 WBC 0.00   Neutrophils (Absolute)      2.00 - 7.15 K/uL 5.23   Lymphs (Absolute)      1.00 - 4.80 K/uL 1.58   Monos (Absolute)      0.00 - 0.85 K/uL 0.50   Eos (Absolute)       0.00 - 0.51 K/uL 0.09   Baso (Absolute)      0.00 - 0.12 K/uL 0.04   Immature Granulocytes (abs)      0.00 - 0.11 K/uL 0.04   NRBC (Absolute)      K/uL 0.00   Campylobacter, PCR       Not Detected   C. Diff Toxin A+B genes, PCR       Not Detected   Plesiomonas shigelloides, PCR       Not Detected   Salmonella sp., PCR       Detected (A)   Vibrio Cholerae       Not Detected   Vibrio cholerae, PCR       Not Detected   Yersinia enterocolitica, PCR       Not Detected   Enteroaggregative E. coli (EAEC) gene, PCR       Not Detected   Enteropathogenic E. coli (EPEC) gene, PCR       Not Detected   Enterotoxigenic E. coli (ETEC) genes, PCR       Not Detected   Shiga - like Toxin 1+2 E. coli (STEC) genes, PCR       Not Detected   E. coli O157, PCR       N/A   Shigella + Enteroinvasive E. coli (EIEC) gene, PCR       Not Detected   Cryptosporidium sp., PCR       Not Detected   Cyclospora cayetanensis, PCR       Not Detected   Entamoeba histolytica, PCR       Not Detected   Giardia lamblia, PCR       Not Detected   Adenovirus F40+41, PCR       Not Detected   Astrovirus, PCR       Not Detected   Norovirus GI+GII, PCR       -   Rotavirus A, PCR       -   Sapovirus I+II+IV+V, PCR       -   Sodium      135 - 145 mmol/L 140   Potassium      3.6 - 5.5 mmol/L 4.1   Chloride      96 - 112 mmol/L 103   Co2      20 - 33 mmol/L 27   Anion Gap      7.0 - 16.0 10.0   Glucose      65 - 99 mg/dL 100 (H)   Bun      8 - 22 mg/dL 10   Creatinine      0.50 - 1.40 mg/dL 0.81   Calcium      8.5 - 10.5 mg/dL 9.5   AST(SGOT)      12 - 45 U/L 21   ALT(SGPT)      2 - 50 U/L 23   Alkaline Phosphatase      30 - 99 U/L 85   Total Bilirubin      0.1 - 1.5 mg/dL 0.8   Albumin      3.2 - 4.9 g/dL 4.4   Total Protein      6.0 - 8.2 g/dL 7.2   Globulin      1.9 - 3.5 g/dL 2.8   A-G Ratio      g/dL 1.6   Cholesterol,Tot      100 - 199 mg/dL 264 (H)   Triglycerides      0 - 149 mg/dL 142   HDL      >=40 mg/dL 64   LDL      <100 mg/dL 172 (H)   Celiac Dual Ag  Screen      0 - 19 Units 6   Immunoglobulin A      68 - 408 mg/dL 181   NT-proBNP      0 - 125 pg/mL 69   GFR (CKD-EPI)      >60 mL/min/1.73 m 2 78       Please note that this dictation was created using voice recognition software. I have made every reasonable attempt to correct obvious errors, but I expect that there are errors of grammar and possibly content that I did not discover before finalizing the note.      Assessment/Plan  1. Salmonella      2. Nausea    - ondansetron (ZOFRAN ODT) 4 MG TABLET DISPERSIBLE; Take 1 Tablet by mouth every 6 hours as needed for Nausea.  Dispense: 10 Tablet; Refill: 2    3. Chills    - POCT SARS-COV Antigen KHANH (Symptomatic only)  - POCT Influenza A/B    4. Diarrhea, unspecified type    - POCT SARS-COV Antigen KHANH (Symptomatic only)  - POCT Influenza A/B    5. Lower abdominal pain      6. Familial hypercholesteremia      Clinical decision making: Point-of-care flu and COVID-negative today.  We will treat for symptomatic Salmonella with Cipro.  Instructions side effects reviewed with patient.  Will take ondansetron as needed for nausea.  Continue with Bentyl as needed.  ER precautions reviewed with patient.  She will follow-up with GI as scheduled.  In regards to cholesterol, will attempt to restart atorvastatin after current GI symptoms have resolved.  Continue follow-up with cardiology.

## 2022-09-30 ENCOUNTER — HOSPITAL ENCOUNTER (OUTPATIENT)
Dept: RADIOLOGY | Facility: MEDICAL CENTER | Age: 67
End: 2022-09-30
Attending: NURSE PRACTITIONER
Payer: MEDICARE

## 2022-09-30 DIAGNOSIS — Z12.31 ENCOUNTER FOR SCREENING MAMMOGRAM FOR BREAST CANCER: ICD-10-CM

## 2022-09-30 DIAGNOSIS — Z78.0 POST-MENOPAUSAL: ICD-10-CM

## 2022-09-30 PROCEDURE — 77063 BREAST TOMOSYNTHESIS BI: CPT

## 2022-09-30 PROCEDURE — 77080 DXA BONE DENSITY AXIAL: CPT

## 2022-10-05 NOTE — PROGRESS NOTES
Urogynecology and Pelvic Reconstructive Surgery Consultation Visit    Almaz Samuel MRN:[id number]    Referred by: Munira Morales MD    Reason for Visit:   Chief Complaint   Patient presents with    New Patient     Consult          Subjective     History of Presenting Illness:    Ms.Peggy Anthony Samuel is a 67 y.o. year old P1 who was referred by her colorectal surgeon Dr. Morales for the evaluation and management of rectocele.     She reports left sided abdominal pain (at area of prior colon recection). She noticed a pelvic bulge, worse with activities. She has difficulty emptying her rectum.     She has some stool leakage over the last months, usually diarrehea but with some solids, both urgency and passively. This corresponds to recent salmonella diagnosis.     She has history of chronic constipation starting 6 months ago    She was started on a regimen of Metamucil and MiraLAX by her colorectal surgeon. She also saw her GI doctor in [place] (Dr. Gilbert) , who took her off of all fiber and placed on soft diet.     She is also referred to pelvic floor physical therapy - Linda Karimi but is transferring to [facility].     She was previously ordered for MR defecography which was notable for rectocele, cystocele, absence of intussusception or rectal prolapse.  She is able to evacuate completely    She has history of diverticulitis, IBS, colonic polyp    Prior Pelvic surgery:   Hysterectomy  Appendectomy  2011 Sigmoid colectomy for diverticulosis  2018 Hiatal hernia repair ([place])     Prior treatment:   Dietary management  Fiber  Physical therapy (Mariela Karimi)        Pelvic floor symptom review:     Bladder:   Voids per day: 5-6 Voids per night: 0      Urinary incontinence episodes per day: a few times in the last few months    Voiding symptoms: None   UTI in last 12 months: No   Other urologic history: none      Prolapse:     Prolapse symptoms: Bulge   Duration of prolapse symptoms: 6 months  "     Bowel:    Constipation: Yes   Bowel movements per day: 1    Straining to empty bowels: Yes   Splinting to evacuate: Yes   Painful evacuation: No    Difficulty emptying rectum: Yes   Incontinence to stool: yes, occasionally previously, but over the last month or so this has been more frequent, both passive and with urgency, solid and liquid.  However, she had a Salmonella infection at this time as well   Incontinence to gas: No         Sexual function:    Sexually active: Yes   Gender of partners: Male   Pain with intercourse: with dryness       Pelvic Pain: yes, left, s/p colectomy      Past medical and surgical history    Past obstetric history   Number of vaginal deliveries: 1   Number of  deliveries: 0   History of vacuum/forceps: No    History of obstetric anal sphincter injury: Yes    Past gynecological history:    Last menstrual period: No LMP recorded. Patient has had a hysterectomy.     Past medical history:  Past Medical History:   Diagnosis Date    Hair loss 2018    Cancer (HCC) 2018    Skin - 15 years ago.    Bronchitis 2018    ROBERT (generalized anxiety disorder) 6/15/2017    Sleep apnea 2015    CPAP    H/O: CVA (cerebrovascular accident) 2014    Complex event associated with apparent medication reaction but with MRI suggesting possible multiple small infarcts of various ages, [facility]    MRSA exposure 2014    while in hospital    Fusion of spine     Unspecified vitamin D deficiency 2012    Mixed hyperlipidemia 2011    Post-menopause on HRT (hormone replacement therapy) 2011    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    exposed as a child, told not active    HTN, goal below 130/80 10/24/2011    Moderate major depression (HCC) 10/24/2011    Major depressive disorder with single episode, in full remission ([facility]) 10/24/2011    Lung collapse 11    right lung  collpsed     Anesthesia 11    PO N/V, \"slow to come out\"    Arthritis 11    spine    Backpain " 02-14-11    neck and abd. also,     Aortic atherosclerosis (HCC)     Breath shortness     with exertion    Cancer (HCC)     April/May 2018    Complication of anesthesia     Diverticulitis     Endometriosis of uterus     Familial hypercholesteremia     H/O fall     when in hospital  with low O2 sats    Heart burn     Hiatus hernia syndrome     not repaired    High cholesterol     Infectious disease      Hep C +    Scoliosis     Snoring      Past surgical history:  Past Surgical History:   Procedure Laterality Date    CT NEUROPLASTY & OR TRANSPOS MEDIAN NRV CARPAL SABRINA Right 05/31/2022    Procedure: RIGHT HAND OPEN CARPAL TUNNEL DECOMPRESSION;  Surgeon: Holly Martin M.D.;  Location: [facility] [place];  Service: Orthopedics    NISSEN FUNDOPLICATION LAPAROSCOPIC  07/25/2018    Procedure: NISSEN FUNDOPLICATION LAPAROSCOPIC;  Surgeon: Ayaz Garcia M.D.;  Location: SURGERY [facility];  Service: General    NISSEN FUNDOPLICATION LAPAROSCOPIC N/A 06/30/2015    Procedure: NISSEN FUNDOPLICATION LAPAROSCOPIC;  Surgeon: Ayaz Garcia M.D.;  Location: SURGERY SAME DAY [facility];  Service:     GASTROSCOPY-ENDO  06/24/2015    Procedure: GASTROSCOPY-ENDO;  Surgeon: Ayaz Garcia M.D.;  Location: ENDOSCOPY [facility];  Service:     CARPAL TUNNEL RELEASE  11/14/2012    Performed by Holly Martin M.D. at SURGERY [facility]    LOW ANTERIOR RESECTION LAPAROSCOPIC  03/02/2011    Performed by AYAZ GARCIA at SURGERY [facility]    CERVICAL DISK AND FUSION ANTERIOR  06/22/2010    Performed by JOSEPH DARLING at SURGERY [facility]    TUBAL LIGATION  01/01/1988    TONSILLECTOMY AND ADENOIDECTOMY  01/01/1959    ABDOMINAL HYSTERECTOMY TOTAL      APPENDECTOMY      GYN SURGERY      partial hysterectomy     Medications:has a current medication list which includes the following prescription(s): estradiol, ondansetron, ciprofloxacin, dicyclomine, lorazepam, omeprazole, azelastine hcl,  gabapentin, citalopram, baclofen, atorvastatin, and artificial tear solution.  Allergies:Albumin, Rosuvastatin, Simvastatin, and Seasonal  Family history:  Family History   Problem Relation Age of Onset    Diabetes Mother     Cancer Mother 82        breast cancer    Lung Disease Mother         copd    Hyperlipidemia Mother     Hypertension Mother     Cancer Father         Laryngeal CA    Heart Disease Father 50        CAD with bypasses x 3    Heart Attack Father     Hyperlipidemia Father     Hypertension Father     Diabetes Sister         type II diabetes    Diabetes Other     Heart Disease Other     Hypertension Other     Lung Disease Other      Social history: reports that she quit smoking about 47 years ago. Her smoking use included cigarettes. She has a 1.50 pack-year smoking history. She has never used smokeless tobacco. She reports that she does not drink alcohol and does not use drugs.    Review of systems: A full review of systems was performed, and negative with the exception of want is noted above in the HPI.        Objective        BP (!) 144/84 (BP Location: Right arm, Patient Position: Sitting, BP Cuff Size: Adult)   Pulse 76   Wt 164 lb   BMI 32.03 kg/m²     Physical Exam  Vitals reviewed. Exam conducted with a chaperone present (MA - see notes.).   Constitutional:       Appearance: Normal appearance. She is obese.   HENT:      Head: Normocephalic.      Mouth/Throat:      Mouth: Mucous membranes are moist.   Cardiovascular:      Rate and Rhythm: Normal rate.   Pulmonary:      Effort: Pulmonary effort is normal.   Abdominal:      Palpations: Abdomen is soft. There is no mass.      Tenderness: There is no abdominal tenderness.   Skin:     General: Skin is warm and dry.   Neurological:      Mental Status: She is alert.   Psychiatric:         Mood and Affect: Mood normal.       Genitourinary:    External female genitalia: WNL   Vulva: WNL   Bulbocavernosus reflex: Intact   Anal wink reflex:  Intact   Perineal sensation: WNL   Urethra: Atrophic   Vagina: Atrophic   Atrophy: Moderate   Cough stress test: Negative    Pelvic floor:    POP-Q: Aa -2 / Ba -2 / TVL 8.5 / C -5.5 / D x / Ap -1 / Bp -1 / GH 3.5 / PB 2   Palpable rectocele and palpable perineocele   Prolapse stage: 2   Paravaginal defect:  none   Cervical elongation: No    Urethral tenderness: No    Bladder/ suprapubic tenderness: No    Levator tenderness: None   Levator muscle tone: Hypotonic   Pelvic floor contraction strength (modified Oxford scale): 2=Weak   Pelvic floor contraction duration: Brief    Bimanual exam: Uterus surgically absent, no palpable Nexa masses   Anal resting tone: Decreased   Anal squeeze tone: Decreased   Palpable anal sphincter defect: Very thin anterior anal sphincter   Granulation tissue: No    Epithelial erosions: No    Epithelial ulcerations: No    Fistula: None   Vaginal band/stricture: No     Procedure Performed: none    Diagnostic test and records review:       Post-void residual: 122mL, performed by Bladder Scanner    Labs:     Radiology:       MR defecography 8/6/2022: Images reviewed  Anatomic evaluation:  Levator anatomy: Symmetric levator plates.     Functional evaluation:  Patient was able to successfully evacuate rectal gel during the examination.  [place] used for evaluation of prolapse: Pubococcygeal line (PCL).     Anterior compartment:  Bladder neck relative to the pubococcygeal line:  Rest: 1.7 cm above PCL.  Evacuation: 1.6 cm below PCL.  Urethral hypermobility: Possible     Middle compartment:  Vaginal apex location relative to the pubococcygeal line:  Rest: 3 cm above PCL.  Evacuation:  At the level of the PCL.     Posterior compartment:  Anorectal/levator plate angle:  Rest: 96 degrees; Kegel: 96 degrees; evacuation: 121 degrees  Rectal intussusception: Absent  Rectocele: Present, anterior.  Peritoneocele/enterocele/sigmoidocele: Absent.     There is pelvic floor perineal descent with  evacuation.     Other findings: The uterus is surgically absent. Bladder demonstrates a right posterior small bladder diverticulum. Visualized sigmoid colon demonstrates minimal diverticulosis. There is no pelvic adenopathy or free fluid.     IMPRESSION:     1.  Abnormal MR defecography with pelvic floor dysfunction and global perineal descent.  2.  There is a rectocele.  3.  There is a cystocele.    Documentation reviewed: Prior EMR Records    Outside records reviewed: 22 pages           Assessment & Plan     Ms.Peggy Anthony Samuel is a 67 y.o. year old P1 with rectocele, perineocele, outlet dysfunction constipation.  We discussed my recommendations for further diagnosis and treatment at length today.     1. Rectocele  2. Perineocele  3. Outlet dysfunction constipation  Ms. Samuel has symptomatic pelvic organ prolapse, posterior predominant with rectocele/perineocele. This is consistent with MR defecography findings. I reviewed the clinical findings and discussed the pathogenesis extensively, including genetic tendency, aging, menopause and childbirth injuries. Also discussed options for management, including both nonsurgical and surgical options.   Nonsurgical options include both expectant management and pessary use.  She has no apical prolapse and only distal rectocele/perineocele, pessary therapy is unlikely to be successful for her.  Due to the minimal apical/anterior descent as well as palpable perineocele/rectocele, I would recommend a native tissue site-speicic posterior repair and perineorrhaphy. The goals of this surgery would be to reduce the vaginal bulge, and to fix the structure contributing to the incomplete defecation. It will not treat her pelvic pain, general constipation/bowel issues. She was counseled on 10% risk of new dyspareunia due to scar formation, however this often improves or responds to pelvic floor PT. Counseled on same day discharge, recovery, avoidance of constipation  Recommended  natasha arora  Continue GI follow up for upper GI/ functional bowel management  She would like to continue with PT first (transfer to [facility]), and consider surgical options        4. Incomplete bladder emptying  Higher PVR today without subjective retention, hesitancy, incontinence, UTIs. No prolapse on exam. Recommend observation      5. Atrophic vaginitis  6. Vaginal dryness, menopausal  Her exam confirms vaginal atrophy / genitorurinary syndrome of menopause. This is very common and due to low estrogen levels, which render the vaginal tissue thin, irritated, and open to colonization with gut mariana. This can lead to irritation, dryness, painful sex, urinary infections and urinary urgency. Discussed risks, benefits, and indications for vaginal estrogen therapy.  Vaginal estrogen has negligible absorption into the bloodstream and is not associated with increased risks for uterine or breast cancers. Prescription given for estrace vaginal cream to be placed inside vagina nightly for 2 weeks, then twice weekly thereafter. The effects of vaginal estrogen can take weeks to months.      - estradiol (ESTRACE VAGINAL) 0.1 MG/GM vaginal cream; Apply 1g cream inside vagina twice per week  Dispense: 1 Each; Refill: 3       F/u as needed or for surgical therapy      Rajinder Myles MD, FACOG    Female Pelvic Medicine and Reconstructive Surgery  Department of Obstetrics and Gynecology  [facility]    CC: Munira Morales    This medical record contains text that has been entered with the assistance of computer voice recognition and dictation software.  Therefore, it may contain unintended errors in text, spelling, punctuation, or grammar

## 2022-10-06 ENCOUNTER — TELEPHONE (OUTPATIENT)
Dept: OBGYN | Facility: CLINIC | Age: 67
End: 2022-10-06

## 2022-10-06 ENCOUNTER — GYNECOLOGY VISIT (OUTPATIENT)
Dept: OBGYN | Facility: CLINIC | Age: 67
End: 2022-10-06
Payer: MEDICARE

## 2022-10-06 ENCOUNTER — HOSPITAL ENCOUNTER (OUTPATIENT)
Dept: RADIOLOGY | Facility: MEDICAL CENTER | Age: 67
End: 2022-10-06
Attending: NURSE PRACTITIONER
Payer: MEDICARE

## 2022-10-06 VITALS
WEIGHT: 164 LBS | HEART RATE: 76 BPM | SYSTOLIC BLOOD PRESSURE: 144 MMHG | BODY MASS INDEX: 32.03 KG/M2 | DIASTOLIC BLOOD PRESSURE: 84 MMHG

## 2022-10-06 DIAGNOSIS — N81.81 PERINEOCELE: ICD-10-CM

## 2022-10-06 DIAGNOSIS — K59.02 OUTLET DYSFUNCTION CONSTIPATION: ICD-10-CM

## 2022-10-06 DIAGNOSIS — R33.9 INCOMPLETE BLADDER EMPTYING: Primary | ICD-10-CM

## 2022-10-06 DIAGNOSIS — R92.8 ABNORMAL MAMMOGRAM: ICD-10-CM

## 2022-10-06 DIAGNOSIS — N81.6 RECTOCELE: ICD-10-CM

## 2022-10-06 DIAGNOSIS — N95.2 ATROPHIC VAGINITIS: ICD-10-CM

## 2022-10-06 DIAGNOSIS — N95.1 VAGINAL DRYNESS, MENOPAUSAL: ICD-10-CM

## 2022-10-06 LAB
APPEARANCE UR: NORMAL
BILIRUB UR STRIP-MCNC: NORMAL MG/DL
COLOR UR AUTO: NORMAL
GLUCOSE UR STRIP.AUTO-MCNC: NEGATIVE MG/DL
KETONES UR STRIP.AUTO-MCNC: NEGATIVE MG/DL
LEUKOCYTE ESTERASE UR QL STRIP.AUTO: NORMAL
NITRITE UR QL STRIP.AUTO: NEGATIVE
PH UR STRIP.AUTO: 5.5 [PH] (ref 5–8)
PROT UR QL STRIP: NEGATIVE MG/DL
RBC UR QL AUTO: NEGATIVE
SP GR UR STRIP.AUTO: >=1.03
UROBILINOGEN UR STRIP-MCNC: NORMAL MG/DL

## 2022-10-06 PROCEDURE — 81002 URINALYSIS NONAUTO W/O SCOPE: CPT | Performed by: STUDENT IN AN ORGANIZED HEALTH CARE EDUCATION/TRAINING PROGRAM

## 2022-10-06 PROCEDURE — 99204 OFFICE O/P NEW MOD 45 MIN: CPT | Performed by: STUDENT IN AN ORGANIZED HEALTH CARE EDUCATION/TRAINING PROGRAM

## 2022-10-06 PROCEDURE — 76642 ULTRASOUND BREAST LIMITED: CPT | Mod: RT

## 2022-10-06 RX ORDER — ESTRADIOL 0.1 MG/G
CREAM VAGINAL
Qty: 1 EACH | Refills: 3 | Status: SHIPPED | OUTPATIENT
Start: 2022-10-06

## 2022-10-06 ASSESSMENT — FIBROSIS 4 INDEX: FIB4 SCORE: 0.92

## 2022-10-06 NOTE — PROGRESS NOTES
PT here today for consult   PT States rectal and vaginal prolapse   Hysterectomy? Partial   Good #: [phone] (home)   PVR : 122 mL  Pharmacy Verified

## 2022-10-06 NOTE — TELEPHONE ENCOUNTER
Attempted to contact pt to get information regarding her medicare insurance in order to complete PA, If pt returns back please ask for her Medicare Member ID and/or RxGroup # or RX Bin #.

## 2022-10-06 NOTE — TELEPHONE ENCOUNTER
Pt called because the price of her medication is very expensive. I spoke with ADAM Stephenson she stated she will complete a PA I told pt a PA will be submitted and she will be contacted when results. Pt stated she understands and call was disconnected.

## 2022-10-06 NOTE — Clinical Note
Please fax consult note to (1) Dr. Munira Morales (referring MD, [facility]), and (2) Dr. Gilbert (GI consultants [place], at patient request  Thanks!

## 2022-10-07 ENCOUNTER — TELEPHONE (OUTPATIENT)
Dept: OBGYN | Facility: CLINIC | Age: 67
End: 2022-10-07
Payer: MEDICARE

## 2022-10-07 NOTE — TELEPHONE ENCOUNTER
Contacted PT to inform her that I submitted a PA for her Estradiol cream as she states it was too expensive, after submitting PA I see [employer] already covers this medication but at the cost of $90. I informed her this tube should last about 3 months if applied correctly, she can either  as is for the $90 or try good RX which if this medication is on Good Rx should bring the cost down to about $50. Pt verbalized understanding.

## 2022-10-11 ENCOUNTER — HOSPITAL ENCOUNTER (OUTPATIENT)
Facility: MEDICAL CENTER | Age: 67
End: 2022-10-11
Attending: INTERNAL MEDICINE
Payer: MEDICARE

## 2022-10-11 PROCEDURE — 87507 IADNA-DNA/RNA PROBE TQ 12-25: CPT

## 2022-10-14 LAB — TEST NAME 95000: NORMAL

## 2022-12-01 ENCOUNTER — OFFICE VISIT (OUTPATIENT)
Dept: MEDICAL GROUP | Facility: PHYSICIAN GROUP | Age: 67
End: 2022-12-01
Payer: MEDICARE

## 2022-12-01 VITALS
SYSTOLIC BLOOD PRESSURE: 140 MMHG | BODY MASS INDEX: 32.59 KG/M2 | TEMPERATURE: 99.1 F | DIASTOLIC BLOOD PRESSURE: 90 MMHG | WEIGHT: 166 LBS | RESPIRATION RATE: 16 BRPM | HEIGHT: 60 IN | HEART RATE: 118 BPM | OXYGEN SATURATION: 93 %

## 2022-12-01 DIAGNOSIS — G47.00 INSOMNIA, PERSISTENT: ICD-10-CM

## 2022-12-01 DIAGNOSIS — R68.89 FLU-LIKE SYMPTOMS: ICD-10-CM

## 2022-12-01 DIAGNOSIS — M54.16 LUMBAR RADICULOPATHY, CHRONIC: ICD-10-CM

## 2022-12-01 DIAGNOSIS — Z79.899 CHRONIC USE OF BENZODIAZEPINE FOR THERAPEUTIC PURPOSE: ICD-10-CM

## 2022-12-01 DIAGNOSIS — F41.1 GENERALIZED ANXIETY DISORDER: ICD-10-CM

## 2022-12-01 DIAGNOSIS — R05.1 ACUTE COUGH: ICD-10-CM

## 2022-12-01 LAB
EXTERNAL QUALITY CONTROL: NORMAL
FLUAV+FLUBV AG SPEC QL IA: NORMAL
INT CON NEG: NORMAL
INT CON POS: NORMAL
S PYO AG THROAT QL: NORMAL
SARS-COV+SARS-COV-2 AG RESP QL IA.RAPID: NEGATIVE

## 2022-12-01 PROCEDURE — 99214 OFFICE O/P EST MOD 30 MIN: CPT | Mod: CS | Performed by: NURSE PRACTITIONER

## 2022-12-01 PROCEDURE — 87804 INFLUENZA ASSAY W/OPTIC: CPT | Performed by: NURSE PRACTITIONER

## 2022-12-01 PROCEDURE — 87880 STREP A ASSAY W/OPTIC: CPT | Performed by: NURSE PRACTITIONER

## 2022-12-01 PROCEDURE — 87426 SARSCOV CORONAVIRUS AG IA: CPT | Performed by: NURSE PRACTITIONER

## 2022-12-01 RX ORDER — AZELASTINE HYDROCHLORIDE 137 UG/1
2 SPRAY, METERED NASAL 2 TIMES DAILY
Qty: 30 ML | Refills: 5 | Status: SHIPPED | OUTPATIENT
Start: 2022-12-01

## 2022-12-01 RX ORDER — BENZONATATE 100 MG/1
100 CAPSULE ORAL 3 TIMES DAILY PRN
Qty: 60 CAPSULE | Refills: 0 | Status: SHIPPED | OUTPATIENT
Start: 2022-12-01 | End: 2023-09-19

## 2022-12-01 RX ORDER — MELOXICAM 15 MG/1
15 TABLET ORAL PRN
COMMUNITY
Start: 2022-11-04 | End: 2024-01-09

## 2022-12-01 RX ORDER — LORAZEPAM 1 MG/1
1 TABLET ORAL
Qty: 30 TABLET | Refills: 2 | Status: SHIPPED | OUTPATIENT
Start: 2022-12-01 | End: 2023-02-23 | Stop reason: SDUPTHER

## 2022-12-01 RX ORDER — BACLOFEN 10 MG/1
10 TABLET ORAL
Qty: 90 TABLET | Refills: 3 | Status: SHIPPED | OUTPATIENT
Start: 2022-12-01 | End: 2024-01-04 | Stop reason: SDUPTHER

## 2022-12-01 ASSESSMENT — FIBROSIS 4 INDEX: FIB4 SCORE: 0.92

## 2022-12-01 NOTE — ASSESSMENT & PLAN NOTE
Patient reports a 6-day onset of worsening symptoms of sore throat, sinus congestion, eye pressure, chills, cough, right otalgia.  Denies fever, bowel changes, nausea.  Has been taking holistic patients to help with cold symptoms, Mucinex.   has been sick with similar symptoms for 2 weeks.  Tested negative for COVID.

## 2022-12-01 NOTE — ASSESSMENT & PLAN NOTE
This is a chronic health problem that is well controlled with current medications and lifestyle measures.  Use with Celexa 10 mg daily.  Continues to take lorazepam at bedtime for both sleep and anxiety.  Has been taking medication for several years.  Denies adverse effects.  Sleeping about 6 hours a night.  Understands not to drink alcohol due to increased risk of respiratory depression.

## 2022-12-01 NOTE — PROGRESS NOTES
CC: Medication refill follow-up on chronic health problems, flulike symptoms    HISTORY OF THE PRESENT ILLNESS: Patient is a 67 y.o. female. This pleasant patient is here today for evaluation and management of the following health problems.      Flu-like symptoms  Patient reports a 6-day onset of worsening symptoms of sore throat, sinus congestion, eye pressure, chills, cough, right otalgia.  Denies fever, bowel changes, nausea.  Has been taking holistic patients to help with cold symptoms, Mucinex.   has been sick with similar symptoms for 2 weeks.  Tested negative for COVID.    Insomnia, persistent  Please see additional notes under anxiety.    ROBERT (generalized anxiety disorder)  This is a chronic health problem that is well controlled with current medications and lifestyle measures.  Use with Celexa 10 mg daily.  Continues to take lorazepam at bedtime for both sleep and anxiety.  Has been taking medication for several years.  Denies adverse effects.  Sleeping about 6 hours a night.  Understands not to drink alcohol due to increased risk of respiratory depression.    Allergies: Albumin, Rosuvastatin, Simvastatin, and Seasonal    Current Outpatient Medications Ordered in Epic   Medication Sig Dispense Refill    LORazepam (ATIVAN) 1 MG Tab Take 1 Tablet by mouth at bedtime for 90 days. 30 Tablet 2    baclofen (LIORESAL) 10 MG Tab Take 1 Tablet by mouth at bedtime. 90 Tablet 3    Azelastine HCl 137 MCG/SPRAY Solution Administer 2 Sprays into each nostril 2 times a day. 30 mL 5    benzonatate (TESSALON) 100 MG Cap Take 1 Capsule by mouth 3 times a day as needed for Cough. 60 Capsule 0    gabapentin (NEURONTIN) 300 MG Cap Take 300 mg twice a day. Increase by one cap every 4-5 days to a max of 3 in the morning and 3 at night. 90 Capsule 5    ondansetron (ZOFRAN ODT) 4 MG TABLET DISPERSIBLE Take 1 Tablet by mouth every 6 hours as needed for Nausea. 10 Tablet 2    dicyclomine (BENTYL) 10 MG Cap TAKE 1 CAPSULE BY  "MOUTH EVERY 6 HOURS AS NEEDED FOR ABDOMINAL CRAMPS AND OR DISCOMFORT      omeprazole (PRILOSEC) 20 MG delayed-release capsule Take 1 capsule by mouth once daily 30 Capsule 11    citalopram (CELEXA) 10 MG tablet Take 1 tablet by mouth once daily 90 Tablet 3    atorvastatin (LIPITOR) 40 MG Tab Take 1 Tab by mouth every day. 90 Tab 3    Artificial Tear Solution (TEARS NATURALE OP) Place 2 Drops in both eyes 2 times a day as needed.      meloxicam (MOBIC) 15 MG tablet Take 15 mg by mouth as needed.      estradiol (ESTRACE VAGINAL) 0.1 MG/GM vaginal cream Apply 1g cream inside vagina twice per week 1 Each 3     No current Epic-ordered facility-administered medications on file.       Past Medical History:   Diagnosis Date    Anesthesia 02-14-11    PO N/V, \"slow to come out\"    Aortic atherosclerosis (HCC)     Arthritis 02-14-11    spine    Backpain 02-14-11    neck and abd. also,     Breath shortness     with exertion    Bronchitis 05/2018    Cancer ([facility]) 07/18/2018    Skin - 15 years ago.    Cancer (HCC)     April/May 2018    Complication of anesthesia     Diverticulitis     Endometriosis of uterus     Familial hypercholesteremia     Fusion of spine 2014    ROBERT (generalized anxiety disorder) 6/15/2017    H/O fall     when in hospital  with low O2 sats    H/O: CVA (cerebrovascular accident) 8/22/2014    Complex event associated with apparent medication reaction but with MRI suggesting possible multiple small infarcts of various ages, [facility]    Hair loss 12/6/2018    Heart burn     Hiatus hernia syndrome     not repaired    High cholesterol     HTN, goal below 130/80 10/24/2011    Infectious disease      Hep C +    Lung collapse 02-14-11    right lung 1/4 collpsed     Major depressive disorder with single episode, in full remission ([facility]) 10/24/2011    Mixed hyperlipidemia 12/27/2011    Moderate major depression ([facility]) 10/24/2011    MRSA exposure 8/2014    while in hospital    Post-menopause on HRT (hormone replacement " therapy) 2011    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    exposed as a child, told not active    Scoliosis     Sleep apnea 2015    CPAP    Snoring     Unspecified vitamin D deficiency 2012       Past Surgical History:   Procedure Laterality Date    LA NEUROPLASTY & OR TRANSPOS MEDIAN NRV CARPAL SABRINA Right 2022    Procedure: RIGHT HAND OPEN CARPAL TUNNEL DECOMPRESSION;  Surgeon: Holly Martin M.D.;  Location: [facility] [place];  Service: Orthopedics    NISSEN FUNDOPLICATION LAPAROSCOPIC  2018    Procedure: NISSEN FUNDOPLICATION LAPAROSCOPIC;  Surgeon: Ayaz Garcia M.D.;  Location: SURGERY [facility];  Service: General    NISSEN FUNDOPLICATION LAPAROSCOPIC N/A 2015    Procedure: NISSEN FUNDOPLICATION LAPAROSCOPIC;  Surgeon: Ayaz Garcia M.D.;  Location: SURGERY SAME DAY [facility];  Service:     GASTROSCOPY-ENDO  2015    Procedure: GASTROSCOPY-ENDO;  Surgeon: Ayaz Garcia M.D.;  Location: ENDOSCOPY [facility];  Service:     CARPAL TUNNEL RELEASE  2012    Performed by Holly Martin M.D. at SURGERY [facility] ORS    LOW ANTERIOR RESECTION LAPAROSCOPIC  2011    Performed by AYAZ GARCIA at SURGERY [facility] ORS    CERVICAL DISK AND FUSION ANTERIOR  2010    Performed by JOSEPH DARLING at SURGERY [facility] ORS    TUBAL LIGATION  1988    TONSILLECTOMY AND ADENOIDECTOMY  1959    ABDOMINAL HYSTERECTOMY TOTAL      APPENDECTOMY      GYN SURGERY      partial hysterectomy       Social History     Tobacco Use    Smoking status: Former     Packs/day: 0.30     Years: 5.00     Pack years: 1.50     Types: Cigarettes     Quit date: 1975     Years since quittin.9    Smokeless tobacco: Never    Tobacco comments:     quit    Vaping Use    Vaping Use: Never used   Substance Use Topics    Alcohol use: No     Alcohol/week: 0.0 oz    Drug use: No       Family History   Problem Relation Age of Onset    Diabetes Mother     Cancer Mother 82         breast cancer    Lung Disease Mother         copd    Hyperlipidemia Mother     Hypertension Mother     Cancer Father         Laryngeal CA    Heart Disease Father 50        CAD with bypasses x 3    Heart Attack Father     Hyperlipidemia Father     Hypertension Father     Diabetes Sister         type II diabetes    Diabetes Other     Heart Disease Other     Hypertension Other     Lung Disease Other        ROS: As in HPI, otherwise negative for chest pain, dyspnea, abdominal pain, dysuria, blood in stool, fever         Exam: BP (!) 140/90 (BP Location: Left arm)   Pulse (!) 118   Temp 37.3 °C (99.1 °F) (Temporal)   Resp 16   Ht 1.524 m (5')   Wt 75.3 kg (166 lb)   SpO2 93%  Body mass index is 32.42 kg/m².    General: Alert, pleasant, well nourished, well developed female in NAD  HEENT: Normocephalic. Eyes conjunctiva clear lids without ptosis, pupils equal and reactive to light, ears normal shape and contour, canals are clear bilaterally, tympanic membranes are pearly gray with good light reflex, nasal mucosa without erythema and drainage, oropharynx is with mild erythema and no edema or exudates.   Neck: Supple without bruit. Thyroid is not enlarged.  Pulmonary: Clear to ausculation.  Normal effort. No rales, ronchi, or wheezing.  Dry cough heard on exam.  Cardiovascular: Normal rate and rhythm without murmur. Carotid and radial pulses are intact and equal bilaterally.  No lower extremity edema.  Abdomen: Soft, nontender, nondistended. Normal bowel sounds. Liver and spleen are not palpable  Neurologic: Grossly nonfocal  Lymph: No cervical or supraclavicular lymph nodes are palpable  Skin: Warm and dry.    Musculoskeletal: Normal gait.   Psych: Normal mood and affect. Alert and oriented. Judgment and insight is normal.    Please note that this dictation was created using voice recognition software. I have made every reasonable attempt to correct obvious errors, but I expect that there are errors of grammar  and possibly content that I did not discover before finalizing the note.      Assessment/Plan  1. Insomnia, persistent  Well-controlled.  Continue with lorazepam.  Reviewed good sleep hygiene.  - LORazepam (ATIVAN) 1 MG Tab; Take 1 Tablet by mouth at bedtime for 90 days.  Dispense: 30 Tablet; Refill: 2    2. Lumbar radiculopathy, chronic  Patient requesting refill  - baclofen (LIORESAL) 10 MG Tab; Take 1 Tablet by mouth at bedtime.  Dispense: 90 Tablet; Refill: 3    3. Flu-like symptoms  Point-of-care strep, influenza, COVID all negative.  Explained to patient she likely has a virus and advised on comfort measures.  Did advise that things can change and it can become bacterial.  If symptoms worsen, patient will return to clinic or go to ER.  Will prescribe benzonatate for cough.  Instructions and side effects reviewed with patient.  - POCT Rapid Strep A  - POCT Influenza A/B  - POCT SARS-COV Antigen KHANH (Symptomatic only)    4. Acute cough    - benzonatate (TESSALON) 100 MG Cap; Take 1 Capsule by mouth 3 times a day as needed for Cough.  Dispense: 60 Capsule; Refill: 0    5. ROBERT (generalized anxiety disorder)  Well-controlled.  Continue with citalopram and lorazepam as needed at bedtime.  - LORazepam (ATIVAN) 1 MG Tab; Take 1 Tablet by mouth at bedtime for 90 days.  Dispense: 30 Tablet; Refill: 2    6. Chronic use of benzodiazepine for therapeutic purpose  This patient is continuing to use a controlled substance (CS) on a long term basis.  The patient is thoroughly aware of the goals of treatment with the CS  The patient is aware that yearly and random urine drug screens are required.  The patient has been instructed to take the CS only as prescribed.  The patient is prohibited from sharing the CS with any other person.  The patient is instructed to inform the provider if any other CS is taken, of any alcohol or cannabis or other recreational drug use, any treatment for side effects of the CS or complications, if  they have CS active rx in other states  The patient has evidence for a reason for the CS  The treatment plan has been discussed with the patient  The  report has been reviewed      Patient will return to clinic in 3 months for medication refill or sooner if needed.

## 2022-12-05 ENCOUNTER — OFFICE VISIT (OUTPATIENT)
Dept: URGENT CARE | Facility: PHYSICIAN GROUP | Age: 67
End: 2022-12-05
Payer: MEDICARE

## 2022-12-05 VITALS
SYSTOLIC BLOOD PRESSURE: 114 MMHG | WEIGHT: 165 LBS | TEMPERATURE: 98.5 F | OXYGEN SATURATION: 95 % | HEIGHT: 60 IN | DIASTOLIC BLOOD PRESSURE: 78 MMHG | HEART RATE: 88 BPM | BODY MASS INDEX: 32.39 KG/M2 | RESPIRATION RATE: 16 BRPM

## 2022-12-05 DIAGNOSIS — J01.40 ACUTE NON-RECURRENT PANSINUSITIS: ICD-10-CM

## 2022-12-05 PROCEDURE — 99213 OFFICE O/P EST LOW 20 MIN: CPT | Performed by: NURSE PRACTITIONER

## 2022-12-05 RX ORDER — AMOXICILLIN AND CLAVULANATE POTASSIUM 875; 125 MG/1; MG/1
1 TABLET, FILM COATED ORAL 2 TIMES DAILY
Qty: 10 TABLET | Refills: 0 | Status: SHIPPED | OUTPATIENT
Start: 2022-12-05 | End: 2022-12-10

## 2022-12-05 ASSESSMENT — ENCOUNTER SYMPTOMS
HEMOPTYSIS: 0
WHEEZING: 0
CHILLS: 0
SINUS PAIN: 1
SORE THROAT: 1
SHORTNESS OF BREATH: 0
COUGH: 1
FEVER: 0
SPUTUM PRODUCTION: 0

## 2022-12-05 ASSESSMENT — FIBROSIS 4 INDEX: FIB4 SCORE: 0.92

## 2022-12-06 NOTE — PROGRESS NOTES
Subjective     Almaz Samuel is a 67 y.o. female who presents with Pharyngitis (X 1 week on an off), Otalgia (X 1 week on an off), and Cough (X 1 week bad in the A.M. an P.M.)            Almaz saw her PCP last week with URI symptoms.  She had a negative POCT rapid strep a, covid and influenza a/b.  She reports persistent symptoms with worsening nasal congestion with diffuse sinus pain, post nasal drainage, ear fullness, and a cough that is worse when supine and on first rising.  No fever, chills, or shortness of breath.        Review of Systems   Constitutional:  Positive for malaise/fatigue. Negative for chills and fever.   HENT:  Positive for congestion, ear pain, sinus pain and sore throat.    Respiratory:  Positive for cough. Negative for hemoptysis, sputum production, shortness of breath and wheezing.    Cardiovascular:  Negative for chest pain.      Medications, Allergies, and current problem list reviewed today in Epic      Objective     Blood Pressure 114/78   Pulse 88   Temperature 36.9 °C (98.5 °F) (Temporal)   Respiration 16   Height 1.524 m (5')   Weight 74.8 kg (165 lb)   Oxygen Saturation 95%   Body Mass Index 32.22 kg/m²      Physical Exam  Vitals reviewed.   Constitutional:       General: She is not in acute distress.     Appearance: Normal appearance. She is well-developed. She is not ill-appearing, toxic-appearing or diaphoretic.   HENT:      Head: Normocephalic.      Right Ear: Tympanic membrane, ear canal and external ear normal. There is no impacted cerumen.      Left Ear: Tympanic membrane, ear canal and external ear normal. There is no impacted cerumen.      Nose: Congestion present. No rhinorrhea.      Comments: Diffuse sinus TTP.     Mouth/Throat:      Mouth: Mucous membranes are moist.      Pharynx: Posterior oropharyngeal erythema present. No oropharyngeal exudate.      Comments: Post nasal drainage streaking noted.  Eyes:      General: No scleral icterus.        Right eye: No  discharge.         Left eye: No discharge.      Conjunctiva/sclera: Conjunctivae normal.   Neck:      Thyroid: No thyromegaly.      Vascular: No JVD.      Trachea: No tracheal deviation.   Cardiovascular:      Rate and Rhythm: Normal rate and regular rhythm.      Heart sounds: Normal heart sounds. No murmur heard.    No friction rub. No gallop.   Pulmonary:      Effort: Pulmonary effort is normal. No respiratory distress.      Breath sounds: Normal breath sounds. No stridor. No wheezing, rhonchi or rales.      Comments: No cough in clinic.  Chest:      Chest wall: No tenderness.   Musculoskeletal:      Cervical back: Neck supple.   Lymphadenopathy:      Cervical: No cervical adenopathy.   Skin:     General: Skin is warm and dry.      Coloration: Skin is not jaundiced or pale.   Neurological:      Mental Status: She is alert and oriented to person, place, and time.                           Assessment & Plan        1. Acute non-recurrent pansinusitis    - amoxicillin-clavulanate (AUGMENTIN) 875-125 MG Tab; Take 1 Tablet by mouth 2 times a day for 5 days.  Dispense: 10 Tablet; Refill: 0    Discussed exam findings with Almaz. Differential reviewed.  Take full course of antibiotics.  OTC cold medications prn symptom management.  Maintain adequate po hydration.  RTC in 5-7 days if symptoms persist, sooner if worse.  She verbalized understanding of and agreed with plan of care.

## 2022-12-19 ENCOUNTER — APPOINTMENT (OUTPATIENT)
Dept: OBGYN | Facility: CLINIC | Age: 67
End: 2022-12-19
Payer: MEDICARE

## 2023-01-26 ENCOUNTER — GYNECOLOGY VISIT (OUTPATIENT)
Dept: OBGYN | Facility: CLINIC | Age: 68
End: 2023-01-26
Payer: MEDICARE

## 2023-01-26 VITALS
SYSTOLIC BLOOD PRESSURE: 147 MMHG | HEART RATE: 89 BPM | DIASTOLIC BLOOD PRESSURE: 77 MMHG | WEIGHT: 169 LBS | BODY MASS INDEX: 33.01 KG/M2

## 2023-01-26 DIAGNOSIS — N95.2 ATROPHIC VAGINITIS: ICD-10-CM

## 2023-01-26 DIAGNOSIS — N81.6 RECTOCELE: ICD-10-CM

## 2023-01-26 DIAGNOSIS — N81.81 PERINEOCELE: ICD-10-CM

## 2023-01-26 DIAGNOSIS — N39.46 URINARY INCONTINENCE, MIXED: ICD-10-CM

## 2023-01-26 DIAGNOSIS — K59.02 OUTLET DYSFUNCTION CONSTIPATION: ICD-10-CM

## 2023-01-26 PROCEDURE — 99214 OFFICE O/P EST MOD 30 MIN: CPT | Performed by: STUDENT IN AN ORGANIZED HEALTH CARE EDUCATION/TRAINING PROGRAM

## 2023-01-26 ASSESSMENT — FIBROSIS 4 INDEX: FIB4 SCORE: 0.92
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
## 2023-01-26 NOTE — PATIENT INSTRUCTIONS
Pre-op: What you should know before your surgery  Vaginal reconstructive surgery for prolapse       You are scheduled for surgery to fix your prolapse (vaginal bulge) using sutures to suspend your own tissues back into a supported position. These surgeries are minimally invasive, meaning that all incisions are small and hidden inside the vagina. The documents in this packet will outline each part of the surgery. There may be a few “possible” surgeries listed. We use the word “possible” because we tailor the surgery based on your needs. This means we won't know how much surgery you'll need until after you receive anesthesia and fall asleep.     When you fall asleep, the pelvic muscles will relax, allowing us to determine how much surgery you need. We will do as few procedures as possible to fix your prolapse but will address each area as needed.     Goals of prolapse surgery: The primary goal of surgery is to repair the prolapse and eliminate the symptoms of a vaginal bulge and pressure. Depending on your symptoms, the surgery may possibly improve bladder and/or rectal emptying, as well as urinary incontinence.      Prolapse recurrence:  No permanent mesh material will be used to fix prolapse in this procedure. However, since we are relying on your own tissues to heal into place and correct the prolapse, there is a risk that your symptoms may return over time. The majority of patients are satisfied with their repair long-term and do not require any further surgery. However, after non-mesh vaginal surgery, prolapse can eventually return in up to half of women. About 10% of women requiring another treatment. Your personal risk of recurrence/retreatment is based on multiple factors including genetics, your body weight, smoking, frequent heavy lifting, and future vaginal deliveries.      Sexual function:  Following surgical repair, most patients experience improvements in their sexual function. Surgery tends to improve  the general discomfort of sex associated with prolapse. However, if you have a long history of pain with sex (either externally or internally), this is unlikely to be due to prolapse. Therefore, surgery may not improve these symptoms.     Surgery involves the cutting and re-sewing of the vaginal tissues . Scar tissue may form after surgery, which can lead to painful sex for some patients. In many patients, this new pain goes away over time or can be improved with pelvic physical therapy, lubrication, dilators, or vaginal estrogen.     Bladder urgency and incontinence after surgery:  Bladder tests may be performed before your surgery to see whether you are at risk for new or worsening urinary leakage after prolapse repair. Our bladder testing is a great tool to find out who is at risk for urinary leakage, but it is not perfect. There is a chance you may have new or increased leakage with coughing, sneezing, or laughing after surgery. Problems with leakage can be addressed in a number of ways. Bladder urgency and/or frequency often improves after surgery, but it may worsen in some patients. We have various medications and therapies that can help with this urgency after surgery, if necessary    Risk of postoperative pain: Pain after prolapse surgery is a normal part of the healing process, but usually well-tolerated. Common areas of pain include the pelvis, buttocks, hips and back, often related to positioning. Expect to have some pain when you are discharged home. This should improve with time and with use of medications. See “after surgery” instructions for more details on pain control.     General risks of pelvic reconstructive surgery: Specific risks for your procedure are discussed separately  Anesthesia: With modern anesthetics and monitoring equipment, complications due to anesthesia are very rare. Your anesthesiologist will discuss what will be most suitable for you on the day of surgery  Bleeding: All surgery  results in bleeding, however this surgery usually amounts to blood loss between a tablespoon and a teacup. Large amounts of blood loss that requires a blood transfusion are not common (only 1-2% of women) in prolapse surgery.  Blood transfusion, if needed, is safe. Minor reactions like fever or allergy occur in less than 1% of transfusions. Risks of an infection or mismatched blood is very rare (less than 1 out of every 100,000 transfusions).   Infection: The most common infection with prolapse surgery is a urinary tract infection (UTI). This is easily treated. Wound infections occur in 2-3% of patients. Rarely, infection of the abdominal/vaginal wounds may occur, or abscesses in the pelvis may develop. These infections may need to be treated with antibiotics or another procedure to fix them. Risk factors for infections and wound complications include diabetes, smoking, obesity, and other chronic illnesses.  Blood Clots: Clots in the blood vessels of the legs and lungs are potentially dangerous and can occur in patients undergoing prolapse surgery. This is prevented with leg compression during surgery, and sometimes, an injected blood thinner. We strongly encourage you to stay mobile because this is an important way to prevent clots.  Damage to surrounding organs. The pelvic organs are all very close together. The risk of damage to other organs increases if you have had prior pelvic surgeries, as well as a history of endometriosis or pelvic infection. These conditions can cause scar tissue to develop in the pelvis. Scar tissue can cause organs to stick together, making the surgery more difficult.    Ureter and bladder damage: The ureters are tubes that carry urine from the kidneys to the bladder. They travel very close to the uterus and vagina. The bladder is attached to both your uterus and the front of the vagina. Damage/injury can happen during prolapse-repair procedures. A cystoscopy (camera in the bladder) will  be done during your surgery to ensure there is no bladder or ureter damage/injury. If injury occurs, it may require temporary placement of a stent (drainage tube). In rare cases, a larger abdominal incision may be needed to repair the injury. A bladder catheter may need to stay in place temporarily after surgery.  Bowel damage: Bowel damage/injury is uncommon during your surgery. Any damage that is seen in surgery will be fixed. Very rarely, a temporary ostomy (connection of bowel to the front of the abdomen and collection of stool with a bag) is required for a higher-risk bowel injury.  Vascular damage: Major damage to blood vessels is uncommon. If this occurs, it may require a larger surgery and/or blood transfusion.  Fistula: A fistula is an abnormal connection between the vagina and either the bladder or rectum. A fistula leads to leakage of urine or stool. These are rare complications that arise during healing from pelvic surgery that sometimes require additional surgeries to correct. We take great care is during your surgery to prevent these fistulas. If they do occur, your Urogynecologist is the leading expert in fixing them.        Main surgical procedure:  Posterior repair, perineorrhaphy  (fix prolapse of the back of the vagina/rectum and pelvic muscles)        The entire procedure will be completed in a minimally invasive manner, with all incisions inside of the vagina. Once you are asleep, a thorough exam will be performed to confirm the areas needing repair. A cut is made along the back wall of the vagina where the rectum is pushing down, and the skin above the rectum is  from the underlying supportive tissue. This stronger tissue underneath is then repaired using sutures that will dissolve over time. Sometimes excess skin is removed. The skin is then closed.     If the area between the vagina and the anus (called the perineum) is weak, then sutures will be placed to make that area stronger.  "This is called a \"perineorrhaphy.\" This will be just like a repair of an episiotomy or a vaginal tear after having a baby.     The risk of these procedures is similar to those mentioned in the “pre-op” leaflet. However, any surgery to the back wall of the vagina carries a higher risk of pain with sexual intercourse after surgery (up to 10%) due to scar formation. Great care is taken not to narrow the vagina more than necessary. The perineorrhaphy (or episiotomy type of repair) can be uncomfortable and may be difficult to sit normally for up to 6 weeks after surgery. You may use a doughnut cushion to sit on if needed.          Possible Procedure:   Apical repair  (fix prolapse of the top of the vagina/uterus)        Often, the pelvic exam in the office is limited by discomfort and pelvic muscle tension. The exam will be repeated in detail when you are relaxed and asleep with anesthesia. At this time, if your surgeon notes that you have significant prolapse of the top (apex) of the vagina, they will proceed with an apical repair. The repair will be done by lifting the top of the vagina with sutures and connecting it to your existing pelvic structures, such as the sacrospinous ligament, uterosacral ligament, and/or iliococcygeus muscle.     In addition to the general surgical risks listed above, this procedure carries the risk of:  Buttock pain: The suture in the ligament sometimes causes an aching buttock discomfort as it heals. This is temporary, and should resolve over a few weeks. Rarely, this leads to severe pain from nerve entrapment, which is repaired on the same day of surgery by removing the suture.  Hematoma (pooling of blood) in the areas behind the vagina. This is due to the many small blood vessels in this area. You may require a vaginal gauze packing in the vagina after the surgery. More rarely, a large blood clot may require another procedure to drain and repair the area.      Possible Procedure: "   Anterior Repair  (fix prolapse of the vagina/bladder)      After the above procedure, if there is still prolapse in the anterior (front) compartment under the bladder, your surgeon will perform an “anterior repair” to reinforce this tissue. A cut is made along the front wall of your vagina where the bladder is pushing down, and your skin is  from the underlying supportive tissue. This stronger tissue underneath is then repaired using sutures that will dissolve over time. Sometimes excess skin is removed. The skin is then closed. A cystoscopy (camera in bladder) will be performed to confirm the repair is successful and no injury has occurred.     Specific risks for this procedure include damage to the bladder, urethra (the tube where you urinate from) or ureters (tubes that carry urine from the kidneys to the bladder). This is uncommon. Bladder damage may result in having a catheter in the bladder for a longer amount of time.       Post-op: What to expect after your surgery      Recovery room  You can expect to stay in the recovery room for a few hours until you are alert. There may be a gauze packing in your vagina, which will be removed before you go home. Pain is normal after surgery but should be tolerable. Your pain will become less over the first 2 weeks. If your pain is difficult to control or you need more nursing care, you will stay in the hospital overnight. Most patients do very well going home on the day of surgery.     Bladder function  You will wake up with a small tube (catheter) in your bladder. A bladder test, called a “void trial”, will be performed by the nurse before you go home. The test is done to make sure you can empty your bladder normally. To do the bladder test, the nurse will fill your bladder with sterile water, remove the catheter, and give you 30 minutes to try and urinate (pee) on your own. If you cannot urinate, or if you only urinate a small amount, you will need to:   Go  home with a catheter that stays in your bladder OR  Learn to put a catheter into your bladder a few times a day to empty it    Use of a catheter is temporary and usually needed for 2-4 days. It is very common for the bladder to work slowly, or sluggishly, after this type of surgery. One in every 3-4 patients will require some type of catheterization. An antibiotic may be prescribed while the catheter is in place to decrease the risk of infection.    Call the surgeon for treatment, if you have signs and symptoms of a urinary tract infection, including:  Burning with urination  Bladder pain  Worsening need to urinate right away,  Urine with a bad smell    Vaginal care  Do not go swimming, take sitting baths, and have sexual intercourse for 6 weeks after surgery Do not place anything in your vagina except vaginal estrogen cream, if instructed to do so by your surgeon.     Vaginal discharge and bleeding/spotting is also normal through the entire 6-week recovery. Sometimes small sutures will fall out of the vagina as they dissolve. This is normal. Contact the office with any heavy bleeding, foul discharge or worsening pain.     Pain management  Surgical pain is controlled in most patients with only acetaminophen (Tylenol) and non-steroidal anti-inflammatory drugs (NSAIDs), such as ibuprofen (Advil). These drugs can be taken together because they do not interact with each other. Check your prescription for specific dosing instructions. A cold compress can help with pain in the vaginal area.  Sometimes, a short course of narcotics, such as oxycodone or hydromorphone is required. We do not recommend using narcotics regularly as it can lead to constipation or dizziness and falls.     Do not drive or operate heavy machinery while using narcotics. You are unlikely to become addicted if you need to take a narcotic medication a few times within the first week of your surgery.  After the first few days to one week, your pain  should decrease and you should not have pain severe enough to need narcotics. If you continue having severe pain, contact your surgeon for re-evaluation.     Bowel function  Constipation is common after surgery. This means it may take several days before having a normal stool, or bowel movement. It is important to take extra steps to keep your stools soft to avoid straining with bowel movements. Straining could damage the prolapse repair before it has healed. Most patients will be given a prescription for a stool softener (docusate) as well as a gentle laxative (Miralax or lactulose). These drugs add water to the stool to make it easier to pass. Take them daily throughout your recovery. Hold for a day if you develop diarrhea.     Call us if you experience any repeated episodes of vomiting, worsening abdominal pain/bloating, or are unable to have a bowel movement for more than 3 days.      Activity restrictions  During the first 6 weeks, avoid any type of heavy lifting that requires straining.  Gentle walking is good exercise. Start with about 10 minutes a day when you feel ready and build up gradually. Avoid repetitive squatting or bending at the waist. Avoid any fitness-type training, aerobics, etc. for at least 6 weeks after surgery. Generally, you will need 4-6 weeks off work. This period may be longer if you have a very physical job.    Return to sex  When your surgeon clears you to resume sex (if desired), begin slowly and use enough lubrication to help ease the discomfort.  Because your vagina and pelvis have been fixed, or re-structured, it can take time to get used to sex after surgery. As scar tissue softens over time, you will feel less discomfort. If the discomfort does not go away, contact the office to schedule an exam and discuss other options. In some cases, your surgeon may prescribe a low dose of vaginal estrogen to help with vaginal dryness and pain that may happen with sex.

## 2023-01-26 NOTE — PROGRESS NOTES
PT here for [facility]  PT states she has not started the estrogen cream because she forgot what it was for. She has only attended two physical therapy sessions as she states it is difficult for her to get down to [place] due to weather.   Phone #: [phone] (home)   Pharmacy Verified.

## 2023-02-23 ENCOUNTER — OFFICE VISIT (OUTPATIENT)
Dept: MEDICAL GROUP | Facility: PHYSICIAN GROUP | Age: 68
End: 2023-02-23
Payer: MEDICARE

## 2023-02-23 VITALS
DIASTOLIC BLOOD PRESSURE: 60 MMHG | WEIGHT: 164.5 LBS | OXYGEN SATURATION: 99 % | RESPIRATION RATE: 18 BRPM | BODY MASS INDEX: 32.3 KG/M2 | HEIGHT: 60 IN | HEART RATE: 69 BPM | TEMPERATURE: 98.3 F | SYSTOLIC BLOOD PRESSURE: 110 MMHG

## 2023-02-23 DIAGNOSIS — F41.1 GENERALIZED ANXIETY DISORDER: ICD-10-CM

## 2023-02-23 DIAGNOSIS — R10.30 LOWER ABDOMINAL PAIN: ICD-10-CM

## 2023-02-23 DIAGNOSIS — M54.12 CERVICAL RADICULOPATHY: ICD-10-CM

## 2023-02-23 DIAGNOSIS — G47.00 INSOMNIA, PERSISTENT: ICD-10-CM

## 2023-02-23 DIAGNOSIS — F32.5 MAJOR DEPRESSIVE DISORDER WITH SINGLE EPISODE, IN FULL REMISSION (HCC): ICD-10-CM

## 2023-02-23 PROBLEM — R68.89 FLU-LIKE SYMPTOMS: Status: RESOLVED | Noted: 2022-12-01 | Resolved: 2023-02-23

## 2023-02-23 PROCEDURE — 99214 OFFICE O/P EST MOD 30 MIN: CPT | Performed by: NURSE PRACTITIONER

## 2023-02-23 RX ORDER — LORAZEPAM 1 MG/1
1 TABLET ORAL
Qty: 30 TABLET | Refills: 2 | Status: SHIPPED | OUTPATIENT
Start: 2023-03-09 | End: 2023-05-16 | Stop reason: SDUPTHER

## 2023-02-23 ASSESSMENT — PATIENT HEALTH QUESTIONNAIRE - PHQ9
SUM OF ALL RESPONSES TO PHQ QUESTIONS 1-9: 12
2. FEELING DOWN, DEPRESSED, IRRITABLE, OR HOPELESS: MORE THAN HALF THE DAYS
6. FEELING BAD ABOUT YOURSELF - OR THAT YOU ARE A FAILURE OR HAVE LET YOURSELF OR YOUR FAMILY DOWN: SEVERAL DAYS
4. FEELING TIRED OR HAVING LITTLE ENERGY: NEARLY EVERY DAY
5. POOR APPETITE OR OVEREATING: SEVERAL DAYS
9. THOUGHTS THAT YOU WOULD BE BETTER OFF DEAD, OR OF HURTING YOURSELF: NOT AT ALL
7. TROUBLE CONCENTRATING ON THINGS, SUCH AS READING THE NEWSPAPER OR WATCHING TELEVISION: NOT AT ALL
SUM OF ALL RESPONSES TO PHQ9 QUESTIONS 1 AND 2: 4
1. LITTLE INTEREST OR PLEASURE IN DOING THINGS: MORE THAN HALF THE DAYS
8. MOVING OR SPEAKING SO SLOWLY THAT OTHER PEOPLE COULD HAVE NOTICED. OR THE OPPOSITE, BEING SO FIGETY OR RESTLESS THAT YOU HAVE BEEN MOVING AROUND A LOT MORE THAN USUAL: NOT AT ALL
3. TROUBLE FALLING OR STAYING ASLEEP OR SLEEPING TOO MUCH: NEARLY EVERY DAY

## 2023-02-23 ASSESSMENT — FIBROSIS 4 INDEX: FIB4 SCORE: 0.92

## 2023-02-23 NOTE — PROGRESS NOTES
CC: Medication refill, follow-up on chronic health problems    HISTORY OF THE PRESENT ILLNESS: Patient is a 67 y.o. female. This pleasant patient is here today for evaluation and management of the following health problems.        Insomnia, persistent  This is a chronic health problem that is well controlled with current medications and lifestyle measures.  Taking lorazepam 1 mg at bedtime for sleep and anxiety.  Has been taking lorazepam for several years.  Denies adverse effects.  Feels rested the next day.  Does not drink alcohol or take opioids.  Requesting refill today.    ROBERT (generalized anxiety disorder)  This is a chronic health problem that is well controlled with current medications and lifestyle measures.  Taking Celexa 10 mg daily.  Also taking lorazepam at bedtime for anxiety and sleep.  Sleeps about 5 to 6 hours a night.  Denies adverse effects.    Cervical radiculopathy  Chronic problem, managed by [facility].  Had nerve ablation.  Reports helped a little.  However developed bilateral trigger thumbs and arm pain after ablation.  Followed up with [facility] and had steroid injections and trigger thumbs.  Still having pain in left 1 and forearm.    Lower abdominal pain  Patient reports episode of right lower quadrant pain following steroid injection and trigger finger about a week ago.  Pain lasted a couple days and had associated of dizziness.  Hurts to breathe.  Reports feels much better today.  Still having some residual discomfort in the right lower quadrant.  Does have history of irritable bowel syndrome.  Denies associated fever, diarrhea.    Major depressive disorder with single episode, in full remission (HCC)  Please see additional notes under anxiety    Allergies: Albumin, Rosuvastatin, Simvastatin, and Seasonal    Current Outpatient Medications Ordered in Epic   Medication Sig Dispense Refill    [START ON 3/9/2023] LORazepam (ATIVAN) 1 MG Tab Take 1 Tablet by mouth at bedtime for  "90 days. 30 Tablet 2    meloxicam (MOBIC) 15 MG tablet Take 15 mg by mouth as needed.      baclofen (LIORESAL) 10 MG Tab Take 1 Tablet by mouth at bedtime. 90 Tablet 3    Azelastine HCl 137 MCG/SPRAY Solution Administer 2 Sprays into each nostril 2 times a day. 30 mL 5    benzonatate (TESSALON) 100 MG Cap Take 1 Capsule by mouth 3 times a day as needed for Cough. 60 Capsule 0    gabapentin (NEURONTIN) 300 MG Cap Take 300 mg twice a day. Increase by one cap every 4-5 days to a max of 3 in the morning and 3 at night. 90 Capsule 5    estradiol (ESTRACE VAGINAL) 0.1 MG/GM vaginal cream Apply 1g cream inside vagina twice per week 1 Each 3    ondansetron (ZOFRAN ODT) 4 MG TABLET DISPERSIBLE Take 1 Tablet by mouth every 6 hours as needed for Nausea. 10 Tablet 2    dicyclomine (BENTYL) 10 MG Cap TAKE 1 CAPSULE BY MOUTH EVERY 6 HOURS AS NEEDED FOR ABDOMINAL CRAMPS AND OR DISCOMFORT      omeprazole (PRILOSEC) 20 MG delayed-release capsule Take 1 capsule by mouth once daily 30 Capsule 11    citalopram (CELEXA) 10 MG tablet Take 1 tablet by mouth once daily 90 Tablet 3    atorvastatin (LIPITOR) 40 MG Tab Take 1 Tab by mouth every day. 90 Tab 3    Artificial Tear Solution (TEARS NATURALE OP) Place 2 Drops in both eyes 2 times a day as needed.       No current Epic-ordered facility-administered medications on file.       Past Medical History:   Diagnosis Date    Anesthesia 02-14-11    PO N/V, \"slow to come out\"    Aortic atherosclerosis (HCC)     Arthritis 02-14-11    spine    Backpain 02-14-11    neck and abd. also,     Breath shortness     with exertion    Bronchitis 05/2018    Cancer (HCC) 07/18/2018    Skin - 15 years ago.    Cancer (HCC)     April/May 2018    Complication of anesthesia     Diverticulitis     Endometriosis of uterus     Familial hypercholesteremia     Fusion of spine 2014    ROBERT (generalized anxiety disorder) 6/15/2017    H/O fall     when in hospital  with low O2 sats    H/O: CVA (cerebrovascular accident) " 8/22/2014    Complex event associated with apparent medication reaction but with MRI suggesting possible multiple small infarcts of various ages, [facility]    Hair loss 12/6/2018    Heart burn     Hiatus hernia syndrome     not repaired    High cholesterol     HTN, goal below 130/80 10/24/2011    Infectious disease      Hep C +    Lung collapse 02-14-11    right lung 1/4 collpsed     Major depressive disorder with single episode, in full remission (HCC) 10/24/2011    Mixed hyperlipidemia 12/27/2011    Moderate major depression (HCC) 10/24/2011    MRSA exposure 8/2014    while in hospital    Post-menopause on HRT (hormone replacement therapy) 12/27/2011    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    exposed as a child, told not active    Scoliosis     Sleep apnea 6/2015    CPAP    Snoring     Unspecified vitamin D deficiency 9/24/2012       Past Surgical History:   Procedure Laterality Date    UT NEUROPLASTY & OR TRANSPOS MEDIAN NRV CARPAL SABRINA Right 05/31/2022    Procedure: RIGHT HAND OPEN CARPAL TUNNEL DECOMPRESSION;  Surgeon: Holly Martin M.D.;  Location: [facility] [place];  Service: Orthopedics    NISSEN FUNDOPLICATION LAPAROSCOPIC  07/25/2018    Procedure: NISSEN FUNDOPLICATION LAPAROSCOPIC;  Surgeon: Ayaz Garcia M.D.;  Location: SURGERY [facility];  Service: General    NISSEN FUNDOPLICATION LAPAROSCOPIC N/A 06/30/2015    Procedure: NISSEN FUNDOPLICATION LAPAROSCOPIC;  Surgeon: Ayaz Garcia M.D.;  Location: SURGERY SAME DAY [facility];  Service:     GASTROSCOPY-ENDO  06/24/2015    Procedure: GASTROSCOPY-ENDO;  Surgeon: Ayaz Garcia M.D.;  Location: ENDOSCOPY [facility];  Service:     CARPAL TUNNEL RELEASE  11/14/2012    Performed by Holly Martin M.D. at SURGERY [facility]    LOW ANTERIOR RESECTION LAPAROSCOPIC  03/02/2011    Performed by AYAZ GARCIA at SURGERY [facility]    CERVICAL DISK AND FUSION ANTERIOR  06/22/2010    Performed by JOSEPH DARLING at SURGERY [facility]     TUBAL LIGATION  1988    TONSILLECTOMY AND ADENOIDECTOMY  1959    ABDOMINAL HYSTERECTOMY TOTAL      APPENDECTOMY      GYN SURGERY      partial hysterectomy       Social History     Tobacco Use    Smoking status: Former     Packs/day: 0.30     Years: 5.00     Pack years: 1.50     Types: Cigarettes     Quit date: 1975     Years since quittin.1    Smokeless tobacco: Never    Tobacco comments:     quit    Vaping Use    Vaping Use: Never used   Substance Use Topics    Alcohol use: No     Alcohol/week: 0.0 oz    Drug use: No       Family History   Problem Relation Age of Onset    Diabetes Mother     Cancer Mother 82        breast cancer    Lung Disease Mother         copd    Hyperlipidemia Mother     Hypertension Mother     Cancer Father         Laryngeal CA    Heart Disease Father 50        CAD with bypasses x 3    Heart Attack Father     Hyperlipidemia Father     Hypertension Father     Diabetes Sister         type II diabetes    Diabetes Other     Heart Disease Other     Hypertension Other     Lung Disease Other        ROS: As in HPI, otherwise negative for chest pain, dyspnea, abdominal pain, dysuria, blood in stool, fever         Exam: /60   Pulse 69   Temp 36.8 °C (98.3 °F) (Temporal)   Resp 18   Ht 1.524 m (5')   Wt 74.6 kg (164 lb 8 oz)   SpO2 99%  Body mass index is 32.13 kg/m².    General: Alert, well nourished, well developed female in NAD  HEENT: Normocephalic. Eyes conjunctiva clear lids without ptosis  Neck: Supple without bruit. Thyroid is not enlarged.  Pulmonary: Clear to ausculation.  Normal effort. No rales, ronchi, or wheezing.  Cardiovascular: Normal rate and rhythm without murmur.   Abdomen: Soft, mild tenderness right lower quadrant, nondistended. Normal bowel sounds. Liver and spleen are not palpable  Neurologic: Grossly nonfocal  Lymph: No cervical or supraclavicular lymph nodes are palpable  Skin: Warm and dry.    Musculoskeletal: Normal gait.   Psych:  Normal mood and affect. Alert and oriented. Judgment and insight is normal.    Please note that this dictation was created using voice recognition software. I have made every reasonable attempt to correct obvious errors, but I expect that there are errors of grammar and possibly content that I did not discover before finalizing the note.      Assessment/Plan  1. Insomnia, persistent  Well-controlled.  Tolerating lorazepam.  Continue with lorazepam and good sleep hygiene.   reviewed.  - LORazepam (ATIVAN) 1 MG Tab; Take 1 Tablet by mouth at bedtime for 90 days.  Dispense: 30 Tablet; Refill: 2    2. ROBERT (generalized anxiety disorder)  Well-controlled.  Continue with citalopram daily and lorazepam as needed at nighttime for anxiety and sleep.  - LORazepam (ATIVAN) 1 MG Tab; Take 1 Tablet by mouth at bedtime for 90 days.  Dispense: 30 Tablet; Refill: 2    3. Cervical radiculopathy  Continue follow-up with [facility] and [facility].    4. Lower abdominal pain  Reviewed differentials with patient including, but not limited to irritable bowel syndrome, ovarian cyst, inflammation, appendicitis.  As patient is feeling better, will continue to monitor.  ER precautions reviewed with patient.    5. Major depressive disorder with single episode, in full remission (HCC)  As in #2.    Patient will return to clinic in 3 months for medication refill or sooner if needed.

## 2023-02-24 NOTE — ASSESSMENT & PLAN NOTE
Chronic problem, managed by [facility].  Had nerve ablation.  Reports helped a little.  However developed bilateral trigger thumbs and arm pain after ablation.  Followed up with [facility] and had steroid injections and trigger thumbs.  Still having pain in left 1 and forearm.

## 2023-02-24 NOTE — ASSESSMENT & PLAN NOTE
This is a chronic health problem that is well controlled with current medications and lifestyle measures.  Taking Celexa 10 mg daily.  Also taking lorazepam at bedtime for anxiety and sleep.  Sleeps about 5 to 6 hours a night.  Denies adverse effects.

## 2023-02-24 NOTE — ASSESSMENT & PLAN NOTE
Patient reports episode of right lower quadrant pain following steroid injection and trigger finger about a week ago.  Pain lasted a couple days and had associated of dizziness.  Hurts to breathe.  Reports feels much better today.  Still having some residual discomfort in the right lower quadrant.  Does have history of irritable bowel syndrome.  Denies associated fever, diarrhea.

## 2023-02-24 NOTE — ASSESSMENT & PLAN NOTE
This is a chronic health problem that is well controlled with current medications and lifestyle measures.  Taking lorazepam 1 mg at bedtime for sleep and anxiety.  Has been taking lorazepam for several years.  Denies adverse effects.  Feels rested the next day.  Does not drink alcohol or take opioids.  Requesting refill today.

## 2023-02-28 DIAGNOSIS — Z98.890 H/O OVARIAN CYSTECTOMY: ICD-10-CM

## 2023-02-28 DIAGNOSIS — Z87.42 H/O OVARIAN CYSTECTOMY: ICD-10-CM

## 2023-02-28 DIAGNOSIS — Z87.42 HISTORY OF OVARIAN CYST: ICD-10-CM

## 2023-02-28 DIAGNOSIS — R10.2 PELVIC PAIN: ICD-10-CM

## 2023-03-08 ENCOUNTER — PRE-ADMISSION TESTING (OUTPATIENT)
Dept: ADMISSIONS | Facility: MEDICAL CENTER | Age: 68
End: 2023-03-08
Attending: STUDENT IN AN ORGANIZED HEALTH CARE EDUCATION/TRAINING PROGRAM
Payer: MEDICARE

## 2023-03-14 ENCOUNTER — OFFICE VISIT (OUTPATIENT)
Dept: OBGYN | Facility: CLINIC | Age: 68
End: 2023-03-14
Payer: MEDICARE

## 2023-03-14 VITALS
WEIGHT: 166 LBS | BODY MASS INDEX: 32.42 KG/M2 | HEART RATE: 100 BPM | DIASTOLIC BLOOD PRESSURE: 88 MMHG | SYSTOLIC BLOOD PRESSURE: 137 MMHG

## 2023-03-14 DIAGNOSIS — K59.02 OUTLET DYSFUNCTION CONSTIPATION: ICD-10-CM

## 2023-03-14 DIAGNOSIS — N81.81 PERINEOCELE: ICD-10-CM

## 2023-03-14 DIAGNOSIS — R10.2 PELVIC PAIN: ICD-10-CM

## 2023-03-14 DIAGNOSIS — N39.46 URINARY INCONTINENCE, MIXED: ICD-10-CM

## 2023-03-14 DIAGNOSIS — N81.6 RECTOCELE: Primary | ICD-10-CM

## 2023-03-14 LAB
APPEARANCE UR: CLEAR
BILIRUB UR STRIP-MCNC: NORMAL MG/DL
COLOR UR AUTO: YELLOW
GLUCOSE UR STRIP.AUTO-MCNC: NEGATIVE MG/DL
KETONES UR STRIP.AUTO-MCNC: NEGATIVE MG/DL
LEUKOCYTE ESTERASE UR QL STRIP.AUTO: NORMAL
NITRITE UR QL STRIP.AUTO: NEGATIVE
PH UR STRIP.AUTO: 5.5 [PH] (ref 5–8)
PROT UR QL STRIP: NEGATIVE MG/DL
RBC UR QL AUTO: NEGATIVE
SP GR UR STRIP.AUTO: 1.02
UROBILINOGEN UR STRIP-MCNC: NORMAL MG/DL

## 2023-03-14 PROCEDURE — 51729 CYSTOMETROGRAM W/VP&UP: CPT | Performed by: STUDENT IN AN ORGANIZED HEALTH CARE EDUCATION/TRAINING PROGRAM

## 2023-03-14 PROCEDURE — 51741 ELECTRO-UROFLOWMETRY FIRST: CPT | Mod: 51 | Performed by: STUDENT IN AN ORGANIZED HEALTH CARE EDUCATION/TRAINING PROGRAM

## 2023-03-14 PROCEDURE — 51784 ANAL/URINARY MUSCLE STUDY: CPT | Mod: 51 | Performed by: STUDENT IN AN ORGANIZED HEALTH CARE EDUCATION/TRAINING PROGRAM

## 2023-03-14 PROCEDURE — 99214 OFFICE O/P EST MOD 30 MIN: CPT | Mod: 25 | Performed by: STUDENT IN AN ORGANIZED HEALTH CARE EDUCATION/TRAINING PROGRAM

## 2023-03-14 PROCEDURE — 81002 URINALYSIS NONAUTO W/O SCOPE: CPT | Performed by: STUDENT IN AN ORGANIZED HEALTH CARE EDUCATION/TRAINING PROGRAM

## 2023-03-14 PROCEDURE — 51797 INTRAABDOMINAL PRESSURE TEST: CPT | Performed by: STUDENT IN AN ORGANIZED HEALTH CARE EDUCATION/TRAINING PROGRAM

## 2023-03-14 ASSESSMENT — FIBROSIS 4 INDEX: FIB4 SCORE: 0.92
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
## 2023-03-14 NOTE — PROCEDURES
Procedure note: Complex urodynamic testing    Procedure performed:    -     21364 Complex Uroflowmetry  63190 Complex CMG w/ voiding pressure study AND urethral pressure  48972 EMG studies anal or urethral sphincter   69757 Intraabdominal catheter       Indication: Almaz Samuel is a 67 y.o. year old with urinary incontinence throughout the day with unclear etiology, constant tightness/leaking and sometimes without sensation, and with voiding dysfunction/hesitancy.      She presents for complex urodynamic testing today to fully elucidate her bladder function and symptom pathophysiology, in order to guide further treatment, and to evaluate if an anti-incontinence procedure is indicated at her upcoming prolapse repair.     Verbal consent was obtained after review of risk and benefit.     Chaperone: Seema Jeffries MA    Procedure: The patient was taken to the urodynamic suite and placed in the urodynamic chair. She underwent sterile prep with betadine prior to catheterization. There was a negative urinalysis. Air-charged catheters were placed in the urethra/bladder and vagina. Urodynamics were performed using routine techniques. Prolapse was reduced with a cotton scopette for stress testing. There were no complications    Urodynamic findings:     Preliminary Uroflometry     Flow pattern: intermittent  Maximum flow: 13 mL/sec  Average flow: 5.3 mL/sec  Voided volume: 100 mL  Post-void residual: 8 mL  Flow time: 17 sec    Filling cystometrogram    First sensation: 6.2 mL  First desire: 252 mL  Strong Desire: 465 mL  Infusion stopped at: 465 mL  Stress leakage: no  Uninhibited detrusor contractions present: yes, at capacity  Leakage with DO: no  Leak point pressures  At 156mL volume: did not leak to 131 cm H2O  At 298mL volume: did not leak to 128 cm H2O  Compliance: normal    Urethral pressure profile    Maximum urethral closing pressure (MUCP): 100 cm H2O  Morphology: normal, high tone    Pressure voiding  study    The patient's voiding mechanism was accomplished by detrusor contraction and urethral relaxation  Max flow: 16 mL/sec  Average flow: 10 mL/sec  Post-void residual: 0 mL  Pdet at peak flow: 9 cm H2O  Flow time: 46 sec  Pelvic floor EMG silenced during voiding: yes    Pelvic floor EMG: Normal     Assessment:     She has completed urodynamic testing, which was uncomplicated.     Filling phase: Normal capacity, normal compliance, some uninhibited detrusor contractions towards capacity without leaking.  No stress incontinence demonstrated, elevated mid urethral closure pressure consistent likely with hypertonic pelvic floor muscles.  Voiding phase: Complete emptying by detrusor contraction and urethral laxation with low flow pattern.    Plan:   She was counseled on urodynamic findings  See office encounter for full procedural counseling  Counseled on normal post-UDS symptoms including burning and possible hematuria. If this persists after 2 days she should call or send [employer] message.     Rajinder Myles MD, FACOG    Female Pelvic Medicine and Reconstructive Surgery  Department of Obstetrics and Gynecology  [facility]

## 2023-03-26 ENCOUNTER — ANESTHESIA EVENT (OUTPATIENT)
Dept: SURGERY | Facility: MEDICAL CENTER | Age: 68
End: 2023-03-26
Payer: MEDICARE

## 2023-03-27 ENCOUNTER — HOSPITAL ENCOUNTER (OUTPATIENT)
Facility: MEDICAL CENTER | Age: 68
End: 2023-03-28
Attending: STUDENT IN AN ORGANIZED HEALTH CARE EDUCATION/TRAINING PROGRAM | Admitting: HOSPITALIST
Payer: MEDICARE

## 2023-03-27 ENCOUNTER — ANESTHESIA (OUTPATIENT)
Dept: SURGERY | Facility: MEDICAL CENTER | Age: 68
End: 2023-03-27
Payer: MEDICARE

## 2023-03-27 ENCOUNTER — APPOINTMENT (OUTPATIENT)
Dept: RADIOLOGY | Facility: MEDICAL CENTER | Age: 68
End: 2023-03-27
Attending: HOSPITALIST
Payer: MEDICARE

## 2023-03-27 PROBLEM — Z98.890 POST-OPERATIVE STATE: Status: ACTIVE | Noted: 2023-03-27

## 2023-03-27 LAB
BASOPHILS # BLD AUTO: 0 % (ref 0–1.8)
BASOPHILS # BLD: 0 K/UL (ref 0–0.12)
EKG IMPRESSION: NORMAL
EOSINOPHIL # BLD AUTO: 0 K/UL (ref 0–0.51)
EOSINOPHIL NFR BLD: 0 % (ref 0–6.9)
ERYTHROCYTE [DISTWIDTH] IN BLOOD BY AUTOMATED COUNT: 47.7 FL (ref 35.9–50)
HCT VFR BLD AUTO: 43.6 % (ref 37–47)
HGB BLD-MCNC: 14.1 G/DL (ref 12–16)
IMM GRANULOCYTES # BLD AUTO: 0.07 K/UL (ref 0–0.11)
IMM GRANULOCYTES NFR BLD AUTO: 0.6 % (ref 0–0.9)
LYMPHOCYTES # BLD AUTO: 0.71 K/UL (ref 1–4.8)
LYMPHOCYTES NFR BLD: 6 % (ref 22–41)
MCH RBC QN AUTO: 30.6 PG (ref 27–33)
MCHC RBC AUTO-ENTMCNC: 32.3 G/DL (ref 33.6–35)
MCV RBC AUTO: 94.6 FL (ref 81.4–97.8)
MONOCYTES # BLD AUTO: 0.37 K/UL (ref 0–0.85)
MONOCYTES NFR BLD AUTO: 3.1 % (ref 0–13.4)
NEUTROPHILS # BLD AUTO: 10.62 K/UL (ref 2–7.15)
NEUTROPHILS NFR BLD: 90.3 % (ref 44–72)
NRBC # BLD AUTO: 0 K/UL
NRBC BLD-RTO: 0 /100 WBC
NT-PROBNP SERPL IA-MCNC: 248 PG/ML (ref 0–125)
PLATELET # BLD AUTO: 295 K/UL (ref 164–446)
PMV BLD AUTO: 8.7 FL (ref 9–12.9)
RBC # BLD AUTO: 4.61 M/UL (ref 4.2–5.4)
WBC # BLD AUTO: 11.8 K/UL (ref 4.8–10.8)

## 2023-03-27 PROCEDURE — A9270 NON-COVERED ITEM OR SERVICE: HCPCS | Performed by: STUDENT IN AN ORGANIZED HEALTH CARE EDUCATION/TRAINING PROGRAM

## 2023-03-27 PROCEDURE — 160048 HCHG OR STATISTICAL LEVEL 1-5: Performed by: STUDENT IN AN ORGANIZED HEALTH CARE EDUCATION/TRAINING PROGRAM

## 2023-03-27 PROCEDURE — 700101 HCHG RX REV CODE 250: Performed by: STUDENT IN AN ORGANIZED HEALTH CARE EDUCATION/TRAINING PROGRAM

## 2023-03-27 PROCEDURE — 99222 1ST HOSP IP/OBS MODERATE 55: CPT | Performed by: HOSPITALIST

## 2023-03-27 PROCEDURE — 700111 HCHG RX REV CODE 636 W/ 250 OVERRIDE (IP): Performed by: ANESTHESIOLOGY

## 2023-03-27 PROCEDURE — G0378 HOSPITAL OBSERVATION PER HR: HCPCS

## 2023-03-27 PROCEDURE — 160009 HCHG ANES TIME/MIN: Performed by: STUDENT IN AN ORGANIZED HEALTH CARE EDUCATION/TRAINING PROGRAM

## 2023-03-27 PROCEDURE — 160039 HCHG SURGERY MINUTES - EA ADDL 1 MIN LEVEL 3: Performed by: STUDENT IN AN ORGANIZED HEALTH CARE EDUCATION/TRAINING PROGRAM

## 2023-03-27 PROCEDURE — 71045 X-RAY EXAM CHEST 1 VIEW: CPT

## 2023-03-27 PROCEDURE — 700105 HCHG RX REV CODE 258: Performed by: STUDENT IN AN ORGANIZED HEALTH CARE EDUCATION/TRAINING PROGRAM

## 2023-03-27 PROCEDURE — 160035 HCHG PACU - 1ST 60 MINS PHASE I: Performed by: STUDENT IN AN ORGANIZED HEALTH CARE EDUCATION/TRAINING PROGRAM

## 2023-03-27 PROCEDURE — 160028 HCHG SURGERY MINUTES - 1ST 30 MINS LEVEL 3: Performed by: STUDENT IN AN ORGANIZED HEALTH CARE EDUCATION/TRAINING PROGRAM

## 2023-03-27 PROCEDURE — 700102 HCHG RX REV CODE 250 W/ 637 OVERRIDE(OP): Performed by: HOSPITALIST

## 2023-03-27 PROCEDURE — A9270 NON-COVERED ITEM OR SERVICE: HCPCS | Performed by: ANESTHESIOLOGY

## 2023-03-27 PROCEDURE — 57282 COLPOPEXY EXTRAPERITONEAL: CPT | Performed by: STUDENT IN AN ORGANIZED HEALTH CARE EDUCATION/TRAINING PROGRAM

## 2023-03-27 PROCEDURE — 57250 REPAIR RECTUM & VAGINA: CPT | Performed by: STUDENT IN AN ORGANIZED HEALTH CARE EDUCATION/TRAINING PROGRAM

## 2023-03-27 PROCEDURE — 85025 COMPLETE CBC W/AUTO DIFF WBC: CPT

## 2023-03-27 PROCEDURE — 700101 HCHG RX REV CODE 250: Performed by: ANESTHESIOLOGY

## 2023-03-27 PROCEDURE — 160002 HCHG RECOVERY MINUTES (STAT): Performed by: STUDENT IN AN ORGANIZED HEALTH CARE EDUCATION/TRAINING PROGRAM

## 2023-03-27 PROCEDURE — 700102 HCHG RX REV CODE 250 W/ 637 OVERRIDE(OP): Performed by: ANESTHESIOLOGY

## 2023-03-27 PROCEDURE — 00942 ANES VAG PX CLPTMY VGNC CLPR: CPT | Performed by: ANESTHESIOLOGY

## 2023-03-27 PROCEDURE — 700102 HCHG RX REV CODE 250 W/ 637 OVERRIDE(OP): Performed by: STUDENT IN AN ORGANIZED HEALTH CARE EDUCATION/TRAINING PROGRAM

## 2023-03-27 PROCEDURE — 700111 HCHG RX REV CODE 636 W/ 250 OVERRIDE (IP)

## 2023-03-27 PROCEDURE — 110454 HCHG SHELL REV 250: Performed by: STUDENT IN AN ORGANIZED HEALTH CARE EDUCATION/TRAINING PROGRAM

## 2023-03-27 PROCEDURE — 93010 ELECTROCARDIOGRAM REPORT: CPT | Performed by: INTERNAL MEDICINE

## 2023-03-27 PROCEDURE — 83880 ASSAY OF NATRIURETIC PEPTIDE: CPT

## 2023-03-27 PROCEDURE — 99100 ANES PT EXTEME AGE<1 YR&>70: CPT | Performed by: ANESTHESIOLOGY

## 2023-03-27 PROCEDURE — 160036 HCHG PACU - EA ADDL 30 MINS PHASE I: Performed by: STUDENT IN AN ORGANIZED HEALTH CARE EDUCATION/TRAINING PROGRAM

## 2023-03-27 PROCEDURE — A9270 NON-COVERED ITEM OR SERVICE: HCPCS | Performed by: HOSPITALIST

## 2023-03-27 PROCEDURE — 93005 ELECTROCARDIOGRAM TRACING: CPT | Performed by: STUDENT IN AN ORGANIZED HEALTH CARE EDUCATION/TRAINING PROGRAM

## 2023-03-27 RX ORDER — LORAZEPAM 1 MG/1
1 TABLET ORAL
Status: DISCONTINUED | OUTPATIENT
Start: 2023-03-27 | End: 2023-03-28 | Stop reason: HOSPADM

## 2023-03-27 RX ORDER — LIDOCAINE HYDROCHLORIDE 20 MG/ML
INJECTION, SOLUTION EPIDURAL; INFILTRATION; INTRACAUDAL; PERINEURAL PRN
Status: DISCONTINUED | OUTPATIENT
Start: 2023-03-27 | End: 2023-03-27 | Stop reason: SURG

## 2023-03-27 RX ORDER — LIDOCAINE HYDROCHLORIDE 10 MG/ML
INJECTION, SOLUTION INFILTRATION; PERINEURAL
Status: DISPENSED
Start: 2023-03-27 | End: 2023-03-27

## 2023-03-27 RX ORDER — CITALOPRAM HYDROBROMIDE 10 MG/1
10 TABLET ORAL DAILY
Status: DISCONTINUED | OUTPATIENT
Start: 2023-03-27 | End: 2023-03-28 | Stop reason: HOSPADM

## 2023-03-27 RX ORDER — OXYCODONE HCL 5 MG/5 ML
10 SOLUTION, ORAL ORAL
Status: COMPLETED | OUTPATIENT
Start: 2023-03-27 | End: 2023-03-27

## 2023-03-27 RX ORDER — DIPHENHYDRAMINE HYDROCHLORIDE 50 MG/ML
12.5 INJECTION INTRAMUSCULAR; INTRAVENOUS
Status: DISCONTINUED | OUTPATIENT
Start: 2023-03-27 | End: 2023-03-27 | Stop reason: HOSPADM

## 2023-03-27 RX ORDER — HYDROMORPHONE HYDROCHLORIDE 1 MG/ML
0.1 INJECTION, SOLUTION INTRAMUSCULAR; INTRAVENOUS; SUBCUTANEOUS
Status: DISCONTINUED | OUTPATIENT
Start: 2023-03-27 | End: 2023-03-27 | Stop reason: HOSPADM

## 2023-03-27 RX ORDER — MELOXICAM 7.5 MG/1
15 TABLET ORAL DAILY
Status: DISCONTINUED | OUTPATIENT
Start: 2023-03-27 | End: 2023-03-28 | Stop reason: HOSPADM

## 2023-03-27 RX ORDER — EPHEDRINE SULFATE 50 MG/ML
5 INJECTION, SOLUTION INTRAVENOUS
Status: DISCONTINUED | OUTPATIENT
Start: 2023-03-27 | End: 2023-03-27 | Stop reason: HOSPADM

## 2023-03-27 RX ORDER — ACETAMINOPHEN 500 MG
1000 TABLET ORAL
Status: COMPLETED | OUTPATIENT
Start: 2023-03-27 | End: 2023-03-27

## 2023-03-27 RX ORDER — SIMETHICONE 125 MG
125 TABLET,CHEWABLE ORAL 3 TIMES DAILY PRN
Status: DISCONTINUED | OUTPATIENT
Start: 2023-03-27 | End: 2023-03-28 | Stop reason: HOSPADM

## 2023-03-27 RX ORDER — HALOPERIDOL 5 MG/ML
1 INJECTION INTRAMUSCULAR
Status: DISCONTINUED | OUTPATIENT
Start: 2023-03-27 | End: 2023-03-27 | Stop reason: HOSPADM

## 2023-03-27 RX ORDER — ONDANSETRON 2 MG/ML
INJECTION INTRAMUSCULAR; INTRAVENOUS PRN
Status: DISCONTINUED | OUTPATIENT
Start: 2023-03-27 | End: 2023-03-27 | Stop reason: SURG

## 2023-03-27 RX ORDER — LABETALOL HYDROCHLORIDE 5 MG/ML
5 INJECTION, SOLUTION INTRAVENOUS
Status: DISCONTINUED | OUTPATIENT
Start: 2023-03-27 | End: 2023-03-27 | Stop reason: HOSPADM

## 2023-03-27 RX ORDER — SODIUM CHLORIDE, SODIUM LACTATE, POTASSIUM CHLORIDE, CALCIUM CHLORIDE 600; 310; 30; 20 MG/100ML; MG/100ML; MG/100ML; MG/100ML
INJECTION, SOLUTION INTRAVENOUS CONTINUOUS
Status: DISCONTINUED | OUTPATIENT
Start: 2023-03-27 | End: 2023-03-27 | Stop reason: HOSPADM

## 2023-03-27 RX ORDER — SODIUM CHLORIDE, SODIUM LACTATE, POTASSIUM CHLORIDE, CALCIUM CHLORIDE 600; 310; 30; 20 MG/100ML; MG/100ML; MG/100ML; MG/100ML
INJECTION, SOLUTION INTRAVENOUS CONTINUOUS
Status: ACTIVE | OUTPATIENT
Start: 2023-03-27 | End: 2023-03-27

## 2023-03-27 RX ORDER — EPINEPHRINE 1 MG/ML(1)
AMPUL (ML) INJECTION
Status: DISPENSED
Start: 2023-03-27 | End: 2023-03-27

## 2023-03-27 RX ORDER — ATORVASTATIN CALCIUM 40 MG/1
40 TABLET, FILM COATED ORAL DAILY
Status: DISCONTINUED | OUTPATIENT
Start: 2023-03-27 | End: 2023-03-28 | Stop reason: HOSPADM

## 2023-03-27 RX ORDER — AZELASTINE HYDROCHLORIDE 137 UG/1
2 SPRAY, METERED NASAL 2 TIMES DAILY
Status: DISCONTINUED | OUTPATIENT
Start: 2023-03-27 | End: 2023-03-27

## 2023-03-27 RX ORDER — DEXAMETHASONE SODIUM PHOSPHATE 4 MG/ML
INJECTION, SOLUTION INTRA-ARTICULAR; INTRALESIONAL; INTRAMUSCULAR; INTRAVENOUS; SOFT TISSUE PRN
Status: DISCONTINUED | OUTPATIENT
Start: 2023-03-27 | End: 2023-03-27 | Stop reason: SURG

## 2023-03-27 RX ORDER — LIDOCAINE HYDROCHLORIDE 20 MG/ML
JELLY TOPICAL PRN
Status: DISCONTINUED | OUTPATIENT
Start: 2023-03-27 | End: 2023-03-27 | Stop reason: SURG

## 2023-03-27 RX ORDER — OMEPRAZOLE 20 MG/1
20 CAPSULE, DELAYED RELEASE ORAL DAILY
Status: DISCONTINUED | OUTPATIENT
Start: 2023-03-27 | End: 2023-03-28 | Stop reason: HOSPADM

## 2023-03-27 RX ORDER — PHENAZOPYRIDINE HYDROCHLORIDE 200 MG/1
200 TABLET, FILM COATED ORAL
Status: COMPLETED | OUTPATIENT
Start: 2023-03-27 | End: 2023-03-27

## 2023-03-27 RX ORDER — BACLOFEN 10 MG/1
10 TABLET ORAL
Status: DISCONTINUED | OUTPATIENT
Start: 2023-03-27 | End: 2023-03-28 | Stop reason: HOSPADM

## 2023-03-27 RX ORDER — MIDAZOLAM HYDROCHLORIDE 1 MG/ML
INJECTION INTRAMUSCULAR; INTRAVENOUS PRN
Status: DISCONTINUED | OUTPATIENT
Start: 2023-03-27 | End: 2023-03-27 | Stop reason: SURG

## 2023-03-27 RX ORDER — ONDANSETRON 4 MG/1
4 TABLET, ORALLY DISINTEGRATING ORAL EVERY 6 HOURS PRN
Status: DISCONTINUED | OUTPATIENT
Start: 2023-03-27 | End: 2023-03-28 | Stop reason: HOSPADM

## 2023-03-27 RX ORDER — DICYCLOMINE HYDROCHLORIDE 10 MG/1
10 CAPSULE ORAL 3 TIMES DAILY PRN
Status: DISCONTINUED | OUTPATIENT
Start: 2023-03-27 | End: 2023-03-28 | Stop reason: HOSPADM

## 2023-03-27 RX ORDER — ACETAMINOPHEN 325 MG/1
650 TABLET ORAL EVERY 4 HOURS PRN
Status: DISCONTINUED | OUTPATIENT
Start: 2023-03-27 | End: 2023-03-28 | Stop reason: HOSPADM

## 2023-03-27 RX ORDER — CEFAZOLIN SODIUM 1 G/3ML
INJECTION, POWDER, FOR SOLUTION INTRAMUSCULAR; INTRAVENOUS PRN
Status: DISCONTINUED | OUTPATIENT
Start: 2023-03-27 | End: 2023-03-27 | Stop reason: SURG

## 2023-03-27 RX ORDER — PHENYLEPHRINE HCL IN 0.9% NACL 0.5 MG/5ML
SYRINGE (ML) INTRAVENOUS PRN
Status: DISCONTINUED | OUTPATIENT
Start: 2023-03-27 | End: 2023-03-27 | Stop reason: SURG

## 2023-03-27 RX ORDER — HYDROMORPHONE HYDROCHLORIDE 1 MG/ML
0.2 INJECTION, SOLUTION INTRAMUSCULAR; INTRAVENOUS; SUBCUTANEOUS
Status: DISCONTINUED | OUTPATIENT
Start: 2023-03-27 | End: 2023-03-27 | Stop reason: HOSPADM

## 2023-03-27 RX ORDER — SCOLOPAMINE TRANSDERMAL SYSTEM 1 MG/1
1 PATCH, EXTENDED RELEASE TRANSDERMAL
Status: DISCONTINUED | OUTPATIENT
Start: 2023-03-27 | End: 2023-03-28 | Stop reason: HOSPADM

## 2023-03-27 RX ORDER — GABAPENTIN 300 MG/1
300 CAPSULE ORAL 2 TIMES DAILY
Status: DISCONTINUED | OUTPATIENT
Start: 2023-03-27 | End: 2023-03-28 | Stop reason: HOSPADM

## 2023-03-27 RX ORDER — HYDROMORPHONE HYDROCHLORIDE 1 MG/ML
0.4 INJECTION, SOLUTION INTRAMUSCULAR; INTRAVENOUS; SUBCUTANEOUS
Status: DISCONTINUED | OUTPATIENT
Start: 2023-03-27 | End: 2023-03-27 | Stop reason: HOSPADM

## 2023-03-27 RX ORDER — ROCURONIUM BROMIDE 10 MG/ML
INJECTION, SOLUTION INTRAVENOUS PRN
Status: DISCONTINUED | OUTPATIENT
Start: 2023-03-27 | End: 2023-03-27 | Stop reason: SURG

## 2023-03-27 RX ORDER — HYDRALAZINE HYDROCHLORIDE 20 MG/ML
5 INJECTION INTRAMUSCULAR; INTRAVENOUS
Status: DISCONTINUED | OUTPATIENT
Start: 2023-03-27 | End: 2023-03-27 | Stop reason: HOSPADM

## 2023-03-27 RX ORDER — ONDANSETRON 2 MG/ML
4 INJECTION INTRAMUSCULAR; INTRAVENOUS
Status: DISCONTINUED | OUTPATIENT
Start: 2023-03-27 | End: 2023-03-27 | Stop reason: HOSPADM

## 2023-03-27 RX ORDER — EPHEDRINE SULFATE 50 MG/ML
INJECTION, SOLUTION INTRAVENOUS PRN
Status: DISCONTINUED | OUTPATIENT
Start: 2023-03-27 | End: 2023-03-27 | Stop reason: SURG

## 2023-03-27 RX ORDER — LIDOCAINE HYDROCHLORIDE 10 MG/ML
INJECTION, SOLUTION EPIDURAL; INFILTRATION; INTRACAUDAL; PERINEURAL
Status: COMPLETED
Start: 2023-03-27 | End: 2023-03-27

## 2023-03-27 RX ORDER — OXYCODONE HCL 5 MG/5 ML
5 SOLUTION, ORAL ORAL
Status: COMPLETED | OUTPATIENT
Start: 2023-03-27 | End: 2023-03-27

## 2023-03-27 RX ADMIN — LIDOCAINE HYDROCHLORIDE 0.1 ML: 10 INJECTION, SOLUTION EPIDURAL; INFILTRATION; INTRACAUDAL at 06:35

## 2023-03-27 RX ADMIN — ONDANSETRON 4 MG: 2 INJECTION INTRAMUSCULAR; INTRAVENOUS at 08:34

## 2023-03-27 RX ADMIN — LORAZEPAM 1 MG: 1 TABLET ORAL at 20:40

## 2023-03-27 RX ADMIN — LIDOCAINE HYDROCHLORIDE 100 MG: 20 INJECTION, SOLUTION EPIDURAL; INFILTRATION; INTRACAUDAL at 07:35

## 2023-03-27 RX ADMIN — ACETAMINOPHEN 650 MG: 325 TABLET, FILM COATED ORAL at 17:46

## 2023-03-27 RX ADMIN — ROCURONIUM BROMIDE 50 MG: 10 INJECTION, SOLUTION INTRAVENOUS at 07:35

## 2023-03-27 RX ADMIN — GABAPENTIN 300 MG: 300 CAPSULE ORAL at 18:41

## 2023-03-27 RX ADMIN — MELOXICAM 15 MG: 7.5 TABLET ORAL at 18:42

## 2023-03-27 RX ADMIN — Medication 100 MCG: at 08:05

## 2023-03-27 RX ADMIN — SCOLOPAMINE TRANSDERMAL SYSTEM 1 PATCH: 1 PATCH, EXTENDED RELEASE TRANSDERMAL at 06:40

## 2023-03-27 RX ADMIN — OXYCODONE HYDROCHLORIDE 10 MG: 5 SOLUTION ORAL at 11:33

## 2023-03-27 RX ADMIN — SODIUM CHLORIDE, POTASSIUM CHLORIDE, SODIUM LACTATE AND CALCIUM CHLORIDE: 600; 310; 30; 20 INJECTION, SOLUTION INTRAVENOUS at 08:34

## 2023-03-27 RX ADMIN — PHENAZOPYRIDINE 200 MG: 200 TABLET ORAL at 06:40

## 2023-03-27 RX ADMIN — EPHEDRINE SULFATE 10 MG: 50 INJECTION, SOLUTION INTRAVENOUS at 07:49

## 2023-03-27 RX ADMIN — SUGAMMADEX 200 MG: 100 INJECTION, SOLUTION INTRAVENOUS at 08:34

## 2023-03-27 RX ADMIN — SODIUM CHLORIDE, POTASSIUM CHLORIDE, SODIUM LACTATE AND CALCIUM CHLORIDE: 600; 310; 30; 20 INJECTION, SOLUTION INTRAVENOUS at 06:40

## 2023-03-27 RX ADMIN — BACLOFEN 10 MG: 10 TABLET ORAL at 20:40

## 2023-03-27 RX ADMIN — ACETAMINOPHEN 1000 MG: 500 TABLET, FILM COATED ORAL at 06:39

## 2023-03-27 RX ADMIN — CEFAZOLIN 2 G: 330 INJECTION, POWDER, FOR SOLUTION INTRAMUSCULAR; INTRAVENOUS at 07:35

## 2023-03-27 RX ADMIN — MIDAZOLAM HYDROCHLORIDE 2 MG: 1 INJECTION, SOLUTION INTRAMUSCULAR; INTRAVENOUS at 07:32

## 2023-03-27 RX ADMIN — DEXAMETHASONE SODIUM PHOSPHATE 8 MG: 4 INJECTION, SOLUTION INTRA-ARTICULAR; INTRALESIONAL; INTRAMUSCULAR; INTRAVENOUS; SOFT TISSUE at 07:44

## 2023-03-27 RX ADMIN — Medication 0.1 ML: at 06:35

## 2023-03-27 RX ADMIN — EPHEDRINE SULFATE 5 MG: 50 INJECTION, SOLUTION INTRAVENOUS at 08:05

## 2023-03-27 RX ADMIN — PROPOFOL 150 MG: 10 INJECTION, EMULSION INTRAVENOUS at 07:35

## 2023-03-27 RX ADMIN — SIMETHICONE 125 MG: 125 TABLET, CHEWABLE ORAL at 18:41

## 2023-03-27 RX ADMIN — Medication 100 MCG: at 07:40

## 2023-03-27 RX ADMIN — FENTANYL CITRATE 100 MCG: 50 INJECTION, SOLUTION INTRAMUSCULAR; INTRAVENOUS at 07:34

## 2023-03-27 RX ADMIN — CITALOPRAM HYDROBROMIDE 10 MG: 10 TABLET ORAL at 20:40

## 2023-03-27 RX ADMIN — LIDOCAINE HYDROCHLORIDE 3 ML: 20 JELLY TOPICAL at 07:36

## 2023-03-27 RX ADMIN — OMEPRAZOLE 20 MG: 20 CAPSULE, DELAYED RELEASE ORAL at 17:46

## 2023-03-27 ASSESSMENT — ENCOUNTER SYMPTOMS
SENSORY CHANGE: 0
SPUTUM PRODUCTION: 0
ORTHOPNEA: 0
PHOTOPHOBIA: 0
DEPRESSION: 0
FEVER: 0
PALPITATIONS: 0
DOUBLE VISION: 0
ABDOMINAL PAIN: 1
TREMORS: 0
WEIGHT LOSS: 0
NECK PAIN: 0
HEMOPTYSIS: 0
TINGLING: 0
MYALGIAS: 0
CHILLS: 0
HEADACHES: 0
DIZZINESS: 0
HALLUCINATIONS: 0
COUGH: 0
BLURRED VISION: 0

## 2023-03-27 ASSESSMENT — LIFESTYLE VARIABLES
EVER FELT BAD OR GUILTY ABOUT YOUR DRINKING: NO
ALCOHOL_USE: NO
HAVE YOU EVER FELT YOU SHOULD CUT DOWN ON YOUR DRINKING: NO
TOTAL SCORE: 0
DOES PATIENT WANT TO STOP DRINKING: NO
HAVE PEOPLE ANNOYED YOU BY CRITICIZING YOUR DRINKING: NO
AVERAGE NUMBER OF DAYS PER WEEK YOU HAVE A DRINK CONTAINING ALCOHOL: 0
ON A TYPICAL DAY WHEN YOU DRINK ALCOHOL HOW MANY DRINKS DO YOU HAVE: 0
SUBSTANCE_ABUSE: 0
HOW MANY TIMES IN THE PAST YEAR HAVE YOU HAD 5 OR MORE DRINKS IN A DAY: 0
TOTAL SCORE: 0
TOTAL SCORE: 0
EVER HAD A DRINK FIRST THING IN THE MORNING TO STEADY YOUR NERVES TO GET RID OF A HANGOVER: NO
CONSUMPTION TOTAL: NEGATIVE

## 2023-03-27 ASSESSMENT — PAIN DESCRIPTION - PAIN TYPE
TYPE: SURGICAL PAIN

## 2023-03-27 ASSESSMENT — PATIENT HEALTH QUESTIONNAIRE - PHQ9
SUM OF ALL RESPONSES TO PHQ9 QUESTIONS 1 AND 2: 0
1. LITTLE INTEREST OR PLEASURE IN DOING THINGS: NOT AT ALL
2. FEELING DOWN, DEPRESSED, IRRITABLE, OR HOPELESS: NOT AT ALL

## 2023-03-27 ASSESSMENT — FIBROSIS 4 INDEX
FIB4 SCORE: 0.93
FIB4 SCORE: 0.93

## 2023-03-27 NOTE — DISCHARGE PLANNING
Case Management Discharge Planning    Admission Date: 3/27/2023  GMLOS:    ALOS: 0    6-Clicks ADL Score:    6-Clicks Mobility Score:        Anticipated Discharge Dispo: Discharge Disposition: Discharged to home/self care (01)  Discharge Address: [address] [place] [address] [place] [address]  Discharge Contact Phone Number: [phone]    DME Needed: No    Action(s) Taken: Updated Provider/Nurse on Discharge Plan    Escalations Completed: None    Medically Clear: No, patient post-op day #0    Next Steps: Continue to monitor for changes and update discharge plan accordingly. Follow-up with Attending and Bedside RN, assist as needed.    Barriers to Discharge: Medical clearance    Is the patient up for discharge tomorrow: Yes    Is transport arranged for discharge disposition: Yes, family to provide transportation.

## 2023-03-27 NOTE — ANESTHESIA PREPROCEDURE EVALUATION
Case: [id number] Date/Time: 03/27/23 0715    Procedures:       PELVIC EXAM UNDER ANESTHESIA, POSTERIOR REPAIR, PERINEORRHAPHY, POSSIBLE APICAL SUSPENSION, POSSIBLE ANTERIOR REPAIR, POSSIBLE MID URETHRAL SLING, CYSTOURETHROSCOPY      COLPORRHAPHY, COMBINED ANTEROPOSTERIOR      BLADDER SLING, FEMALE    Pre-op diagnosis: INCOMPLETE UTEROVAGINAL PROLAPSE, CYSTOCELE MIDLINE, RECTOCELE, STRESS URINARY INCONTINENCE    Location: [facility] ROOM 27 / SURGERY SAME DAY [facility]    Surgeons: Rajinder Myles M.D.          Relevant Problems   ANESTHESIA   (positive) Sleep apnea, obstructive      CARDIAC   (positive) Aortic atherosclerosis (HCC)   (positive) Mild mitral regurgitation      GI   (positive) GERD (gastroesophageal reflux disease)      Other   (positive) Diaphragmatic hernia   (positive) Obesity (BMI 30-39.9)   (positive) Rectocele   SCOTT not currently using bipap  PONV-scope patch preop  Scoliosis s/p fusion  CVA-no residual weakness  GERD    Physical Exam    Airway   Mallampati: III  TM distance: >3 FB  Neck ROM: full       Cardiovascular - normal exam  Rhythm: regular  Rate: normal  (-) murmur     Dental - normal exam           Pulmonary - normal exam  Breath sounds clear to auscultation     Abdominal    Neurological - normal exam               Anesthesia Plan    ASA 3   ASA physical status 3 criteria: CVA or TIA - history (> 3 months)    Plan - general       Airway plan will be ETT          Induction: intravenous    Postoperative Plan: Postoperative administration of opioids is intended.    Pertinent diagnostic labs and testing reviewed    Informed Consent:    Anesthetic plan and risks discussed with patient.    Use of blood products discussed with: patient whom consented to blood products.

## 2023-03-27 NOTE — OR NURSING
0823 received patient from OR. Report from Anesthesiologist and OR RN. Patient on 6L at 91 % O2. VSS. Monitor connected. No advanced airway in place. S/P vaginal repair surgery, packing and zarco in place. To be removed by Dr. Myles per circulator. Peripad in place with small amount of light pink drainage     0930 Updated patient's  and friend.     0940 Packing removed by Dr. Myles    1012 States no pain or nausea, tolerating sips of water, requiring 1L NC when sleeping. Phase II     1115 Backfilled bladder per order, around 250 mL in, able to void 200 mL out. Dressed with assistance, new pad placed.    1133 Oxycodone given for pain, 2L NC applied incentive spirometer given and instructed    1150 Updated patient's ride     1205 Updated Dr. Myles that patient unable to be weened off O2. Handoff to Sara ANN     1302 Assumed care. Awaiting bed placement     1308 Report given to Sujatha ANN on CDU    1318 Patient transported to CDU on 2L O2 via wheelchair with friend and all belongings

## 2023-03-27 NOTE — H&P
Hospital Medicine History & Physical Note    Date of Service  3/27/2023    Primary Care Physician  NATALIE Mahan.    Consultants  obstetrics    Specialist Names: [email or web address]    Code Status  Prior    Chief Complaint  Pelvic pain    History of Presenting Illness  Almaz Samuel is a 68 y.o. female who presented 3/27/2023 with past medical history of obstructive sleep apnea, who was taken to the operating room for an elective procedure patient underwent vaginal colpopexy for a vaginal vault prolapse.  Patient tolerated the procedure well however postoperatively patient was noted to have some mild hypoxia.  She was satting at 88% on room air.  She was was placed on oxygen and referred to me for further evaluation.    Patient complaining of lower abdominal/pelvic pain dull ache intensity 3 out of 10 intermittent with no radiation      Patient does have sleep apnea but does not use her BiPAP machine due to it incorrect settings, as per patient      I discussed the plan of care with patient.    Review of Systems  Review of Systems   Constitutional:  Negative for chills, fever and weight loss.   HENT:  Negative for ear pain, hearing loss and tinnitus.    Eyes:  Negative for blurred vision, double vision and photophobia.   Respiratory:  Negative for cough, hemoptysis and sputum production.    Cardiovascular:  Negative for chest pain, palpitations and orthopnea.   Gastrointestinal:  Positive for abdominal pain.   Genitourinary:  Negative for dysuria, frequency and urgency.   Musculoskeletal:  Negative for myalgias and neck pain.   Skin:  Negative for itching and rash.   Neurological:  Negative for dizziness, tingling, tremors, sensory change and headaches.   Psychiatric/Behavioral:  Negative for depression, hallucinations, substance abuse and suicidal ideas.    All other systems reviewed and are negative.    Past Medical History   has a past medical history of Anesthesia (02/14/2011), Aortic atherosclerosis (HCC),  Arthritis (02/14/2011), Backpain (02/14/2011), Bowel habit changes (03/08/2023), Breath shortness, Bronchitis (05/2018), Cancer (HCC) (07/18/2018), Cancer (HCC), Cataract (03/08/2023), Complication of anesthesia, Diverticulitis, Endometriosis of uterus, Familial hypercholesteremia, Fusion of spine (2014), ROBERT (generalized anxiety disorder) (03/08/2023), H/O fall, H/O: CVA (cerebrovascular accident) (08/22/2014), Hair loss (12/06/2018), Heart burn (03/08/2023), Hiatus hernia syndrome, High cholesterol (03/08/2023), HTN, goal below 130/80 (10/24/2011), Infectious disease, Lung collapse (02/14/2011), Major depressive disorder with single episode, in full remission ([facility]) (10/24/2011), Mixed hyperlipidemia (12/27/2011), Moderate major depression ([facility]) (03/08/2023), MRSA exposure (08/2014), PONV (postoperative nausea and vomiting) (03/08/2023), Post-menopause on HRT (hormone replacement therapy) (12/27/2011), PPD positive (10/24/2011), Scoliosis, Sleep apnea (03/08/2023), Snoring (03/08/2023), Stroke ([facility]) (03/08/2023), Unspecified vitamin D deficiency (09/24/2012), and Urinary incontinence (03/08/2023).    Surgical History   has a past surgical history that includes tonsillectomy and adenoidectomy (01/01/1959); tubal ligation (01/01/1988); low anterior resection laparoscopic (03/02/2011); cervical disk and fusion anterior (06/22/2010); carpal tunnel release (11/14/2012); gastroscopy-endo (06/24/2015); gyn surgery; appendectomy; nissen fundoplication laparoscopic (N/A, 06/30/2015); nissen fundoplication laparoscopic (07/25/2018); pr neuroplasty & or transpos median nrv carpal gabino (Right, 05/31/2022); and abdominal hysterectomy total.     Family History  family history includes Cancer in her father; Cancer (age of onset: 82) in her mother; Diabetes in her mother, sister, and another family member; Heart Attack in her father; Heart Disease in an other family member; Heart Disease (age of onset: 50) in her father;  "Hyperlipidemia in her father and mother; Hypertension in her father, mother, and another family member; Lung Disease in her mother and another family member.   Family history reviewed with patient. There is no family history that is pertinent to the chief complaint.     Social History   reports that she quit smoking about 48 years ago. Her smoking use included cigarettes. She has a 1.50 pack-year smoking history. She has never used smokeless tobacco. She reports that she does not drink alcohol and does not use drugs.    Allergies  Allergies   Allergen Reactions    Albumin Unspecified     Other reaction(s): Other (See Comments)  \"egg intolerance\"  Reaction:stomach cramps,throwing up for 12 hours,cold sweats    Rosuvastatin Nausea    Seasonal Itching     Pt states eye irritation, pollen     Simvastatin Nausea       Medications  Prior to Admission Medications   Prescriptions Last Dose Informant Patient Reported? Taking?   Artificial Tear Solution (TEARS NATURALE OP) 3/26/2023 Patient Yes No   Sig: Place 2 Drops in both eyes 2 times a day as needed.   Azelastine HCl 137 MCG/SPRAY Solution 3/20/2023 Patient No No   Sig: Administer 2 Sprays into each nostril 2 times a day.   LORazepam (ATIVAN) 1 MG Tab 3/26/2023 Patient No No   Sig: Take 1 Tablet by mouth at bedtime for 90 days.   atorvastatin (LIPITOR) 40 MG Tab  Patient No No   Sig: Take 1 Tab by mouth every day.   baclofen (LIORESAL) 10 MG Tab 3/26/2023 Patient No No   Sig: Take 1 Tablet by mouth at bedtime.   benzonatate (TESSALON) 100 MG Cap  Patient No No   Sig: Take 1 Capsule by mouth 3 times a day as needed for Cough.   citalopram (CELEXA) 10 MG tablet 3/26/2023 Patient No No   Sig: Take 1 tablet by mouth once daily   dicyclomine (BENTYL) 10 MG Cap  Patient Yes No   Sig: TAKE 1 CAPSULE BY MOUTH EVERY 6 HOURS AS NEEDED FOR ABDOMINAL CRAMPS AND OR DISCOMFORT   estradiol (ESTRACE VAGINAL) 0.1 MG/GM vaginal cream 3/23/2023 Patient No No   Sig: Apply 1g cream inside " vagina twice per week   gabapentin (NEURONTIN) 300 MG Cap 3/26/2023 Patient No No   Sig: Take 300 mg twice a day. Increase by one cap every 4-5 days to a max of 3 in the morning and 3 at night.   meloxicam (MOBIC) 15 MG tablet  Patient Yes No   Sig: Take 15 mg by mouth as needed.   omeprazole (PRILOSEC) 20 MG delayed-release capsule 3/26/2023 Patient No No   Sig: Take 1 capsule by mouth once daily   ondansetron (ZOFRAN ODT) 4 MG TABLET DISPERSIBLE  Patient No No   Sig: Take 1 Tablet by mouth every 6 hours as needed for Nausea.      Facility-Administered Medications: None       Physical Exam  Temp:  [36.1 °C (97 °F)-36.9 °C (98.4 °F)] 36.1 °C (97 °F)  Pulse:  [] 90  Resp:  [12-20] 16  BP: (116-143)/(60-78) 128/66  SpO2:  [88 %-98 %] 97 %  Blood Pressure : 128/66   Temperature: 36.1 °C (97 °F)   Pulse: 90   Respiration: 16   Pulse Oximetry: 97 %       Physical Exam  Constitutional:       General: She is not in acute distress.     Appearance: She is obese. She is not ill-appearing, toxic-appearing or diaphoretic.   HENT:      Head: Normocephalic.      Mouth/Throat:      Mouth: Mucous membranes are dry.   Eyes:      General: No scleral icterus.        Left eye: No discharge.      Extraocular Movements: Extraocular movements intact.      Pupils: Pupils are equal, round, and reactive to light.   Cardiovascular:      Rate and Rhythm: Normal rate and regular rhythm.      Heart sounds: No murmur heard.    No gallop.   Pulmonary:      Effort: Pulmonary effort is normal. No respiratory distress.      Breath sounds: No stridor. No wheezing or rhonchi.   Chest:      Chest wall: No tenderness.   Abdominal:      General: Abdomen is flat. There is distension.      Palpations: There is no mass.      Tenderness: There is abdominal tenderness (Pelvic). There is no rebound.      Hernia: No hernia is present.   Musculoskeletal:         General: No swelling, tenderness, deformity or signs of injury.      Cervical back: Normal range  of motion and neck supple. No rigidity.      Right lower leg: Edema present.   Lymphadenopathy:      Cervical: No cervical adenopathy.   Skin:     Capillary Refill: Capillary refill takes 2 to 3 seconds.      Coloration: Skin is not jaundiced or pale.      Findings: No bruising or rash.   Neurological:      General: No focal deficit present.      Mental Status: She is oriented to person, place, and time.      Cranial Nerves: No cranial nerve deficit.      Motor: No weakness.   Psychiatric:         Mood and Affect: Mood normal.       Laboratory:          No results for input(s): ALTSGPT, ASTSGOT, ALKPHOSPHAT, TBILIRUBIN, DBILIRUBIN, GAMMAGT, AMYLASE, LIPASE, ALB, PREALBUMIN, GLUCOSE in the last 72 hours.      No results for input(s): NTPROBNP in the last 72 hours.      No results for input(s): TROPONINT in the last 72 hours.    Imaging:  No orders to display       X-Ray:  I have personally reviewed the images and compared with prior images.    Assessment/Plan:  Justification for Admission Status  I anticipate this patient is appropriate for observation status at this time because I expect patient require less than 48 hours for further ration and treatment of her hypoxia        Hypoxia- (present on admission)  Assessment & Plan  Related to her postoperative state and her history of obstructive sleep apnea    Oxygen to maintain a sat greater than 92%    Wean oxygen as tolerated    Incentive spirometry    Ambulate as tolerated    Perineocele- (present on admission)  Assessment & Plan  Status post repair    Plan as per surgery    Rectocele- (present on admission)  Assessment & Plan  Status post repair  Plan as per surgery    Outlet dysfunction constipation- (present on admission)  Assessment & Plan  Bowel protocol    Obstructive sleep apnea- (present on admission)  Assessment & Plan  Patient's current settings are incorrect    Would be difficult to initiate CPAP overnight knowing the correct settings    Continue  supplemental oxygen for now        VTE prophylaxis: SCDs/TEDs

## 2023-03-27 NOTE — ANESTHESIA TIME REPORT
Anesthesia Start and Stop Event Times     Date Time Event    3/27/2023 0721 Ready for Procedure     0729 Anesthesia Start     0900 Anesthesia Stop        Responsible Staff  03/27/23    Name Role Begin End    Martin Ordonez M.D. Anesth 0729 0900        Overtime Reason:  no overtime (within assigned shift)    Comments:

## 2023-03-27 NOTE — OR NURSING
1209 handoff from rayne ANN. Placed patient on placed patient on 1L NC.  Pain tolerable, ice pack in place.     1302 handoff to rayne

## 2023-03-27 NOTE — DISCHARGE INSTRUCTIONS
Post-op: What to expect after your surgery    For urgent post-operative questions or concerns, please call Dr. Myles's direct on-call cell line at [phone].     For less-urgent matters (Monday - Friday), you may send a message through [employer] [facility] or call the general Women's Health line at [phone].     Bladder function  Try to empty your bladder (urinate) at regular intervals by sitting on the toilet and relaxing.  You may need to adjust your positioning (lean forward or back) to empty the bladder fully. It is important that you do not push or strain to empty your bladder.     Call the surgeon at the number above if you cannot urinate. Also, call for treatment if you have signs and symptoms of a urinary tract infection, including:   Burning with urination  Bladder pain  Worsening need to urinate right away  Urine with a bad smell    If you are sent home with a catheter:   Empty the catheter bag when it becomes full. The bag should be kept at a level below your hips to drain properly. When you are asleep, the bag should dangle off the bed. and should dangle off of the bed while you are asleep. You will be called the day after you go home to schedule an office visit to test your bladder and remove the catheter.     Vaginal care:   Do not go swimming, take sitting baths, or have sexual intercourse for 6 weeks after surgery. Do not place anything in the vagina except vaginal estrogen cream, if instructed to do so by your surgeon.     Vaginal discharge and bleeding/spotting is also normal through the entire 6-week recovery. Sometimes small sutures will fall out of the vagina as they dissolve. This is normal. Contact the office with any heavy bleeding soaking through pads, bad-smelling discharge, or worsening pain.     Pain management:  Surgical pain is controlled in most patients with only non-steroidal anti-inflammatory drugs (NSAIDs, such as ibuprofen, “Advil”), and acetaminophen (Tylenol). These drugs can be taken  together without interaction.     In the hospital, your nurse will give you these medications at regular intervals to both treat and to prevent pain. If these are not controlling your pain, you may ask the nurse for additional medication.     When you go home, you will also take NSAIDs and acetaminophen for pain management. I recommend the following at-home pain regimen:    Take meloxicam (mobic) 15mg once per day, along with tylenol 1000mg three times per day, for the first 5-7 days after surgery to prevent and treat pain and inflammation.   After 5-7 days, you may take meloxicam and 500mg tylenol as-needed     Sometimes, a short course of narcotics such as oxycodone and hydromorphone is  required, but this is not routine. We do not recommend using narcotics regularly as it can lead to constipation or dizziness, and falls.     Do not drive or operate heavy machinery while using narcotics. You are unlikely to become addicted if you need to take a narcotic medication a few times within the first week of your surgery.  After the first few days to one week, your pain should decrease and you should not have pain severe enough to need narcotics. If you continue having severe pain, contact your surgeon for re-evaluation.     Bowel function  Constipation is common after surgery. This means it may take several days before having a normal bowel movement. It is important to take extra steps to keep your stools soft to avoid straining with bowel movements. Straining may damage the prolapse repair before it has healed. Most patients will be given a prescription for a stool softener (docusate) as well as a gentle laxative (Miralax or lactulose). These mediations adds water to the stool to make it easier to pass. Take them daily throughout your recovery. Hold for a day if you develop diarrhea.     Call us if you experience any repeated episodes of vomiting, worsening abdominal pain/bloating, or are unable to have a bowel movement  for more than 3 days.     Activity restrictions  During the first 6 weeks avoid any type of heavy lifting that requires you to strain.  Gentle walking is good exercise. Start with about 10 minutes a day when you feel ready and build up gradually. Avoid repetitive squatting or bending at the waist. Avoid any fitness-type training, aerobics, etc. for at least 6 weeks after surgery. Generally, you will need 4-6 weeks off work. This period may be longer if you have a very physical job.    Return to sex  When your surgeon clears you to resume sex (if desired), begin slowly and to use enough lubrication to help ease the discomfort. Because your vagina and pelvis have been re-structured, and it can take time to get used to sex after surgery. As scar tissue softens over time you will feel less discomfort. If discomfort does not go away, contact the office to schedule an exam and to discuss other options. In some cases, your surgeon may prescribe a low dose of vaginal estrogen to help with vaginal dryness and pain that may happen with sex.    If any questions arise, call your provider.  If your provider is not available, please feel free to call the Surgical Center at [phone].    MEDICATIONS: Resume taking daily medication.  Take prescribed pain medication with food.  If no medication is prescribed, you may take non-aspirin pain medication if needed.  PAIN MEDICATION CAN BE VERY CONSTIPATING.  Take a stool softener or laxative such as senokot, pericolace, or milk of magnesia if needed.    Last pain medication given at     Tylenol 1,000 mg at 7:00 am    Scope patch behind ear can remain on for up to three days, wash hands after touching       What to Expect Post Anesthesia    Rest and take it easy for the first 24 hours.  A responsible adult is recommended to remain with you during that time.  It is normal to feel sleepy.  We encourage you to not do anything that requires balance, judgment or coordination.    FOR 24  HOURS DO NOT:  Drive, operate machinery or run household appliances.  Drink beer or alcoholic beverages.  Make important decisions or sign legal documents.    To avoid nausea, slowly advance diet as tolerated, avoiding spicy or greasy foods for the first day.  Add more substantial food to your diet according to your provider's instructions.  Babies can be fed formula or breast milk as soon as they are hungry.  INCREASE FLUIDS AND FIBER TO AVOID CONSTIPATION.    MILD FLU-LIKE SYMPTOMS ARE NORMAL.  YOU MAY EXPERIENCE GENERALIZED MUSCLE ACHES, THROAT IRRITATION, HEADACHE AND/OR SOME NAUSEA.

## 2023-03-27 NOTE — ASSESSMENT & PLAN NOTE
Patient's current settings are incorrect    Would be difficult to initiate CPAP overnight knowing the correct settings    Continue supplemental oxygen for now

## 2023-03-27 NOTE — ANESTHESIA POSTPROCEDURE EVALUATION
Patient: Almaz Samuel    Procedure Summary     Date: 03/27/23 Room / Location: [facility] ROOM 27 / SURGERY SAME DAY [facility]    Anesthesia Start: 0729 Anesthesia Stop: 0900    Procedure: PELVIC EXAM UNDER ANESTHESIA, POSTERIOR REPAIR, sacrospinous vault suspension, PERINEORRHAPHY, (Vagina ) Diagnosis: (INCOMPLETE UTEROVAGINAL PROLAPSE, CYSTOCELE MIDLINE, RECTOCELE, STRESS URINARY INCONTINENCE)    Surgeons: Rajinder Myles M.D. Responsible Provider: Martin Ordonez M.D.    Anesthesia Type: general ASA Status: 3          Final Anesthesia Type: general  Last vitals  BP   Blood Pressure : 128/66    Temp   36.1 °C (97 °F)    Pulse   90   Resp   16    SpO2   97 %      Anesthesia Post Evaluation    Patient location during evaluation: PACU  Patient participation: complete - patient participated  Level of consciousness: awake and alert    Airway patency: patent  Anesthetic complications: no  Cardiovascular status: hemodynamically stable  Respiratory status: acceptable  Hydration status: euvolemic    PONV: none          No notable events documented.     Nurse Pain Score: 4 (NPRS)

## 2023-03-27 NOTE — ANESTHESIA PROCEDURE NOTES
Airway    Date/Time: 3/27/2023 7:36 AM  Performed by: Martin Ordonez M.D.  Authorized by: Martin Ordonez M.D.     Location:  OR  Urgency:  Elective  Difficult Airway: No    Indications for Airway Management:  Anesthesia      Spontaneous Ventilation: absent    Sedation Level:  Deep  Preoxygenated: Yes    Patient Position:  Sniffing  Mask Difficulty Assessment:  0 - not attempted  Final Airway Type:  Endotracheal airway  Final Endotracheal Airway:  ETT  Cuffed: Yes    Technique Used for Successful ETT Placement:  Video laryngoscopy    Insertion Site:  Oral  Blade Type:  Glide  Laryngoscope Blade/Videolaryngoscope Blade Size:  3  ETT Size (mm):  6.5  Measured from:  Teeth  ETT to Teeth (cm):  20  Placement Verified by: auscultation and capnometry    Cormack-Lehane Classification:  Grade I - full view of glottis  Number of Attempts at Approach:  1   Atraumatic DLx1

## 2023-03-27 NOTE — OP REPORT
OPERATIVE REPORT     Name: Almaz Samuel    : 1955    MRN: [id number]       PRE-OP DIAGNOSIS:   Rectocele  Perineocele  Outlet dysfunction constipation  Vaginal vault prolapse            POST-OP DIAGNOSIS:   Rectocele  Perineocele  Outlet dysfunction constipation  Vaginal vault prolapse            PROCEDURE:   Extraperitoneal vaginal colpopexy - sacrospinous ligament, right (44796)  Posterior repair, perineorrhaphy ([id number])              SURGEON:   Surgeon(s) and Role:     * Rajinder Myles M.D. - Primary            ANESTHESIA: General            FINDINGS:       - Exam under anesthesia: Stage 3 prolapse, palpable rectocele and widened genital hiatus with attenuated perineal body and perineocele. Vaginal vault prolaspe noted posteriorly on exam under anesthesia. Small cystocele reduced well with apical support. Bimanual exam with no palpable adnexal masses. At the completion of the procedure there was excellent tricompartmental support, no sutures were palpated rectally.        ESTIMATED BLOOD LOSS: 25 mL            DRAINS: Lao                   SPECIMENS: None            IMPLANTS: None            COMPLICATIONS: None            DISPOSITION: Discharge            CONDITION: Stable            INDICATION FOR SURGERY:     Almaz Samuel is a 67 y.o. year old P1 with rectocele, perineocele, outlet dysfunction constipation. She was counseled on all approaches to prolapse treatment including observation, pessary and surgery. She was counseled on all methods of surgical prolapse repair including vaginal and abdominal/laparoscopic reconstructive approaches, and obliterative approaches. After detailed counseling about all prolapse treatment options and shared decision-making, Ms. Samuel opts for a native tissue vaginal reconstructive approach to prolapse repair via pelvic exam under anesthesia, posterior pair, perineorrhaphy, possible apical suspension, possible anterior pair, possible cystourethroscopy, and all  indicated procedures. Pre-operative urodynamics did not demonstrate stress urinary incontinence with reduction of prolapse and she was counseled against a concomitant JOVAN procedure. Benefits of surgery were reviewed, including functional outcomes (bladder/bowel/sexual). Risks of surgery were  also discussed including anesthesia, bleeding, infection, damage to surrounding organs (bladder, ureter, urethra, bowel, blood vessel, nerves), possible blood transfusion, recurrent prolapse, transient buttock pain if sacrospinous suspension performed, transient voiding dysfunction requiring catheterization, new/worsening urinary incontinence, pain with sex. All questions were answered and consent was signed and witnessed.        TECHNIQUE:    I spoke with the patient in the preoperative holding area, where again risks, benefits, indications, and alternatives were reviewed and informed consent was obtained.  She was then transferred to the operative suite, where she was identified, procedure was confirmed.  She was placed in dorsal supine position, given general endotracheal anesthesia without difficulty.  She was then placed in a high dorsal lithotomy position  in yellowfin stirrups with all pressure points padded.  She was prepared and draped in usual sterile fashion with vaginal chlorhexidine prep with all pressure points padded.  An appropriate time-out was performed, where patient was identified, procedure was confirmed, and the operative team was introduced.  It was also confirmed that patient did receive cefazolin 2 g IV for prophylaxis, and she also received DVT prophylaxis with sequential compression devices bilaterally throughout the case.   A 16-[place] catheter was then placed in the patient's  bladder for drainage throughout the procedure, and a [place] retractor was placed for retraction.     At this time, exam under anesthesia revealed findings noted above. A significant apical component to her rectocele  was noted, and reduction of the apex to the sacrospinous ligament reduced this without undue tension. The optimal sacrospinous suspension location was then marked/tagged with interrupted 2-0 vicryl for future use.     Attention was then turned posteriorly.  There was a palpable distal rectocele.  The posterior repair then proceed in the following fashion: Allis clamps were placed on the posterior introitus in a triangle-shaped fashion on the posterior introitus with care not to over narrow the vaginal introitus.  The area was then infiltrated with 1 % lidocaine with dilute epinephrine.  A triangle-shaped incision was then made over the posterior vaginal wall and the perineal skin with a scalpel.  Skin was then sharply dissected from the underlying central tendon in the perineal body and the rectovaginal fascia out to the level of the levator fascia bilaterally and vaginal apex superiorly.     The sacrospinous vault suspension then proceed in the following fashion: On the patient's right-hand side the dissection plane between the vaginal epithelium and the rectovaginal fibromuscular layer was continued in the paravaginal and eventually pararectal space down both bluntly and sharply down to the level of the right ischial spine.  Using careful blunt dissection with the surgeons right index finger the sacrospinous ligament was cleared of all surrounding tissue.  A WhoWantsMe suture passing device was then brought into the operative field and a 0 [place] PDS suture was then passed through the sacrospinous ligament approximately 1.5 fingerbreadths medial to the ischial spine.  Second Mon[place]k PDS suture was placed in a similar fashion just laterally to the previously placed suture.  It was confirmed by palpation that the sutures were far away from vital structures near the ischial spine.  Excellent hemostasis at this perirectal tunnel was noted.  Both ends of the medial sacrspinous suture and were then passed from the  retroperitoneal space through the previously tagged vaginal apex and into the vaginal canal and held.  The more lateral sacrospinous suture was then placed from the retroperitoneal space to through and through the vaginal epithelium and held.     The posterior repair and perineorrhaphy then continued: The rectocele was then plicated in the midline with 2-0 PDS stratafix in 2 running layers with care to approximate the distal rectovaginal fascia to the central tendon of the perineal body. This was then reinforced with a series of 2-0 vicryl sutures, including some site specific repairs of the rectovaginal fibromuscular layer. The posterior vaginal epithelium closure was started with 2-0 Vicryl in a running fashion with care to include the underlying fibromuscular tissue and obliterate dead space. This was paused FDC. Surgiflo was then placed into dissection planes to aid in venous hemostasis. The sacrospinous sutures were then tied down with care to avoid suture bridging, with excellent elevation of the vaginal apex ot the sacrospinous ligament. These sutures were then cut. 0 Vicryl was then placed in the left deep transverse perineal muscle, and then using the same suture this was reattached to the external anal sphincter, then to the contralateral deep transverse perineal muscle and held.  The superficial transverse perineal muscle was then reapproximated in a similar fashion with 0 Vicryl on the left side and central tendon of the perineal body to the right side and held.  Perineorrhaphy suture were crossed and found not to narrow the introitus. The posterior vaginal epithelium was then trimmed.  A final 2-0 vicryl was placed between the two other perineorrhaphy sutures for reinforcement. The perineorrhaphy sutures were tied serially and the vaginal epithelial layer closure was completed.  There was good lengthening of the perineal body with an adequate genital hiatus diameter.  Another rectal exam was then  performed with no sutures palpated.   Hemostasis was noted at the completion of the procedure. Packing was placed for temporary tamponade in the recovery area, to be removed before discharged.      All counts were correct.  The patient was then wakened from general anesthesia easily, and brought to the PACU in stable condition. Anticipate discharge home today.         Rajinder Myles MD, FACOG  Female Pelvic Medicine and Reconstructive Surgery  Department of Obstetrics and Gynecology  [facility]

## 2023-03-27 NOTE — ASSESSMENT & PLAN NOTE
Related to her postoperative state and her history of obstructive sleep apnea    Oxygen to maintain a sat greater than 92%    Wean oxygen as tolerated    Incentive spirometry    Ambulate as tolerated

## 2023-03-28 VITALS
BODY MASS INDEX: 33.07 KG/M2 | SYSTOLIC BLOOD PRESSURE: 113 MMHG | DIASTOLIC BLOOD PRESSURE: 59 MMHG | HEIGHT: 60 IN | OXYGEN SATURATION: 94 % | RESPIRATION RATE: 16 BRPM | WEIGHT: 168.43 LBS | TEMPERATURE: 98.1 F | HEART RATE: 79 BPM

## 2023-03-28 PROBLEM — N81.81 PERINEOCELE: Status: RESOLVED | Noted: 2023-01-26 | Resolved: 2023-03-28

## 2023-03-28 PROBLEM — N81.6 RECTOCELE: Status: RESOLVED | Noted: 2023-01-26 | Resolved: 2023-03-28

## 2023-03-28 PROCEDURE — A9270 NON-COVERED ITEM OR SERVICE: HCPCS | Performed by: HOSPITALIST

## 2023-03-28 PROCEDURE — 99239 HOSP IP/OBS DSCHRG MGMT >30: CPT | Performed by: HOSPITALIST

## 2023-03-28 PROCEDURE — 700102 HCHG RX REV CODE 250 W/ 637 OVERRIDE(OP): Performed by: HOSPITALIST

## 2023-03-28 PROCEDURE — 700111 HCHG RX REV CODE 636 W/ 250 OVERRIDE (IP): Performed by: HOSPITALIST

## 2023-03-28 PROCEDURE — G0378 HOSPITAL OBSERVATION PER HR: HCPCS

## 2023-03-28 PROCEDURE — 96374 THER/PROPH/DIAG INJ IV PUSH: CPT

## 2023-03-28 RX ORDER — FUROSEMIDE 10 MG/ML
20 INJECTION INTRAMUSCULAR; INTRAVENOUS ONCE
Status: COMPLETED | OUTPATIENT
Start: 2023-03-28 | End: 2023-03-28

## 2023-03-28 RX ORDER — POTASSIUM CHLORIDE 20 MEQ/1
40 TABLET, EXTENDED RELEASE ORAL ONCE
Status: COMPLETED | OUTPATIENT
Start: 2023-03-28 | End: 2023-03-28

## 2023-03-28 RX ADMIN — POTASSIUM CHLORIDE 40 MEQ: 1500 TABLET, EXTENDED RELEASE ORAL at 10:04

## 2023-03-28 RX ADMIN — GABAPENTIN 300 MG: 300 CAPSULE ORAL at 04:49

## 2023-03-28 RX ADMIN — FUROSEMIDE 20 MG: 10 INJECTION, SOLUTION INTRAMUSCULAR; INTRAVENOUS at 10:05

## 2023-03-28 RX ADMIN — MELOXICAM 15 MG: 7.5 TABLET ORAL at 04:50

## 2023-03-28 RX ADMIN — ACETAMINOPHEN 650 MG: 325 TABLET, FILM COATED ORAL at 04:49

## 2023-03-28 RX ADMIN — CITALOPRAM HYDROBROMIDE 10 MG: 10 TABLET ORAL at 04:50

## 2023-03-28 RX ADMIN — OMEPRAZOLE 20 MG: 20 CAPSULE, DELAYED RELEASE ORAL at 04:49

## 2023-03-28 NOTE — PROGRESS NOTES
"Urogynecology and Pelvic Reconstructive Surgery Inpatient Progress Note    Almaz aSmuel  1955  [id number]    Postop Day 1 s/p Posterior repair, perineorrhaphy, sacrospinous vault suspension.  Her surgical course was uncomplicated with normal EBL, however she was satting in the 70s to 80s in the PACU which did not spontaneously recover.  Per the recommendations of her anesthesiologist she was admitted to observation to the hospitalist team in the CDU.    Overnight her saturations were high 80s to mid 90% range, on 0 to 1 L of nasal cannula oxygen.  She has been ambulating without dizziness or shortness of breath.  She is using her incentive spirometer.  She has moderate pain in the buttock and perineal area which is expected after surgery.  Minimal bleeding.  Emptying her bladder normally without difficulty.        Past Medical History:   Diagnosis Date    Anesthesia 02/14/2011    PO N/V, \"slow to come out\", vasovagal response with last ablation/block on her neck    Aortic atherosclerosis (HCC)     Arthritis 02/14/2011    spine    Backpain 02/14/2011    neck and abd. also,     Bowel habit changes 03/08/2023    constipation and diarrhea r/t IBS, medicated    Breath shortness     with exertion    Bronchitis 05/2018    Cancer (HCC) 07/18/2018    Skin - 15 years ago.    Cancer (HCC)     April/May 2018    Cataract 03/08/2023    in both eyes, no surgery at present    Complication of anesthesia     Diverticulitis     Endometriosis of uterus     Familial hypercholesteremia     Fusion of spine 2014    ROBERT (generalized anxiety disorder) 03/08/2023    medicated    H/O fall     when in hospital  with low O2 sats    H/O: CVA (cerebrovascular accident) 08/22/2014    Complex event associated with apparent medication reaction but with MRI suggesting possible multiple small infarcts of various ages, [facility]    Hair loss 12/06/2018    Heart burn 03/08/2023    medicated    Hiatus hernia syndrome     not repaired    High cholesterol " 03/08/2023    medicated    HTN, goal below 130/80 10/24/2011    Infectious disease      Hep C +    Lung collapse 02/14/2011    right lung 1/4 collpsed     Major depressive disorder with single episode, in full remission (HCC) 10/24/2011    Mixed hyperlipidemia 12/27/2011    Moderate major depression (HCC) 03/08/2023    medicated    MRSA exposure 08/2014    while in hospital    PONV (postoperative nausea and vomiting) 03/08/2023    Post-menopause on HRT (hormone replacement therapy) 12/27/2011    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    exposed as a child, told not active    Scoliosis     Sleep apnea 03/08/2023    BIPAP, hasn't used in the last year    Snoring 03/08/2023    Stroke (HCC) 03/08/2023    tia's times 3 in the past    Unspecified vitamin D deficiency 09/24/2012    Urinary incontinence 03/08/2023    at present, wears a pad     Past Surgical History:   Procedure Laterality Date    TX PELVIC EXAMINATION W ANESTH  3/27/2023    Procedure: PELVIC EXAM UNDER ANESTHESIA, POSTERIOR REPAIR, sacrospinous vault suspension, PERINEORRHAPHY,;  Surgeon: Rajinder Myles M.D.;  Location: SURGERY SAME DAY [facility];  Service: Gynecology    TX NEUROPLASTY & OR TRANSPOS MEDIAN NRV CARPAL SABRINA Right 05/31/2022    Procedure: RIGHT HAND OPEN CARPAL TUNNEL DECOMPRESSION;  Surgeon: Holly Martin M.D.;  Location: [facility];  Service: Orthopedics    NISSEN FUNDOPLICATION LAPAROSCOPIC  07/25/2018    Procedure: NISSEN FUNDOPLICATION LAPAROSCOPIC;  Surgeon: Kenji Okeefe M.D.;  Location: SURGERY [facility];  Service: General    NISSEN FUNDOPLICATION LAPAROSCOPIC N/A 06/30/2015    Procedure: NISSEN FUNDOPLICATION LAPAROSCOPIC;  Surgeon: Kenji Okeefe M.D.;  Location: SURGERY SAME DAY [facility];  Service:     GASTROSCOPY-ENDO  06/24/2015    Procedure: GASTROSCOPY-ENDO;  Surgeon: Kenji Okeefe M.D.;  Location: ENDOSCOPY [facility];  Service:     CARPAL TUNNEL RELEASE  11/14/2012    Performed by Holly Martin  M.D. at SURGERY [facility] ORS    LOW ANTERIOR RESECTION LAPAROSCOPIC  03/02/2011    Performed by AYAZ GARCIA at SURGERY [facility] ORS    CERVICAL DISK AND FUSION ANTERIOR  06/22/2010    Performed by JSOEPH DARLING at SURGERY [facility] ORS    TUBAL LIGATION  01/01/1988    TONSILLECTOMY AND ADENOIDECTOMY  01/01/1959    ABDOMINAL HYSTERECTOMY TOTAL      APPENDECTOMY      GYN SURGERY      partial hysterectomy       Current Facility-Administered Medications:     scopolamine (TRANSDERM-SCOP) patch 1 Patch, 1 Patch, Transdermal, Pre-Op Once, Rajinder Myles M.D., 1 Patch at 03/27/23 0640    atorvastatin (LIPITOR) tablet 40 mg, 40 mg, Oral, DAILY, Paulie Fletcher M.D.    baclofen (LIORESAL) tablet 10 mg, 10 mg, Oral, QHS, Paulie Fletcher M.D., 10 mg at 03/27/23 2040    citalopram (CeleXA) tablet 10 mg, 10 mg, Oral, DAILY, Paulie Fletcher M.D., 10 mg at 03/28/23 0450    dicyclomine (BENTYL) capsule 10 mg, 10 mg, Oral, TID PRN, Paulie Fletcher M.D.    gabapentin (NEURONTIN) capsule 300 mg, 300 mg, Oral, BID, Paulie Fletcher M.D., 300 mg at 03/28/23 0449    LORazepam (ATIVAN) tablet 1 mg, 1 mg, Oral, QHS, Paulie Fletcher M.D., 1 mg at 03/27/23 2040    meloxicam (MOBIC) tablet 15 mg, 15 mg, Oral, DAILY, Paulie Fletcher M.D., 15 mg at 03/28/23 0450    omeprazole (PRILOSEC) capsule 20 mg, 20 mg, Oral, DAILY, Paulie Fletcher M.D., 20 mg at 03/28/23 0449    ondansetron (ZOFRAN ODT) dispertab 4 mg, 4 mg, Oral, Q6HRS PRN, Paulie Fletcher M.D.    simethicone (Mylicon) chewable tablet 125 mg, 125 mg, Oral, TID PRN, Paulie Fletcher M.D., 125 mg at 03/27/23 1841    acetaminophen (Tylenol) tablet 650 mg, 650 mg, Oral, Q4HRS PRN, Paulie Fletcher M.D., 650 mg at 03/28/23 0449        Objective :   Vitals:    03/28/23 0400 03/28/23 0412 03/28/23 0612 03/28/23 0722   BP: 110/59   113/59   Pulse: 80   79   Resp: 16   16   Temp: 36.6 °C (97.9 °F)   36.7 °C (98.1 °F)   TempSrc: Temporal   Temporal   SpO2: (!) 86% 91% 92% 94%    Weight:       Height:           Chest: no distress  Abdomen: soft, nontender  Incision: n/a  Pelvic: deferred  Extremities: Normal       LABS:   Recent Labs     03/27/23  2146   WBC 11.8*   RBC 4.61   HEMOGLOBIN 14.1   HEMATOCRIT 43.6   MCV 94.6   MCH 30.6   RDW 47.7   PLATELETCT 295   MPV 8.7*   NEUTSPOLYS 90.30*   LYMPHOCYTES 6.00*   MONOCYTES 3.10   EOSINOPHILS 0.00   BASOPHILS 0.00     Component Ref Range & Units 1 d ago 6 mo ago   NT-proBNP 0 - 125 pg/mL 248 High   69        No results for input(s): SODIUM, POTASSIUM, CHLORIDE, CO2, GLUCOSE, BUN, CPKTOTAL in the last 72 hours.    Imaging:     CXR 3/28/23:     Position of medical devices appears stable. The cardiac silhouette appears within normal limits.     The mediastinal contour appears within normal limits.  The central pulmonary vasculature appears normal.     Bilateral lung volumes are diminished.  Hazy linear density in the left lung base is observed.     No significant pleural effusions are identified.     The bony structures appear age-appropriate.     IMPRESSION:        1.  Left basilar atelectasis, no focal infiltrate          A/P:   POD#  1 s/p posterior repair, perineorrhaphy, sacrospinous suspension, admitted overnight for low oxygen saturations.  She is status post checks x-ray which shows left atelectasis without focal infiltrate, saturations improved with incentive spirometry and ambulation, elevated BNP.  Doing very well from a postsurgical perspective.      -Appreciate care by the hospitalist team, and defer discharge planning and PCP follow-up to their recommendations  -Recommend starting MiraLAX once daily this morning to prevent constipation  -She will follow-up with me as scheduled at 6 weeks, or earlier if needed.  She has my contact information.      Rajinder Myles MD, FACOG    Female Pelvic Medicine and Reconstructive Surgery  Department of Obstetrics and Gynecology  [facility]  [facility]

## 2023-03-28 NOTE — PROGRESS NOTES
4 Eyes Skin Assessment Completed by Tracy MERCER RN and SETH Solares.     Head WDL  Ears WDL  Nose WDL  Mouth WDL  Neck WDL  Breast/Chest WDL  Shoulder Blades WDL  Spine WDL   (L) Arm/Elbow/Hand WDL  Abdomen WDL   Groin WDL  Scrotum/Coccyx/Buttocks WDL  (R) Leg WDL  (L) Leg WDL  (R) Heel/Foot/Toe WDL  (L) Heel/Foot/Toe WDL              Devices In Places Tele Box and Pulse Ox        Interventions In Place NC W/Ear Foams and Pillows     Possible Skin Injury No     Pictures Uploaded Into Epic N/A  Wound Consult Placed N/A  RN Wound Prevention Protocol Ordered No

## 2023-03-29 ENCOUNTER — DOCUMENTATION (OUTPATIENT)
Dept: HEALTH INFORMATION MANAGEMENT | Facility: OTHER | Age: 68
End: 2023-03-29
Payer: MEDICARE

## 2023-03-29 NOTE — DISCHARGE SUMMARY
Discharge Summary    CHIEF COMPLAINT ON ADMISSION  No chief complaint on file.      Reason for Admission  Post-operative state     Admission Date  3/27/2023    CODE STATUS  Prior    HPI & HOSPITAL COURSE  Almaz Samuel is a 68 y.o. female who presented 3/27/2023 with past medical history of obstructive sleep apnea, who was taken to the operating room for an elective procedure patient underwent vaginal colpopexy for a vaginal vault prolapse.  Patient tolerated the procedure well however postoperatively patient was noted to have some mild hypoxia.  She was satting at 88% on room air.  She was was placed on oxygen and referred to me for further evaluation.     Patient complaining of lower abdominal/pelvic pain dull ache intensity 3 out of 10 intermittent with no radiation        Patient does have sleep apnea but does not use her BiPAP machine due to it incorrect settings, as per patient    ======================================    Patient was treated with oxygen O2 weaned during her hospital stay.  Encouraged to ambulate and use incentive spirometry.  Patient having trace edema and just prior to discharge patient was given Lasix 20 mg IV push x1 for pulmonary toilet.  Patient able to ambulate without any need for oxygen.  Patient was cleared by OB/GYN.  Patient to follow-up with OB/GYN in outpatient setting    Therefore, she is discharged in good and stable condition to home with close outpatient follow-up.    The patient recovered much more quickly than anticipated on admission.    Discharge Date  3/28/2023    FOLLOW UP ITEMS POST DISCHARGE      DISCHARGE DIAGNOSES  Principal Problem:    Post-operative state POA: Yes  Active Problems:    Obstructive sleep apnea POA: Yes      Overview: Sees Dr. Siddiqui in [place]  Resolved Problems:    Hypoxia POA: Yes      Overview: ER at [facility] on 8/9/13 and dropped her SaO2 to the low 80s and seeing new       pulmonologist on Wed. 8/28 in [place].  See Dr. Max's  note    Outlet dysfunction constipation POA: Yes    Rectocele POA: Yes    Perineocele POA: Yes      FOLLOW UP  Future Appointments   Date Time Provider Department Center   5/8/2023 10:00 AM Rajinder Myles M.D. OBGYN [address] [place] [address]   5/16/2023  8:20 AM TASHA Mahan None   5/18/2023  8:40 AM TASHA Mahan None     Rajinder Myles M.D.  [address] [place] [id number]  [phone]    Follow up on 5/8/2023        MEDICATIONS ON DISCHARGE     Medication List        CONTINUE taking these medications        Instructions   atorvastatin 40 MG Tabs  Commonly known as: LIPITOR   Take 1 Tab by mouth every day.  Dose: 40 mg     Azelastine HCl 137 MCG/SPRAY Soln   Administer 2 Sprays into each nostril 2 times a day.  Dose: 2 Spray     baclofen 10 MG Tabs  Commonly known as: LIORESAL   Take 1 Tablet by mouth at bedtime.  Dose: 10 mg     benzonatate 100 MG Caps  Commonly known as: TESSALON   Take 1 Capsule by mouth 3 times a day as needed for Cough.  Dose: 100 mg     citalopram 10 MG tablet  Commonly known as: CeleXA   Take 1 tablet by mouth once daily     dicyclomine 10 MG Caps  Commonly known as: BENTYL   TAKE 1 CAPSULE BY MOUTH EVERY 6 HOURS AS NEEDED FOR ABDOMINAL CRAMPS AND OR DISCOMFORT     estradiol 0.1 MG/GM vaginal cream  Commonly known as: ESTRACE VAGINAL   Apply 1g cream inside vagina twice per week     gabapentin 300 MG Caps  Commonly known as: NEURONTIN   Take 300 mg twice a day. Increase by one cap every 4-5 days to a max of 3 in the morning and 3 at night.     LORazepam 1 MG Tabs  Commonly known as: ATIVAN   Doctor's comments: 30 tabs to last 30 days  Take 1 Tablet by mouth at bedtime for 90 days.  Dose: 1 mg     meloxicam 15 MG tablet  Commonly known as: MOBIC   Take 15 mg by mouth as needed.  Dose: 15 mg     omeprazole 20 MG delayed-release capsule  Commonly known as: PRILOSEC   Take 1 capsule by mouth once daily     ondansetron 4 MG Tbdp  Commonly known as: ZOFRAN  "ODT   Take 1 Tablet by mouth every 6 hours as needed for Nausea.  Dose: 4 mg     TEARS NATURALE OP   Place 2 Drops in both eyes 2 times a day as needed.  Dose: 2 Drop              Allergies  Allergies   Allergen Reactions    Albumin Unspecified     Other reaction(s): Other (See Comments)  \"egg intolerance\"  Reaction:stomach cramps,throwing up for 12 hours,cold sweats    Rosuvastatin Nausea    Seasonal Itching     Pt states eye irritation, pollen     Simvastatin Nausea       DIET  No orders of the defined types were placed in this encounter.      ACTIVITY  As tolerated.  Weight bearing as tolerated    CONSULTATIONS  Dr montes de oca ob/gyn    PROCEDURES  Rajinder Montes De Oca M.D.  Physician  Obstetrics & Gynecology  OP Report     Signed  Date of Service:  3/27/2023  7:19 AM       Show:Clear all  [x]Written[x]Templated[x]Copied    Added by:  [x]Rajinder Montes De Oca M.D.    []Hover for details                                                                                                                                                   OPERATIVE REPORT      Name: Almaz Samuel    : 1955    MRN: [id number]         PRE-OP DIAGNOSIS:   Rectocele  Perineocele  Outlet dysfunction constipation  Vaginal vault prolapse            POST-OP DIAGNOSIS:   Rectocele  Perineocele  Outlet dysfunction constipation  Vaginal vault prolapse            PROCEDURE:   Extraperitoneal vaginal colpopexy - sacrospinous ligament, right (75838)  Posterior repair, perineorrhaphy (25371)               SURGEON:   Surgeon(s) and Role:     * Rajinder Montes De Oca M.D. - Primary            ANESTHESIA: General            FINDINGS:       - Exam under anesthesia: Stage 3 prolapse, palpable rectocele and widened genital hiatus with attenuated perineal body and perineocele. Vaginal vault prolaspe noted posteriorly on exam under anesthesia. Small cystocele reduced well with apical support. Bimanual exam with no palpable adnexal masses. At the completion of the procedure there was " excellent tricompartmental support, no sutures were palpated rectally.          ESTIMATED BLOOD LOSS: 25 mL            DRAINS: Lao                   SPECIMENS: None            IMPLANTS: None            COMPLICATIONS: None            DISPOSITION: Discharge            CONDITION: Stable            INDICATION FOR SURGERY:      Almaz Samuel is a 67 y.o. year old P1 with rectocele, perineocele, outlet dysfunction constipation. She was counseled on all approaches to prolapse treatment including observation, pessary and surgery. She was counseled on all methods of surgical prolapse repair including vaginal and abdominal/laparoscopic reconstructive approaches, and obliterative approaches. After detailed counseling about all prolapse treatment options and shared decision-making, Ms. Samuel opts for a native tissue vaginal reconstructive approach to prolapse repair via pelvic exam under anesthesia, posterior pair, perineorrhaphy, possible apical suspension, possible anterior pair, possible cystourethroscopy, and all indicated procedures. Pre-operative urodynamics did not demonstrate stress urinary incontinence with reduction of prolapse and she was counseled against a concomitant JOVAN procedure. Benefits of surgery were reviewed, including functional outcomes (bladder/bowel/sexual). Risks of surgery were  also discussed including anesthesia, bleeding, infection, damage to surrounding organs (bladder, ureter, urethra, bowel, blood vessel, nerves), possible blood transfusion, recurrent prolapse, transient buttock pain if sacrospinous suspension performed, transient voiding dysfunction requiring catheterization, new/worsening urinary incontinence, pain with sex. All questions were answered and consent was signed and witnessed.          TECHNIQUE:    I spoke with the patient in the preoperative holding area, where again risks, benefits, indications, and alternatives were reviewed and informed consent was obtained.  She was  then transferred to the operative suite, where she was identified, procedure was confirmed.  She was placed in dorsal supine position, given general endotracheal anesthesia without difficulty.  She was then placed in a high dorsal lithotomy position  in yellowfin stirrups with all pressure points padded.  She was prepared and draped in usual sterile fashion with vaginal chlorhexidine prep with all pressure points padded.  An appropriate time-out was performed, where patient was identified, procedure was confirmed, and the operative team was introduced.  It was also confirmed that patient did receive cefazolin 2 g IV for prophylaxis, and she also received DVT prophylaxis with sequential compression devices bilaterally throughout the case.   A 16-[place] La[place] catheter was then placed in the patient's  bladder for drainage throughout the procedure, and a [place] retractor was placed for retraction.      At this time, exam under anesthesia revealed findings noted above. A significant apical component to her rectocele was noted, and reduction of the apex to the sacrospinous ligament reduced this without undue tension. The optimal sacrospinous suspension location was then marked/tagged with interrupted 2-0 vicryl for future use.      Attention was then turned posteriorly.  There was a palpable distal rectocele.  The posterior repair then proceed in the following fashion: Allis clamps were placed on the posterior introitus in a triangle-shaped fashion on the posterior introitus with care not to over narrow the vaginal introitus.  The area was then infiltrated with 1 % lidocaine with dilute epinephrine.  A triangle-shaped incision was then made over the posterior vaginal wall and the perineal skin with a scalpel.  Skin was then sharply dissected from the underlying central tendon in the perineal body and the rectovaginal fascia out to the level of the levator fascia bilaterally and vaginal apex superiorly.      The  sacrospinous vault suspension then proceed in the following fashion: On the patient's right-hand side the dissection plane between the vaginal epithelium and the rectovaginal fibromuscular layer was continued in the paravaginal and eventually pararectal space down both bluntly and sharply down to the level of the right ischial spine.  Using careful blunt dissection with the surgeons right index finger the sacrospinous ligament was cleared of all surrounding tissue.  A Capio suture passing device was then brought into the operative field and a 0 [place] PDS suture was then passed through the sacrospinous ligament approximately 1.5 fingerbreadths medial to the ischial spine.  Second [place] PDS suture was placed in a similar fashion just laterally to the previously placed suture.  It was confirmed by palpation that the sutures were far away from vital structures near the ischial spine.  Excellent hemostasis at this perirectal tunnel was noted.  Both ends of the medial sacrspinous suture and were then passed from the retroperitoneal space through the previously tagged vaginal apex and into the vaginal canal and held.  The more lateral sacrospinous suture was then placed from the retroperitoneal space to through and through the vaginal epithelium and held.      The posterior repair and perineorrhaphy then continued: The rectocele was then plicated in the midline with 2-0 PDS stratafix in 2 running layers with care to approximate the distal rectovaginal fascia to the central tendon of the perineal body. This was then reinforced with a series of 2-0 vicryl sutures, including some site specific repairs of the rectovaginal fibromuscular layer. The posterior vaginal epithelium closure was started with 2-0 Vicryl in a running fashion with care to include the underlying fibromuscular tissue and obliterate dead space. This was paused custodial. Surgiflo was then placed into dissection planes to aid in venous hemostasis. The  sacrospinous sutures were then tied down with care to avoid suture bridging, with excellent elevation of the vaginal apex ot the sacrospinous ligament. These sutures were then cut. 0 Vicryl was then placed in the left deep transverse perineal muscle, and then using the same suture this was reattached to the external anal sphincter, then to the contralateral deep transverse perineal muscle and held.  The superficial transverse perineal muscle was then reapproximated in a similar fashion with 0 Vicryl on the left side and central tendon of the perineal body to the right side and held.  Perineorrhaphy suture were crossed and found not to narrow the introitus. The posterior vaginal epithelium was then trimmed.  A final 2-0 vicryl was placed between the two other perineorrhaphy sutures for reinforcement. The perineorrhaphy sutures were tied serially and the vaginal epithelial layer closure was completed.  There was good lengthening of the perineal body with an adequate genital hiatus diameter.  Another rectal exam was then performed with no sutures palpated.   Hemostasis was noted at the completion of the procedure. Packing was placed for temporary tamponade in the recovery area, to be removed before discharged.      All counts were correct.  The patient was then wakened from general anesthesia easily, and brought to the PACU in stable condition. Anticipate discharge home today.            Rajinder Myles MD, FACOG  Female Pelvic Medicine and Reconstructive Surgery  Department of Obstetrics and Gynecology  [facility]                                LABORATORY  Lab Results   Component Value Date    SODIUM 140 09/09/2022    POTASSIUM 4.1 09/09/2022    CHLORIDE 103 09/09/2022    CO2 27 09/09/2022    GLUCOSE 100 (H) 09/09/2022    BUN 10 09/09/2022    CREATININE 0.81 09/09/2022        Lab Results   Component Value Date    WBC 11.8 (H) 03/27/2023    HEMOGLOBIN 14.1 03/27/2023     HEMATOCRIT 43.6 03/27/2023    PLATELETCT 295 03/27/2023        Total time of the discharge process exceeds 38  minutes.

## 2023-04-26 RX ORDER — SULFAMETHOXAZOLE AND TRIMETHOPRIM 800; 160 MG/1; MG/1
1 TABLET ORAL 2 TIMES DAILY
Qty: 6 TABLET | Refills: 0 | Status: SHIPPED | OUTPATIENT
Start: 2023-04-26 | End: 2023-04-29

## 2023-05-08 ENCOUNTER — GYNECOLOGY VISIT (OUTPATIENT)
Dept: OBGYN | Facility: CLINIC | Age: 68
End: 2023-05-08
Payer: MEDICARE

## 2023-05-08 ENCOUNTER — HOSPITAL ENCOUNTER (OUTPATIENT)
Facility: MEDICAL CENTER | Age: 68
End: 2023-05-08
Attending: STUDENT IN AN ORGANIZED HEALTH CARE EDUCATION/TRAINING PROGRAM
Payer: MEDICARE

## 2023-05-08 VITALS
DIASTOLIC BLOOD PRESSURE: 74 MMHG | BODY MASS INDEX: 32.61 KG/M2 | HEART RATE: 99 BPM | SYSTOLIC BLOOD PRESSURE: 142 MMHG | WEIGHT: 167 LBS

## 2023-05-08 DIAGNOSIS — R30.0 DYSURIA: ICD-10-CM

## 2023-05-08 DIAGNOSIS — Z98.890 POSTOPERATIVE STATE: ICD-10-CM

## 2023-05-08 PROCEDURE — 87077 CULTURE AEROBIC IDENTIFY: CPT | Mod: 91

## 2023-05-08 PROCEDURE — 87186 SC STD MICRODIL/AGAR DIL: CPT

## 2023-05-08 PROCEDURE — 99024 POSTOP FOLLOW-UP VISIT: CPT | Performed by: STUDENT IN AN ORGANIZED HEALTH CARE EDUCATION/TRAINING PROGRAM

## 2023-05-08 PROCEDURE — 87086 URINE CULTURE/COLONY COUNT: CPT

## 2023-05-08 ASSESSMENT — FIBROSIS 4 INDEX: FIB4 SCORE: 1.04
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
## 2023-05-11 RX ORDER — CEPHALEXIN 500 MG/1
500 CAPSULE ORAL 2 TIMES DAILY
Qty: 10 CAPSULE | Refills: 0 | Status: SHIPPED | OUTPATIENT
Start: 2023-05-11 | End: 2023-05-17

## 2023-05-16 ENCOUNTER — OFFICE VISIT (OUTPATIENT)
Dept: MEDICAL GROUP | Facility: PHYSICIAN GROUP | Age: 68
End: 2023-05-16
Payer: MEDICARE

## 2023-05-16 VITALS
RESPIRATION RATE: 16 BRPM | HEIGHT: 60 IN | DIASTOLIC BLOOD PRESSURE: 78 MMHG | TEMPERATURE: 97.9 F | HEART RATE: 69 BPM | BODY MASS INDEX: 32.83 KG/M2 | WEIGHT: 167.2 LBS | SYSTOLIC BLOOD PRESSURE: 120 MMHG | OXYGEN SATURATION: 96 %

## 2023-05-16 DIAGNOSIS — Z98.890 POST-OPERATIVE STATE: ICD-10-CM

## 2023-05-16 DIAGNOSIS — K59.02 OUTLET DYSFUNCTION CONSTIPATION: ICD-10-CM

## 2023-05-16 DIAGNOSIS — G47.00 INSOMNIA, PERSISTENT: ICD-10-CM

## 2023-05-16 DIAGNOSIS — F41.1 GENERALIZED ANXIETY DISORDER: ICD-10-CM

## 2023-05-16 DIAGNOSIS — E78.01 FAMILIAL HYPERCHOLESTEREMIA: Chronic | ICD-10-CM

## 2023-05-16 DIAGNOSIS — I70.0 ATHEROSCLEROSIS OF AORTA (HCC): Chronic | ICD-10-CM

## 2023-05-16 DIAGNOSIS — M54.12 CERVICAL RADICULOPATHY: ICD-10-CM

## 2023-05-16 DIAGNOSIS — L50.9 HIVES: ICD-10-CM

## 2023-05-16 DIAGNOSIS — M65.319 TRIGGER FINGER OF THUMB, UNSPECIFIED LATERALITY: ICD-10-CM

## 2023-05-16 DIAGNOSIS — G63 POLYNEUROPATHY ASSOCIATED WITH UNDERLYING DISEASE (HCC): ICD-10-CM

## 2023-05-16 PROCEDURE — 99214 OFFICE O/P EST MOD 30 MIN: CPT | Performed by: NURSE PRACTITIONER

## 2023-05-16 PROCEDURE — 3074F SYST BP LT 130 MM HG: CPT | Performed by: NURSE PRACTITIONER

## 2023-05-16 PROCEDURE — 3078F DIAST BP <80 MM HG: CPT | Performed by: NURSE PRACTITIONER

## 2023-05-16 RX ORDER — METHYLPREDNISOLONE 4 MG/1
TABLET ORAL
Qty: 21 TABLET | Refills: 0 | Status: SHIPPED | OUTPATIENT
Start: 2023-05-16 | End: 2023-08-08

## 2023-05-16 RX ORDER — HYDROXYZINE HYDROCHLORIDE 25 MG/1
25 TABLET, FILM COATED ORAL 3 TIMES DAILY PRN
Qty: 30 TABLET | Refills: 0 | Status: SHIPPED | OUTPATIENT
Start: 2023-05-16

## 2023-05-16 RX ORDER — CITALOPRAM HYDROBROMIDE 10 MG/1
TABLET ORAL
Qty: 90 TABLET | Refills: 3 | Status: SHIPPED | OUTPATIENT
Start: 2023-05-16

## 2023-05-16 RX ORDER — LORAZEPAM 1 MG/1
1 TABLET ORAL
Qty: 30 TABLET | Refills: 2 | Status: SHIPPED | OUTPATIENT
Start: 2023-05-19 | End: 2023-08-08 | Stop reason: SDUPTHER

## 2023-05-16 RX ORDER — GABAPENTIN 300 MG/1
CAPSULE ORAL
Qty: 60 CAPSULE | Refills: 5 | Status: SHIPPED | OUTPATIENT
Start: 2023-05-16 | End: 2023-11-06 | Stop reason: SDUPTHER

## 2023-05-16 ASSESSMENT — FIBROSIS 4 INDEX: FIB4 SCORE: 1.04

## 2023-05-16 NOTE — PROGRESS NOTES
"CC: Medication refill, go over recent surgeries, itching    HISTORY OF THE PRESENT ILLNESS: Patient is a 68 y.o. female. This pleasant patient is here today for evaluation and management of the following health problems.        Polyneuropathy associated with underlying disease (CMS-HCC) (HCC)  Continues with neuropathy in hands and feet that is well controlled with gabapentin 300 mg 1-2 times a day.    Aortic atherosclerosis (HCC)  Chronic health problem.  Admits to not taking her atorvastatin for the last several months.  \"I just got out of the habit.\"  Managed by cardiology, due for follow-up.  At last appointment with cardiology, was considering Praluent.    Post-operative state  Patient had recent surgery under anesthesia for posterior repair.  Had low oxygen saturation postsurgery and was admitted overnight.  No problems since then.  Also reports when she had ablation in her neck, she had a vasovagal episode and quickly recovered.  Denies chest pain, headedness.    Outlet dysfunction constipation  Managed by Dr. Green.  Patient had recent surgery of sacrospinous colpopexy, posterior repair, perineorrhaphy.  Continues with outlet dysfunction constipation.  Now seeing pelvic PT.  Reports some improvement.    ROBERT (generalized anxiety disorder)  This is a chronic health problem that is well controlled with current medications and lifestyle measures.  Continues with Celexa 10 mg daily.  Continues to take lorazepam at bedtime for both sleep and anxiety.  Has been taking medication for several years.  Denies adverse effects.  Sleeping about 6 hours a night.  Understands not to drink alcohol due to increased risk of respiratory depression.    Insomnia, persistent  Please see additional notes under anxiety.    Hives  Patient reports intermittent hives on arms and torso for the last couple weeks.  Very pruritic and scratching a lot.  Denies any new products.  Has tried calamine lotion.    Trigger thumb  Patient reports " "bilateral trigger thumb and left CMC arthritis.  Managed by GIULIA.  Trying to avoid surgery.  Currently using Voltaren gel with moderate management.    Allergies: Albumin, Rosuvastatin, Seasonal, and Simvastatin    Current Outpatient Medications Ordered in Epic   Medication Sig Dispense Refill    methylPREDNISolone (MEDROL DOSEPAK) 4 MG Tablet Therapy Pack As directed on the packaging label. 21 Tablet 0    hydrOXYzine HCl (ATARAX) 25 MG Tab Take 1 Tablet by mouth 3 times a day as needed for Itching. 30 Tablet 0    [START ON 5/19/2023] LORazepam (ATIVAN) 1 MG Tab Take 1 Tablet by mouth at bedtime for 90 days. 30 Tablet 2    citalopram (CELEXA) 10 MG tablet Take 1 tablet by mouth once daily 90 Tablet 3    gabapentin (NEURONTIN) 300 MG Cap Take 300 mg twice a day. 60 Capsule 5    meloxicam (MOBIC) 15 MG tablet Take 15 mg by mouth as needed.      baclofen (LIORESAL) 10 MG Tab Take 1 Tablet by mouth at bedtime. 90 Tablet 3    Azelastine HCl 137 MCG/SPRAY Solution Administer 2 Sprays into each nostril 2 times a day. 30 mL 5    benzonatate (TESSALON) 100 MG Cap Take 1 Capsule by mouth 3 times a day as needed for Cough. 60 Capsule 0    estradiol (ESTRACE VAGINAL) 0.1 MG/GM vaginal cream Apply 1g cream inside vagina twice per week 1 Each 3    ondansetron (ZOFRAN ODT) 4 MG TABLET DISPERSIBLE Take 1 Tablet by mouth every 6 hours as needed for Nausea. 10 Tablet 2    dicyclomine (BENTYL) 10 MG Cap TAKE 1 CAPSULE BY MOUTH EVERY 6 HOURS AS NEEDED FOR ABDOMINAL CRAMPS AND OR DISCOMFORT      omeprazole (PRILOSEC) 20 MG delayed-release capsule Take 1 capsule by mouth once daily 30 Capsule 11    atorvastatin (LIPITOR) 40 MG Tab Take 1 Tab by mouth every day. 90 Tab 3    Artificial Tear Solution (TEARS NATURALE OP) Place 2 Drops in both eyes 2 times a day as needed.       No current Epic-ordered facility-administered medications on file.       Past Medical History:   Diagnosis Date    Anesthesia 02/14/2011    PO N/V, \"slow to come out\", " vasovagal response with last ablation/block on her neck    Aortic atherosclerosis (HCC)     Arthritis 02/14/2011    spine    Backpain 02/14/2011    neck and abd. also,     Bowel habit changes 03/08/2023    constipation and diarrhea r/t IBS, medicated    Breath shortness     with exertion    Bronchitis 05/2018    Cancer (HCC) 07/18/2018    Skin - 15 years ago.    Cancer ([facility])     April/May 2018    Cataract 03/08/2023    in both eyes, no surgery at present    Complication of anesthesia     Diverticulitis     Endometriosis of uterus     Familial hypercholesteremia     Fusion of spine 2014    ROBERT (generalized anxiety disorder) 03/08/2023    medicated    H/O fall     when in hospital  with low O2 sats    H/O: CVA (cerebrovascular accident) 08/22/2014    Complex event associated with apparent medication reaction but with MRI suggesting possible multiple small infarcts of various ages, [facility]    Hair loss 12/06/2018    Heart burn 03/08/2023    medicated    Hiatus hernia syndrome     not repaired    High cholesterol 03/08/2023    medicated    HTN, goal below 130/80 10/24/2011    Infectious disease      Hep C +    Lung collapse 02/14/2011    right lung 1/4 collpsed     Major depressive disorder with single episode, in full remission ([facility]) 10/24/2011    Mixed hyperlipidemia 12/27/2011    Moderate major depression ([facility]) 03/08/2023    medicated    MRSA exposure 08/2014    while in hospital    PONV (postoperative nausea and vomiting) 03/08/2023    Post-menopause on HRT (hormone replacement therapy) 12/27/2011    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    exposed as a child, told not active    Scoliosis     Sleep apnea 03/08/2023    BIPAP, hasn't used in the last year    Snoring 03/08/2023    Stroke ([facility]) 03/08/2023    tia's times 3 in the past    Unspecified vitamin D deficiency 09/24/2012    Urinary incontinence 03/08/2023    at present, wears a pad       Past Surgical History:   Procedure Laterality Date    ID PELVIC EXAMINATION W ANESTH   3/27/2023    Procedure: PELVIC EXAM UNDER ANESTHESIA, POSTERIOR REPAIR, sacrospinous vault suspension, PERINEORRHAPHY,;  Surgeon: Rajinder Myles M.D.;  Location: SURGERY SAME DAY [facility];  Service: Gynecology    NM NEUROPLASTY & OR TRANSPOS MEDIAN NRV CARPAL SABRINA Right 2022    Procedure: RIGHT HAND OPEN CARPAL TUNNEL DECOMPRESSION;  Surgeon: Holly Martin M.D.;  Location: [facility];  Service: Orthopedics    NISSEN FUNDOPLICATION LAPAROSCOPIC  2018    Procedure: NISSEN FUNDOPLICATION LAPAROSCOPIC;  Surgeon: Kenji Okeefe M.D.;  Location: SURGERY [facility];  Service: General    NISSEN FUNDOPLICATION LAPAROSCOPIC N/A 2015    Procedure: NISSEN FUNDOPLICATION LAPAROSCOPIC;  Surgeon: Kenji Okeefe M.D.;  Location: SURGERY SAME DAY [facility];  Service:     GASTROSCOPY-ENDO  2015    Procedure: GASTROSCOPY-ENDO;  Surgeon: Kenji Okeefe M.D.;  Location: ENDOSCOPY [facility];  Service:     CARPAL TUNNEL RELEASE  2012    Performed by Holly Martin M.D. at SURGERY [facility] ORS    LOW ANTERIOR RESECTION LAPAROSCOPIC  2011    Performed by KENJI OKEEFE at SURGERY [facility] ORS    CERVICAL DISK AND FUSION ANTERIOR  2010    Performed by JOSEPH DARLING at SURGERY [facility] ORS    TUBAL LIGATION  1988    TONSILLECTOMY AND ADENOIDECTOMY  1959    ABDOMINAL HYSTERECTOMY TOTAL      APPENDECTOMY      GYN SURGERY      partial hysterectomy       Social History     Tobacco Use    Smoking status: Former     Packs/day: 0.30     Years: 5.00     Pack years: 1.50     Types: Cigarettes     Quit date: 1975     Years since quittin.4    Smokeless tobacco: Never    Tobacco comments:     quit    Vaping Use    Vaping Use: Never used   Substance Use Topics    Alcohol use: No     Alcohol/week: 0.0 oz    Drug use: No       Family History   Problem Relation Age of Onset    Diabetes Mother     Cancer Mother 82        breast cancer    Lung  Disease Mother         copd    Hyperlipidemia Mother     Hypertension Mother     Cancer Father         Laryngeal CA    Heart Disease Father 50        CAD with bypasses x 3    Heart Attack Father     Hyperlipidemia Father     Hypertension Father     Diabetes Sister         type II diabetes    Diabetes Other     Heart Disease Other     Hypertension Other     Lung Disease Other        ROS: As in HPI, otherwise negative for chest pain, dyspnea, dysuria, blood in stool, fever         Exam: /78   Pulse 69   Temp 36.6 °C (97.9 °F) (Temporal)   Resp 16   Ht 1.524 m (5')   Wt 75.8 kg (167 lb 3.2 oz)   SpO2 96%  Body mass index is 32.65 kg/m².    General: Alert, pleasant, well nourished, well developed female in NAD  HEENT: Normocephalic. Eyes conjunctiva clear lids without ptosis  Neck: Supple without bruit. Thyroid is not enlarged.  Pulmonary: Clear to ausculation.  Normal effort. No rales, ronchi, or wheezing.  Cardiovascular: Normal rate and rhythm without murmur.  No lower extremity edema.  Abdomen: Soft, nontender, nondistended.  Neurologic: Grossly nonfocal  Skin: Warm and dry.  Musculoskeletal: Normal gait.   Psych: Normal mood and affect. Alert and oriented. Judgment and insight is normal.    Please note that this dictation was created using voice recognition software. I have made every reasonable attempt to correct obvious errors, but I expect that there are errors of grammar and possibly content that I did not discover before finalizing the note.      Assessment/Plan  1. Hives  We will prescribe steroid burst in addition to hydroxyzine for itching.  Instructions and side effects reviewed with patient.  She will return to clinic if symptoms do not improve.  - methylPREDNISolone (MEDROL DOSEPAK) 4 MG Tablet Therapy Pack; As directed on the packaging label.  Dispense: 21 Tablet; Refill: 0  - hydrOXYzine HCl (ATARAX) 25 MG Tab; Take 1 Tablet by mouth 3 times a day as needed for Itching.  Dispense: 30  Tablet; Refill: 0    2. Insomnia, persistent  As in #3  - LORazepam (ATIVAN) 1 MG Tab; Take 1 Tablet by mouth at bedtime for 90 days.  Dispense: 30 Tablet; Refill: 2    3. ROBERT (generalized anxiety disorder)  Well-controlled.  Continue with lorazepam as needed at bedtime.  Tolerating medication.  Medically necessary.   reviewed.  Up-to-date on controlled substance agreement and UDS which is consistent.  - LORazepam (ATIVAN) 1 MG Tab; Take 1 Tablet by mouth at bedtime for 90 days.  Dispense: 30 Tablet; Refill: 2  - citalopram (CELEXA) 10 MG tablet; Take 1 tablet by mouth once daily  Dispense: 90 Tablet; Refill: 3    4. Cervical radiculopathy  Fairly well controlled.  Continue with gabapentin.  Some improvement since ablation.  - gabapentin (NEURONTIN) 300 MG Cap; Take 300 mg twice a day.  Dispense: 60 Capsule; Refill: 5    5. Familial hypercholesteremia  Advised patient to restart atorvastatin and rationale secondary to ASCVD risk..  She would like to wait until she sees cardiology.  She will call for an appointment.  She will have lipid profile done prior to appointment with cardiology.  - Lipid Profile; Future    6. Polyneuropathy associated with underlying disease (HCC)  As in #4    7. Aortic atherosclerosis (HCC)  As in #5    8. Post-operative state  Some complications after anesthesia and sedation.  She will be sure to inform providers the next time she has a procedure.    9. Outlet dysfunction constipation  Continue follow-up with urogynecology and pelvic physical therapy.    10. Trigger finger of thumb, unspecified laterality  Continue management with orthopedics.    Patient will return to clinic in 3 months for medication refill or sooner if needed.

## 2023-05-17 PROBLEM — Z98.890 POST-OPERATIVE STATE: Status: RESOLVED | Noted: 2023-03-27 | Resolved: 2023-05-17

## 2023-05-17 PROBLEM — K58.2 IRRITABLE BOWEL SYNDROME WITH BOTH CONSTIPATION AND DIARRHEA: Status: RESOLVED | Noted: 2021-07-17 | Resolved: 2023-05-17

## 2023-05-17 PROBLEM — M65.319 TRIGGER THUMB: Status: ACTIVE | Noted: 2023-05-17

## 2023-05-17 PROBLEM — L50.9 HIVES: Status: ACTIVE | Noted: 2023-05-17

## 2023-05-17 NOTE — ASSESSMENT & PLAN NOTE
Patient reports bilateral trigger thumb and left CMC arthritis.  Managed by GIULIA.  Trying to avoid surgery.  Currently using Voltaren gel with moderate management.

## 2023-05-17 NOTE — ASSESSMENT & PLAN NOTE
"Chronic health problem.  Admits to not taking her atorvastatin for the last several months.  \"I just got out of the habit.\"  Managed by cardiology, due for follow-up.  At last appointment with cardiology, was considering Praluent.  "

## 2023-05-17 NOTE — ASSESSMENT & PLAN NOTE
Continues with neuropathy in hands and feet that is well controlled with gabapentin 300 mg 1-2 times a day.

## 2023-05-17 NOTE — ASSESSMENT & PLAN NOTE
Patient reports intermittent hives on arms and torso for the last couple weeks.  Very pruritic and scratching a lot.  Denies any new products.  Has tried calamine lotion.

## 2023-05-17 NOTE — ASSESSMENT & PLAN NOTE
This is a chronic health problem that is well controlled with current medications and lifestyle measures.  Continues with Celexa 10 mg daily.  Continues to take lorazepam at bedtime for both sleep and anxiety.  Has been taking medication for several years.  Denies adverse effects.  Sleeping about 6 hours a night.  Understands not to drink alcohol due to increased risk of respiratory depression.

## 2023-05-17 NOTE — ASSESSMENT & PLAN NOTE
Patient had recent surgery under anesthesia for posterior repair.  Had low oxygen saturation postsurgery and was admitted overnight.  No problems since then.  Also reports when she had ablation in her neck, she had a vasovagal episode and quickly recovered.  Denies chest pain, headedness.

## 2023-08-08 ENCOUNTER — OFFICE VISIT (OUTPATIENT)
Dept: MEDICAL GROUP | Facility: PHYSICIAN GROUP | Age: 68
End: 2023-08-08
Payer: MEDICARE

## 2023-08-08 VITALS
OXYGEN SATURATION: 96 % | DIASTOLIC BLOOD PRESSURE: 70 MMHG | RESPIRATION RATE: 16 BRPM | BODY MASS INDEX: 32.98 KG/M2 | HEART RATE: 84 BPM | SYSTOLIC BLOOD PRESSURE: 130 MMHG | WEIGHT: 168 LBS | TEMPERATURE: 98.7 F | HEIGHT: 60 IN

## 2023-08-08 DIAGNOSIS — Z13.6 SCREENING FOR CARDIOVASCULAR CONDITION: ICD-10-CM

## 2023-08-08 DIAGNOSIS — F41.1 GENERALIZED ANXIETY DISORDER: ICD-10-CM

## 2023-08-08 DIAGNOSIS — E66.9 OBESITY (BMI 30-39.9): ICD-10-CM

## 2023-08-08 DIAGNOSIS — K21.9 GASTROESOPHAGEAL REFLUX DISEASE, UNSPECIFIED WHETHER ESOPHAGITIS PRESENT: ICD-10-CM

## 2023-08-08 DIAGNOSIS — R11.0 NAUSEA: ICD-10-CM

## 2023-08-08 DIAGNOSIS — E78.01 FAMILIAL HYPERCHOLESTEREMIA: Chronic | ICD-10-CM

## 2023-08-08 DIAGNOSIS — G47.00 INSOMNIA, PERSISTENT: ICD-10-CM

## 2023-08-08 PROCEDURE — 3078F DIAST BP <80 MM HG: CPT | Performed by: NURSE PRACTITIONER

## 2023-08-08 PROCEDURE — 3075F SYST BP GE 130 - 139MM HG: CPT | Performed by: NURSE PRACTITIONER

## 2023-08-08 PROCEDURE — 99213 OFFICE O/P EST LOW 20 MIN: CPT | Performed by: NURSE PRACTITIONER

## 2023-08-08 RX ORDER — ONDANSETRON 4 MG/1
4 TABLET, ORALLY DISINTEGRATING ORAL EVERY 6 HOURS PRN
Qty: 10 TABLET | Refills: 2 | Status: SHIPPED | OUTPATIENT
Start: 2023-08-08

## 2023-08-08 RX ORDER — OMEPRAZOLE 20 MG/1
20 CAPSULE, DELAYED RELEASE ORAL DAILY
Qty: 30 CAPSULE | Refills: 11 | Status: SHIPPED | OUTPATIENT
Start: 2023-08-08 | End: 2024-01-09 | Stop reason: SDUPTHER

## 2023-08-08 RX ORDER — LORAZEPAM 1 MG/1
1 TABLET ORAL
Qty: 30 TABLET | Refills: 2 | Status: SHIPPED | OUTPATIENT
Start: 2023-09-02 | End: 2023-11-14

## 2023-08-08 RX ORDER — ATORVASTATIN CALCIUM 40 MG/1
40 TABLET, FILM COATED ORAL DAILY
Qty: 90 TABLET | Refills: 3 | Status: SHIPPED | OUTPATIENT
Start: 2023-08-08 | End: 2024-01-09

## 2023-08-08 ASSESSMENT — FIBROSIS 4 INDEX: FIB4 SCORE: 1.04

## 2023-08-08 NOTE — PROGRESS NOTES
CC: Medication refill    HISTORY OF THE PRESENT ILLNESS: Patient is a 68 y.o. female. This pleasant patient is here today for evaluation and management of the following health problems.        ROBERT (generalized anxiety disorder)  This is a chronic health problem that is well controlled with current medications and lifestyle measures.  Taking Celexa 10 mg daily.  Also taking lorazepam at bedtime for anxiety and sleep.  Sleeps about 5 to 6 hours a night.  Denies adverse effects.    Allergies: Albumin, Rosuvastatin, Seasonal, and Simvastatin    Current Outpatient Medications Ordered in Epic   Medication Sig Dispense Refill    [START ON 9/2/2023] LORazepam (ATIVAN) 1 MG Tab Take 1 Tablet by mouth at bedtime for 90 days. 30 Tablet 2    omeprazole (PRILOSEC) 20 MG delayed-release capsule Take 1 Capsule by mouth every day. 30 Capsule 11    ondansetron (ZOFRAN ODT) 4 MG TABLET DISPERSIBLE Take 1 Tablet by mouth every 6 hours as needed for Nausea/Vomiting. 10 Tablet 2    atorvastatin (LIPITOR) 40 MG Tab Take 1 Tablet by mouth every day. 90 Tablet 3    hydrOXYzine HCl (ATARAX) 25 MG Tab Take 1 Tablet by mouth 3 times a day as needed for Itching. 30 Tablet 0    citalopram (CELEXA) 10 MG tablet Take 1 tablet by mouth once daily 90 Tablet 3    gabapentin (NEURONTIN) 300 MG Cap Take 300 mg twice a day. 60 Capsule 5    meloxicam (MOBIC) 15 MG tablet Take 15 mg by mouth as needed.      baclofen (LIORESAL) 10 MG Tab Take 1 Tablet by mouth at bedtime. 90 Tablet 3    Azelastine HCl 137 MCG/SPRAY Solution Administer 2 Sprays into each nostril 2 times a day. 30 mL 5    benzonatate (TESSALON) 100 MG Cap Take 1 Capsule by mouth 3 times a day as needed for Cough. 60 Capsule 0    estradiol (ESTRACE VAGINAL) 0.1 MG/GM vaginal cream Apply 1g cream inside vagina twice per week 1 Each 3    dicyclomine (BENTYL) 10 MG Cap TAKE 1 CAPSULE BY MOUTH EVERY 6 HOURS AS NEEDED FOR ABDOMINAL CRAMPS AND OR DISCOMFORT      Artificial Tear Solution (TEARS  "NATURALE OP) Place 2 Drops in both eyes 2 times a day as needed.       No current Epic-ordered facility-administered medications on file.       Past Medical History:   Diagnosis Date    Anesthesia 02/14/2011    PO N/V, \"slow to come out\", vasovagal response with last ablation/block on her neck    Aortic atherosclerosis (HCC)     Arthritis 02/14/2011    spine    Backpain 02/14/2011    neck and abd. also,     Bowel habit changes 03/08/2023    constipation and diarrhea r/t IBS, medicated    Breath shortness     with exertion    Bronchitis 05/2018    Cancer (HCC) 07/18/2018    Skin - 15 years ago.    Cancer (HCC)     April/May 2018    Cataract 03/08/2023    in both eyes, no surgery at present    Complication of anesthesia     Diverticulitis     Endometriosis of uterus     Familial hypercholesteremia     Fusion of spine 2014    ROBERT (generalized anxiety disorder) 03/08/2023    medicated    H/O fall     when in hospital  with low O2 sats    H/O: CVA (cerebrovascular accident) 08/22/2014    Complex event associated with apparent medication reaction but with MRI suggesting possible multiple small infarcts of various ages, [facility]    Hair loss 12/06/2018    Heart burn 03/08/2023    medicated    Hiatus hernia syndrome     not repaired    High cholesterol 03/08/2023    medicated    HTN, goal below 130/80 10/24/2011    Infectious disease      Hep C +    Lung collapse 02/14/2011    right lung 1/4 collpsed     Major depressive disorder with single episode, in full remission (HCC) 10/24/2011    Mixed hyperlipidemia 12/27/2011    Moderate major depression ([facility]) 03/08/2023    medicated    MRSA exposure 08/2014    while in hospital    PONV (postoperative nausea and vomiting) 03/08/2023    Post-menopause on HRT (hormone replacement therapy) 12/27/2011    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    exposed as a child, told not active    Scoliosis     Sleep apnea 03/08/2023    BIPAP, hasn't used in the last year    Snoring 03/08/2023    Stroke ([facility]) " 2023    tia's times 3 in the past    Unspecified vitamin D deficiency 2012    Urinary incontinence 2023    at present, wears a pad       Past Surgical History:   Procedure Laterality Date    WI PELVIC EXAMINATION W ANESTH  3/27/2023    Procedure: PELVIC EXAM UNDER ANESTHESIA, POSTERIOR REPAIR, sacrospinous vault suspension, PERINEORRHAPHY,;  Surgeon: Rajinder Myles M.D.;  Location: SURGERY SAME DAY [facility];  Service: Gynecology    W[place] NEUROPLASTY & OR TRANSPOS MEDIAN NRV CARPAL SABRINA Right 2022    Procedure: RIGHT HAND OPEN CARPAL TUNNEL DECOMPRESSION;  Surgeon: Holly Martin M.D.;  Location: [facility];  Service: Orthopedics    NISSEN FUNDOPLICATION LAPAROSCOPIC  2018    Procedure: NISSEN FUNDOPLICATION LAPAROSCOPIC;  Surgeon: Kenji Okeefe M.D.;  Location: SURGERY [facility];  Service: General    NISSEN FUNDOPLICATION LAPAROSCOPIC N/A 2015    Procedure: NISSEN FUNDOPLICATION LAPAROSCOPIC;  Surgeon: Kenji Okeefe M.D.;  Location: SURGERY SAME DAY [facility];  Service:     GASTROSCOPY-ENDO  2015    Procedure: GASTROSCOPY-ENDO;  Surgeon: Kenji Okeefe M.D.;  Location: ENDOSCOPY [facility];  Service:     CARPAL TUNNEL RELEASE  2012    Performed by Holly Martin M.D. at SURGERY [facility] ORS    LOW ANTERIOR RESECTION LAPAROSCOPIC  2011    Performed by KENJI OKEEFE at SURGERY [facility] ORS    CERVICAL DISK AND FUSION ANTERIOR  2010    Performed by JOSEPH DARLING at SURGERY [facility] ORS    TUBAL LIGATION  1988    TONSILLECTOMY AND ADENOIDECTOMY  1959    ABDOMINAL HYSTERECTOMY TOTAL      APPENDECTOMY      GYN SURGERY      partial hysterectomy       Social History     Tobacco Use    Smoking status: Former     Packs/day: 0.30     Years: 5.00     Pack years: 1.50     Types: Cigarettes     Quit date: 1975     Years since quittin.6    Smokeless tobacco: Never    Tobacco comments:     quit    Vaping  Use    Vaping Use: Never used   Substance Use Topics    Alcohol use: No     Alcohol/week: 0.0 oz    Drug use: No       Family History   Problem Relation Age of Onset    Diabetes Mother     Cancer Mother 82        breast cancer    Lung Disease Mother         copd    Hyperlipidemia Mother     Hypertension Mother     Cancer Father         Laryngeal CA    Heart Disease Father 50        CAD with bypasses x 3    Heart Attack Father     Hyperlipidemia Father     Hypertension Father     Diabetes Sister         type II diabetes    Diabetes Other     Heart Disease Other     Hypertension Other     Lung Disease Other        ROS:   As in HPI, otherwise negative for chest pain, dyspnea, fever         Exam: /70   Pulse 84   Temp 37.1 °C (98.7 °F) (Temporal)   Resp 16   Ht 1.524 m (5')   Wt 76.2 kg (168 lb)   SpO2 96%  Body mass index is 32.81 kg/m².    General: Alert, pleasant, well nourished, well developed female in NAD  HEENT: Normocephalic. Eyes conjunctiva clear lids without ptosis  Neck: Supple without bruit. Thyroid is not enlarged.  Pulmonary: Clear to ausculation.  Normal effort. No rales, ronchi, or wheezing.  Cardiovascular: Normal rate and rhythm without murmur.   Neurologic: Grossly nonfocal  Lymph: No cervical or supraclavicular lymph nodes are palpable  Skin: Warm and dry.    Musculoskeletal: Normal gait.   Psych: Normal mood and affect. Alert and oriented. Judgment and insight is normal.    Please note that this dictation was created using voice recognition software. I have made every reasonable attempt to correct obvious errors, but I expect that there are errors of grammar and possibly content that I did not discover before finalizing the note.      Assessment/Plan  1. Insomnia, persistent  Well-controlled.  Continue with lorazepam at bedtime as needed.  Reviewed risks including addiction and respiratory depression and that respiratory depression risk is increased if taken with alcohol.  Patient does  not drink alcohol.   reviewed.  Up-to-date on controlled substance agreement.    - LORazepam (ATIVAN) 1 MG Tab; Take 1 Tablet by mouth at bedtime for 90 days.  Dispense: 30 Tablet; Refill: 2    2. ROBERT (generalized anxiety disorder)  Continue escitalopram routinely and lorazepam as needed at bedtime.  - LORazepam (ATIVAN) 1 MG Tab; Take 1 Tablet by mouth at bedtime for 90 days.  Dispense: 30 Tablet; Refill: 2    3. Gastroesophageal reflux disease, unspecified whether esophagitis present  Requesting refill  - omeprazole (PRILOSEC) 20 MG delayed-release capsule; Take 1 Capsule by mouth every day.  Dispense: 30 Capsule; Refill: 11    4. Nausea  Requesting refill  - ondansetron (ZOFRAN ODT) 4 MG TABLET DISPERSIBLE; Take 1 Tablet by mouth every 6 hours as needed for Nausea/Vomiting.  Dispense: 10 Tablet; Refill: 2    5. Screening for cardiovascular condition    - Comp Metabolic Panel; Future  - ESTIMATED GFR; Future  - Lipid Profile; Future    6. Familial hypercholesteremia    - atorvastatin (LIPITOR) 40 MG Tab; Take 1 Tablet by mouth every day.  Dispense: 90 Tablet; Refill: 3    7. Obesity (BMI 30-39.9)    - Patient identified as having weight management issue.  Appropriate orders and counseling given.      I explained to patient today that I will be leaving the practice.  Patient had already has appointment to establish care with provider at [facility] in [place] next month.

## 2023-08-11 NOTE — ASSESSMENT & PLAN NOTE
Chronic health problem.  Has not taken atorvastatin in a couple months.  Lipid levels are elevated.  Reports that any statin makes her mildly nauseated.  Trying to figure out when is a good time to take it in regards to having nausea.  Managed by cardiology.  
Patient reports increase in lower abdominal pain, both sides about 4 days ago.  Accompanied with diarrhea, chills, nausea, malaise, sinus congestion.  Uncertain if has fever.  Pain was severe 2 days ago, mildly improved.  Tolerating liquids and small bites today.  Took 1 Bentyl with mild relief.  Has been seeing gastroenterology for chronic abdominal pain.  I have reviewed most recent note from 9/8/2022.  Recent work-up showed positive for Salmonella.  Patient wonders if this is related to that.  She has not heard back from her GI regarding the results.  Has follow-up appointment with GI in 2 weeks.  Denies dysuria, hematuria.  
No

## 2023-09-19 ENCOUNTER — OFFICE VISIT (OUTPATIENT)
Dept: MEDICAL GROUP | Facility: PHYSICIAN GROUP | Age: 68
End: 2023-09-19
Payer: MEDICARE

## 2023-09-19 VITALS
OXYGEN SATURATION: 96 % | WEIGHT: 168 LBS | DIASTOLIC BLOOD PRESSURE: 74 MMHG | BODY MASS INDEX: 32.98 KG/M2 | HEART RATE: 75 BPM | SYSTOLIC BLOOD PRESSURE: 102 MMHG | TEMPERATURE: 98.2 F | HEIGHT: 60 IN

## 2023-09-19 DIAGNOSIS — K58.2 IRRITABLE BOWEL SYNDROME WITH BOTH CONSTIPATION AND DIARRHEA: Chronic | ICD-10-CM

## 2023-09-19 DIAGNOSIS — M54.12 CERVICAL RADICULOPATHY: Chronic | ICD-10-CM

## 2023-09-19 DIAGNOSIS — G47.33 OBSTRUCTIVE SLEEP APNEA: Chronic | ICD-10-CM

## 2023-09-19 DIAGNOSIS — E78.01 FAMILIAL HYPERCHOLESTEREMIA: Chronic | ICD-10-CM

## 2023-09-19 DIAGNOSIS — F32.5 MAJOR DEPRESSIVE DISORDER WITH SINGLE EPISODE, IN FULL REMISSION (HCC): Chronic | ICD-10-CM

## 2023-09-19 DIAGNOSIS — Z00.00 ROUTINE HEALTH MAINTENANCE: ICD-10-CM

## 2023-09-19 DIAGNOSIS — Z76.89 ENCOUNTER TO ESTABLISH CARE: ICD-10-CM

## 2023-09-19 DIAGNOSIS — G47.00 INSOMNIA, PERSISTENT: ICD-10-CM

## 2023-09-19 DIAGNOSIS — Z23 NEED FOR VACCINATION: ICD-10-CM

## 2023-09-19 DIAGNOSIS — E55.9 VITAMIN D DEFICIENCY: Chronic | ICD-10-CM

## 2023-09-19 DIAGNOSIS — J30.9 ALLERGIC RHINITIS, UNSPECIFIED SEASONALITY, UNSPECIFIED TRIGGER: ICD-10-CM

## 2023-09-19 DIAGNOSIS — K21.00 GASTROESOPHAGEAL REFLUX DISEASE WITH ESOPHAGITIS WITHOUT HEMORRHAGE: Chronic | ICD-10-CM

## 2023-09-19 PROBLEM — K58.9 IBS (IRRITABLE BOWEL SYNDROME): Status: ACTIVE | Noted: 2021-07-17

## 2023-09-19 PROBLEM — F41.1 GENERALIZED ANXIETY DISORDER: Chronic | Status: ACTIVE | Noted: 2017-06-15

## 2023-09-19 PROBLEM — G63 POLYNEUROPATHY ASSOCIATED WITH UNDERLYING DISEASE (HCC): Chronic | Status: ACTIVE | Noted: 2018-03-13

## 2023-09-19 PROBLEM — K58.9 IBS (IRRITABLE BOWEL SYNDROME): Chronic | Status: ACTIVE | Noted: 2021-07-17

## 2023-09-19 PROCEDURE — 3074F SYST BP LT 130 MM HG: CPT | Performed by: STUDENT IN AN ORGANIZED HEALTH CARE EDUCATION/TRAINING PROGRAM

## 2023-09-19 PROCEDURE — 90662 IIV NO PRSV INCREASED AG IM: CPT | Performed by: STUDENT IN AN ORGANIZED HEALTH CARE EDUCATION/TRAINING PROGRAM

## 2023-09-19 PROCEDURE — 3078F DIAST BP <80 MM HG: CPT | Performed by: STUDENT IN AN ORGANIZED HEALTH CARE EDUCATION/TRAINING PROGRAM

## 2023-09-19 PROCEDURE — G0008 ADMIN INFLUENZA VIRUS VAC: HCPCS | Performed by: STUDENT IN AN ORGANIZED HEALTH CARE EDUCATION/TRAINING PROGRAM

## 2023-09-19 PROCEDURE — 99215 OFFICE O/P EST HI 40 MIN: CPT | Mod: 25 | Performed by: STUDENT IN AN ORGANIZED HEALTH CARE EDUCATION/TRAINING PROGRAM

## 2023-09-19 RX ORDER — AMITRIPTYLINE HYDROCHLORIDE 10 MG/1
TABLET, FILM COATED ORAL
COMMUNITY
Start: 2023-08-10

## 2023-09-19 ASSESSMENT — FIBROSIS 4 INDEX: FIB4 SCORE: 1.04

## 2023-09-19 NOTE — PROGRESS NOTES
"Subjective:     CC:  establish care    HISTORY OF THE PRESENT ILLNESS: Patient is a 68 y.o. female here today to establish care. Prior PCP was Emilia Pedro.    Obstructive sleep apnea  Patient has been unable to use BiPAP for the past 1 year.  Follows up pulmonology.    GERD (gastroesophageal reflux disease)  History of hiatal hernia repair.  Follows up with GI.  Currently on omeprazole 20 mg daily.    Cervical radiculopathy  Follows up with [facility] for ablations.    S/P lumbar fusion  History of scoliosis s/p surgery.  Currently using baclofen 10 mg at bedtime and gabapentin 300 mg 1-2 times daily.    Insomnia, persistent  Patient reports she has been taking lorazepam 1 mg at bedtime for many years.  PDMP reviewed and last filled 9/8.    IBS (irritable bowel syndrome)  Follows up with GI.  Currently on amitriptyline 10 mg daily and Bentyl as needed.    Allergic rhinitis  Well-controlled with azelastine nasal spray as needed.    Familial hypercholesteremia  Early on atorvastatin 40 mg daily.    Major depressive disorder with single episode, in full remission (HCC)  Well-controlled with Celexa 10 mg daily.        Health Maintenance: Completed    Allergies   Allergen Reactions    Albumin Unspecified     Other reaction(s): Other (See Comments)  \"egg intolerance\"  Reaction:stomach cramps,throwing up for 12 hours,cold sweats    Rosuvastatin Nausea    Seasonal Itching     Pt states eye irritation, pollen     Simvastatin Nausea     Patient Active Problem List   Diagnosis    Acquired scoliosis    Obstructive sleep apnea    Insomnia, persistent    Major depressive disorder with single episode, in full remission (HCC)    PPD positive    Vitamin D deficiency disease    Outlet dysfunction constipation    GERD (gastroesophageal reflux disease)    H/O: CVA (cerebrovascular accident)    Mild mitral regurgitation    Diaphragmatic hernia    S/P lumbar fusion    Cervical radiculopathy    Fatigue    Allergic " rhinitis    ROBERT (generalized anxiety disorder)    Spinal stenosis of lumbar region    Polyneuropathy associated with underlying disease (HCC)    Obesity (BMI 30-39.9)    Aortic atherosclerosis (HCC)    Familial hypercholesteremia    IBS (irritable bowel syndrome)    Left lower quadrant pain    Lower abdominal pain    Urinary incontinence, mixed    Atrophic vaginitis    Post-operative state    Hives    Trigger thumb     Current Outpatient Medications   Medication Sig Dispense Refill    amitriptyline (ELAVIL) 10 MG Tab TAKE 1 TABLET BY MOUTH 1 HOUR BEFORE BEDTIME      LORazepam (ATIVAN) 1 MG Tab Take 1 Tablet by mouth at bedtime for 90 days. 30 Tablet 2    omeprazole (PRILOSEC) 20 MG delayed-release capsule Take 1 Capsule by mouth every day. 30 Capsule 11    ondansetron (ZOFRAN ODT) 4 MG TABLET DISPERSIBLE Take 1 Tablet by mouth every 6 hours as needed for Nausea/Vomiting. 10 Tablet 2    atorvastatin (LIPITOR) 40 MG Tab Take 1 Tablet by mouth every day. 90 Tablet 3    hydrOXYzine HCl (ATARAX) 25 MG Tab Take 1 Tablet by mouth 3 times a day as needed for Itching. 30 Tablet 0    citalopram (CELEXA) 10 MG tablet Take 1 tablet by mouth once daily 90 Tablet 3    gabapentin (NEURONTIN) 300 MG Cap Take 300 mg twice a day. 60 Capsule 5    meloxicam (MOBIC) 15 MG tablet Take 15 mg by mouth as needed.      baclofen (LIORESAL) 10 MG Tab Take 1 Tablet by mouth at bedtime. 90 Tablet 3    Azelastine HCl 137 MCG/SPRAY Solution Administer 2 Sprays into each nostril 2 times a day. 30 mL 5    estradiol (ESTRACE VAGINAL) 0.1 MG/GM vaginal cream Apply 1g cream inside vagina twice per week 1 Each 3    dicyclomine (BENTYL) 10 MG Cap TAKE 1 CAPSULE BY MOUTH EVERY 6 HOURS AS NEEDED FOR ABDOMINAL CRAMPS AND OR DISCOMFORT      Artificial Tear Solution (TEARS NATURALE OP) Place 2 Drops in both eyes 2 times a day as needed.       No current facility-administered medications for this visit.     Past Surgical History:   Procedure Laterality Date     MT PELVIC EXAMINATION W ANESTH  3/27/2023    Procedure: PELVIC EXAM UNDER ANESTHESIA, POSTERIOR REPAIR, sacrospinous vault suspension, PERINEORRHAPHY,;  Surgeon: Rajinder Myles M.D.;  Location: SURGERY SAME DAY [facility];  Service: Gynecology    MT NEUROPLASTY & OR TRANSPOS MEDIAN NRV CARPAL SABRINA Right 05/31/2022    Procedure: RIGHT HAND OPEN CARPAL TUNNEL DECOMPRESSION;  Surgeon: Holly Martin M.D.;  Location: [facility] [place];  Service: Orthopedics    NISSEN FUNDOPLICATION LAPAROSCOPIC  07/25/2018    Procedure: NISSEN FUNDOPLICATION LAPAROSCOPIC;  Surgeon: Kneji Okeefe M.D.;  Location: SURGERY [facility];  Service: General    NISSEN FUNDOPLICATION LAPAROSCOPIC N/A 06/30/2015    Procedure: NISSEN FUNDOPLICATION LAPAROSCOPIC;  Surgeon: Kenji Okeefe M.D.;  Location: SURGERY SAME DAY [facility];  Service:     GASTROSCOPY-ENDO  06/24/2015    Procedure: GASTROSCOPY-ENDO;  Surgeon: Kenji Okeefe M.D.;  Location: ENDOSCOPY [facility];  Service:     CARPAL TUNNEL RELEASE  11/14/2012    Performed by Holly Martin M.D. at SURGERY [facility] ORS    LOW ANTERIOR RESECTION LAPAROSCOPIC  03/02/2011    Performed by KENJI OKEEFE at SURGERY [facility] ORS    CERVICAL DISK AND FUSION ANTERIOR  06/22/2010    Performed by JOSEPH DARLING at SURGERY [facility] ORS    TUBAL LIGATION  01/01/1988    TONSILLECTOMY AND ADENOIDECTOMY  01/01/1959    ABDOMINAL HYSTERECTOMY TOTAL      APPENDECTOMY      GYN SURGERY      partial hysterectomy      Social History     Socioeconomic History    Marital status:      Spouse name: Not on file    Number of children: 1    Years of education: Not on file    Highest education level: Not on file   Occupational History     Employer: Retired   Tobacco Use    Smoking status: Former     Current packs/day: 0.00     Average packs/day: 0.3 packs/day for 5.0 years (1.5 ttl pk-yrs)     Types: Cigarettes     Start date: 1/1/1970     Quit date: 1/1/1975     Years since  quittin.7    Smokeless tobacco: Never    Tobacco comments:     quit 1975   Vaping Use    Vaping Use: Never used   Substance and Sexual Activity    Alcohol use: No     Alcohol/week: 0.0 oz    Drug use: No    Sexual activity: Yes     Partners: Male     Comment: , 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[employer]   Other Topics Concern     Service No    Blood Transfusions No    Caffeine Concern Not Asked    Occupational Exposure Not Asked    Hobby Hazards Not Asked    Sleep Concern Not Asked    Stress Concern Not Asked    Weight Concern Not Asked    Special Diet Not Asked    Back Care Not Asked    Exercise No    Bike Helmet No    Seat Belt Yes    Self-Exams Yes   Social History Narrative    Not on file     Social Determinants of Health     Financial Resource Strain: Not on file   Food Insecurity: Not on file   Transportation Needs: Not on file   Physical Activity: Not on file   Stress: Not on file   Social Connections: Not on file   Intimate Partner Violence: Not on file   Housing Stability: Not on file     Family History   Problem Relation Age of Onset    Breast Cancer Mother     Diabetes Mother     Lung Disease Mother         copd    Hyperlipidemia Mother     Hypertension Mother     Cancer Father         Laryngeal CA    Heart Disease Father 50        CAD with bypasses x 3    Heart Attack Father     Hyperlipidemia Father     Hypertension Father     Diabetes Sister         type II diabetes    Diabetes Other     Heart Disease Other     Hypertension Other     Lung Disease Other     Ovarian Cancer Neg Hx     Tubal Cancer Neg Hx     Peritoneal Cancer Neg Hx     Colorectal Cancer Neg Hx          ROS:     Constitutional:  Negative for chills, fever, fatigue, weight loss.  HEENT:  Negative for blurred vision, hearing loss, sore throat.    Respiratory:  Negative for cough, sputum production and shortness of breath.  Cardiovascular:  Negative for chest pain, palpitations and leg swelling.  Gastrointestinal:  Negative for abdominal  pain, blood in stool, constipation, diarrhea and vomiting.   Musculoskeletal:  Negative for falls.   Skin:  Negative for rash.   Neurological:  Negative for dizziness, seizures, weakness and headaches.   Endo/Heme/Allergies:  Does not bruise/bleed easily.   Psychiatric/Behavioral:  Negative for depression, anxiety and suicidal thoughts.      Objective:     Exam: /74 (BP Location: Left arm, Patient Position: Sitting, BP Cuff Size: Adult)   Pulse 75   Temp 36.8 °C (98.2 °F) (Temporal)   Ht 1.524 m (5')   Wt 76.2 kg (168 lb)   SpO2 96%  Body mass index is 32.81 kg/m².    Gen: Alert and oriented, no acute distress.  Eyes:  PERRL, conjunctivae clear, lids normal.   Neck: Neck is supple, trachea middle, no thyromegaly.  Lungs: Normal effort, CTAB, no wheezing / rhonchi / rales.  CV: RRR, normal S1 and S2, no murmurs.  GI:  Abdomen soft, non-tender, non-distended with normal bowel sounds.  Ext: No clubbing, cyanosis, or edema.  Skin:  Warm and dry with no rashes or lesions.  Neuro: AAO x 3, no acute focal deficits.  Psych: Normal affect and mood.      Assessment & Plan:   68 y.o. female with the following -    1. Encounter to establish care  2. Routine health maintenance  Patient presents today to establish care.  Chart was reviewed and history was discussed in detail with the patient.  Previous labs reviewed.  Given flu shot today.  Patient was advised to get COVID and RSV vaccines at the pharmacy.  Annual labs ordered.  Will return for Medicare AW.    3. Obstructive sleep apnea  Chronic, uncontrolled.  Patient reports BiPAP has not been working for the past 1 year.  Patient was advised to schedule appointment with pulmonology.    4. Gastroesophageal reflux disease with esophagitis without hemorrhage  Chronic, controlled.  History of hiatal hernia repair.  Follows up with GI.  Continue omeprazole 20 mg daily.    5. Cervical radiculopathy  Chronic, stable.  Follows up with pain management for ablations.    6.  Insomnia, persistent  Chronic, controlled.  PDMP reviewed.  Patient has been on Ativan 1 mg at bedtime for many years and side effects of benzodiazepines were discussed today.  We will discuss this further at the next visit and consider starting other medication.    7. Irritable bowel syndrome with both constipation and diarrhea  Chronic, controlled.  Follows up with GI.  Continue amitriptyline 10 mg daily and Bentyl 10 mg as needed.    8. Allergic rhinitis, unspecified seasonality, unspecified trigger  Chronic, controlled.  Continue azelastine nasal spray as needed.    9. Need for vaccination  - INFLUENZA VACCINE, HIGH DOSE (65+ ONLY)    10. Vitamin D deficiency disease  - VITAMIN D,25 HYDROXY (DEFICIENCY); Future    11. Familial hypercholesteremia  Chronic, uncontrolled.  September 2022  and  with normal TG and HDL.  Repeat lipid panel ordered.  Continue atorvastatin 40 mg daily.  - Lipid Profile; Future    12. Major depressive disorder with single episode, in full remission (HCC)  Chronic, controlled.  Continue Celexa 10 mg daily.          I spent a total of 40 minutes with record review, exam, communication with the patient, communication with other providers, and documentation of this encounter.    Return in 1 month (on 10/19/2023) for Medicare well visit, Discuss labs.    Please note that this dictation was created using voice recognition software. I have made every reasonable attempt to correct obvious errors, but I expect that there are errors of grammar and possibly content that I did not discover before finalizing the note.

## 2023-09-19 NOTE — ASSESSMENT & PLAN NOTE
History of scoliosis s/p surgery.  Currently using baclofen 10 mg at bedtime and gabapentin 300 mg 1-2 times daily.

## 2023-09-19 NOTE — ASSESSMENT & PLAN NOTE
Patient reports she has been taking lorazepam 1 mg at bedtime for many years.  PDMP reviewed and last filled 9/8.

## 2023-10-10 ENCOUNTER — HOSPITAL ENCOUNTER (OUTPATIENT)
Dept: LAB | Facility: MEDICAL CENTER | Age: 68
End: 2023-10-10
Attending: STUDENT IN AN ORGANIZED HEALTH CARE EDUCATION/TRAINING PROGRAM
Payer: MEDICARE

## 2023-10-10 DIAGNOSIS — E55.9 VITAMIN D DEFICIENCY: Chronic | ICD-10-CM

## 2023-10-10 DIAGNOSIS — Z00.00 ROUTINE HEALTH MAINTENANCE: ICD-10-CM

## 2023-10-10 DIAGNOSIS — E78.01 FAMILIAL HYPERCHOLESTEREMIA: Chronic | ICD-10-CM

## 2023-10-10 LAB
25(OH)D3 SERPL-MCNC: 27 NG/ML (ref 30–100)
ALBUMIN SERPL BCP-MCNC: 4.5 G/DL (ref 3.2–4.9)
ALBUMIN/GLOB SERPL: 1.8 G/DL
ALP SERPL-CCNC: 85 U/L (ref 30–99)
ALT SERPL-CCNC: 29 U/L (ref 2–50)
ANION GAP SERPL CALC-SCNC: 9 MMOL/L (ref 7–16)
AST SERPL-CCNC: 25 U/L (ref 12–45)
BILIRUB SERPL-MCNC: 0.4 MG/DL (ref 0.1–1.5)
BUN SERPL-MCNC: 12 MG/DL (ref 8–22)
CALCIUM ALBUM COR SERPL-MCNC: 8.8 MG/DL (ref 8.5–10.5)
CALCIUM SERPL-MCNC: 9.2 MG/DL (ref 8.5–10.5)
CHLORIDE SERPL-SCNC: 104 MMOL/L (ref 96–112)
CHOLEST SERPL-MCNC: 253 MG/DL (ref 100–199)
CO2 SERPL-SCNC: 27 MMOL/L (ref 20–33)
CREAT SERPL-MCNC: 0.95 MG/DL (ref 0.5–1.4)
ERYTHROCYTE [DISTWIDTH] IN BLOOD BY AUTOMATED COUNT: 46.8 FL (ref 35.9–50)
FASTING STATUS PATIENT QL REPORTED: NORMAL
GFR SERPLBLD CREATININE-BSD FMLA CKD-EPI: 64 ML/MIN/1.73 M 2
GLOBULIN SER CALC-MCNC: 2.5 G/DL (ref 1.9–3.5)
GLUCOSE SERPL-MCNC: 108 MG/DL (ref 65–99)
HCT VFR BLD AUTO: 44.8 % (ref 37–47)
HDLC SERPL-MCNC: 60 MG/DL
HGB BLD-MCNC: 14.9 G/DL (ref 12–16)
LDLC SERPL CALC-MCNC: 165 MG/DL
MCH RBC QN AUTO: 30.8 PG (ref 27–33)
MCHC RBC AUTO-ENTMCNC: 33.3 G/DL (ref 32.2–35.5)
MCV RBC AUTO: 92.6 FL (ref 81.4–97.8)
PLATELET # BLD AUTO: 359 K/UL (ref 164–446)
PMV BLD AUTO: 9.1 FL (ref 9–12.9)
POTASSIUM SERPL-SCNC: 4.5 MMOL/L (ref 3.6–5.5)
PROT SERPL-MCNC: 7 G/DL (ref 6–8.2)
RBC # BLD AUTO: 4.84 M/UL (ref 4.2–5.4)
SODIUM SERPL-SCNC: 140 MMOL/L (ref 135–145)
TRIGL SERPL-MCNC: 138 MG/DL (ref 0–149)
WBC # BLD AUTO: 4.5 K/UL (ref 4.8–10.8)

## 2023-10-10 PROCEDURE — 36415 COLL VENOUS BLD VENIPUNCTURE: CPT

## 2023-10-10 PROCEDURE — 80061 LIPID PANEL: CPT

## 2023-10-10 PROCEDURE — 82306 VITAMIN D 25 HYDROXY: CPT

## 2023-10-10 PROCEDURE — 85027 COMPLETE CBC AUTOMATED: CPT

## 2023-10-10 PROCEDURE — 80053 COMPREHEN METABOLIC PANEL: CPT

## 2023-10-13 ENCOUNTER — OFFICE VISIT (OUTPATIENT)
Dept: MEDICAL GROUP | Facility: PHYSICIAN GROUP | Age: 68
End: 2023-10-13
Payer: MEDICARE

## 2023-10-13 ENCOUNTER — HOSPITAL ENCOUNTER (OUTPATIENT)
Facility: MEDICAL CENTER | Age: 68
End: 2023-10-13
Attending: STUDENT IN AN ORGANIZED HEALTH CARE EDUCATION/TRAINING PROGRAM
Payer: MEDICARE

## 2023-10-13 VITALS
SYSTOLIC BLOOD PRESSURE: 118 MMHG | HEART RATE: 66 BPM | BODY MASS INDEX: 32.79 KG/M2 | DIASTOLIC BLOOD PRESSURE: 88 MMHG | WEIGHT: 167 LBS | OXYGEN SATURATION: 94 % | TEMPERATURE: 97.2 F | HEIGHT: 60 IN

## 2023-10-13 DIAGNOSIS — E55.9 VITAMIN D DEFICIENCY: Chronic | ICD-10-CM

## 2023-10-13 DIAGNOSIS — G47.00 INSOMNIA, PERSISTENT: ICD-10-CM

## 2023-10-13 DIAGNOSIS — Z85.828 HISTORY OF SKIN CANCER: ICD-10-CM

## 2023-10-13 DIAGNOSIS — R73.01 ELEVATED FASTING GLUCOSE: ICD-10-CM

## 2023-10-13 DIAGNOSIS — F13.20 BENZODIAZEPINE DEPENDENCE (HCC): ICD-10-CM

## 2023-10-13 DIAGNOSIS — E78.01 FAMILIAL HYPERCHOLESTEREMIA: Chronic | ICD-10-CM

## 2023-10-13 PROCEDURE — 3079F DIAST BP 80-89 MM HG: CPT | Performed by: STUDENT IN AN ORGANIZED HEALTH CARE EDUCATION/TRAINING PROGRAM

## 2023-10-13 PROCEDURE — 99215 OFFICE O/P EST HI 40 MIN: CPT | Performed by: STUDENT IN AN ORGANIZED HEALTH CARE EDUCATION/TRAINING PROGRAM

## 2023-10-13 PROCEDURE — 3074F SYST BP LT 130 MM HG: CPT | Performed by: STUDENT IN AN ORGANIZED HEALTH CARE EDUCATION/TRAINING PROGRAM

## 2023-10-13 RX ORDER — EZETIMIBE 10 MG/1
10 TABLET ORAL DAILY
Qty: 90 TABLET | Refills: 1 | Status: SHIPPED | OUTPATIENT
Start: 2023-10-13

## 2023-10-13 ASSESSMENT — FIBROSIS 4 INDEX: FIB4 SCORE: 0.91

## 2023-10-13 NOTE — LETTER
[facility]  Tisha Jesus M.D.  [address]  [place] [address]  Fax: [phone]   Authorization for Release/Disclosure of   Protected Health Information   Name: LOW WHITE : 1955 SSN: [id number]   Address: [address] [place], [place] [address]   [place] [address] Phone:    There are no phone numbers on file.   I authorize the entity listed below to release/disclose the PHI below to:   [facility]/Tisha Jesus M.D. and Tisha Jesus M.D.   Provider or Entity Name:  Dr. Jamey Siddiqui (Pulmonology)   Address   [facility], [facility], [facility]  [address] [place] [address]  Phone:      Fax:     Reason for request: continuity of care   Information to be released:    [  ] LAST COLONOSCOPY,  including any PATH REPORT and follow-up  [  ] LAST FIT/COLOGUARD RESULT [  ] LAST DEXA  [  ] LAST MAMMOGRAM  [  ] LAST PAP  [  ] LAST LABS [  ] RETINA EXAM REPORT  [  ] IMMUNIZATION RECORDS  [ X ] Release all info      [  ] Check here and initial the line next to each item to release ALL health information INCLUDING  _____ Care and treatment for drug and / or alcohol abuse  _____ HIV testing, infection status, or AIDS  _____ Genetic Testing    DATES OF SERVICE OR TIME PERIOD TO BE DISCLOSED: _____________  I understand and acknowledge that:  * This Authorization may be revoked at any time by you in writing, except if your health information has already been used or disclosed.  * Your health information that will be used or disclosed as a result of you signing this authorization could be re-disclosed by the recipient. If this occurs, your re-disclosed health information may no longer be protected by State or Federal laws.  * You may refuse to sign this Authorization. Your refusal will not affect your ability to obtain treatment.  * This Authorization becomes effective upon signing and will  on (date) __________.      If no date is indicated, this Authorization will  one (1) year from the signature date.    Name:  Almaz Samuel  Signature: Date:   10/13/2023     PLEASE FAX REQUESTED RECORDS BACK TO: [phone]

## 2023-10-13 NOTE — ASSESSMENT & PLAN NOTE
Follows up with Cardiology.  Patient stopped taking atorvastatin.  Patient reports simvastatin, rosuvastatin, atorvastatin caused nausea.

## 2023-10-13 NOTE — ASSESSMENT & PLAN NOTE
Patient previously saw [facility] (records requested).  Patient reports history of melanoma on right side of nose.  Patient reports she was told to have MELL every 6 months but last time was 1 year ago.  Patient is requesting referral to Dermatology.

## 2023-10-13 NOTE — PROGRESS NOTES
Subjective:     Chief Complaint   Patient presents with    Lab Results       HPI:   Almaz presents today with    Insomnia, persistent  Patient reports she has been taking lorazepam 1 mg at bedtime for many years.  Patient is agreeable to taper off and try different medication.  Patient reports she still has 1 month supply of lorazepam left.  Patient reports she was on trazodone in the past but it did not work.  UDS done today and CS agreement signed.    History of skin cancer  Patient previously saw [facility] (records requested).  Patient reports history of melanoma on right side of nose.  Patient reports she was told to have MELL every 6 months but last time was 1 year ago.  Patient is requesting referral to Dermatology.      Familial hypercholesteremia  Follows up with Cardiology.  Patient stopped taking atorvastatin.  Patient reports simvastatin, rosuvastatin, atorvastatin caused nausea.    Health Maintenance: Completed    ROS:  Negative except as stated above.      Objective:     Exam:  /88 (BP Location: Right leg, Patient Position: Sitting, BP Cuff Size: Adult)   Pulse 66   Temp 36.2 °C (97.2 °F) (Temporal)   Ht 1.524 m (5')   Wt 75.8 kg (167 lb)   SpO2 94%   BMI 32.61 kg/m²  Body mass index is 32.61 kg/m².    Physical Exam    Gen: Alert and oriented, no acute distress.  Lungs: Normal effort, CTAB, no wheezing / rhonchi / rales.  CV: RRR, normal S1 and S2, no murmurs.      Labs:   Hospital Outpatient Visit on 10/10/2023   Component Date Value Ref Range Status    25-Hydroxy   Vitamin D 25 10/10/2023 27 (L)  30 - 100 ng/mL Final    Comment: Adult Ranges:   <20 ng/mL - Deficiency  20-29 ng/mL - Insufficiency   ng/mL - Sufficiency  Electrochemiluminescence binding assay performed using Roche morteza e  immunoassay analyzer.  The Elecsys Vitamin D total II assay is intended for  the quantitative determination of total 25 hydroxyvitamin D in human serum  and plasma. This assay is to be used as  an aid in the assessment of vitamin  D sufficiency in adults.      Cholesterol,Tot 10/10/2023 253 (H)  100 - 199 mg/dL Final    Triglycerides 10/10/2023 138  0 - 149 mg/dL Final    HDL 10/10/2023 60  >=40 mg/dL Final    LDL 10/10/2023 165 (H)  <100 mg/dL Final    Sodium 10/10/2023 140  135 - 145 mmol/L Final    Potassium 10/10/2023 4.5  3.6 - 5.5 mmol/L Final    Chloride 10/10/2023 104  96 - 112 mmol/L Final    Co2 10/10/2023 27  20 - 33 mmol/L Final    Anion Gap 10/10/2023 9.0  7.0 - 16.0 Final    Glucose 10/10/2023 108 (H)  65 - 99 mg/dL Final    Bun 10/10/2023 12  8 - 22 mg/dL Final    Creatinine 10/10/2023 0.95  0.50 - 1.40 mg/dL Final    Calcium 10/10/2023 9.2  8.5 - 10.5 mg/dL Final    Correct Calcium 10/10/2023 8.8  8.5 - 10.5 mg/dL Final    AST(SGOT) 10/10/2023 25  12 - 45 U/L Final    ALT(SGPT) 10/10/2023 29  2 - 50 U/L Final    Alkaline Phosphatase 10/10/2023 85  30 - 99 U/L Final    Total Bilirubin 10/10/2023 0.4  0.1 - 1.5 mg/dL Final    Albumin 10/10/2023 4.5  3.2 - 4.9 g/dL Final    Total Protein 10/10/2023 7.0  6.0 - 8.2 g/dL Final    Globulin 10/10/2023 2.5  1.9 - 3.5 g/dL Final    A-G Ratio 10/10/2023 1.8  g/dL Final    WBC 10/10/2023 4.5 (L)  4.8 - 10.8 K/uL Final    RBC 10/10/2023 4.84  4.20 - 5.40 M/uL Final    Hemoglobin 10/10/2023 14.9  12.0 - 16.0 g/dL Final    Hematocrit 10/10/2023 44.8  37.0 - 47.0 % Final    MCV 10/10/2023 92.6  81.4 - 97.8 fL Final    MCH 10/10/2023 30.8  27.0 - 33.0 pg Final    MCHC 10/10/2023 33.3  32.2 - 35.5 g/dL Final    Please note new reference range effective 05/22/2023.    RDW 10/10/2023 46.8  35.9 - 50.0 fL Final    Platelet Count 10/10/2023 359  164 - 446 K/uL Final    MPV 10/10/2023 9.1  9.0 - 12.9 fL Final    Fasting Status 10/10/2023 Fasting   Final    GFR (CKD-EPI) 10/10/2023 64  >60 mL/min/1.73 m 2 Final    Comment: Estimated Glomerular Filtration Rate is calculated using  race neutral CKD-EPI 2021 equation per NKF-ASN recommendations.           Assessment  & Plan:     68 y.o. female with the following -     1. Familial hypercholesteremia  Chronic, uncontrolled.   and .  Patient reports she stopped taking atorvastatin.  Patient has been intolerant to atorvastatin, simvastatin, rosuvastatin.  Prescribe Zetia 10 mg daily.  Repeat lipid panel in 3 months.  - ezetimibe (ZETIA) 10 MG Tab; Take 1 Tablet by mouth every day.  Dispense: 90 Tablet; Refill: 1  - Lipid Profile; Future    2. Elevated fasting glucose  Chronic, uncontrolled.  Patient had 2 elevated fasting glucose readings - 108 and 100.  A1c ordered.  - HEMOGLOBIN A1C; Future    3. Insomnia, persistent  4. Benzodiazepine dependence (HCC)  Chronic, controlled.  PDMP reviewed.  Patient is agreeable to taper off lorazepam and try alternative medication.  Trazodone has not worked in the past.  Patient reports she still has 1 month prescription of lorazepam 1 mg and was advised to take 0.5 mg at bedtime.  We will do a slow taper and try switching to ambien.  UDS done today and CS agreement signed.   - Controlled Substance Treatment Agreement  - URINE DRUG SCREEN W/CONF (SEND OUT); Future    5. History of skin cancer  - Referral to Dermatology    6. Vitamin D deficiency disease  Chronic, uncontrolled.  Vit D 27.  Patient was advised to start Vit D 1000 units daily.          I spent a total of 40 minutes with record review, exam, communication with the patient, communication with other providers, and documentation of this encounter.      Return in about 1 month (around 11/13/2023).    Please note that this dictation was created using voice recognition software. I have made every reasonable attempt to correct obvious errors, but I expect that there are errors of grammar and possibly content that I did not discover before finalizing the note.

## 2023-10-13 NOTE — ASSESSMENT & PLAN NOTE
Patient reports she has been taking lorazepam 1 mg at bedtime for many years.  Patient is agreeable to taper off and try different medication.  Patient reports she still has 1 month supply of lorazepam left.  Patient reports she was on trazodone in the past but it did not work.  UDS done today and CS agreement signed.

## 2023-11-01 ENCOUNTER — APPOINTMENT (RX ONLY)
Dept: URBAN - METROPOLITAN AREA CLINIC 6 | Facility: CLINIC | Age: 68
Setting detail: DERMATOLOGY
End: 2023-11-01

## 2023-11-01 DIAGNOSIS — D18.0 HEMANGIOMA: ICD-10-CM

## 2023-11-01 DIAGNOSIS — L57.0 ACTINIC KERATOSIS: ICD-10-CM

## 2023-11-01 DIAGNOSIS — L81.4 OTHER MELANIN HYPERPIGMENTATION: ICD-10-CM

## 2023-11-01 DIAGNOSIS — L82.1 OTHER SEBORRHEIC KERATOSIS: ICD-10-CM

## 2023-11-01 DIAGNOSIS — Z71.89 OTHER SPECIFIED COUNSELING: ICD-10-CM

## 2023-11-01 DIAGNOSIS — D22 MELANOCYTIC NEVI: ICD-10-CM

## 2023-11-01 DIAGNOSIS — D69.2 OTHER NONTHROMBOCYTOPENIC PURPURA: ICD-10-CM

## 2023-11-01 PROBLEM — D22.61 MELANOCYTIC NEVI OF RIGHT UPPER LIMB, INCLUDING SHOULDER: Status: ACTIVE | Noted: 2023-11-01

## 2023-11-01 PROBLEM — D22.62 MELANOCYTIC NEVI OF LEFT UPPER LIMB, INCLUDING SHOULDER: Status: ACTIVE | Noted: 2023-11-01

## 2023-11-01 PROBLEM — D22.71 MELANOCYTIC NEVI OF RIGHT LOWER LIMB, INCLUDING HIP: Status: ACTIVE | Noted: 2023-11-01

## 2023-11-01 PROBLEM — D22.5 MELANOCYTIC NEVI OF TRUNK: Status: ACTIVE | Noted: 2023-11-01

## 2023-11-01 PROBLEM — D18.01 HEMANGIOMA OF SKIN AND SUBCUTANEOUS TISSUE: Status: ACTIVE | Noted: 2023-11-01

## 2023-11-01 PROBLEM — D22.72 MELANOCYTIC NEVI OF LEFT LOWER LIMB, INCLUDING HIP: Status: ACTIVE | Noted: 2023-11-01

## 2023-11-01 PROCEDURE — ? COUNSELING

## 2023-11-01 PROCEDURE — 17000 DESTRUCT PREMALG LESION: CPT

## 2023-11-01 PROCEDURE — 99203 OFFICE O/P NEW LOW 30 MIN: CPT | Mod: 25

## 2023-11-01 PROCEDURE — 17003 DESTRUCT PREMALG LES 2-14: CPT

## 2023-11-01 PROCEDURE — ? LIQUID NITROGEN

## 2023-11-01 ASSESSMENT — LOCATION ZONE DERM
LOCATION ZONE: TRUNK
LOCATION ZONE: FACE
LOCATION ZONE: LEG
LOCATION ZONE: SCALP
LOCATION ZONE: ARM

## 2023-11-01 ASSESSMENT — LOCATION SIMPLE DESCRIPTION DERM
LOCATION SIMPLE: LEFT CHEEK
LOCATION SIMPLE: RIGHT THIGH
LOCATION SIMPLE: RIGHT PRETIBIAL REGION
LOCATION SIMPLE: LEFT THIGH
LOCATION SIMPLE: RIGHT UPPER ARM
LOCATION SIMPLE: LEFT PRETIBIAL REGION
LOCATION SIMPLE: CHEST
LOCATION SIMPLE: RIGHT FOREARM
LOCATION SIMPLE: POSTERIOR SCALP
LOCATION SIMPLE: RIGHT POSTERIOR UPPER ARM
LOCATION SIMPLE: LEFT UPPER ARM
LOCATION SIMPLE: RIGHT KNEE
LOCATION SIMPLE: LEFT KNEE
LOCATION SIMPLE: LEFT FOREARM
LOCATION SIMPLE: ABDOMEN

## 2023-11-01 ASSESSMENT — LOCATION DETAILED DESCRIPTION DERM
LOCATION DETAILED: LEFT PROXIMAL PRETIBIAL REGION
LOCATION DETAILED: LEFT PROXIMAL DORSAL FOREARM
LOCATION DETAILED: LEFT KNEE
LOCATION DETAILED: RIGHT KNEE
LOCATION DETAILED: LEFT ANTERIOR DISTAL THIGH
LOCATION DETAILED: RIGHT ANTERIOR DISTAL THIGH
LOCATION DETAILED: RIGHT ANTERIOR PROXIMAL UPPER ARM
LOCATION DETAILED: RIGHT DISTAL DORSAL FOREARM
LOCATION DETAILED: RIGHT ANTERIOR DISTAL UPPER ARM
LOCATION DETAILED: LEFT ANTERIOR DISTAL UPPER ARM
LOCATION DETAILED: LEFT VENTRAL PROXIMAL FOREARM
LOCATION DETAILED: RIGHT ANTECUBITAL SKIN
LOCATION DETAILED: EPIGASTRIC SKIN
LOCATION DETAILED: LOWER STERNUM
LOCATION DETAILED: RIGHT LATERAL DISTAL UPPER ARM
LOCATION DETAILED: POSTERIOR MID-PARIETAL SCALP
LOCATION DETAILED: RIGHT PROXIMAL PRETIBIAL REGION
LOCATION DETAILED: LEFT SUPERIOR CENTRAL MALAR CHEEK
LOCATION DETAILED: RIGHT DISTAL POSTERIOR UPPER ARM
LOCATION DETAILED: RIGHT VENTRAL PROXIMAL FOREARM
LOCATION DETAILED: LEFT ANTERIOR PROXIMAL UPPER ARM
LOCATION DETAILED: PERIUMBILICAL SKIN

## 2023-11-01 NOTE — PROCEDURE: LIQUID NITROGEN
Post-Care Instructions: I reviewed with the patient in detail post-care instructions. Patient is to wear sunprotection, and avoid picking at any of the treated lesions. Pt may apply Vaseline to crusted or scabbing areas.
Number Of Freeze-Thaw Cycles: 3 freeze-thaw cycles
Render Post-Care Instructions In Note?: no
Consent: The patient's consent was obtained including but not limited to risks of crusting, scabbing, blistering, scarring, darker or lighter pigmentary change, recurrence, incomplete removal and infection.
Show Applicator Variable?: Yes
Duration Of Freeze Thaw-Cycle (Seconds): 3
Detail Level: Detailed
Application Tool (Optional): Liquid Nitrogen Sprayer

## 2023-11-06 DIAGNOSIS — M54.12 CERVICAL RADICULOPATHY: ICD-10-CM

## 2023-11-07 RX ORDER — GABAPENTIN 300 MG/1
CAPSULE ORAL
Qty: 180 CAPSULE | Refills: 1 | Status: SHIPPED | OUTPATIENT
Start: 2023-11-07

## 2023-11-08 PROBLEM — Z85.820 HISTORY OF MALIGNANT MELANOMA: Chronic | Status: ACTIVE | Noted: 2023-10-13

## 2023-11-08 PROBLEM — Z85.820 HISTORY OF MALIGNANT MELANOMA: Status: ACTIVE | Noted: 2023-10-13

## 2023-11-09 ENCOUNTER — HOSPITAL ENCOUNTER (OUTPATIENT)
Dept: LAB | Facility: MEDICAL CENTER | Age: 68
End: 2023-11-09
Attending: STUDENT IN AN ORGANIZED HEALTH CARE EDUCATION/TRAINING PROGRAM
Payer: MEDICARE

## 2023-11-09 DIAGNOSIS — E78.01 FAMILIAL HYPERCHOLESTEREMIA: Chronic | ICD-10-CM

## 2023-11-09 DIAGNOSIS — R73.01 ELEVATED FASTING GLUCOSE: ICD-10-CM

## 2023-11-09 PROCEDURE — 83036 HEMOGLOBIN GLYCOSYLATED A1C: CPT | Mod: GA

## 2023-11-09 PROCEDURE — 80061 LIPID PANEL: CPT

## 2023-11-09 PROCEDURE — 36415 COLL VENOUS BLD VENIPUNCTURE: CPT

## 2023-11-10 LAB
CHOLEST SERPL-MCNC: 204 MG/DL (ref 100–199)
EST. AVERAGE GLUCOSE BLD GHB EST-MCNC: 131 MG/DL
FASTING STATUS PATIENT QL REPORTED: NORMAL
HBA1C MFR BLD: 6.2 % (ref 4–5.6)
HDLC SERPL-MCNC: 60 MG/DL
LDLC SERPL CALC-MCNC: 122 MG/DL
TRIGL SERPL-MCNC: 112 MG/DL (ref 0–149)

## 2023-11-14 ENCOUNTER — OFFICE VISIT (OUTPATIENT)
Dept: MEDICAL GROUP | Facility: PHYSICIAN GROUP | Age: 68
End: 2023-11-14
Payer: MEDICARE

## 2023-11-14 VITALS
DIASTOLIC BLOOD PRESSURE: 68 MMHG | WEIGHT: 166 LBS | OXYGEN SATURATION: 94 % | HEIGHT: 60 IN | TEMPERATURE: 98.4 F | BODY MASS INDEX: 32.59 KG/M2 | HEART RATE: 94 BPM | SYSTOLIC BLOOD PRESSURE: 138 MMHG

## 2023-11-14 DIAGNOSIS — R73.03 PREDIABETES: Chronic | ICD-10-CM

## 2023-11-14 DIAGNOSIS — E78.01 FAMILIAL HYPERCHOLESTEREMIA: Chronic | ICD-10-CM

## 2023-11-14 DIAGNOSIS — G47.00 INSOMNIA, PERSISTENT: Chronic | ICD-10-CM

## 2023-11-14 PROBLEM — F13.20 BENZODIAZEPINE DEPENDENCE (HCC): Chronic | Status: ACTIVE | Noted: 2023-10-13

## 2023-11-14 PROCEDURE — 99214 OFFICE O/P EST MOD 30 MIN: CPT | Performed by: STUDENT IN AN ORGANIZED HEALTH CARE EDUCATION/TRAINING PROGRAM

## 2023-11-14 PROCEDURE — 3075F SYST BP GE 130 - 139MM HG: CPT | Performed by: STUDENT IN AN ORGANIZED HEALTH CARE EDUCATION/TRAINING PROGRAM

## 2023-11-14 PROCEDURE — 3078F DIAST BP <80 MM HG: CPT | Performed by: STUDENT IN AN ORGANIZED HEALTH CARE EDUCATION/TRAINING PROGRAM

## 2023-11-14 RX ORDER — ZOLPIDEM TARTRATE 10 MG/1
10 TABLET ORAL NIGHTLY PRN
Qty: 30 TABLET | Refills: 0 | Status: SHIPPED | OUTPATIENT
Start: 2023-11-14 | End: 2023-12-13

## 2023-11-14 ASSESSMENT — FIBROSIS 4 INDEX: FIB4 SCORE: 0.91

## 2023-11-14 NOTE — ASSESSMENT & PLAN NOTE
Patient is intolerant to statins.  At the last visit patient was started on Zetia 10 mg daily but reports she is only taking 5 mg daily.

## 2023-11-14 NOTE — ASSESSMENT & PLAN NOTE
Patient was taking lorazepam 1 mg at bedtime for many years.  Patient was agreeable to taper off and try different medication.   Patient reports she was on trazodone in the past but it did not work.  UDS done 10/13/23 but was cancelled by lab and CS agreement signed 10/13/23.  Patient has been taking lorazepam 0.5 mg at bedtime but is only sleeping 4 hours.  PDMP reviewed, lorazepam 1 mg #30 was last filled by previous PCP on 11/10.

## 2023-11-14 NOTE — LETTER
[facility]  Tisha Jesus M.D.  [address] [place] [address]  Fax: [phone]   Authorization for Release/Disclosure of   Protected Health Information   Name: LOW SAMUEL : 1955 SSN: [id number]   Address: [facility] [place] [address] Phone:    There are no phone numbers on file.   I authorize the entity listed below to release/disclose the PHI below to:   [facility]/Tisha Jesus M.D. and Tisha Jesus M.D.   Provider or Entity Name:  [facility]   Address   City, State, [facility] Phone:      Fax:     Reason for request: continuity of care   Information to be released:    [  ] LAST COLONOSCOPY,  including any PATH REPORT and follow-up  [  ] LAST FIT/COLOGUARD RESULT [  ] LAST DEXA  [  ] LAST MAMMOGRAM  [  ] LAST PAP  [  ] LAST LABS [  ] RETINA EXAM REPORT  [  ] IMMUNIZATION RECORDS  [ X ] Release all info      [  ] Check here and initial the line next to each item to release ALL health information INCLUDING  _____ Care and treatment for drug and / or alcohol abuse  _____ HIV testing, infection status, or AIDS  _____ Genetic Testing    DATES OF SERVICE OR TIME PERIOD TO BE DISCLOSED: _____________  I understand and acknowledge that:  * This Authorization may be revoked at any time by you in writing, except if your health information has already been used or disclosed.  * Your health information that will be used or disclosed as a result of you signing this authorization could be re-disclosed by the recipient. If this occurs, your re-disclosed health information may no longer be protected by State or Federal laws.  * You may refuse to sign this Authorization. Your refusal will not affect your ability to obtain treatment.  * This Authorization becomes effective upon signing and will  on (date) __________.      If no date is indicated, this Authorization will  one (1) year from the signature date.    Name: Low Samuel  Signature: Date:    11/14/2023     PLEASE FAX REQUESTED RECORDS BACK TO: [phone]

## 2023-11-14 NOTE — PROGRESS NOTES
Subjective:     Chief Complaint   Patient presents with    Follow-Up     Follow up for zetia, feels sick to stomach after taking it.          HPI:   Almaz presents today with    Insomnia, persistent  Patient was taking lorazepam 1 mg at bedtime for many years.  Patient was agreeable to taper off and try different medication.   Patient reports she was on trazodone in the past but it did not work.  UDS done 10/13/23 but was cancelled by lab and CS agreement signed 10/13/23.  Patient has been taking lorazepam 0.5 mg at bedtime but is only sleeping 4 hours.  PDMP reviewed, lorazepam 1 mg #30 was last filled by previous PCP on 11/10.    Familial hypercholesteremia  Patient is intolerant to statins.  At the last visit patient was started on Zetia 10 mg daily but reports she is only taking 5 mg daily.      Health Maintenance: Completed    ROS:  Negative except as stated above.      Objective:     Exam:  /68   Pulse 94   Temp 36.9 °C (98.4 °F) (Temporal)   Ht 1.524 m (5')   Wt 75.3 kg (166 lb)   SpO2 94%   BMI 32.42 kg/m²  Body mass index is 32.42 kg/m².    Physical Exam    Gen: Alert and oriented, no acute distress.  Lungs: Normal effort, CTAB, no wheezing / rhonchi / rales.  CV: RRR, normal S1 and S2, no murmurs.      Labs:   Hospital Outpatient Visit on 11/09/2023   Component Date Value Ref Range Status    Cholesterol,Tot 11/09/2023 204 (H)  100 - 199 mg/dL Final    Triglycerides 11/09/2023 112  0 - 149 mg/dL Final    HDL 11/09/2023 60  >=40 mg/dL Final    LDL 11/09/2023 122 (H)  <100 mg/dL Final    Glycohemoglobin 11/09/2023 6.2 (H)  4.0 - 5.6 % Final    Comment: Increased risk for diabetes:  5.7 -6.4%  Diabetes:  >6.4%  Glycemic control for adults with diabetes:  <7.0%    The above interpretations are per ADA guidelines.  Diagnosis  of diabetes mellitus on the basis of elevated Hemoglobin A1c  should be confirmed by repeating the Hb A1c test.      Est Avg Glucose 11/09/2023 131  mg/dL Final    Comment: The  eAG calculation is based on the A1c-Derived Daily Glucose  (ADAG) study.  See the ADA's website for additional information.      Fasting Status 11/09/2023 Fasting   Final         Assessment & Plan:     68 y.o. female with the following -     1. Familial hypercholesteremia  Chronic, uncontrolled.   and .  There has been significant improvement since starting Zetia.  Patient was only taking 5 mg daily and was advised to increase to 10 mg daily.  If she is unable to tolerate the higher dose we will just continue 5 mg daily.    2. Prediabetes  Chronic, uncontrolled.  A1c 6.2.  Medication not indicated at this time.  Advise healthy diet and regular exercise.    3. Insomnia, persistent  Chronic, uncontrolled.  Patient was on lorazepam 1 mg at bedtime for many years but was tapered off and is taking 0.5 mg.  Ativan switched to Ambien 10 mg at bedtime as needed.  Side effects of medication were discussed.  CS agreement signed Oct 2023.  Lab cancelled UDS.  - zolpidem (AMBIEN) 10 MG Tab; Take 1 Tablet by mouth at bedtime as needed for Sleep for up to 30 days.  Dispense: 30 Tablet; Refill: 0          Return in about 1 month (around 12/14/2023) for CS refill.    Please note that this dictation was created using voice recognition software. I have made every reasonable attempt to correct obvious errors, but I expect that there are errors of grammar and possibly content that I did not discover before finalizing the note.

## 2023-12-13 ENCOUNTER — TELEMEDICINE (OUTPATIENT)
Dept: MEDICAL GROUP | Facility: PHYSICIAN GROUP | Age: 68
End: 2023-12-13
Payer: MEDICARE

## 2023-12-13 VITALS
OXYGEN SATURATION: 95 % | WEIGHT: 165 LBS | DIASTOLIC BLOOD PRESSURE: 88 MMHG | SYSTOLIC BLOOD PRESSURE: 124 MMHG | BODY MASS INDEX: 32.39 KG/M2 | HEIGHT: 60 IN

## 2023-12-13 DIAGNOSIS — G47.00 INSOMNIA, PERSISTENT: Chronic | ICD-10-CM

## 2023-12-13 PROCEDURE — 99213 OFFICE O/P EST LOW 20 MIN: CPT | Mod: 95 | Performed by: STUDENT IN AN ORGANIZED HEALTH CARE EDUCATION/TRAINING PROGRAM

## 2023-12-13 RX ORDER — ZOLPIDEM TARTRATE 10 MG/1
10 TABLET ORAL NIGHTLY PRN
Qty: 30 TABLET | Refills: 0 | Status: SHIPPED | OUTPATIENT
Start: 2023-12-13 | End: 2024-01-09 | Stop reason: SDUPTHER

## 2023-12-13 RX ORDER — NICOTINE POLACRILEX 4 MG/1
1 GUM, CHEWING ORAL DAILY
COMMUNITY
End: 2024-01-09

## 2023-12-13 ASSESSMENT — FIBROSIS 4 INDEX: FIB4 SCORE: 0.91

## 2023-12-13 NOTE — PROGRESS NOTES
Virtual Visit: Established Patient   This visit was conducted via Zoom using secure and encrypted videoconferencing technology.   The patient was in their home in the [facility].    The patient's identity was confirmed and verbal consent was obtained for this virtual visit.    Subjective:   CC:   Chief Complaint   Patient presents with    Medication Refill       Ambien refill       Almaz Samuel is a 68 y.o. female presenting for evaluation and management of:    Insomnia, persistent  Patient was taking lorazepam 1 mg at bedtime for many years.  Patient was agreeable to taper off and try different medication.   Patient reports she was on trazodone in the past but it did not work.  UDS done 10/13/23 but was cancelled by lab and CS agreement signed 10/13/23.  At the last visit patient was prescribed Ambien 10 mg at bedtime as needed.  Patient reports she only used it for 1 night and it worked well but she accidentally left it in her rental car and wasn't able to get it back.  Patient is requesting a refill today.  PDMP reviewed and Ambien was last filled 11/14/23 #30.      ROS   Negative except as stated above.      Current medicines (including changes today)  Current Outpatient Medications   Medication Sig Dispense Refill    zolpidem (AMBIEN) 10 MG Tab Take 1 Tablet by mouth at bedtime as needed for Sleep for up to 30 days. 30 Tablet 0    gabapentin (NEURONTIN) 300 MG Cap Take 300 mg twice a day. 180 Capsule 1    ezetimibe (ZETIA) 10 MG Tab Take 1 Tablet by mouth every day. 90 Tablet 1    amitriptyline (ELAVIL) 10 MG Tab TAKE 1 TABLET BY MOUTH 1 HOUR BEFORE BEDTIME      ondansetron (ZOFRAN ODT) 4 MG TABLET DISPERSIBLE Take 1 Tablet by mouth every 6 hours as needed for Nausea/Vomiting. 10 Tablet 2    hydrOXYzine HCl (ATARAX) 25 MG Tab Take 1 Tablet by mouth 3 times a day as needed for Itching. 30 Tablet 0    citalopram (CELEXA) 10 MG tablet Take 1 tablet by mouth once daily 90 Tablet 3    meloxicam (MOBIC) 15  MG tablet Take 15 mg by mouth as needed.      baclofen (LIORESAL) 10 MG Tab Take 1 Tablet by mouth at bedtime. 90 Tablet 3    Azelastine HCl 137 MCG/SPRAY Solution Administer 2 Sprays into each nostril 2 times a day. 30 mL 5    estradiol (ESTRACE VAGINAL) 0.1 MG/GM vaginal cream Apply 1g cream inside vagina twice per week 1 Each 3    dicyclomine (BENTYL) 10 MG Cap TAKE 1 CAPSULE BY MOUTH EVERY 6 HOURS AS NEEDED FOR ABDOMINAL CRAMPS AND OR DISCOMFORT      omeprazole (PRILOSEC) 20 MG Tablet Delayed Response delayed-release tablet Take 1 Capsule by mouth every day. (Patient not taking: Reported on 12/13/2023)      omeprazole (PRILOSEC) 20 MG delayed-release capsule Take 1 Capsule by mouth every day. (Patient not taking: Reported on 12/13/2023) 30 Capsule 11    atorvastatin (LIPITOR) 40 MG Tab Take 1 Tablet by mouth every day. 90 Tablet 3    Artificial Tear Solution (TEARS NATURALE OP) Place 2 Drops in both eyes 2 times a day as needed.       No current facility-administered medications for this visit.       Patient Active Problem List    Diagnosis Date Noted    History of malignant melanoma 10/13/2023    Benzodiazepine dependence (HCC) 10/13/2023    Prediabetes 10/13/2023    Hives 05/17/2023    Trigger thumb 05/17/2023    Post-operative state 03/27/2023    Urinary incontinence, mixed 01/26/2023    Atrophic vaginitis 01/26/2023    Lower abdominal pain 09/29/2022    Left lower quadrant pain 08/03/2021    IBS (irritable bowel syndrome) 07/17/2021    Aortic atherosclerosis (HCC)     Familial hypercholesteremia     Polyneuropathy associated with underlying disease (HCC) 03/13/2018    Obesity (BMI 30-39.9) 03/13/2018    ROBERT (generalized anxiety disorder) 06/15/2017    Allergic rhinitis 05/10/2017    Fatigue 03/02/2017    S/P lumbar fusion 10/20/2016    Cervical radiculopathy 10/20/2016    Diaphragmatic hernia 06/30/2015    Mild mitral regurgitation 03/12/2015    H/O: CVA (cerebrovascular accident) 09/23/2014    Outlet  dysfunction constipation 09/17/2014    GERD (gastroesophageal reflux disease) 09/17/2014    Spinal stenosis of lumbar region 03/20/2014    Vitamin D deficiency disease 09/24/2012    Acquired scoliosis 10/24/2011    Obstructive sleep apnea 10/24/2011    Insomnia, persistent 10/24/2011    Major depressive disorder with single episode, in full remission (HCC) 10/24/2011    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        Objective:   /88 Comment: @home  Ht 1.524 m (5')   Wt 74.8 kg (165 lb)   SpO2 95% Comment: @home  BMI 32.22 kg/m²     Physical Exam:  Constitutional: Alert, no distress, well-groomed.  Skin: No rashes in visible areas.  Eye: Round. Conjunctiva clear, lids normal. No icterus.   ENMT: Lips pink without lesions, good dentition, moist mucous membranes. Phonation normal.  Neck: No masses, no thyromegaly. Moves freely without pain.  Respiratory: Unlabored respiratory effort, no cough or audible wheeze  Psych: Alert and oriented x3, normal affect and mood.     Assessment and Plan:   The following treatment plan was discussed:     1. Insomnia, persistent  Chronic, controlled.  PDMP reviewed.  CS agreement signed 10/13/23 and lab cancelled UDS.  Continue ambien 10 mg at bedtime as needed.  - zolpidem (AMBIEN) 10 MG Tab; Take 1 Tablet by mouth at bedtime as needed for Sleep for up to 30 days.  Dispense: 30 Tablet; Refill: 0      Follow-up: Return in about 1 month (around 1/13/2024) for CS refill.

## 2023-12-13 NOTE — ASSESSMENT & PLAN NOTE
Patient was taking lorazepam 1 mg at bedtime for many years.  Patient was agreeable to taper off and try different medication.   Patient reports she was on trazodone in the past but it did not work.  UDS done 10/13/23 but was cancelled by lab and CS agreement signed 10/13/23.  At the last visit patient was prescribed Ambien 10 mg at bedtime as needed.  Patient reports she only used it for 1 night and it worked well but she accidentally left it in her rental car and wasn't able to get it back.  Patient is requesting a refill today.  PDMP reviewed and Ambien was last filled 11/14/23 #30.

## 2023-12-13 NOTE — LETTER
[facility]  Tisha Jesus M.D.  [address] [place] [address]  Fax: [phone]   Authorization for Release/Disclosure of   Protected Health Information   Name: LOW SAMUEL : 1955 SSN: [id number]   Address: [facility] [place] [address] Phone:    There are no phone numbers on file.   I authorize the entity listed below to release/disclose the PHI below to:   [facility]/Tisha Jesus M.D. and Tisha Jesus M.D.   Provider or Entity Name:  [facility]   Address   City, State, [facility]   Phone:      Fax:     Reason for request: continuity of care   Information to be released:    [  ] LAST COLONOSCOPY,  including any PATH REPORT and follow-up  [  ] LAST FIT/COLOGUARD RESULT [  ] LAST DEXA  [  ] LAST MAMMOGRAM  [  ] LAST PAP  [  ] LAST LABS [  ] RETINA EXAM REPORT  [  ] IMMUNIZATION RECORDS  [ X ] Release all info      [  ] Check here and initial the line next to each item to release ALL health information INCLUDING  _____ Care and treatment for drug and / or alcohol abuse  _____ HIV testing, infection status, or AIDS  _____ Genetic Testing    DATES OF SERVICE OR TIME PERIOD TO BE DISCLOSED: _____________  I understand and acknowledge that:  * This Authorization may be revoked at any time by you in writing, except if your health information has already been used or disclosed.  * Your health information that will be used or disclosed as a result of you signing this authorization could be re-disclosed by the recipient. If this occurs, your re-disclosed health information may no longer be protected by State or Federal laws.  * You may refuse to sign this Authorization. Your refusal will not affect your ability to obtain treatment.  * This Authorization becomes effective upon signing and will  on (date) __________.      If no date is indicated, this Authorization will  one (1) year from the signature date.    Name: Low Samuel  Signature: Date:    12/13/2023     PLEASE FAX REQUESTED RECORDS BACK TO: [phone]

## 2024-01-04 DIAGNOSIS — M54.16 LUMBAR RADICULOPATHY, CHRONIC: ICD-10-CM

## 2024-01-04 RX ORDER — BACLOFEN 10 MG/1
10 TABLET ORAL
Qty: 90 TABLET | Refills: 3 | Status: SHIPPED | OUTPATIENT
Start: 2024-01-04

## 2024-01-04 NOTE — TELEPHONE ENCOUNTER
Requested Prescriptions     Pending Prescriptions Disp Refills    baclofen (LIORESAL) 10 MG Tab 90 Tablet 3     Sig: Take 1 Tablet by mouth at bedtime.

## 2024-01-09 ENCOUNTER — OFFICE VISIT (OUTPATIENT)
Dept: MEDICAL GROUP | Facility: PHYSICIAN GROUP | Age: 69
End: 2024-01-09
Payer: MEDICARE

## 2024-01-09 VITALS
BODY MASS INDEX: 32.59 KG/M2 | TEMPERATURE: 97.8 F | SYSTOLIC BLOOD PRESSURE: 124 MMHG | DIASTOLIC BLOOD PRESSURE: 72 MMHG | WEIGHT: 166 LBS | HEIGHT: 60 IN | OXYGEN SATURATION: 94 % | HEART RATE: 98 BPM

## 2024-01-09 DIAGNOSIS — K21.9 GASTROESOPHAGEAL REFLUX DISEASE, UNSPECIFIED WHETHER ESOPHAGITIS PRESENT: ICD-10-CM

## 2024-01-09 DIAGNOSIS — G47.00 INSOMNIA, PERSISTENT: Chronic | ICD-10-CM

## 2024-01-09 PROCEDURE — 3074F SYST BP LT 130 MM HG: CPT | Performed by: STUDENT IN AN ORGANIZED HEALTH CARE EDUCATION/TRAINING PROGRAM

## 2024-01-09 PROCEDURE — 99213 OFFICE O/P EST LOW 20 MIN: CPT | Performed by: STUDENT IN AN ORGANIZED HEALTH CARE EDUCATION/TRAINING PROGRAM

## 2024-01-09 PROCEDURE — 3078F DIAST BP <80 MM HG: CPT | Performed by: STUDENT IN AN ORGANIZED HEALTH CARE EDUCATION/TRAINING PROGRAM

## 2024-01-09 RX ORDER — ZOLPIDEM TARTRATE 10 MG/1
10 TABLET ORAL NIGHTLY PRN
Qty: 30 TABLET | Refills: 0 | Status: SHIPPED | OUTPATIENT
Start: 2024-01-13 | End: 2024-02-12

## 2024-01-09 RX ORDER — OMEPRAZOLE 20 MG/1
20 CAPSULE, DELAYED RELEASE ORAL DAILY
Qty: 90 CAPSULE | Refills: 3 | Status: SHIPPED | OUTPATIENT
Start: 2024-01-09

## 2024-01-09 RX ORDER — ONDANSETRON 4 MG/1
4 TABLET, FILM COATED ORAL DAILY
COMMUNITY
Start: 2023-12-12 | End: 2024-01-09

## 2024-01-09 RX ORDER — ATORVASTATIN CALCIUM 40 MG/1
1 TABLET, FILM COATED ORAL
COMMUNITY
End: 2024-01-09

## 2024-01-09 RX ORDER — ZOLPIDEM TARTRATE 10 MG/1
10 TABLET ORAL NIGHTLY PRN
Qty: 30 TABLET | Refills: 0 | Status: SHIPPED | OUTPATIENT
Start: 2024-02-12 | End: 2024-03-13

## 2024-01-09 RX ORDER — ZOLPIDEM TARTRATE 10 MG/1
10 TABLET ORAL NIGHTLY PRN
Qty: 30 TABLET | Refills: 0 | Status: SHIPPED | OUTPATIENT
Start: 2024-03-13 | End: 2024-04-12

## 2024-01-09 ASSESSMENT — ENCOUNTER SYMPTOMS
FEVER: 0
CHILLS: 0
SHORTNESS OF BREATH: 0
PALPITATIONS: 0

## 2024-01-09 ASSESSMENT — PATIENT HEALTH QUESTIONNAIRE - PHQ9: CLINICAL INTERPRETATION OF PHQ2 SCORE: 0

## 2024-01-09 ASSESSMENT — FIBROSIS 4 INDEX: FIB4 SCORE: 0.91

## 2024-01-09 NOTE — PROGRESS NOTES
Subjective:     CC: Medication refill    HPI:   Ms. Samuel is a pleasant 68-year-old established patient of Dr. Jesus.    Patient requesting refill for Ambien.  She reports she has been taking it nightly and has been sleeping well.    Has no other acute questions or concerns today.    Patient Active Problem List    Diagnosis Date Noted    History of malignant melanoma 10/13/2023    Benzodiazepine dependence (HCC) 10/13/2023    Prediabetes 10/13/2023    Hives 05/17/2023    Trigger thumb 05/17/2023    Post-operative state 03/27/2023    Urinary incontinence, mixed 01/26/2023    Atrophic vaginitis 01/26/2023    Lower abdominal pain 09/29/2022    Left lower quadrant pain 08/03/2021    IBS (irritable bowel syndrome) 07/17/2021    Aortic atherosclerosis ([facility])     Familial hypercholesteremia     Polyneuropathy associated with underlying disease ([facility]) 03/13/2018    Obesity (BMI 30-39.9) 03/13/2018    ROBERT (generalized anxiety disorder) 06/15/2017    Allergic rhinitis 05/10/2017    Fatigue 03/02/2017    S/P lumbar fusion 10/20/2016    Cervical radiculopathy 10/20/2016    Diaphragmatic hernia 06/30/2015    Mild mitral regurgitation 03/12/2015    H/O: CVA (cerebrovascular accident) 09/23/2014    Outlet dysfunction constipation 09/17/2014    GERD (gastroesophageal reflux disease) 09/17/2014    Spinal stenosis of lumbar region 03/20/2014    Vitamin D deficiency disease 09/24/2012    Acquired scoliosis 10/24/2011    Obstructive sleep apnea 10/24/2011    Insomnia, persistent 10/24/2011    Major depressive disorder with single episode, in full remission (HCC) 10/24/2011    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         Health Maintenance: Completed    ROS:  Review of Systems   Constitutional:  Negative for chills and fever.   Respiratory:  Negative for shortness of breath.    Cardiovascular:  Negative for chest pain and palpitations.       Objective:     Exam:  /72 (BP Location: Right arm, Patient Position: Sitting, BP Cuff Size: Adult)    Pulse 98   Temp 36.6 °C (97.8 °F) (Temporal)   Ht 1.524 m (5')   Wt 75.3 kg (166 lb)   SpO2 94%   BMI 32.42 kg/m²  Body mass index is 32.42 kg/m².    Physical Exam  Constitutional:       Appearance: Normal appearance.   Cardiovascular:      Rate and Rhythm: Normal rate and regular rhythm.      Heart sounds: Normal heart sounds.   Pulmonary:      Effort: Pulmonary effort is normal. No respiratory distress.      Breath sounds: Normal breath sounds. No stridor. No wheezing, rhonchi or rales.   Neurological:      Mental Status: She is alert.             Assessment & Plan:     68 y.o. female with the following -     1. Insomnia, persistent  Chronic, stable.   reviewed.  CS agreement signed.  Next visit will order UDS.  Continue Ambien at bedtime as needed.  - Controlled Substance Treatment Agreement  - zolpidem (AMBIEN) 10 MG Tab; Take 1 Tablet by mouth at bedtime as needed for Sleep for up to 30 days.  Dispense: 30 Tablet; Refill: 0  - zolpidem (AMBIEN) 10 MG Tab; Take 1 Tablet by mouth at bedtime as needed for Sleep for up to 30 days.  Dispense: 30 Tablet; Refill: 0  - zolpidem (AMBIEN) 10 MG Tab; Take 1 Tablet by mouth at bedtime as needed for Sleep for up to 30 days.  Dispense: 30 Tablet; Refill: 0    2. Gastroesophageal reflux disease, unspecified whether esophagitis present  Chronic, stable.  Continue omeprazole 20 mg daily.  - omeprazole (PRILOSEC) 20 MG delayed-release capsule; Take 1 Capsule by mouth every day.  Dispense: 90 Capsule; Refill: 3      HCC Gap Form    Last edited 01/09/24 13:28 PST by Janette Dodd M.D.         Return in about 3 months (around 4/9/2024) for Medication Check.    Please note that this dictation was created using voice recognition software. I have made every reasonable attempt to correct obvious errors, but I expect that there are errors of grammar and possibly content that I did not discover before finalizing the note.

## 2024-02-29 ENCOUNTER — HOSPITAL ENCOUNTER (OUTPATIENT)
Dept: LAB | Facility: MEDICAL CENTER | Age: 69
End: 2024-02-29
Payer: MEDICARE

## 2024-02-29 LAB
ALBUMIN SERPL BCP-MCNC: 3.9 G/DL (ref 3.2–4.9)
ALBUMIN/GLOB SERPL: 1.3 G/DL
ALP SERPL-CCNC: 77 U/L (ref 30–99)
ALT SERPL-CCNC: 27 U/L (ref 2–50)
ANION GAP SERPL CALC-SCNC: 12 MMOL/L (ref 7–16)
AST SERPL-CCNC: 23 U/L (ref 12–45)
BILIRUB SERPL-MCNC: 0.3 MG/DL (ref 0.1–1.5)
BUN SERPL-MCNC: 13 MG/DL (ref 8–22)
CALCIUM ALBUM COR SERPL-MCNC: 9.1 MG/DL (ref 8.5–10.5)
CALCIUM SERPL-MCNC: 9 MG/DL (ref 8.5–10.5)
CHLORIDE SERPL-SCNC: 106 MMOL/L (ref 96–112)
CO2 SERPL-SCNC: 25 MMOL/L (ref 20–33)
CREAT SERPL-MCNC: 0.89 MG/DL (ref 0.5–1.4)
GFR SERPLBLD CREATININE-BSD FMLA CKD-EPI: 69 ML/MIN/1.73 M 2
GLOBULIN SER CALC-MCNC: 3 G/DL (ref 1.9–3.5)
GLUCOSE SERPL-MCNC: 110 MG/DL (ref 65–99)
POTASSIUM SERPL-SCNC: 4.6 MMOL/L (ref 3.6–5.5)
PROT SERPL-MCNC: 6.9 G/DL (ref 6–8.2)
SODIUM SERPL-SCNC: 143 MMOL/L (ref 135–145)

## 2024-02-29 PROCEDURE — 36415 COLL VENOUS BLD VENIPUNCTURE: CPT

## 2024-02-29 PROCEDURE — 80053 COMPREHEN METABOLIC PANEL: CPT

## 2024-03-01 ENCOUNTER — HOSPITAL ENCOUNTER (OUTPATIENT)
Dept: RADIOLOGY | Facility: MEDICAL CENTER | Age: 69
End: 2024-03-01
Payer: MEDICARE

## 2024-03-01 DIAGNOSIS — K58.1 IRRITABLE BOWEL SYNDROME WITH PREDOMINANT CONSTIPATION: ICD-10-CM

## 2024-03-01 DIAGNOSIS — K21.9 GASTROESOPHAGEAL REFLUX DISEASE, UNSPECIFIED WHETHER ESOPHAGITIS PRESENT: ICD-10-CM

## 2024-03-01 DIAGNOSIS — R10.32 LLQ ABDOMINAL PAIN: ICD-10-CM

## 2024-03-01 PROCEDURE — 700117 HCHG RX CONTRAST REV CODE 255

## 2024-03-01 PROCEDURE — 74177 CT ABD & PELVIS W/CONTRAST: CPT

## 2024-03-01 RX ADMIN — IOHEXOL 25 ML: 240 INJECTION, SOLUTION INTRATHECAL; INTRAVASCULAR; INTRAVENOUS; ORAL at 09:15

## 2024-03-01 RX ADMIN — IOHEXOL 100 ML: 350 INJECTION, SOLUTION INTRAVENOUS at 09:15

## 2024-03-11 ENCOUNTER — OFFICE VISIT (OUTPATIENT)
Dept: URGENT CARE | Facility: PHYSICIAN GROUP | Age: 69
End: 2024-03-11
Payer: MEDICARE

## 2024-03-11 VITALS
WEIGHT: 165 LBS | OXYGEN SATURATION: 96 % | TEMPERATURE: 98.6 F | DIASTOLIC BLOOD PRESSURE: 76 MMHG | RESPIRATION RATE: 16 BRPM | HEART RATE: 91 BPM | HEIGHT: 60 IN | BODY MASS INDEX: 32.39 KG/M2 | SYSTOLIC BLOOD PRESSURE: 128 MMHG

## 2024-03-11 DIAGNOSIS — S93.412A SPRAIN OF CALCANEOFIBULAR LIGAMENT OF LEFT ANKLE, INITIAL ENCOUNTER: ICD-10-CM

## 2024-03-11 PROCEDURE — 1125F AMNT PAIN NOTED PAIN PRSNT: CPT

## 2024-03-11 PROCEDURE — 3074F SYST BP LT 130 MM HG: CPT

## 2024-03-11 PROCEDURE — 3078F DIAST BP <80 MM HG: CPT

## 2024-03-11 PROCEDURE — 99213 OFFICE O/P EST LOW 20 MIN: CPT

## 2024-03-11 ASSESSMENT — PAIN SCALES - GENERAL: PAINLEVEL: 5=MODERATE PAIN

## 2024-03-11 ASSESSMENT — FIBROSIS 4 INDEX: FIB4 SCORE: 0.86

## 2024-03-11 ASSESSMENT — ENCOUNTER SYMPTOMS: FALLS: 1

## 2024-03-11 NOTE — PROGRESS NOTES
Subjective:     CHIEF COMPLAINT  Chief Complaint   Patient presents with    Ankle Injury     Left ankle twisted 3 weeks ago when squatting to get laundry. Not getting better. Did try to wrap it three days and felt better but today it's hurting.        HPI  Almaz Samuel is a very pleasant 68 y.o. female who presents with pain to the lateral surface of her left ankle that has been present for the past 3 weeks.  She reports that approximately 3 weeks ago she was squatting down to access her dryer when she lost balance and fell to her left side.  She landed on her left knee and noticed some pain in her left ankle.  She wrapped it with an ACE bandage for the first 3 days and was feeling relatively better, but reports that the ankle has been hurting her.  She reports that the pain worsens with walking and worsens at night.  She has not taken any pain medication such as Tylenol or ibuprofen at this time.  She has not tried icing it at this time.    REVIEW OF SYSTEMS  Review of Systems   Musculoskeletal:  Positive for falls and joint pain (L ankle pain).       PAST MEDICAL HISTORY  Patient Active Problem List    Diagnosis Date Noted    History of malignant melanoma 10/13/2023    Benzodiazepine dependence (HCC) 10/13/2023    Prediabetes 10/13/2023    Hives 05/17/2023    Trigger thumb 05/17/2023    Post-operative state 03/27/2023    Urinary incontinence, mixed 01/26/2023    Atrophic vaginitis 01/26/2023    Lower abdominal pain 09/29/2022    Left lower quadrant pain 08/03/2021    IBS (irritable bowel syndrome) 07/17/2021    Aortic atherosclerosis ([facility])     Familial hypercholesteremia     Polyneuropathy associated with underlying disease ([facility]) 03/13/2018    Obesity (BMI 30-39.9) 03/13/2018    ROBERT (generalized anxiety disorder) 06/15/2017    Allergic rhinitis 05/10/2017    Fatigue 03/02/2017    S/P lumbar fusion 10/20/2016    Cervical radiculopathy 10/20/2016    Diaphragmatic hernia 06/30/2015    Mild mitral regurgitation  "03/12/2015    H/O: CVA (cerebrovascular accident) 09/23/2014    Outlet dysfunction constipation 09/17/2014    GERD (gastroesophageal reflux disease) 09/17/2014    Spinal stenosis of lumbar region 03/20/2014    Vitamin D deficiency disease 09/24/2012    Acquired scoliosis 10/24/2011    Obstructive sleep apnea 10/24/2011    Insomnia, persistent 10/24/2011    Major depressive disorder with single episode, in full remission (HCC) 10/24/2011    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       SURGICAL HISTORY   has a past surgical history that includes tonsillectomy and adenoidectomy (01/01/1959); tubal ligation (01/01/1988); low anterior resection laparoscopic (03/02/2011); cervical disk and fusion anterior (06/22/2010); carpal tunnel release (11/14/2012); gastroscopy-endo (06/24/2015); gyn surgery; appendectomy; nissen fundoplication laparoscopic (N/A, 06/30/2015); nissen fundoplication laparoscopic (07/25/2018); neuroplasty & or transpos median nrv carpal gabino (Right, 05/31/2022); abdominal hysterectomy total; pelvic examination w anesth (03/27/2023); and other surgical procedure (04/2023).    ALLERGIES  Allergies   Allergen Reactions    Albumin Unspecified     Other reaction(s): Other (See Comments)  \"egg intolerance\"  Reaction:stomach cramps,throwing up for 12 hours,cold sweats    Rosuvastatin Nausea    Seasonal Itching     Pt states eye irritation, pollen     Simvastatin Nausea       CURRENT MEDICATIONS  Home Medications       Reviewed by Vicky Mace P.A.-C. (Physician Assistant) on 03/11/24 at 1437  Med List Status: <None>     Medication Last Dose Status   amitriptyline (ELAVIL) 10 MG Tab Taking Active   Artificial Tear Solution (TEARS NATURALE OP) Taking Active   Azelastine HCl 137 MCG/SPRAY Solution Taking Active   baclofen (LIORESAL) 10 MG Tab Taking Active   citalopram (CELEXA) 10 MG tablet Taking Active   dicyclomine (BENTYL) 10 MG Cap Taking Active   estradiol (ESTRACE VAGINAL) 0.1 MG/GM vaginal cream Taking Active "   ezetimibe (ZETIA) 10 MG Tab Taking Active   gabapentin (NEURONTIN) 300 MG Cap Taking Active   hydrOXYzine HCl (ATARAX) 25 MG Tab Taking Active   omeprazole (PRILOSEC) 20 MG delayed-release capsule Taking Active   ondansetron (ZOFRAN ODT) 4 MG TABLET DISPERSIBLE Taking Active   zolpidem (AMBIEN) 10 MG Tab Taking Active   zolpidem (AMBIEN) 10 MG Tab Taking Active                    SOCIAL HISTORY  Social History     Tobacco Use    Smoking status: Former     Current packs/day: 0.00     Average packs/day: 0.3 packs/day for 5.0 years (1.5 ttl pk-yrs)     Types: Cigarettes     Start date: 1970     Quit date: 1975     Years since quittin.2    Smokeless tobacco: Never    Tobacco comments:     quit    Vaping Use    Vaping Use: Never used   Substance and Sexual Activity    Alcohol use: No     Alcohol/week: 0.0 oz    Drug use: No    Sexual activity: Yes     Partners: Male     Comment: , 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[employer]       FAMILY HISTORY  Family History   Problem Relation Age of Onset    Breast Cancer Mother     Diabetes Mother     Lung Disease Mother         copd    Hyperlipidemia Mother     Hypertension Mother     Cancer Father         Laryngeal CA    Heart Disease Father 50        CAD with bypasses x 3    Heart Attack Father     Hyperlipidemia Father     Hypertension Father     Diabetes Sister         type II diabetes    Diabetes Other     Heart Disease Other     Hypertension Other     Lung Disease Other     Ovarian Cancer Neg Hx     Tubal Cancer Neg Hx     Peritoneal Cancer Neg Hx     Colorectal Cancer Neg Hx           Objective:     VITAL SIGNS: /76   Pulse 91   Temp 37 °C (98.6 °F) (Temporal)   Resp 16   Ht 1.524 m (5')   Wt 74.8 kg (165 lb)   SpO2 96%   BMI 32.22 kg/m²     PHYSICAL EXAM  Physical Exam  Vitals reviewed.   Constitutional:       General: She is not in acute distress.     Appearance: Normal appearance. She is not ill-appearing or toxic-appearing.   HENT:      Head:  Normocephalic and atraumatic.      Mouth/Throat:      Mouth: Mucous membranes are moist.   Eyes:      Conjunctiva/sclera: Conjunctivae normal.      Pupils: Pupils are equal, round, and reactive to light.   Cardiovascular:      Pulses:           Dorsalis pedis pulses are 2+ on the left side.   Pulmonary:      Effort: Pulmonary effort is normal. No respiratory distress.   Musculoskeletal:      Left foot: Normal range of motion.        Feet:    Feet:      Left foot:      Skin integrity: Skin integrity normal.      Comments: Localized swelling present over lateral malleolus.  No tenderness to the lateral or medial malleolus.  No metatarsal tenderness.  Tenderness present over the calcaneofibular ligament region.  No bruising, erythema, lacerations, or skin changes.  Pedal pulse 2+.  Capillary refill present less than 2 seconds.  Full active range of motion present.  Skin:     General: Skin is warm and dry.   Neurological:      General: No focal deficit present.      Mental Status: She is alert and oriented to person, place, and time.   Psychiatric:         Mood and Affect: Mood normal.         Assessment/Plan:     1. Sprain of calcaneofibular ligament of left ankle, initial encounter  -Rest, ice, and elevate left ankle  -Tylenol/ibuprofen over-the-counter as needed for discomfort  -Ankle brace to be worn for support and structure until symptoms resolve  -Return to clinic if symptoms worsen or fail to resolve    MDM/Comments:  I have prepared for this visit by personally reviewing the patient's prevous medical records, vitals, and labs including: most recent GFR and CMP. Patient has stable vital signs and is non-toxic appearing.  Patient has localized swelling on the left lateral malleolus with tenderness to inferior structures.  There is no bony tenderness.  Patient has been walking on the ankle for approximately 3 weeks.  Patient has politely declined x-ray imaging at this time.  Patient provided an ankle brace to be  worn for support and structure until symptoms resolve.  Discussed icing and elevating the ankle.  Discussed supportive care with hydration, rest, Tylenol/Ibuprofen as needed. Patient demonstrated understanding of treatment plan at this time and will RTC if symptoms worsen or fail to resolve.       Differential diagnosis, natural history, supportive care, and indications for immediate follow-up discussed. All questions answered. Patient agrees with the plan of care.    Follow-up as needed if symptoms worsen or fail to improve to PCP, Urgent care or Emergency Room.    I have personally reviewed prior external notes and test results pertinent to today's visit.  I have independently reviewed and interpreted all diagnostics ordered during this urgent care acute visit.   Discussed management options (risks,benefits, and alternatives to treatment). Pt expresses understanding and the treatment plan was agreed upon. Questions were encouraged and answered to pt's satisfaction.    Please note that this dictation was created using voice recognition software. I have made a reasonable attempt to correct obvious errors, but I expect that there are errors of grammar and possibly content that I did not discover before finalizing the note.

## 2024-03-28 DIAGNOSIS — M54.12 CERVICAL RADICULOPATHY: ICD-10-CM

## 2024-03-28 DIAGNOSIS — E78.01 FAMILIAL HYPERCHOLESTEREMIA: Chronic | ICD-10-CM

## 2024-03-29 RX ORDER — EZETIMIBE 10 MG/1
10 TABLET ORAL DAILY
Qty: 90 TABLET | Refills: 3 | Status: SHIPPED | OUTPATIENT
Start: 2024-03-29

## 2024-03-29 RX ORDER — GABAPENTIN 300 MG/1
CAPSULE ORAL
Qty: 180 CAPSULE | Refills: 0 | OUTPATIENT
Start: 2024-03-29

## 2024-03-29 NOTE — TELEPHONE ENCOUNTER
Received request via: Patient    Was the patient seen in the last year in this department? Yes    Does the patient have an active prescription (recently filled or refills available) for medication(s) requested? No    Pharmacy Name: [facility]     Does the patient have [facility] Plus and need 100 day supply (blood pressure, diabetes and cholesterol meds only)? Patient does not have SCP

## 2024-04-05 DIAGNOSIS — M54.12 CERVICAL RADICULOPATHY: ICD-10-CM

## 2024-04-08 DIAGNOSIS — F41.1 GENERALIZED ANXIETY DISORDER: ICD-10-CM

## 2024-04-08 DIAGNOSIS — L50.9 HIVES: ICD-10-CM

## 2024-04-08 NOTE — TELEPHONE ENCOUNTER
Requested Prescriptions     Pending Prescriptions Disp Refills    Azelastine (ASTELIN) 137 MCG/SPRAY Solution 30 mL 0     Sig: Administer 2 Sprays into affected nostril(S) 2 times a day.    hydrOXYzine HCl (ATARAX) 25 MG Tab 30 Tablet 0     Sig: Take 1 Tablet by mouth 3 times a day as needed for Itching.    citalopram (CELEXA) 10 MG tablet 90 Tablet 0     Sig: Take 1 tablet by mouth once daily          Last office visit: 12/13/23  Last lab: 2/29/24

## 2024-04-09 ENCOUNTER — OFFICE VISIT (OUTPATIENT)
Dept: MEDICAL GROUP | Facility: PHYSICIAN GROUP | Age: 69
End: 2024-04-09
Payer: MEDICARE

## 2024-04-09 VITALS
SYSTOLIC BLOOD PRESSURE: 102 MMHG | HEIGHT: 61 IN | TEMPERATURE: 98.3 F | WEIGHT: 167 LBS | BODY MASS INDEX: 31.53 KG/M2 | OXYGEN SATURATION: 97 % | HEART RATE: 80 BPM | DIASTOLIC BLOOD PRESSURE: 80 MMHG

## 2024-04-09 DIAGNOSIS — G47.00 INSOMNIA, PERSISTENT: Chronic | ICD-10-CM

## 2024-04-09 DIAGNOSIS — G89.29 CHRONIC PAIN OF LEFT ANKLE: ICD-10-CM

## 2024-04-09 DIAGNOSIS — M25.572 CHRONIC PAIN OF LEFT ANKLE: ICD-10-CM

## 2024-04-09 DIAGNOSIS — N18.2 STAGE 2 CHRONIC KIDNEY DISEASE: ICD-10-CM

## 2024-04-09 PROCEDURE — 3074F SYST BP LT 130 MM HG: CPT | Performed by: STUDENT IN AN ORGANIZED HEALTH CARE EDUCATION/TRAINING PROGRAM

## 2024-04-09 PROCEDURE — 99214 OFFICE O/P EST MOD 30 MIN: CPT | Performed by: STUDENT IN AN ORGANIZED HEALTH CARE EDUCATION/TRAINING PROGRAM

## 2024-04-09 PROCEDURE — 3079F DIAST BP 80-89 MM HG: CPT | Performed by: STUDENT IN AN ORGANIZED HEALTH CARE EDUCATION/TRAINING PROGRAM

## 2024-04-09 RX ORDER — NAPROXEN 500 MG/1
500 TABLET ORAL 2 TIMES DAILY WITH MEALS
Qty: 10 TABLET | Refills: 0 | Status: SHIPPED | OUTPATIENT
Start: 2024-04-09 | End: 2024-04-14

## 2024-04-09 RX ORDER — GABAPENTIN 300 MG/1
CAPSULE ORAL
Qty: 180 CAPSULE | Refills: 3 | Status: SHIPPED | OUTPATIENT
Start: 2024-04-09

## 2024-04-09 RX ORDER — CITALOPRAM HYDROBROMIDE 10 MG/1
TABLET ORAL
Qty: 90 TABLET | Refills: 3 | Status: SHIPPED | OUTPATIENT
Start: 2024-04-09

## 2024-04-09 RX ORDER — HYDROXYZINE HYDROCHLORIDE 25 MG/1
25 TABLET, FILM COATED ORAL 3 TIMES DAILY PRN
Qty: 30 TABLET | Refills: 1 | Status: SHIPPED | OUTPATIENT
Start: 2024-04-09

## 2024-04-09 RX ORDER — AZELASTINE HYDROCHLORIDE 137 UG/1
2 SPRAY, METERED NASAL 2 TIMES DAILY
Qty: 30 ML | Refills: 0 | Status: SHIPPED | OUTPATIENT
Start: 2024-04-09

## 2024-04-09 RX ORDER — RAMELTEON 8 MG/1
8 TABLET ORAL NIGHTLY
Qty: 30 TABLET | Refills: 0 | Status: SHIPPED | OUTPATIENT
Start: 2024-04-09

## 2024-04-09 ASSESSMENT — PATIENT HEALTH QUESTIONNAIRE - PHQ9
SUM OF ALL RESPONSES TO PHQ9 QUESTIONS 1 AND 2: 0
2. FEELING DOWN, DEPRESSED, IRRITABLE, OR HOPELESS: NOT AT ALL
1. LITTLE INTEREST OR PLEASURE IN DOING THINGS: NOT AT ALL

## 2024-04-09 ASSESSMENT — ENCOUNTER SYMPTOMS
PALPITATIONS: 0
CHILLS: 0
SHORTNESS OF BREATH: 0
FEVER: 0

## 2024-04-09 ASSESSMENT — FIBROSIS 4 INDEX: FIB4 SCORE: 0.88

## 2024-04-09 NOTE — PATIENT INSTRUCTIONS
12 Tips for Healthy Sleep:    Stick to a sleep schedule.  Noted bed and wake up at the same time each day.  Is creatures of habit, people have a hard time adjusting changes sleep patterns.  Sleeping later on weekends hopefully make up for lack of sleep during the week and will be harder to wake up early Monday morning.  Set alarm for bedtime.  Often we set alarm for this and wake up at 3 to do so for when it is time to go to sleep.  If there is only 1 piece of ice remember intake and use toetips, that should be it.  Exercise is great, but not too late in the day.  Try to exercise at least 30 minutes on most days but not later than 2 to 3 hours before your bedtime.  Avoid caffeine and nicotine.  Coffee, nash, certain teas, chocolate contain a stimulant caffeine, and its effects can take as long as 8 hours to work fully.  Therefore, cup of coffee late afternoon can make it hard for her full sleep at night.  Nicotine is also stimulant, often causes milligrams of sleep only very lightly.  In addition, smokers often wake up too early in the morning because of nicotine withdrawal.  Avoid alcoholic drinks before bed.  Having a night Or alcoholic beverage before sleep may help you relax, but heavy use rubs you have REM sleep, keeping you in the later stages of sleep.  Heavy alcohol ingestion also may contribute to impairment in breathing at night.  He also tend to wake up in the middle of the night when the effects of the alcohol are worn off.  Avoid large meals and beverages late at night.  A light snack is okay, but a large meal can cause indigestion, which interferes with sleep.  Drinking to any fluids at night can cause frequent awakenings to urinate.  If possible, avoid medications that delay or disrupt her sleep.  Some commonly prescribed heart, blood pressure, or asthma medications, as well as some over-the-counter and herbal remedies for cough, colds, or allergies, can disrupt sleep patterns.  In the event trouble  sleeping, talk to your healthcare provider pharmacist to see whether any drugs or taking might be contributing to your insomnia and ask whether that can be taken at other times during the day early in the evening.  Do not take naps after 3 PM.  Naps can help make up for lost sleep, but late afternoon naps can make it harder to fall asleep at night.  Relax before bed.  Do not over schedule your day so that no time is low for unwinding.  A relaxing activity, such as reading or listening to music, she reported a bedtime ritual.  Take a hot bath before bed.  The drop in body temperature after getting out of the bath may help you feel sleepy, in the bath can help you relax and slow down so you are more ready to sleep.  Dark bedroom, cool bedroom, and gadget free bedroom.  Get rid of anything in your bedroom I distract you from sleep, such as noises, bright lights, an uncomfortable bed, or warm temperatures.  You sleep better if the temperature in the room is kept on the cool side.  A TV, cell phone, or computer in the bedroom can be a distraction and a private if needed sleep.  Having a comfortable mattress and pillow can help promote a good night sleep.  Individuals with insomnia often watch the clock.  Turn the clock face out of UC do not worry about the time while trying to fall asleep.  Have the right sunlight exposure.  Dayligh is white to regulating daily sleep patterns.  Try to get outside in natural sunlight for at least 30 minutes each day.  If possible, wake up with the sun or use very bright lights in the morning.  Sleep experts recommend that, if you have problems falling asleep, you should get an hour of exposure to morning sunlight and turn down the lights before bedtime.  Do not lie in bed awake.  If you find yourself still awake after staying in bed for more than 20 minutes or if you are starting to feel anxious or worried, get up and do some relaxing activity until you feel sleepy.  The anxiety of not  being able to sleep can make it harder to fall asleep.    Insomnia Treatment Options    CBT-Insomnia (#1 Treatment recommendation)  - Cognitive Behavioral Therapy (CBT) for insomnia has been found to be effective at promoting improved, healthy sleep.  - Unfortunately, we do not have enough psychologists providing this excellent treatment for insomnia but I have a couple options where you can do this on your own.  CBT-I  mobile phone jersey. This is free and was developed by the [facility] to treat veterans for insomnia. It does work best in conjunction with a therapist/psychologist, but it can be used by itself  Say [place]enzie To Insomnia by Enrique Hamilton. This a book where you learn about sleep and how sleep medicines actually do not help and then guides you through a 6 weeks of CBT for isomnia.    Sleep Medications (Back Up Option, and only for short-term)  - Sleep medications are no longer recommended as 1st-line treatment for insomnia and not recommended for long term treatment.  - Not a single sleep medication on the market helps people fall asleep faster nor do they cause healthier, deeper sleep  - Potential risks of sleep medications   - Lingering next-day effects. Up to 80% of people report excess drowsiness or slowed thinking in the morning.   - Confusion or loss of coordination. This could increase a person's risk for falls or lead to accidents right before bed or during the night.   - Abnormal behavior. This can include talking, walking, or taking a drive while someone remains partially asleep and they can be completely unaware of their actions.   - Allergic reaction   - Drug interaction. The medication may interact with another medication, potentially changing that medication's effectiveness.   - Suppressed breathing. Some medications can suppress breathing while asleep, worsening conditions like Sleep Apnea.   - Worsen some medical conditions. Some sleep medications can worsen depression, for example.   -  Habit-forming. Many sleep medications can be habit-forming. This could lead to the medication being used too long, or needing higher doses of the medication for it to work again, increasing the risk for side effects. Also, abruptly stopping these medications can lead to withdrawal.  - If you are using a sleep medication, please try to limit your use and work on the 12 tips discussed above.    For Further Information    - I recommend the website: https://sleepfoundation.org  - You can learn all about sleep and its health benefits as well as tips and products to help promote healthy sleep

## 2024-04-09 NOTE — ASSESSMENT & PLAN NOTE
Patient reports that she twisted her left ankle about two months ago. She reports that she was seen by  a month after the injury. She did not have any imaging completed at that time. She reports that her ankle continues to hurt. She has been icing and elevating. She has been taking Tylenol

## 2024-04-09 NOTE — ASSESSMENT & PLAN NOTE
"Patient reports that she has been doing \"crazy things\" while being on Ambien. She reports her  that she has been grazing throughout the night. She states that while on the Ambien she forgot to pay for her insurance coverage. She also reports having strange dreams while being on Ambien and begin forgetting things. She previously has tried Lorazepam and Trazodone for insomnia. Patient reports that she has a hard time falling and staying asleep.   "

## 2024-04-09 NOTE — PROGRESS NOTES
"Subjective:     CC: Insomnia    HPI:   Ms. Samuel is a pleasant 70 yo established patient of Dr. Tisha Jesus who presents today to discuss medication.    Insomnia, persistent  Patient reports that she has been doing \"crazy things\" while being on Ambien. She reports her  that she has been grazing throughout the night. She states that while on the Ambien she forgot to pay for her insurance coverage. She also reports having strange dreams while being on Ambien and begin forgetting things. She previously has tried Lorazepam and Trazodone for insomnia. Patient reports that she has a hard time falling and staying asleep.     Chronic pain of left ankle  Patient reports that she twisted her left ankle about two months ago. She reports that she was seen by UC a month after the injury. She did not have any imaging completed at that time. She reports that her ankle continues to hurt. She has been icing and elevating. She has been taking Tylenol       Patient Active Problem List    Diagnosis Date Noted    Chronic pain of left ankle 04/09/2024    History of malignant melanoma 10/13/2023    Benzodiazepine dependence (HCC) 10/13/2023    Prediabetes 10/13/2023    Hives 05/17/2023    Trigger thumb 05/17/2023    Post-operative state 03/27/2023    Urinary incontinence, mixed 01/26/2023    Atrophic vaginitis 01/26/2023    Lower abdominal pain 09/29/2022    Left lower quadrant pain 08/03/2021    IBS (irritable bowel syndrome) 07/17/2021    Aortic atherosclerosis (HCC)     Familial hypercholesteremia     Polyneuropathy associated with underlying disease (HCC) 03/13/2018    Obesity (BMI 30-39.9) 03/13/2018    ROBERT (generalized anxiety disorder) 06/15/2017    Allergic rhinitis 05/10/2017    Fatigue 03/02/2017    S/P lumbar fusion 10/20/2016    Cervical radiculopathy 10/20/2016    Diaphragmatic hernia 06/30/2015    Mild mitral regurgitation 03/12/2015    H/O: CVA (cerebrovascular accident) 09/23/2014    Outlet dysfunction " "constipation 09/17/2014    GERD (gastroesophageal reflux disease) 09/17/2014    Spinal stenosis of lumbar region 03/20/2014    Vitamin D deficiency disease 09/24/2012    Acquired scoliosis 10/24/2011    Obstructive sleep apnea 10/24/2011    Insomnia, persistent 10/24/2011    Major depressive disorder with single episode, in full remission (HCC) 10/24/2011    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         Health Maintenance: Completed    ROS:  Review of Systems   Constitutional:  Negative for chills and fever.   Respiratory:  Negative for shortness of breath.    Cardiovascular:  Negative for chest pain and palpitations.       Objective:     Exam:  /80 (BP Location: Right arm, Patient Position: Sitting, BP Cuff Size: Adult)   Pulse 80   Temp 36.8 °C (98.3 °F) (Temporal)   Ht 1.549 m (5' 1\")   Wt 75.8 kg (167 lb)   SpO2 97%   BMI 31.55 kg/m²  Body mass index is 31.55 kg/m².    Physical Exam  Constitutional:       Appearance: Normal appearance.   Neurological:      Mental Status: She is alert.   Psychiatric:         Mood and Affect: Mood normal.             Assessment & Plan:     69 y.o. female with the following -     1. Insomnia, persistent  Chronic. Patient requesting to prescribed different sleep aid.  Will trial Ramelteon 8mg qd. Patient provided with one month prescription. She will follow-up with her PCP to discuss further sleep aid medications if no relief from Ramelteon.   - ramelteon (ROZEREM) 8 MG tablet; Take 1 Tablet by mouth every evening.  Dispense: 30 Tablet; Refill: 0    2. Chronic pain of left ankle  Chronic, ongoing. Patient reports that she sprained her left ankle in 2/2024, she was seen at urgent care on 3/2024.  Patient at that time did not have imaging completed.  Conservative measures were recommended at that time.  Patient was advised to continue to elevate her left ankle and to apply ice/heat.  Patient's most recent GFR was 69, will prescribe patient naproxen 500 mg twice daily for 5 days to help " with inflammation. Patient was also advised to wear compression stockings for swelling.  - naproxen (NAPROSYN) 500 MG Tab; Take 1 Tablet by mouth 2 times a day with meals for 5 days.  Dispense: 10 Tablet; Refill: 0    3. Stage 2 chronic kidney disease  Chronic, stable. Most recent GFR from 2/2024 stable at 69.        Return in about 4 weeks (around 5/7/2024) for Medication Check.    Please note that this dictation was created using voice recognition software. I have made every reasonable attempt to correct obvious errors, but I expect that there are errors of grammar and possibly content that I did not discover before finalizing the note.

## 2024-04-19 ENCOUNTER — OFFICE VISIT (OUTPATIENT)
Dept: URGENT CARE | Facility: PHYSICIAN GROUP | Age: 69
End: 2024-04-19
Payer: MEDICARE

## 2024-04-19 VITALS
RESPIRATION RATE: 16 BRPM | OXYGEN SATURATION: 95 % | WEIGHT: 166 LBS | TEMPERATURE: 97.4 F | HEIGHT: 60 IN | SYSTOLIC BLOOD PRESSURE: 122 MMHG | HEART RATE: 93 BPM | DIASTOLIC BLOOD PRESSURE: 72 MMHG | BODY MASS INDEX: 32.59 KG/M2

## 2024-04-19 DIAGNOSIS — J06.9 VIRAL URI WITH COUGH: ICD-10-CM

## 2024-04-19 PROCEDURE — 3074F SYST BP LT 130 MM HG: CPT | Performed by: NURSE PRACTITIONER

## 2024-04-19 PROCEDURE — 99213 OFFICE O/P EST LOW 20 MIN: CPT | Performed by: NURSE PRACTITIONER

## 2024-04-19 PROCEDURE — 3078F DIAST BP <80 MM HG: CPT | Performed by: NURSE PRACTITIONER

## 2024-04-19 RX ORDER — BENZONATATE 100 MG/1
100 CAPSULE ORAL 3 TIMES DAILY PRN
Qty: 30 CAPSULE | Refills: 0 | Status: SHIPPED | OUTPATIENT
Start: 2024-04-19 | End: 2024-04-29

## 2024-04-19 RX ORDER — DEXTROMETHORPHAN HYDROBROMIDE AND PROMETHAZINE HYDROCHLORIDE 15; 6.25 MG/5ML; MG/5ML
5 SYRUP ORAL NIGHTLY
Qty: 100 ML | Refills: 0 | Status: SHIPPED | OUTPATIENT
Start: 2024-04-19 | End: 2024-05-09

## 2024-04-19 RX ORDER — GUAIFENESIN 600 MG/1
600 TABLET, EXTENDED RELEASE ORAL EVERY 12 HOURS
Qty: 20 TABLET | Refills: 0 | Status: SHIPPED | OUTPATIENT
Start: 2024-04-19 | End: 2024-04-29

## 2024-04-19 ASSESSMENT — FIBROSIS 4 INDEX: FIB4 SCORE: 0.88

## 2024-04-19 NOTE — PROGRESS NOTES
Subjective:   Almaz Samuel is a 69 y.o. female who presents for Cough (X7 days Cough, runny nose, headache, sore throat, post nasal drip, ear pressure pain, sinus pain and pressure )    Patient is a 69-year-old female presents clinic today reporting 7-day history of cough, intermittent shortness of breath during coughing fits, clear runny nose, headache, sinus pain and pressure, sore throat, postnasal drip, bilateral ear pressure, and fatigue.  Patient states that she has a dry cough.  She has not had any chest pain, wheezing, fevers, vomiting, or diarrhea.  Has been taking over-the-counter Tylenol Cold and flu along with been Zenni, feels which has helped however she is ran out and she would like a refill today.    Medications, Allergies, and current problem list reviewed today in Epic.     Objective:     /72   Pulse 93   Temp 36.3 °C (97.4 °F) (Temporal)   Resp 16   Ht 1.524 m (5')   Wt 75.3 kg (166 lb)   SpO2 95%     Physical Exam  Vitals reviewed.   Constitutional:       General: She is not in acute distress.     Appearance: Normal appearance. She is ill-appearing. She is not toxic-appearing.   HENT:      Head: Normocephalic.      Right Ear: Tympanic membrane, ear canal and external ear normal.      Left Ear: Tympanic membrane, ear canal and external ear normal.      Nose: Rhinorrhea present. No mucosal edema or congestion. Rhinorrhea is clear.      Right Sinus: No maxillary sinus tenderness or frontal sinus tenderness.      Left Sinus: No maxillary sinus tenderness or frontal sinus tenderness.      Mouth/Throat:      Lips: Pink. No lesions.      Mouth: Mucous membranes are moist.      Pharynx: Oropharynx is clear. Posterior oropharyngeal erythema present. No pharyngeal swelling, oropharyngeal exudate or uvula swelling.      Comments: PND  Eyes:      Extraocular Movements: Extraocular movements intact.      Conjunctiva/sclera: Conjunctivae normal.      Pupils: Pupils are equal, round, and  reactive to light.   Cardiovascular:      Rate and Rhythm: Normal rate and regular rhythm.   Pulmonary:      Effort: Pulmonary effort is normal. No respiratory distress.      Breath sounds: Normal breath sounds. No stridor. No wheezing, rhonchi or rales.   Musculoskeletal:         General: Normal range of motion.      Cervical back: Normal range of motion and neck supple.   Lymphadenopathy:      Cervical: No cervical adenopathy.   Skin:     General: Skin is warm and dry.   Neurological:      Mental Status: She is alert and oriented to person, place, and time.   Psychiatric:         Mood and Affect: Mood normal.         Behavior: Behavior normal.         Thought Content: Thought content normal.         Judgment: Judgment normal.         Assessment/Plan:     Diagnosis and associated orders:     1. Viral URI with cough  benzonatate (TESSALON) 100 MG Cap    promethazine-dextromethorphan (PROMETHAZINE-DM) 6.25-15 MG/5ML syrup    guaiFENesin ER (MUCINEX) 600 MG TABLET SR 12 HR         Comments/MDM:     Did discuss viral screening however due to duration of symptoms with shared decision making patient did politely declined as it does not change plan of care or outcome.  HPI physical exam finds are consistent with viral illness.  Lung sounds are clear throughout.  Low suspicion at this time for pneumonia.  No signs of secondary bacterial infection vital signs are all within normal range.   Patient was prescribed been Zenni Perles, Promethazine DM for at night, and guaifenesin.    Did advise patient on conservative measures for management of symptoms.  Patient is agreeable to pursue adequate rest, adequate hydration, saltwater gargle and nasal saline and nasal toileting for any symptoms of upper respiratory congestion.  Over-the-counter analgesia and antipyretics on a p.r.n. basis as needed for pain and fever.  Did discuss over-the-counter cough medications and to  follow manufactures dosing and safety guidelines.     Recommended following up in clinic if symptoms acutely worsen or if symptoms persist past 4-5 days.    Patient was involved with shared decision-making throughout the exam today and verbalizes understanding regards to plan of care, discharge instructions, and follow-up         Differential diagnosis, natural history, supportive care, and indications for immediate follow-up discussed.    Advised the patient to follow-up with the primary care physician for recheck, reevaluation, and consideration of further management.    I personally reviewed prior external notes and test results pertinent to today's visit as well as additional imaging and testing completed in clinic today.     Please note that this dictation was created using voice recognition software. I have made a reasonable attempt to correct obvious errors, but I expect that there are errors of grammar and possibly content that I did not discover before finalizing the note.

## 2024-05-10 ENCOUNTER — OFFICE VISIT (OUTPATIENT)
Dept: MEDICAL GROUP | Facility: PHYSICIAN GROUP | Age: 69
End: 2024-05-10
Payer: MEDICARE

## 2024-05-10 ENCOUNTER — HOSPITAL ENCOUNTER (OUTPATIENT)
Dept: RADIOLOGY | Facility: MEDICAL CENTER | Age: 69
End: 2024-05-10
Attending: STUDENT IN AN ORGANIZED HEALTH CARE EDUCATION/TRAINING PROGRAM
Payer: MEDICARE

## 2024-05-10 VITALS
SYSTOLIC BLOOD PRESSURE: 132 MMHG | HEART RATE: 74 BPM | TEMPERATURE: 98.6 F | BODY MASS INDEX: 32.73 KG/M2 | OXYGEN SATURATION: 95 % | DIASTOLIC BLOOD PRESSURE: 80 MMHG | WEIGHT: 166.7 LBS | HEIGHT: 60 IN

## 2024-05-10 DIAGNOSIS — G89.29 CHRONIC PAIN OF LEFT ANKLE: ICD-10-CM

## 2024-05-10 DIAGNOSIS — M25.572 CHRONIC PAIN OF LEFT ANKLE: ICD-10-CM

## 2024-05-10 DIAGNOSIS — K58.2 IRRITABLE BOWEL SYNDROME WITH BOTH CONSTIPATION AND DIARRHEA: Chronic | ICD-10-CM

## 2024-05-10 DIAGNOSIS — H61.23 BILATERAL IMPACTED CERUMEN: ICD-10-CM

## 2024-05-10 DIAGNOSIS — R05.1 ACUTE COUGH: ICD-10-CM

## 2024-05-10 DIAGNOSIS — G47.00 INSOMNIA, PERSISTENT: Chronic | ICD-10-CM

## 2024-05-10 PROCEDURE — 3079F DIAST BP 80-89 MM HG: CPT | Performed by: STUDENT IN AN ORGANIZED HEALTH CARE EDUCATION/TRAINING PROGRAM

## 2024-05-10 PROCEDURE — 99214 OFFICE O/P EST MOD 30 MIN: CPT | Performed by: STUDENT IN AN ORGANIZED HEALTH CARE EDUCATION/TRAINING PROGRAM

## 2024-05-10 PROCEDURE — 3075F SYST BP GE 130 - 139MM HG: CPT | Performed by: STUDENT IN AN ORGANIZED HEALTH CARE EDUCATION/TRAINING PROGRAM

## 2024-05-10 RX ORDER — TRAZODONE HYDROCHLORIDE 150 MG/1
150 TABLET ORAL NIGHTLY
Qty: 30 TABLET | Refills: 1 | Status: SHIPPED | OUTPATIENT
Start: 2024-05-10

## 2024-05-10 RX ORDER — BENZONATATE 100 MG/1
100-200 CAPSULE ORAL 3 TIMES DAILY PRN
Qty: 90 CAPSULE | Refills: 0 | Status: SHIPPED | OUTPATIENT
Start: 2024-05-10

## 2024-05-10 ASSESSMENT — FIBROSIS 4 INDEX: FIB4 SCORE: 0.88

## 2024-05-10 ASSESSMENT — PATIENT HEALTH QUESTIONNAIRE - PHQ9
1. LITTLE INTEREST OR PLEASURE IN DOING THINGS: SEVERAL DAYS
SUM OF ALL RESPONSES TO PHQ9 QUESTIONS 1 AND 2: 1
2. FEELING DOWN, DEPRESSED, IRRITABLE, OR HOPELESS: NOT AT ALL

## 2024-05-10 NOTE — ASSESSMENT & PLAN NOTE
"Patient was taking lorazepam 1 mg at bedtime for many years.  Patient was agreeable to taper off and try different medication.   Patient was on trazodone 100 mg in the past but reports it did not work.  Patient was put on Ambien but reports it caused her to do \"crazy things\".  Patient recently saw Dr. Dodd and was prescribed ramelteon but reports it did not keep her asleep.  Patient reports sleeping 3-4 hours per night.  Patient is also on amitriptyline but reports it does not keep her asleep.  Patient is agreeable to retry trazodone.  "

## 2024-05-10 NOTE — PROGRESS NOTES
"Subjective:     Chief Complaint   Patient presents with    Medication Follow-up     Baclofen.       HPI:   Almaz presents today with    Cough  Patient complains of cough and sore throat for the past 2 weeks.  Patient was last seen in urgent care on 4/19 and treated with Tessalon Perles, promethazine-DM, and Mucinex.  Patient reports her  was recently sick.  Patient denies fever, nasal congestion or discharge, ear pain, redness of breath.    Sprain of calcaneofibular ligament of left ankle  Patient was seen in urgent care in March and rest, ice, elevation, ankle brace, Tylenol/ibuprofen recommended.  Patient then saw Dr. Dodd and was prescribed naproxen but did not take the medication.  Patient reports pain is worse in the evening after walking around all day and 4/10 in severity    Insomnia, persistent  Patient was taking lorazepam 1 mg at bedtime for many years.  Patient was agreeable to taper off and try different medication.   Patient was on trazodone 100 mg in the past but reports it did not work.  Patient was put on Ambien but reports it caused her to do \"crazy things\".  Patient recently saw Dr. Dodd and was prescribed ramelteon but reports it did not keep her asleep.  Patient reports sleeping 3-4 hours per night.  Patient is also on amitriptyline but reports it does not keep her asleep.  Patient is agreeable to retry trazodone.    Health Maintenance: Completed    ROS:  Negative except as stated above.      Objective:     Exam:  /80   Pulse 74   Temp 37 °C (98.6 °F) (Temporal)   Ht 1.524 m (5')   Wt 75.6 kg (166 lb 11.2 oz)   SpO2 95%   BMI 32.56 kg/m²  Body mass index is 32.56 kg/m².    Physical Exam    Gen: Alert and oriented, no acute distress.  ENT:   Cerumen impaction bilaterally and unable to visualize tympanic membranes.  No oropharyngeal erythema or exudate.  Lungs: Normal effort, CTAB, no wheezing / rhonchi / rales.  CV: RRR, normal S1 and S2, no murmurs.      Assessment & Plan: "     69 y.o. female with the following -     1. Chronic pain of left ankle  Chronic, uncontrolled.  X-ray ordered for further evaluation.  - DX-FOOT-COMPLETE 3+ LEFT; Future    2. Insomnia, persistent  Chronic, uncontrolled.  Patient was taking lorazepam 1 mg at bedtime for many years and was tapered off.  Patient also tried trazodone 100 mg, ambien, amitriptyline, ramelteon but medications don't keep her asleep.  Patient agreed to retry trazodone and was prescribed 150 mg at bedtime.  Also consider doxepin and belsomra before returning to [facility].  - traZODone (DESYREL) 150 MG Tab; Take 1 Tablet by mouth every evening.  Dispense: 30 Tablet; Refill: 1    3. Acute cough  Acute.  Physical exam was unremarkable today.  Most likely viral URI.  Prescribed Tessalon Perles as needed.  - benzonatate (TESSALON) 100 MG Cap; Take 1-2 Capsules by mouth 3 times a day as needed for Cough.  Dispense: 90 Capsule; Refill: 0    4. Bilateral impacted cerumen  Chronic, uncontrolled.  Ear lavage will be done at the next visit.    5. Irritable bowel syndrome with both constipation and diarrhea  Chronic.  Patient complained of bloating and history of IBS.  Patient is concerned about parasites but that seems unlikely.  Follows up with GI and colonoscopy was last done October 2021.  Continue amitriptyline 10 mg daily and Bentyl as needed.      Return in about 1 month (around 6/10/2024) for Discuss imaging, Discuss medication.    Please note that this dictation was created using voice recognition software. I have made every reasonable attempt to correct obvious errors, but I expect that there are errors of grammar and possibly content that I did not discover before finalizing the note.

## 2024-06-05 ENCOUNTER — OFFICE VISIT (OUTPATIENT)
Dept: MEDICAL GROUP | Facility: PHYSICIAN GROUP | Age: 69
End: 2024-06-05
Payer: MEDICARE

## 2024-06-05 VITALS
SYSTOLIC BLOOD PRESSURE: 132 MMHG | HEART RATE: 73 BPM | DIASTOLIC BLOOD PRESSURE: 78 MMHG | BODY MASS INDEX: 32.49 KG/M2 | WEIGHT: 165.5 LBS | OXYGEN SATURATION: 94 % | TEMPERATURE: 99.1 F | HEIGHT: 60 IN

## 2024-06-05 DIAGNOSIS — M79.672 LEFT FOOT PAIN: ICD-10-CM

## 2024-06-05 DIAGNOSIS — H61.21 IMPACTED CERUMEN, RIGHT EAR: ICD-10-CM

## 2024-06-05 DIAGNOSIS — G47.00 INSOMNIA, PERSISTENT: Chronic | ICD-10-CM

## 2024-06-05 DIAGNOSIS — G47.33 OBSTRUCTIVE SLEEP APNEA: Chronic | ICD-10-CM

## 2024-06-05 PROBLEM — E66.9 OBESITY (BMI 30-39.9): Chronic | Status: ACTIVE | Noted: 2018-03-13

## 2024-06-05 PROCEDURE — 3078F DIAST BP <80 MM HG: CPT | Performed by: STUDENT IN AN ORGANIZED HEALTH CARE EDUCATION/TRAINING PROGRAM

## 2024-06-05 PROCEDURE — 3075F SYST BP GE 130 - 139MM HG: CPT | Performed by: STUDENT IN AN ORGANIZED HEALTH CARE EDUCATION/TRAINING PROGRAM

## 2024-06-05 PROCEDURE — 99214 OFFICE O/P EST MOD 30 MIN: CPT | Performed by: STUDENT IN AN ORGANIZED HEALTH CARE EDUCATION/TRAINING PROGRAM

## 2024-06-05 RX ORDER — MELOXICAM 7.5 MG/1
7.5-15 TABLET ORAL DAILY
Qty: 90 TABLET | Refills: 0 | Status: SHIPPED | OUTPATIENT
Start: 2024-06-05

## 2024-06-05 ASSESSMENT — FIBROSIS 4 INDEX: FIB4 SCORE: 0.88

## 2024-06-05 ASSESSMENT — PATIENT HEALTH QUESTIONNAIRE - PHQ9
1. LITTLE INTEREST OR PLEASURE IN DOING THINGS: NOT AT ALL
2. FEELING DOWN, DEPRESSED, IRRITABLE, OR HOPELESS: NOT AT ALL
SUM OF ALL RESPONSES TO PHQ9 QUESTIONS 1 AND 2: 0

## 2024-06-05 NOTE — PROGRESS NOTES
Subjective:     Chief Complaint   Patient presents with    Medication Refill    Results     xray         History of Present Illness  The patient is a 69-year-old female who presents for evaluation of multiple medical concerns.    The patient was prescribed trazodone at the last visit for sleep and tried 150 mg for 1 week but only slept 4 hours.  Patient was taking lorazepam 1 mg at bedtime for many years and was tapered off.  Patient also tried trazodone 100 mg, ambien, amitriptyline, ramelteon but medications don't keep her asleep.  Her nocturnal routine includes watching television and engaging in games. She tends to fall asleep on the couch for a couple hours before retiring to bed, tossing and turning, and subsequently waking up. A sleep study was recently conducted by Dr. Siddiqui, her pulmonologist, in [place]. She has not been using her BiPAP machine for the past 2 years due to a recall. Initially, the BiPAP machine was effective for the first year, but later became ineffective when she was laying down. Her pulmonologist has informed her that her BiPAP machine, while seated, is functioning adequately.    The patient is considering semaglutide for weight loss, which is procured by a nurse practitioner. She acknowledges to snacking late at night.    The patient underwent an x-ray of her foot and ankle, which revealed arthritis and a heel spur. She is seeking advice on managing her arthritis. She is planning a hunting trip in 09/2024 and expresses concern about potential pain relief. She also experiences locking in her left hip during ambulation.    The patient asked to have her ears checked.      Health Maintenance: Completed    ROS:  Negative except as stated above.      Objective:     Exam:  /78   Pulse 73   Temp 37.3 °C (99.1 °F) (Temporal)   Ht 1.524 m (5')   Wt 75.1 kg (165 lb 8 oz)   SpO2 94%   BMI 32.32 kg/m²  Body mass index is 32.32 kg/m².    Physical Exam    Gen: Alert and oriented, no acute  distress.  ENMT: Cerumen on the right and unable to visualize tympanic membrane.  Tympanic membrane translucent on the left with good landmarks.  Lungs: Normal effort, CTAB, no wheezing / rhonchi / rales.  CV: RRR, normal S1 and S2, no murmurs.      Assessment & Plan:     69 y.o. female with the following -     1. Insomnia, persistent  Chronic, uncontrolled.  We had a lengthy discussion today about sleep hygiene.  Poor sleep is most likely due to untreated SCOTT.    2. Obstructive sleep apnea  Chronic, uncontrolled.  Follows up with pulmonology and records requested.  Patient has been off BiPAP for the past 2 years and reports she will be getting a new one soon.  Patient is unhappy with her current pulmonologist and is requesting a new referral.  - Referral to Pulmonary and Sleep Medicine    3. Left foot pain  Chronic, uncontrolled.  X-ray showed moderate first MTP joint osteoarthrosis and plantar calcaneal spur.  Prescribed Mobic 7.5-15 mg daily as needed.  - meloxicam (MOBIC) 7.5 MG Tab; Take 1-2 Tablets by mouth every day.  Dispense: 90 Tablet; Refill: 0    4. Impacted cerumen, right ear  Chronic.  Advised to use earwax softener.          I spent a total of 35 minutes with record review, exam, communication with the patient, communication with other providers, and documentation of this encounter.      Return in about 3 months (around 9/5/2024) for Follow-up of chronic conditions.    Verbal consent was acquired by the patient to use [place] ambient listening note generation during this visit: Yes .    Please note that this dictation was created using voice recognition software. I have made every reasonable attempt to correct obvious errors, but I expect that there are errors of grammar and possibly content that I did not discover before finalizing the note.

## 2024-06-10 ENCOUNTER — APPOINTMENT (OUTPATIENT)
Dept: MEDICAL GROUP | Facility: PHYSICIAN GROUP | Age: 69
End: 2024-06-10
Payer: MEDICARE

## 2024-06-11 ENCOUNTER — APPOINTMENT (OUTPATIENT)
Dept: MEDICAL GROUP | Facility: PHYSICIAN GROUP | Age: 69
End: 2024-06-11
Payer: MEDICARE

## 2024-07-02 DIAGNOSIS — M79.672 LEFT FOOT PAIN: ICD-10-CM

## 2024-07-03 RX ORDER — MELOXICAM 7.5 MG/1
TABLET ORAL
Qty: 90 TABLET | Refills: 0 | Status: SHIPPED | OUTPATIENT
Start: 2024-07-03

## 2024-07-12 ENCOUNTER — TELEPHONE (OUTPATIENT)
Dept: HEALTH INFORMATION MANAGEMENT | Facility: OTHER | Age: 69
End: 2024-07-12
Payer: MEDICARE

## 2024-09-10 ENCOUNTER — APPOINTMENT (RX ONLY)
Dept: URBAN - METROPOLITAN AREA CLINIC 6 | Facility: CLINIC | Age: 69
Setting detail: DERMATOLOGY
End: 2024-09-10

## 2024-09-10 DIAGNOSIS — Z71.89 OTHER SPECIFIED COUNSELING: ICD-10-CM

## 2024-09-10 DIAGNOSIS — L82.1 OTHER SEBORRHEIC KERATOSIS: ICD-10-CM

## 2024-09-10 DIAGNOSIS — L57.0 ACTINIC KERATOSIS: ICD-10-CM

## 2024-09-10 DIAGNOSIS — L81.4 OTHER MELANIN HYPERPIGMENTATION: ICD-10-CM

## 2024-09-10 DIAGNOSIS — L82.0 INFLAMED SEBORRHEIC KERATOSIS: ICD-10-CM

## 2024-09-10 PROCEDURE — 17000 DESTRUCT PREMALG LESION: CPT | Mod: 59

## 2024-09-10 PROCEDURE — ? COUNSELING

## 2024-09-10 PROCEDURE — ? LIQUID NITROGEN

## 2024-09-10 PROCEDURE — 17110 DESTRUCTION B9 LES UP TO 14: CPT

## 2024-09-10 PROCEDURE — 99213 OFFICE O/P EST LOW 20 MIN: CPT | Mod: 25

## 2024-09-10 PROCEDURE — 17003 DESTRUCT PREMALG LES 2-14: CPT | Mod: 59

## 2024-09-10 PROCEDURE — ? SUNSCREEN RECOMMENDATIONS

## 2024-09-10 ASSESSMENT — LOCATION ZONE DERM
LOCATION ZONE: ARM
LOCATION ZONE: LEG
LOCATION ZONE: EAR
LOCATION ZONE: FACE

## 2024-09-10 ASSESSMENT — LOCATION DETAILED DESCRIPTION DERM
LOCATION DETAILED: LEFT SUPERIOR MEDIAL BUCCAL CHEEK
LOCATION DETAILED: RIGHT PROXIMAL DORSAL FOREARM
LOCATION DETAILED: RIGHT INFERIOR LATERAL MALAR CHEEK
LOCATION DETAILED: LEFT SCAPHA
LOCATION DETAILED: RIGHT ACHILLES SKIN
LOCATION DETAILED: RIGHT SUPERIOR LATERAL MALAR CHEEK
LOCATION DETAILED: LEFT PROXIMAL DORSAL FOREARM
LOCATION DETAILED: LEFT CENTRAL TEMPLE
LOCATION DETAILED: RIGHT PROXIMAL RADIAL DORSAL FOREARM
LOCATION DETAILED: RIGHT INFERIOR CENTRAL MALAR CHEEK
LOCATION DETAILED: LEFT SUPERIOR LATERAL MALAR CHEEK
LOCATION DETAILED: LEFT DISTAL RADIAL DORSAL FOREARM
LOCATION DETAILED: LEFT FOREHEAD
LOCATION DETAILED: LEFT CENTRAL ZYGOMA

## 2024-09-10 ASSESSMENT — LOCATION SIMPLE DESCRIPTION DERM
LOCATION SIMPLE: LEFT CHEEK
LOCATION SIMPLE: LEFT ZYGOMA
LOCATION SIMPLE: LEFT FOREARM
LOCATION SIMPLE: LEFT EAR
LOCATION SIMPLE: LEFT FOREHEAD
LOCATION SIMPLE: RIGHT ACHILLES SKIN
LOCATION SIMPLE: LEFT TEMPLE
LOCATION SIMPLE: RIGHT FOREARM
LOCATION SIMPLE: RIGHT CHEEK

## 2024-09-10 NOTE — PROCEDURE: LIQUID NITROGEN
Consent: The patient's consent was obtained including but not limited to risks of crusting, scabbing, blistering, scarring, darker or lighter pigmentary change, recurrence, incomplete removal and infection.
Post-Care Instructions: I reviewed with the patient in detail post-care instructions. Patient is to wear sunprotection, and avoid picking at any of the treated lesions. Pt may apply Vaseline to crusted or scabbing areas.
Render Post-Care Instructions In Note?: no
Show Applicator Variable?: Yes
Number Of Freeze-Thaw Cycles: 3 freeze-thaw cycles
Detail Level: Detailed
Application Tool (Optional): Liquid Nitrogen Sprayer
Duration Of Freeze Thaw-Cycle (Seconds): 10
Spray Paint Text: The liquid nitrogen was applied to the skin utilizing a spray paint frosting technique.
Medical Necessity Clause: This procedure was medically necessary because the lesions that were treated were:
Medical Necessity Information: It is in your best interest to select a reason for this procedure from the list below. All of these items fulfill various CMS LCD requirements except the new and changing color options.

## 2024-09-11 ENCOUNTER — OFFICE VISIT (OUTPATIENT)
Dept: MEDICAL GROUP | Facility: PHYSICIAN GROUP | Age: 69
End: 2024-09-11
Payer: MEDICARE

## 2024-09-11 VITALS
BODY MASS INDEX: 30.82 KG/M2 | SYSTOLIC BLOOD PRESSURE: 102 MMHG | OXYGEN SATURATION: 98 % | HEIGHT: 60 IN | HEART RATE: 73 BPM | WEIGHT: 157 LBS | DIASTOLIC BLOOD PRESSURE: 56 MMHG | TEMPERATURE: 98.4 F

## 2024-09-11 DIAGNOSIS — E55.9 VITAMIN D DEFICIENCY: Chronic | ICD-10-CM

## 2024-09-11 DIAGNOSIS — Z12.31 ENCOUNTER FOR SCREENING MAMMOGRAM FOR MALIGNANT NEOPLASM OF BREAST: ICD-10-CM

## 2024-09-11 DIAGNOSIS — L03.113 CELLULITIS OF RIGHT UPPER EXTREMITY: ICD-10-CM

## 2024-09-11 DIAGNOSIS — E78.01 FAMILIAL HYPERCHOLESTEREMIA: Chronic | ICD-10-CM

## 2024-09-11 DIAGNOSIS — R73.03 PREDIABETES: Chronic | ICD-10-CM

## 2024-09-11 DIAGNOSIS — E66.9 OBESITY (BMI 30-39.9): Chronic | ICD-10-CM

## 2024-09-11 DIAGNOSIS — G47.33 OBSTRUCTIVE SLEEP APNEA: Chronic | ICD-10-CM

## 2024-09-11 PROCEDURE — 3074F SYST BP LT 130 MM HG: CPT | Performed by: STUDENT IN AN ORGANIZED HEALTH CARE EDUCATION/TRAINING PROGRAM

## 2024-09-11 PROCEDURE — 3078F DIAST BP <80 MM HG: CPT | Performed by: STUDENT IN AN ORGANIZED HEALTH CARE EDUCATION/TRAINING PROGRAM

## 2024-09-11 PROCEDURE — 99214 OFFICE O/P EST MOD 30 MIN: CPT | Performed by: STUDENT IN AN ORGANIZED HEALTH CARE EDUCATION/TRAINING PROGRAM

## 2024-09-11 RX ORDER — TRIAMCINOLONE ACETONIDE 1 MG/G
1 CREAM TOPICAL 2 TIMES DAILY
Qty: 15 G | Refills: 1 | Status: SHIPPED | OUTPATIENT
Start: 2024-09-11

## 2024-09-11 RX ORDER — SULFAMETHOXAZOLE/TRIMETHOPRIM 800-160 MG
1 TABLET ORAL 2 TIMES DAILY
Qty: 14 TABLET | Refills: 0 | Status: SHIPPED | OUTPATIENT
Start: 2024-09-11 | End: 2024-09-18

## 2024-09-11 ASSESSMENT — FIBROSIS 4 INDEX: FIB4 SCORE: 0.88

## 2024-09-11 NOTE — PROGRESS NOTES
Subjective:     Chief Complaint   Patient presents with    Follow-Up     Bite in R arm getting bigger.      History of Present Illness  The patient presents for evaluation of multiple medical concerns.    She reports a suspected spider bite on her right forearm, which she noticed yesterday morning upon waking up. The affected area is itchy and burning and appears to be spreading around her arm. She has applied Neosporin to the area. She confirms that she has no known allergies to antibiotics.    She has been using Ozempic 1 mg for weight management since her last visit three months ago. Her weight at that time was 165 pounds. She reports tolerating the medication well, with only occasional nausea and constipation as side effects. To manage the constipation, she takes MiraLAX daily in her coffee. Weight 157 pounds today and BMI 30.66.    She recently underwent a sleep study and received a new BiPAP machine. However, she continues to experience difficulties, which she suspects may be due to a deviated septum. Despite using nasal pillows and a belt to keep her jaw closed, she believes air is leaking, causing her to wake up with a dry mouth and throat.    She had precancerous lesions burned off her face by her dermatologist.    FAMILY HISTORY  Her mother had breast cancer in her 80s. Last mammogram and ultrasound was Sept/Oct 2022 but patient thinks she had a mammogram last year.      Health Maintenance: Completed    ROS:  Negative except as stated above.      Objective:     Exam:  /56 (BP Location: Right arm, Patient Position: Sitting, BP Cuff Size: Adult)   Pulse 73   Temp 36.9 °C (98.4 °F) (Temporal)   Ht 1.524 m (5')   Wt 71.2 kg (157 lb)   SpO2 98%   BMI 30.66 kg/m²  Body mass index is 30.66 kg/m².    Physical Exam    Gen: Alert and oriented, no acute distress.  Lungs: Normal effort, CTAB, no wheezing / rhonchi / rales.  CV: RRR, normal S1 and S2, no murmurs.  Skin:   See picture below of right forearm.   Area is erythematous, swollen, warm to touch, and tender to palpation.      Picture consent was verbally obtained from the patient.    Assessment & Plan:     69 y.o. female with the following -     1. Cellulitis of right upper extremity  Acute.  Possible bug bite.  Prescribed triamcinolone cream twice daily and bactrim twice daily for 7 days.  - triamcinolone acetonide (KENALOG) 0.1 % Cream; Apply 1 Application topically 2 times a day.  Dispense: 15 g; Refill: 1  - sulfamethoxazole-trimethoprim (BACTRIM DS) 800-160 MG tablet; Take 1 Tablet by mouth 2 times a day for 7 days.  Dispense: 14 Tablet; Refill: 0    2. Vitamin D deficiency disease  Chronic, uncontrolled.  October 2023 vitamin D 27.  Advised daily vitamin D and repeat labs ordered.  - VITAMIN D,25 HYDROXY (DEFICIENCY); Future    3. Obesity (BMI 30-39.9)  Chronic, uncontrolled.  BMI 30.66 and weight 157 pounds today.  Almost 10 pound weight loss over the past 3 months with ozempic.  Continue ozempic 1 mg weekly.  - CBC WITHOUT DIFFERENTIAL; Future  - Comp Metabolic Panel; Future  - Lipid Profile; Future    4. Prediabetes  Chronic, uncontrolled.  November 2023 A1c 6.2.  Continue Ozempic 1 mg weekly.  Repeat labs ordered.  - HEMOGLOBIN A1C; Future    5. Familial hypercholesteremia  Chronic, uncontrolled.  November 2023  and .  Patient is intolerant to statins.  Continue Zetia 10 mg daily.  - Lipid Profile; Future    6. Obstructive sleep apnea  Chronic, stable.  Follows up with pulmonology and records requested again today.  Continue BiPAP.    7. Encounter for screening mammogram for malignant neoplasm of breast  Mammogram ordered and patient will have it done if it was not completed last year.  Family history of breast cancer in her mother in her 80s.  - MA-SCREENING MAMMO BILAT W/TOMOSYNTHESIS W/CAD; Future          Return in about 3 months (around 12/11/2024) for Follow-up of chronic conditions, Discuss labs.    Verbal consent was acquired by  the patient to use [employer] ambient listening note generation during this visit: Yes.    Please note that this dictation was created using voice recognition software. I have made every reasonable attempt to correct obvious errors, but I expect that there are errors of grammar and possibly content that I did not discover before finalizing the note.

## 2024-10-04 DIAGNOSIS — M79.672 LEFT FOOT PAIN: ICD-10-CM

## 2024-10-07 RX ORDER — MELOXICAM 7.5 MG/1
TABLET ORAL
Qty: 90 TABLET | Refills: 0 | Status: SHIPPED | OUTPATIENT
Start: 2024-10-07

## 2024-10-09 DIAGNOSIS — M54.16 LUMBAR RADICULOPATHY, CHRONIC: ICD-10-CM

## 2024-10-11 RX ORDER — BACLOFEN 10 MG/1
10 TABLET ORAL
Qty: 90 TABLET | Refills: 0 | Status: SHIPPED | OUTPATIENT
Start: 2024-10-11

## 2024-10-17 ENCOUNTER — HOSPITAL ENCOUNTER (OUTPATIENT)
Dept: RADIOLOGY | Facility: MEDICAL CENTER | Age: 69
End: 2024-10-17

## 2024-10-17 ENCOUNTER — APPOINTMENT (OUTPATIENT)
Dept: RADIOLOGY | Facility: MEDICAL CENTER | Age: 69
DRG: 280 | End: 2024-10-17
Attending: HOSPITALIST
Payer: MEDICARE

## 2024-10-17 ENCOUNTER — APPOINTMENT (OUTPATIENT)
Dept: CARDIOLOGY | Facility: MEDICAL CENTER | Age: 69
DRG: 280 | End: 2024-10-17
Attending: HOSPITALIST
Payer: MEDICARE

## 2024-10-17 ENCOUNTER — HOSPITAL ENCOUNTER (INPATIENT)
Facility: MEDICAL CENTER | Age: 69
LOS: 3 days | DRG: 280 | End: 2024-10-20
Attending: STUDENT IN AN ORGANIZED HEALTH CARE EDUCATION/TRAINING PROGRAM | Admitting: STUDENT IN AN ORGANIZED HEALTH CARE EDUCATION/TRAINING PROGRAM
Payer: MEDICARE

## 2024-10-17 DIAGNOSIS — I21.4 NSTEMI (NON-ST ELEVATED MYOCARDIAL INFARCTION) (HCC): ICD-10-CM

## 2024-10-17 DIAGNOSIS — J96.01 ACUTE HYPOXEMIC RESPIRATORY FAILURE (HCC): Primary | ICD-10-CM

## 2024-10-17 PROBLEM — N10 ACUTE PYELONEPHRITIS: Status: ACTIVE | Noted: 2024-10-17

## 2024-10-17 PROBLEM — I50.9 DECOMPENSATED HEART FAILURE (HCC): Status: ACTIVE | Noted: 2024-10-17

## 2024-10-17 PROBLEM — M54.50 LOW BACK PAIN: Status: ACTIVE | Noted: 2024-10-17

## 2024-10-17 PROBLEM — F32.A DEPRESSION: Status: ACTIVE | Noted: 2024-10-17

## 2024-10-17 PROBLEM — J81.0 ACUTE PULMONARY EDEMA (HCC): Status: ACTIVE | Noted: 2024-10-17

## 2024-10-17 PROBLEM — R94.31 QT PROLONGATION: Status: ACTIVE | Noted: 2024-10-17

## 2024-10-17 PROBLEM — F41.9 ANXIETY: Status: ACTIVE | Noted: 2024-10-17

## 2024-10-17 LAB
ABO GROUP BLD: NORMAL
ALBUMIN SERPL BCP-MCNC: 3.1 G/DL (ref 3.2–4.9)
ALBUMIN/GLOB SERPL: 1 G/DL
ALP SERPL-CCNC: 103 U/L (ref 30–99)
ALT SERPL-CCNC: 66 U/L (ref 2–50)
ANION GAP SERPL CALC-SCNC: 13 MMOL/L (ref 7–16)
APTT PPP: 36 SEC (ref 24.7–36)
APTT PPP: 64.2 SEC (ref 24.7–36)
AST SERPL-CCNC: 69 U/L (ref 12–45)
BASOPHILS # BLD AUTO: 0.1 % (ref 0–1.8)
BASOPHILS # BLD: 0.01 K/UL (ref 0–0.12)
BILIRUB SERPL-MCNC: 0.4 MG/DL (ref 0.1–1.5)
BLD GP AB SCN SERPL QL: NORMAL
BUN SERPL-MCNC: 9 MG/DL (ref 8–22)
CALCIUM ALBUM COR SERPL-MCNC: 8.7 MG/DL (ref 8.5–10.5)
CALCIUM SERPL-MCNC: 8 MG/DL (ref 8.5–10.5)
CHLORIDE SERPL-SCNC: 98 MMOL/L (ref 96–112)
CO2 SERPL-SCNC: 26 MMOL/L (ref 20–33)
CREAT SERPL-MCNC: 0.95 MG/DL (ref 0.5–1.4)
EKG IMPRESSION: NORMAL
EKG IMPRESSION: NORMAL
EOSINOPHIL # BLD AUTO: 0 K/UL (ref 0–0.51)
EOSINOPHIL NFR BLD: 0 % (ref 0–6.9)
ERYTHROCYTE [DISTWIDTH] IN BLOOD BY AUTOMATED COUNT: 46.3 FL (ref 35.9–50)
GFR SERPLBLD CREATININE-BSD FMLA CKD-EPI: 64 ML/MIN/1.73 M 2
GLOBULIN SER CALC-MCNC: 3.1 G/DL (ref 1.9–3.5)
GLUCOSE SERPL-MCNC: 112 MG/DL (ref 65–99)
HCT VFR BLD AUTO: 37.9 % (ref 37–47)
HGB BLD-MCNC: 12.6 G/DL (ref 12–16)
HOLDING TUBE BB 8507: NORMAL
IMM GRANULOCYTES # BLD AUTO: 0.04 K/UL (ref 0–0.11)
IMM GRANULOCYTES NFR BLD AUTO: 0.5 % (ref 0–0.9)
INR PPP: 0.99 (ref 0.87–1.13)
INR PPP: 1.01 (ref 0.87–1.13)
LACTATE SERPL-SCNC: 1.7 MMOL/L (ref 0.5–2)
LV EJECT FRACT  99904: 28
LV EJECT FRACT MOD 2C 99903: 31.3
LV EJECT FRACT MOD 4C 99902: 32.05
LV EJECT FRACT MOD BP 99901: 32.47
LYMPHOCYTES # BLD AUTO: 0.75 K/UL (ref 1–4.8)
LYMPHOCYTES NFR BLD: 9.1 % (ref 22–41)
MAGNESIUM SERPL-MCNC: 2 MG/DL (ref 1.5–2.5)
MCH RBC QN AUTO: 30.9 PG (ref 27–33)
MCHC RBC AUTO-ENTMCNC: 33.2 G/DL (ref 32.2–35.5)
MCV RBC AUTO: 92.9 FL (ref 81.4–97.8)
MONOCYTES # BLD AUTO: 0.69 K/UL (ref 0–0.85)
MONOCYTES NFR BLD AUTO: 8.4 % (ref 0–13.4)
NEUTROPHILS # BLD AUTO: 6.75 K/UL (ref 1.82–7.42)
NEUTROPHILS NFR BLD: 81.9 % (ref 44–72)
NRBC # BLD AUTO: 0 K/UL
NRBC BLD-RTO: 0 /100 WBC (ref 0–0.2)
PLATELET # BLD AUTO: 280 K/UL (ref 164–446)
PMV BLD AUTO: 9.2 FL (ref 9–12.9)
POTASSIUM SERPL-SCNC: 3.8 MMOL/L (ref 3.6–5.5)
PROT SERPL-MCNC: 6.2 G/DL (ref 6–8.2)
PROTHROMBIN TIME: 13.1 SEC (ref 12–14.6)
PROTHROMBIN TIME: 13.4 SEC (ref 12–14.6)
RBC # BLD AUTO: 4.08 M/UL (ref 4.2–5.4)
RH BLD: NORMAL
SODIUM SERPL-SCNC: 137 MMOL/L (ref 135–145)
TROPONIN T SERPL-MCNC: 277 NG/L (ref 6–19)
UFH PPP CHRO-ACNC: 0.71 IU/ML
UFH PPP CHRO-ACNC: 0.95 IU/ML
WBC # BLD AUTO: 8.2 K/UL (ref 4.8–10.8)

## 2024-10-17 PROCEDURE — 93005 ELECTROCARDIOGRAM TRACING: CPT | Performed by: EMERGENCY MEDICINE

## 2024-10-17 PROCEDURE — A9270 NON-COVERED ITEM OR SERVICE: HCPCS | Performed by: HOSPITALIST

## 2024-10-17 PROCEDURE — 99223 1ST HOSP IP/OBS HIGH 75: CPT | Performed by: HOSPITALIST

## 2024-10-17 PROCEDURE — 700105 HCHG RX REV CODE 258

## 2024-10-17 PROCEDURE — 86900 BLOOD TYPING SEROLOGIC ABO: CPT

## 2024-10-17 PROCEDURE — 700111 HCHG RX REV CODE 636 W/ 250 OVERRIDE (IP): Performed by: EMERGENCY MEDICINE

## 2024-10-17 PROCEDURE — 93010 ELECTROCARDIOGRAM REPORT: CPT | Mod: 59 | Performed by: INTERNAL MEDICINE

## 2024-10-17 PROCEDURE — 700102 HCHG RX REV CODE 250 W/ 637 OVERRIDE(OP): Performed by: HOSPITALIST

## 2024-10-17 PROCEDURE — 700111 HCHG RX REV CODE 636 W/ 250 OVERRIDE (IP): Mod: JZ

## 2024-10-17 PROCEDURE — 84484 ASSAY OF TROPONIN QUANT: CPT

## 2024-10-17 PROCEDURE — 85520 HEPARIN ASSAY: CPT | Mod: 91

## 2024-10-17 PROCEDURE — 83605 ASSAY OF LACTIC ACID: CPT

## 2024-10-17 PROCEDURE — 99291 CRITICAL CARE FIRST HOUR: CPT | Mod: GC | Performed by: EMERGENCY MEDICINE

## 2024-10-17 PROCEDURE — 83735 ASSAY OF MAGNESIUM: CPT

## 2024-10-17 PROCEDURE — A9270 NON-COVERED ITEM OR SERVICE: HCPCS | Performed by: EMERGENCY MEDICINE

## 2024-10-17 PROCEDURE — 86850 RBC ANTIBODY SCREEN: CPT

## 2024-10-17 PROCEDURE — 93306 TTE W/DOPPLER COMPLETE: CPT | Mod: 26 | Performed by: INTERNAL MEDICINE

## 2024-10-17 PROCEDURE — 85610 PROTHROMBIN TIME: CPT

## 2024-10-17 PROCEDURE — 86901 BLOOD TYPING SEROLOGIC RH(D): CPT

## 2024-10-17 PROCEDURE — 700117 HCHG RX CONTRAST REV CODE 255: Performed by: FAMILY MEDICINE

## 2024-10-17 PROCEDURE — 85730 THROMBOPLASTIN TIME PARTIAL: CPT | Mod: 91

## 2024-10-17 PROCEDURE — 93306 TTE W/DOPPLER COMPLETE: CPT

## 2024-10-17 PROCEDURE — 71045 X-RAY EXAM CHEST 1 VIEW: CPT

## 2024-10-17 PROCEDURE — 94640 AIRWAY INHALATION TREATMENT: CPT

## 2024-10-17 PROCEDURE — 700102 HCHG RX REV CODE 250 W/ 637 OVERRIDE(OP): Performed by: EMERGENCY MEDICINE

## 2024-10-17 PROCEDURE — 770000 HCHG ROOM/CARE - INTERMEDIATE ICU *

## 2024-10-17 PROCEDURE — 80053 COMPREHEN METABOLIC PANEL: CPT

## 2024-10-17 PROCEDURE — 85025 COMPLETE CBC W/AUTO DIFF WBC: CPT

## 2024-10-17 RX ORDER — GABAPENTIN 300 MG/1
300 CAPSULE ORAL EVERY EVENING
Status: DISCONTINUED | OUTPATIENT
Start: 2024-10-17 | End: 2024-10-20 | Stop reason: HOSPADM

## 2024-10-17 RX ORDER — ASPIRIN 300 MG/1
300 SUPPOSITORY RECTAL ONCE
Status: DISCONTINUED | OUTPATIENT
Start: 2024-10-17 | End: 2024-10-17

## 2024-10-17 RX ORDER — ASPIRIN 325 MG
325 TABLET ORAL ONCE
Status: DISCONTINUED | OUTPATIENT
Start: 2024-10-17 | End: 2024-10-17

## 2024-10-17 RX ORDER — FUROSEMIDE 10 MG/ML
40 INJECTION INTRAMUSCULAR; INTRAVENOUS ONCE
Status: COMPLETED | OUTPATIENT
Start: 2024-10-17 | End: 2024-10-17

## 2024-10-17 RX ORDER — HYDROMORPHONE HYDROCHLORIDE 1 MG/ML
.2-.5 INJECTION, SOLUTION INTRAMUSCULAR; INTRAVENOUS; SUBCUTANEOUS
Status: DISCONTINUED | OUTPATIENT
Start: 2024-10-17 | End: 2024-10-20 | Stop reason: HOSPADM

## 2024-10-17 RX ORDER — HEPARIN SODIUM 1000 [USP'U]/ML
40 INJECTION, SOLUTION INTRAVENOUS; SUBCUTANEOUS PRN
Status: DISCONTINUED | OUTPATIENT
Start: 2024-10-17 | End: 2024-10-19

## 2024-10-17 RX ORDER — AMITRIPTYLINE HYDROCHLORIDE 10 MG/1
10 TABLET ORAL
Status: DISCONTINUED | OUTPATIENT
Start: 2024-10-17 | End: 2024-10-20 | Stop reason: HOSPADM

## 2024-10-17 RX ORDER — OXYCODONE HYDROCHLORIDE 5 MG/1
5 TABLET ORAL EVERY 4 HOURS PRN
Status: DISCONTINUED | OUTPATIENT
Start: 2024-10-17 | End: 2024-10-20 | Stop reason: HOSPADM

## 2024-10-17 RX ORDER — EZETIMIBE 10 MG/1
10 TABLET ORAL DAILY
Status: DISCONTINUED | OUTPATIENT
Start: 2024-10-17 | End: 2024-10-17

## 2024-10-17 RX ORDER — HEPARIN SODIUM 1000 [USP'U]/ML
60 INJECTION, SOLUTION INTRAVENOUS; SUBCUTANEOUS ONCE
Status: DISCONTINUED | OUTPATIENT
Start: 2024-10-17 | End: 2024-10-17

## 2024-10-17 RX ORDER — HEPARIN SODIUM 1000 [USP'U]/ML
30 INJECTION, SOLUTION INTRAVENOUS; SUBCUTANEOUS PRN
Status: DISCONTINUED | OUTPATIENT
Start: 2024-10-17 | End: 2024-10-17

## 2024-10-17 RX ORDER — BACLOFEN 10 MG/1
10 TABLET ORAL
Status: DISCONTINUED | OUTPATIENT
Start: 2024-10-17 | End: 2024-10-20 | Stop reason: HOSPADM

## 2024-10-17 RX ORDER — HEPARIN SODIUM 5000 [USP'U]/100ML
0-30 INJECTION, SOLUTION INTRAVENOUS CONTINUOUS
Status: DISCONTINUED | OUTPATIENT
Start: 2024-10-17 | End: 2024-10-19

## 2024-10-17 RX ORDER — CITALOPRAM HYDROBROMIDE 20 MG/1
10 TABLET ORAL DAILY
Status: DISCONTINUED | OUTPATIENT
Start: 2024-10-17 | End: 2024-10-20 | Stop reason: HOSPADM

## 2024-10-17 RX ORDER — ONDANSETRON 4 MG/1
4 TABLET, ORALLY DISINTEGRATING ORAL EVERY 6 HOURS PRN
Status: DISCONTINUED | OUTPATIENT
Start: 2024-10-17 | End: 2024-10-17

## 2024-10-17 RX ORDER — ASPIRIN 81 MG/1
324 TABLET, CHEWABLE ORAL ONCE
Status: DISCONTINUED | OUTPATIENT
Start: 2024-10-17 | End: 2024-10-17

## 2024-10-17 RX ORDER — EZETIMIBE 10 MG/1
10 TABLET ORAL
Status: DISCONTINUED | OUTPATIENT
Start: 2024-10-17 | End: 2024-10-20 | Stop reason: HOSPADM

## 2024-10-17 RX ORDER — ACETAMINOPHEN 325 MG/1
650 TABLET ORAL EVERY 6 HOURS PRN
Status: DISCONTINUED | OUTPATIENT
Start: 2024-10-17 | End: 2024-10-20 | Stop reason: HOSPADM

## 2024-10-17 RX ORDER — HYDRALAZINE HYDROCHLORIDE 20 MG/ML
10-20 INJECTION INTRAMUSCULAR; INTRAVENOUS EVERY 4 HOURS PRN
Status: DISCONTINUED | OUTPATIENT
Start: 2024-10-17 | End: 2024-10-20 | Stop reason: HOSPADM

## 2024-10-17 RX ORDER — GABAPENTIN 300 MG/1
300 CAPSULE ORAL 2 TIMES DAILY
Status: DISCONTINUED | OUTPATIENT
Start: 2024-10-17 | End: 2024-10-17

## 2024-10-17 RX ORDER — HEPARIN SODIUM 5000 [USP'U]/100ML
0-30 INJECTION, SOLUTION INTRAVENOUS CONTINUOUS
Status: DISCONTINUED | OUTPATIENT
Start: 2024-10-17 | End: 2024-10-17

## 2024-10-17 RX ORDER — ASPIRIN 81 MG/1
81 TABLET ORAL DAILY
Status: DISCONTINUED | OUTPATIENT
Start: 2024-10-18 | End: 2024-10-20 | Stop reason: HOSPADM

## 2024-10-17 RX ADMIN — BACLOFEN 10 MG: 10 TABLET ORAL at 20:18

## 2024-10-17 RX ADMIN — GABAPENTIN 300 MG: 300 CAPSULE ORAL at 18:17

## 2024-10-17 RX ADMIN — ACETAMINOPHEN 650 MG: 325 TABLET ORAL at 18:19

## 2024-10-17 RX ADMIN — CITALOPRAM HYDROBROMIDE 10 MG: 20 TABLET ORAL at 08:25

## 2024-10-17 RX ADMIN — AMITRIPTYLINE HYDROCHLORIDE 10 MG: 10 TABLET, FILM COATED ORAL at 20:18

## 2024-10-17 RX ADMIN — CEFEPIME 2 G: 2 INJECTION, POWDER, FOR SOLUTION INTRAVENOUS at 08:36

## 2024-10-17 RX ADMIN — HUMAN ALBUMIN MICROSPHERES AND PERFLUTREN 3 ML: 10; .22 INJECTION, SOLUTION INTRAVENOUS at 17:30

## 2024-10-17 RX ADMIN — HEPARIN SODIUM 18 UNITS/KG/HR: 5000 INJECTION, SOLUTION INTRAVENOUS at 09:12

## 2024-10-17 RX ADMIN — OMEPRAZOLE 20 MG: 20 CAPSULE, DELAYED RELEASE ORAL at 08:25

## 2024-10-17 RX ADMIN — FUROSEMIDE 40 MG: 10 INJECTION INTRAMUSCULAR; INTRAVENOUS at 08:25

## 2024-10-17 RX ADMIN — CEFEPIME 2 G: 2 INJECTION, POWDER, FOR SOLUTION INTRAVENOUS at 18:16

## 2024-10-17 RX ADMIN — EZETIMIBE 10 MG: 10 TABLET ORAL at 20:18

## 2024-10-17 SDOH — ECONOMIC STABILITY: TRANSPORTATION INSECURITY
IN THE PAST 12 MONTHS, HAS LACK OF RELIABLE TRANSPORTATION KEPT YOU FROM MEDICAL APPOINTMENTS, MEETINGS, WORK OR FROM GETTING THINGS NEEDED FOR DAILY LIVING?: NO

## 2024-10-17 SDOH — ECONOMIC STABILITY: TRANSPORTATION INSECURITY
IN THE PAST 12 MONTHS, HAS THE LACK OF TRANSPORTATION KEPT YOU FROM MEDICAL APPOINTMENTS OR FROM GETTING MEDICATIONS?: NO

## 2024-10-17 ASSESSMENT — ENCOUNTER SYMPTOMS
CONSTITUTIONAL NEGATIVE: 1
EYES NEGATIVE: 1
DEPRESSION: 1
DIZZINESS: 0
SHORTNESS OF BREATH: 1
HALLUCINATIONS: 0
MYALGIAS: 1
FOCAL WEAKNESS: 0
ABDOMINAL PAIN: 1

## 2024-10-17 ASSESSMENT — COGNITIVE AND FUNCTIONAL STATUS - GENERAL
CLIMB 3 TO 5 STEPS WITH RAILING: A LITTLE
SUGGESTED CMS G CODE MODIFIER DAILY ACTIVITY: CJ
MOVING TO AND FROM BED TO CHAIR: A LITTLE
SUGGESTED CMS G CODE MODIFIER MOBILITY: CJ
DAILY ACTIVITIY SCORE: 22
TOILETING: A LITTLE
MOBILITY SCORE: 20
WALKING IN HOSPITAL ROOM: A LITTLE
STANDING UP FROM CHAIR USING ARMS: A LITTLE
HELP NEEDED FOR BATHING: A LITTLE

## 2024-10-17 ASSESSMENT — LIFESTYLE VARIABLES
SUBSTANCE_ABUSE: 0
HOW MANY TIMES IN THE PAST YEAR HAVE YOU HAD 5 OR MORE DRINKS IN A DAY: 0
HAVE YOU EVER FELT YOU SHOULD CUT DOWN ON YOUR DRINKING: NO
HAVE PEOPLE ANNOYED YOU BY CRITICIZING YOUR DRINKING: NO
ON A TYPICAL DAY WHEN YOU DRINK ALCOHOL HOW MANY DRINKS DO YOU HAVE: 0
AVERAGE NUMBER OF DAYS PER WEEK YOU HAVE A DRINK CONTAINING ALCOHOL: 0
TOTAL SCORE: 0
CONSUMPTION TOTAL: NEGATIVE
EVER FELT BAD OR GUILTY ABOUT YOUR DRINKING: NO
EVER HAD A DRINK FIRST THING IN THE MORNING TO STEADY YOUR NERVES TO GET RID OF A HANGOVER: NO
ALCOHOL_USE: NO
DOES PATIENT WANT TO STOP DRINKING: NO

## 2024-10-17 ASSESSMENT — SOCIAL DETERMINANTS OF HEALTH (SDOH)
IN THE PAST 12 MONTHS, HAS THE ELECTRIC, GAS, OIL, OR WATER COMPANY THREATENED TO SHUT OFF SERVICE IN YOUR HOME?: NO
WITHIN THE LAST YEAR, HAVE YOU BEEN AFRAID OF YOUR PARTNER OR EX-PARTNER?: NO
WITHIN THE LAST YEAR, HAVE TO BEEN RAPED OR FORCED TO HAVE ANY KIND OF SEXUAL ACTIVITY BY YOUR PARTNER OR EX-PARTNER?: NO
WITHIN THE PAST 12 MONTHS, THE FOOD YOU BOUGHT JUST DIDN'T LAST AND YOU DIDN'T HAVE MONEY TO GET MORE: NEVER TRUE
WITHIN THE LAST YEAR, HAVE YOU BEEN HUMILIATED OR EMOTIONALLY ABUSED IN OTHER WAYS BY YOUR PARTNER OR EX-PARTNER?: NO
WITHIN THE PAST 12 MONTHS, YOU WORRIED THAT YOUR FOOD WOULD RUN OUT BEFORE YOU GOT THE MONEY TO BUY MORE: NEVER TRUE
WITHIN THE LAST YEAR, HAVE YOU BEEN KICKED, HIT, SLAPPED, OR OTHERWISE PHYSICALLY HURT BY YOUR PARTNER OR EX-PARTNER?: NO

## 2024-10-17 ASSESSMENT — COPD QUESTIONNAIRES
COPD SCREENING SCORE: 2
HAVE YOU SMOKED AT LEAST 100 CIGARETTES IN YOUR ENTIRE LIFE: NO/DON'T KNOW
DO YOU EVER COUGH UP ANY MUCUS OR PHLEGM?: NO/ONLY WITH OCCASIONAL COLDS OR INFECTIONS
DURING THE PAST 4 WEEKS HOW MUCH DID YOU FEEL SHORT OF BREATH: NONE/LITTLE OF THE TIME

## 2024-10-17 ASSESSMENT — PAIN DESCRIPTION - PAIN TYPE: TYPE: ACUTE PAIN

## 2024-10-17 ASSESSMENT — FIBROSIS 4 INDEX: FIB4 SCORE: 0.88

## 2024-10-18 LAB
ANION GAP SERPL CALC-SCNC: 9 MMOL/L (ref 7–16)
BUN SERPL-MCNC: 10 MG/DL (ref 8–22)
CALCIUM SERPL-MCNC: 8.4 MG/DL (ref 8.5–10.5)
CHLORIDE SERPL-SCNC: 96 MMOL/L (ref 96–112)
CHOLEST SERPL-MCNC: 146 MG/DL (ref 100–199)
CO2 SERPL-SCNC: 29 MMOL/L (ref 20–33)
CREAT SERPL-MCNC: 0.76 MG/DL (ref 0.5–1.4)
GFR SERPLBLD CREATININE-BSD FMLA CKD-EPI: 83 ML/MIN/1.73 M 2
GLUCOSE SERPL-MCNC: 103 MG/DL (ref 65–99)
HDLC SERPL-MCNC: 33 MG/DL
LDLC SERPL CALC-MCNC: 73 MG/DL
POTASSIUM SERPL-SCNC: 3.2 MMOL/L (ref 3.6–5.5)
SODIUM SERPL-SCNC: 134 MMOL/L (ref 135–145)
TRIGL SERPL-MCNC: 202 MG/DL (ref 0–149)
TROPONIN T SERPL-MCNC: 115 NG/L (ref 6–19)
UFH PPP CHRO-ACNC: 0.39 IU/ML
UFH PPP CHRO-ACNC: 0.49 IU/ML

## 2024-10-18 PROCEDURE — 94640 AIRWAY INHALATION TREATMENT: CPT

## 2024-10-18 PROCEDURE — A9270 NON-COVERED ITEM OR SERVICE: HCPCS | Performed by: INTERNAL MEDICINE

## 2024-10-18 PROCEDURE — 700102 HCHG RX REV CODE 250 W/ 637 OVERRIDE(OP): Performed by: INTERNAL MEDICINE

## 2024-10-18 PROCEDURE — 80061 LIPID PANEL: CPT

## 2024-10-18 PROCEDURE — 99291 CRITICAL CARE FIRST HOUR: CPT | Performed by: HOSPITALIST

## 2024-10-18 PROCEDURE — 84484 ASSAY OF TROPONIN QUANT: CPT

## 2024-10-18 PROCEDURE — 80048 BASIC METABOLIC PNL TOTAL CA: CPT

## 2024-10-18 PROCEDURE — 700102 HCHG RX REV CODE 250 W/ 637 OVERRIDE(OP): Performed by: HOSPITALIST

## 2024-10-18 PROCEDURE — 770000 HCHG ROOM/CARE - INTERMEDIATE ICU *

## 2024-10-18 PROCEDURE — 99223 1ST HOSP IP/OBS HIGH 75: CPT | Performed by: INTERNAL MEDICINE

## 2024-10-18 PROCEDURE — 700111 HCHG RX REV CODE 636 W/ 250 OVERRIDE (IP): Mod: JZ | Performed by: EMERGENCY MEDICINE

## 2024-10-18 PROCEDURE — 700111 HCHG RX REV CODE 636 W/ 250 OVERRIDE (IP): Mod: JZ | Performed by: HOSPITALIST

## 2024-10-18 PROCEDURE — 700102 HCHG RX REV CODE 250 W/ 637 OVERRIDE(OP): Performed by: EMERGENCY MEDICINE

## 2024-10-18 PROCEDURE — 85520 HEPARIN ASSAY: CPT

## 2024-10-18 PROCEDURE — 700111 HCHG RX REV CODE 636 W/ 250 OVERRIDE (IP): Mod: JZ

## 2024-10-18 PROCEDURE — A9270 NON-COVERED ITEM OR SERVICE: HCPCS | Performed by: HOSPITALIST

## 2024-10-18 PROCEDURE — 700105 HCHG RX REV CODE 258

## 2024-10-18 PROCEDURE — A9270 NON-COVERED ITEM OR SERVICE: HCPCS | Performed by: EMERGENCY MEDICINE

## 2024-10-18 RX ORDER — SPIRONOLACTONE 25 MG/1
25 TABLET ORAL
Status: DISCONTINUED | OUTPATIENT
Start: 2024-10-19 | End: 2024-10-20 | Stop reason: HOSPADM

## 2024-10-18 RX ORDER — BISOPROLOL FUMARATE 5 MG/1
5 TABLET, FILM COATED ORAL
Status: DISCONTINUED | OUTPATIENT
Start: 2024-10-19 | End: 2024-10-20 | Stop reason: HOSPADM

## 2024-10-18 RX ORDER — POTASSIUM CHLORIDE 1500 MG/1
40 TABLET, EXTENDED RELEASE ORAL 2 TIMES DAILY
Status: DISCONTINUED | OUTPATIENT
Start: 2024-10-18 | End: 2024-10-20 | Stop reason: HOSPADM

## 2024-10-18 RX ORDER — ATORVASTATIN CALCIUM 40 MG/1
40 TABLET, FILM COATED ORAL EVERY EVENING
Status: DISCONTINUED | OUTPATIENT
Start: 2024-10-18 | End: 2024-10-20 | Stop reason: HOSPADM

## 2024-10-18 RX ORDER — BISOPROLOL FUMARATE 5 MG/1
5 TABLET, FILM COATED ORAL
Status: DISCONTINUED | OUTPATIENT
Start: 2024-10-18 | End: 2024-10-18

## 2024-10-18 RX ORDER — FUROSEMIDE 10 MG/ML
40 INJECTION INTRAMUSCULAR; INTRAVENOUS
Status: DISCONTINUED | OUTPATIENT
Start: 2024-10-18 | End: 2024-10-20 | Stop reason: HOSPADM

## 2024-10-18 RX ADMIN — HEPARIN SODIUM 16 UNITS/KG/HR: 5000 INJECTION, SOLUTION INTRAVENOUS at 11:13

## 2024-10-18 RX ADMIN — BACLOFEN 10 MG: 10 TABLET ORAL at 21:35

## 2024-10-18 RX ADMIN — FUROSEMIDE 40 MG: 10 INJECTION, SOLUTION INTRAVENOUS at 15:39

## 2024-10-18 RX ADMIN — CEFEPIME 2 G: 2 INJECTION, POWDER, FOR SOLUTION INTRAVENOUS at 05:34

## 2024-10-18 RX ADMIN — EZETIMIBE 10 MG: 10 TABLET ORAL at 21:35

## 2024-10-18 RX ADMIN — SACUBITRIL AND VALSARTAN 1 TABLET: 24; 26 TABLET, FILM COATED ORAL at 17:27

## 2024-10-18 RX ADMIN — ASPIRIN 81 MG: 81 TABLET, COATED ORAL at 05:28

## 2024-10-18 RX ADMIN — OMEPRAZOLE 20 MG: 20 CAPSULE, DELAYED RELEASE ORAL at 05:27

## 2024-10-18 RX ADMIN — GABAPENTIN 300 MG: 300 CAPSULE ORAL at 21:41

## 2024-10-18 RX ADMIN — AMITRIPTYLINE HYDROCHLORIDE 10 MG: 10 TABLET, FILM COATED ORAL at 21:36

## 2024-10-18 RX ADMIN — FUROSEMIDE 40 MG: 10 INJECTION, SOLUTION INTRAVENOUS at 07:54

## 2024-10-18 RX ADMIN — ATORVASTATIN CALCIUM 40 MG: 40 TABLET, FILM COATED ORAL at 17:27

## 2024-10-18 RX ADMIN — POTASSIUM CHLORIDE 40 MEQ: 1500 TABLET, EXTENDED RELEASE ORAL at 07:54

## 2024-10-18 RX ADMIN — POTASSIUM CHLORIDE 40 MEQ: 1500 TABLET, EXTENDED RELEASE ORAL at 17:27

## 2024-10-18 RX ADMIN — ACETAMINOPHEN 650 MG: 325 TABLET ORAL at 21:35

## 2024-10-18 RX ADMIN — CITALOPRAM HYDROBROMIDE 10 MG: 20 TABLET ORAL at 05:27

## 2024-10-18 ASSESSMENT — ENCOUNTER SYMPTOMS
VOMITING: 0
SHORTNESS OF BREATH: 1
CHILLS: 0
FEVER: 0
NAUSEA: 0

## 2024-10-18 ASSESSMENT — PAIN DESCRIPTION - PAIN TYPE
TYPE: ACUTE PAIN

## 2024-10-19 LAB
ANION GAP SERPL CALC-SCNC: 14 MMOL/L (ref 7–16)
BUN SERPL-MCNC: 11 MG/DL (ref 8–22)
CALCIUM SERPL-MCNC: 8.6 MG/DL (ref 8.5–10.5)
CHLORIDE SERPL-SCNC: 95 MMOL/L (ref 96–112)
CO2 SERPL-SCNC: 28 MMOL/L (ref 20–33)
CREAT SERPL-MCNC: 0.58 MG/DL (ref 0.5–1.4)
GFR SERPLBLD CREATININE-BSD FMLA CKD-EPI: 96 ML/MIN/1.73 M 2
GLUCOSE SERPL-MCNC: 93 MG/DL (ref 65–99)
POTASSIUM SERPL-SCNC: 4.4 MMOL/L (ref 3.6–5.5)
SODIUM SERPL-SCNC: 137 MMOL/L (ref 135–145)
UFH PPP CHRO-ACNC: 0.18 IU/ML

## 2024-10-19 PROCEDURE — 99233 SBSQ HOSP IP/OBS HIGH 50: CPT | Performed by: HOSPITALIST

## 2024-10-19 PROCEDURE — 99232 SBSQ HOSP IP/OBS MODERATE 35: CPT | Performed by: INTERNAL MEDICINE

## 2024-10-19 PROCEDURE — 700102 HCHG RX REV CODE 250 W/ 637 OVERRIDE(OP): Performed by: HOSPITALIST

## 2024-10-19 PROCEDURE — 700102 HCHG RX REV CODE 250 W/ 637 OVERRIDE(OP): Performed by: EMERGENCY MEDICINE

## 2024-10-19 PROCEDURE — 85520 HEPARIN ASSAY: CPT

## 2024-10-19 PROCEDURE — 700111 HCHG RX REV CODE 636 W/ 250 OVERRIDE (IP): Performed by: EMERGENCY MEDICINE

## 2024-10-19 PROCEDURE — 770020 HCHG ROOM/CARE - TELE (206)

## 2024-10-19 PROCEDURE — 700102 HCHG RX REV CODE 250 W/ 637 OVERRIDE(OP): Performed by: INTERNAL MEDICINE

## 2024-10-19 PROCEDURE — A9270 NON-COVERED ITEM OR SERVICE: HCPCS | Performed by: HOSPITALIST

## 2024-10-19 PROCEDURE — 80048 BASIC METABOLIC PNL TOTAL CA: CPT

## 2024-10-19 PROCEDURE — 94640 AIRWAY INHALATION TREATMENT: CPT

## 2024-10-19 PROCEDURE — 97162 PT EVAL MOD COMPLEX 30 MIN: CPT

## 2024-10-19 PROCEDURE — 700111 HCHG RX REV CODE 636 W/ 250 OVERRIDE (IP): Performed by: HOSPITALIST

## 2024-10-19 PROCEDURE — A9270 NON-COVERED ITEM OR SERVICE: HCPCS | Performed by: INTERNAL MEDICINE

## 2024-10-19 PROCEDURE — 94660 CPAP INITIATION&MGMT: CPT

## 2024-10-19 PROCEDURE — A9270 NON-COVERED ITEM OR SERVICE: HCPCS | Performed by: EMERGENCY MEDICINE

## 2024-10-19 RX ORDER — NITROGLYCERIN 0.4 MG/1
0.4 TABLET SUBLINGUAL PRN
Status: DISCONTINUED | OUTPATIENT
Start: 2024-10-19 | End: 2024-10-20 | Stop reason: HOSPADM

## 2024-10-19 RX ADMIN — FUROSEMIDE 40 MG: 10 INJECTION, SOLUTION INTRAVENOUS at 17:15

## 2024-10-19 RX ADMIN — OMEPRAZOLE 20 MG: 20 CAPSULE, DELAYED RELEASE ORAL at 06:00

## 2024-10-19 RX ADMIN — ASPIRIN 81 MG: 81 TABLET, COATED ORAL at 06:19

## 2024-10-19 RX ADMIN — CITALOPRAM HYDROBROMIDE 10 MG: 20 TABLET ORAL at 06:18

## 2024-10-19 RX ADMIN — RIVAROXABAN 10 MG: 10 TABLET, FILM COATED ORAL at 17:14

## 2024-10-19 RX ADMIN — SPIRONOLACTONE 25 MG: 25 TABLET ORAL at 06:18

## 2024-10-19 RX ADMIN — AMITRIPTYLINE HYDROCHLORIDE 10 MG: 10 TABLET, FILM COATED ORAL at 22:27

## 2024-10-19 RX ADMIN — FUROSEMIDE 40 MG: 10 INJECTION, SOLUTION INTRAVENOUS at 06:17

## 2024-10-19 RX ADMIN — HEPARIN SODIUM 2300 UNITS: 1000 INJECTION, SOLUTION INTRAVENOUS; SUBCUTANEOUS at 02:38

## 2024-10-19 RX ADMIN — GABAPENTIN 300 MG: 300 CAPSULE ORAL at 17:15

## 2024-10-19 RX ADMIN — BISOPROLOL FUMARATE 5 MG: 5 TABLET ORAL at 06:21

## 2024-10-19 RX ADMIN — POTASSIUM CHLORIDE 40 MEQ: 1500 TABLET, EXTENDED RELEASE ORAL at 06:19

## 2024-10-19 RX ADMIN — SACUBITRIL AND VALSARTAN 1 TABLET: 24; 26 TABLET, FILM COATED ORAL at 17:14

## 2024-10-19 RX ADMIN — SACUBITRIL AND VALSARTAN 1 TABLET: 24; 26 TABLET, FILM COATED ORAL at 06:21

## 2024-10-19 RX ADMIN — BACLOFEN 10 MG: 10 TABLET ORAL at 22:27

## 2024-10-19 RX ADMIN — EZETIMIBE 10 MG: 10 TABLET ORAL at 22:26

## 2024-10-19 RX ADMIN — POTASSIUM CHLORIDE 40 MEQ: 1500 TABLET, EXTENDED RELEASE ORAL at 17:15

## 2024-10-19 ASSESSMENT — ENCOUNTER SYMPTOMS
NAUSEA: 0
SORE THROAT: 0
DIZZINESS: 0
ABDOMINAL PAIN: 0
LOSS OF CONSCIOUSNESS: 0
FEVER: 0
VOMITING: 0
SHORTNESS OF BREATH: 1
NAUSEA: 1
CHILLS: 0

## 2024-10-19 ASSESSMENT — GAIT ASSESSMENTS
DISTANCE (FEET): 350
DEVIATION: DECREASED HEEL STRIKE;DECREASED TOE OFF
GAIT LEVEL OF ASSIST: SUPERVISED

## 2024-10-19 ASSESSMENT — PAIN DESCRIPTION - PAIN TYPE
TYPE: ACUTE PAIN
TYPE: ACUTE PAIN

## 2024-10-19 ASSESSMENT — COGNITIVE AND FUNCTIONAL STATUS - GENERAL
CLIMB 3 TO 5 STEPS WITH RAILING: A LITTLE
MOBILITY SCORE: 23
SUGGESTED CMS G CODE MODIFIER MOBILITY: CI

## 2024-10-19 ASSESSMENT — FIBROSIS 4 INDEX
FIB4 SCORE: 2.15
FIB4 SCORE: 2.15

## 2024-10-20 ENCOUNTER — APPOINTMENT (OUTPATIENT)
Dept: RADIOLOGY | Facility: MEDICAL CENTER | Age: 69
DRG: 280 | End: 2024-10-20
Attending: NURSE PRACTITIONER
Payer: MEDICARE

## 2024-10-20 ENCOUNTER — PHARMACY VISIT (OUTPATIENT)
Dept: PHARMACY | Facility: MEDICAL CENTER | Age: 69
End: 2024-10-20
Payer: COMMERCIAL

## 2024-10-20 VITALS
SYSTOLIC BLOOD PRESSURE: 120 MMHG | OXYGEN SATURATION: 92 % | TEMPERATURE: 97.3 F | WEIGHT: 150.57 LBS | DIASTOLIC BLOOD PRESSURE: 75 MMHG | BODY MASS INDEX: 29.56 KG/M2 | HEIGHT: 60 IN | HEART RATE: 76 BPM | RESPIRATION RATE: 15 BRPM

## 2024-10-20 LAB
ANION GAP SERPL CALC-SCNC: 8 MMOL/L (ref 7–16)
BUN SERPL-MCNC: 21 MG/DL (ref 8–22)
CALCIUM SERPL-MCNC: 9.2 MG/DL (ref 8.5–10.5)
CHLORIDE SERPL-SCNC: 98 MMOL/L (ref 96–112)
CO2 SERPL-SCNC: 30 MMOL/L (ref 20–33)
CREAT SERPL-MCNC: 0.99 MG/DL (ref 0.5–1.4)
GFR SERPLBLD CREATININE-BSD FMLA CKD-EPI: 61 ML/MIN/1.73 M 2
GLUCOSE SERPL-MCNC: 95 MG/DL (ref 65–99)
POTASSIUM SERPL-SCNC: 4.4 MMOL/L (ref 3.6–5.5)
SODIUM SERPL-SCNC: 136 MMOL/L (ref 135–145)

## 2024-10-20 PROCEDURE — 36415 COLL VENOUS BLD VENIPUNCTURE: CPT

## 2024-10-20 PROCEDURE — A9270 NON-COVERED ITEM OR SERVICE: HCPCS | Performed by: INTERNAL MEDICINE

## 2024-10-20 PROCEDURE — 75574 CT ANGIO HRT W/3D IMAGE: CPT

## 2024-10-20 PROCEDURE — 99239 HOSP IP/OBS DSCHRG MGMT >30: CPT | Performed by: HOSPITALIST

## 2024-10-20 PROCEDURE — A9270 NON-COVERED ITEM OR SERVICE: HCPCS | Performed by: EMERGENCY MEDICINE

## 2024-10-20 PROCEDURE — 700111 HCHG RX REV CODE 636 W/ 250 OVERRIDE (IP): Performed by: HOSPITALIST

## 2024-10-20 PROCEDURE — 700102 HCHG RX REV CODE 250 W/ 637 OVERRIDE(OP): Mod: JZ | Performed by: HOSPITALIST

## 2024-10-20 PROCEDURE — RXMED WILLOW AMBULATORY MEDICATION CHARGE: Performed by: HOSPITALIST

## 2024-10-20 PROCEDURE — A9270 NON-COVERED ITEM OR SERVICE: HCPCS | Mod: JZ | Performed by: HOSPITALIST

## 2024-10-20 PROCEDURE — 700117 HCHG RX CONTRAST REV CODE 255: Performed by: NURSE PRACTITIONER

## 2024-10-20 PROCEDURE — 700102 HCHG RX REV CODE 250 W/ 637 OVERRIDE(OP): Performed by: NURSE PRACTITIONER

## 2024-10-20 PROCEDURE — 94660 CPAP INITIATION&MGMT: CPT

## 2024-10-20 PROCEDURE — 700102 HCHG RX REV CODE 250 W/ 637 OVERRIDE(OP): Performed by: INTERNAL MEDICINE

## 2024-10-20 PROCEDURE — 700102 HCHG RX REV CODE 250 W/ 637 OVERRIDE(OP): Performed by: EMERGENCY MEDICINE

## 2024-10-20 PROCEDURE — 80048 BASIC METABOLIC PNL TOTAL CA: CPT

## 2024-10-20 PROCEDURE — A9270 NON-COVERED ITEM OR SERVICE: HCPCS | Performed by: NURSE PRACTITIONER

## 2024-10-20 RX ORDER — OXYMETAZOLINE HYDROCHLORIDE 0.05 G/100ML
2 SPRAY NASAL 2 TIMES DAILY
Status: DISCONTINUED | OUTPATIENT
Start: 2024-10-20 | End: 2024-10-20 | Stop reason: HOSPADM

## 2024-10-20 RX ORDER — OXYMETAZOLINE HYDROCHLORIDE 0.05 G/100ML
2 SPRAY NASAL 2 TIMES DAILY
Status: DISCONTINUED | OUTPATIENT
Start: 2024-10-20 | End: 2024-10-20

## 2024-10-20 RX ORDER — SPIRONOLACTONE 25 MG/1
25 TABLET ORAL DAILY
Qty: 30 TABLET | Refills: 3 | Status: SHIPPED | OUTPATIENT
Start: 2024-10-21 | End: 2024-10-30 | Stop reason: SDUPTHER

## 2024-10-20 RX ORDER — ASPIRIN 81 MG/1
81 TABLET ORAL DAILY
Qty: 100 TABLET | Refills: 2 | Status: SHIPPED | OUTPATIENT
Start: 2024-10-21

## 2024-10-20 RX ORDER — FUROSEMIDE 20 MG/1
20 TABLET ORAL DAILY
Qty: 30 TABLET | Refills: 2 | Status: SHIPPED | OUTPATIENT
Start: 2024-10-20 | End: 2024-10-30 | Stop reason: SDUPTHER

## 2024-10-20 RX ORDER — BISOPROLOL FUMARATE 5 MG/1
5 TABLET, FILM COATED ORAL DAILY
Qty: 30 TABLET | Refills: 5 | Status: SHIPPED | OUTPATIENT
Start: 2024-10-21 | End: 2024-10-30 | Stop reason: SDUPTHER

## 2024-10-20 RX ADMIN — FUROSEMIDE 40 MG: 10 INJECTION, SOLUTION INTRAVENOUS at 06:00

## 2024-10-20 RX ADMIN — BISOPROLOL FUMARATE 5 MG: 5 TABLET ORAL at 05:45

## 2024-10-20 RX ADMIN — OXYMETAZOLINE HYDROCHLORIDE 2 SPRAY: 0.5 SPRAY NASAL at 14:48

## 2024-10-20 RX ADMIN — OMEPRAZOLE 20 MG: 20 CAPSULE, DELAYED RELEASE ORAL at 05:45

## 2024-10-20 RX ADMIN — SACUBITRIL AND VALSARTAN 1 TABLET: 24; 26 TABLET, FILM COATED ORAL at 05:45

## 2024-10-20 RX ADMIN — NITROGLYCERIN 0.4 MG: 0.4 TABLET SUBLINGUAL at 08:11

## 2024-10-20 RX ADMIN — IOHEXOL 60 ML: 350 INJECTION, SOLUTION INTRAVENOUS at 08:20

## 2024-10-20 RX ADMIN — POTASSIUM CHLORIDE 40 MEQ: 1500 TABLET, EXTENDED RELEASE ORAL at 05:45

## 2024-10-20 RX ADMIN — ASPIRIN 81 MG: 81 TABLET, COATED ORAL at 05:45

## 2024-10-20 RX ADMIN — CITALOPRAM HYDROBROMIDE 10 MG: 20 TABLET ORAL at 05:45

## 2024-10-20 RX ADMIN — SPIRONOLACTONE 25 MG: 25 TABLET ORAL at 05:45

## 2024-10-20 ASSESSMENT — PAIN DESCRIPTION - PAIN TYPE: TYPE: ACUTE PAIN

## 2024-10-20 ASSESSMENT — ENCOUNTER SYMPTOMS
NAUSEA: 0
ALLERGIC/IMMUNOLOGIC NEGATIVE: 1
CONSTITUTIONAL NEGATIVE: 1
COUGH: 0
FATIGUE: 0
PSYCHIATRIC NEGATIVE: 1
CHEST TIGHTNESS: 0
DIZZINESS: 0
NEUROLOGICAL NEGATIVE: 1
DIAPHORESIS: 0
LIGHT-HEADEDNESS: 0
MUSCULOSKELETAL NEGATIVE: 1
HEMATOLOGIC/LYMPHATIC NEGATIVE: 1
VOMITING: 0
CHILLS: 0
DIARRHEA: 0
CONSTIPATION: 0
CARDIOVASCULAR NEGATIVE: 1
SHORTNESS OF BREATH: 0
GASTROINTESTINAL NEGATIVE: 1
PALPITATIONS: 0
FEVER: 0
RESPIRATORY NEGATIVE: 1
BRUISES/BLEEDS EASILY: 0
ENDOCRINE NEGATIVE: 1
EYES NEGATIVE: 1

## 2024-10-20 ASSESSMENT — PATIENT HEALTH QUESTIONNAIRE - PHQ9
1. LITTLE INTEREST OR PLEASURE IN DOING THINGS: NOT AT ALL
SUM OF ALL RESPONSES TO PHQ9 QUESTIONS 1 AND 2: 0

## 2024-10-20 ASSESSMENT — FIBROSIS 4 INDEX: FIB4 SCORE: 2.15

## 2024-10-21 ENCOUNTER — PATIENT OUTREACH (OUTPATIENT)
Dept: MEDICAL GROUP | Facility: PHYSICIAN GROUP | Age: 69
End: 2024-10-21
Payer: MEDICARE

## 2024-10-21 ENCOUNTER — TELEPHONE (OUTPATIENT)
Dept: HEALTH INFORMATION MANAGEMENT | Facility: OTHER | Age: 69
End: 2024-10-21
Payer: MEDICARE

## 2024-10-30 ENCOUNTER — TELEPHONE (OUTPATIENT)
Dept: CARDIOLOGY | Facility: MEDICAL CENTER | Age: 69
End: 2024-10-30

## 2024-10-30 ENCOUNTER — OFFICE VISIT (OUTPATIENT)
Dept: CARDIOLOGY | Facility: MEDICAL CENTER | Age: 69
End: 2024-10-30
Payer: MEDICARE

## 2024-10-30 VITALS
RESPIRATION RATE: 16 BRPM | OXYGEN SATURATION: 95 % | BODY MASS INDEX: 28.86 KG/M2 | DIASTOLIC BLOOD PRESSURE: 64 MMHG | HEIGHT: 60 IN | HEART RATE: 76 BPM | WEIGHT: 147 LBS | SYSTOLIC BLOOD PRESSURE: 94 MMHG

## 2024-10-30 DIAGNOSIS — I25.10 CORONARY ARTERY DISEASE INVOLVING NATIVE CORONARY ARTERY OF NATIVE HEART WITHOUT ANGINA PECTORIS: ICD-10-CM

## 2024-10-30 DIAGNOSIS — E78.2 MIXED HYPERLIPIDEMIA: ICD-10-CM

## 2024-10-30 DIAGNOSIS — R73.03 PREDIABETES: Chronic | ICD-10-CM

## 2024-10-30 DIAGNOSIS — E78.01 FAMILIAL HYPERCHOLESTEREMIA: Chronic | ICD-10-CM

## 2024-10-30 DIAGNOSIS — I70.0 ATHEROSCLEROSIS OF AORTA (HCC): Chronic | ICD-10-CM

## 2024-10-30 DIAGNOSIS — I50.20 HFREF (HEART FAILURE WITH REDUCED EJECTION FRACTION) (HCC): ICD-10-CM

## 2024-10-30 PROCEDURE — 99213 OFFICE O/P EST LOW 20 MIN: CPT

## 2024-10-30 RX ORDER — PREDNISOLONE ACETATE 10 MG/ML
SUSPENSION/ DROPS OPHTHALMIC
COMMUNITY
Start: 2024-08-21 | End: 2024-10-30

## 2024-10-30 RX ORDER — ERYTHROMYCIN 5 MG/G
OINTMENT OPHTHALMIC
COMMUNITY
Start: 2024-08-30 | End: 2024-10-30

## 2024-10-30 RX ORDER — SPIRONOLACTONE 25 MG/1
12.5 TABLET ORAL DAILY
Qty: 45 TABLET | Refills: 3 | Status: SHIPPED | OUTPATIENT
Start: 2024-10-30

## 2024-10-30 RX ORDER — BISOPROLOL FUMARATE 5 MG/1
5 TABLET, FILM COATED ORAL DAILY
Qty: 90 TABLET | Refills: 3 | Status: SHIPPED | OUTPATIENT
Start: 2024-10-30

## 2024-10-30 RX ORDER — FLUTICASONE PROPIONATE 50 MCG
SPRAY, SUSPENSION (ML) NASAL
COMMUNITY

## 2024-10-30 RX ORDER — FUROSEMIDE 20 MG/1
20 TABLET ORAL DAILY
Qty: 90 TABLET | Refills: 3 | Status: SHIPPED | OUTPATIENT
Start: 2024-10-30

## 2024-10-30 ASSESSMENT — ENCOUNTER SYMPTOMS
ORTHOPNEA: 0
PND: 0
DIZZINESS: 1
SYNCOPE: 0
NEAR-SYNCOPE: 0
HEADACHES: 0
PALPITATIONS: 0
SHORTNESS OF BREATH: 0
LIGHT-HEADEDNESS: 0
DYSPNEA ON EXERTION: 1

## 2024-10-30 ASSESSMENT — FIBROSIS 4 INDEX: FIB4 SCORE: 2.15

## 2024-11-01 ENCOUNTER — HOSPITAL ENCOUNTER (OUTPATIENT)
Facility: MEDICAL CENTER | Age: 69
End: 2024-11-01
Attending: INTERNAL MEDICINE | Admitting: INTERNAL MEDICINE
Payer: MEDICARE

## 2024-11-04 ENCOUNTER — TELEPHONE (OUTPATIENT)
Dept: CARDIOLOGY | Facility: MEDICAL CENTER | Age: 69
End: 2024-11-04
Payer: MEDICARE

## 2024-11-04 ENCOUNTER — APPOINTMENT (OUTPATIENT)
Dept: ADMISSIONS | Facility: MEDICAL CENTER | Age: 69
End: 2024-11-04
Attending: INTERNAL MEDICINE
Payer: MEDICARE

## 2024-11-04 NOTE — TELEPHONE ENCOUNTER
Spoke to Almaz and she let me know she does not currently have prescription coverage. She stated she was only using goodrx discount cards at her current pharmacy. I let her know that there is PAP through the manufacture that I could help her apply for if she allowed me to screen her for assistance. She agreed and upon screening she is eligible to apply. Almaz will be coming to sign and provider her income documents on 11/6 so I can help her apply for PAP through [employer].

## 2024-11-04 NOTE — TELEPHONE ENCOUNTER
Received [employer] request via [employer]  for sacubitril-valsartan (ENTRESTO) 24-26 MG Tab. (Quantity:180, Day Supply:90)     Insurance: N/A  Member ID:  N/A  BIN: N/A  PCN: N/A  Group: [id number]     Ran Test claim via [place] & medication Pays for a $1976 copay. Will outreach to patient to offer specialty pharmacy services and or release to preferred pharmacy

## 2024-11-05 ENCOUNTER — TELEPHONE (OUTPATIENT)
Dept: CARDIOLOGY | Facility: MEDICAL CENTER | Age: 69
End: 2024-11-05
Payer: MEDICARE

## 2024-11-05 NOTE — TELEPHONE ENCOUNTER
Called pt to remind her about her blood work before her upcoming appt. Pt verbalized understanding.

## 2024-11-06 ENCOUNTER — PRE-ADMISSION TESTING (OUTPATIENT)
Dept: ADMISSIONS | Facility: MEDICAL CENTER | Age: 69
End: 2024-11-06
Payer: MEDICARE

## 2024-11-06 ENCOUNTER — PRE-ADMISSION TESTING (OUTPATIENT)
Dept: ADMISSIONS | Facility: MEDICAL CENTER | Age: 69
End: 2024-11-06
Attending: INTERNAL MEDICINE
Payer: MEDICARE

## 2024-11-06 ENCOUNTER — OFFICE VISIT (OUTPATIENT)
Dept: MEDICAL GROUP | Facility: PHYSICIAN GROUP | Age: 69
End: 2024-11-06
Payer: MEDICARE

## 2024-11-06 ENCOUNTER — TELEPHONE (OUTPATIENT)
Dept: CARDIOLOGY | Facility: MEDICAL CENTER | Age: 69
End: 2024-11-06

## 2024-11-06 VITALS
WEIGHT: 147 LBS | HEIGHT: 60 IN | SYSTOLIC BLOOD PRESSURE: 94 MMHG | HEART RATE: 65 BPM | OXYGEN SATURATION: 94 % | DIASTOLIC BLOOD PRESSURE: 54 MMHG | BODY MASS INDEX: 28.86 KG/M2 | TEMPERATURE: 97.5 F

## 2024-11-06 DIAGNOSIS — J81.0 ACUTE PULMONARY EDEMA (HCC): ICD-10-CM

## 2024-11-06 DIAGNOSIS — Z01.810 PRE-OPERATIVE CARDIOVASCULAR EXAMINATION: ICD-10-CM

## 2024-11-06 DIAGNOSIS — I21.4 NSTEMI (NON-ST ELEVATED MYOCARDIAL INFARCTION) (HCC): ICD-10-CM

## 2024-11-06 DIAGNOSIS — I50.20 HFREF (HEART FAILURE WITH REDUCED EJECTION FRACTION) (HCC): ICD-10-CM

## 2024-11-06 DIAGNOSIS — Z09 HOSPITAL DISCHARGE FOLLOW-UP: Primary | ICD-10-CM

## 2024-11-06 DIAGNOSIS — Z01.812 PRE-OPERATIVE LABORATORY EXAMINATION: ICD-10-CM

## 2024-11-06 DIAGNOSIS — I50.9 DECOMPENSATED HEART FAILURE (HCC): ICD-10-CM

## 2024-11-06 PROBLEM — J96.01 ACUTE HYPOXEMIC RESPIRATORY FAILURE (HCC): Status: RESOLVED | Noted: 2024-10-17 | Resolved: 2024-11-06

## 2024-11-06 LAB
ALBUMIN SERPL BCP-MCNC: 4 G/DL (ref 3.2–4.9)
ALBUMIN/GLOB SERPL: 1.2 G/DL
ALP SERPL-CCNC: 119 U/L (ref 30–99)
ALT SERPL-CCNC: 21 U/L (ref 2–50)
ANION GAP SERPL CALC-SCNC: 13 MMOL/L (ref 7–16)
APTT PPP: 28.1 SEC (ref 24.7–36)
AST SERPL-CCNC: 15 U/L (ref 12–45)
BILIRUB SERPL-MCNC: 0.4 MG/DL (ref 0.1–1.5)
BUN SERPL-MCNC: 30 MG/DL (ref 8–22)
CALCIUM ALBUM COR SERPL-MCNC: 9.8 MG/DL (ref 8.5–10.5)
CALCIUM SERPL-MCNC: 9.8 MG/DL (ref 8.5–10.5)
CHLORIDE SERPL-SCNC: 96 MMOL/L (ref 96–112)
CO2 SERPL-SCNC: 27 MMOL/L (ref 20–33)
CREAT SERPL-MCNC: 1.43 MG/DL (ref 0.5–1.4)
EKG IMPRESSION: NORMAL
ERYTHROCYTE [DISTWIDTH] IN BLOOD BY AUTOMATED COUNT: 46.7 FL (ref 35.9–50)
GFR SERPLBLD CREATININE-BSD FMLA CKD-EPI: 39 ML/MIN/1.73 M 2
GLOBULIN SER CALC-MCNC: 3.3 G/DL (ref 1.9–3.5)
GLUCOSE SERPL-MCNC: 119 MG/DL (ref 65–99)
HCT VFR BLD AUTO: 41.1 % (ref 37–47)
HGB BLD-MCNC: 13.5 G/DL (ref 12–16)
INR PPP: 0.99 (ref 0.87–1.13)
MCH RBC QN AUTO: 30.9 PG (ref 27–33)
MCHC RBC AUTO-ENTMCNC: 32.8 G/DL (ref 32.2–35.5)
MCV RBC AUTO: 94.1 FL (ref 81.4–97.8)
PLATELET # BLD AUTO: 565 K/UL (ref 164–446)
PMV BLD AUTO: 8.7 FL (ref 9–12.9)
POTASSIUM SERPL-SCNC: 4.5 MMOL/L (ref 3.6–5.5)
PROT SERPL-MCNC: 7.3 G/DL (ref 6–8.2)
PROTHROMBIN TIME: 13.1 SEC (ref 12–14.6)
RBC # BLD AUTO: 4.37 M/UL (ref 4.2–5.4)
SODIUM SERPL-SCNC: 136 MMOL/L (ref 135–145)
WBC # BLD AUTO: 8 K/UL (ref 4.8–10.8)

## 2024-11-06 PROCEDURE — 85027 COMPLETE CBC AUTOMATED: CPT

## 2024-11-06 PROCEDURE — 36415 COLL VENOUS BLD VENIPUNCTURE: CPT

## 2024-11-06 PROCEDURE — 85610 PROTHROMBIN TIME: CPT

## 2024-11-06 PROCEDURE — 80053 COMPREHEN METABOLIC PANEL: CPT

## 2024-11-06 PROCEDURE — 93010 ELECTROCARDIOGRAM REPORT: CPT | Performed by: INTERNAL MEDICINE

## 2024-11-06 PROCEDURE — 93005 ELECTROCARDIOGRAM TRACING: CPT

## 2024-11-06 PROCEDURE — 85730 THROMBOPLASTIN TIME PARTIAL: CPT

## 2024-11-06 ASSESSMENT — FIBROSIS 4 INDEX: FIB4 SCORE: 0.41

## 2024-11-06 NOTE — PROGRESS NOTES
Subjective:     Almaz Samuel is a 69 y.o. female who presents for Hospital Follow-up.    Transitional Care Management  TCM Outreach Date and Time: Filed (10/21/2024  9:53 AM)     Discharge Questions  Actual Discharge Date: 10/20/24  Now that you are home, how are you feeling?: Very Good  Did you receive any new prescriptions?: Yes  Were you able to get them filled?: Yes  Meds to Bed or Pharmacy filled?: Meds to Bed  Do you have any questions about your current medications or new medications (Review Med Rec)?: No  Did you have any durable medical equipment ordered?: No  Do you have a follow up appointment scheduled with your PCP?: No  Was an appointment scheduled for the patient?: Yes  Appointment Date: 11/06/24  Appointment Time: 1320  Any issues or paperwork you wish to discuss with your PCP?: No  Are you (patient) able to get to the appointment?: Yes  If Home Health was ordered, have they contacted you (Patient): Not Applicable  Did you have enough support after your last discharge?: Yes (pt has friends and family that help her)  Does this patient qualify for the CCM program?: No (medicare)     Transitional Care  Number of attempts made to contact patient: 1  Current or previous attempts completed within two business days of discharge? : Yes  Provided education regarding treatment plan, medications, self-management, ADLs?: No  Has patient completed an Advanced Directive?: Yes  Is the patient's advanced directive on file?: Yes  Has the Care Manager's phone number provided?: No  Is there anything else I can help you with?: Yes (Please specify) (I advised pt on diet and fluid intake and signs that she is drinking to much water.)     Discharge Summary  Chief Complaint: Flash pulmonary edema  Admitting Diagnosis: Flash pulmonary edema  Discharge Diagnosis: Decompensated heart failure                     HPI:   Recently hospitalized for flash pulmonary edema and discharged home with decompensated heart failure.  She  arrived from hospital in [place] where she was admitted for UTI with pyelonephritis.  She was getting IV fluids and IV antibiotics and then experienced severe shortness of breath and was found to have acute onset of pulmonary edema.  She was started on IV heparin drip as her troponin became elevated without chest pain and placed on BiPAP.  She was admitted to [facility] ICU where she was able to de-escalate to high flow oxygen.  Echocardiogram did not show an EF 28%.  She was given IV Lasix and potassium chloride.  Cardiology was consulted.  Cardiology recommended outpatient heart catheter which is scheduled for Friday 11/8/2024.  She was started on aspirin 81 mg daily, bisoprolol 5 mg daily, furosemide 20 mg daily, Entresto 24-26 mg twice daily and spironolactone 25 mg daily. She has followed up cardiology.  Her spironolactone was decreased to 12.5 mg daily.  She is having difficulty with affording Entresto as it currently cost +$600/month.  Cardiology is working with patient.  Since starting aspirin she has noticed easy bruising. She is able to bath herself. Walking is slowly improving. She is not dizzy. Eating and drinking without difficulty.  She is able to void and have regular bowel movements.  She has intermittent leg swelling. Now drinking 6 glasses of water a day from previous 8 glasses of water a day. Denies chest pain, shortness of breath, constipation, orthopnea.    Current medicines (including reconciliation performed today)  Current Outpatient Medications   Medication Sig Dispense Refill    fluticasone (FLONASE ALLERGY RELIEF) 50 MCG/ACT nasal spray Administer 1 Spray into affected nostril(S) 1 time a day as needed.      spironolactone (ALDACTONE) 25 MG Tab Take 0.5 Tablets by mouth every day. 45 Tablet 3    sacubitril-valsartan (ENTRESTO) 24-26 MG Tab Take 1 Tablet by mouth 2 times a day. 180 Tablet 3    furosemide (LASIX) 20 MG Tab Take 1 Tablet by mouth every day. 90 Tablet 3    bisoprolol  (ZEBETA) 5 MG Tab Take 1 Tablet by mouth every day. 90 Tablet 3    aspirin 81 MG EC tablet Take 1 Tablet by mouth every day. Indications: Acute Heart Attack 100 Tablet 2    baclofen (LIORESAL) 10 MG Tab TAKE 1 TABLET BY MOUTH AT BEDTIME 90 Tablet 0    meloxicam (MOBIC) 7.5 MG Tab TAKE 1 TO 2 TABLETS BY MOUTH ONCE DAILY (Patient taking differently: Take 7.5 mg by mouth 1 time a day as needed.) 90 Tablet 0    triamcinolone acetonide (KENALOG) 0.1 % Cream Apply 1 Application topically 2 times a day. (Patient taking differently: Apply 1 Application topically 2 times a day as needed.) 15 g 1    traZODone (DESYREL) 150 MG Tab Take 1 Tablet by mouth every evening. 30 Tablet 1    benzonatate (TESSALON) 100 MG Cap Take 1-2 Capsules by mouth 3 times a day as needed for Cough. 90 Capsule 0    gabapentin (NEURONTIN) 300 MG Cap Take 1 capsule by mouth twice daily 180 Capsule 3    Azelastine (ASTELIN) 137 MCG/SPRAY Solution Administer 2 Sprays into affected nostril(S) 2 times a day. (Patient taking differently: Administer 2 Sprays into affected nostril(S) 2 times a day as needed.) 30 mL 0    hydrOXYzine HCl (ATARAX) 25 MG Tab Take 1 Tablet by mouth 3 times a day as needed for Itching. 30 Tablet 1    citalopram (CELEXA) 10 MG tablet Take 1 tablet by mouth once daily 90 Tablet 3    ezetimibe (ZETIA) 10 MG Tab Take 1 tablet by mouth once daily 90 Tablet 3    omeprazole (PRILOSEC) 20 MG delayed-release capsule Take 1 Capsule by mouth every day. 90 Capsule 3    amitriptyline (ELAVIL) 10 MG Tab TAKE 1 TABLET BY MOUTH 1 HOUR BEFORE BEDTIME      ondansetron (ZOFRAN ODT) 4 MG TABLET DISPERSIBLE Take 1 Tablet by mouth every 6 hours as needed for Nausea/Vomiting. 10 Tablet 2    estradiol (ESTRACE VAGINAL) 0.1 MG/GM vaginal cream Apply 1g cream inside vagina twice per week 1 Each 3    dicyclomine (BENTYL) 10 MG Cap TAKE 1 CAPSULE BY MOUTH EVERY 6 HOURS AS NEEDED FOR ABDOMINAL CRAMPS AND OR DISCOMFORT      Artificial Tear Solution (TEARS  ALCONE OP) Place 2 Drops in both eyes 2 times a day as needed.       No current facility-administered medications for this visit.       Allergies:   Albumin, Rosuvastatin, Seasonal, and Simvastatin    Social History     Tobacco Use    Smoking status: Former     Current packs/day: 0.00     Average packs/day: 0.3 packs/day for 5.0 years (1.5 ttl pk-yrs)     Types: Cigarettes     Start date: 1970     Quit date: 1975     Years since quittin.8     Passive exposure: Past    Smokeless tobacco: Never    Tobacco comments:     quit    Vaping Use    Vaping status: Never Used   Substance Use Topics    Alcohol use: No    Drug use: No       ROS:  Per HPI    Objective:     Vitals:    24 1535 24 1538   BP: 98/58 94/54   BP Location: Right arm Left arm   Patient Position: Sitting Sitting   BP Cuff Size: Adult Adult   Pulse: 65    Temp: 36.4 °C (97.5 °F)    TempSrc: Temporal    SpO2: 94%    Weight: 66.7 kg (147 lb)    Height: 1.524 m (5')      Body mass index is 28.71 kg/m².    Physical Exam:    Gen: Alert and oriented, No apparent distress.  Lungs: Normal effort, CTA bilaterally, no wheezes, rhonchi, or rales.    CV: Regular rate and rhythm. No murmurs, rubs, or gallops.  Ext: No clubbing, cyanosis. Trace ankle edema.       Assessment and Plan:     1. Hospital discharge follow-up (Primary)  Acute uncomplicated problem.  Hospital follow-up completed today.  Keep upcoming heart cath procedure along with upcoming cardiology appointments.    2. Acute pulmonary edema (HCC)  Acute uncomplicated problem.  Symptoms have resolved.  She is on room air today.    3. Decompensated heart failure (HCC)  Acute complicated problem.  Continue follow-up with cardiology.  Continue spironolactone 12.5 mg daily, Entresto 24-26 mg twice daily, furosemide 20 mg daily, bisoprolol 5 mg daily.    4. NSTEMI (non-ST elevated myocardial infarction) (HCC)  Acute uncomplicated problem.  Continue follow-up with cardiology.  Continue  with heart cath on 11/8/2024.    - Chart and discharge summary were reviewed.   - Hospitalization and results reviewed with patient.   - Medications reviewed including instructions regarding high risk medications, dosing and side effects.  - Recommended Services: No services needed at this time  - Advance directive/POLST on file?  Yes    Follow-up:Return if symptoms worsen or fail to improve.    Face-to-face transitional care management services with MODERATE (today's visit is within 14 days post discharge & LACE+ score of 28-58) medical decision complexity were provided.             Please note that this dictation was created using voice recognition software. I have made every reasonable attempt to correct obvious errors, but I expect that there are errors of grammar and possibly content that I did not discover before finalizing the note.

## 2024-11-06 NOTE — OR NURSING
RN pre admit tele appointment complete.  Medication and fasting instructions given per cardiology.  Patient stated understanding of all instructions and had no further questions.

## 2024-11-07 ENCOUNTER — TELEPHONE (OUTPATIENT)
Dept: CARDIOLOGY | Facility: MEDICAL CENTER | Age: 69
End: 2024-11-07
Payer: MEDICARE

## 2024-11-07 NOTE — TELEPHONE ENCOUNTER
Caller: Almaz Samuel     Topic/issue: Patient would like to know if its okay to get a flu shot    Callback Number: [phone]     Thank you,  Stephanie NGO   This is a very nice 51-year-old who is here for routine follow-up for ADD.  The medication continues to work so I have given her a refill.  I would like her to call if there is a problem.

## 2024-11-07 NOTE — TELEPHONE ENCOUNTER
To HL, please see pt question below regarding driving and advise, thank you!    To Prabhjot, please call pt to reschedule angiogram per CW.  Thank you!    =================    Called pt [phone]  to review CW recommendations.  All questions at this time.  Pt verbalizes understanding and is asking when she can she start driving as she has not driven since being discharged from the hospital. Almaz no other concerns or questions at this time.  Pt is appreciative of information given.

## 2024-11-07 NOTE — TELEPHONE ENCOUNTER
Returned pt call, [phone] and advised we support vaccines however the choice to have the flu shot is a personal choice.  Reassurance given response is still pending with previous question, see telephone note signed by this RN.  Pt verbalizes understanding and states no other concerns or questions at this time.  Almaz is appreciative of information given.

## 2024-11-07 NOTE — TELEPHONE ENCOUNTER
Recvd call from Brain in cath lab - this patient is still is on the schedule. Called and LM on patient's letting her know this procedure has been cancelled. Requested a call back to reschedule in a couple weeks per CW request.    Cancelled case with Raymundo in cath lab scheduling.

## 2024-11-07 NOTE — TELEPHONE ENCOUNTER
Called patient and left voicemail that her creatinine is increased and she should stop her Entresto and spironolactone and recheck her chemistry in a week we should delay her angiogram by 2 weeks.

## 2024-11-08 ENCOUNTER — TELEPHONE (OUTPATIENT)
Dept: CARDIOLOGY | Facility: MEDICAL CENTER | Age: 69
End: 2024-11-08
Payer: MEDICARE

## 2024-11-08 ENCOUNTER — APPOINTMENT (OUTPATIENT)
Dept: CARDIOLOGY | Facility: MEDICAL CENTER | Age: 69
End: 2024-11-08
Payer: MEDICARE

## 2024-11-11 NOTE — TELEPHONE ENCOUNTER
Patient has been rescheduled to 11-21-24 for C w/poss with Dr. Orr. No meds to stop and patient to check in at 7:00 for a 9:00 procedure. H&P was done on 10-30-24 by Jennifer Vargas admit to call patient.

## 2024-11-11 NOTE — TELEPHONE ENCOUNTER
Medication: Entresto 24-26mg   Type of Insurance: Government funded (Medicare/Medicare Advantage)  Type of Financial assistance requested Foundation  Source: [employer]  Source Phone #: [phone]  Outcome: Denied (Over Income)

## 2024-11-14 ENCOUNTER — HOSPITAL ENCOUNTER (OUTPATIENT)
Dept: LAB | Facility: MEDICAL CENTER | Age: 69
End: 2024-11-14
Payer: MEDICARE

## 2024-11-14 ENCOUNTER — PRE-ADMISSION TESTING (OUTPATIENT)
Dept: ADMISSIONS | Facility: MEDICAL CENTER | Age: 69
End: 2024-11-14
Attending: INTERNAL MEDICINE
Payer: MEDICARE

## 2024-11-14 ENCOUNTER — HOSPITAL ENCOUNTER (OUTPATIENT)
Dept: LAB | Facility: MEDICAL CENTER | Age: 69
End: 2024-11-14
Attending: STUDENT IN AN ORGANIZED HEALTH CARE EDUCATION/TRAINING PROGRAM
Payer: MEDICARE

## 2024-11-14 DIAGNOSIS — E78.01 FAMILIAL HYPERCHOLESTEREMIA: Chronic | ICD-10-CM

## 2024-11-14 DIAGNOSIS — I50.20 HFREF (HEART FAILURE WITH REDUCED EJECTION FRACTION) (HCC): ICD-10-CM

## 2024-11-14 DIAGNOSIS — E55.9 VITAMIN D DEFICIENCY: Chronic | ICD-10-CM

## 2024-11-14 DIAGNOSIS — E66.9 OBESITY (BMI 30-39.9): Chronic | ICD-10-CM

## 2024-11-14 LAB
25(OH)D3 SERPL-MCNC: 40 NG/ML (ref 30–100)
ALBUMIN SERPL BCP-MCNC: 4.2 G/DL (ref 3.2–4.9)
ALBUMIN/GLOB SERPL: 1.1 G/DL
ALP SERPL-CCNC: 97 U/L (ref 30–99)
ALT SERPL-CCNC: 19 U/L (ref 2–50)
ANION GAP SERPL CALC-SCNC: 10 MMOL/L (ref 7–16)
AST SERPL-CCNC: 25 U/L (ref 12–45)
BILIRUB SERPL-MCNC: 0.5 MG/DL (ref 0.1–1.5)
BUN SERPL-MCNC: 15 MG/DL (ref 8–22)
CALCIUM ALBUM COR SERPL-MCNC: 9.4 MG/DL (ref 8.5–10.5)
CALCIUM SERPL-MCNC: 9.6 MG/DL (ref 8.5–10.5)
CHLORIDE SERPL-SCNC: 99 MMOL/L (ref 96–112)
CHOLEST SERPL-MCNC: 203 MG/DL (ref 100–199)
CO2 SERPL-SCNC: 31 MMOL/L (ref 20–33)
CREAT SERPL-MCNC: 1.08 MG/DL (ref 0.5–1.4)
ERYTHROCYTE [DISTWIDTH] IN BLOOD BY AUTOMATED COUNT: 46.5 FL (ref 35.9–50)
EST. AVERAGE GLUCOSE BLD GHB EST-MCNC: 126 MG/DL
FASTING STATUS PATIENT QL REPORTED: NORMAL
FASTING STATUS PATIENT QL REPORTED: NORMAL
GFR SERPLBLD CREATININE-BSD FMLA CKD-EPI: 55 ML/MIN/1.73 M 2
GLOBULIN SER CALC-MCNC: 3.7 G/DL (ref 1.9–3.5)
GLUCOSE SERPL-MCNC: 94 MG/DL (ref 65–99)
HBA1C MFR BLD: 6 % (ref 4–5.6)
HCT VFR BLD AUTO: 43.6 % (ref 37–47)
HDLC SERPL-MCNC: 58 MG/DL
HGB BLD-MCNC: 13.9 G/DL (ref 12–16)
LDLC SERPL CALC-MCNC: 124 MG/DL
MCH RBC QN AUTO: 30.2 PG (ref 27–33)
MCHC RBC AUTO-ENTMCNC: 31.9 G/DL (ref 32.2–35.5)
MCV RBC AUTO: 94.8 FL (ref 81.4–97.8)
PLATELET # BLD AUTO: 354 K/UL (ref 164–446)
PMV BLD AUTO: 9.1 FL (ref 9–12.9)
POTASSIUM SERPL-SCNC: 4.2 MMOL/L (ref 3.6–5.5)
PROT SERPL-MCNC: 7.9 G/DL (ref 6–8.2)
RBC # BLD AUTO: 4.6 M/UL (ref 4.2–5.4)
SODIUM SERPL-SCNC: 140 MMOL/L (ref 135–145)
TRIGL SERPL-MCNC: 107 MG/DL (ref 0–149)
WBC # BLD AUTO: 5.5 K/UL (ref 4.8–10.8)

## 2024-11-14 PROCEDURE — 82306 VITAMIN D 25 HYDROXY: CPT

## 2024-11-14 PROCEDURE — 83695 ASSAY OF LIPOPROTEIN(A): CPT | Mod: GA

## 2024-11-14 PROCEDURE — 85027 COMPLETE CBC AUTOMATED: CPT

## 2024-11-14 PROCEDURE — 80053 COMPREHEN METABOLIC PANEL: CPT

## 2024-11-14 PROCEDURE — 83036 HEMOGLOBIN GLYCOSYLATED A1C: CPT | Mod: GA

## 2024-11-14 PROCEDURE — 80061 LIPID PANEL: CPT

## 2024-11-14 NOTE — OR NURSING
Pre-Admit RN appointment completed with pt, by this RN, over the phone, for sedation procedure on 11/21/24. Discussed pre-procedure instructions, unless they have been otherwise instructed by their surgeon. Discussed post-op expectations for pain, and approximate duration of time in PACU etc. Pt verbalized understanding of all instructions. Pt denies any questions or concerns.

## 2024-11-15 DIAGNOSIS — E78.5 DYSLIPIDEMIA: ICD-10-CM

## 2024-11-15 DIAGNOSIS — E78.01 FAMILIAL HYPERCHOLESTEREMIA: ICD-10-CM

## 2024-11-15 DIAGNOSIS — I50.20 HFREF (HEART FAILURE WITH REDUCED EJECTION FRACTION) (HCC): ICD-10-CM

## 2024-11-17 LAB — LPA SERPL-MCNC: 20 MG/DL

## 2024-11-18 ENCOUNTER — PRE-ADMISSION TESTING (OUTPATIENT)
Dept: ADMISSIONS | Facility: MEDICAL CENTER | Age: 69
End: 2024-11-18
Attending: INTERNAL MEDICINE
Payer: MEDICARE

## 2024-11-18 DIAGNOSIS — Z01.810 PRE-OPERATIVE CARDIOVASCULAR EXAMINATION: ICD-10-CM

## 2024-11-18 DIAGNOSIS — Z01.812 PRE-OPERATIVE LABORATORY EXAMINATION: ICD-10-CM

## 2024-11-18 LAB
ALBUMIN SERPL BCP-MCNC: 4.2 G/DL (ref 3.2–4.9)
ALBUMIN/GLOB SERPL: 1.3 G/DL
ALP SERPL-CCNC: 91 U/L (ref 30–99)
ALT SERPL-CCNC: 15 U/L (ref 2–50)
ANION GAP SERPL CALC-SCNC: 11 MMOL/L (ref 7–16)
APTT PPP: 26 SEC (ref 24.7–36)
AST SERPL-CCNC: 27 U/L (ref 12–45)
BILIRUB SERPL-MCNC: 0.4 MG/DL (ref 0.1–1.5)
BUN SERPL-MCNC: 13 MG/DL (ref 8–22)
CALCIUM ALBUM COR SERPL-MCNC: 9.6 MG/DL (ref 8.5–10.5)
CALCIUM SERPL-MCNC: 9.8 MG/DL (ref 8.5–10.5)
CHLORIDE SERPL-SCNC: 101 MMOL/L (ref 96–112)
CO2 SERPL-SCNC: 29 MMOL/L (ref 20–33)
CREAT SERPL-MCNC: 1.11 MG/DL (ref 0.5–1.4)
EKG IMPRESSION: NORMAL
ERYTHROCYTE [DISTWIDTH] IN BLOOD BY AUTOMATED COUNT: 47.3 FL (ref 35.9–50)
GFR SERPLBLD CREATININE-BSD FMLA CKD-EPI: 53 ML/MIN/1.73 M 2
GLOBULIN SER CALC-MCNC: 3.3 G/DL (ref 1.9–3.5)
GLUCOSE SERPL-MCNC: 101 MG/DL (ref 65–99)
HCT VFR BLD AUTO: 40.5 % (ref 37–47)
HGB BLD-MCNC: 13.1 G/DL (ref 12–16)
INR PPP: 0.93 (ref 0.87–1.13)
MCH RBC QN AUTO: 30.6 PG (ref 27–33)
MCHC RBC AUTO-ENTMCNC: 32.3 G/DL (ref 32.2–35.5)
MCV RBC AUTO: 94.6 FL (ref 81.4–97.8)
PLATELET # BLD AUTO: 307 K/UL (ref 164–446)
PMV BLD AUTO: 8.9 FL (ref 9–12.9)
POTASSIUM SERPL-SCNC: 4.1 MMOL/L (ref 3.6–5.5)
PROT SERPL-MCNC: 7.5 G/DL (ref 6–8.2)
PROTHROMBIN TIME: 12.5 SEC (ref 12–14.6)
RBC # BLD AUTO: 4.28 M/UL (ref 4.2–5.4)
SODIUM SERPL-SCNC: 141 MMOL/L (ref 135–145)
WBC # BLD AUTO: 6.4 K/UL (ref 4.8–10.8)

## 2024-11-18 PROCEDURE — 85610 PROTHROMBIN TIME: CPT

## 2024-11-18 PROCEDURE — 93010 ELECTROCARDIOGRAM REPORT: CPT | Performed by: INTERNAL MEDICINE

## 2024-11-18 PROCEDURE — 80053 COMPREHEN METABOLIC PANEL: CPT

## 2024-11-18 PROCEDURE — 85027 COMPLETE CBC AUTOMATED: CPT

## 2024-11-18 PROCEDURE — 36415 COLL VENOUS BLD VENIPUNCTURE: CPT

## 2024-11-18 PROCEDURE — 93005 ELECTROCARDIOGRAM TRACING: CPT

## 2024-11-18 PROCEDURE — 85730 THROMBOPLASTIN TIME PARTIAL: CPT

## 2024-11-19 ENCOUNTER — PATIENT MESSAGE (OUTPATIENT)
Dept: CARDIOLOGY | Facility: MEDICAL CENTER | Age: 69
End: 2024-11-19
Payer: MEDICARE

## 2024-11-19 DIAGNOSIS — M79.672 LEFT FOOT PAIN: ICD-10-CM

## 2024-11-19 NOTE — TELEPHONE ENCOUNTER
Received request via: Pharmacy    Was the patient seen in the last year in this department? Yes    Does the patient have an active prescription (recently filled or refills available) for medication(s) requested? No    Pharmacy Name: [facility]    Does the patient have [facility] and need 100-day supply? (This applies to ALL medications) Patient does not have SCP

## 2024-11-19 NOTE — PATIENT COMMUNICATION
Replied to pt via [employer] requesting more information regarding concerns, awaiting pt response.

## 2024-11-20 ENCOUNTER — TELEPHONE (OUTPATIENT)
Dept: CARDIOLOGY | Facility: MEDICAL CENTER | Age: 69
End: 2024-11-20
Payer: MEDICARE

## 2024-11-20 NOTE — TELEPHONE ENCOUNTER
Caller: Almaz Samuel    Topic/issue: Patient calling to discuss the [employer] messages from yesterday - would prefer to speak over the phone. Please callback.     Callback Number: [phone]    Thank you,  Gloria VEGA

## 2024-11-21 ENCOUNTER — APPOINTMENT (OUTPATIENT)
Dept: CARDIOLOGY | Facility: MEDICAL CENTER | Age: 69
End: 2024-11-21
Payer: MEDICARE

## 2024-11-21 ENCOUNTER — PHARMACY VISIT (OUTPATIENT)
Dept: PHARMACY | Facility: MEDICAL CENTER | Age: 69
End: 2024-11-21
Payer: COMMERCIAL

## 2024-11-21 ENCOUNTER — TELEPHONE (OUTPATIENT)
Dept: CARDIOLOGY | Facility: MEDICAL CENTER | Age: 69
End: 2024-11-21

## 2024-11-21 ENCOUNTER — HOSPITAL ENCOUNTER (OUTPATIENT)
Facility: MEDICAL CENTER | Age: 69
End: 2024-11-21
Attending: INTERNAL MEDICINE | Admitting: INTERNAL MEDICINE
Payer: MEDICARE

## 2024-11-21 VITALS
OXYGEN SATURATION: 93 % | HEIGHT: 60 IN | RESPIRATION RATE: 16 BRPM | HEART RATE: 66 BPM | TEMPERATURE: 97.7 F | BODY MASS INDEX: 29.65 KG/M2 | WEIGHT: 151.01 LBS | DIASTOLIC BLOOD PRESSURE: 67 MMHG | SYSTOLIC BLOOD PRESSURE: 114 MMHG

## 2024-11-21 DIAGNOSIS — I25.83 CORONARY ARTERY DISEASE DUE TO LIPID RICH PLAQUE: ICD-10-CM

## 2024-11-21 DIAGNOSIS — I21.4 NSTEMI (NON-ST ELEVATED MYOCARDIAL INFARCTION) (HCC): ICD-10-CM

## 2024-11-21 DIAGNOSIS — I25.10 CORONARY ARTERY DISEASE DUE TO LIPID RICH PLAQUE: ICD-10-CM

## 2024-11-21 DIAGNOSIS — Z79.02 PLATELET INHIBITION DUE TO PLAVIX: ICD-10-CM

## 2024-11-21 DIAGNOSIS — Z95.5 STATUS POST INSERTION OF DRUG-ELUTING STENT INTO LEFT ANTERIOR DESCENDING (LAD) ARTERY: ICD-10-CM

## 2024-11-21 DIAGNOSIS — I25.10 CORONARY ARTERY DISEASE INVOLVING NATIVE CORONARY ARTERY OF NATIVE HEART WITHOUT ANGINA PECTORIS: ICD-10-CM

## 2024-11-21 DIAGNOSIS — I25.5 ISCHEMIC CARDIOMYOPATHY: Chronic | ICD-10-CM

## 2024-11-21 LAB
ACT BLD: 262 SEC (ref 74–137)
ACT BLD: 308 SEC (ref 74–137)
EKG IMPRESSION: NORMAL
GLUCOSE BLD STRIP.AUTO-MCNC: 93 MG/DL (ref 65–99)

## 2024-11-21 PROCEDURE — 99152 MOD SED SAME PHYS/QHP 5/>YRS: CPT | Performed by: INTERNAL MEDICINE

## 2024-11-21 PROCEDURE — 160036 HCHG PACU - EA ADDL 30 MINS PHASE I

## 2024-11-21 PROCEDURE — RXMED WILLOW AMBULATORY MEDICATION CHARGE: Performed by: NURSE PRACTITIONER

## 2024-11-21 PROCEDURE — 93005 ELECTROCARDIOGRAM TRACING: CPT | Performed by: INTERNAL MEDICINE

## 2024-11-21 PROCEDURE — 99153 MOD SED SAME PHYS/QHP EA: CPT

## 2024-11-21 PROCEDURE — 700102 HCHG RX REV CODE 250 W/ 637 OVERRIDE(OP)

## 2024-11-21 PROCEDURE — 160002 HCHG RECOVERY MINUTES (STAT)

## 2024-11-21 PROCEDURE — 93458 L HRT ARTERY/VENTRICLE ANGIO: CPT | Mod: 26,59 | Performed by: INTERNAL MEDICINE

## 2024-11-21 PROCEDURE — A9270 NON-COVERED ITEM OR SERVICE: HCPCS

## 2024-11-21 PROCEDURE — 700105 HCHG RX REV CODE 258: Performed by: INTERNAL MEDICINE

## 2024-11-21 PROCEDURE — 700101 HCHG RX REV CODE 250

## 2024-11-21 PROCEDURE — 160047 HCHG PACU  - EA ADDL 30 MINS PHASE II

## 2024-11-21 PROCEDURE — 82962 GLUCOSE BLOOD TEST: CPT

## 2024-11-21 PROCEDURE — 700117 HCHG RX CONTRAST REV CODE 255: Performed by: INTERNAL MEDICINE

## 2024-11-21 PROCEDURE — 160035 HCHG PACU - 1ST 60 MINS PHASE I

## 2024-11-21 PROCEDURE — 92928 PRQ TCAT PLMT NTRAC ST 1 LES: CPT | Mod: LD | Performed by: INTERNAL MEDICINE

## 2024-11-21 PROCEDURE — 160046 HCHG PACU - 1ST 60 MINS PHASE II

## 2024-11-21 PROCEDURE — 93010 ELECTROCARDIOGRAM REPORT: CPT | Mod: 59 | Performed by: INTERNAL MEDICINE

## 2024-11-21 PROCEDURE — 85347 COAGULATION TIME ACTIVATED: CPT | Mod: 91

## 2024-11-21 PROCEDURE — 92978 ENDOLUMINL IVUS OCT C 1ST: CPT | Mod: 26,LD | Performed by: INTERNAL MEDICINE

## 2024-11-21 PROCEDURE — 700111 HCHG RX REV CODE 636 W/ 250 OVERRIDE (IP): Mod: JZ

## 2024-11-21 RX ORDER — CLOPIDOGREL 300 MG/1
600 TABLET, FILM COATED ORAL ONCE
Status: DISCONTINUED | OUTPATIENT
Start: 2024-11-21 | End: 2024-11-21

## 2024-11-21 RX ORDER — HEPARIN SODIUM 200 [USP'U]/100ML
INJECTION, SOLUTION INTRAVENOUS
Status: COMPLETED
Start: 2024-11-21 | End: 2024-11-21

## 2024-11-21 RX ORDER — PANTOPRAZOLE SODIUM 20 MG/1
20 TABLET, DELAYED RELEASE ORAL DAILY
Qty: 30 TABLET | Refills: 1 | Status: SHIPPED | OUTPATIENT
Start: 2024-11-21

## 2024-11-21 RX ORDER — HEPARIN SODIUM 1000 [USP'U]/ML
INJECTION, SOLUTION INTRAVENOUS; SUBCUTANEOUS
Status: COMPLETED
Start: 2024-11-21 | End: 2024-11-21

## 2024-11-21 RX ORDER — VERAPAMIL HYDROCHLORIDE 2.5 MG/ML
INJECTION, SOLUTION INTRAVENOUS
Status: COMPLETED
Start: 2024-11-21 | End: 2024-11-21

## 2024-11-21 RX ORDER — SODIUM CHLORIDE 9 MG/ML
1.5 INJECTION, SOLUTION INTRAVENOUS CONTINUOUS
Status: ACTIVE | OUTPATIENT
Start: 2024-11-21 | End: 2024-11-21

## 2024-11-21 RX ORDER — PANTOPRAZOLE SODIUM 20 MG/1
20 TABLET, DELAYED RELEASE ORAL DAILY
Qty: 30 TABLET | Refills: 1 | Status: SHIPPED | OUTPATIENT
Start: 2024-11-21 | End: 2024-11-21

## 2024-11-21 RX ORDER — CLOPIDOGREL 300 MG/1
TABLET, FILM COATED ORAL
Status: COMPLETED
Start: 2024-11-21 | End: 2024-11-21

## 2024-11-21 RX ORDER — MIDAZOLAM HYDROCHLORIDE 1 MG/ML
INJECTION INTRAMUSCULAR; INTRAVENOUS
Status: COMPLETED
Start: 2024-11-21 | End: 2024-11-21

## 2024-11-21 RX ORDER — MELOXICAM 7.5 MG/1
TABLET ORAL
Qty: 90 TABLET | Refills: 0 | Status: SHIPPED | OUTPATIENT
Start: 2024-11-21

## 2024-11-21 RX ORDER — ASPIRIN 81 MG/1
81 TABLET ORAL DAILY
Status: DISCONTINUED | OUTPATIENT
Start: 2024-11-22 | End: 2024-11-21 | Stop reason: HOSPADM

## 2024-11-21 RX ORDER — LIDOCAINE HYDROCHLORIDE 20 MG/ML
INJECTION, SOLUTION INFILTRATION; PERINEURAL
Status: COMPLETED
Start: 2024-11-21 | End: 2024-11-21

## 2024-11-21 RX ORDER — CLOPIDOGREL BISULFATE 75 MG/1
75 TABLET ORAL DAILY
Status: DISCONTINUED | OUTPATIENT
Start: 2024-11-22 | End: 2024-11-21 | Stop reason: HOSPADM

## 2024-11-21 RX ORDER — CLOPIDOGREL BISULFATE 75 MG/1
75 TABLET ORAL DAILY
Qty: 100 TABLET | Refills: 3 | Status: SHIPPED | OUTPATIENT
Start: 2024-11-21

## 2024-11-21 RX ORDER — CLOPIDOGREL BISULFATE 75 MG/1
75 TABLET ORAL DAILY
Qty: 100 TABLET | Refills: 3 | Status: SHIPPED | OUTPATIENT
Start: 2024-11-21 | End: 2024-11-21

## 2024-11-21 RX ADMIN — LIDOCAINE HYDROCHLORIDE: 20 INJECTION, SOLUTION INFILTRATION; PERINEURAL at 08:41

## 2024-11-21 RX ADMIN — VERAPAMIL HYDROCHLORIDE 2.5 MG: 2.5 INJECTION, SOLUTION INTRAVENOUS at 08:41

## 2024-11-21 RX ADMIN — FENTANYL CITRATE 50 MCG: 50 INJECTION, SOLUTION INTRAMUSCULAR; INTRAVENOUS at 09:09

## 2024-11-21 RX ADMIN — NITROGLYCERIN 10 ML: 20 INJECTION INTRAVENOUS at 08:42

## 2024-11-21 RX ADMIN — HEPARIN SODIUM 2000 UNITS: 200 INJECTION, SOLUTION INTRAVENOUS at 08:41

## 2024-11-21 RX ADMIN — CLOPIDOGREL BISULFATE 600 MG: 300 TABLET, FILM COATED ORAL at 09:09

## 2024-11-21 RX ADMIN — MIDAZOLAM HYDROCHLORIDE 1.5 MG: 1 INJECTION, SOLUTION INTRAMUSCULAR; INTRAVENOUS at 09:10

## 2024-11-21 RX ADMIN — SODIUM CHLORIDE 1.5 ML/KG/HR: 9 INJECTION, SOLUTION INTRAVENOUS at 09:39

## 2024-11-21 RX ADMIN — IOHEXOL 40 ML: 350 INJECTION, SOLUTION INTRAVENOUS at 09:05

## 2024-11-21 RX ADMIN — HEPARIN SODIUM: 1000 INJECTION, SOLUTION INTRAVENOUS; SUBCUTANEOUS at 08:41

## 2024-11-21 ASSESSMENT — FIBROSIS 4 INDEX: FIB4 SCORE: 1.61

## 2024-11-21 NOTE — TELEPHONE ENCOUNTER
Caller: [facility] in [place]    Topic/issue: Pharmacist wants to make sure Dr. Orr is aware that the Plavix does have medication interactions with Omeprazole and he wants to make sure it's okay to proceed with filling the medication.     Callback Number: [phone]    Thank you,  Caroline KNUTSON

## 2024-11-21 NOTE — TELEPHONE ENCOUNTER
Upon chart review omeprazole was d/c earlier today.    Attempted to call, [phone], unable to reach as they are closed at this time for lunch and will re-open at 2pm.  Will follow up at a later time.

## 2024-11-21 NOTE — OR NURSING
1320 2 ml air removed from TR band, no new bleeding to site.   1340 3 ml air removed from TR band, no new bleeding to site.

## 2024-11-21 NOTE — TELEPHONE ENCOUNTER
Returned pt call Aman and reviewed [phone] findings.  He states prescription were cancelled.  Upon chart review clopidogrel and pantoprazole [facility].  He verbalizes understanding and states no other concerns or questions at this time.  Aman is appreciative of information given.

## 2024-11-21 NOTE — PROGRESS NOTES
0920 - patient arrived to pacu.  2 identifiers verified, attached to monitors, alarms/parameters verified, and received report. Right wrist cath site assessed with RN.  Site is clean, dry and soft with TR band in place.  Oriented to unit and plan of care discussed.      0926 - EKG at pt bedside  0929 - called pts , states he is on his way in  0944 - pts  at bedside, pt belongings returned.  1030 - called [facility] to verify plavix for discharge, they informed me to verify orders as patient is also on prilosec. Upon follow up with Violeta De La O the patient has been prescribed protonix instead of the prilosec. Pt informed and instructed on need to change medication, verbalizes understanding.   1045 - purewik placed for voiding  1045 - 2 ml air removed from TR band, no bleeding to site.  1105 - 2 ml air removed from TR band, no bleeding to site.   1125 - 2ml ml air removed from TR band, no bleeding to site.   1145 - 2 ml air removed from TR band, no bleeding to site.   1200 - 2 ml air removed from TR band, no bleeding to site.   1230 - no air removed, site a little scant drainage, will reassess. Pt denies further needs at this time.  1245 - Patient up to edge of bed, denies discomfort.  Access site remains clean dry and soft.  All lines and monitors discontinued.   1255 - 3ml air removed from TR band, no increased bleeding. Scant drainage remains  1315 - Dr. Orr would like for the patient to  her plavix from the [facility], we are working on that.   1350 - TR band removed, site cleaned and new gauze and tegaderm dressing in place. Site clean dry and intact.  Reviewed discharge paperwork with pt and . Discussed diet, activity, medications, follow up care and worsening symptoms. All questions and concerns addressed.prescriptions and patient belongings in hand.    1445 Pt discharged home with  via wheelchar by RN.

## 2024-11-21 NOTE — DISCHARGE INSTRUCTIONS
POST ANGIOGRAM  General Care Instructions  Maintain a bandage over the incision site for 24 hours.  It's normal to find a small bruise or dime-sized lump at the insertion site. This should disappear within a few weeks.  Do not apply lotions or powders to the site.  Do not immerse the catheter insertion site in water (bathtub/swimming) for five days. It is ok to shower 24 hours after the procedure.  You may resume your normal diet immediately; on the day of your procedure, drink 6-10 glasses of water to help flush the contrast liquid out of your system.  If the doctor inserted the catheter in through your groin:  Walking short distances on a flat surface is OK. Limit going up/down stairs for the first 2 days.  DO NOT do yard work, drive, squat, lift heavy objects, or play sports for 2 days; or until your health care provider tells you it is OK.  If the doctor inserted the catheter in your arm:  For 3 days, DO NOT lift anything heavier than 10 pounds (approximately a gallon of milk). DO NOT do any heavy pushing, pulling, or twisting.    Medications  If your current medications need to be changed, you will be provided with an updated list of your medications prior to discharge.  If you take warfarin (Coumadin), resume taking your usual dose the evening after the procedure.  DO NOT STOP taking prescribed blood thinning (anti-platelet) medications unless instructed by your cardiologist.  These medications include:  Aspirin, Clopidogrel (Plavix), Ticagrelor (Brilinta), or Prasugrel (Effient)   If you take one of the following anticoagulants, RESUME 24 HOURS after your procedure:  Apixiban (Eliquis), Rivaroxaban (Xarelto), Dabigatran (Pradaxa), Edoxaban (Savaysa)  If you take metformin (Glucophage), RESUME 48 HOURS after your procedure.    When to call your healthcare provider  Call your cardiologist right away at [phone] if you have any of the following:   Problems/Concerns taking any of your prescribed heart  medicines.   The insertion site has increasing pain, swelling, redness, bleeding, or drainage.   Your arm or leg below where the insertion site changes color, is cool, or is numb.   You have chest pain or shortness of breath that does not go away with rest.   Your pulse feels irregular -- very slow (less than 60 beats/minute) or very fast (over 100 beats/minute).   You have dizziness, fainting, or you are very tired.   You are coughing up blood or yellow or green mucus.   You have chills or a fever over 101°F (38.3°C).    If there is bleeding at the catheter insertion site, apply pressure for 10 minutes.  If bleeding persists, call 911, and continue to hold pressure until advanced medical support arrives.        Exercising Safely After Percutaneous Coronary Intervention (PCI)  After percutaneous coronary intervention (PCI), which involves angioplasty and often stenting, it's important to focus on your heart health. Exercise can help strengthen your heart. It can also help you feel good and improve your overall health. Talk with your health care provider or cardiac rehab team member about good options for you.  Start slowly. Work up to more vigorous exercise as you get stronger. Aim for at least 150 minutes of exercise each week.  Include aerobic activities. These make the heart beat faster. They work the heart and lungs, and improve the body's ability to use oxygen. Good choices include walking, swimming, and biking .  Always follow your doctor's recommendation for exercise.   You have been referred to cardiac rehabilitation, which is important for your recovery.  You may contact [facility] at [phone] to learn more and schedule a visit.        Lifestyle Management After Percutaneous Coronary Intervention (PCI)  Percutaneous coronary intervention (PCI)  involves angioplasty and often stenting. This procedure can open arteries and relieve symptoms. But, it doesn't cure coronary artery  disease. New blockages can still form. You need to take steps to prevent this by managing risk factors. Doing so will help make your heart and arteries healthier. Your doctor may prescribe cardiac rehabilitation to help with this lifelong process.  Understanding risk factors  Some risk factors for coronary artery disease can be controlled. These include smoking, high blood pressure, cholesterol, diabetes, and obesity. They can be managed with medication, diet, and exercise. Support and counseling can also play a role. The effort will pay off! Managing risk factors can help you be more active, feel better, and reduce the risk of heart attack.    If you smoke, quit!  If your doctor has been urging you to quit smoking, it's for good reasons. Smoking damages your heart, blood vessels, and lungs. The good news is that quitting can halt or even reverse the damage of smoking. To quit now:  Get medical help. Ask your doctor for advice on stop-smoking programs. Also ask about medications or nicotine replacement therapy products that may help you quit smoking.  Get support. Join a support group. Ask for help from your family and friends.  Don't give up. It often takes several tries to succeed in quitting smoking.  Avoid secondhand smoke. Ask family and friends not to smoke around you.    What to Expect Sedation    Rest and take it easy for the first 24 hours.  A responsible adult is recommended to remain with you during that time.  It is normal to feel sleepy.  We encourage you to not do anything that requires balance, judgment or coordination.    FOR 24 HOURS DO NOT:  Drive, operate machinery or run household appliances.  Drink beer or alcoholic beverages.  Make important decisions or sign legal documents.    To avoid nausea, slowly advance diet as tolerated, avoiding spicy or greasy foods for the first day.  Add more substantial food to your diet according to your provider's instructions.  Babies can be fed formula or  breast milk as soon as they are hungry.  INCREASE FLUIDS AND FIBER TO AVOID CONSTIPATION.    MILD FLU-LIKE SYMPTOMS ARE NORMAL.  YOU MAY EXPERIENCE GENERALIZED MUSCLE ACHES, THROAT IRRITATION, HEADACHE AND/OR SOME NAUSEA.  If any questions arise, call your provider.  If your provider is not available, please feel free to call the [facility] at [phone].    MEDICATIONS: Resume taking daily medication.  Take prescribed pain medication with food.  If no medication is prescribed, you may take non-aspirin pain medication if needed.  PAIN MEDICATION CAN BE VERY CONSTIPATING.  Take a stool softener or laxative such as senokot, pericolace, or milk of magnesia if needed.

## 2024-11-21 NOTE — TELEPHONE ENCOUNTER
To RX coordinators, please see message below regarding pt concerns, thank you!    Returned pt call [phone], and clarified the medication she is requesting patient assistance, Repatha.  She states no other concerns or questions at this time.

## 2024-11-21 NOTE — PROCEDURES
"CARDIAC CATHETERIZATION REPORT    Referring Provider: Leonel Bridges M.D.    PROCEDURE PHYSICIAN: Valdez Orr MD, [facility], [facility]  ASSISTANT: None    IMPRESSIONS:  1.  Recent NSTEMI with severely depressed LV systolic function due to severe stenosis in the proximal LAD  2.  Successful PCI of the proximal LAD using 1 drug-eluting stent, IVUS  3.  Normal LVEDP    Recommendations:  Dual antiplatelet therapy  Same-day discharge in 4 hours if stable    Pre-procedure diagnosis: NSTEMI  Post-procedure diagnosis: Same    Procedure performed  Selective coronary angiography  Left heart catheterization  Percutaneous coronary intervention - Stent Placement (LAD)  Intravascular Ultrasound (LAD)    Conscious sedation was supervised by myself and administered by trained personnel using fentanyl and versed between 837 and 906. The patient tolerated sedation without complication.     Procedure Description  1. Access: 5/6 [place] right radial artery Micropuncture technique was utilized following local anesthesia with lidocaine.  A radial cocktail containing verapamil and saline was administered in the radial artery sheath Dynamic ultrasound was utilized to gain access    2. Diagnostic description: The catheter was passed to the central circulation with the aide of J tipped 0.35\" wire. A 5F TIG 4.0 was used to inject the coronary circulation  and enter the left ventricle during invasive hemodynamic monitoring.     3. Description of Intervention: After confirming therapeutic anticoagulation intervention proceeded. A 6F EBU 3.5 guiding catheter was used.  Balloon assisted tracking was needed to traverse the forearm.  I used a 2.0 balloon on a Ironman wire for the BAT.  The lesion in the LAD was crossed with a 0.014\" Run Through wire.  I predilated with a 2.5 balloon.  I then performed IVUS which demonstrated a 3.0 reference vessel lumen.  I then deployed a 3.0 x 12 mm Synergy XD MALACHI at nominal atmospheres and postdilated this with a " 3.0 NC balloon at 16 neftali    4. Closing: At completion of the procedure the relevant equipment was removed from the body and hemostasis achieved by Radial band    Findings   Hemodynamics:   Aorta: 86/54 mmHg  LVEDP: 11 mmHg  No significant pullback gradient across the aortic valve    Coronary Anatomy   Left Main: Normal   LAD: Proximal 95% stenosis.  In the mid to distal vessel there is a 50% stenosis.  The diagonal 1 is normal and small, the diagonal 2 has 50% ostial stenosis.   LCx:  Minimal luminal irregularities   RCA: Dominant, Minimal luminal irregularities    Results of intervention to the LAD:  Pre: 95% stenosis and XIANG III flow  Post: 0% residual stenosis and XIANG 0 flow. No dissection or distal embolization.    Technical Factors  1. Complications: None  2. Estimated Blood Loss: <50 cc  3. Specimens: None  4. Contrast Volume: 40 ml  5. Medications: Radial cocktail (Verapamil 2.5 mg, Nitroglycerin 100 mcg) Heparin to maintain ACT >250 Clopidogrel 600 mg Intracoronary nitroglycerin  6. Radiation (Air Kerma): 48 mGy

## 2024-11-22 ENCOUNTER — TELEPHONE (OUTPATIENT)
Dept: CARDIOLOGY | Facility: MEDICAL CENTER | Age: 69
End: 2024-11-22
Payer: MEDICARE

## 2024-11-22 NOTE — TELEPHONE ENCOUNTER
Almaz does not have pharmacy coverage. Based off her income she provided for me for the [employer] application, she is also over income for [employer].

## 2024-11-22 NOTE — TELEPHONE ENCOUNTER
Called pt [phone] to review CW recommendations.  unable to reach.  Left message  to return this call at their earliest convenience.    Upon chart review, pt is active on [employer].  Last login 11/21/2024.  PCS [employer] message sent regarding CW recommendations.

## 2024-11-22 NOTE — TELEPHONE ENCOUNTER
Let her know I reviewed her images with Dr. Orr and as I suspected she had severe blockage of the artery but got an excellent result with her stent.  Extremely important to take aspirin and Plavix.  She should have an excellent result and follow-up with us closely in [place] or [place]

## 2024-11-22 NOTE — TELEPHONE ENCOUNTER
Received [employer] request via  Patient Call   for Repatha 140mg/ml. (Quantity:2, Day Supply:28)     Insurance: n/a  Member ID:  n/a  BIN: n/a  PCN: n/a  Group: n/a     Ran Test claim via [employer] [facility] medication  $598.36 cash price with no insurance.

## 2024-12-02 ENCOUNTER — PATIENT MESSAGE (OUTPATIENT)
Dept: CARDIOLOGY | Facility: MEDICAL CENTER | Age: 69
End: 2024-12-02
Payer: MEDICARE

## 2024-12-02 ASSESSMENT — ENCOUNTER SYMPTOMS
PALPITATIONS: 0
SHORTNESS OF BREATH: 0
HEADACHES: 0
ORTHOPNEA: 0
PND: 0
SYNCOPE: 0
LIGHT-HEADEDNESS: 0
NEAR-SYNCOPE: 0

## 2024-12-03 ENCOUNTER — OFFICE VISIT (OUTPATIENT)
Dept: CARDIOLOGY | Facility: MEDICAL CENTER | Age: 69
End: 2024-12-03
Payer: MEDICARE

## 2024-12-03 ENCOUNTER — PATIENT MESSAGE (OUTPATIENT)
Dept: CARDIOLOGY | Facility: MEDICAL CENTER | Age: 69
End: 2024-12-03

## 2024-12-03 VITALS
HEART RATE: 102 BPM | SYSTOLIC BLOOD PRESSURE: 118 MMHG | RESPIRATION RATE: 16 BRPM | HEIGHT: 60 IN | OXYGEN SATURATION: 96 % | BODY MASS INDEX: 29.49 KG/M2 | DIASTOLIC BLOOD PRESSURE: 72 MMHG

## 2024-12-03 DIAGNOSIS — I70.0 ATHEROSCLEROSIS OF AORTA (HCC): ICD-10-CM

## 2024-12-03 DIAGNOSIS — I25.83 CORONARY ARTERY DISEASE DUE TO LIPID RICH PLAQUE: ICD-10-CM

## 2024-12-03 DIAGNOSIS — I50.20 HFREF (HEART FAILURE WITH REDUCED EJECTION FRACTION) (HCC): ICD-10-CM

## 2024-12-03 DIAGNOSIS — Z95.5 STATUS POST INSERTION OF DRUG-ELUTING STENT INTO LEFT ANTERIOR DESCENDING (LAD) ARTERY: ICD-10-CM

## 2024-12-03 DIAGNOSIS — E78.01 FAMILIAL HYPERCHOLESTEREMIA: ICD-10-CM

## 2024-12-03 DIAGNOSIS — E78.2 MIXED HYPERLIPIDEMIA: ICD-10-CM

## 2024-12-03 DIAGNOSIS — I25.10 CORONARY ARTERY DISEASE DUE TO LIPID RICH PLAQUE: ICD-10-CM

## 2024-12-03 DIAGNOSIS — I25.5 ISCHEMIC CARDIOMYOPATHY: ICD-10-CM

## 2024-12-03 DIAGNOSIS — Z71.89 ENCOUNTER FOR EDUCATION ABOUT HEART FAILURE: ICD-10-CM

## 2024-12-03 PROCEDURE — 3078F DIAST BP <80 MM HG: CPT

## 2024-12-03 PROCEDURE — 99213 OFFICE O/P EST LOW 20 MIN: CPT

## 2024-12-03 PROCEDURE — 99999 PR NO CHARGE: CPT

## 2024-12-03 PROCEDURE — 99214 OFFICE O/P EST MOD 30 MIN: CPT

## 2024-12-03 PROCEDURE — 3074F SYST BP LT 130 MM HG: CPT

## 2024-12-03 RX ORDER — LOSARTAN POTASSIUM 25 MG/1
12.5 TABLET ORAL DAILY
Qty: 45 TABLET | Refills: 3 | Status: SHIPPED | OUTPATIENT
Start: 2024-12-03

## 2024-12-03 ASSESSMENT — ENCOUNTER SYMPTOMS
DYSPNEA ON EXERTION: 0
DIZZINESS: 0

## 2024-12-03 NOTE — PROGRESS NOTES
Chief Complaint   Patient presents with    Aortic Atherosclerosis    Hyperlipidemia    MI (Non ST Segment Elevation MI)          Subjective:   Almaz Samuel is a 69 y.o. female who presents today for follow-up.     Patient of Dr. Bridges.  Current medical problems include CVA, HLD, aortic stenosis, HTN and familial hypercholesterolemia. Their last clinic visit was 12/16/2021 with Dr. Bridges.    Patient was hospitalized from 10/17/2024-10/20/2024 after presenting as a direct admit from [facility] after being treated for a UTI and developing sudden onset of acute dyspnea, hypoxia and tachycardia She had flash pulmonary edema. She had positive troponin and EKG concerning changes. She had an echocardiogram that showed severely reduced EF 28% and cardiology was consulted. She was not a good candidate for cardiac catheterization so a CTA heart was ordered which showed Moderate coronary stenosis, estimated at 50%-69%. She was started on GDMT, SGLT2i was held due to UTI and set up for outpatient follow up with cardiology.     Patient underwent LHC with Dr. Orr on 11/21/24 with successful PCI MALACHI proximal LAD    Previous follow up:  She reports she is taking all her medications at home and reports dizziness but no other side effects. She is taking her blood pressure at home and it is ranging from 89-99/60-62, her highest blood pressure was this morning and it was 113/63 and she did feel slightly less dizzy. She is trying to walk or ride a recumbent bike and just doing 7-8  mins at a time. She has SCOTT and is normally compliant with her BiPap but had gone on vacation and not used it and is getting back to using it daily. She has no chest pain, shortness of breath, edema, or palpitations.    Today;'s visit:  Patient is accompanied by her . Patient reports since her angiogram she has had improvement in symptoms. She denies any dizziness which she was having prior to PCI. She denies chest pain, palpitations, shortness of  "breath, PND, orthopnea, lightheadedness, syncope. She does get some edema in her BLE that is managed with the lasix. She is not currently weighing herself daily but has a scale. She does take her blood pressure at home and reports it is 112-114/70-80. She has not started the repatha due to cost. She is taking the plavix and asa without any notes of bleeding but does say she is bruising easily. She does have pain in her hip which has limited her ability to use her recumbent bike and is following up with her PCP for the pain. She is trying to walk as she is able to walk without pain.     Cardiovascular Risk Factors:  1. Smoking status: Former Smoker  2. Type II Diabetes Mellitus: No Prediabetes  Lab Results   Component Value Date/Time    HBA1C 6.0 (H) 11/14/2024 07:30 AM    HBA1C 6.2 (H) 11/09/2023 07:40 AM     3. Hypertension: Yes  4. Dyslipidemia: Yes   Cholesterol,Tot   Date Value Ref Range Status   10/18/2024 146 100 - 199 mg/dL Final     LDL   Date Value Ref Range Status   10/18/2024 73 <100 mg/dL Final     HDL   Date Value Ref Range Status   10/18/2024 33 (A) >=40 mg/dL Final     Triglycerides   Date Value Ref Range Status   10/18/2024 202 (H) 0 - 149 mg/dL Final     5. Family history of early Coronary Artery Disease in a first degree relative (Male less than 55 years of age; Female less than 65 years of age): Yes  6.  Obesity and/or Metabolic Syndrome: No  7. Sedentary lifestyle: Yes    Past Medical History:   Diagnosis Date    Anesthesia 02/14/2011    PO N/V, \"slow to come out\", vasovagal response with last ablation/block on her neck    Aortic atherosclerosis (HCC)     Arrhythmia 8/14    Arthritis 02/14/2011    spine    Backpain 02/14/2011    neck and abd. also,     Bowel habit changes 03/08/2023    constipation and diarrhea r/t IBS, medicated    Breath shortness     with exertion    Bronchitis 05/2018    Cancer (HCC) 07/18/2018    Skin - 15 years ago.    Cancer (HCC)     April/May 2018 Melanoma (nose)    " Cataract 03/08/2023    in both eyes, no surgery at present    Complication of anesthesia     Coronary artery disease due to lipid rich plaque - PCI TO LAD for CHF 11/21/2024    Delayed emergence from general anesthesia     Diverticulitis     Endometriosis of uterus     Familial hypercholesteremia     Fusion of spine 2014    ROBERT (generalized anxiety disorder) 03/08/2023    medicated    H/O fall     when in hospital  with low O2 sats    H/O: CVA (cerebrovascular accident) 08/22/2014    Complex event associated with apparent medication reaction but with MRI suggesting possible multiple small infarcts of various ages, [facility]    Hair loss 12/06/2018    Heart burn 03/08/2023    medicated    Hiatus hernia syndrome     not repaired    High cholesterol 03/08/2023    medicated    HTN, goal below 130/80 10/24/2011    Indigestion     Infectious disease      Hep C +    Ischemic cardiomyopathy 11/21/2024    Lung collapse 02/14/2011    right lung 1/4 collpsed     Major depressive disorder with single episode, in full remission (HCC) 10/24/2011    Mixed hyperlipidemia 12/27/2011    Moderate major depression (HCC) 03/08/2023    medicated    MRSA exposure 08/2014    while in hospital    Myocardial infarct (HCC) 10/24    PONV (postoperative nausea and vomiting) 03/08/2023    Post-menopause on HRT (hormone replacement therapy) 12/27/2011    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    exposed as a child, told not active    Scoliosis     Sleep apnea 03/08/2023    BIPAP, hasn't used in the last year    Snoring 03/08/2023    Stroke (HCC) 03/08/2023    tia's times 3 in the past    Unspecified vitamin D deficiency 09/24/2012    Urinary incontinence 03/08/2023    at present, wears a pad         Family History   Problem Relation Age of Onset    Breast Cancer Mother     Diabetes Mother     Lung Disease Mother         copd    Hyperlipidemia Mother     Hypertension Mother     Glaucoma Mother     Cancer Father         Laryngeal CA    Heart Disease Father 50  "       CAD with bypasses x 3    Heart Attack Father     Hyperlipidemia Father     Hypertension Father     Diabetes Sister         type II diabetes    Hyperlipidemia Sister     Diabetes Other     Heart Disease Other     Hypertension Other     Lung Disease Other     Heart Disease Brother     Heart Disease Sister     Heart Disease Brother     Hyperlipidemia Sister     Hyperlipidemia Sister     Hyperlipidemia Sister     Hyperlipidemia Brother     Ovarian Cancer Neg Hx     Tubal Cancer Neg Hx     Peritoneal Cancer Neg Hx     Colorectal Cancer Neg Hx          Social History     Tobacco Use    Smoking status: Former     Current packs/day: 0.00     Average packs/day: 0.3 packs/day for 5.0 years (1.5 ttl pk-yrs)     Types: Cigarettes     Start date: 1970     Quit date: 1975     Years since quittin.9     Passive exposure: Past    Smokeless tobacco: Never    Tobacco comments:     quit    Vaping Use    Vaping status: Never Used   Substance Use Topics    Alcohol use: No    Drug use: No         Allergies   Allergen Reactions    Albumin Unspecified     Other reaction(s): Other (See Comments)  \"egg intolerance\"  Reaction:stomach cramps,throwing up for 12 hours,cold sweats    Rosuvastatin Nausea    Seasonal Itching     Pt states eye irritation, pollen     Simvastatin Nausea         Current Outpatient Medications   Medication Sig    losartan (COZAAR) 25 MG Tab Take 0.5 Tablets by mouth every day.    meloxicam (MOBIC) 7.5 MG Tab TAKE 1 TO 2 TABLETS BY MOUTH ONCE DAILY    pantoprazole (PROTONIX) 20 MG tablet Take 1 Tablet by mouth every day.    clopidogrel (PLAVIX) 75 MG Tab Take 1 Tablet by mouth every day.    fluticasone (FLONASE ALLERGY RELIEF) 50 MCG/ACT nasal spray Administer 1 Spray into affected nostril(S) 1 time a day as needed.    furosemide (LASIX) 20 MG Tab Take 1 Tablet by mouth every day.    bisoprolol (ZEBETA) 5 MG Tab Take 1 Tablet by mouth every day.    aspirin 81 MG EC tablet Take 1 Tablet by mouth " every day. Indications: Acute Heart Attack    baclofen (LIORESAL) 10 MG Tab TAKE 1 TABLET BY MOUTH AT BEDTIME    triamcinolone acetonide (KENALOG) 0.1 % Cream Apply 1 Application topically 2 times a day. (Patient taking differently: Apply 1 Application topically 2 times a day as needed.)    traZODone (DESYREL) 150 MG Tab Take 1 Tablet by mouth every evening. (Patient taking differently: Take 150 mg by mouth at bedtime as needed for Sleep.)    benzonatate (TESSALON) 100 MG Cap Take 1-2 Capsules by mouth 3 times a day as needed for Cough.    gabapentin (NEURONTIN) 300 MG Cap Take 1 capsule by mouth twice daily    Azelastine (ASTELIN) 137 MCG/SPRAY Solution Administer 2 Sprays into affected nostril(S) 2 times a day. (Patient taking differently: Administer 2 Sprays into affected nostril(S) 2 times a day as needed.)    hydrOXYzine HCl (ATARAX) 25 MG Tab Take 1 Tablet by mouth 3 times a day as needed for Itching.    citalopram (CELEXA) 10 MG tablet Take 1 tablet by mouth once daily    ezetimibe (ZETIA) 10 MG Tab Take 1 tablet by mouth once daily    amitriptyline (ELAVIL) 10 MG Tab TAKE 1 TABLET BY MOUTH 1 HOUR BEFORE BEDTIME    ondansetron (ZOFRAN ODT) 4 MG TABLET DISPERSIBLE Take 1 Tablet by mouth every 6 hours as needed for Nausea/Vomiting.    estradiol (ESTRACE VAGINAL) 0.1 MG/GM vaginal cream Apply 1g cream inside vagina twice per week (Patient taking differently: as needed. Apply 1g cream inside vagina twice per week)    dicyclomine (BENTYL) 10 MG Cap TAKE 1 CAPSULE BY MOUTH EVERY 6 HOURS AS NEEDED FOR ABDOMINAL CRAMPS AND OR DISCOMFORT    Artificial Tear Solution (TEARS NATURALE OP) Place 2 Drops in both eyes 2 times a day as needed.    Evolocumab (REPATHA) 140 MG/ML Solution Auto-injector SubQ injection pen Inject 1 mL under the skin every 14 days. (Patient not taking: Reported on 12/3/2024)         Review of Systems   Constitutional: Negative for malaise/fatigue.   Cardiovascular:  Negative for chest pain, dyspnea  on exertion, leg swelling, near-syncope, orthopnea, palpitations, paroxysmal nocturnal dyspnea and syncope.   Respiratory:  Negative for shortness of breath.    Neurological:  Negative for dizziness, headaches and light-headedness.           Objective:   /72 (BP Location: Left arm, Patient Position: Sitting, BP Cuff Size: Adult)   Pulse (!) 102   Resp 16   Ht 1.524 m (5')   SpO2 96%  Body mass index is 29.49 kg/m².         Physical Exam  Vitals reviewed.   Constitutional:       General: She is not in acute distress.     Appearance: Normal appearance.   HENT:      Head: Normocephalic and atraumatic.   Cardiovascular:      Rate and Rhythm: Normal rate and regular rhythm.      Pulses: Normal pulses.      Heart sounds: No murmur heard.  Pulmonary:      Effort: Pulmonary effort is normal. No respiratory distress.      Breath sounds: Normal breath sounds.   Musculoskeletal:      Right lower leg: No edema.      Left lower leg: No edema.   Neurological:      Mental Status: She is alert and oriented to person, place, and time.      Gait: Gait normal.   Psychiatric:         Behavior: Behavior normal.             Lab Results   Component Value Date/Time    SODIUM 141 11/18/2024 08:15 AM    POTASSIUM 4.1 11/18/2024 08:15 AM    CHLORIDE 101 11/18/2024 08:15 AM    CO2 29 11/18/2024 08:15 AM    GLUCOSE 101 (H) 11/18/2024 08:15 AM    BUN 13 11/18/2024 08:15 AM    CREATININE 1.11 11/18/2024 08:15 AM      Lab Results   Component Value Date/Time    WBC 6.4 11/18/2024 08:15 AM    RBC 4.28 11/18/2024 08:15 AM    HEMOGLOBIN 13.1 11/18/2024 08:15 AM    HEMATOCRIT 40.5 11/18/2024 08:15 AM    MCV 94.6 11/18/2024 08:15 AM    MCH 30.6 11/18/2024 08:15 AM    MCHC 32.3 11/18/2024 08:15 AM    MPV 8.9 (L) 11/18/2024 08:15 AM    NEUTSPOLYS 81.90 (H) 10/17/2024 07:50 AM    LYMPHOCYTES 9.10 (L) 10/17/2024 07:50 AM    MONOCYTES 8.40 10/17/2024 07:50 AM    EOSINOPHILS 0.00 10/17/2024 07:50 AM    BASOPHILS 0.10 10/17/2024 07:50 AM      Lab  Results   Component Value Date/Time    CHOLSTRLTOT 203 (H) 11/14/2024 07:29 AM     (H) 11/14/2024 07:29 AM    HDL 58 11/14/2024 07:29 AM    TRIGLYCERIDE 107 11/14/2024 07:29 AM       Lab Results   Component Value Date/Time    TROPONINT 115 (H) 10/18/2024 0800     Lab Results   Component Value Date/Time    NTPROBNP 248 (H) 03/27/2023 2146   Coronary Angiogram 11/21/24 Dr. Orr  Hemodynamics:   Aorta: 86/54 mmHg  LVEDP: 11 mmHg  No significant pullback gradient across the aortic valve     Coronary Anatomy              Left Main: Normal              LAD: Proximal 95% stenosis.  In the mid to distal vessel there is a 50% stenosis.  The diagonal 1 is normal and small, the diagonal 2 has 50% ostial stenosis.              LCx:  Minimal luminal irregularities              RCA: Dominant, Minimal luminal irregularities     Results of intervention to the LAD:  Pre: 95% stenosis and XIANG III flow  Post: 0% residual stenosis and XIANG 0 flow. No dissection or distal embolization.    Cardiac CT Heart 10/20/86488  IMPRESSION:  1.  Calcified and noncalcified plaque in the proximal LAD with approximately 60% luminal reduction.  2.  Small areas of scattered calcified plaque in the complex and RIGHT coronary artery without significant luminal reduction.  3.  Small bilateral pleural effusions with associated atelectasis.     CAD-RADS category: 3. Moderate coronary stenosis, estimated at 50%-69%. Consider functional assessment.    Echocardiogram 10/17/2024  CONCLUSIONS  Severely reduced left ventricular systolic function.  Reduced right ventricular systolic function.   No significant valvular disease.   Assessment:   1. Coronary artery disease due to lipid rich plaque    2. Status post insertion of drug-eluting stent into left anterior descending (LAD) artery    3. HFrEF (heart failure with reduced ejection fraction) (HCC)  - losartan (COZAAR) 25 MG Tab; Take 0.5 Tablets by mouth every day.  Dispense: 45 Tablet; Refill: 3  - Comp  Metabolic Panel; Future  - EC-ECHOCARDIOGRAM COMPLETE W/O CONT; Future    4. Ischemic cardiomyopathy    5. Familial hypercholesteremia    6. Mixed hyperlipidemia    7. Aortic atherosclerosis (HCC)          Medical Decision Making:  Today's Assessment / Plan:   CAD s/p PCI MALACHI LAD  Aortic atherosclerosis  -Continue asa 81 mg and plavix 75 mg daily for 6-12 months if able to tolerate. Then transition to monotherapy with either asa or plavix  -Patient has intolerance to statin medication. Continue Zetia 10 mg daily, trying to start repatha but cost has been a barrier  -Continue bisoprolol 5 mg daily  -Continue healthy diet and lifestyle modification  -Cardiac rehab referral placed     HFrEF, Stage C, Class III-IV, LVEF 28%: Euvolemic on exam, no swelling in her legs, no JVD, no ascites, and lungs clear on auscultation  -ACE-I/ARB/ARNI: Unable to afford Entresto, start losartan 12.5 mg daily  -Evidence Based Beta-blocker:  Continue bisoprolol 5 mg   -Aldosterone Antagonist: Stopped due to dizziness and worsening kidney function. Will continue to monitor and try to trial low dose medication at follow up  -Diuretic: Continue lasix 20 mg daily  -SGLT2i: consider starting at follow up  -Coronary angiogram ordered for ischemic workup  -Repeat echocardiogram in 3 months and if EF does not improve to greater than 35% refer to EP for consideration of ICD placement  -Labs: CMP ordered  -Continue Daily weights; daily BP readings; daily symptom tracking.  -reviewed HF packet   -HF nurse education appointment today.    Hyperlipidemia  -Most recent LDL 73  -Start Repatha 140 mg every 14 days  -Continue zetia 10 mg  -Goal of less than 70  -Check lipid panel in 3 months  -Patient has statin intolerance but has significant family history and prior CVA. Patient had not started Repatha due to financial barrier, following up with RX coordinator to help with assistance. If unable to afford medication can try referral to lipid clinic to see  if able to afford inclisiran.     Return in about 4 weeks (around 12/31/2024) for Jennifer Rausch APRN.  Sooner if problems.    NATALIE Oneill.

## 2024-12-03 NOTE — PATIENT COMMUNICATION
To RX coordinators, please see message below regarding pt response regarding income and pt assistance and advise, thank you!

## 2024-12-03 NOTE — PATIENT COMMUNICATION
Noted findings.    ===================    Dr. Bridges's Recommendations  Vignesh Gillespie  You5 minutes ago (3:46 PM)     Left voicemail for Almaz @ [phone]. Waiting call back.  Thank you!  Magdalena Gillespie  Rx Coordinator

## 2024-12-03 NOTE — Clinical Note
Hi,  I know you have been communicating with this patient regarding Repatha, she told me today she has actually  lower income number for 2024 to see if that helps with assistance. If she cannot afford this medication let me know because I will refer her to the lipid clinic to see if she has better coverage for inclisiran  Thank you!

## 2024-12-11 ENCOUNTER — OFFICE VISIT (OUTPATIENT)
Dept: MEDICAL GROUP | Facility: PHYSICIAN GROUP | Age: 69
End: 2024-12-11
Payer: MEDICARE

## 2024-12-11 VITALS
OXYGEN SATURATION: 97 % | DIASTOLIC BLOOD PRESSURE: 60 MMHG | HEART RATE: 74 BPM | WEIGHT: 150 LBS | SYSTOLIC BLOOD PRESSURE: 90 MMHG | HEIGHT: 60 IN | BODY MASS INDEX: 29.45 KG/M2 | TEMPERATURE: 96.8 F

## 2024-12-11 DIAGNOSIS — N17.9 AKI (ACUTE KIDNEY INJURY) (HCC): ICD-10-CM

## 2024-12-11 DIAGNOSIS — I25.10 CORONARY ARTERY DISEASE DUE TO LIPID RICH PLAQUE: Chronic | ICD-10-CM

## 2024-12-11 DIAGNOSIS — I25.2 HISTORY OF NON-ST ELEVATION MYOCARDIAL INFARCTION (NSTEMI): ICD-10-CM

## 2024-12-11 DIAGNOSIS — M25.552 CHRONIC LEFT HIP PAIN: ICD-10-CM

## 2024-12-11 DIAGNOSIS — E55.9 VITAMIN D DEFICIENCY: Chronic | ICD-10-CM

## 2024-12-11 DIAGNOSIS — Z95.5 STATUS POST INSERTION OF DRUG-ELUTING STENT INTO LEFT ANTERIOR DESCENDING (LAD) ARTERY: Chronic | ICD-10-CM

## 2024-12-11 DIAGNOSIS — R73.03 PREDIABETES: Chronic | ICD-10-CM

## 2024-12-11 DIAGNOSIS — I50.9 DECOMPENSATED HEART FAILURE (HCC): Chronic | ICD-10-CM

## 2024-12-11 DIAGNOSIS — I25.83 CORONARY ARTERY DISEASE DUE TO LIPID RICH PLAQUE: Chronic | ICD-10-CM

## 2024-12-11 DIAGNOSIS — G89.29 CHRONIC LEFT HIP PAIN: ICD-10-CM

## 2024-12-11 DIAGNOSIS — E78.2 MIXED HYPERLIPIDEMIA: ICD-10-CM

## 2024-12-11 PROBLEM — J81.0 ACUTE PULMONARY EDEMA (HCC): Status: RESOLVED | Noted: 2024-10-17 | Resolved: 2024-12-11

## 2024-12-11 PROCEDURE — 99215 OFFICE O/P EST HI 40 MIN: CPT | Performed by: STUDENT IN AN ORGANIZED HEALTH CARE EDUCATION/TRAINING PROGRAM

## 2024-12-11 PROCEDURE — 3078F DIAST BP <80 MM HG: CPT | Performed by: STUDENT IN AN ORGANIZED HEALTH CARE EDUCATION/TRAINING PROGRAM

## 2024-12-11 PROCEDURE — 3074F SYST BP LT 130 MM HG: CPT | Performed by: STUDENT IN AN ORGANIZED HEALTH CARE EDUCATION/TRAINING PROGRAM

## 2024-12-11 ASSESSMENT — PATIENT HEALTH QUESTIONNAIRE - PHQ9
6. FEELING BAD ABOUT YOURSELF - OR THAT YOU ARE A FAILURE OR HAVE LET YOURSELF OR YOUR FAMILY DOWN: NOT AL ALL
3. TROUBLE FALLING OR STAYING ASLEEP OR SLEEPING TOO MUCH: NOT AT ALL
9. THOUGHTS THAT YOU WOULD BE BETTER OFF DEAD, OR OF HURTING YOURSELF: NOT AT ALL
8. MOVING OR SPEAKING SO SLOWLY THAT OTHER PEOPLE COULD HAVE NOTICED. OR THE OPPOSITE, BEING SO FIGETY OR RESTLESS THAT YOU HAVE BEEN MOVING AROUND A LOT MORE THAN USUAL: NOT AT ALL
2. FEELING DOWN, DEPRESSED, IRRITABLE, OR HOPELESS: NOT AT ALL
SUM OF ALL RESPONSES TO PHQ9 QUESTIONS 1 AND 2: 0
SUM OF ALL RESPONSES TO PHQ QUESTIONS 1-9: 0
5. POOR APPETITE OR OVEREATING: NOT AT ALL
7. TROUBLE CONCENTRATING ON THINGS, SUCH AS READING THE NEWSPAPER OR WATCHING TELEVISION: NOT AT ALL
4. FEELING TIRED OR HAVING LITTLE ENERGY: NOT AT ALL
1. LITTLE INTEREST OR PLEASURE IN DOING THINGS: NOT AT ALL

## 2024-12-11 ASSESSMENT — FIBROSIS 4 INDEX: FIB4 SCORE: 1.61

## 2024-12-11 NOTE — PROGRESS NOTES
Subjective:     Chief Complaint   Patient presents with    Follow-Up     Had attack in end of October         HPI:   Almaz presents today for hospital follow-up.    History of non-ST elevation myocardial infarction (NSTEMI)  She was hospitalized from 10/17/2024-10/20/2024 for NSTEMI after presenting as a direct admit from [place] after being treated for a UTI and developing sudden onset of acute dyspnea, hypoxia and tachycardia She had flash pulmonary edema. She had positive troponin and EKG concerning changes. She had an echocardiogram that showed severely reduced EF 28% and cardiology was consulted. She was not a good candidate for cardiac catheterization so a CTA heart was ordered which showed Moderate coronary stenosis, estimated at 50%-69%. She was started on GDMT, SGLT2i was held due to UTI and set up for outpatient follow up with cardiology.  She underwent LHC with Dr. Orr on 11/21/24 with successful PCI MALACHI proximal LAD.  Currently on aspirin 81 mg daily, plavix 75 mg daily, zetia 10 mg daily, bisoprolol 5 mg daily, losartan 12.5 mg daily and she will be starting Repatha.    Decompensated heart failure (HCC)  Oct 2024 Echo showed EF 28%.  Follows up with cardiology.  She could not afford Entresto.  Currently on losartan 12.5 mg daily, bisoprolol 5 mg daily, lasix 20 mg daily.  Aldosterone antagonist worsened kidney function.  Cardiology is considering SGLT2i.    Chronic left hip pain  She complains of pain in her left groin and difficulty lifting her left hip.  She reports imaging in the past when she had spinal surgery showed arthritis in her hip.  She would like to follow-up with [facility].        Health Maintenance: Completed    ROS:  Negative except as stated above.      Objective:     Exam:  BP 90/60 (BP Location: Right arm, Patient Position: Sitting, BP Cuff Size: Adult)   Pulse 74   Temp 36 °C (96.8 °F) (Temporal)   Ht 1.524 m (5')   Wt 68 kg (150 lb)   SpO2 97%   BMI 29.29 kg/m²  Body mass index is  29.29 kg/m².    Physical Exam    Gen: Alert and oriented, no acute distress.  Lungs: Normal effort, CTAB, no wheezing / rhonchi / rales.  CV: RRR, normal S1 and S2, no murmurs.      Labs:   Admission on 2024, Discharged on 2024   Component Date Value Ref Range Status    POC Glucose, Blood 2024 93  65 - 99 mg/dL Final    Report 2024    Final                    Value:[facility]    Test Date:  2024  Pt Name:    LOW WHITE                 Department: [facility]  MRN:        [id number]                      Room:       [facility]  Gender:     Female                       [facility]  :        [id number]                   Requested By:ORALIA RITCHIE  Order #:    [id number]                    Reading MD: Roland Hung MD    Measurements  Intervals                                Axis  Rate:       61                           P:          18  DC:         158                          QRS:        1  QRSD:       105                          T:          138  QT:         488  QTc:        492    Interpretive Statements  Sinus rhythm  Inferior infarct, old  Abnrm T, consider ischemia, anterolateral lds  Compared to ECG 2024 08:24:34  Sinus bradycardia no longer present    Electronically Signed On 2024 10:17:12 PST by Roland Hung MD      Istat Activated Clotting Time 2024 308 (H)  74 - 137 sec Final    Comment: Non-therapeutic reference range:  74 - 137 seconds    Therapeutic range:  >300 seconds  For Cardiac Catheterization, Carotid Stent,  and Cardiopulmonary Bypass Procedures    Target value to discontinue sheath:  <170 seconds  For Post Cardiac Cath/Interventional Procedures      Istat Activated Clotting Time 2024 262 (H)  74 - 137 sec Final    Comment: Non-therapeutic reference range:  74 - 137 seconds    Therapeutic range:  >300 seconds  For Cardiac Catheterization, Carotid Stent,  and Cardiopulmonary Bypass Procedures    Target value to discontinue sheath:  <170  seconds  For Post Cardiac Cath/Interventional Procedures     Pre-Admission Testing on 11/18/2024   Component Date Value Ref Range Status    WBC 11/18/2024 6.4  4.8 - 10.8 K/uL Final    RBC 11/18/2024 4.28  4.20 - 5.40 M/uL Final    Hemoglobin 11/18/2024 13.1  12.0 - 16.0 g/dL Final    Hematocrit 11/18/2024 40.5  37.0 - 47.0 % Final    MCV 11/18/2024 94.6  81.4 - 97.8 fL Final    MCH 11/18/2024 30.6  27.0 - 33.0 pg Final    MCHC 11/18/2024 32.3  32.2 - 35.5 g/dL Final    RDW 11/18/2024 47.3  35.9 - 50.0 fL Final    Platelet Count 11/18/2024 307  164 - 446 K/uL Final    MPV 11/18/2024 8.9 (L)  9.0 - 12.9 fL Final    Sodium 11/18/2024 141  135 - 145 mmol/L Final    Potassium 11/18/2024 4.1  3.6 - 5.5 mmol/L Final    Chloride 11/18/2024 101  96 - 112 mmol/L Final    Co2 11/18/2024 29  20 - 33 mmol/L Final    Anion Gap 11/18/2024 11.0  7.0 - 16.0 Final    Glucose 11/18/2024 101 (H)  65 - 99 mg/dL Final    Bun 11/18/2024 13  8 - 22 mg/dL Final    Creatinine 11/18/2024 1.11  0.50 - 1.40 mg/dL Final    Calcium 11/18/2024 9.8  8.5 - 10.5 mg/dL Final    Correct Calcium 11/18/2024 9.6  8.5 - 10.5 mg/dL Final    AST(SGOT) 11/18/2024 27  12 - 45 U/L Final    ALT(SGPT) 11/18/2024 15  2 - 50 U/L Final    Alkaline Phosphatase 11/18/2024 91  30 - 99 U/L Final    Total Bilirubin 11/18/2024 0.4  0.1 - 1.5 mg/dL Final    Albumin 11/18/2024 4.2  3.2 - 4.9 g/dL Final    Total Protein 11/18/2024 7.5  6.0 - 8.2 g/dL Final    Globulin 11/18/2024 3.3  1.9 - 3.5 g/dL Final    A-G Ratio 11/18/2024 1.3  g/dL Final    PT 11/18/2024 12.5  12.0 - 14.6 sec Final    INR 11/18/2024 0.93  0.87 - 1.13 Final    Comment: INR - Non-therapeutic Reference Range: 0.87-1.13  INR - Therapeutic Reference Range: 2.0-4.0      APTT 11/18/2024 26.0  24.7 - 36.0 sec Final    Report 11/18/2024    Final                    Value:[facility]    Test Date:  2024-11-18  Pt Name:    LOW WHITE                 Department: [facility]  MRN:        [id number]                       Room:  Gender:     Female                       Technician: DRE  :        1955                   Requested By:LEONEL MITCHELL  Order #:    [id number]                    Reading MD: Leonel Mitchell MD    Measurements  Intervals                                Axis  Rate:       58                           P:          25  NM:         152                          QRS:        -3  QRSD:       119                          T:          145  QT:         445  QTc:        438    Interpretive Statements  Sinus bradycardia  Nonspecific intraventricular conduction delay  Inferior infarct, old  Abnrm T, consider ischemia, anterolateral lds  Compared to ECG 2024 13:31:36  BRAULIO SIGNIFICANT CHANGES  Electronically Signed On 2024 19:53:47 PST by Leonel Mitchell MD      GFR (CKD-EPI) 2024 53 (A)  >60 mL/min/1.73 m 2 Final    Comment: Estimated Glomerular Filtration Rate is calculated using  race neutral CKD-EPI  equation per NKF-ASN recommendations.     Hospital Outpatient Visit on 2024   Component Date Value Ref Range Status    25-Hydroxy   Vitamin D 25 2024 40  30 - 100 ng/mL Final    Comment: Adult Ranges:   <20 ng/mL - Deficiency  20-29 ng/mL - Insufficiency   ng/mL - Sufficiency  Electrochemiluminescence binding assay performed using Roche morteza e  immunoassay analyzer.  The Elecsys Vitamin D total II assay is intended for  the quantitative determination of total 25 hydroxyvitamin D in human serum  and plasma. This assay is to be used as an aid in the assessment of vitamin  D sufficiency in adults.      Cholesterol,Tot 2024 203 (H)  100 - 199 mg/dL Final    Triglycerides 2024 107  0 - 149 mg/dL Final    HDL 2024 58  >=40 mg/dL Final    LDL 2024 124 (H)  <100 mg/dL Final    WBC 2024 5.5  4.8 - 10.8 K/uL Final    RBC 2024 4.60  4.20 - 5.40 M/uL Final    Hemoglobin 2024 13.9  12.0 - 16.0 g/dL Final    Hematocrit 2024 43.6   37.0 - 47.0 % Final    MCV 11/14/2024 94.8  81.4 - 97.8 fL Final    MCH 11/14/2024 30.2  27.0 - 33.0 pg Final    MCHC 11/14/2024 31.9 (L)  32.2 - 35.5 g/dL Final    RDW 11/14/2024 46.5  35.9 - 50.0 fL Final    Platelet Count 11/14/2024 354  164 - 446 K/uL Final    MPV 11/14/2024 9.1  9.0 - 12.9 fL Final    Fasting Status 11/14/2024 Fasting   Final   Hospital Outpatient Visit on 11/14/2024   Component Date Value Ref Range Status    Glycohemoglobin 11/14/2024 6.0 (H)  4.0 - 5.6 % Final    Comment: Increased risk for diabetes:  5.7 -6.4%  Diabetes:  >6.4%  Glycemic control for adults with diabetes:  <7.0%    The above interpretations are per ADA guidelines.  Diagnosis  of diabetes mellitus on the basis of elevated Hemoglobin A1c  should be confirmed by repeating the Hb A1c test.      Est Avg Glucose 11/14/2024 126  mg/dL Final    Comment: The eAG calculation is based on the A1c-Derived Daily Glucose  (ADAG) study.  See the ADA's website for additional information.      Lipoprotein (a) 11/14/2024 20  <=29 mg/dL Final    Comment: Performed By: [facility]  [address] [place] [address]  : Castro Alonzo MD, PhD  CLIA Number: [id number]      Sodium 11/14/2024 140  135 - 145 mmol/L Final    Potassium 11/14/2024 4.2  3.6 - 5.5 mmol/L Final    Chloride 11/14/2024 99  96 - 112 mmol/L Final    Co2 11/14/2024 31  20 - 33 mmol/L Final    Anion Gap 11/14/2024 10.0  7.0 - 16.0 Final    Glucose 11/14/2024 94  65 - 99 mg/dL Final    Bun 11/14/2024 15  8 - 22 mg/dL Final    Creatinine 11/14/2024 1.08  0.50 - 1.40 mg/dL Final    Calcium 11/14/2024 9.6  8.5 - 10.5 mg/dL Final    Correct Calcium 11/14/2024 9.4  8.5 - 10.5 mg/dL Final    AST(SGOT) 11/14/2024 25  12 - 45 U/L Final    ALT(SGPT) 11/14/2024 19  2 - 50 U/L Final    Alkaline Phosphatase 11/14/2024 97  30 - 99 U/L Final    Total Bilirubin 11/14/2024 0.5  0.1 - 1.5 mg/dL Final    Albumin 11/14/2024 4.2  3.2 - 4.9 g/dL Final    Total  Protein 11/14/2024 7.9  6.0 - 8.2 g/dL Final    Globulin 11/14/2024 3.7 (H)  1.9 - 3.5 g/dL Final    A-G Ratio 11/14/2024 1.1  g/dL Final    Fasting Status 11/14/2024 Fasting   Final    GFR (CKD-EPI) 11/14/2024 55 (A)  >60 mL/min/1.73 m 2 Final    Comment: Estimated Glomerular Filtration Rate is calculated using  race neutral CKD-EPI 2021 equation per NKF-ASN recommendations.           Assessment & Plan:     69 y.o. female with the following -     1. Prediabetes  Chronic, uncontrolled.  A1c improved from 6.2 to 6.  She would benefit from SGLT2i for CHF or GLP1 agonist for CAD.  She was advised to discuss with cardiology.    2. Vitamin D deficiency disease  Chronic, controlled.  Vit D WNL.  Continue daily supplement.    3. History of non-ST elevation myocardial infarction (NSTEMI)  4. Coronary artery disease due to lipid rich plaque - PCI TO LAD for CHF  5. Status post insertion of drug-eluting stent into left anterior descending (LAD) artery  6. Mixed hyperlipidemia  Chronic, stable.  Hospital records reviewed.  Follows up with Cardiology.  She underwent LAD stent placement.  Continue aspirin 81 mg daily, plavix 75 mg daily, zetia 10 mg daily, bisoprolol 5 mg daily, losartan 12.5 mg daily.  She will also be starting Repatha and may benefit GLP1 agonists     7. Decompensated heart failure (HCC)  Chronic, stable.  Follows up with Cardiology.  Oct 2024 Echo showed EF 28%.  She could not afford Entresto.  Continue losartan 12.5 mg daily, bisoprolol 5 mg daily, lasix 20 mg daily.  Cardiology is considering SGLT2i.    8. THADDEUS (acute kidney injury) (HCC)  Acute.  Most likely due to cardiac cath.  GFR is trending upward (39 to 53) with normal creatinine and repeat labs already ordered.  Encouraged adequate hydration.    9. Chronic left hip pain  Chronic, uncontrolled.  Most likely OA.  X-ray ordered and given referral to [facility] for further management.  - DX-HIP-UNILATERAL-WITH PELVIS-1 VIEW LEFT; Future  - Referral to  Orthopedics          I spent a total of 40 minutes with record review, exam, communication with the patient, communication with other providers, and documentation of this encounter.      Return in about 3 months (around 3/11/2025) for Follow-up of chronic conditions.    Verbal consent was acquired by the patient to use [employer] ambient listening note generation during this visit: Yes.    Please note that this dictation was created using voice recognition software. I have made every reasonable attempt to correct obvious errors, but I expect that there are errors of grammar and possibly content that I did not discover before finalizing the note.

## 2024-12-11 NOTE — ASSESSMENT & PLAN NOTE
She was hospitalized from 10/17/2024-10/20/2024 for NSTEMI after presenting as a direct admit from [place] after being treated for a UTI and developing sudden onset of acute dyspnea, hypoxia and tachycardia She had flash pulmonary edema. She had positive troponin and EKG concerning changes. She had an echocardiogram that showed severely reduced EF 28% and cardiology was consulted. She was not a good candidate for cardiac catheterization so a CTA heart was ordered which showed Moderate coronary stenosis, estimated at 50%-69%. She was started on GDMT, SGLT2i was held due to UTI and set up for outpatient follow up with cardiology.  She underwent LHC with Dr. Orr on 11/21/24 with successful PCI MALACHI proximal LAD.  Currently on aspirin 81 mg daily, plavix 75 mg daily, zetia 10 mg daily, bisoprolol 5 mg daily, losartan 12.5 mg daily and she will be starting Repatha.

## 2024-12-11 NOTE — ASSESSMENT & PLAN NOTE
Oct 2024 Echo showed EF 28%.  Follows up with cardiology.  She could not afford Entresto.  Currently on losartan 12.5 mg daily, bisoprolol 5 mg daily, lasix 20 mg daily.  Aldosterone antagonist worsened kidney function.  Cardiology is considering SGLT2i.

## 2024-12-11 NOTE — ASSESSMENT & PLAN NOTE
She complains of pain in her left groin and difficulty lifting her left hip.  She reports imaging in the past when she had spinal surgery showed arthritis in her hip.  She would like to follow-up with [facility].

## 2024-12-19 ENCOUNTER — OFFICE VISIT (OUTPATIENT)
Dept: SLEEP MEDICINE | Facility: MEDICAL CENTER | Age: 69
End: 2024-12-19
Attending: STUDENT IN AN ORGANIZED HEALTH CARE EDUCATION/TRAINING PROGRAM
Payer: MEDICARE

## 2024-12-19 ENCOUNTER — APPOINTMENT (OUTPATIENT)
Dept: RADIOLOGY | Facility: MEDICAL CENTER | Age: 69
End: 2024-12-19
Attending: STUDENT IN AN ORGANIZED HEALTH CARE EDUCATION/TRAINING PROGRAM
Payer: MEDICARE

## 2024-12-19 VITALS
RESPIRATION RATE: 16 BRPM | HEART RATE: 69 BPM | SYSTOLIC BLOOD PRESSURE: 100 MMHG | HEIGHT: 60 IN | DIASTOLIC BLOOD PRESSURE: 54 MMHG | WEIGHT: 150 LBS | OXYGEN SATURATION: 96 % | BODY MASS INDEX: 29.45 KG/M2

## 2024-12-19 DIAGNOSIS — G47.33 OBSTRUCTIVE SLEEP APNEA: ICD-10-CM

## 2024-12-19 DIAGNOSIS — M25.552 CHRONIC LEFT HIP PAIN: ICD-10-CM

## 2024-12-19 DIAGNOSIS — G89.29 CHRONIC LEFT HIP PAIN: ICD-10-CM

## 2024-12-19 DIAGNOSIS — Z12.31 ENCOUNTER FOR SCREENING MAMMOGRAM FOR MALIGNANT NEOPLASM OF BREAST: ICD-10-CM

## 2024-12-19 DIAGNOSIS — G47.33 OSA (OBSTRUCTIVE SLEEP APNEA): ICD-10-CM

## 2024-12-19 PROCEDURE — 3074F SYST BP LT 130 MM HG: CPT | Performed by: STUDENT IN AN ORGANIZED HEALTH CARE EDUCATION/TRAINING PROGRAM

## 2024-12-19 PROCEDURE — 73501 X-RAY EXAM HIP UNI 1 VIEW: CPT | Mod: LT

## 2024-12-19 PROCEDURE — 77067 SCR MAMMO BI INCL CAD: CPT

## 2024-12-19 PROCEDURE — 99204 OFFICE O/P NEW MOD 45 MIN: CPT | Performed by: STUDENT IN AN ORGANIZED HEALTH CARE EDUCATION/TRAINING PROGRAM

## 2024-12-19 PROCEDURE — 99212 OFFICE O/P EST SF 10 MIN: CPT | Performed by: STUDENT IN AN ORGANIZED HEALTH CARE EDUCATION/TRAINING PROGRAM

## 2024-12-19 PROCEDURE — 3078F DIAST BP <80 MM HG: CPT | Performed by: STUDENT IN AN ORGANIZED HEALTH CARE EDUCATION/TRAINING PROGRAM

## 2024-12-19 ASSESSMENT — FIBROSIS 4 INDEX: FIB4 SCORE: 1.61

## 2024-12-19 NOTE — PROGRESS NOTES
[facility] Consult Note     Date: 12/19/2024 / Time: 12:37 PM      Thank you for requesting a sleep medicine consultation on Almaz Samuel at the sleep center. Presents today with the chief complaints of discussing sleep apnea. She is referred by Tisha Jesus M.D.  [address] [place] [address] for evaluation and treatment of obstructive sleep apnea.    HISTORY OF PRESENT ILLNESS:     Almaz Samuel is a 69 y.o. female with CAD s/p stent placement, HFrEF, MDD, insomnia, GERD, history of CVA, overweight, and obstructive sleep apnea on BiPAP.  Presents to Sleep Clinic for evaluation of sleep I discussed management of sleep apnea.    She states the reason for today's visit is to establish care within the same organization who is managing the majority of her care.    She has known history of severe obstructive sleep apnea.  She was diagnosed in 2019 with severe SCOTT and AHI of 33.9.  She was placed on APAP initially.  She underwent overnight pulse oximetry study through her previous provider which showed low oxygen levels.  This led to a PSG titration study in April of this year exploring CPAP and BiPAP.  Her breathing events and oxygen appeared to respond best to BiPAP therapy and is recommended to be on BiPAP.  She states that she has been on BiPAP since.  However she went hunting in [place] with her  and took a break from using her PAP machine.  She ended up having a myocardial infarction requiring a stent.  Her ejection fraction has also been decreased since the MI.  This has made her concerned regarding using her BiPAP.  She said difficulty getting back into using her machine regularly.  She finds it difficult to breathe with her PAP machine.  She is concerned regarding her heart health and relation to sleep apnea.    DME provider: Jessa delacruz   Device: Resmed BiPAP  Mask: nasal pillows   Aerophagia: No   Snoring: No   Dry mouth: No   Leak: No   Skin irritation: No  "  Chin strap: Yes      As per supplemental questionnaire to be scanned or imported into chart:    Ulman Sleepiness Score: 11    Sleep Schedule  Bedtime: Weekday & Weekend 11pm 12am  Wake time: Weekday & Weekend 6-7am  Sleep-onset latency: 1-2 hours   Awakenings from sleep: 0  Difficulty falling back asleep: No  Bedroom partner: No  Naps: No     DAYTIME SYMPTOMS:   Excessive daytime sleepiness: Yes  Daytime fatigue: Yes  Difficulty concentrating: Yes  Memory problems: Yes  Irritability:No   Work/school performance issues: No   Sleepiness with driving: No   Caffeine/stimulant use: Yes  Alcohol use:No     SLEEP RELATED SYMPTOMS  Snoring: Yes  Witnessed apnea or gasping/choking: No   Dry mouth or mouth breathing: Yes  Sweating: No   Teeth grinding/biting: No   Morning headaches: sometimes   Refreshed Upon Awakening: sometimes      SLEEP RELATED BEHAVIORS:  Parasomnias (walking, talking, eating, violence): No   Leg kicking: No   Restless legs - \"urge to move\": No   Nightmares: No  Recurrent: No   Dream enactment: No      NARCOLEPSY:  Cataplexy: No   Sleep paralysis: No   Sleep attacks: No   Hypnagogic/hypnopompic hallucinations: No     MEDICAL HISTORY  Past Medical History:   Diagnosis Date    Anesthesia 02/14/2011    PO N/V, \"slow to come out\", vasovagal response with last ablation/block on her neck    Aortic atherosclerosis (HCC)     Apnea, sleep     Arrhythmia 8/14    Arthritis 02/14/2011    spine    Backpain 02/14/2011    neck and abd. also,     Bowel habit changes 03/08/2023    constipation and diarrhea r/t IBS, medicated    Breath shortness     with exertion    Bronchitis 05/2018    Cancer (HCC) 07/18/2018    Skin - 15 years ago.    Cancer (HCC)     April/May 2018 Melanoma (nose)    Cataract 03/08/2023    in both eyes, no surgery at present    Complication of anesthesia     Coronary artery disease due to lipid rich plaque - PCI TO LAD for CHF 11/21/2024    Daytime sleepiness     Delayed emergence from general " anesthesia     Diverticulitis     Endometriosis of uterus     Familial hypercholesteremia     Fusion of spine 2014    ROBERT (generalized anxiety disorder) 03/08/2023    medicated    H/O fall     when in hospital  with low O2 sats    H/O: CVA (cerebrovascular accident) 08/22/2014    Complex event associated with apparent medication reaction but with MRI suggesting possible multiple small infarcts of various ages, [facility]    Hair loss 12/06/2018    Heart burn 03/08/2023    medicated    Hiatus hernia syndrome     not repaired    High cholesterol 03/08/2023    medicated    HTN, goal below 130/80 10/24/2011    Indigestion     Infectious disease      Hep C +    Insomnia     Ischemic cardiomyopathy 11/21/2024    Lung collapse 02/14/2011    right lung 1/4 collpsed     Major depressive disorder with single episode, in full remission (HCC) 10/24/2011    Mixed hyperlipidemia 12/27/2011    Moderate major depression (HCC) 03/08/2023    medicated    MRSA exposure 08/2014    while in hospital    Myocardial infarct (HCC) 10/24    PONV (postoperative nausea and vomiting) 03/08/2023    Post-menopause on HRT (hormone replacement therapy) 12/27/2011    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    exposed as a child, told not active    Scoliosis     Sleep apnea 03/08/2023    BIPAP, hasn't used in the last year    Snoring 03/08/2023    Stroke (HCC) 03/08/2023    tia's times 3 in the past    Unspecified vitamin D deficiency 09/24/2012    Urinary incontinence 03/08/2023    at present, wears a pad        SURGICAL HISTORY  Past Surgical History:   Procedure Laterality Date    CATARACT EXTRACTION WITH IOL  2024    OTHER SURGICAL PROCEDURE  04/2023    repair of rectal and bladder prolapse with Dr. Green    TX PELVIC EXAMINATION W ANESTH  03/27/2023    Procedure: PELVIC EXAM UNDER ANESTHESIA, POSTERIOR REPAIR, sacrospinous vault suspension, PERINEORRHAPHY,;  Surgeon: Rajinder Myles M.D.;  Location: SURGERY SAME DAY [facility];  Service: Gynecology    TX  NEUROPLASTY & OR TRANSPOS MEDIAN NRV CARPAL SABRINA Right 05/31/2022    Procedure: RIGHT HAND OPEN CARPAL TUNNEL DECOMPRESSION;  Surgeon: Holly Martin M.D.;  Location: [facility];  Service: Orthopedics    NISSEN FUNDOPLICATION LAPAROSCOPIC  07/25/2018    Procedure: NISSEN FUNDOPLICATION LAPAROSCOPIC;  Surgeon: Ayaz Garcia M.D.;  Location: SURGERY [facility];  Service: General    NISSEN FUNDOPLICATION LAPAROSCOPIC N/A 06/30/2015    Procedure: NISSEN FUNDOPLICATION LAPAROSCOPIC;  Surgeon: Ayaz Garcia M.D.;  Location: SURGERY SAME DAY [facility];  Service:     GASTROSCOPY-ENDO  06/24/2015    Procedure: GASTROSCOPY-ENDO;  Surgeon: Ayaz Garcia M.D.;  Location: ENDOSCOPY [facility];  Service:     CARPAL TUNNEL RELEASE  11/14/2012    Performed by Holly Martin M.D. at SURGERY [facility]    LOW ANTERIOR RESECTION LAPAROSCOPIC  03/02/2011    Performed by AYAZ GARCIA at SURGERY [facility] ORS    CERVICAL DISK AND FUSION ANTERIOR  06/22/2010    Performed by JOSEPH DARLING at SURGERY [facility]    TUBAL LIGATION  01/01/1988    TONSILLECTOMY AND ADENOIDECTOMY  01/01/1959    ABDOMINAL HYSTERECTOMY TOTAL      APPENDECTOMY      GYN SURGERY      partial hysterectomy    OTHER NEUROLOGICAL SURG  2014        FAMILY HISTORY  Family History   Problem Relation Age of Onset    Breast Cancer Mother     Diabetes Mother     Lung Disease Mother         copd    Hyperlipidemia Mother     Hypertension Mother     Glaucoma Mother     Cancer Father         Laryngeal CA    Heart Disease Father 50        CAD with bypasses x 3    Heart Attack Father     Hyperlipidemia Father     Hypertension Father     Diabetes Sister         type II diabetes    Hyperlipidemia Sister     Diabetes Other     Heart Disease Other     Hypertension Other     Lung Disease Other     Heart Disease Brother     Heart Disease Sister     Heart Disease Brother     Hyperlipidemia Sister     Hyperlipidemia Sister      Hyperlipidemia Sister     Hyperlipidemia Brother     Ovarian Cancer Neg Hx     Tubal Cancer Neg Hx     Peritoneal Cancer Neg Hx     Colorectal Cancer Neg Hx        SOCIAL HISTORY  Social History     Socioeconomic History    Marital status:     Number of children: 1   Occupational History     Employer: Retired   Tobacco Use    Smoking status: Former     Current packs/day: 0.00     Average packs/day: 0.3 packs/day for 5.0 years (1.5 ttl pk-yrs)     Types: Cigarettes     Start date: 1970     Quit date: 1975     Years since quittin.0     Passive exposure: Past    Smokeless tobacco: Never    Tobacco comments:     quit    Vaping Use    Vaping status: Never Used   Substance and Sexual Activity    Alcohol use: No    Drug use: No    Sexual activity: Yes     Partners: Male     Comment: , 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[employer]   Other Topics Concern     Service No    Blood Transfusions No    Exercise No    Bike Helmet No    Seat Belt Yes    Self-Exams Yes     Social Drivers of Health     Food Insecurity: No Food Insecurity (10/17/2024)    Hunger Vital Sign     Worried About Running Out of Food in the Last Year: Never true     Ran Out of Food in the Last Year: Never true   Transportation Needs: No Transportation Needs (10/17/2024)    PRAPARE - Transportation     Lack of Transportation (Medical): No     Lack of Transportation (Non-Medical): No   Intimate Partner Violence: Not At Risk (10/17/2024)    Humiliation, Afraid, Rape, and Kick questionnaire     Fear of Current or Ex-Partner: No     Emotionally Abused: No     Physically Abused: No     Sexually Abused: No   Housing Stability: Low Risk  (10/17/2024)    Housing Stability Vital Sign     Unable to Pay for Housing in the Last Year: No     Number of Times Moved in the Last Year: 0     Homeless in the Last Year: No        Occupation:     CURRENT MEDICATIONS  Current Outpatient Medications   Medication Sig Dispense Refill    losartan (COZAAR) 25 MG Tab Take  0.5 Tablets by mouth every day. 45 Tablet 3    meloxicam (MOBIC) 7.5 MG Tab TAKE 1 TO 2 TABLETS BY MOUTH ONCE DAILY 90 Tablet 0    pantoprazole (PROTONIX) 20 MG tablet Take 1 Tablet by mouth every day. 30 Tablet 1    clopidogrel (PLAVIX) 75 MG Tab Take 1 Tablet by mouth every day. 100 Tablet 3    Evolocumab (REPATHA) 140 MG/ML Solution Auto-injector SubQ injection pen Inject 1 mL under the skin every 14 days. 2.1 Each 11    fluticasone (FLONASE ALLERGY RELIEF) 50 MCG/ACT nasal spray Administer 1 Spray into affected nostril(S) 1 time a day as needed.      furosemide (LASIX) 20 MG Tab Take 1 Tablet by mouth every day. 90 Tablet 3    bisoprolol (ZEBETA) 5 MG Tab Take 1 Tablet by mouth every day. 90 Tablet 3    aspirin 81 MG EC tablet Take 1 Tablet by mouth every day. Indications: Acute Heart Attack 100 Tablet 2    baclofen (LIORESAL) 10 MG Tab TAKE 1 TABLET BY MOUTH AT BEDTIME 90 Tablet 0    triamcinolone acetonide (KENALOG) 0.1 % Cream Apply 1 Application topically 2 times a day. (Patient taking differently: Apply 1 Application topically 2 times a day as needed.) 15 g 1    traZODone (DESYREL) 150 MG Tab Take 1 Tablet by mouth every evening. (Patient taking differently: Take 150 mg by mouth at bedtime as needed for Sleep.) 30 Tablet 1    benzonatate (TESSALON) 100 MG Cap Take 1-2 Capsules by mouth 3 times a day as needed for Cough. 90 Capsule 0    gabapentin (NEURONTIN) 300 MG Cap Take 1 capsule by mouth twice daily 180 Capsule 3    Azelastine (ASTELIN) 137 MCG/SPRAY Solution Administer 2 Sprays into affected nostril(S) 2 times a day. (Patient taking differently: Administer 2 Sprays into affected nostril(S) 2 times a day as needed.) 30 mL 0    hydrOXYzine HCl (ATARAX) 25 MG Tab Take 1 Tablet by mouth 3 times a day as needed for Itching. 30 Tablet 1    citalopram (CELEXA) 10 MG tablet Take 1 tablet by mouth once daily 90 Tablet 3    ezetimibe (ZETIA) 10 MG Tab Take 1 tablet by mouth once daily 90 Tablet 3     amitriptyline (ELAVIL) 10 MG Tab TAKE 1 TABLET BY MOUTH 1 HOUR BEFORE BEDTIME      ondansetron (ZOFRAN ODT) 4 MG TABLET DISPERSIBLE Take 1 Tablet by mouth every 6 hours as needed for Nausea/Vomiting. 10 Tablet 2    estradiol (ESTRACE VAGINAL) 0.1 MG/GM vaginal cream Apply 1g cream inside vagina twice per week (Patient taking differently: as needed. Apply 1g cream inside vagina twice per week) 1 Each 3    dicyclomine (BENTYL) 10 MG Cap TAKE 1 CAPSULE BY MOUTH EVERY 6 HOURS AS NEEDED FOR ABDOMINAL CRAMPS AND OR DISCOMFORT      Artificial Tear Solution (TEARS NATURALE OP) Place 2 Drops in both eyes 2 times a day as needed.       No current facility-administered medications for this visit.       REVIEW OF SYSTEMS  Constitutional: Denies fevers, Denies weight changes  Ears/Nose/Throat/Mouth: Denies nasal congestion or sore throat   Cardiovascular: Denies chest pain  Respiratory: Denies shortness of breath, Denies cough  Gastrointestinal/Hepatic: Denies nausea, vomiting  Sleep: see HPI    Physical Examination:  Vitals/ General Appearance:   Weight/BMI: Body mass index is 29.29 kg/m².  /54 (BP Location: Left arm, Patient Position: Sitting, BP Cuff Size: Adult)   Pulse 69   Resp 16   Ht 1.524 m (5')   Wt 68 kg (150 lb)   SpO2 96%   Vitals:    12/19/24 1235   BP: 100/54   BP Location: Left arm   Patient Position: Sitting   BP Cuff Size: Adult   Pulse: 69   Resp: 16   SpO2: 96%   Weight: 68 kg (150 lb)   Height: 1.524 m (5')       Pt. is alert and oriented to time, place and person. Cooperative and in no apparent distress.     Constitutional: Alert, no distress, well-groomed.  Skin: No rashes in visible areas.  Eye: Round. Conjunctiva clear, lids normal. No icterus.   ENT EXAM  Nasal alae/valves collapsible: No   Nasal septum deviation: Yes  Nasal turbinate hypertrophy: No  Hard palate narrow: No   Hard palate high: No   Soft palate/uvula (Mallampati score): 4  Tongue Scalloping: No   Retrognathia: No    Micrognathia: No   Cardiovascular:no murmus/gallops/rubs, normal S1 and S2 heart sounds, regular rate and rhythm  Pulmonary:Clear to auscultation, No wheezes, No crackles.  Neurologic:Awake, alert and oriented x 3, Normal age appropriate gait, No involuntary motions.  Extremities: No clubbing, cyanosis, or edema       ASSESSMENT AND PLAN   Almaz Samuel is a 69 y.o. female with CAD s/p stent placement, HFrEF, MDD, insomnia, GERD, history of CVA, overweight, and obstructive sleep apnea on BiPAP.  Presents to Sleep Clinic for evaluation of sleep I discussed management of sleep apnea.    Obstructive sleep apnea  She has a known history of obstructive sleep apnea.  ESS 11  Pt has risk factors for SCOTT include overweight , age, and crowded oropharynx.     The pathophysiology of SCOTT and the increased risk of cardiovascular morbidity from untreated SCOTT is discussed in detail with the patient. She  also has HTN, heart disease, HFrEF,  which can be worsened by SCOTT.        The medical record was reviewed.    Diagnostic and titration nocturnal polysomnogram's we do not have remote access to her machine.  She did not bring her PAP machine to today's visit.  Reports reviewed.  Will reach out to DME company to auto obtain remote access.  Advised she may also send us her serial number to the machine to see if we can luis fernando ourselves remote access.    Reviewed importance of using her PAP machine given her recent heart attack and reduced ejection fraction.  Advised that given her history of CVA and heart failure she is at increased risk for central sleep apnea.  Discussed that if central apneas present now with her new diagnosis she may require a repeat titration study.    PLAN:   -Order placed for mask and supplies   -Await remote access to machine to look at data  -Advised to reach out via [employer] with questions     Has been advised to continue the current BiPAP, clean equipment frequently, and get new mask and supplies as  allowed by insurance and DME. Recommend an earlier appointment, if significant treatment barriers develop.    Patients with SCOTT are at increased risk of cardiovascular disease including coronary artery disease, systemic arterial hypertension, pulmonary arterial hypertension, cardiac arrythmias, and stroke. The patient was advised to avoid driving a motor vehicle when drowsy.      Return in about 3 months (around 3/19/2025).        Please note portions of this record was created using voice recognition software. I have made every reasonable attempt to correct obvious errors, but I expect that there are errors of grammar and possibly content I did not discover before finalizing the note.

## 2024-12-27 ENCOUNTER — HOSPITAL ENCOUNTER (OUTPATIENT)
Dept: LAB | Facility: MEDICAL CENTER | Age: 69
End: 2024-12-27
Payer: MEDICARE

## 2024-12-27 DIAGNOSIS — I50.20 HFREF (HEART FAILURE WITH REDUCED EJECTION FRACTION) (HCC): ICD-10-CM

## 2024-12-27 LAB
ALBUMIN SERPL BCP-MCNC: 4.3 G/DL (ref 3.2–4.9)
ALBUMIN/GLOB SERPL: 1.5 G/DL
ALP SERPL-CCNC: 87 U/L (ref 30–99)
ALT SERPL-CCNC: 16 U/L (ref 2–50)
ANION GAP SERPL CALC-SCNC: 11 MMOL/L (ref 7–16)
AST SERPL-CCNC: 18 U/L (ref 12–45)
BILIRUB SERPL-MCNC: 0.5 MG/DL (ref 0.1–1.5)
BUN SERPL-MCNC: 17 MG/DL (ref 8–22)
CALCIUM ALBUM COR SERPL-MCNC: 8.9 MG/DL (ref 8.5–10.5)
CALCIUM SERPL-MCNC: 9.1 MG/DL (ref 8.5–10.5)
CHLORIDE SERPL-SCNC: 102 MMOL/L (ref 96–112)
CO2 SERPL-SCNC: 25 MMOL/L (ref 20–33)
CREAT SERPL-MCNC: 0.96 MG/DL (ref 0.5–1.4)
FASTING STATUS PATIENT QL REPORTED: NORMAL
GFR SERPLBLD CREATININE-BSD FMLA CKD-EPI: 63 ML/MIN/1.73 M 2
GLOBULIN SER CALC-MCNC: 2.9 G/DL (ref 1.9–3.5)
GLUCOSE SERPL-MCNC: 97 MG/DL (ref 65–99)
POTASSIUM SERPL-SCNC: 4.1 MMOL/L (ref 3.6–5.5)
PROT SERPL-MCNC: 7.2 G/DL (ref 6–8.2)
SODIUM SERPL-SCNC: 138 MMOL/L (ref 135–145)

## 2024-12-27 PROCEDURE — 80053 COMPREHEN METABOLIC PANEL: CPT

## 2024-12-27 PROCEDURE — 36415 COLL VENOUS BLD VENIPUNCTURE: CPT

## 2024-12-31 ENCOUNTER — PATIENT MESSAGE (OUTPATIENT)
Dept: CARDIOLOGY | Facility: MEDICAL CENTER | Age: 69
End: 2024-12-31
Payer: MEDICARE

## 2024-12-31 DIAGNOSIS — Z79.02 PLATELET INHIBITION DUE TO PLAVIX: ICD-10-CM

## 2024-12-31 DIAGNOSIS — I25.10 CORONARY ARTERY DISEASE INVOLVING NATIVE CORONARY ARTERY OF NATIVE HEART WITHOUT ANGINA PECTORIS: ICD-10-CM

## 2024-12-31 DIAGNOSIS — I21.4 NSTEMI (NON-ST ELEVATED MYOCARDIAL INFARCTION) (HCC): ICD-10-CM

## 2025-01-02 ENCOUNTER — PATIENT MESSAGE (OUTPATIENT)
Dept: CARDIOLOGY | Facility: MEDICAL CENTER | Age: 70
End: 2025-01-02

## 2025-01-02 ENCOUNTER — OFFICE VISIT (OUTPATIENT)
Dept: URGENT CARE | Facility: PHYSICIAN GROUP | Age: 70
End: 2025-01-02
Payer: MEDICARE

## 2025-01-02 VITALS
RESPIRATION RATE: 16 BRPM | SYSTOLIC BLOOD PRESSURE: 112 MMHG | TEMPERATURE: 98.8 F | OXYGEN SATURATION: 96 % | DIASTOLIC BLOOD PRESSURE: 68 MMHG | HEART RATE: 87 BPM

## 2025-01-02 DIAGNOSIS — I25.10 CORONARY ARTERY DISEASE DUE TO LIPID RICH PLAQUE: Chronic | ICD-10-CM

## 2025-01-02 DIAGNOSIS — R73.03 PREDIABETES: Chronic | ICD-10-CM

## 2025-01-02 DIAGNOSIS — I25.83 CORONARY ARTERY DISEASE DUE TO LIPID RICH PLAQUE: Chronic | ICD-10-CM

## 2025-01-02 DIAGNOSIS — J98.8 RTI (RESPIRATORY TRACT INFECTION): ICD-10-CM

## 2025-01-02 DIAGNOSIS — R05.1 ACUTE COUGH: ICD-10-CM

## 2025-01-02 LAB
FLUAV RNA SPEC QL NAA+PROBE: NEGATIVE
FLUBV RNA SPEC QL NAA+PROBE: NEGATIVE
RSV RNA SPEC QL NAA+PROBE: NEGATIVE
SARS-COV-2 RNA RESP QL NAA+PROBE: NEGATIVE

## 2025-01-02 PROCEDURE — 99214 OFFICE O/P EST MOD 30 MIN: CPT | Performed by: FAMILY MEDICINE

## 2025-01-02 PROCEDURE — 0241U POCT CEPHEID COV-2, FLU A/B, RSV - PCR: CPT | Performed by: FAMILY MEDICINE

## 2025-01-02 ASSESSMENT — ENCOUNTER SYMPTOMS
FEVER: 1
COUGH: 1
HEADACHES: 1
MYALGIAS: 1

## 2025-01-02 NOTE — PROGRESS NOTES
"Subjective     Almaz Samuel is a 69 y.o. female who presents with Fever (X3 days fever, cough, headache, runny nose)    This is a  new problem with uncertain prognosis:   This is a very pleasant 69 y.o. who has come to the walk-in clinic today for 2 or 3 days with headache body aches malaise subjective fevers stuffy runny nose and some cough with yellow or green sputum.    History of prediabetes currently diet controlled.  Most recent hemoglobin A1c a month ago was 6.    History of hypertension and coronary artery disease status post stent and followed by cardiology.  Currently doing well on medication        ALLERGIES:  Albumin, Rosuvastatin, Seasonal, and Simvastatin     PMH:  Past Medical History:   Diagnosis Date    Anesthesia 02/14/2011    PO N/V, \"slow to come out\", vasovagal response with last ablation/block on her neck    Aortic atherosclerosis (HCC)     Apnea, sleep     Arrhythmia 8/14    Arthritis 02/14/2011    spine    Backpain 02/14/2011    neck and abd. also,     Bowel habit changes 03/08/2023    constipation and diarrhea r/t IBS, medicated    Breath shortness     with exertion    Bronchitis 05/2018    Cancer (HCC) 07/18/2018    Skin - 15 years ago.    Cancer (HCC)     April/May 2018 Melanoma (nose)    Cataract 03/08/2023    in both eyes, no surgery at present    Complication of anesthesia     Coronary artery disease due to lipid rich plaque - PCI TO LAD for CHF 11/21/2024    Daytime sleepiness     Delayed emergence from general anesthesia     Diverticulitis     Endometriosis of uterus     Familial hypercholesteremia     Fusion of spine 2014    ROBERT (generalized anxiety disorder) 03/08/2023    medicated    H/O fall     when in hospital  with low O2 sats    H/O: CVA (cerebrovascular accident) 08/22/2014    Complex event associated with apparent medication reaction but with MRI suggesting possible multiple small infarcts of various ages, [facility]    Hair loss 12/06/2018    Heart burn 03/08/2023    " medicated    Hiatus hernia syndrome     not repaired    High cholesterol 03/08/2023    medicated    HTN, goal below 130/80 10/24/2011    Indigestion     Infectious disease      Hep C +    Insomnia     Ischemic cardiomyopathy 11/21/2024    Lung collapse 02/14/2011    right lung 1/4 collpsed     Major depressive disorder with single episode, in full remission (HCC) 10/24/2011    Mixed hyperlipidemia 12/27/2011    Moderate major depression (HCC) 03/08/2023    medicated    MRSA exposure 08/2014    while in hospital    Myocardial infarct ([facility]) 10/24    PONV (postoperative nausea and vomiting) 03/08/2023    Post-menopause on HRT (hormone replacement therapy) 12/27/2011    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    exposed as a child, told not active    Scoliosis     Sleep apnea 03/08/2023    BIPAP, hasn't used in the last year    Snoring 03/08/2023    Stroke ([facility]) 03/08/2023    tia's times 3 in the past    Unspecified vitamin D deficiency 09/24/2012    Urinary incontinence 03/08/2023    at present, wears a pad        PSH:  Past Surgical History:   Procedure Laterality Date    CATARACT EXTRACTION WITH IOL  2024    OTHER SURGICAL PROCEDURE  04/2023    repair of rectal and bladder prolapse with Dr. Green    IA PELVIC EXAMINATION W ANESTH  03/27/2023    Procedure: PELVIC EXAM UNDER ANESTHESIA, POSTERIOR REPAIR, sacrospinous vault suspension, PERINEORRHAPHY,;  Surgeon: Rajinder Myles M.D.;  Location: SURGERY SAME DAY [facility];  Service: Gynecology    IA NEUROPLASTY & OR TRANSPOS MEDIAN NRV CARPAL SABRINA Right 05/31/2022    Procedure: RIGHT HAND OPEN CARPAL TUNNEL DECOMPRESSION;  Surgeon: Holly Martin M.D.;  Location: [facility];  Service: Orthopedics    NISSEN FUNDOPLICATION LAPAROSCOPIC  07/25/2018    Procedure: NISSEN FUNDOPLICATION LAPAROSCOPIC;  Surgeon: Kenji Okeefe M.D.;  Location: SURGERY [facility];  Service: General    NISSEN FUNDOPLICATION LAPAROSCOPIC N/A 06/30/2015    Procedure: NISSEN  FUNDOPLICATION LAPAROSCOPIC;  Surgeon: Kenji Garcia M.D.;  Location: SURGERY SAME DAY [facility];  Service:     GASTROSCOPY-ENDO  06/24/2015    Procedure: GASTROSCOPY-ENDO;  Surgeon: Kenji Garcia M.D.;  Location: ENDOSCOPY [facility];  Service:     CARPAL TUNNEL RELEASE  11/14/2012    Performed by Holly Martin M.D. at SURGERY [facility] ORS    LOW ANTERIOR RESECTION LAPAROSCOPIC  03/02/2011    Performed by KENJI GARCIA at SURGERY [facility] ORS    CERVICAL DISK AND FUSION ANTERIOR  06/22/2010    Performed by JOSEPH DARLING at SURGERY [facility] ORS    TUBAL LIGATION  01/01/1988    TONSILLECTOMY AND ADENOIDECTOMY  01/01/1959    ABDOMINAL HYSTERECTOMY TOTAL      APPENDECTOMY      GYN SURGERY      partial hysterectomy    OTHER NEUROLOGICAL SURG  2014       MEDS:    Current Outpatient Medications:     amoxicillin-clavulanate (AUGMENTIN) 875-125 MG Tab, Take 1 Tablet by mouth 2 times a day for 5 days., Disp: 10 Tablet, Rfl: 0    losartan (COZAAR) 25 MG Tab, Take 0.5 Tablets by mouth every day., Disp: 45 Tablet, Rfl: 3    meloxicam (MOBIC) 7.5 MG Tab, TAKE 1 TO 2 TABLETS BY MOUTH ONCE DAILY, Disp: 90 Tablet, Rfl: 0    pantoprazole (PROTONIX) 20 MG tablet, Take 1 Tablet by mouth every day., Disp: 30 Tablet, Rfl: 1    clopidogrel (PLAVIX) 75 MG Tab, Take 1 Tablet by mouth every day., Disp: 100 Tablet, Rfl: 3    Evolocumab (REPATHA) 140 MG/ML Solution Auto-injector SubQ injection pen, Inject 1 mL under the skin every 14 days., Disp: 2.1 Each, Rfl: 11    fluticasone (FLONASE ALLERGY RELIEF) 50 MCG/ACT nasal spray, Administer 1 Spray into affected nostril(S) 1 time a day as needed., Disp: , Rfl:     furosemide (LASIX) 20 MG Tab, Take 1 Tablet by mouth every day., Disp: 90 Tablet, Rfl: 3    bisoprolol (ZEBETA) 5 MG Tab, Take 1 Tablet by mouth every day., Disp: 90 Tablet, Rfl: 3    aspirin 81 MG EC tablet, Take 1 Tablet by mouth every day. Indications: Acute Heart Attack, Disp: 100 Tablet, Rfl: 2    baclofen  (LIORESAL) 10 MG Tab, TAKE 1 TABLET BY MOUTH AT BEDTIME, Disp: 90 Tablet, Rfl: 0    triamcinolone acetonide (KENALOG) 0.1 % Cream, Apply 1 Application topically 2 times a day., Disp: 15 g, Rfl: 1    traZODone (DESYREL) 150 MG Tab, Take 1 Tablet by mouth every evening., Disp: 30 Tablet, Rfl: 1    benzonatate (TESSALON) 100 MG Cap, Take 1-2 Capsules by mouth 3 times a day as needed for Cough., Disp: 90 Capsule, Rfl: 0    gabapentin (NEURONTIN) 300 MG Cap, Take 1 capsule by mouth twice daily, Disp: 180 Capsule, Rfl: 3    Azelastine (ASTELIN) 137 MCG/SPRAY Solution, Administer 2 Sprays into affected nostril(S) 2 times a day., Disp: 30 mL, Rfl: 0    hydrOXYzine HCl (ATARAX) 25 MG Tab, Take 1 Tablet by mouth 3 times a day as needed for Itching., Disp: 30 Tablet, Rfl: 1    citalopram (CELEXA) 10 MG tablet, Take 1 tablet by mouth once daily, Disp: 90 Tablet, Rfl: 3    ezetimibe (ZETIA) 10 MG Tab, Take 1 tablet by mouth once daily, Disp: 90 Tablet, Rfl: 3    amitriptyline (ELAVIL) 10 MG Tab, TAKE 1 TABLET BY MOUTH 1 HOUR BEFORE BEDTIME, Disp: , Rfl:     ondansetron (ZOFRAN ODT) 4 MG TABLET DISPERSIBLE, Take 1 Tablet by mouth every 6 hours as needed for Nausea/Vomiting., Disp: 10 Tablet, Rfl: 2    estradiol (ESTRACE VAGINAL) 0.1 MG/GM vaginal cream, Apply 1g cream inside vagina twice per week, Disp: 1 Each, Rfl: 3    dicyclomine (BENTYL) 10 MG Cap, TAKE 1 CAPSULE BY MOUTH EVERY 6 HOURS AS NEEDED FOR ABDOMINAL CRAMPS AND OR DISCOMFORT, Disp: , Rfl:     Artificial Tear Solution (TEARS NATURALE OP), Place 2 Drops in both eyes 2 times a day as needed., Disp: , Rfl:     ** I have documented what I find to be significant in regards to past medical, social, family and surgical history  in my HPI or under PMH/PSH/FH review section, otherwise it is noncontributory **           HPI    Review of Systems   Constitutional:  Positive for fever and malaise/fatigue.   HENT:  Positive for congestion.    Respiratory:  Positive for cough.     Musculoskeletal:  Positive for myalgias.   Neurological:  Positive for headaches.   All other systems reviewed and are negative.             Objective     /68   Pulse 87   Temp 37.1 °C (98.8 °F) (Temporal)   Resp 16   SpO2 96%      Physical Exam  Vitals and nursing note reviewed.   Constitutional:       General: She is not in acute distress.     Appearance: Normal appearance. She is well-developed. She is not ill-appearing, toxic-appearing or diaphoretic.   HENT:      Head: Normocephalic.      Mouth/Throat:      Mouth: Mucous membranes are moist.      Pharynx: Oropharynx is clear. No oropharyngeal exudate or posterior oropharyngeal erythema.   Cardiovascular:      Rate and Rhythm: Normal rate and regular rhythm.      Heart sounds: Normal heart sounds.   Pulmonary:      Effort: Pulmonary effort is normal. No respiratory distress.      Breath sounds: Normal breath sounds.   Neurological:      Mental Status: She is alert.      Motor: No abnormal muscle tone.   Psychiatric:         Mood and Affect: Mood normal.         Behavior: Behavior normal.                             Assessment & Plan     1. Prediabetes        2. Coronary artery disease due to lipid rich plaque - PCI TO LAD for CHF        3. RTI (respiratory tract infection)  POCT CoV-2, Flu A/B, RSV by PCR    amoxicillin-clavulanate (AUGMENTIN) 875-125 MG Tab      4. Acute cough  POCT CoV-2, Flu A/B, RSV by PCR    amoxicillin-clavulanate (AUGMENTIN) 875-125 MG Tab        Results for orders placed or performed in visit on 01/02/25   POCT CoV-2, Flu A/B, RSV by PCR    Collection Time: 01/02/25  9:18 AM   Result Value Ref Range    SARS-CoV-2 by PCR Negative Negative, Invalid    Influenza virus A RNA Negative Negative, Invalid    Influenza virus B, PCR Negative Negative, Invalid    RSV, PCR Negative Negative, Invalid     *Note: Due to a large number of results and/or encounters for the requested time period, some results have not been displayed. A  complete set of results can be found in Results Review.                69-year-old with history of well-controlled hypertension coronary artery disease and prediabetes with acute most likely viral respiratory infection.  Will rule out COVID and flu.  Contingency antibiotic.    - Dx, plan & d/c instructions discussed   - Rest, stay hydrated, OTC Motrin and/or Tylenol as needed      Follow up with your regular primary care providers office within a week to keep them updated and informed of this visit and for regular routine health maintenance check-ups. ER if not improving in 2-3 days or if feeling/getting worse. (If you do not have a primary care provider and need to schedule one you may call [facility] at [phone] to do this).    Patient left in stable condition     Discussed if any testing, labs or imaging studies are obtained outside of the [facility] facility, it is their responsibility to contact the Urgent Care and let us know that it was done and get us the results so adequate follow up can be initiated    Pertinent prior lab work and/or imaging studies in Epic have been reviewed by me today on day of this visit and taken into account for my treatment and plan today    Pertinent PMH/PSH and/or chronic conditions and medications if any were reviewed today and taken into account for my treatment and plan today    Pertinent prior office visit notes in [facility] have been reviewed by me today on day of this visit.    Please note that this dictation may have been created using voice recognition software, if so I have made every reasonable attempt to correct obvious errors, but I expect that there are errors of grammar and possibly content that I did not discover before finalizing the note.

## 2025-01-03 NOTE — PATIENT COMMUNICATION
Replied to pt via [employer] requesting more information regarding concerns, awaiting pt response.

## 2025-01-03 NOTE — PATIENT COMMUNICATION
To HL , please clarify if pt should still be on pantoprazole, prescribed by our team after heart cath 11/21/24.  Please reply directly to pt regarding advise, thank you!

## 2025-01-04 DIAGNOSIS — M54.16 LUMBAR RADICULOPATHY, CHRONIC: ICD-10-CM

## 2025-01-07 ENCOUNTER — PATIENT MESSAGE (OUTPATIENT)
Dept: CARDIOLOGY | Facility: MEDICAL CENTER | Age: 70
End: 2025-01-07
Payer: MEDICARE

## 2025-01-07 DIAGNOSIS — I25.10 CORONARY ARTERY DISEASE INVOLVING NATIVE CORONARY ARTERY OF NATIVE HEART WITHOUT ANGINA PECTORIS: ICD-10-CM

## 2025-01-07 RX ORDER — CLOPIDOGREL BISULFATE 75 MG/1
75 TABLET ORAL DAILY
Qty: 100 TABLET | Refills: 2 | Status: SHIPPED | OUTPATIENT
Start: 2025-01-07

## 2025-01-08 ENCOUNTER — PATIENT MESSAGE (OUTPATIENT)
Dept: MEDICAL GROUP | Facility: PHYSICIAN GROUP | Age: 70
End: 2025-01-08
Payer: MEDICARE

## 2025-01-08 ENCOUNTER — PATIENT MESSAGE (OUTPATIENT)
Dept: CARDIOLOGY | Facility: MEDICAL CENTER | Age: 70
End: 2025-01-08
Payer: MEDICARE

## 2025-01-08 RX ORDER — PANTOPRAZOLE SODIUM 20 MG/1
20 TABLET, DELAYED RELEASE ORAL DAILY
Qty: 90 TABLET | Refills: 0 | Status: SHIPPED | OUTPATIENT
Start: 2025-01-08

## 2025-01-08 RX ORDER — BACLOFEN 10 MG/1
10 TABLET ORAL
Qty: 90 TABLET | Refills: 0 | Status: SHIPPED | OUTPATIENT
Start: 2025-01-08

## 2025-01-08 NOTE — PATIENT COMMUNICATION
Medication sent to preferred pharmacy as requested.    ===================    Medication Pantoprazole   NATALIE Oneill.  You20 minutes ago (8:47 AM)     Please send prescription for 90 days but refills should be done with PCP moving forward. Thank you!

## 2025-01-08 NOTE — PATIENT COMMUNICATION
Plavix medication sent to preferred pharmacy as requested.    Replied to pt via aitainmenthart regarding findings.

## 2025-01-09 NOTE — PATIENT COMMUNICATION
Received request via: Patient    Was the patient seen in the last year in this department? Yes    Does the patient have an active prescription (recently filled or refills available) for medication(s) requested? No    Pharmacy Name: [facility]    Does the patient have skilled nursing Plus and need 100-day supply? (This applies to ALL medications) Patient does not have SCP

## 2025-01-13 DIAGNOSIS — Z78.0 POSTMENOPAUSAL ESTROGEN DEFICIENCY: ICD-10-CM

## 2025-01-13 DIAGNOSIS — M85.852 OSTEOPENIA OF BOTH HIPS: ICD-10-CM

## 2025-01-13 DIAGNOSIS — M85.851 OSTEOPENIA OF BOTH HIPS: ICD-10-CM

## 2025-01-13 PROBLEM — M85.859 OSTEOPENIA OF HIP: Status: ACTIVE | Noted: 2025-01-13

## 2025-01-17 RX ORDER — AMITRIPTYLINE HYDROCHLORIDE 10 MG/1
10 TABLET ORAL NIGHTLY PRN
Qty: 90 TABLET | Refills: 0 | Status: SHIPPED | OUTPATIENT
Start: 2025-01-17

## 2025-01-23 ENCOUNTER — HOSPITAL ENCOUNTER (OUTPATIENT)
Dept: RADIOLOGY | Facility: MEDICAL CENTER | Age: 70
End: 2025-01-23
Attending: STUDENT IN AN ORGANIZED HEALTH CARE EDUCATION/TRAINING PROGRAM
Payer: MEDICARE

## 2025-01-23 DIAGNOSIS — M85.851 OSTEOPENIA OF BOTH HIPS: ICD-10-CM

## 2025-01-23 DIAGNOSIS — M85.852 OSTEOPENIA OF BOTH HIPS: ICD-10-CM

## 2025-01-23 DIAGNOSIS — Z78.0 POSTMENOPAUSAL ESTROGEN DEFICIENCY: ICD-10-CM

## 2025-01-23 PROCEDURE — 77080 DXA BONE DENSITY AXIAL: CPT

## 2025-01-30 PROBLEM — M85.839 OSTEOPENIA OF FOREARM: Status: ACTIVE | Noted: 2025-01-13

## 2025-01-30 PROBLEM — M85.859 OSTEOPENIA OF HIP: Chronic | Status: ACTIVE | Noted: 2025-01-13

## 2025-02-10 ENCOUNTER — NON-PROVIDER VISIT (OUTPATIENT)
Dept: CARDIOLOGY | Facility: MEDICAL CENTER | Age: 70
End: 2025-02-10
Payer: MEDICARE

## 2025-02-10 DIAGNOSIS — Z95.5 STENTED CORONARY ARTERY: Primary | ICD-10-CM

## 2025-02-10 PROCEDURE — G0422 INTENS CARDIAC REHAB W/EXERC: HCPCS | Performed by: INTERNAL MEDICINE

## 2025-02-10 PROCEDURE — G0423 INTENS CARDIAC REHAB NO EXER: HCPCS | Mod: 59 | Performed by: INTERNAL MEDICINE

## 2025-02-10 NOTE — PROGRESS NOTES
Patient arrived for initial 1:1 Intensive Cardiac Rehabilitation Consultation and Education session with the Registered Nurse.  A total of 60 minutes was spent with the patient during which time a focused cardiovascular assessment was completed and musculoskeletal limitations were addressed in preparation to safely start the exercise portion of the program.  Education regarding the program was explained including exercise goals, precautions, and target heart rate during exercise.  Nutrition goals were reviewed and patient was introduced to the [facility].  Stress management goals were discussed and patient concerns and questions were answered at this time. Patient arrived for education at 1130 and visit was concluded at 1230.  Exercise time was from 1230 to 1330.

## 2025-02-11 ENCOUNTER — NON-PROVIDER VISIT (OUTPATIENT)
Dept: CARDIOLOGY | Facility: MEDICAL CENTER | Age: 70
End: 2025-02-11
Payer: MEDICARE

## 2025-02-11 DIAGNOSIS — Z95.5 STENTED CORONARY ARTERY: ICD-10-CM

## 2025-02-11 PROCEDURE — G0422 INTENS CARDIAC REHAB W/EXERC: HCPCS | Performed by: INTERNAL MEDICINE

## 2025-02-11 PROCEDURE — G0423 INTENS CARDIAC REHAB NO EXER: HCPCS | Mod: 59 | Performed by: INTERNAL MEDICINE

## 2025-02-11 NOTE — PROGRESS NOTES
Almaz Samuel attended Intensive Cardiac Rehab today from 0900 to 1100. During her time she exercised and attended class. Her education today was a VIDEO titled: HYPERTENSION AND HEART DISEASE. Patient received handouts and class discussion pertaining to the topic.

## 2025-02-12 ENCOUNTER — NON-PROVIDER VISIT (OUTPATIENT)
Dept: CARDIOLOGY | Facility: MEDICAL CENTER | Age: 70
End: 2025-02-12
Payer: MEDICARE

## 2025-02-12 DIAGNOSIS — Z95.5 STENTED CORONARY ARTERY: ICD-10-CM

## 2025-02-12 PROCEDURE — G0423 INTENS CARDIAC REHAB NO EXER: HCPCS | Mod: 59 | Performed by: INTERNAL MEDICINE

## 2025-02-12 PROCEDURE — G0422 INTENS CARDIAC REHAB W/EXERC: HCPCS | Performed by: INTERNAL MEDICINE

## 2025-02-12 NOTE — PROGRESS NOTES
Almaz Samuel attended Intensive Cardiac Rehab today from 0900 to 1100. During her time she exercised and attended class. Her education today was a cooking school titled: [employer]. Patient received handouts and class discussion pertaining to the topic.

## 2025-02-18 ENCOUNTER — NON-PROVIDER VISIT (OUTPATIENT)
Dept: CARDIOLOGY | Facility: MEDICAL CENTER | Age: 70
End: 2025-02-18
Payer: MEDICARE

## 2025-02-18 DIAGNOSIS — Z95.5 STENTED CORONARY ARTERY: ICD-10-CM

## 2025-02-18 PROCEDURE — G0422 INTENS CARDIAC REHAB W/EXERC: HCPCS | Performed by: INTERNAL MEDICINE

## 2025-02-18 PROCEDURE — G0423 INTENS CARDIAC REHAB NO EXER: HCPCS | Mod: 59 | Performed by: INTERNAL MEDICINE

## 2025-02-18 NOTE — PROGRESS NOTES
Almaz Samuel attended Intensive Cardiac Rehab today from 0900 to 1100. During her time she exercised and attended class. Her education today was a WORKSHOP titled: MANAGING HEART DISEASE PART 1. Patient received handouts and class discussion pertaining to the topic.

## 2025-02-25 ENCOUNTER — NON-PROVIDER VISIT (OUTPATIENT)
Dept: CARDIOLOGY | Facility: MEDICAL CENTER | Age: 70
End: 2025-02-25
Payer: MEDICARE

## 2025-02-25 DIAGNOSIS — Z95.5 STENTED CORONARY ARTERY: ICD-10-CM

## 2025-02-25 NOTE — PROGRESS NOTES
Almaz Samuel attended Intensive Cardiac Rehab today from 1000 to 1200. During her time she exercised and attended class. Her education today was a video titled: Dining Out. Patient received handouts and class discussion pertaining to the topic.

## 2025-02-26 ENCOUNTER — NON-PROVIDER VISIT (OUTPATIENT)
Dept: CARDIOLOGY | Facility: MEDICAL CENTER | Age: 70
End: 2025-02-26
Payer: MEDICARE

## 2025-02-26 DIAGNOSIS — Z95.5 STENTED CORONARY ARTERY: ICD-10-CM

## 2025-02-26 NOTE — PROGRESS NOTES
Almaz Samuel attended Intensive Cardiac Rehab today from 1000 to 1200. During her time she exercised and attended class. Her education today was a cooking school titled: [employer]. Patient received handouts and class discussion pertaining to the topic.

## 2025-02-27 ENCOUNTER — NON-PROVIDER VISIT (OUTPATIENT)
Dept: CARDIOLOGY | Facility: MEDICAL CENTER | Age: 70
End: 2025-02-27
Payer: MEDICARE

## 2025-02-27 DIAGNOSIS — Z95.5 STENTED CORONARY ARTERY: ICD-10-CM

## 2025-02-27 NOTE — PROGRESS NOTES
Almaz Samuel attended Intensive Cardiac Rehab today from 1000 to 1200. During her time she exercised and attended class. Her education today was a workshop titled: Dining Out . Patient received handouts and class discussion pertaining to the topic.

## 2025-03-03 DIAGNOSIS — I21.4 NSTEMI (NON-ST ELEVATED MYOCARDIAL INFARCTION) (HCC): ICD-10-CM

## 2025-03-03 DIAGNOSIS — Z79.02 PLATELET INHIBITION DUE TO PLAVIX: ICD-10-CM

## 2025-03-03 DIAGNOSIS — I25.10 CORONARY ARTERY DISEASE INVOLVING NATIVE CORONARY ARTERY OF NATIVE HEART WITHOUT ANGINA PECTORIS: ICD-10-CM

## 2025-03-05 ENCOUNTER — HOSPITAL ENCOUNTER (OUTPATIENT)
Dept: CARDIOLOGY | Facility: MEDICAL CENTER | Age: 70
End: 2025-03-05
Attending: INTERNAL MEDICINE
Payer: MEDICARE

## 2025-03-05 DIAGNOSIS — I50.20 HFREF (HEART FAILURE WITH REDUCED EJECTION FRACTION) (HCC): ICD-10-CM

## 2025-03-05 PROCEDURE — 93306 TTE W/DOPPLER COMPLETE: CPT

## 2025-03-05 RX ORDER — PANTOPRAZOLE SODIUM 20 MG/1
20 TABLET, DELAYED RELEASE ORAL DAILY
Qty: 90 TABLET | Refills: 3 | Status: SHIPPED | OUTPATIENT
Start: 2025-03-05

## 2025-03-05 NOTE — TELEPHONE ENCOUNTER
Received request via: Patient    Was the patient seen in the last year in this department? Yes    Does the patient have an active prescription (recently filled or refills available) for medication(s) requested? No    Ph[facility] Name[facility]    Does the patient have custodial Plus and need 100-day supply? (This applies to ALL medications) Patient does not have SCP

## 2025-03-06 ENCOUNTER — RESULTS FOLLOW-UP (OUTPATIENT)
Dept: CARDIOLOGY | Facility: MEDICAL CENTER | Age: 70
End: 2025-03-06

## 2025-03-06 ENCOUNTER — NON-PROVIDER VISIT (OUTPATIENT)
Dept: CARDIOLOGY | Facility: MEDICAL CENTER | Age: 70
End: 2025-03-06
Payer: MEDICARE

## 2025-03-06 DIAGNOSIS — Z95.5 STENTED CORONARY ARTERY: ICD-10-CM

## 2025-03-06 LAB
LV EJECT FRACT  99904: 60
LV EJECT FRACT MOD 2C 99903: 49.45
LV EJECT FRACT MOD 4C 99902: 52.93
LV EJECT FRACT MOD BP 99901: 52.75

## 2025-03-06 PROCEDURE — G0422 INTENS CARDIAC REHAB W/EXERC: HCPCS | Performed by: INTERNAL MEDICINE

## 2025-03-06 PROCEDURE — 93306 TTE W/DOPPLER COMPLETE: CPT | Mod: 26 | Performed by: INTERNAL MEDICINE

## 2025-03-06 PROCEDURE — G0423 INTENS CARDIAC REHAB NO EXER: HCPCS | Mod: 59 | Performed by: INTERNAL MEDICINE

## 2025-03-06 NOTE — PROGRESS NOTES
Almaz Samuel attended Intensive Cardiac Rehab today from 1000 to 1200. During her time she exercised and attended class. Her education today was a WORKSHOP titled: MANAGING HEART DISEASE PART 2. Patient received handouts and class discussion pertaining to the topic.

## 2025-03-11 ENCOUNTER — NON-PROVIDER VISIT (OUTPATIENT)
Dept: CARDIOLOGY | Facility: MEDICAL CENTER | Age: 70
End: 2025-03-11
Payer: MEDICARE

## 2025-03-11 DIAGNOSIS — Z95.5 STENTED CORONARY ARTERY: ICD-10-CM

## 2025-03-11 PROCEDURE — G0423 INTENS CARDIAC REHAB NO EXER: HCPCS | Mod: 59 | Performed by: INTERNAL MEDICINE

## 2025-03-11 PROCEDURE — G0422 INTENS CARDIAC REHAB W/EXERC: HCPCS | Performed by: INTERNAL MEDICINE

## 2025-03-11 NOTE — PROGRESS NOTES
Almaz Samuel attended Intensive Cardiac Rehab today from 1000 to 1200. During her time she exercised and attended class. Her education today was a VIDEO titled: HEART DISEASE AND RISK REDUCTION. Patient received handouts and class discussion pertaining to the topic.

## 2025-03-12 ENCOUNTER — NON-PROVIDER VISIT (OUTPATIENT)
Dept: CARDIOLOGY | Facility: MEDICAL CENTER | Age: 70
End: 2025-03-12
Payer: MEDICARE

## 2025-03-12 DIAGNOSIS — Z95.5 STENTED CORONARY ARTERY: ICD-10-CM

## 2025-03-12 PROCEDURE — G0423 INTENS CARDIAC REHAB NO EXER: HCPCS | Mod: 59 | Performed by: INTERNAL MEDICINE

## 2025-03-12 PROCEDURE — G0422 INTENS CARDIAC REHAB W/EXERC: HCPCS | Performed by: INTERNAL MEDICINE

## 2025-03-12 NOTE — PROGRESS NOTES
Almaz Samuel attended Intensive Cardiac Rehab today from 1000 to 1200. During her time she exercised and attended class. Her education today was a nutrition and cooking class titled: [employer]. Patient received handouts and class discussion pertaining to the topic.

## 2025-03-13 ENCOUNTER — TELEPHONE (OUTPATIENT)
Dept: CARDIOLOGY | Facility: MEDICAL CENTER | Age: 70
End: 2025-03-13

## 2025-03-13 ENCOUNTER — OFFICE VISIT (OUTPATIENT)
Dept: CARDIOLOGY | Facility: MEDICAL CENTER | Age: 70
End: 2025-03-13
Attending: INTERNAL MEDICINE
Payer: MEDICARE

## 2025-03-13 VITALS
DIASTOLIC BLOOD PRESSURE: 70 MMHG | HEIGHT: 60 IN | SYSTOLIC BLOOD PRESSURE: 116 MMHG | RESPIRATION RATE: 16 BRPM | HEART RATE: 60 BPM | WEIGHT: 158 LBS | BODY MASS INDEX: 31.02 KG/M2 | OXYGEN SATURATION: 96 %

## 2025-03-13 DIAGNOSIS — I50.20 HFREF (HEART FAILURE WITH REDUCED EJECTION FRACTION) (HCC): ICD-10-CM

## 2025-03-13 DIAGNOSIS — E78.01 FAMILIAL HYPERCHOLESTEREMIA: ICD-10-CM

## 2025-03-13 DIAGNOSIS — I25.83 CORONARY ARTERY DISEASE DUE TO LIPID RICH PLAQUE: Chronic | ICD-10-CM

## 2025-03-13 DIAGNOSIS — E78.2 MIXED HYPERLIPIDEMIA: ICD-10-CM

## 2025-03-13 DIAGNOSIS — I50.22 CHRONIC SYSTOLIC HEART FAILURE (HCC): ICD-10-CM

## 2025-03-13 DIAGNOSIS — Z95.5 STATUS POST INSERTION OF DRUG-ELUTING STENT INTO LEFT ANTERIOR DESCENDING (LAD) ARTERY: Chronic | ICD-10-CM

## 2025-03-13 DIAGNOSIS — I25.10 CORONARY ARTERY DISEASE DUE TO LIPID RICH PLAQUE: Chronic | ICD-10-CM

## 2025-03-13 DIAGNOSIS — I25.5 ISCHEMIC CARDIOMYOPATHY: Chronic | ICD-10-CM

## 2025-03-13 PROCEDURE — 99214 OFFICE O/P EST MOD 30 MIN: CPT | Performed by: INTERNAL MEDICINE

## 2025-03-13 PROCEDURE — 3078F DIAST BP <80 MM HG: CPT | Performed by: INTERNAL MEDICINE

## 2025-03-13 PROCEDURE — 3074F SYST BP LT 130 MM HG: CPT | Performed by: INTERNAL MEDICINE

## 2025-03-13 PROCEDURE — 99213 OFFICE O/P EST LOW 20 MIN: CPT | Performed by: INTERNAL MEDICINE

## 2025-03-13 PROCEDURE — G2211 COMPLEX E/M VISIT ADD ON: HCPCS | Performed by: INTERNAL MEDICINE

## 2025-03-13 ASSESSMENT — FIBROSIS 4 INDEX: FIB4 SCORE: 1.04

## 2025-03-13 NOTE — PROGRESS NOTES
"Chief Complaint   Patient presents with    Aortic Atherosclerosis    Coronary Artery Disease    Congestive Heart Failure     F/v dx: HFrEF (heart failure with reduced ejection fraction) ([facility])       Subjective     Almaz Samuel is a 69 y.o. female who presents today CAD post PIC to LAD in setting of CHF 11/2024    Doing cardiac rehab    Had lt le hematoma      Past Medical History:   Diagnosis Date    Anesthesia 02/14/2011    PO N/V, \"slow to come out\", vasovagal response with last ablation/block on her neck    Aortic atherosclerosis ([facility])     Apnea, sleep     Arrhythmia 8/14    Arthritis 02/14/2011    spine    Backpain 02/14/2011    neck and abd. also,     Bowel habit changes 03/08/2023    constipation and diarrhea r/t IBS, medicated    Breath shortness     with exertion    Bronchitis 05/2018    Cancer ([facility]) 07/18/2018    Skin - 15 years ago.    Cancer ([facility])     April/May 2018 Melanoma (nose)    Cataract 03/08/2023    in both eyes, no surgery at present    Complication of anesthesia     Coronary artery disease due to lipid rich plaque - PCI TO LAD for CHF 11/21/2024    Daytime sleepiness     Delayed emergence from general anesthesia     Diverticulitis     Endometriosis of uterus     Familial hypercholesteremia     Fusion of spine 2014    ROBERT (generalized anxiety disorder) 03/08/2023    medicated    H/O fall     when in hospital  with low O2 sats    H/O: CVA (cerebrovascular accident) 08/22/2014    Complex event associated with apparent medication reaction but with MRI suggesting possible multiple small infarcts of various ages, [facility]    Hair loss 12/06/2018    Heart burn 03/08/2023    medicated    Hiatus hernia syndrome     not repaired    High cholesterol 03/08/2023    medicated    HTN, goal below 130/80 10/24/2011    Indigestion     Infectious disease      Hep C +    Insomnia     Ischemic cardiomyopathy 11/21/2024    Lung collapse 02/14/2011    right lung 1/4 collpsed     Major depressive disorder with " single episode, in full remission (HCC) 10/24/2011    Mixed hyperlipidemia 12/27/2011    Moderate major depression ([facility]) 03/08/2023    medicated    MRSA exposure 08/2014    while in hospital    Myocardial infarct ([facility]) 10/24    PONV (postoperative nausea and vomiting) 03/08/2023    Post-menopause on HRT (hormone replacement therapy) 12/27/2011    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    exposed as a child, told not active    Scoliosis     Sleep apnea 03/08/2023    BIPAP, hasn't used in the last year    Snoring 03/08/2023    Stroke ([facility]) 03/08/2023    tia's times 3 in the past    Unspecified vitamin D deficiency 09/24/2012    Urinary incontinence 03/08/2023    at present, wears a pad     Past Surgical History:   Procedure Laterality Date    CATARACT EXTRACTION WITH IOL  2024    OTHER SURGICAL PROCEDURE  04/2023    repair of rectal and bladder prolapse with Dr. Green    CO PELVIC EXAMINATION W ANESTH  03/27/2023    Procedure: PELVIC EXAM UNDER ANESTHESIA, POSTERIOR REPAIR, sacrospinous vault suspension, PERINEORRHAPHY,;  Surgeon: Rajinder Myles M.D.;  Location: SURGERY SAME DAY [facility];  Service: Gynecology    CO NEUROPLASTY & OR TRANSPOS MEDIAN NRV CARPAL SABRINA Right 05/31/2022    Procedure: RIGHT HAND OPEN CARPAL TUNNEL DECOMPRESSION;  Surgeon: Holly Martin M.D.;  Location: [facility];  Service: Orthopedics    NISSEN FUNDOPLICATION LAPAROSCOPIC  07/25/2018    Procedure: NISSEN FUNDOPLICATION LAPAROSCOPIC;  Surgeon: Kenji Okeefe M.D.;  Location: SURGERY [facility];  Service: General    NISSEN FUNDOPLICATION LAPAROSCOPIC N/A 06/30/2015    Procedure: NISSEN FUNDOPLICATION LAPAROSCOPIC;  Surgeon: Kenji Okeefe M.D.;  Location: SURGERY SAME DAY [facility] ORS;  Service:     GASTROSCOPY-ENDO  06/24/2015    Procedure: GASTROSCOPY-ENDO;  Surgeon: Kenji Okeefe M.D.;  Location: ENDOSCOPY [facility];  Service:     CARPAL TUNNEL RELEASE  11/14/2012    Performed by Holly Martin M.D. at [facility]  ORS    LOW ANTERIOR RESECTION LAPAROSCOPIC  2011    Performed by AYAZ GARCIA at SURGERY [facility] ORS    CERVICAL DISK AND FUSION ANTERIOR  2010    Performed by JOSEPH DARLING at SURGERY [facility] ORS    TUBAL LIGATION  1988    TONSILLECTOMY AND ADENOIDECTOMY  1959    ABDOMINAL HYSTERECTOMY TOTAL      APPENDECTOMY      GYN SURGERY      partial hysterectomy    OTHER NEUROLOGICAL SURG  2014     Family History   Problem Relation Age of Onset    Breast Cancer Mother     Diabetes Mother     Lung Disease Mother         copd    Hyperlipidemia Mother     Hypertension Mother     Glaucoma Mother     Cancer Father         Laryngeal CA    Heart Disease Father 50        CAD with bypasses x 3    Heart Attack Father     Hyperlipidemia Father     Hypertension Father     Diabetes Sister         type II diabetes    Hyperlipidemia Sister     Diabetes Other     Heart Disease Other     Hypertension Other     Lung Disease Other     Heart Disease Brother     Heart Disease Sister     Heart Disease Brother     Hyperlipidemia Sister     Hyperlipidemia Sister     Hyperlipidemia Sister     Hyperlipidemia Brother     Ovarian Cancer Neg Hx     Tubal Cancer Neg Hx     Peritoneal Cancer Neg Hx     Colorectal Cancer Neg Hx      Social History     Socioeconomic History    Marital status:      Spouse name: Not on file    Number of children: 1    Years of education: Not on file    Highest education level: Not on file   Occupational History     Employer: Retired   Tobacco Use    Smoking status: Former     Current packs/day: 0.00     Average packs/day: 0.3 packs/day for 5.0 years (1.5 ttl pk-yrs)     Types: Cigarettes     Start date: 1970     Quit date: 1975     Years since quittin.2     Passive exposure: Past    Smokeless tobacco: Never    Tobacco comments:     quit    Vaping Use    Vaping status: Never Used   Substance and Sexual Activity    Alcohol use: No    Drug use: No    Sexual activity: Yes  "    Partners: Male     Comment: , 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[employer]   Other Topics Concern     Service No    Blood Transfusions No    Caffeine Concern Not Asked    Occupational Exposure Not Asked    Hobby Hazards Not Asked    Sleep Concern Not Asked    Stress Concern Not Asked    Weight Concern Not Asked    Special Diet Not Asked    Back Care Not Asked    Exercise No    Bike Helmet No    Seat Belt Yes    Self-Exams Yes   Social History Narrative    Not on file     Social Drivers of Health     Financial Resource Strain: Not on file   Food Insecurity: No Food Insecurity (10/17/2024)    Hunger Vital Sign     Worried About Running Out of Food in the Last Year: Never true     Ran Out of Food in the Last Year: Never true   Transportation Needs: No Transportation Needs (10/17/2024)    PRAPARE - Transportation     Lack of Transportation (Medical): No     Lack of Transportation (Non-Medical): No   Physical Activity: Not on file   Stress: Not on file   Social Connections: Not on file   Intimate Partner Violence: Not At Risk (10/17/2024)    Humiliation, Afraid, Rape, and Kick questionnaire     Fear of Current or Ex-Partner: No     Emotionally Abused: No     Physically Abused: No     Sexually Abused: No   Housing Stability: Low Risk  (10/17/2024)    Housing Stability Vital Sign     Unable to Pay for Housing in the Last Year: No     Number of Times Moved in the Last Year: 0     Homeless in the Last Year: No     Allergies   Allergen Reactions    Albumin Unspecified     Other reaction(s): Other (See Comments)  \"egg intolerance\"  Reaction:stomach cramps,throwing up for 12 hours,cold sweats    Rosuvastatin Nausea    Seasonal Itching     Pt states eye irritation, pollen     Simvastatin Nausea     Outpatient Encounter Medications as of 3/13/2025   Medication Sig Dispense Refill    sacubitril-valsartan (ENTRESTO) 24-26 MG Tab Take 1 Tablet by mouth 2 times a day. 180 Tablet 3    pantoprazole (PROTONIX) 20 MG tablet Take 1 Tablet " by mouth every day. Further refills must come from primary care, thank you 90 Tablet 3    amitriptyline (ELAVIL) 10 MG Tab Take 1 Tablet by mouth at bedtime as needed for Sleep. 90 Tablet 0    baclofen (LIORESAL) 10 MG Tab TAKE 1 TABLET BY MOUTH AT BEDTIME 90 Tablet 0    clopidogrel (PLAVIX) 75 MG Tab Take 1 Tablet by mouth every day. 100 Tablet 2    meloxicam (MOBIC) 7.5 MG Tab TAKE 1 TO 2 TABLETS BY MOUTH ONCE DAILY 90 Tablet 0    Evolocumab (REPATHA) 140 MG/ML Solution Auto-injector SubQ injection pen Inject 1 mL under the skin every 14 days. 2.1 Each 11    fluticasone (FLONASE ALLERGY RELIEF) 50 MCG/ACT nasal spray Administer 1 Spray into affected nostril(S) 1 time a day as needed.      furosemide (LASIX) 20 MG Tab Take 1 Tablet by mouth every day. 90 Tablet 3    bisoprolol (ZEBETA) 5 MG Tab Take 1 Tablet by mouth every day. 90 Tablet 3    aspirin 81 MG EC tablet Take 1 Tablet by mouth every day. Indications: Acute Heart Attack 100 Tablet 2    triamcinolone acetonide (KENALOG) 0.1 % Cream Apply 1 Application topically 2 times a day. 15 g 1    traZODone (DESYREL) 150 MG Tab Take 1 Tablet by mouth every evening. 30 Tablet 1    benzonatate (TESSALON) 100 MG Cap Take 1-2 Capsules by mouth 3 times a day as needed for Cough. 90 Capsule 0    gabapentin (NEURONTIN) 300 MG Cap Take 1 capsule by mouth twice daily 180 Capsule 3    Azelastine (ASTELIN) 137 MCG/SPRAY Solution Administer 2 Sprays into affected nostril(S) 2 times a day. 30 mL 0    hydrOXYzine HCl (ATARAX) 25 MG Tab Take 1 Tablet by mouth 3 times a day as needed for Itching. 30 Tablet 1    citalopram (CELEXA) 10 MG tablet Take 1 tablet by mouth once daily 90 Tablet 3    ezetimibe (ZETIA) 10 MG Tab Take 1 tablet by mouth once daily 90 Tablet 3    ondansetron (ZOFRAN ODT) 4 MG TABLET DISPERSIBLE Take 1 Tablet by mouth every 6 hours as needed for Nausea/Vomiting. 10 Tablet 2    estradiol (ESTRACE VAGINAL) 0.1 MG/GM vaginal cream Apply 1g cream inside vagina twice  per week 1 Each 3    dicyclomine (BENTYL) 10 MG Cap TAKE 1 CAPSULE BY MOUTH EVERY 6 HOURS AS NEEDED FOR ABDOMINAL CRAMPS AND OR DISCOMFORT      Artificial Tear Solution (TEARS NATURALE OP) Place 2 Drops in both eyes 2 times a day as needed.      [DISCONTINUED] ondansetron (ZOFRAN) 4 MG Tab tablet Take 4 mg by mouth every 8 hours as needed. (Patient not taking: Reported on 3/13/2025)      [DISCONTINUED] losartan (COZAAR) 25 MG Tab Take 0.5 Tablets by mouth every day. 45 Tablet 3     No facility-administered encounter medications on file as of 3/13/2025.     ROS           Objective     /70 (BP Location: Left arm, Patient Position: Sitting, BP Cuff Size: Adult)   Pulse 60   Resp 16   Ht 1.524 m (5')   Wt 71.7 kg (158 lb)   SpO2 96%   BMI 30.86 kg/m²     Physical Exam  Constitutional:       General: She is not in acute distress.     Appearance: She is not diaphoretic.   Eyes:      General: No scleral icterus.  Neck:      Vascular: No JVD.   Cardiovascular:      Rate and Rhythm: Normal rate.      Heart sounds: Normal heart sounds. No murmur heard.     No friction rub. No gallop.   Pulmonary:      Effort: No respiratory distress.      Breath sounds: No wheezing or rales.   Abdominal:      General: Bowel sounds are normal.      Palpations: Abdomen is soft.   Musculoskeletal:      Right lower leg: No edema.      Left lower leg: No edema.   Skin:     Findings: No rash.   Neurological:      Mental Status: She is alert. Mental status is at baseline.   Psychiatric:         Mood and Affect: Mood normal.            We reviewed in person the most recent labs  Recent Results (from the past 30 weeks)   EKG - STAT Once    Collection Time: 10/17/24  7:39 AM   Result Value Ref Range    Report       [facility]    Test Date:  2024-10-17  Pt Name:    LOW WHITE                 Department: 161  MRN:        [id number]                      Room:       T614  Gender:     Female                       Technician: RUBA  :         1955                   Requested By:KYM ROMERO  Order #:    [id number]                    Reading MD: Valdez Orr MD    Measurements  Intervals                                Axis  Rate:       84                           P:          21  TN:         145                          QRS:        39  QRSD:       88                           T:          12  QT:         393  QTc:        465    Interpretive Statements  Sinus rhythm  Anterior infarct, age indeterminate  Prolonged QT interval  ABNORMAL T WAVES, ANTEROLATERAL LEADS  Electronically Signed On 10- 12:05:53 PDT by Valdez Orr MD     CBC WITH DIFFERENTIAL    Collection Time: 10/17/24  7:50 AM   Result Value Ref Range    WBC 8.2 4.8 - 10.8 K/uL    RBC 4.08 (L) 4.20 - 5.40 M/uL    Hemoglobin 12.6 12.0 - 16.0 g/dL    Hematocrit 37.9 37.0 - 47.0 %    MCV 92.9 81.4 - 97.8 fL    MCH 30.9 27.0 - 33.0 pg    MCHC 33.2 32.2 - 35.5 g/dL    RDW 46.3 35.9 - 50.0 fL    Platelet Count 280 164 - 446 K/uL    MPV 9.2 9.0 - 12.9 fL    Neutrophils-Polys 81.90 (H) 44.00 - 72.00 %    Lymphocytes 9.10 (L) 22.00 - 41.00 %    Monocytes 8.40 0.00 - 13.40 %    Eosinophils 0.00 0.00 - 6.90 %    Basophils 0.10 0.00 - 1.80 %    Immature Granulocytes 0.50 0.00 - 0.90 %    Nucleated RBC 0.00 0.00 - 0.20 /100 WBC    Neutrophils (Absolute) 6.75 1.82 - 7.42 K/uL    Lymphs (Absolute) 0.75 (L) 1.00 - 4.80 K/uL    Monos (Absolute) 0.69 0.00 - 0.85 K/uL    Eos (Absolute) 0.00 0.00 - 0.51 K/uL    Baso (Absolute) 0.01 0.00 - 0.12 K/uL    Immature Granulocytes (abs) 0.04 0.00 - 0.11 K/uL    NRBC (Absolute) 0.00 K/uL   Comp Metabolic Panel    Collection Time: 10/17/24  7:50 AM   Result Value Ref Range    Sodium 137 135 - 145 mmol/L    Potassium 3.8 3.6 - 5.5 mmol/L    Chloride 98 96 - 112 mmol/L    Co2 26 20 - 33 mmol/L    Anion Gap 13.0 7.0 - 16.0    Glucose 112 (H) 65 - 99 mg/dL    Bun 9 8 - 22 mg/dL    Creatinine 0.95 0.50 - 1.40 mg/dL    Calcium 8.0 (L) 8.5 - 10.5 mg/dL    Correct  Calcium 8.7 8.5 - 10.5 mg/dL    AST(SGOT) 69 (H) 12 - 45 U/L    ALT(SGPT) 66 (H) 2 - 50 U/L    Alkaline Phosphatase 103 (H) 30 - 99 U/L    Total Bilirubin 0.4 0.1 - 1.5 mg/dL    Albumin 3.1 (L) 3.2 - 4.9 g/dL    Total Protein 6.2 6.0 - 8.2 g/dL    Globulin 3.1 1.9 - 3.5 g/dL    A-G Ratio 1.0 g/dL   Prothrombin Time (INR) (plasma)    Collection Time: 10/17/24  7:50 AM   Result Value Ref Range    PT 13.4 12.0 - 14.6 sec    INR 1.01 0.87 - 1.13   MAGNESIUM    Collection Time: 10/17/24  7:50 AM   Result Value Ref Range    Magnesium 2.0 1.5 - 2.5 mg/dL   COD - Adult (Type and Screen)    Collection Time: 10/17/24  7:50 AM   Result Value Ref Range    ABO Grouping Only O     Rh Grouping Only NEG     Antibody Screen-Cod NEG    LACTIC ACID    Collection Time: 10/17/24  7:50 AM   Result Value Ref Range    Lactic Acid 1.7 0.5 - 2.0 mmol/L   Troponin - STAT Once    Collection Time: 10/17/24  7:50 AM   Result Value Ref Range    Troponin T 277 (H) 6 - 19 ng/L   aPTT    Collection Time: 10/17/24  7:50 AM   Result Value Ref Range    APTT 36.0 24.7 - 36.0 sec   Heparin Xa (Unfractionated)    Collection Time: 10/17/24  7:50 AM   Result Value Ref Range    Heparin Xa (UFH) 0.71 IU/mL   HOLD BLOOD BANK SPECIMEN (NOT TESTED)    Collection Time: 10/17/24  7:50 AM   Result Value Ref Range    Holding Tube - Bb DONE    ESTIMATED GFR    Collection Time: 10/17/24  7:50 AM   Result Value Ref Range    GFR (CKD-EPI) 64 >60 mL/min/1.73 m 2   EKG in four (4) hours    Collection Time: 10/17/24 11:38 AM   Result Value Ref Range    Report       [facility]    Test Date:  2024-10-17  Pt Name:    LOW WHITE                 Department: 161  MRN:        [id number]                      Room:       14  Gender:     Female                       Technician: RUBA  :        1955                   Requested By:KYM ROMERO  Order #:    [id number]                    Reading MD: Valdez Orr MD    Measurements  Intervals                                 Axis  Rate:       91                           P:          20  RI:         147                          QRS:        25  QRSD:       88                           T:          102  QT:         410  QTc:        505    Interpretive Statements  Sinus rhythm  Anterior infarct, age indeterminate  Prolonged QT interval  ABNORMAL T WAVES, ANTEROLATERAL LEADS  Electronically Signed On 10- 12:06:09 PDT by Valdez Orr MD     Prothrombin Time    Collection Time: 10/17/24  3:53 PM   Result Value Ref Range    PT 13.1 12.0 - 14.6 sec    INR 0.99 0.87 - 1.13   APTT    Collection Time: 10/17/24  3:53 PM   Result Value Ref Range    APTT 64.2 (H) 24.7 - 36.0 sec   Heparin Anti-Xa    Collection Time: 10/17/24  3:53 PM   Result Value Ref Range    Heparin Xa (UFH) 0.95 IU/mL   EC-ECHOCARDIOGRAM COMPLETE W/ CONT    Collection Time: 10/17/24  5:09 PM   Result Value Ref Range    Eject.Frac. MOD BP 32.47     Eject.Frac. MOD 4C 32.05     Eject.Frac. MOD 2C 31.3     Left Ventrical Ejection Fraction 28    Heparin Xa (Unfractionated)    Collection Time: 10/17/24 11:45 PM   Result Value Ref Range    Heparin Xa (UFH) 0.49 IU/mL   Lipid Profile (Tomorrow AM)    Collection Time: 10/18/24  2:00 AM   Result Value Ref Range    Cholesterol,Tot 146 100 - 199 mg/dL    Triglycerides 202 (H) 0 - 149 mg/dL    HDL 33 (A) >=40 mg/dL    LDL 73 <100 mg/dL   Heparin Xa (Unfractionated)    Collection Time: 10/18/24  5:35 AM   Result Value Ref Range    Heparin Xa (UFH) 0.39 IU/mL   Basic Metabolic Panel    Collection Time: 10/18/24  8:00 AM   Result Value Ref Range    Sodium 134 (L) 135 - 145 mmol/L    Potassium 3.2 (L) 3.6 - 5.5 mmol/L    Chloride 96 96 - 112 mmol/L    Co2 29 20 - 33 mmol/L    Glucose 103 (H) 65 - 99 mg/dL    Bun 10 8 - 22 mg/dL    Creatinine 0.76 0.50 - 1.40 mg/dL    Calcium 8.4 (L) 8.5 - 10.5 mg/dL    Anion Gap 9.0 7.0 - 16.0   ESTIMATED GFR    Collection Time: 10/18/24  8:00 AM   Result Value Ref Range    GFR (CKD-EPI) 83 >60  mL/min/1.73 m 2   TROPONIN    Collection Time: 10/18/24  8:00 AM   Result Value Ref Range    Troponin T 115 (H) 6 - 19 ng/L   Basic Metabolic Panel    Collection Time: 10/19/24  2:00 AM   Result Value Ref Range    Sodium 137 135 - 145 mmol/L    Potassium 4.4 3.6 - 5.5 mmol/L    Chloride 95 (L) 96 - 112 mmol/L    Co2 28 20 - 33 mmol/L    Glucose 93 65 - 99 mg/dL    Bun 11 8 - 22 mg/dL    Creatinine 0.58 0.50 - 1.40 mg/dL    Calcium 8.6 8.5 - 10.5 mg/dL    Anion Gap 14.0 7.0 - 16.0   Heparin Anti-Xa    Collection Time: 10/19/24  2:00 AM   Result Value Ref Range    Heparin Xa (UFH) 0.18 IU/mL   ESTIMATED GFR    Collection Time: 10/19/24  2:00 AM   Result Value Ref Range    GFR (CKD-EPI) 96 >60 mL/min/1.73 m 2   Basic Metabolic Panel    Collection Time: 10/20/24  4:29 AM   Result Value Ref Range    Sodium 136 135 - 145 mmol/L    Potassium 4.4 3.6 - 5.5 mmol/L    Chloride 98 96 - 112 mmol/L    Co2 30 20 - 33 mmol/L    Glucose 95 65 - 99 mg/dL    Bun 21 8 - 22 mg/dL    Creatinine 0.99 0.50 - 1.40 mg/dL    Calcium 9.2 8.5 - 10.5 mg/dL    Anion Gap 8.0 7.0 - 16.0   ESTIMATED GFR    Collection Time: 10/20/24  4:29 AM   Result Value Ref Range    GFR (CKD-EPI) 61 >60 mL/min/1.73 m 2   APTT    Collection Time: 11/06/24  1:25 PM   Result Value Ref Range    APTT 28.1 24.7 - 36.0 sec   PT    Collection Time: 11/06/24  1:25 PM   Result Value Ref Range    PT 13.1 12.0 - 14.6 sec    INR 0.99 0.87 - 1.13   Comp Metabolic Panel    Collection Time: 11/06/24  1:25 PM   Result Value Ref Range    Sodium 136 135 - 145 mmol/L    Potassium 4.5 3.6 - 5.5 mmol/L    Chloride 96 96 - 112 mmol/L    Co2 27 20 - 33 mmol/L    Anion Gap 13.0 7.0 - 16.0    Glucose 119 (H) 65 - 99 mg/dL    Bun 30 (H) 8 - 22 mg/dL    Creatinine 1.43 (H) 0.50 - 1.40 mg/dL    Calcium 9.8 8.5 - 10.5 mg/dL    Correct Calcium 9.8 8.5 - 10.5 mg/dL    AST(SGOT) 15 12 - 45 U/L    ALT(SGPT) 21 2 - 50 U/L    Alkaline Phosphatase 119 (H) 30 - 99 U/L    Total Bilirubin 0.4 0.1 - 1.5  mg/dL    Albumin 4.0 3.2 - 4.9 g/dL    Total Protein 7.3 6.0 - 8.2 g/dL    Globulin 3.3 1.9 - 3.5 g/dL    A-G Ratio 1.2 g/dL   CBC WITHOUT DIFFERENTIAL    Collection Time: 24  1:25 PM   Result Value Ref Range    WBC 8.0 4.8 - 10.8 K/uL    RBC 4.37 4.20 - 5.40 M/uL    Hemoglobin 13.5 12.0 - 16.0 g/dL    Hematocrit 41.1 37.0 - 47.0 %    MCV 94.1 81.4 - 97.8 fL    MCH 30.9 27.0 - 33.0 pg    MCHC 32.8 32.2 - 35.5 g/dL    RDW 46.7 35.9 - 50.0 fL    Platelet Count 565 (H) 164 - 446 K/uL    MPV 8.7 (L) 9.0 - 12.9 fL   ESTIMATED GFR    Collection Time: 24  1:25 PM   Result Value Ref Range    GFR (CKD-EPI) 39 (A) >60 mL/min/1.73 m 2   EKG    Collection Time: 24  1:31 PM   Result Value Ref Range    Report       [facility]    Test Date:  2024  Pt Name:    LOW WHITE                 Department: [facility]  MRN:        [id number]                      Room:  Gender:     Female                       Technician: FGJ  :        1955                   Requested By:DAVIAN MITCHELL  Order #:    [id number]                    Reading MD: Davian Mitchell MD    Measurements  Intervals                                Axis  Rate:       61                           P:          36  RI:         167                          QRS:        7  QRSD:       100                          T:          152  QT:         461  QTc:        465    Interpretive Statements  Sinus rhythm  Abnormal R-wave progression, late transition  Abnrm T, consider ischemia, anterolateral lds  Compared to ECG 10/17/2024 11:38:28  NO SIGNIFICANT CHANGES  Electronically Signed On 2024 17:06:14 PST by Davian Mitchell MD     VITAMIN D,25 HYDROXY (DEFICIENCY)    Collection Time: 24  7:29 AM   Result Value Ref Range    25-Hydroxy   Vitamin D 25 40 30 - 100 ng/mL   Lipid Profile    Collection Time: 24  7:29 AM   Result Value Ref Range    Cholesterol,Tot 203 (H) 100 - 199 mg/dL    Triglycerides 107 0 - 149 mg/dL    HDL 58  >=40 mg/dL     (H) <100 mg/dL   CBC WITHOUT DIFFERENTIAL    Collection Time: 11/14/24  7:29 AM   Result Value Ref Range    WBC 5.5 4.8 - 10.8 K/uL    RBC 4.60 4.20 - 5.40 M/uL    Hemoglobin 13.9 12.0 - 16.0 g/dL    Hematocrit 43.6 37.0 - 47.0 %    MCV 94.8 81.4 - 97.8 fL    MCH 30.2 27.0 - 33.0 pg    MCHC 31.9 (L) 32.2 - 35.5 g/dL    RDW 46.5 35.9 - 50.0 fL    Platelet Count 354 164 - 446 K/uL    MPV 9.1 9.0 - 12.9 fL   FASTING STATUS    Collection Time: 11/14/24  7:29 AM   Result Value Ref Range    Fasting Status Fasting    HEMOGLOBIN A1C (Glycohemoglobin GHB Total/A1C with MBG Estimate)    Collection Time: 11/14/24  7:30 AM   Result Value Ref Range    Glycohemoglobin 6.0 (H) 4.0 - 5.6 %    Est Avg Glucose 126 mg/dL   Lipoprotein (a)    Collection Time: 11/14/24  7:30 AM   Result Value Ref Range    Lipoprotein (a) 20 <=29 mg/dL   Comp Metabolic Panel    Collection Time: 11/14/24  7:30 AM   Result Value Ref Range    Sodium 140 135 - 145 mmol/L    Potassium 4.2 3.6 - 5.5 mmol/L    Chloride 99 96 - 112 mmol/L    Co2 31 20 - 33 mmol/L    Anion Gap 10.0 7.0 - 16.0    Glucose 94 65 - 99 mg/dL    Bun 15 8 - 22 mg/dL    Creatinine 1.08 0.50 - 1.40 mg/dL    Calcium 9.6 8.5 - 10.5 mg/dL    Correct Calcium 9.4 8.5 - 10.5 mg/dL    AST(SGOT) 25 12 - 45 U/L    ALT(SGPT) 19 2 - 50 U/L    Alkaline Phosphatase 97 30 - 99 U/L    Total Bilirubin 0.5 0.1 - 1.5 mg/dL    Albumin 4.2 3.2 - 4.9 g/dL    Total Protein 7.9 6.0 - 8.2 g/dL    Globulin 3.7 (H) 1.9 - 3.5 g/dL    A-G Ratio 1.1 g/dL   FASTING STATUS    Collection Time: 11/14/24  7:30 AM   Result Value Ref Range    Fasting Status Fasting    ESTIMATED GFR    Collection Time: 11/14/24  7:30 AM   Result Value Ref Range    GFR (CKD-EPI) 55 (A) >60 mL/min/1.73 m 2   CBC WITHOUT DIFFERENTIAL    Collection Time: 11/18/24  8:15 AM   Result Value Ref Range    WBC 6.4 4.8 - 10.8 K/uL    RBC 4.28 4.20 - 5.40 M/uL    Hemoglobin 13.1 12.0 - 16.0 g/dL    Hematocrit 40.5 37.0 - 47.0 %     MCV 94.6 81.4 - 97.8 fL    MCH 30.6 27.0 - 33.0 pg    MCHC 32.3 32.2 - 35.5 g/dL    RDW 47.3 35.9 - 50.0 fL    Platelet Count 307 164 - 446 K/uL    MPV 8.9 (L) 9.0 - 12.9 fL   Comp Metabolic Panel    Collection Time: 24  8:15 AM   Result Value Ref Range    Sodium 141 135 - 145 mmol/L    Potassium 4.1 3.6 - 5.5 mmol/L    Chloride 101 96 - 112 mmol/L    Co2 29 20 - 33 mmol/L    Anion Gap 11.0 7.0 - 16.0    Glucose 101 (H) 65 - 99 mg/dL    Bun 13 8 - 22 mg/dL    Creatinine 1.11 0.50 - 1.40 mg/dL    Calcium 9.8 8.5 - 10.5 mg/dL    Correct Calcium 9.6 8.5 - 10.5 mg/dL    AST(SGOT) 27 12 - 45 U/L    ALT(SGPT) 15 2 - 50 U/L    Alkaline Phosphatase 91 30 - 99 U/L    Total Bilirubin 0.4 0.1 - 1.5 mg/dL    Albumin 4.2 3.2 - 4.9 g/dL    Total Protein 7.5 6.0 - 8.2 g/dL    Globulin 3.3 1.9 - 3.5 g/dL    A-G Ratio 1.3 g/dL   PT    Collection Time: 24  8:15 AM   Result Value Ref Range    PT 12.5 12.0 - 14.6 sec    INR 0.93 0.87 - 1.13   APTT    Collection Time: 24  8:15 AM   Result Value Ref Range    APTT 26.0 24.7 - 36.0 sec   ESTIMATED GFR    Collection Time: 24  8:15 AM   Result Value Ref Range    GFR (CKD-EPI) 53 (A) >60 mL/min/1.73 m 2   EKG    Collection Time: 24  8:24 AM   Result Value Ref Range    Report       [facility]    Test Date:  2024  Pt Name:    LOW WHITE                 Department: [facility]  MRN:        [id number]                      Room:  Gender:     Female                       Technician: [facility]  :        [id number]                   Requested By:DAVIAN MITCHELL  Order #:    [id number]                    Reading MD: Davian Mitchell MD    Measurements  Intervals                                Axis  Rate:       58                           P:          25  MO:         152                          QRS:        -3  QRSD:       119                          T:          145  QT:         445  QTc:        438    Interpretive Statements  Sinus bradycardia  Nonspecific  intraventricular conduction delay  Inferior infarct, old  Abnrm T, consider ischemia, anterolateral lds  Compared to ECG 2024 13:31:36  BRAULIO SIGNIFICANT CHANGES  Electronically Signed On 2024 19:53:47 PST by Leonel Bridges MD     POCT glucose device results    Collection Time: 24  7:19 AM   Result Value Ref Range    POC Glucose, Blood 93 65 - 99 mg/dL   POCT activated clotting time device results    Collection Time: 24  8:56 AM   Result Value Ref Range    Istat Activated Clotting Time 308 (H) 74 - 137 sec   POCT activated clotting time device results    Collection Time: 24  9:11 AM   Result Value Ref Range    Istat Activated Clotting Time 262 (H) 74 - 137 sec   EKG STAT    Collection Time: 24  9:31 AM   Result Value Ref Range    Report       [facility]    Test Date:  2024  Pt Name:    LOW WHITE                 Department: [facility]  MRN:        [id number]                      Room:       [facility]  Gender:     Female                       Technician: [facility]  :        1955                   Requested By:ORALIA RITCHIE  Order #:    [id number]                    Reading MD: Roland Hung MD    Measurements  Intervals                                Axis  Rate:       61                           P:          18  IA:         158                          QRS:        1  QRSD:       105                          T:          138  QT:         488  QTc:        492    Interpretive Statements  Sinus rhythm  Inferior infarct, old  Abnrm T, consider ischemia, anterolateral lds  Compared to ECG 2024 08:24:34  Sinus bradycardia no longer present    Electronically Signed On 2024 10:17:12 PST by Roland Hung MD     Comp Metabolic Panel    Collection Time: 24  8:51 AM   Result Value Ref Range    Sodium 138 135 - 145 mmol/L    Potassium 4.1 3.6 - 5.5 mmol/L    Chloride 102 96 - 112 mmol/L    Co2 25 20 - 33 mmol/L    Anion Gap 11.0 7.0 - 16.0    Glucose 97 65 - 99  mg/dL    Bun 17 8 - 22 mg/dL    Creatinine 0.96 0.50 - 1.40 mg/dL    Calcium 9.1 8.5 - 10.5 mg/dL    Correct Calcium 8.9 8.5 - 10.5 mg/dL    AST(SGOT) 18 12 - 45 U/L    ALT(SGPT) 16 2 - 50 U/L    Alkaline Phosphatase 87 30 - 99 U/L    Total Bilirubin 0.5 0.1 - 1.5 mg/dL    Albumin 4.3 3.2 - 4.9 g/dL    Total Protein 7.2 6.0 - 8.2 g/dL    Globulin 2.9 1.9 - 3.5 g/dL    A-G Ratio 1.5 g/dL   FASTING STATUS    Collection Time: 12/27/24  8:51 AM   Result Value Ref Range    Fasting Status Non-Fasting    ESTIMATED GFR    Collection Time: 12/27/24  8:51 AM   Result Value Ref Range    GFR (CKD-EPI) 63 >60 mL/min/1.73 m 2   POCT CoV-2, Flu A/B, RSV by PCR    Collection Time: 01/02/25  9:18 AM   Result Value Ref Range    SARS-CoV-2 by PCR Negative Negative, Invalid    Influenza virus A RNA Negative Negative, Invalid    Influenza virus B, PCR Negative Negative, Invalid    RSV, PCR Negative Negative, Invalid   EC-ECHOCARDIOGRAM COMPLETE W/O CONT    Collection Time: 03/05/25 12:20 PM   Result Value Ref Range    Eject.Frac. MOD BP 52.75     Eject.Frac. MOD 4C 52.93     Eject.Frac. MOD 2C 49.45     Left Ventrical Ejection Fraction 60          Assessment & Plan     1. Mixed hyperlipidemia        2. Familial hypercholesteremia  Lipid Profile    Lipid Profile      3. HFrEF (heart failure with reduced ejection fraction) (HCC)  sacubitril-valsartan (ENTRESTO) 24-26 MG Tab      4. Chronic systolic heart failure (HCC)  CBC WITHOUT DIFFERENTIAL    Comp Metabolic Panel      5. Ischemic cardiomyopathy        6. Status post insertion of drug-eluting stent into left anterior descending (LAD) artery        7. Coronary artery disease due to lipid rich plaque - PCI TO LAD for CHF            Medical Decision Making: Today's Assessment/Status/Plan:      It was my pleasure to meet with Ms. Samuel.    We addressed the management of hypertension at today's visit. Blood pressure is well controlled.  We specifically assessed the labs on hypertension  treatment    We addressed the management of dyslipidemia and atherosclerosis at today's visit. She is on appropriate lipid lowering medication.    Watch for SE with wilfredo    We addressed the management of atherosclerotic artery disease.  She is on proper antiplatelet, cholesterol management as appropriate.  We addressed the potential side effects and laboratory follow-up for these medications.    We addressed the management of congestive heart failure. We addressed the potential side effects and laboratory follow-up for these medications. She is on proper mineralacorticoid, angiotensin/ace inhibition with neprilysin inhibition, SGLTs inhibitor and beta-blockers if appropriate.  We addressed the potential side effects and regular laboratory follow-up for these medications.  ADDED ENTRESTO    I will see Ms. Samuel back in 6 months time and encouraged her to follow up with us over the phone or electronically using my Coverityhart as issues arise.    It is my pleasure to participate in the care of Ms. Samuel.  Please do not hesitate to contact me with questions or concerns.    Leonel Bridges MD PhD [facility]  Cardiologist [facility]    Please note that this dictation was created using voice recognition software. There may be errors I did not discover before finalizing the note.     () Today's E/M visit is associated with medical care services that serve as the continuing focal point for all needed health care services and/or with medical care services that  are part of ongoing care related to a patient's single, serious condition, or a complex condition: This includes  furnishing services to patients on an ongoing basis that result in care that is personalized  to the patient. The services result in a comprehensive, longitudinal, and continuous  relationship with the patient and involve delivery of team-based care that is accessible, coordinated with other practitioners and  providers, and integrated with the broader health  care landscape.

## 2025-03-13 NOTE — PATIENT INSTRUCTIONS
A - Antiplatelet - Clopidogrel (PLAVIX) reduces your risk of cardiac event by 27% compared to Aspirin 81 mg daily (HOST EXAM study 2021), prasrugrel (EFFIENT), ticagrelor (BRILINTA)) may be used for the first year.  Aspirin 81 mg daily is associated with a 20% less use of heart event and is used the first year after a cardiac event, stent or CABG.  B - Blood Pressure Control - reduces your risk or heart attack and stroke, the goal is <130/80.  C - Cholesterol Management - statins dramatically reduce your risk; for those that are intolerant to statins, there are alternatives.  D - Diet - MEDITERRANEAN DIET or Cardiac rehab diets, Cardiosmart.org.  E - Exercise - at least 2.5 hours of moderate exercise weekly  (typical brisk walking or similar activity).  F - Fats - VASCEPA, or EPA Fish oil (if Vascepa too expensive) for elevated triglycerides (REDUCE IT trial showed reduction from 22% 5 year MACE to 17%).  G - Good Vibes - meditation, exercise, yoga, Pilates, mindfulness, Ildefonso-Chi, stress reduction.  H - Heart Failure - betablockers, sucubatril (ENTRESTO) 16% less risk of dying over 3 years, spironolactone, empagliflozin (JARDIANCE) 17% less risk of dying over 2 years, CRT +/- ICD.  I - Inflammation - Colchicine in the LoDoCo2 study in 2020 reduced the risk of heart attack by 30% in 2.5 year follow up.  R - Rehab - Cardiac Rehab reduces risk of dying by 13-24% and need to go to the hospital by 30% within the first year. Compared to regular Cardiac Rehab, Intensive Cardiac Rehab ([place] [facility]) was shown to reduce the risk of major events 17% to 11% and hospitalization for CHF from 8% to 2%. (Nutrients 6157Rzx46(11:7397)  S - Smoking - never smoke, if you do smoke ask for help to quit for good. Patients who quit smoking after heart attack have 36% less likely risk of dying.  Resources are 1-800-QUIT-NOW [employer]olar in addition to Chantix, bupropion (Zyban) or nicotine replacement  T - Type II Diabetes  - pills empagliflozin (JARDIANCE) 38% less risk of dying over 4 years, and/or weekly injections: tirzepatide (Mounjaro), semaglutide (Ozempic), liraglutide (Victoza), dulaglutide (Trulicity) ~26% less risk of MACE in 2 years.  V - Vaccines - Annual flu shot and COVID vaccine reduces the risk of serious cardiovascular complications from these deadly infections.  W - Weight - maintain a healthy weight. Semaglutide (WEGOVY) weekly injection was shown to reduce weight by 10% and heart events by 20% for patients with CAD and BMI > 27 in the SELECT trial (6.5% vs 8% in 48 month follow up [facility] 12/2023).      Work on at least 2.5 - 5 hours a week of moderate exercise    Please look into the following diets and incorporate them into your diet  LOW SALT DIET   KEEP YOUR SODIUM EQUAL TO CALORIES AND NO MORE THAN DOUBLE THE CALORIES FOR A LOW SALT DIET    Cardiosmart.org - great resource for American College of Cardiology on heart disease prevention and treatment    FOR TREATMENT OR PREVENTION OF CORONARY ARTERY DISEASE  These three programs are approved by Medicare/Insurers for those with heart disease  [facility]le - [facility]  Dr. Turner's Program for Reversing Heart Disease - Russell Lau's Cardiologist vegetarian-based  [facility] Cardiac Wellness Program - [place]-based mind-body Program    Mediterranean Diet has been shown to be a hearty healthy diet.    This is a commonly referenced Program  Dr East - [place] over [place] (book and documentary) - vegetarian-based    FOR TREATMENT OF BLOOD PRESSURE  DASH DIET - American Heart Association for treatment of HYPERTENSION    FOR TREATMENT OF BAD CHOLESTEROL/FATS  REDUCE PROCESSED SUGAR AS MUCH AS POSSIBLE  INCREASE WHOLE GRAINS/VEGETABLES  INCREASE FIBER    Lowering total cholesterol and LDL (bad) cholesterol:  - Eat leaner cuts of meat, or eliminate altogether if possible red meat, and frequently substitute fish or chicken.  - Limit saturated  fat to no more than 7-10% of total calories no more than 10 g per day is recommended. Some sources of saturated fat include butter, animal fats, hydrogenated vegetable fats and oils, many desserts, whole milk dairy products.  - Replaced saturated fats with polyunsaturated fats and monounsaturated fats. Foods high in monounsaturated fat include nuts, canola oil, avocados, and olives.  - Limit trans fat (processed foods) and replaced with fresh fruits and vegetables  - Recommend nonfat dairy products  - Increase substantially the amount of soluble fiber intake (legumes such as beans, fruit, whole grains).  - Consider nutritional supplements: plant sterile spreads such as Benecol, fish oil,  flaxseed oil, omega-3 acids EPA capsules 2000 mg twice a day, or viscous fiber such as Metamucil  - Attain ideal weight and regular exercise (at least 30 minutes per day of moderate exercise)  ASK ABOUT STATIN OR NON STATIN MEDICATION TO REDUCE YOUR LDL AND HEART RISK    Lowering triglycerides:  - Reduce intake of simple sugar: Desserts, candy, pastries, honey, sodas, sugared cereals, yogurt, Gatorade, sports bars, canned fruit, smoothies, fruit juice, coffee drinks  - Reduced intake of refined starches: Refined Pasta, most bread  - Reduce or abstain from alcohol  - Increase omega-3 fatty acids: [place], Trout, Mackerel, [place], [place] tuna and supplements  - Attain ideal weight and regular exercise (at least 30 minutes per day of moderate exercise)  ASK ABOUT PURIFIED OMEGA 3 EPA or FISH OIL TO REDUCE YOUR TG AND HEART RISK    Elevating HDL (good) cholesterol:  - Increase physical activity  - Increase omega-3 fatty acids and supplements as listed above  - Incorporating appropriate amounts of monounsaturated fats such as nuts, olive oil, canola oil, avocados, olives  - Stop smoking  - Attain ideal weight and regular exercise (at least 30 minutes per day of moderate exercise)

## 2025-03-13 NOTE — TELEPHONE ENCOUNTER
Received [employer] PA request via [employer]  for   sacubitril-valsartan (ENTRESTO) 24-26 MG Tab . (Quantity:180, Day Supply:90)     Insurance: [employer]  Member ID:[id number]  BIN: [id number]  PCN: [facility]  Group: [id number]     Ran Test claim via [place] & medication Pays for a $366.27 copay. Will outreach to patient to offer specialty pharmacy services and or release to preferred pharmacy    [facility]

## 2025-03-17 ENCOUNTER — OFFICE VISIT (OUTPATIENT)
Dept: MEDICAL GROUP | Facility: PHYSICIAN GROUP | Age: 70
End: 2025-03-17
Payer: MEDICARE

## 2025-03-17 VITALS
HEART RATE: 60 BPM | DIASTOLIC BLOOD PRESSURE: 58 MMHG | OXYGEN SATURATION: 98 % | SYSTOLIC BLOOD PRESSURE: 106 MMHG | BODY MASS INDEX: 31.34 KG/M2 | HEIGHT: 60 IN | TEMPERATURE: 96.8 F | WEIGHT: 159.61 LBS

## 2025-03-17 DIAGNOSIS — M85.839 OSTEOPENIA OF FOREARM, UNSPECIFIED LATERALITY: ICD-10-CM

## 2025-03-17 DIAGNOSIS — N17.9 AKI (ACUTE KIDNEY INJURY) (HCC): ICD-10-CM

## 2025-03-17 DIAGNOSIS — I25.10 CORONARY ARTERY DISEASE DUE TO LIPID RICH PLAQUE: Chronic | ICD-10-CM

## 2025-03-17 DIAGNOSIS — G47.00 INSOMNIA, PERSISTENT: Chronic | ICD-10-CM

## 2025-03-17 DIAGNOSIS — F41.1 GENERALIZED ANXIETY DISORDER: ICD-10-CM

## 2025-03-17 DIAGNOSIS — I25.83 CORONARY ARTERY DISEASE DUE TO LIPID RICH PLAQUE: Chronic | ICD-10-CM

## 2025-03-17 DIAGNOSIS — I50.22 CHRONIC SYSTOLIC HEART FAILURE (HCC): Chronic | ICD-10-CM

## 2025-03-17 DIAGNOSIS — M48.062 SPINAL STENOSIS OF LUMBAR REGION WITH NEUROGENIC CLAUDICATION: Chronic | ICD-10-CM

## 2025-03-17 DIAGNOSIS — E78.01 FAMILIAL HYPERCHOLESTEREMIA: Chronic | ICD-10-CM

## 2025-03-17 PROCEDURE — 3074F SYST BP LT 130 MM HG: CPT | Performed by: STUDENT IN AN ORGANIZED HEALTH CARE EDUCATION/TRAINING PROGRAM

## 2025-03-17 PROCEDURE — 99215 OFFICE O/P EST HI 40 MIN: CPT | Performed by: STUDENT IN AN ORGANIZED HEALTH CARE EDUCATION/TRAINING PROGRAM

## 2025-03-17 PROCEDURE — 3078F DIAST BP <80 MM HG: CPT | Performed by: STUDENT IN AN ORGANIZED HEALTH CARE EDUCATION/TRAINING PROGRAM

## 2025-03-17 RX ORDER — EZETIMIBE 10 MG/1
10 TABLET ORAL DAILY
Qty: 90 TABLET | Refills: 3 | Status: SHIPPED | OUTPATIENT
Start: 2025-03-17

## 2025-03-17 RX ORDER — AMITRIPTYLINE HYDROCHLORIDE 10 MG/1
10 TABLET ORAL NIGHTLY PRN
Qty: 90 TABLET | Refills: 1 | Status: SHIPPED | OUTPATIENT
Start: 2025-03-17

## 2025-03-17 RX ORDER — CITALOPRAM HYDROBROMIDE 10 MG/1
TABLET ORAL
Qty: 90 TABLET | Refills: 3 | Status: SHIPPED | OUTPATIENT
Start: 2025-03-17

## 2025-03-17 ASSESSMENT — PATIENT HEALTH QUESTIONNAIRE - PHQ9: CLINICAL INTERPRETATION OF PHQ2 SCORE: 0

## 2025-03-17 ASSESSMENT — FIBROSIS 4 INDEX: FIB4 SCORE: 1.04

## 2025-03-17 NOTE — TELEPHONE ENCOUNTER
Caller: Almaz Bonner    Topic/issue: Patient returning call and requesting call back to discuss [facility] and has additional questions.    Callback Number: [phone]     Thank you,  Ciara ROBINS

## 2025-03-17 NOTE — PROGRESS NOTES
Subjective:     Chief Complaint   Patient presents with    Follow-Up     Imaging followup, ears popping 2-3 days       History of Present Illness  The patient is a 69-year-old female who presents for a 3-month checkup.    Recent mammogram was negative and DEXA continues to show osteopenia in the forearm. She reports being previously diagnosed with osteoporosis, but this condition has since resolved without pharmacological intervention.    She has a history of bilateral carpal tunnel syndrome and was recently informed that it could be indicative of amyloidosis. She experiences intermittent hot flashes and generalized body aches and pains. She also reports hip pain during exercise, which her orthopedic specialist suggested could be nerve-related. She is concerned about her GFR levels.    She is currently on Plavix and aspirin, and has experienced significant bruising. She recently started Repatha therapy and was advised to continue both Entresto and Repatha. She is still unable to get Entresto.    She takes amitriptyline 10 mg nightly and is stable on Celexa 10 mg daily. She is not currently taking trazodone or hydroxyzine. She is on gabapentin 300 mg daily, but will resume the dose to twice daily due to worsening leg symptoms. She reports that severe pain limits her mobility, as evidenced by a recent incident where she was unable to ascend a flight of stairs due to increasing pain.    FAMILY HISTORY  Her mother had breast cancer in her 80s.      Health Maintenance: Completed    ROS:  Negative except as stated above.      Objective:     Exam:  /58 (BP Location: Left arm, Patient Position: Sitting, BP Cuff Size: Adult)   Pulse 60   Temp 36 °C (96.8 °F) (Temporal)   Ht 1.524 m (5')   Wt 72.4 kg (159 lb 9.8 oz)   SpO2 98%   BMI 31.17 kg/m²  Body mass index is 31.17 kg/m².    Physical Exam    Gen: Alert and oriented, no acute distress.  Lungs: Normal effort, CTAB, no wheezing / rhonchi / rales.  CV: RRR,  normal S1 and S2, no murmurs.      Assessment & Plan:     69 y.o. female with the following -     1. ROBERT (generalized anxiety disorder)  Chronic, stable.  Continue celexa 10 mg daily.  - citalopram (CELEXA) 10 MG tablet; Take 1 tablet by mouth once daily  Dispense: 90 Tablet; Refill: 3    2. Familial hypercholesteremia  - ezetimibe (ZETIA) 10 MG Tab; Take 1 Tablet by mouth every day.  Dispense: 90 Tablet; Refill: 3    3. Osteopenia of forearm, unspecified laterality  Chronic, stable.  DEXA continues to show osteopenia in the forearm and treatment not indicated at this time.  Repeat in 2 years.    4. Coronary artery disease due to lipid rich plaque - PCI TO LAD for CHF  Chronic, stable.  Follows up with Cardiology.  History of LAD stent.  DAPT is most likely causing the increased bruising and she was advised to discuss with Cardiology.  Continue aspirin 81 mg daily, plavix 75 mg daily, zetia 10 mg daily, bisoprolol 5 mg daily, losartan 12.5 mg daily, repatha 140 mg every 2 weeks.    5. Chronic systolic heart failure (HCC)  Chronic, stable.  She follows up with cardiology.  EF improved from 30 to 60%.  She is still trying to get Entrestro.  Continue losartan 12.5 mg daily, bisoprolol 5 mg daily, lasix 20 mg daily.     6. THADDEUS (acute kidney injury) (HCC)  Acute, resolved.  GFR most likely dropped due to cardiac cath and returned to normal.    7. Spinal stenosis of lumbar region with neurogenic claudication  Chronic, uncontrolled.  History of scoliosis s/p lumbar fusion.  She was advised to increase gabapentin 300 mg from once to twice daily.  Continue baclofen 10 mg at bedtime.    8. Insomnia, persistent  Chronic, controlled . Continue amitriptyline 10 mg at bedtime as needed.  - amitriptyline (ELAVIL) 10 MG Tab; Take 1 Tablet by mouth at bedtime as needed for Sleep.  Dispense: 90 Tablet; Refill: 1        I spent a total of 40 minutes with record review, exam, communication with the patient, communication with other  providers, and documentation of this encounter.      Return in about 6 weeks (around 4/28/2025) for Medicare well visit.    Verbal consent was acquired by the patient to use FreeBrieot ambient listening note generation during this visit: Yes.    Please note that this dictation was created using voice recognition software. I have made every reasonable attempt to correct obvious errors, but I expect that there are errors of grammar and possibly content that I did not discover before finalizing the note.

## 2025-03-18 ENCOUNTER — NON-PROVIDER VISIT (OUTPATIENT)
Dept: CARDIOLOGY | Facility: MEDICAL CENTER | Age: 70
End: 2025-03-18
Payer: MEDICARE

## 2025-03-18 DIAGNOSIS — Z95.5 STENTED CORONARY ARTERY: ICD-10-CM

## 2025-03-18 PROCEDURE — G0422 INTENS CARDIAC REHAB W/EXERC: HCPCS | Performed by: INTERNAL MEDICINE

## 2025-03-18 PROCEDURE — G0423 INTENS CARDIAC REHAB NO EXER: HCPCS | Mod: 59 | Performed by: INTERNAL MEDICINE

## 2025-03-18 NOTE — PROGRESS NOTES
Almaz Samuel attended Intensive Cardiac Rehab today from 1000 to 1200. During her time she exercised and attended class. Her education today was a workshop titled: Mindfulness for Heart Health. Patient received handouts and class discussion pertaining to the topic.

## 2025-03-19 ENCOUNTER — NON-PROVIDER VISIT (OUTPATIENT)
Dept: CARDIOLOGY | Facility: MEDICAL CENTER | Age: 70
End: 2025-03-19
Payer: MEDICARE

## 2025-03-19 ENCOUNTER — TELEPHONE (OUTPATIENT)
Dept: CARDIOLOGY | Facility: MEDICAL CENTER | Age: 70
End: 2025-03-19

## 2025-03-19 DIAGNOSIS — Z95.5 STENTED CORONARY ARTERY: ICD-10-CM

## 2025-03-19 PROCEDURE — G0423 INTENS CARDIAC REHAB NO EXER: HCPCS | Mod: 59 | Performed by: INTERNAL MEDICINE

## 2025-03-19 PROCEDURE — G0422 INTENS CARDIAC REHAB W/EXERC: HCPCS | Performed by: INTERNAL MEDICINE

## 2025-03-19 NOTE — PROGRESS NOTES
Almaz Samuel attended Intensive Cardiac Rehab today from 1000 to 1200. During his time he exercised and attended class.   His education today wwas a COOKING CLASS titled: PERSONALIZE YOUR PRITIKIN PLATE. Patient received handouts and class discussion pertaining to the topic.

## 2025-03-20 ENCOUNTER — NON-PROVIDER VISIT (OUTPATIENT)
Dept: CARDIOLOGY | Facility: MEDICAL CENTER | Age: 70
End: 2025-03-20
Payer: MEDICARE

## 2025-03-20 DIAGNOSIS — Z95.5 STENTED CORONARY ARTERY: ICD-10-CM

## 2025-03-20 PROCEDURE — G0423 INTENS CARDIAC REHAB NO EXER: HCPCS | Mod: 59 | Performed by: INTERNAL MEDICINE

## 2025-03-20 PROCEDURE — G0422 INTENS CARDIAC REHAB W/EXERC: HCPCS | Performed by: INTERNAL MEDICINE

## 2025-03-20 NOTE — PROGRESS NOTES
Almaz Samuel attended Intensive Cardiac Rehab today from 1000 to 1200. During her time she exercised and attended class. Her education today was a workshop titled: Label Reading. Patient received handouts and class discussion pertaining to the topic.

## 2025-03-21 NOTE — TELEPHONE ENCOUNTER
Prior Authorization for Repatha 140mg/ml (Quantity: 2, Days: 28) has been submitted via Cover My Meds: Key ([id number])    Insurance: AMANDA    Will follow up in 24-48 business hours.   
Prior Authorization for Repatha 140mg/ml has been approved for a quantity of 2 , day supply 28    Prior Authorization reference number: [id number]  Insurance: Liz  Effective dates: 2/17/25-3/19/26  Copay: $N/A RTS 3/27/25     Is patient eligible to fill with [facility]? Yes    Next Steps:  Notify pharmacy and pt of pa approval. RTS 3/27/25.    
Received Refill PA request via  [employer]   for Repatha 140 mg/ml. (Quantity:2, Day Supply:28)     Insurance: [employer]  Member ID:  [id number]  BIN: [id number]  PCN: [facility]  Group: [id number]     Ran Test claim via [place] & medication Rejects stating prior authorization is required.      
None

## 2025-03-25 ENCOUNTER — NON-PROVIDER VISIT (OUTPATIENT)
Dept: CARDIOLOGY | Facility: MEDICAL CENTER | Age: 70
End: 2025-03-25
Payer: MEDICARE

## 2025-03-25 DIAGNOSIS — Z95.5 STENTED CORONARY ARTERY: ICD-10-CM

## 2025-03-25 PROCEDURE — G0422 INTENS CARDIAC REHAB W/EXERC: HCPCS | Performed by: INTERNAL MEDICINE

## 2025-03-25 PROCEDURE — G0423 INTENS CARDIAC REHAB NO EXER: HCPCS | Mod: 59 | Performed by: INTERNAL MEDICINE

## 2025-03-25 NOTE — PROGRESS NOTES
Almaz Samuel attended Intensive Cardiac Rehab today from 1000 to 1200. During his time he exercised and attended class.   His education today was a VIDEO titled: BODY COMPOSITION. Patient received handouts and class discussion pertaining to the topic.

## 2025-03-26 ENCOUNTER — NON-PROVIDER VISIT (OUTPATIENT)
Dept: CARDIOLOGY | Facility: MEDICAL CENTER | Age: 70
End: 2025-03-26
Payer: MEDICARE

## 2025-03-26 DIAGNOSIS — Z95.5 STENTED CORONARY ARTERY: ICD-10-CM

## 2025-03-26 PROCEDURE — G0423 INTENS CARDIAC REHAB NO EXER: HCPCS | Mod: 59 | Performed by: INTERNAL MEDICINE

## 2025-03-26 PROCEDURE — G0422 INTENS CARDIAC REHAB W/EXERC: HCPCS | Performed by: INTERNAL MEDICINE

## 2025-03-26 NOTE — PROGRESS NOTES
Almaz Samuel attended Intensive Cardiac Rehab today from 1000 to 1200. During her time she exercised and attended class. Her education today was a cooking school titled: Adding Flavor Sodium Free. Patient received handouts and class discussion pertaining to the topic.

## 2025-03-27 ENCOUNTER — NON-PROVIDER VISIT (OUTPATIENT)
Dept: CARDIOLOGY | Facility: MEDICAL CENTER | Age: 70
End: 2025-03-27
Payer: MEDICARE

## 2025-03-27 DIAGNOSIS — Z95.5 STENTED CORONARY ARTERY: ICD-10-CM

## 2025-03-27 PROCEDURE — G0422 INTENS CARDIAC REHAB W/EXERC: HCPCS | Performed by: INTERNAL MEDICINE

## 2025-03-27 PROCEDURE — G0423 INTENS CARDIAC REHAB NO EXER: HCPCS | Mod: 59 | Performed by: INTERNAL MEDICINE

## 2025-03-27 NOTE — PROGRESS NOTES
Almaz Samuel attended Intensive Cardiac Rehab today from 1000 to 1200. During his time he exercised and attended class.   His education today was a EXERCISE WORKSHOP titled: EXERCISE BASICS AND BUILDING YOUR ACTION PLAN.  Patient received handouts and class discussion pertaining to the topic.

## 2025-04-01 ENCOUNTER — NON-PROVIDER VISIT (OUTPATIENT)
Dept: CARDIOLOGY | Facility: MEDICAL CENTER | Age: 70
End: 2025-04-01
Payer: MEDICARE

## 2025-04-01 DIAGNOSIS — Z95.5 STENTED CORONARY ARTERY: ICD-10-CM

## 2025-04-01 PROCEDURE — G0422 INTENS CARDIAC REHAB W/EXERC: HCPCS | Performed by: INTERNAL MEDICINE

## 2025-04-01 PROCEDURE — G0423 INTENS CARDIAC REHAB NO EXER: HCPCS | Mod: 59 | Performed by: INTERNAL MEDICINE

## 2025-04-01 NOTE — PROGRESS NOTES
Almaz Samuel attended Intensive Cardiac Rehab today from 1000 to 1200. During his time he exercised and attended class.   His education today was a VIDEO titled: LABEL READING.  Patient received handouts and class discussion pertaining to the topic.

## 2025-04-02 ENCOUNTER — NON-PROVIDER VISIT (OUTPATIENT)
Dept: CARDIOLOGY | Facility: MEDICAL CENTER | Age: 70
End: 2025-04-02
Payer: MEDICARE

## 2025-04-02 DIAGNOSIS — Z95.5 STENTED CORONARY ARTERY: ICD-10-CM

## 2025-04-02 DIAGNOSIS — M54.16 LUMBAR RADICULOPATHY, CHRONIC: ICD-10-CM

## 2025-04-02 PROCEDURE — G0423 INTENS CARDIAC REHAB NO EXER: HCPCS | Mod: 59 | Performed by: INTERNAL MEDICINE

## 2025-04-02 PROCEDURE — G0422 INTENS CARDIAC REHAB W/EXERC: HCPCS | Performed by: INTERNAL MEDICINE

## 2025-04-02 NOTE — PROGRESS NOTES
Almaz Samuel attended Intensive Cardiac Rehab today from 1000 to 1200. During her time she exercised and attended class. Her education today was a nutrition and cooking class titled: Healthy Breakfast. Patient received handouts and class discussion pertaining to the topic.

## 2025-04-03 ENCOUNTER — NON-PROVIDER VISIT (OUTPATIENT)
Dept: CARDIOLOGY | Facility: MEDICAL CENTER | Age: 70
End: 2025-04-03
Payer: MEDICARE

## 2025-04-03 DIAGNOSIS — Z95.5 STENTED CORONARY ARTERY: ICD-10-CM

## 2025-04-03 PROCEDURE — G0423 INTENS CARDIAC REHAB NO EXER: HCPCS | Mod: 59 | Performed by: INTERNAL MEDICINE

## 2025-04-03 PROCEDURE — G0422 INTENS CARDIAC REHAB W/EXERC: HCPCS | Performed by: INTERNAL MEDICINE

## 2025-04-03 RX ORDER — BACLOFEN 10 MG/1
10 TABLET ORAL
Qty: 90 TABLET | Refills: 0 | Status: SHIPPED | OUTPATIENT
Start: 2025-04-03

## 2025-04-03 NOTE — PROGRESS NOTES
Almaz Samuel attended Intensive Cardiac Rehab today from 1000 to 1200. During her time she exercised and attended class. Her education today was a WORKSHOP titled: LOGAN. Patient received handouts and class discussion pertaining to the topic.

## 2025-04-03 NOTE — TELEPHONE ENCOUNTER
Received request via: Patient    Was the patient seen in the last year in this department? Yes    Does the patient have an active prescription (recently filled or refills available) for medication(s) requested? No    Pharmacy Name: [facility]    Does the patient have long term Plus and need 100-day supply? (This applies to ALL medications) Patient does not have SCP

## 2025-04-09 ENCOUNTER — NON-PROVIDER VISIT (OUTPATIENT)
Dept: CARDIOLOGY | Facility: MEDICAL CENTER | Age: 70
End: 2025-04-09
Payer: MEDICARE

## 2025-04-09 DIAGNOSIS — Z95.5 STENTED CORONARY ARTERY: ICD-10-CM

## 2025-04-09 PROCEDURE — G0423 INTENS CARDIAC REHAB NO EXER: HCPCS | Mod: 59 | Performed by: INTERNAL MEDICINE

## 2025-04-09 PROCEDURE — G0422 INTENS CARDIAC REHAB W/EXERC: HCPCS | Performed by: INTERNAL MEDICINE

## 2025-04-09 NOTE — PROGRESS NOTES
Almaz Samuel attended Intensive Cardiac Rehab today from 1000 to 1200. During her time she exercised and attended class. Her education today was a nutrition class titled: 2C2P Plant-Based Proteins. Patient received handouts and class discussion pertaining to the topic.

## 2025-04-10 ENCOUNTER — HOSPITAL ENCOUNTER (OUTPATIENT)
Dept: LAB | Facility: MEDICAL CENTER | Age: 70
End: 2025-04-10
Attending: INTERNAL MEDICINE
Payer: MEDICARE

## 2025-04-10 ENCOUNTER — NON-PROVIDER VISIT (OUTPATIENT)
Dept: CARDIOLOGY | Facility: MEDICAL CENTER | Age: 70
End: 2025-04-10
Payer: MEDICARE

## 2025-04-10 DIAGNOSIS — E78.01 FAMILIAL HYPERCHOLESTEREMIA: ICD-10-CM

## 2025-04-10 DIAGNOSIS — Z95.5 STENTED CORONARY ARTERY: ICD-10-CM

## 2025-04-10 LAB
CHOLEST SERPL-MCNC: 131 MG/DL (ref 100–199)
FASTING STATUS PATIENT QL REPORTED: NORMAL
HDLC SERPL-MCNC: 64 MG/DL
LDLC SERPL CALC-MCNC: 46 MG/DL
TRIGL SERPL-MCNC: 103 MG/DL (ref 0–149)

## 2025-04-10 PROCEDURE — 36415 COLL VENOUS BLD VENIPUNCTURE: CPT

## 2025-04-10 PROCEDURE — G0422 INTENS CARDIAC REHAB W/EXERC: HCPCS | Performed by: INTERNAL MEDICINE

## 2025-04-10 PROCEDURE — 80061 LIPID PANEL: CPT

## 2025-04-10 PROCEDURE — G0423 INTENS CARDIAC REHAB NO EXER: HCPCS | Mod: 59 | Performed by: INTERNAL MEDICINE

## 2025-04-10 NOTE — PROGRESS NOTES
Almaz Samuel attended Intensive Cardiac Rehab today from 1000 to 1200. During her time she exercised and attended class. Her education today was a workshop titled: Fueling a Healthy Body. Patient received handouts and class discussion pertaining to the topic.

## 2025-04-11 ENCOUNTER — RESULTS FOLLOW-UP (OUTPATIENT)
Dept: CARDIOLOGY | Facility: MEDICAL CENTER | Age: 70
End: 2025-04-11

## 2025-04-14 ENCOUNTER — APPOINTMENT (OUTPATIENT)
Dept: URBAN - METROPOLITAN AREA CLINIC 6 | Facility: CLINIC | Age: 70
Setting detail: DERMATOLOGY
End: 2025-04-14

## 2025-04-14 DIAGNOSIS — L82.1 OTHER SEBORRHEIC KERATOSIS: ICD-10-CM

## 2025-04-14 DIAGNOSIS — Z71.89 OTHER SPECIFIED COUNSELING: ICD-10-CM

## 2025-04-14 DIAGNOSIS — L91.8 OTHER HYPERTROPHIC DISORDERS OF THE SKIN: ICD-10-CM

## 2025-04-14 DIAGNOSIS — L21.8 OTHER SEBORRHEIC DERMATITIS: ICD-10-CM

## 2025-04-14 DIAGNOSIS — D22 MELANOCYTIC NEVI: ICD-10-CM

## 2025-04-14 DIAGNOSIS — L57.8 OTHER SKIN CHANGES DUE TO CHRONIC EXPOSURE TO NONIONIZING RADIATION: ICD-10-CM

## 2025-04-14 DIAGNOSIS — Z85.828 PERSONAL HISTORY OF OTHER MALIGNANT NEOPLASM OF SKIN: ICD-10-CM

## 2025-04-14 DIAGNOSIS — L81.4 OTHER MELANIN HYPERPIGMENTATION: ICD-10-CM

## 2025-04-14 DIAGNOSIS — F42.4 EXCORIATION (SKIN-PICKING) DISORDER: ICD-10-CM

## 2025-04-14 DIAGNOSIS — D18.0 HEMANGIOMA: ICD-10-CM

## 2025-04-14 PROBLEM — D22.61 MELANOCYTIC NEVI OF RIGHT UPPER LIMB, INCLUDING SHOULDER: Status: ACTIVE | Noted: 2025-04-14

## 2025-04-14 PROBLEM — L30.9 DERMATITIS, UNSPECIFIED: Status: ACTIVE | Noted: 2025-04-14

## 2025-04-14 PROBLEM — D22.5 MELANOCYTIC NEVI OF TRUNK: Status: ACTIVE | Noted: 2025-04-14

## 2025-04-14 PROBLEM — D22.71 MELANOCYTIC NEVI OF RIGHT LOWER LIMB, INCLUDING HIP: Status: ACTIVE | Noted: 2025-04-14

## 2025-04-14 PROBLEM — D18.01 HEMANGIOMA OF SKIN AND SUBCUTANEOUS TISSUE: Status: ACTIVE | Noted: 2025-04-14

## 2025-04-14 PROBLEM — D23.71 OTHER BENIGN NEOPLASM OF SKIN OF RIGHT LOWER LIMB, INCLUDING HIP: Status: ACTIVE | Noted: 2025-04-14

## 2025-04-14 PROBLEM — D22.72 MELANOCYTIC NEVI OF LEFT LOWER LIMB, INCLUDING HIP: Status: ACTIVE | Noted: 2025-04-14

## 2025-04-14 PROBLEM — D22.62 MELANOCYTIC NEVI OF LEFT UPPER LIMB, INCLUDING SHOULDER: Status: ACTIVE | Noted: 2025-04-14

## 2025-04-14 PROCEDURE — 99214 OFFICE O/P EST MOD 30 MIN: CPT

## 2025-04-14 PROCEDURE — ? PRESCRIPTION

## 2025-04-14 PROCEDURE — ? ADDITIONAL NOTES

## 2025-04-14 PROCEDURE — ? PHOTODYNAMIC THERAPY COUNSELING

## 2025-04-14 PROCEDURE — ? DIAGNOSIS COMMENT

## 2025-04-14 PROCEDURE — ? COUNSELING

## 2025-04-14 RX ORDER — CLOBETASOL PROPIONATE 0.5 MG/ML
SOLUTION TOPICAL QPM
Qty: 50 | Refills: 3 | Status: ERX | COMMUNITY
Start: 2025-04-14

## 2025-04-14 RX ADMIN — CLOBETASOL PROPIONATE: 0.5 SOLUTION TOPICAL at 00:00

## 2025-04-14 ASSESSMENT — LOCATION SIMPLE DESCRIPTION DERM
LOCATION SIMPLE: POSTERIOR SCALP
LOCATION SIMPLE: LEFT AXILLARY VAULT
LOCATION SIMPLE: LEFT PRETIBIAL REGION
LOCATION SIMPLE: RIGHT PRETIBIAL REGION
LOCATION SIMPLE: CHEST
LOCATION SIMPLE: RIGHT ACHILLES SKIN
LOCATION SIMPLE: SUPERIOR FOREHEAD
LOCATION SIMPLE: RIGHT FOREARM
LOCATION SIMPLE: LEFT THIGH
LOCATION SIMPLE: LEFT CHEEK
LOCATION SIMPLE: NOSE
LOCATION SIMPLE: LEFT UPPER ARM
LOCATION SIMPLE: LEFT KNEE
LOCATION SIMPLE: RIGHT THIGH
LOCATION SIMPLE: RIGHT UPPER ARM
LOCATION SIMPLE: RIGHT KNEE
LOCATION SIMPLE: LEFT FOREARM
LOCATION SIMPLE: ABDOMEN

## 2025-04-14 ASSESSMENT — LOCATION DETAILED DESCRIPTION DERM
LOCATION DETAILED: EPIGASTRIC SKIN
LOCATION DETAILED: LEFT INFERIOR CENTRAL MALAR CHEEK
LOCATION DETAILED: RIGHT PROXIMAL PRETIBIAL REGION
LOCATION DETAILED: LEFT VENTRAL PROXIMAL FOREARM
LOCATION DETAILED: RIGHT ANTECUBITAL SKIN
LOCATION DETAILED: RIGHT ANTERIOR DISTAL UPPER ARM
LOCATION DETAILED: LEFT PROXIMAL PRETIBIAL REGION
LOCATION DETAILED: LOWER STERNUM
LOCATION DETAILED: LEFT AXILLARY VAULT
LOCATION DETAILED: SUPERIOR MID FOREHEAD
LOCATION DETAILED: PERIUMBILICAL SKIN
LOCATION DETAILED: LEFT ANTERIOR PROXIMAL UPPER ARM
LOCATION DETAILED: RIGHT VENTRAL PROXIMAL FOREARM
LOCATION DETAILED: RIGHT KNEE
LOCATION DETAILED: LEFT KNEE
LOCATION DETAILED: RIGHT ACHILLES SKIN
LOCATION DETAILED: NASAL DORSUM
LOCATION DETAILED: LEFT ANTERIOR DISTAL UPPER ARM
LOCATION DETAILED: RIGHT ANTERIOR DISTAL THIGH
LOCATION DETAILED: LEFT INFERIOR OCCIPITAL SCALP
LOCATION DETAILED: LEFT ANTERIOR DISTAL THIGH
LOCATION DETAILED: RIGHT ANTERIOR PROXIMAL UPPER ARM

## 2025-04-14 ASSESSMENT — LOCATION ZONE DERM
LOCATION ZONE: FACE
LOCATION ZONE: AXILLAE
LOCATION ZONE: SCALP
LOCATION ZONE: NOSE
LOCATION ZONE: ARM
LOCATION ZONE: TRUNK
LOCATION ZONE: LEG

## 2025-04-15 ENCOUNTER — NON-PROVIDER VISIT (OUTPATIENT)
Dept: CARDIOLOGY | Facility: MEDICAL CENTER | Age: 70
End: 2025-04-15
Payer: MEDICARE

## 2025-04-15 DIAGNOSIS — Z95.5 STENTED CORONARY ARTERY: ICD-10-CM

## 2025-04-15 PROCEDURE — G0422 INTENS CARDIAC REHAB W/EXERC: HCPCS | Performed by: INTERNAL MEDICINE

## 2025-04-15 PROCEDURE — G0423 INTENS CARDIAC REHAB NO EXER: HCPCS | Mod: 59 | Performed by: INTERNAL MEDICINE

## 2025-04-15 NOTE — PROGRESS NOTES
Almaz Samuel attended Intensive Cardiac Rehab today from 1000 to 1200. During her time she exercised and attended class. Her education today was a VIDEO titled: BIOMECHANICAL LIMITATIONS. Patient received handouts and class discussion pertaining to the topic.

## 2025-04-16 ENCOUNTER — NON-PROVIDER VISIT (OUTPATIENT)
Dept: CARDIOLOGY | Facility: MEDICAL CENTER | Age: 70
End: 2025-04-16
Payer: MEDICARE

## 2025-04-16 DIAGNOSIS — Z95.5 STENTED CORONARY ARTERY: ICD-10-CM

## 2025-04-16 PROCEDURE — G0423 INTENS CARDIAC REHAB NO EXER: HCPCS | Mod: 59 | Performed by: INTERNAL MEDICINE

## 2025-04-16 PROCEDURE — G0422 INTENS CARDIAC REHAB W/EXERC: HCPCS | Performed by: INTERNAL MEDICINE

## 2025-04-16 NOTE — PROGRESS NOTES
Almza Samuel attended Intensive Cardiac Rehab today from 1000 to 1200. During her time she exercised and attended class. Her education today was a cooking school titled: Satisfying Salads and Dressings. Patient received handouts and class discussion pertaining to the topic.

## 2025-04-17 ENCOUNTER — NON-PROVIDER VISIT (OUTPATIENT)
Dept: CARDIOLOGY | Facility: MEDICAL CENTER | Age: 70
End: 2025-04-17
Payer: MEDICARE

## 2025-04-17 DIAGNOSIS — Z95.5 STENTED CORONARY ARTERY: ICD-10-CM

## 2025-04-17 PROCEDURE — G0423 INTENS CARDIAC REHAB NO EXER: HCPCS | Mod: 59 | Performed by: INTERNAL MEDICINE

## 2025-04-17 PROCEDURE — G0422 INTENS CARDIAC REHAB W/EXERC: HCPCS | Performed by: INTERNAL MEDICINE

## 2025-04-17 NOTE — NON-PROVIDER
Almaz Samuel attended Intensive Cardiac Rehab today from 10am to 12pm. During her time she exercised and attended class. Her education today was a video titled: Decoding Lab Results. Patient received handouts and class discussion pertaining to the topic.

## 2025-04-22 ENCOUNTER — NON-PROVIDER VISIT (OUTPATIENT)
Dept: CARDIOLOGY | Facility: MEDICAL CENTER | Age: 70
End: 2025-04-22
Payer: MEDICARE

## 2025-04-22 DIAGNOSIS — Z95.5 STENTED CORONARY ARTERY: ICD-10-CM

## 2025-04-22 PROCEDURE — G0423 INTENS CARDIAC REHAB NO EXER: HCPCS | Mod: 59 | Performed by: INTERNAL MEDICINE

## 2025-04-22 PROCEDURE — G0422 INTENS CARDIAC REHAB W/EXERC: HCPCS | Performed by: INTERNAL MEDICINE

## 2025-04-22 NOTE — PROGRESS NOTES
Almaz Samuel attended Intensive Cardiac Rehab today from 1000 to 1200. During her time she exercised and attended class. Her education today was a workshop titled: Exercise Biomechanics. Patient received handouts and class discussion pertaining to the topic.

## 2025-04-23 ENCOUNTER — NON-PROVIDER VISIT (OUTPATIENT)
Dept: CARDIOLOGY | Facility: MEDICAL CENTER | Age: 70
End: 2025-04-23
Payer: MEDICARE

## 2025-04-23 DIAGNOSIS — Z95.5 STENTED CORONARY ARTERY: ICD-10-CM

## 2025-04-23 PROCEDURE — G0422 INTENS CARDIAC REHAB W/EXERC: HCPCS | Performed by: INTERNAL MEDICINE

## 2025-04-23 PROCEDURE — G0423 INTENS CARDIAC REHAB NO EXER: HCPCS | Mod: 59 | Performed by: INTERNAL MEDICINE

## 2025-04-23 NOTE — PROGRESS NOTES
Almaz Samuel attended Intensive Cardiac Rehab today from 1000 to 1200. During her time she exercised and attended class. Her education today was a nutrition class titled: Simple Sides. Patient received handouts and class discussion pertaining to the topic.

## 2025-04-25 NOTE — PROGRESS NOTES
"Verbal consent was acquired by the patient to use [employer] ambient listening note generation during this visit Yes     NEW PATIENT VISIT     Interventional Spine and Pain  Physiatry (Physical Medicine and Rehabilitation)     Date of Service: See Epic  Chief Complaint: No chief complaint on file.     Referring Provider: James Conley M.D.   Patient Name: Almaz Samuel   : 1955   MRN: [id number]     History:     HPI:     Almaz Samuel is a 70 y.o. female with history of T3-pelvis fusion who presents to clinic for evaluation of low back and leg pain.     History of Present Illness         Red Flags ROS: ***  Fever, Chills, Sweats: Denies  Involuntary Weight Loss: Denies  Bladder Incontinence: Denies  Bowel Incontinence: Denies  Saddle Anesthesia: Denies        PMHx:   Past Medical History:   Diagnosis Date    Anesthesia 2011    PO N/V, \"slow to come out\", vasovagal response with last ablation/block on her neck    Aortic atherosclerosis (HCC)     Apnea, sleep     Arrhythmia     Arthritis 2011    spine    Backpain 2011    neck and abd. also,     Bowel habit changes 2023    constipation and diarrhea r/t IBS, medicated    Breath shortness     with exertion    Bronchitis 2018    Cancer (HCC) 2018    Skin - 15 years ago.    Cancer (HCC)     April/May 2018 Melanoma (nose)    Cataract 2023    in both eyes, no surgery at present    Complication of anesthesia     Coronary artery disease due to lipid rich plaque - PCI TO LAD for CHF 2024    Daytime sleepiness     Delayed emergence from general anesthesia     Diverticulitis     Endometriosis of uterus     Familial hypercholesteremia     Fusion of spine     ROBERT (generalized anxiety disorder) 2023    medicated    H/O fall     when in hospital  with low O2 sats    H/O: CVA (cerebrovascular accident) 2014    Complex event associated with apparent medication reaction but with MRI suggesting possible " multiple small infarcts of various ages, [facility]    Hair loss 12/06/2018    Heart burn 03/08/2023    medicated    Hiatus hernia syndrome     not repaired    High cholesterol 03/08/2023    medicated    HTN, goal below 130/80 10/24/2011    Indigestion     Infectious disease      Hep C +    Insomnia     Ischemic cardiomyopathy 11/21/2024    Lung collapse 02/14/2011    right lung 1/4 collpsed     Major depressive disorder with single episode, in full remission (HCC) 10/24/2011    Mixed hyperlipidemia 12/27/2011    Moderate major depression (HCC) 03/08/2023    medicated    MRSA exposure 08/2014    while in hospital    Myocardial infarct (HCC) 10/24    PONV (postoperative nausea and vomiting) 03/08/2023    Post-menopause on HRT (hormone replacement therapy) 12/27/2011    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    exposed as a child, told not active    Scoliosis     Sleep apnea 03/08/2023    BIPAP, hasn't used in the last year    Snoring 03/08/2023    Stroke (HCC) 03/08/2023    tia's times 3 in the past    Unspecified vitamin D deficiency 09/24/2012    Urinary incontinence 03/08/2023    at present, wears a pad       Current Outpatient Medications on File Prior to Visit   Medication Sig Dispense Refill    diazePAM (VALIUM) 5 MG Tab Take 1 Tablet by mouth every 6 hours as needed for Anxiety (take 1 tab 90 min before MRI. Take another tab 30 min before MRI if needed.) for up to 2 doses. 2 Tablet 0    methylPREDNISolone (MEDROL DOSEPAK) 4 MG Tablet Therapy Pack Follow schedule on package instructions. 21 Tablet 0    baclofen (LIORESAL) 10 MG Tab TAKE 1 TABLET BY MOUTH AT BEDTIME 90 Tablet 0    amitriptyline (ELAVIL) 10 MG Tab Take 1 Tablet by mouth at bedtime as needed for Sleep. 90 Tablet 1    citalopram (CELEXA) 10 MG tablet Take 1 tablet by mouth once daily 90 Tablet 3    ezetimibe (ZETIA) 10 MG Tab Take 1 Tablet by mouth every day. 90 Tablet 3    sacubitril-valsartan (ENTRESTO) 24-26 MG Tab Take 1 Tablet by mouth 2 times a day. 180  Tablet 3    pantoprazole (PROTONIX) 20 MG tablet Take 1 Tablet by mouth every day. Further refills must come from primary care, thank you 90 Tablet 3    clopidogrel (PLAVIX) 75 MG Tab Take 1 Tablet by mouth every day. 100 Tablet 2    meloxicam (MOBIC) 7.5 MG Tab TAKE 1 TO 2 TABLETS BY MOUTH ONCE DAILY 90 Tablet 0    Evolocumab (REPATHA) 140 MG/ML Solution Auto-injector SubQ injection pen Inject 1 mL under the skin every 14 days. 2.1 Each 11    fluticasone (FLONASE ALLERGY RELIEF) 50 MCG/ACT nasal spray Administer 1 Spray into affected nostril(S) 1 time a day as needed.      furosemide (LASIX) 20 MG Tab Take 1 Tablet by mouth every day. 90 Tablet 3    bisoprolol (ZEBETA) 5 MG Tab Take 1 Tablet by mouth every day. 90 Tablet 3    aspirin 81 MG EC tablet Take 1 Tablet by mouth every day. Indications: Acute Heart Attack 100 Tablet 2    triamcinolone acetonide (KENALOG) 0.1 % Cream Apply 1 Application topically 2 times a day. 15 g 1    gabapentin (NEURONTIN) 300 MG Cap Take 1 capsule by mouth twice daily 180 Capsule 3    Azelastine (ASTELIN) 137 MCG/SPRAY Solution Administer 2 Sprays into affected nostril(S) 2 times a day. 30 mL 0    hydrOXYzine HCl (ATARAX) 25 MG Tab Take 1 Tablet by mouth 3 times a day as needed for Itching. 30 Tablet 1    ondansetron (ZOFRAN ODT) 4 MG TABLET DISPERSIBLE Take 1 Tablet by mouth every 6 hours as needed for Nausea/Vomiting. 10 Tablet 2    estradiol (ESTRACE VAGINAL) 0.1 MG/GM vaginal cream Apply 1g cream inside vagina twice per week 1 Each 3    dicyclomine (BENTYL) 10 MG Cap TAKE 1 CAPSULE BY MOUTH EVERY 6 HOURS AS NEEDED FOR ABDOMINAL CRAMPS AND OR DISCOMFORT      Artificial Tear Solution (TEARS NATURALE OP) Place 2 Drops in both eyes 2 times a day as needed.       No current facility-administered medications on file prior to visit.        PSHx:   Past Surgical History:   Procedure Laterality Date    CATARACT EXTRACTION WITH IOL  2024    OTHER SURGICAL PROCEDURE  04/2023    repair of  rectal and bladder prolapse with Dr. Green    MO PELVIC EXAMINATION W ANESTH  03/27/2023    Procedure: PELVIC EXAM UNDER ANESTHESIA, POSTERIOR REPAIR, sacrospinous vault suspension, PERINEORRHAPHY,;  Surgeon: Rajinder Myles M.D.;  Location: SURGERY SAME DAY [facility];  Service: Gynecology    MO NEUROPLASTY & OR TRANSPOS MEDIAN NRV CARPAL SABRINA Right 05/31/2022    Procedure: RIGHT HAND OPEN CARPAL TUNNEL DECOMPRESSION;  Surgeon: Holly Martin M.D.;  Location: [facility] [place];  Service: Orthopedics    NISSEN FUNDOPLICATION LAPAROSCOPIC  07/25/2018    Procedure: NISSEN FUNDOPLICATION LAPAROSCOPIC;  Surgeon: Kenji Garcia M.D.;  Location: SURGERY [facility];  Service: General    NISSEN FUNDOPLICATION LAPAROSCOPIC N/A 06/30/2015    Procedure: NISSEN FUNDOPLICATION LAPAROSCOPIC;  Surgeon: Kenji Garcia M.D.;  Location: SURGERY SAME DAY [facility];  Service:     GASTROSCOPY-ENDO  06/24/2015    Procedure: GASTROSCOPY-ENDO;  Surgeon: Kenji Garcia M.D.;  Location: ENDOSCOPY [facility];  Service:     CARPAL TUNNEL RELEASE  11/14/2012    Performed by Holly Martin M.D. at SURGERY [facility]S    LOW ANTERIOR RESECTION LAPAROSCOPIC  03/02/2011    Performed by KENJI GARCIA at SURGERY [facility] ORS    CERVICAL DISK AND FUSION ANTERIOR  06/22/2010    Performed by JOSEPH DARLING at SURGERY [facility]    TUBAL LIGATION  01/01/1988    TONSILLECTOMY AND ADENOIDECTOMY  01/01/1959    ABDOMINAL HYSTERECTOMY TOTAL      APPENDECTOMY      GYN SURGERY      partial hysterectomy    OTHER NEUROLOGICAL SURG  2014       Family history   Family History   Problem Relation Age of Onset    Breast Cancer Mother     Diabetes Mother     Lung Disease Mother         copd    Hyperlipidemia Mother     Hypertension Mother     Glaucoma Mother     Cancer Father         Laryngeal CA    Heart Disease Father 50        CAD with bypasses x 3    Heart Attack Father     Hyperlipidemia Father     Hypertension Father      "Diabetes Sister         type II diabetes    Hyperlipidemia Sister     Diabetes Other     Heart Disease Other     Hypertension Other     Lung Disease Other     Heart Disease Brother     Heart Disease Sister     Heart Disease Brother     Hyperlipidemia Sister     Hyperlipidemia Sister     Hyperlipidemia Sister     Hyperlipidemia Brother     Ovarian Cancer Neg Hx     Tubal Cancer Neg Hx     Peritoneal Cancer Neg Hx     Colorectal Cancer Neg Hx          Medications: Reviewed on Epic  No outpatient medications have been marked as taking for the 25 encounter (Appointment) with Dodie Mohr M.D..        Allergies:   Allergies   Allergen Reactions    Albumin Unspecified     Other reaction(s): Other (See Comments)  \"egg intolerance\"  Reaction:stomach cramps,throwing up for 12 hours,cold sweats    Rosuvastatin Nausea    Seasonal Itching     Pt states eye irritation, pollen     Simvastatin Nausea       Social Hx:   Social History     Socioeconomic History    Marital status:      Spouse name: Not on file    Number of children: 1    Years of education: Not on file    Highest education level: Not on file   Occupational History     Employer: Retired   Tobacco Use    Smoking status: Former     Current packs/day: 0.00     Average packs/day: 0.3 packs/day for 5.0 years (1.5 ttl pk-yrs)     Types: Cigarettes     Start date: 1970     Quit date: 1975     Years since quittin.3     Passive exposure: Past    Smokeless tobacco: Never    Tobacco comments:     quit    Vaping Use    Vaping status: Never Used   Substance and Sexual Activity    Alcohol use: No    Drug use: No    Sexual activity: Yes     Partners: Male     Comment: , 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[employer]   Other Topics Concern     Service No    Blood Transfusions No    Caffeine Concern Not Asked    Occupational Exposure Not Asked    Hobby Hazards Not Asked    Sleep Concern Not Asked    Stress Concern Not Asked    Weight Concern Not Asked    " Special Diet Not Asked    Back Care Not Asked    Exercise No    Bike Helmet No    Seat Belt Yes    Self-Exams Yes   Social History Narrative    Not on file     Social Drivers of Health     Financial Resource Strain: Not on file   Food Insecurity: No Food Insecurity (10/17/2024)    Hunger Vital Sign     Worried About Running Out of Food in the Last Year: Never true     Ran Out of Food in the Last Year: Never true   Transportation Needs: No Transportation Needs (10/17/2024)    PRAPARE - Transportation     Lack of Transportation (Medical): No     Lack of Transportation (Non-Medical): No   Physical Activity: Not on file   Stress: Not on file   Social Connections: Not on file   Intimate Partner Violence: Not At Risk (10/17/2024)    Humiliation, Afraid, Rape, and Kick questionnaire     Fear of Current or Ex-Partner: No     Emotionally Abused: No     Physically Abused: No     Sexually Abused: No   Housing Stability: Low Risk  (10/17/2024)    Housing Stability Vital Sign     Unable to Pay for Housing in the Last Year: No     Number of Times Moved in the Last Year: 0     Homeless in the Last Year: No          EXAMINATION     Physical Exam:   Vitals: There were no vitals taken for this visit.    Physical Exam         Constitutional:   Body Habitus: There is no height or weight on file to calculate BMI.  Cooperation: Fully cooperates with exam  Appearance: Well-groomed, well-nourished  Eyes: No scleral icterus to suggest severe liver disease, no proptosis to suggest severe hyperthyroid  ENT: No obvious auditory deficits, no obvious tongue lesions, tongue midline, no facial droop   Respiratory:  Breathing comfortable on room air, no audible wheezing  Cardiovascular: Skin appears well-perfused  Psychiatric: Appropriate affect  Gait: normal gait, no use of ambulatory device, nonantalgic. {ckwal[place]osston}. ***    Neurologic:  Strength:    Bilateral UE 5/5 in shoulder abductors, elbow extensors and flexors, wrist extensors, finger  abductors, and finger flexors   Bilateral LE 5/5 in hip flexors, knee extensors and flexors, ankle dorsiflexors and plantarflexors, great toe extensors   Reflexes: 2+ in bilateral patella and achilles (brachioradialis, biceps, triceps)   Sensation: grossly intact bilaterally dermatomes L3-S1 (C5-T1)   MSK:?No?TTP across axial spinous processes and paraspinals bilaterally, has?preserved?active lumbar ROM with pain with ***     Special?tests:    Negative slump test bilaterally   Negative FADIR, log roll bilaterally   Negative thigh thrust, BETH bilaterally   Negative facet loading maneuvers bilaterally       MEDICAL DECISION MAKING    Medical Records Review: See under HPI section    DATA    Labs:   Lab Results   Component Value Date/Time    SODIUM 138 12/27/2024 08:51 AM    POTASSIUM 4.1 12/27/2024 08:51 AM    CHLORIDE 102 12/27/2024 08:51 AM    CO2 25 12/27/2024 08:51 AM    ANION 11.0 12/27/2024 08:51 AM    GLUCOSE 97 12/27/2024 08:51 AM    BUN 17 12/27/2024 08:51 AM    CREATININE 0.96 12/27/2024 08:51 AM    CALCIUM 9.1 12/27/2024 08:51 AM    ASTSGOT 18 12/27/2024 08:51 AM    ALTSGPT 16 12/27/2024 08:51 AM    TBILIRUBIN 0.5 12/27/2024 08:51 AM    ALBUMIN 4.3 12/27/2024 08:51 AM    TOTPROTEIN 7.2 12/27/2024 08:51 AM    GLOBULIN 2.9 12/27/2024 08:51 AM    AGRATIO 1.5 12/27/2024 08:51 AM       Lab Results   Component Value Date/Time    PROTHROMBTM 12.5 11/18/2024 08:15 AM    INR 0.93 11/18/2024 08:15 AM        Lab Results   Component Value Date/Time    WBC 6.4 11/18/2024 08:15 AM    RBC 4.28 11/18/2024 08:15 AM    HEMOGLOBIN 13.1 11/18/2024 08:15 AM    HEMATOCRIT 40.5 11/18/2024 08:15 AM    MCV 94.6 11/18/2024 08:15 AM    MCH 30.6 11/18/2024 08:15 AM    MCHC 32.3 11/18/2024 08:15 AM    MPV 8.9 (L) 11/18/2024 08:15 AM    NEUTSPOLYS 81.90 (H) 10/17/2024 07:50 AM    LYMPHOCYTES 9.10 (L) 10/17/2024 07:50 AM    MONOCYTES 8.40 10/17/2024 07:50 AM    EOSINOPHILS 0.00 10/17/2024 07:50 AM    BASOPHILS 0.10 10/17/2024 07:50 AM  "       Lab Results   Component Value Date/Time    HBA1C 6.0 (H) 11/14/2024 07:30 AM        Imaging:   I personally reviewed the following images, below are my independent reads:  X-ray lumbar spine 4/23/25: T3 to pelvis fusion hardware      IMAGING radiology reads: I reviewed the following radiology reports  MRI Lumbar Spine 4/29/25: ***       Results for orders placed in visit on 04/23/25    DX-LUMBAR SPINE-4+ VIEWS  AP, lateral, flexion, and extension of the lumbar spine interpreted by me   today show evidence of hardware from a fusion beginning at T3 down to the   pelvis without signs of periprosthetic fracture or loosening.  Spondylosis   present throughout the spine.       Diagnosis ***  {No diagnosis found. (Refresh or delete this SmartLink)}      ASSESSMENT AND PLAN:  Almaz Samuel is a 70 y.o. female who presents to clinic for evaluation of ***.     There are no diagnoses linked to this encounter.      -***    Assessment & Plan        Physical Therapy: I ordered physical therapy to focus on strengthening and stretching. ***    Home Exercise Program: I provided the patient with a strengthening and stretching with a home exercise program ***    Diagnostic Workup: As above***    Medications: {ckintervention:30953::\"I recommend avoiding narcotic medications in this patient. \"}  As above ***    Interventional Treatment: I would consider the patient for an epidural steroid injection depending on the results of the above***     Referrals: ***     Outside Records Requested:  The patient signed outside records request form for her outside records including outside images. This includes the records from {ckrequestrecords:[id number]}    Follow-up: After the above diagnostic studies ***      Please note that this dictation was created using voice recognition software. I have made every reasonable attempt to correct obvious errors but there may be errors of grammar and content that I may have overlooked prior to " finalization of this note.      Dodie Mohr MD  Physical Medicine and Rehabilitation  Interventional Spine and Sports Physiatry  [facility]       KALEY Jesus M.D.   James Davenport M.D..      PROTHROMBTM 12.5 11/18/2024 08:15 AM    INR 0.93 11/18/2024 08:15 AM        Lab Results   Component Value Date/Time    WBC 6.4 11/18/2024 08:15 AM    RBC 4.28 11/18/2024 08:15 AM    HEMOGLOBIN 13.1 11/18/2024 08:15 AM    HEMATOCRIT 40.5 11/18/2024 08:15 AM    MCV 94.6 11/18/2024 08:15 AM    MCH 30.6 11/18/2024 08:15 AM    MCHC 32.3 11/18/2024 08:15 AM    MPV 8.9 (L) 11/18/2024 08:15 AM    NEUTSPOLYS 81.90 (H) 10/17/2024 07:50 AM    LYMPHOCYTES 9.10 (L) 10/17/2024 07:50 AM    MONOCYTES 8.40 10/17/2024 07:50 AM    EOSINOPHILS 0.00 10/17/2024 07:50 AM    BASOPHILS 0.10 10/17/2024 07:50 AM        Lab Results   Component Value Date/Time    HBA1C 6.0 (H) 11/14/2024 07:30 AM        Imaging:   I personally reviewed the following images, below are my independent reads:  X-ray lumbar spine 4/23/25: T3 to pelvis fusion hardware      IMAGING radiology reads: I reviewed the following radiology reports  MRI Lumbar Spine 4/29/25:   Impression:  Extensive posterior fusion and dorsal decompression from the thoracic spine to the S1 level along with bilateral iliac bolts.  Hardware appears intact.  There is no postoperative complication.  No central canal stenosis or neural foraminal narrowing.  Levoscoliosis of the lumbar spine.       Results for orders placed in visit on 04/23/25    DX-LUMBAR SPINE-4+ VIEWS  AP, lateral, flexion, and extension of the lumbar spine interpreted by me   today show evidence of hardware from a fusion beginning at T3 down to the   pelvis without signs of periprosthetic fracture or loosening.  Spondylosis   present throughout the spine.       Diagnosis   Visit Diagnoses     ICD-10-CM   1. Bilateral hip pain  M25.551    M25.552   2. Chronic bilateral low back pain with bilateral sciatica  M54.42    M54.41    G89.29   3. Bilateral primary osteoarthritis of hip  M16.0   4. History of spinal fusion  Z98.1         ASSESSMENT AND PLAN:  Almaz Samuel is a 70 y.o. female who presents to clinic for evaluation  of low back, hip, and bilateral leg pain.     Almaz was seen today for new patient.    Diagnoses and all orders for this visit:    Bilateral hip pain    Chronic bilateral low back pain with bilateral sciatica    Bilateral primary osteoarthritis of hip    History of spinal fusion        Assessment & Plan  Bilateral low back pain with radiation into the bilateral lower extremities  Bilateral hip pain  History of T3-pelvis fusion  Patient presents for evaluation of 4 months of moderate to severe bilateral low back, groin, and anterior thigh and leg pain in the setting of T3-pelvis fusion in 2014. The pain worsens with prolonged standing, walking, and using stairs. Physical examination reveals pain with bilateral hip provocative maneuvers and given mild osteoarthritis on recent hip x-ray's I do think there is likely a component of intraarticular hip pathology to her pain and I think she could benefit from hip steroid injections. The recent MRI  lumbar spine shows no significant narrowing of the nerve roots. She is advised to continue with her current medication regimen, including gabapentin 300 mg in the morning and at night. If the morning dose causes sleepiness, a lower dose of 100-200 mg can be tried. The patient will undergo nerve conduction testing on 05/18/2025, and I will plan on reviewing the results of this prior to scheduling her for bilateral hip intraarticular steroid injections with fluoroscopic guidance. We discussed the risks of these injections including but not limited to bleeding (higher risk as patient is on aspirin and Plavix), infection, damage to surrounding structures. We did also discuss that her pain radiating down the anterior thighs and legs could be consistent with lumbar radiculitis in the setting of prior fusion surgery. Given lack of stenosis on recent MRI, pending response to hip injections, I would consider her for referral for spinal cord stimulator trial in the setting of failed back  surgery syndrome.      Physical Therapy: Patient has completed 10 weeks of physical therapy in the last 3 months without significant improvement    Diagnostic Workup: As above patient scheduled for EMG/NCS on 5/18/25    Medications: Continue gabapentin, amitriptyline, meloxicam, baclofen    Interventional Treatment: Plan on bilateral hip intraarticular steroid injections pending results of EMG    Follow-up: After the above diagnostic studies      Please note that this dictation was created using voice recognition software. I have made every reasonable attempt to correct obvious errors but there may be errors of grammar and content that I may have overlooked prior to finalization of this note.      Dodie Mohr MD  Physical Medicine and Rehabilitation  Interventional Spine and Sports Physiatry  [facility]       KALEY Jesus M.D.   CC James Conley M.D..

## 2025-04-30 ENCOUNTER — APPOINTMENT (OUTPATIENT)
Dept: CARDIOLOGY | Facility: MEDICAL CENTER | Age: 70
End: 2025-04-30
Payer: MEDICARE

## 2025-04-30 DIAGNOSIS — Z95.5 STENTED CORONARY ARTERY: ICD-10-CM

## 2025-05-01 ENCOUNTER — APPOINTMENT (OUTPATIENT)
Dept: CARDIOLOGY | Facility: MEDICAL CENTER | Age: 70
End: 2025-05-01
Payer: MEDICARE

## 2025-05-01 DIAGNOSIS — Z95.5 STENTED CORONARY ARTERY: ICD-10-CM

## 2025-05-02 ENCOUNTER — APPOINTMENT (OUTPATIENT)
Dept: URBAN - METROPOLITAN AREA CLINIC 20 | Facility: CLINIC | Age: 70
Setting detail: DERMATOLOGY
End: 2025-05-02

## 2025-05-02 DIAGNOSIS — L57.0 ACTINIC KERATOSIS: ICD-10-CM

## 2025-05-02 PROCEDURE — ? PHOTO-DOCUMENTATION

## 2025-05-02 PROCEDURE — ? PDT: RED

## 2025-05-02 PROCEDURE — 96567 PDT DSTR PRMLG LES SKN: CPT

## 2025-05-02 PROCEDURE — ? PHOTODYNAMIC THERAPY COUNSELING

## 2025-05-02 ASSESSMENT — LOCATION SIMPLE DESCRIPTION DERM: LOCATION SIMPLE: SUPERIOR FOREHEAD

## 2025-05-02 ASSESSMENT — LOCATION DETAILED DESCRIPTION DERM: LOCATION DETAILED: SUPERIOR MID FOREHEAD

## 2025-05-02 ASSESSMENT — LOCATION ZONE DERM: LOCATION ZONE: FACE

## 2025-05-02 NOTE — PROCEDURE: PDT: RED
Who Performed The Pdt?: Performed by Nurse, MA or  with Pre-Procedure Debridement of Hyperkeratotic Lesions (89973)
Photosensitizer: Ameluz
Number Of Kerasticks/Tubes Of Metvixia/Tubes Of Ameluz Used: 1
Application Method: without occlusion
Lot # (Optional): [id number]
Show Anesthesia In Plan?: Yes
Ndc# (Optional): 82699-268-17
Post-Care Instructions: I reviewed with the patient in detail post-care instructions. Patient is to avoid sunlight for the next 2 days, and wear sun protection. Patients may expect sunburn like redness, discomfort and scabbing.
Was Levulan/Metvixia/Ameluz Applied On A Previous Day?: No
Medical Necessity: Precancerous Lesions
Detail Level: Zone
Incubation Time (Set To 00:00:00 If Not Wanted): 01:30:00
Debridement Text (Will Only Render In Visit Note If You Select Debridement Option Under Who Performed The Pdt Field): Prior to application of the photodynamic medication the hyperkeratotic lesions were curetted to make them more amenable to therapy.
Illumination Time: 7 min 7 sec
Total Number Of Aks Treated (Optional To Report): 0
Expiration Date (Optional): 5/2026
Light Source: 635nm LED
Pre-Procedure Text: The treatment areas were cleaned and prepped in the usual fashion.
Anesthesia Type: 1% lidocaine with epinephrine
Consent: Written consent obtained.  The risks were reviewed with the patient including but not limited to: pigmentary changes, pain, blistering, scabbing, redness, and the remote possibility of scarring.

## 2025-05-03 SDOH — ECONOMIC STABILITY: TRANSPORTATION INSECURITY
IN THE PAST 12 MONTHS, HAS LACK OF TRANSPORTATION KEPT YOU FROM MEETINGS, WORK, OR FROM GETTING THINGS NEEDED FOR DAILY LIVING?: NO

## 2025-05-03 SDOH — ECONOMIC STABILITY: INCOME INSECURITY: HOW HARD IS IT FOR YOU TO PAY FOR THE VERY BASICS LIKE FOOD, HOUSING, MEDICAL CARE, AND HEATING?: SOMEWHAT HARD

## 2025-05-03 SDOH — ECONOMIC STABILITY: FOOD INSECURITY: WITHIN THE PAST 12 MONTHS, YOU WORRIED THAT YOUR FOOD WOULD RUN OUT BEFORE YOU GOT MONEY TO BUY MORE.: PATIENT DECLINED

## 2025-05-03 SDOH — HEALTH STABILITY: PHYSICAL HEALTH: ON AVERAGE, HOW MANY MINUTES DO YOU ENGAGE IN EXERCISE AT THIS LEVEL?: 60 MIN

## 2025-05-03 SDOH — HEALTH STABILITY: PHYSICAL HEALTH: ON AVERAGE, HOW MANY DAYS PER WEEK DO YOU ENGAGE IN MODERATE TO STRENUOUS EXERCISE (LIKE A BRISK WALK)?: 3 DAYS

## 2025-05-03 SDOH — HEALTH STABILITY: MENTAL HEALTH
STRESS IS WHEN SOMEONE FEELS TENSE, NERVOUS, ANXIOUS, OR CAN'T SLEEP AT NIGHT BECAUSE THEIR MIND IS TROUBLED. HOW STRESSED ARE YOU?: RATHER MUCH

## 2025-05-03 SDOH — ECONOMIC STABILITY: INCOME INSECURITY: IN THE LAST 12 MONTHS, WAS THERE A TIME WHEN YOU WERE NOT ABLE TO PAY THE MORTGAGE OR RENT ON TIME?: PATIENT DECLINED

## 2025-05-03 SDOH — ECONOMIC STABILITY: FOOD INSECURITY: WITHIN THE PAST 12 MONTHS, THE FOOD YOU BOUGHT JUST DIDN'T LAST AND YOU DIDN'T HAVE MONEY TO GET MORE.: PATIENT DECLINED

## 2025-05-03 SDOH — ECONOMIC STABILITY: HOUSING INSECURITY
IN THE LAST 12 MONTHS, WAS THERE A TIME WHEN YOU DID NOT HAVE A STEADY PLACE TO SLEEP OR SLEPT IN A SHELTER (INCLUDING NOW)?: PATIENT DECLINED

## 2025-05-03 ASSESSMENT — SOCIAL DETERMINANTS OF HEALTH (SDOH)
HOW OFTEN DO YOU ATTEND CHURCH OR RELIGIOUS SERVICES?: MORE THAN 4 TIMES PER YEAR
HOW OFTEN DO YOU ATTENT MEETINGS OF THE CLUB OR ORGANIZATION YOU BELONG TO?: 1 TO 4 TIMES PER YEAR
HOW OFTEN DO YOU GET TOGETHER WITH FRIENDS OR RELATIVES?: MORE THAN THREE TIMES A WEEK
IN A TYPICAL WEEK, HOW MANY TIMES DO YOU TALK ON THE PHONE WITH FAMILY, FRIENDS, OR NEIGHBORS?: MORE THAN THREE TIMES A WEEK
HOW OFTEN DO YOU ATTEND CHURCH OR RELIGIOUS SERVICES?: MORE THAN 4 TIMES PER YEAR
HOW OFTEN DO YOU HAVE SIX OR MORE DRINKS ON ONE OCCASION: NEVER
HOW HARD IS IT FOR YOU TO PAY FOR THE VERY BASICS LIKE FOOD, HOUSING, MEDICAL CARE, AND HEATING?: SOMEWHAT HARD
IN A TYPICAL WEEK, HOW MANY TIMES DO YOU TALK ON THE PHONE WITH FAMILY, FRIENDS, OR NEIGHBORS?: MORE THAN THREE TIMES A WEEK
IN THE PAST 12 MONTHS, HAS THE ELECTRIC, GAS, OIL, OR WATER COMPANY THREATENED TO SHUT OFF SERVICE IN YOUR HOME?: NO
HOW OFTEN DO YOU HAVE A DRINK CONTAINING ALCOHOL: NEVER
DO YOU BELONG TO ANY CLUBS OR ORGANIZATIONS SUCH AS CHURCH GROUPS UNIONS, FRATERNAL OR ATHLETIC GROUPS, OR SCHOOL GROUPS?: YES
HOW OFTEN DO YOU GET TOGETHER WITH FRIENDS OR RELATIVES?: MORE THAN THREE TIMES A WEEK
HOW MANY DRINKS CONTAINING ALCOHOL DO YOU HAVE ON A TYPICAL DAY WHEN YOU ARE DRINKING: PATIENT DOES NOT DRINK
DO YOU BELONG TO ANY CLUBS OR ORGANIZATIONS SUCH AS CHURCH GROUPS UNIONS, FRATERNAL OR ATHLETIC GROUPS, OR SCHOOL GROUPS?: YES
WITHIN THE PAST 12 MONTHS, YOU WORRIED THAT YOUR FOOD WOULD RUN OUT BEFORE YOU GOT THE MONEY TO BUY MORE: PATIENT DECLINED
HOW OFTEN DO YOU ATTENT MEETINGS OF THE CLUB OR ORGANIZATION YOU BELONG TO?: 1 TO 4 TIMES PER YEAR

## 2025-05-03 ASSESSMENT — LIFESTYLE VARIABLES
HOW OFTEN DO YOU HAVE SIX OR MORE DRINKS ON ONE OCCASION: NEVER
HOW OFTEN DO YOU HAVE A DRINK CONTAINING ALCOHOL: NEVER
SKIP TO QUESTIONS 9-10: 1
HOW MANY STANDARD DRINKS CONTAINING ALCOHOL DO YOU HAVE ON A TYPICAL DAY: PATIENT DOES NOT DRINK
AUDIT-C TOTAL SCORE: 0

## 2025-05-05 ENCOUNTER — OFFICE VISIT (OUTPATIENT)
Dept: MEDICAL GROUP | Facility: PHYSICIAN GROUP | Age: 70
End: 2025-05-05
Payer: MEDICARE

## 2025-05-05 VITALS
SYSTOLIC BLOOD PRESSURE: 96 MMHG | BODY MASS INDEX: 31.47 KG/M2 | HEART RATE: 59 BPM | TEMPERATURE: 98.4 F | WEIGHT: 160.27 LBS | HEIGHT: 60 IN | OXYGEN SATURATION: 98 % | DIASTOLIC BLOOD PRESSURE: 42 MMHG

## 2025-05-05 DIAGNOSIS — I50.22 CHRONIC SYSTOLIC HEART FAILURE (HCC): Chronic | ICD-10-CM

## 2025-05-05 DIAGNOSIS — M54.16 LUMBAR RADICULOPATHY: Chronic | ICD-10-CM

## 2025-05-05 DIAGNOSIS — H91.93 BILATERAL HEARING LOSS, UNSPECIFIED HEARING LOSS TYPE: ICD-10-CM

## 2025-05-05 DIAGNOSIS — I25.83 CORONARY ARTERY DISEASE DUE TO LIPID RICH PLAQUE: Chronic | ICD-10-CM

## 2025-05-05 DIAGNOSIS — G47.00 INSOMNIA, PERSISTENT: Chronic | ICD-10-CM

## 2025-05-05 DIAGNOSIS — F32.5 MAJOR DEPRESSIVE DISORDER WITH SINGLE EPISODE, IN FULL REMISSION (HCC): Chronic | ICD-10-CM

## 2025-05-05 DIAGNOSIS — I25.10 CORONARY ARTERY DISEASE DUE TO LIPID RICH PLAQUE: Chronic | ICD-10-CM

## 2025-05-05 DIAGNOSIS — Z00.00 ENCOUNTER FOR MEDICARE ANNUAL WELLNESS EXAM: ICD-10-CM

## 2025-05-05 PROCEDURE — 99214 OFFICE O/P EST MOD 30 MIN: CPT | Mod: 25 | Performed by: STUDENT IN AN ORGANIZED HEALTH CARE EDUCATION/TRAINING PROGRAM

## 2025-05-05 PROCEDURE — 3074F SYST BP LT 130 MM HG: CPT | Performed by: STUDENT IN AN ORGANIZED HEALTH CARE EDUCATION/TRAINING PROGRAM

## 2025-05-05 PROCEDURE — G0439 PPPS, SUBSEQ VISIT: HCPCS | Performed by: STUDENT IN AN ORGANIZED HEALTH CARE EDUCATION/TRAINING PROGRAM

## 2025-05-05 PROCEDURE — 3078F DIAST BP <80 MM HG: CPT | Performed by: STUDENT IN AN ORGANIZED HEALTH CARE EDUCATION/TRAINING PROGRAM

## 2025-05-05 RX ORDER — AMITRIPTYLINE HYDROCHLORIDE 10 MG/1
10 TABLET ORAL NIGHTLY PRN
Qty: 90 TABLET | Refills: 1 | Status: SHIPPED | OUTPATIENT
Start: 2025-05-05

## 2025-05-05 ASSESSMENT — ACTIVITIES OF DAILY LIVING (ADL): BATHING_REQUIRES_ASSISTANCE: 0

## 2025-05-05 ASSESSMENT — ENCOUNTER SYMPTOMS: GENERAL WELL-BEING: FAIR

## 2025-05-05 ASSESSMENT — FIBROSIS 4 INDEX: FIB4 SCORE: 1.06

## 2025-05-05 ASSESSMENT — PATIENT HEALTH QUESTIONNAIRE - PHQ9
SUM OF ALL RESPONSES TO PHQ QUESTIONS 1-9: 5
5. POOR APPETITE OR OVEREATING: 0 - NOT AT ALL
CLINICAL INTERPRETATION OF PHQ2 SCORE: 2

## 2025-05-05 NOTE — PROGRESS NOTES
Chief Complaint   Patient presents with    Medicare Annual Wellness    Referral Needed     ENT       History of Present Illness  Almaz Samuel is a 70 y.o. here for Medicare Annual Wellness Visit and evaluation of facial burn and leg pain.    She underwent a red light treatment on her face, which involved the application of a solution for approximately 90 minutes, followed by exposure to red light. Despite being informed that the procedure would result in a mild sunburn, she experienced a severe burn akin to a second-degree burn. She was advised to apply a cold compress soaked in a solution of vinegar and water, which she has been doing for the past few days. However, she continues to experience a burning sensation on her face since the procedure was done on 05/02/2025.    She has been experiencing severe leg pain and nerve discomfort radiating down her legs, necessitating a return to physical therapy. She has been off from physical therapy for a week due to the intensity of the pain. An MRI was recently performed, but she has not yet received the results.    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  She reports concerns of scar tissue and hearing changes and requested referral to ENT.        Patient Active Problem List    Diagnosis Date Noted    Osteopenia of forearm 01/13/2025    Chronic left hip pain 12/11/2024    Coronary artery disease due to lipid rich plaque - PCI TO LAD for CHF 11/21/2024    Status post insertion of drug-eluting stent into left anterior descending (LAD) artery 11/21/2024    Chronic systolic heart failure (HCC) 11/21/2024    Decompensated heart failure (HCC) 10/17/2024    Acute pyelonephritis 10/17/2024    Depression 10/17/2024    Anxiety 10/17/2024    History of non-ST elevation myocardial infarction (NSTEMI) 10/17/2024    Low back pain 10/17/2024    QT prolongation 10/17/2024    Chronic pain of left ankle 04/09/2024    History of malignant  melanoma 10/13/2023    Benzodiazepine dependence (HCC) 10/13/2023    Prediabetes 10/13/2023    Hives 05/17/2023    Trigger thumb 05/17/2023    Post-operative state 03/27/2023    Urinary incontinence, mixed 01/26/2023    Atrophic vaginitis 01/26/2023    Lower abdominal pain 09/29/2022    Left lower quadrant pain 08/03/2021    IBS (irritable bowel syndrome) 07/17/2021    Aortic atherosclerosis ([facility])     Familial hypercholesteremia     Polyneuropathy associated with underlying disease ([facility]) 03/13/2018    Obesity (BMI 30-39.9) 03/13/2018    ROBERT (generalized anxiety disorder) 06/15/2017    Allergic rhinitis 05/10/2017    Fatigue 03/02/2017    S/P lumbar fusion 10/20/2016    Cervical radiculopathy 10/20/2016    Diaphragmatic hernia 06/30/2015    Mild mitral regurgitation 03/12/2015    H/O: CVA (cerebrovascular accident) 09/23/2014    Outlet dysfunction constipation 09/17/2014    GERD (gastroesophageal reflux disease) 09/17/2014    Lumbar radiculopathy 03/20/2014    Vitamin D deficiency disease 09/24/2012    Hyperlipidemia 12/27/2011    SCOTT (obstructive sleep apnea) 10/24/2011    Insomnia, persistent 10/24/2011    Major depressive disorder with single episode, in full remission ([facility]) 10/24/2011    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       Current Outpatient Medications   Medication Sig Dispense Refill    amitriptyline (ELAVIL) 10 MG Tab Take 1 Tablet by mouth at bedtime as needed for Sleep. 90 Tablet 1    methylPREDNISolone (MEDROL DOSEPAK) 4 MG Tablet Therapy Pack Follow schedule on package instructions. 21 Tablet 0    baclofen (LIORESAL) 10 MG Tab TAKE 1 TABLET BY MOUTH AT BEDTIME 90 Tablet 0    citalopram (CELEXA) 10 MG tablet Take 1 tablet by mouth once daily 90 Tablet 3    ezetimibe (ZETIA) 10 MG Tab Take 1 Tablet by mouth every day. 90 Tablet 3    sacubitril-valsartan (ENTRESTO) 24-26 MG Tab Take 1 Tablet by mouth 2 times a day. 180 Tablet 3    pantoprazole (PROTONIX) 20 MG tablet Take 1 Tablet by mouth every day. Further  refills must come from primary care, thank you 90 Tablet 3    clopidogrel (PLAVIX) 75 MG Tab Take 1 Tablet by mouth every day. 100 Tablet 2    meloxicam (MOBIC) 7.5 MG Tab TAKE 1 TO 2 TABLETS BY MOUTH ONCE DAILY 90 Tablet 0    Evolocumab (REPATHA) 140 MG/ML Solution Auto-injector SubQ injection pen Inject 1 mL under the skin every 14 days. 2.1 Each 11    fluticasone (FLONASE ALLERGY RELIEF) 50 MCG/ACT nasal spray Administer 1 Spray into affected nostril(S) 1 time a day as needed.      furosemide (LASIX) 20 MG Tab Take 1 Tablet by mouth every day. 90 Tablet 3    bisoprolol (ZEBETA) 5 MG Tab Take 1 Tablet by mouth every day. 90 Tablet 3    aspirin 81 MG EC tablet Take 1 Tablet by mouth every day. Indications: Acute Heart Attack 100 Tablet 2    triamcinolone acetonide (KENALOG) 0.1 % Cream Apply 1 Application topically 2 times a day. 15 g 1    gabapentin (NEURONTIN) 300 MG Cap Take 1 capsule by mouth twice daily 180 Capsule 3    Azelastine (ASTELIN) 137 MCG/SPRAY Solution Administer 2 Sprays into affected nostril(S) 2 times a day. 30 mL 0    hydrOXYzine HCl (ATARAX) 25 MG Tab Take 1 Tablet by mouth 3 times a day as needed for Itching. 30 Tablet 1    ondansetron (ZOFRAN ODT) 4 MG TABLET DISPERSIBLE Take 1 Tablet by mouth every 6 hours as needed for Nausea/Vomiting. 10 Tablet 2    estradiol (ESTRACE VAGINAL) 0.1 MG/GM vaginal cream Apply 1g cream inside vagina twice per week 1 Each 3    dicyclomine (BENTYL) 10 MG Cap TAKE 1 CAPSULE BY MOUTH EVERY 6 HOURS AS NEEDED FOR ABDOMINAL CRAMPS AND OR DISCOMFORT      Artificial Tear Solution (TEARS NATURALE OP) Place 2 Drops in both eyes 2 times a day as needed.       No current facility-administered medications for this visit.          Current supplements as per medication list.     Allergies: Albumin, Rosuvastatin, Seasonal, and Simvastatin    Current social contact/activities: Quilting, Religion gatherings, group PT    She  reports that she quit smoking about 50 years ago. Her  smoking use included cigarettes. She started smoking about 55 years ago. She has a 1.5 pack-year smoking history. She has been exposed to tobacco smoke. She has never used smokeless tobacco. She reports that she does not drink alcohol and does not use drugs.  Counseling given: Not Answered  Tobacco comments: quit 1975      ROS:    Gait: Uses no assistive device  Ostomy: No  Other tubes: No  Amputations: No  Chronic oxygen use: No  Last eye exam: 2024  Wears hearing aids: No , but would like to see ENT  : Denies any urinary leakage during the last 6 months    Screening:  Mammogram done Dec 2024.  DEXA done Jan 2025.  Colonoscopy done Oct 2021, due in 7 years.    Depression Screening  Little interest or pleasure in doing things?  1 - several days  Feeling down, depressed , or hopeless? 1 - several days  Trouble falling or staying asleep, or sleeping too much?  1 - several days  Feeling tired or having little energy?  1 - several days  Poor appetite or overeating?  0 - not at all  Feeling bad about yourself - or that you are a failure or have let yourself or your family down? 1 - several days  Trouble concentrating on things, such as reading the newspaper or watching television? 0 - not at all  Moving or speaking so slowly that other people could have noticed.  Or the opposite - being so fidgety or restless that you have been moving around a lot more than usual?  0 - not at all  Thoughts that you would be better off dead, or of hurting yourself?  0 - not at all  Patient Health Questionnaire Score: 5    If depressive symptoms identified deferred to follow up visit unless specifically addressed in assessment and plan.    Interpretation of PHQ-9 Total Score   Score Severity   1-4 No Depression   5-9 Mild Depression   10-14 Moderate Depression   15-19 Moderately Severe Depression   20-27 Severe Depression    Screening for Cognitive Impairment  Do you or any of your friends or family members have any concern about your  memory? No  Three Minute Recall (Village, Kitchen, Baby) 3/3    Chuckie clock face with all 12 numbers and set the hands to show 10 minutes past 11.  Yes    Cognitive concerns identified deferred for follow up unless specifically addressed in assessment and plan.    Fall Risk Assessment  Has the patient had two or more falls in the last year or any fall with injury in the last year?  No    Safety Assessment  Do you always wear your seatbelt?  Yes  Any changes to home needed to function safely? No  Difficulty hearing.  Yes  Patient counseled about all safety risks that were identified.    Functional Assessment ADLs  Are there any barriers preventing you from cooking for yourself or meeting nutritional needs?  No.    Are there any barriers preventing you from driving safely or obtaining transportation?  No.    Are there any barriers preventing you from using a telephone or calling for help?  No    Are there any barriers preventing you from shopping?  No.    Are there any barriers preventing you from taking care of your own finances?  No    Are there any barriers preventing you from managing your medications?  No    Are there any barriers preventing you from showering, bathing or dressing yourself? No    Are there any barriers preventing you from doing housework or laundry? No    Are there any barriers preventing you from using the toilet?Yes Back issues  Are you currently engaging in any exercise or physical activity?  Yes. CARDIAC REHAB T,W,THU.     Self-Assessment of Health  What is your perception of your health? Fair    Do you sleep more than six hours a night? No    In the past 7 days, how much did pain keep you from doing your normal work? None    Do you spend quality time with family or friends (virtually or in person)? Yes BOTH  Do you usually eat a heart healthy diet that constists of a variety of fruits, vegetables, whole grains and fiber? Yes    Do you eat foods high in fat and/or Fast Food more than three  times per week? No    How concerned are you that your medical conditions are not being well managed? Not at all    Are you worried that in the next 2 months, you may not have stable housing that you own, rent, or stay in as part of a household? No        Advance Care Planning  Do you have an Advance Directive, Living Will, Durable Power of , or POLST? Yes  Advance Directive       is not on file - instructed patient to bring in a copy to scan into their chart      Health Maintenance Summary            Upcoming       COVID-19 Vaccine (4 - 2024-25 season) Postponed until 9/11/2025 01/07/2022  Imm Admin: MODERNA SARS-COV-2 VACCINE (12+)    04/19/2021  Imm Admin: MODERNA SARS-COV-2 VACCINE (12+)    03/18/2021  Imm Admin: MODERNA SARS-COV-2 VACCINE (12+)              Mammogram (Yearly) Next due on 12/19/2025 12/19/2024  MA-SCREENING MAMMO BILAT W/TOMOSYNTHESIS W/CAD    09/30/2022  MA-SCREENING MAMMO BILAT W/TOMOSYNTHESIS W/CAD    12/16/2020  MA-SCREENING MAMMO BILAT W/TOMOSYNTHESIS W/CAD    12/16/2020  [id number]    12/16/2020  [id number]     Only the first 5 history entries have been loaded, but more history exists.            Annual Wellness Visit (Yearly) Next due on 5/5/2026 05/05/2025  Level of Service: UT ANNUAL WELLNESS VISIT-INCLUDES PPPS SUBSEQUE*    05/05/2025  Visit Dx: Encounter for Medicare annual wellness exam    04/24/2018  Visit Dx: Medicare annual wellness visit, subsequent              Bone Density Scan (Every 2 Years) Next due on 1/23/2027 01/23/2025  DS-BONE DENSITY STUDY (DEXA)    09/30/2022  DS-BONE DENSITY STUDY (DEXA)    08/08/2017  DS-BONE DENSITY STUDY (DEXA)              Colorectal Cancer Screening (Colonoscopy - Every 7 Years) Next due on 10/6/2028      10/06/2021  REFERRAL TO GI FOR COLONOSCOPY    10/05/2021  REFERRAL TO GI FOR COLONOSCOPY    10/28/2011  Colonoscopy (Previously completed - Done at [facility], negative, good for 10 years)               IMM DTaP/Tdap/Td Vaccine (2 - Td or Tdap) Next due on 9/1/2032 09/01/2022  Imm Admin: Tdap Vaccine                      Completed or No Longer Recommended       Influenza Vaccine (Series Information) Completed      11/07/2024  Imm Admin: Influenza high-dose trivalent (PF)    09/19/2023  Imm Admin: Influenza Vaccine Adult HD    01/14/2023  Imm Admin: Influenza Vaccine Quad Inj (Pf)    11/17/2021  Imm Admin: Influenza Vaccine Adult HD    10/08/2020  Imm Admin: Influenza Vaccine Adult HD      Only the first 5 history entries have been loaded, but more history exists.              Zoster (Shingles) Vaccines (Series Information) Completed      08/10/2018  Imm Admin: Zoster Vaccine Recombinant (RZV) (SHINGRIX)    02/27/2018  Imm Admin: Zoster Vaccine Recombinant (RZV) (SHINGRIX)              Hepatitis C Screening  Completed      09/17/2012  Hepatitis C Antibody component of HEPATITIS PANEL ACUTE(4 COMPONENTS)              Pneumococcal Vaccine: 50+ Years (Series Information) Completed      11/20/2020  Imm Admin: Pneumococcal polysaccharide vaccine (PPSV-23)    09/06/2019  Imm Admin: Pneumococcal Conjugate Vaccine (Prevnar/PCV-13)              Hepatitis A Vaccine (Hep A) (Series Information) Aged Out      No completion history exists for this topic.              Hepatitis B Vaccine (Hep B) (Series Information) Aged Out     No completion history exists for this topic.              HPV Vaccines (Series Information) Aged Out     No completion history exists for this topic.              Polio Vaccine (Inactivated Polio) (Series Information) Aged Out     No completion history exists for this topic.              Meningococcal Immunization (Series Information) Aged Out     No completion history exists for this topic.                            Patient Care Team:  Tisha Jesus M.D. as PCP - General (Family Medicine)  [facility] (DME Supplier)      Social History     Tobacco Use    Smoking status: Former      Current packs/day: 0.00     Average packs/day: 0.3 packs/day for 5.0 years (1.5 ttl pk-yrs)     Types: Cigarettes     Start date: 1970     Quit date: 1975     Years since quittin.3     Passive exposure: Past    Smokeless tobacco: Never    Tobacco comments:     quit    Vaping Use    Vaping status: Never Used   Substance Use Topics    Alcohol use: No    Drug use: No     Family History   Problem Relation Age of Onset    Breast Cancer Mother     Diabetes Mother     Lung Disease Mother         copd    Hyperlipidemia Mother     Hypertension Mother     Glaucoma Mother     Cancer Father         Laryngeal CA    Heart Disease Father 50        CAD with bypasses x 3    Heart Attack Father     Hyperlipidemia Father     Hypertension Father     Diabetes Sister         type II diabetes    Hyperlipidemia Sister     Diabetes Other     Heart Disease Other     Hypertension Other     Lung Disease Other     Heart Disease Brother     Heart Disease Sister     Heart Disease Brother     Hyperlipidemia Sister     Hyperlipidemia Sister     Hyperlipidemia Sister     Hyperlipidemia Brother     Ovarian Cancer Neg Hx     Tubal Cancer Neg Hx     Peritoneal Cancer Neg Hx     Colorectal Cancer Neg Hx      She  has a past medical history of Anesthesia (2011), Aortic atherosclerosis (HCC), Apnea, sleep, Arrhythmia (), Arthritis (2011), Backpain (2011), Bowel habit changes (2023), Breath shortness, Bronchitis (2018), Cancer (HCC) (2018), Cancer (HCC), Cataract (2023), Complication of anesthesia, Coronary artery disease due to lipid rich plaque - PCI TO LAD for CHF (2024), Daytime sleepiness, Delayed emergence from general anesthesia, Diverticulitis, Endometriosis of uterus, Familial hypercholesteremia, Fusion of spine (), ROBERT (generalized anxiety disorder) (2023), H/O fall, H/O: CVA (cerebrovascular accident) (2014), Hair loss (2018), Heart burn (2023), Hiatus  hernia syndrome, High cholesterol (03/08/2023), HTN, goal below 130/80 (10/24/2011), Indigestion, Infectious disease, Insomnia, Ischemic cardiomyopathy (11/21/2024), Lung collapse (02/14/2011), Major depressive disorder with single episode, in full remission (HCC) (10/24/2011), Mixed hyperlipidemia (12/27/2011), Moderate major depression (HCC) (03/08/2023), MRSA exposure (08/2014), Myocardial infarct ([facility]) (10/24), PONV (postoperative nausea and vomiting) (03/08/2023), Post-menopause on HRT (hormone replacement therapy) (12/27/2011), PPD positive (10/24/2011), Scoliosis, Sleep apnea (03/08/2023), Snoring (03/08/2023), Stroke ([facility]) (03/08/2023), Unspecified vitamin D deficiency (09/24/2012), and Urinary incontinence (03/08/2023).    She has no past medical history of COPD or Fall.   Past Surgical History:   Procedure Laterality Date    CATARACT EXTRACTION WITH IOL  2024    OTHER SURGICAL PROCEDURE  04/2023    repair of rectal and bladder prolapse with Dr. Green    AK PELVIC EXAMINATION W ANESTH  03/27/2023    Procedure: PELVIC EXAM UNDER ANESTHESIA, POSTERIOR REPAIR, sacrospinous vault suspension, PERINEORRHAPHY,;  Surgeon: Rajinder Myles M.D.;  Location: SURGERY SAME DAY [facility];  Service: Gynecology    AK NEUROPLASTY & OR TRANSPOS MEDIAN NRV CARPAL SABRINA Right 05/31/2022    Procedure: RIGHT HAND OPEN CARPAL TUNNEL DECOMPRESSION;  Surgeon: Holly Martin M.D.;  Location: [facility];  Service: Orthopedics    NISSEN FUNDOPLICATION LAPAROSCOPIC  07/25/2018    Procedure: NISSEN FUNDOPLICATION LAPAROSCOPIC;  Surgeon: Kenji Okeefe M.D.;  Location: [facility];  Service: General    NISSEN FUNDOPLICATION LAPAROSCOPIC N/A 06/30/2015    Procedure: NISSEN FUNDOPLICATION LAPAROSCOPIC;  Surgeon: Kenji Okeefe M.D.;  Location: SURGERY SAME DAY [facility];  Service:     GASTROSCOPY-ENDO  06/24/2015    Procedure: GASTROSCOPY-ENDO;  Surgeon: Kenji Okeefe M.D.;  Location: [facility]  ORS;  Service:     CARPAL TUNNEL RELEASE  11/14/2012    Performed by Holly Martin M.D. at SURGERY [facility] ORS    LOW ANTERIOR RESECTION LAPAROSCOPIC  03/02/2011    Performed by AYAZ GARCIA at SURGERY [facility] ORS    CERVICAL DISK AND FUSION ANTERIOR  06/22/2010    Performed by JOSEPH DARLING at SURGERY [facility] ORS    TUBAL LIGATION  01/01/1988    TONSILLECTOMY AND ADENOIDECTOMY  01/01/1959    ABDOMINAL HYSTERECTOMY TOTAL      APPENDECTOMY      GYN SURGERY      partial hysterectomy    OTHER NEUROLOGICAL SURG  2014       Exam:   BP 96/42 (BP Location: Right arm, Patient Position: Sitting, BP Cuff Size: Adult)   Pulse (!) 59   Temp 36.9 °C (98.4 °F) (Temporal)   Ht 1.524 m (5')   Wt 72.7 kg (160 lb 4.4 oz)   SpO2 98%  Body mass index is 31.3 kg/m².    Hearing good.    Dentition good, follows up regularly.  Alert, oriented in no acute distress.  Eye contact is good, speech goal directed, affect calm    Assessment and Plan. The following treatment and monitoring plan is recommended:      1. Encounter for Medicare annual wellness exam  Medicare AWV done today.  She reports 1 fall over the past year when she slipped on a wet staircase and we discussed fall precautions today.  PHQ-9 score 5 and depression is stable on Celexa.  She is able to perform ADLs without difficulty.  UTD with mammogram, DEXA, colonoscopy, vaccines.    2. Insomnia, persistent  Chronic, uncontrolled.  She recently lost her bottle and amitriptyline 10 mg at bedtime was refilled.  - amitriptyline (ELAVIL) 10 MG Tab; Take 1 Tablet by mouth at bedtime as needed for Sleep.  Dispense: 90 Tablet; Refill: 1    3. Bilateral hearing loss, unspecified hearing loss type  This is a new chronic problem.  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  Given referral to ENT for further management.  - Referral to ENT    4. Major depressive disorder with single episode, in full remission (HCC)  Chronic, uncontrolled.   PHQ-9 score 5 but recent symptoms were attributed to acute exacerbation of lumbar radiculopathy and poor sleep after losing her amitriptyline bottle.  Continue Celexa 10 mg daily.    5. Coronary artery disease due to lipid rich plaque - PCI TO LAD for CHF  6. Chronic systolic heart failure (HCC)  Chronic, stable.  She follows up with cardiology.  History of LAD stent.  DAPT is most likely causing the increased bruising and she was advised to discuss with Cardiology.  She has already completed 6 months of DAPT after the MI and can most likely stop aspirin.  BP 96/42 today.  She was advised to stop losartan while currently on Entresto.  EF improved from 30 to 60%.  Continue plavix 75 mg daily, zetia 10 mg daily, bisoprolol 5 mg daily, repatha 140 mg every 2 weeks, entrestro 24-26 mg twice daily, lasix 20 mg daily.     7. Lumbar radiculopathy  Chronic, uncontrolled.  History of spinal fusion for scoliosis.  Pain was recently exacerbated by PT.  She follows up with [facility] and reports some improvement with Medrol Dosepak.  No significant findings on recent MRI and she was advised to follow-up with [facility].  She will be having an EMG.  She was also advised to increase gabapentin 300 mg from 1-2 times daily.  Continue baclofen 10 mg at bedtime.      Services suggested: No services needed at this time  Health Care Screening: Age-appropriate preventive services recommended by USPTF and ACIP covered by Medicare were discussed today. Services ordered if indicated and agreed upon by the patient.  Referrals offered: Community-based lifestyle interventions to reduce health risks and promote self-management and wellness, fall prevention, nutrition, physical activity, tobacco-use cessation, weight loss, and mental health services as per orders if indicated.    Discussion today about general wellness and lifestyle habits:    Prevent falls and reduce trip hazards; Cautioned about securing or removing rugs.  Have a working fire alarm and  carbon monoxide detector;   Engage in regular physical activity and social activities     Follow-up: Return in about 3 months (around 8/5/2025) for Follow-up of chronic conditions.     I discussed with the patient the costs associated with double billing for a new complaint or uncontrolled chronic conditions in addition to the Medicare Wellness Visit. Patient is aware they may receive a bill for additional services.

## 2025-05-06 ENCOUNTER — NON-PROVIDER VISIT (OUTPATIENT)
Dept: CARDIOLOGY | Facility: MEDICAL CENTER | Age: 70
End: 2025-05-06
Payer: MEDICARE

## 2025-05-06 ENCOUNTER — TELEPHONE (OUTPATIENT)
Dept: CARDIOLOGY | Facility: MEDICAL CENTER | Age: 70
End: 2025-05-06

## 2025-05-06 ENCOUNTER — APPOINTMENT (OUTPATIENT)
Dept: CARDIOLOGY | Facility: MEDICAL CENTER | Age: 70
End: 2025-05-06
Payer: MEDICARE

## 2025-05-06 DIAGNOSIS — Z95.5 STENTED CORONARY ARTERY: ICD-10-CM

## 2025-05-06 PROCEDURE — G0422 INTENS CARDIAC REHAB W/EXERC: HCPCS | Performed by: INTERNAL MEDICINE

## 2025-05-06 PROCEDURE — G0423 INTENS CARDIAC REHAB NO EXER: HCPCS | Mod: 59 | Performed by: INTERNAL MEDICINE

## 2025-05-06 NOTE — TELEPHONE ENCOUNTER
RONAN      Caller: [facility] WITH [facility] IN [place]    Topic/issue: Their office was calling about the patient doing a cleaning with their office and if there was a clearance needed and if there needed to be anti biotics for this patient. Please advise        Callback Number: [phone]      Thank you    -Willy VEGA

## 2025-05-06 NOTE — PROGRESS NOTES
Almaz Samuel attended Intensive Cardiac Rehab today from 1000 to 1200. During her time she exercised and attended class. Her education today was a video titled: Metabolic Syndrome and Belly Fat. Patient received handouts and class discussion pertaining to the topic.

## 2025-05-07 ENCOUNTER — APPOINTMENT (OUTPATIENT)
Dept: CARDIOLOGY | Facility: MEDICAL CENTER | Age: 70
End: 2025-05-07
Payer: MEDICARE

## 2025-05-07 DIAGNOSIS — Z95.5 STENTED CORONARY ARTERY: ICD-10-CM

## 2025-05-07 PROCEDURE — G0423 INTENS CARDIAC REHAB NO EXER: HCPCS | Mod: 59 | Performed by: INTERNAL MEDICINE

## 2025-05-07 PROCEDURE — G0422 INTENS CARDIAC REHAB W/EXERC: HCPCS | Performed by: INTERNAL MEDICINE

## 2025-05-07 NOTE — PROGRESS NOTES
Almaz Samuel attended Intensive Cardiac Rehab today from 1000 to 1200. During her time she exercised and attended class. Her education today was a cooking school titled: Delicious Desserts. Patient received handouts and class discussion pertaining to the topic.

## 2025-05-08 ENCOUNTER — APPOINTMENT (OUTPATIENT)
Dept: CARDIOLOGY | Facility: MEDICAL CENTER | Age: 70
End: 2025-05-08
Payer: MEDICARE

## 2025-05-08 ENCOUNTER — NON-PROVIDER VISIT (OUTPATIENT)
Dept: CARDIOLOGY | Facility: MEDICAL CENTER | Age: 70
End: 2025-05-08
Payer: MEDICARE

## 2025-05-08 DIAGNOSIS — Z95.5 STENTED CORONARY ARTERY: ICD-10-CM

## 2025-05-08 PROCEDURE — G0422 INTENS CARDIAC REHAB W/EXERC: HCPCS | Performed by: INTERNAL MEDICINE

## 2025-05-08 PROCEDURE — G0423 INTENS CARDIAC REHAB NO EXER: HCPCS | Mod: 59 | Performed by: INTERNAL MEDICINE

## 2025-05-08 NOTE — PROGRESS NOTES
Almaz Samuel attended Intensive Cardiac Rehab today from 1000 to 1200. During her time she exercised and attended class. Her education today was a workshop titled: Balance and Fall Prevention. Patient received handouts and class discussion pertaining to the topic.

## 2025-05-08 NOTE — TELEPHONE ENCOUNTER
Phone Number Called: [phone]     Call outcome:  unable to connect call    Message: Called to inform that abx not needed for dental cleaning

## 2025-05-09 ENCOUNTER — APPOINTMENT (OUTPATIENT)
Dept: PHYSICAL MEDICINE AND REHAB | Facility: MEDICAL CENTER | Age: 70
End: 2025-05-09
Payer: MEDICARE

## 2025-05-09 VITALS
OXYGEN SATURATION: 97 % | WEIGHT: 159.17 LBS | HEIGHT: 60 IN | SYSTOLIC BLOOD PRESSURE: 112 MMHG | TEMPERATURE: 97.8 F | HEART RATE: 61 BPM | DIASTOLIC BLOOD PRESSURE: 76 MMHG | BODY MASS INDEX: 31.25 KG/M2

## 2025-05-09 DIAGNOSIS — Z98.1 HISTORY OF SPINAL FUSION: ICD-10-CM

## 2025-05-09 DIAGNOSIS — M25.551 BILATERAL HIP PAIN: Primary | ICD-10-CM

## 2025-05-09 DIAGNOSIS — G89.29 CHRONIC BILATERAL LOW BACK PAIN WITH BILATERAL SCIATICA: ICD-10-CM

## 2025-05-09 DIAGNOSIS — M25.552 BILATERAL HIP PAIN: Primary | ICD-10-CM

## 2025-05-09 DIAGNOSIS — M16.0 BILATERAL PRIMARY OSTEOARTHRITIS OF HIP: ICD-10-CM

## 2025-05-09 DIAGNOSIS — M54.41 CHRONIC BILATERAL LOW BACK PAIN WITH BILATERAL SCIATICA: ICD-10-CM

## 2025-05-09 DIAGNOSIS — M54.42 CHRONIC BILATERAL LOW BACK PAIN WITH BILATERAL SCIATICA: ICD-10-CM

## 2025-05-09 PROCEDURE — 3078F DIAST BP <80 MM HG: CPT | Performed by: STUDENT IN AN ORGANIZED HEALTH CARE EDUCATION/TRAINING PROGRAM

## 2025-05-09 PROCEDURE — 3074F SYST BP LT 130 MM HG: CPT | Performed by: STUDENT IN AN ORGANIZED HEALTH CARE EDUCATION/TRAINING PROGRAM

## 2025-05-09 PROCEDURE — 1125F AMNT PAIN NOTED PAIN PRSNT: CPT | Performed by: STUDENT IN AN ORGANIZED HEALTH CARE EDUCATION/TRAINING PROGRAM

## 2025-05-09 PROCEDURE — 99204 OFFICE O/P NEW MOD 45 MIN: CPT | Performed by: STUDENT IN AN ORGANIZED HEALTH CARE EDUCATION/TRAINING PROGRAM

## 2025-05-09 ASSESSMENT — FIBROSIS 4 INDEX: FIB4 SCORE: 1.06

## 2025-05-09 ASSESSMENT — PATIENT HEALTH QUESTIONNAIRE - PHQ9
5. POOR APPETITE OR OVEREATING: 2 - MORE THAN HALF THE DAYS
CLINICAL INTERPRETATION OF PHQ2 SCORE: 4
SUM OF ALL RESPONSES TO PHQ QUESTIONS 1-9: 14

## 2025-05-09 ASSESSMENT — PAIN SCALES - GENERAL: PAINLEVEL_OUTOF10: 2=MINIMAL-SLIGHT

## 2025-05-12 NOTE — TELEPHONE ENCOUNTER
Phone Number Called: [phone]    Call outcome: Left detailed message for patient. Informed to call back with any additional questions.    Message: Called to inform that no abx needed as pt has not hx of valve replacemtent

## 2025-05-12 NOTE — Clinical Note
REFERRAL APPROVAL NOTICE         Sent on May 12, 2025                   Almaz Samuel  [address] [place] [address]                   Dear MsKortney Samuel,    After a careful review of the medical information and benefit coverage, [facility] has processed your referral. See below for additional details.    If applicable, you must be actively enrolled with your insurance for coverage of the authorized service. If you have any questions regarding your coverage, please contact your insurance directly.    REFERRAL INFORMATION   Referral #:  [id number]  Referred-To Provider    Referred-By Provider:  Otolaryngology    Tisha Jesus M.D.   [facility]      [address] [place] [address]  [phone] [address] [facility] [place] [address]  [phone]    Referral Start Date:  05/05/2025  Referral End Date:   05/05/2026             SCHEDULING  If you do not already have an appointment, please call [phone] to make an appointment.     MORE INFORMATION  If you do not already have a [facility] account, sign up at: [email or web address]  You can access your medical information, make appointments, see lab results, billing information, and more.  If you have questions regarding this referral, please contact  the [facility] Referrals department at:             [phone]. Monday - Friday 8:00AM - 5:00PM.     Sincerely,    [facility]

## 2025-05-13 ENCOUNTER — APPOINTMENT (OUTPATIENT)
Dept: CARDIOLOGY | Facility: MEDICAL CENTER | Age: 70
End: 2025-05-13
Payer: MEDICARE

## 2025-05-13 DIAGNOSIS — Z95.5 STENTED CORONARY ARTERY: ICD-10-CM

## 2025-05-13 PROCEDURE — G0423 INTENS CARDIAC REHAB NO EXER: HCPCS | Mod: 59 | Performed by: INTERNAL MEDICINE

## 2025-05-13 PROCEDURE — G0422 INTENS CARDIAC REHAB W/EXERC: HCPCS | Performed by: INTERNAL MEDICINE

## 2025-05-13 NOTE — PROGRESS NOTES
Almaz Samuel attended Intensive Cardiac Rehab today from 1000 to 1200. During her time she exercised and attended class. Her education today was a video titled: Nutrition Action Plan. Patient received handouts and class discussion pertaining to the topic.

## 2025-05-14 ENCOUNTER — APPOINTMENT (OUTPATIENT)
Dept: CARDIOLOGY | Facility: MEDICAL CENTER | Age: 70
End: 2025-05-14
Payer: MEDICARE

## 2025-05-14 ENCOUNTER — NON-PROVIDER VISIT (OUTPATIENT)
Dept: CARDIOLOGY | Facility: MEDICAL CENTER | Age: 70
End: 2025-05-14
Payer: MEDICARE

## 2025-05-14 DIAGNOSIS — Z95.5 STENTED CORONARY ARTERY: ICD-10-CM

## 2025-05-14 PROCEDURE — G0422 INTENS CARDIAC REHAB W/EXERC: HCPCS | Performed by: INTERNAL MEDICINE

## 2025-05-14 PROCEDURE — G0423 INTENS CARDIAC REHAB NO EXER: HCPCS | Mod: 59 | Performed by: INTERNAL MEDICINE

## 2025-05-14 NOTE — PROGRESS NOTES
Almaz Samuel attended Intensive Cardiac Rehab today from 1000 to 1200. During his time he exercised and attended class.   His education today was a COOKING CLASS  titled: [employer].  Patient received handouts and class discussion pertaining to the topic.

## 2025-05-15 ENCOUNTER — APPOINTMENT (OUTPATIENT)
Dept: CARDIOLOGY | Facility: MEDICAL CENTER | Age: 70
End: 2025-05-15
Payer: MEDICARE

## 2025-05-15 DIAGNOSIS — Z95.5 STENTED CORONARY ARTERY: ICD-10-CM

## 2025-05-15 PROCEDURE — G0423 INTENS CARDIAC REHAB NO EXER: HCPCS | Mod: 59 | Performed by: INTERNAL MEDICINE

## 2025-05-15 PROCEDURE — G0422 INTENS CARDIAC REHAB W/EXERC: HCPCS | Performed by: INTERNAL MEDICINE

## 2025-05-15 NOTE — PROGRESS NOTES
Almaz Samuel attended Intensive Cardiac Rehab today from 1000 to 1200. During her time she exercised and attended class. Her education today was a workshop titled: HEALTHY SLEEP, HEALTHY HEART. Patient received handouts and class discussion pertaining to the topic.

## 2025-05-16 ENCOUNTER — TELEPHONE (OUTPATIENT)
Dept: CARDIOLOGY | Facility: MEDICAL CENTER | Age: 70
End: 2025-05-16
Payer: MEDICARE

## 2025-05-16 NOTE — TELEPHONE ENCOUNTER
LVM that pt does not need abx as she had PCI with stent placement and is not indicated.     Cath >6 months ago

## 2025-05-16 NOTE — TELEPHONE ENCOUNTER
CW    Caller: Almaz Samuel     Topic/issue: Pt called in regards to wanting to know If she can go back to the dentist and if she can does she need to take antibiotics please advise.     Callback Number: [phone] (work)      Thank you,     Tyler BETTS

## 2025-05-20 ENCOUNTER — APPOINTMENT (OUTPATIENT)
Dept: CARDIOLOGY | Facility: MEDICAL CENTER | Age: 70
End: 2025-05-20
Payer: MEDICARE

## 2025-05-20 DIAGNOSIS — Z95.5 STENTED CORONARY ARTERY: ICD-10-CM

## 2025-05-20 PROCEDURE — G0423 INTENS CARDIAC REHAB NO EXER: HCPCS | Mod: 59 | Performed by: INTERNAL MEDICINE

## 2025-05-20 PROCEDURE — G0422 INTENS CARDIAC REHAB W/EXERC: HCPCS | Performed by: INTERNAL MEDICINE

## 2025-05-20 NOTE — PROGRESS NOTES
Almaz Samuel attended Intensive Cardiac Rehab today from 1000 to 1200. During his time he exercised and attended class.   His education today was a VIDEO titled: DISEASES OF OUR TIME: DIABETES. Patient received handouts and class discussion pertaining to the topic.

## 2025-05-21 PROCEDURE — G0422 INTENS CARDIAC REHAB W/EXERC: HCPCS | Performed by: INTERNAL MEDICINE

## 2025-05-21 PROCEDURE — G0423 INTENS CARDIAC REHAB NO EXER: HCPCS | Mod: 59 | Performed by: INTERNAL MEDICINE

## 2025-05-21 NOTE — PROGRESS NOTES
Almaz Samuel attended Intensive Cardiac Rehab today from 1000 to 1200. During his time he exercised and attended class.   His education today was a COOKING CLASS titled: MEALS IN A SNAP. Patient received handouts and class discussion pertaining to the topic.

## 2025-05-22 ENCOUNTER — NON-PROVIDER VISIT (OUTPATIENT)
Dept: CARDIOLOGY | Facility: MEDICAL CENTER | Age: 70
End: 2025-05-22
Payer: MEDICARE

## 2025-05-22 DIAGNOSIS — Z95.5 STENTED CORONARY ARTERY: ICD-10-CM

## 2025-05-22 PROCEDURE — G0422 INTENS CARDIAC REHAB W/EXERC: HCPCS | Performed by: INTERNAL MEDICINE

## 2025-05-22 PROCEDURE — G0423 INTENS CARDIAC REHAB NO EXER: HCPCS | Mod: 59 | Performed by: INTERNAL MEDICINE

## 2025-05-22 NOTE — PROGRESS NOTES
Almaz Samuel attended Intensive Cardiac Rehab today from 1000 to 1200. During her time she exercised and attended class. Her education today was a workshop titled: Targeting Nutritional Priorities. Patient received handouts and class discussion pertaining to the topic.

## 2025-05-27 ENCOUNTER — NON-PROVIDER VISIT (OUTPATIENT)
Dept: CARDIOLOGY | Facility: MEDICAL CENTER | Age: 70
End: 2025-05-27
Payer: MEDICARE

## 2025-05-27 DIAGNOSIS — Z95.5 STENTED CORONARY ARTERY: ICD-10-CM

## 2025-05-27 NOTE — PROGRESS NOTES
Almaz Samuel attended Intensive Cardiac Rehab today from 1000 to 1200. During his time he exercised and attended class.   His education today was a VIDEO titled: HYPERTENSION AND DISEASE. Patient received handouts and class discussion pertaining to the topic.

## 2025-05-28 ENCOUNTER — NON-PROVIDER VISIT (OUTPATIENT)
Dept: CARDIOLOGY | Facility: MEDICAL CENTER | Age: 70
End: 2025-05-28
Payer: MEDICARE

## 2025-05-28 DIAGNOSIS — Z95.5 STENTED CORONARY ARTERY: ICD-10-CM

## 2025-05-28 NOTE — PROGRESS NOTES
Almaz Samuel attended Intensive Cardiac Rehab today from 1000 to 1200. During his time he exercised and attended class.   His education today was a COOKING WORKSHOP titled: TASTY APPS AND SNACKS. Patient received handouts and class discussion pertaining to the topic.

## 2025-05-29 NOTE — H&P (VIEW-ONLY)
Verbal consent was acquired by the patient to use Missing[employer] ambient listening note generation during this visit Yes     FOLLOW-UP PATIENT VISIT    Interventional Spine and Pain  Physiatry (Physical Medicine and Rehabilitation)     Date of Service: 25   Chief Complaint:   Chief Complaint   Patient presents with    Follow-Up     Bilateral hip pain      Patient Name: Almaz Samuel   : 1955   MRN: [id number]       PRIOR HISTORY    Please see new patient note by Dr. Mohr for more details.     Interval History  Patient identification: Almaz Samuel,  1955, with history of T3-pelvis fusion in , C5-6 ACDF in , depression, GERD, neuropathy, melanoma, CAD on aspirin and Plavix, heart failure, prediabetes, who presents today for follow-up of low back and left hip and leg pain.    History of Present Illness  The patient is a 70-year-old female who presents today for follow-up of low back and left hip and leg pain.    She reports stable radiating pain down her left anterolateral thigh and leg, which she describes as daily and constant, particularly noticeable when she begins to walk or step out. The pain intensifies during her cardiac rehabilitation exercises, especially when she attempts to walk to her car. She has been managing her pain with gabapentin and nightly doses of baclofen. She had previously discontinued amitriptyline but has since resumed its use as she was told it can be beneficial for nerve pain. She has been participating in physical therapy for the past 3 months and has an additional 4 weeks of therapy is scheduled. She recalls an incident where she may have exacerbated her condition by performing a home exercise incorrectly, resulting in increased stiffness and pain in the back. She attempted to alleviate the discomfort with alternating applications of ice and heat.    MEDICATIONS  Current: Gabapentin, baclofen, amitriptyline, aspirin, Plavix    Procedures:  25:  Bilateral lower extremity EMG, left subacute to chronic L4/5 (primarily L5) radiculopathy      PMHx:   Past Medical History[1]    PSHx:   Past Surgical History[2]    Family history   Family History   Problem Relation Age of Onset    Breast Cancer Mother     Diabetes Mother     Lung Disease Mother         copd    Hyperlipidemia Mother     Hypertension Mother     Glaucoma Mother     Cancer Father         Laryngeal CA    Heart Disease Father 50        CAD with bypasses x 3    Heart Attack Father     Hyperlipidemia Father     Hypertension Father     Diabetes Sister         type II diabetes    Hyperlipidemia Sister     Diabetes Other     Heart Disease Other     Hypertension Other     Lung Disease Other     Heart Disease Brother     Heart Disease Sister     Heart Disease Brother     Hyperlipidemia Sister     Hyperlipidemia Sister     Hyperlipidemia Sister     Hyperlipidemia Brother     Ovarian Cancer Neg Hx     Tubal Cancer Neg Hx     Peritoneal Cancer Neg Hx     Colorectal Cancer Neg Hx        Medications:   Active Medications[3]     Medications Ordered Prior to Encounter[4]    Allergies:   Allergies[5]    Social Hx:   Social History     Socioeconomic History    Marital status:      Spouse name: Not on file    Number of children: 1    Years of education: Not on file    Highest education level: Some college, no degree   Occupational History     Employer: Retired   Tobacco Use    Smoking status: Former     Current packs/day: 0.00     Average packs/day: 0.3 packs/day for 5.0 years (1.5 ttl pk-yrs)     Types: Cigarettes     Start date: 1970     Quit date: 1975     Years since quittin.4     Passive exposure: Past    Smokeless tobacco: Never    Tobacco comments:     quit    Vaping Use    Vaping status: Never Used   Substance and Sexual Activity    Alcohol use: No    Drug use: No    Sexual activity: Yes     Partners: Male     Comment: , 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[employer]   Other Topics Concern    [employer]  Service No    Blood Transfusions No    Caffeine Concern Not Asked    Occupational Exposure Not Asked    Hobby Hazards Not Asked    Sleep Concern Not Asked    Stress Concern Not Asked    Weight Concern Not Asked    Special Diet Not Asked    Back Care Not Asked    Exercise No    Bike Helmet No    Seat Belt Yes    Self-Exams Yes   Social History Narrative    Not on file     Social Drivers of Health     Financial Resource Strain: Medium Risk (5/3/2025)    Overall Financial Resource Strain (CARDIA)     Difficulty of Paying Living Expenses: Somewhat hard   Food Insecurity: Patient Declined (5/3/2025)    Hunger Vital Sign     Worried About Running Out of Food in the Last Year: Patient declined     Ran Out of Food in the Last Year: Patient declined   Transportation Needs: No Transportation Needs (5/3/2025)    PRAPARE - Transportation     Lack of Transportation (Medical): No     Lack of Transportation (Non-Medical): No   Physical Activity: Sufficiently Active (5/3/2025)    Exercise Vital Sign     Days of Exercise per Week: 3 days     Minutes of Exercise per Session: 60 min   Stress: Stress Concern Present (5/3/2025)    [employer] - Occupational Stress Questionnaire     Feeling of Stress : Rather much   Social Connections: Socially Integrated (5/3/2025)    Social Connection and Isolation Panel [NHANES]     Frequency of Communication with Friends and Family: More than three times a week     Frequency of Social Gatherings with Friends and Family: More than three times a week     Attends Muslim Services: More than 4 times per year     Active Member of Clubs or Organizations: Yes     Attends Club or Organization Meetings: 1 to 4 times per year     Marital Status:    Intimate Partner Violence: Not At Risk (10/17/2024)    Humiliation, Afraid, Rape, and Kick questionnaire     Fear of Current or Ex-Partner: No     Emotionally Abused: No     Physically Abused: No     Sexually Abused: No    Housing Stability: Unknown (5/3/2025)    Housing Stability Vital Sign     Unable to Pay for Housing in the Last Year: Patient declined     Number of Times Moved in the Last Year: 0     Homeless in the Last Year: No         EXAMINATION     Physical Exam:   Vitals: /72   Pulse 68   Temp 36.2 °C (97.2 °F) (Temporal)   Ht 1.524 m (5')   Wt 71.7 kg (158 lb 1.1 oz)   SpO2 98%       Constitutional:   Body Habitus: Body mass index is 30.87 kg/m².  Cooperation: Fully cooperates with exam  Appearance: Well-groomed, well-nourished  Respiratory:  Breathing comfortable on room air, no audible wheezing  Cardiovascular: Skin appears well-perfused  Psychiatric: Appropriate affect  Gait: normal gait, no use of ambulatory device, nonantalgic.   Neuro:  Sensation: Decreased sensation to light touch left L4/5 distribution  MSK:?No?pain with left hip internal or external rotation      MEDICAL DECISION MAKING    DATA    Labs:   Lab Results   Component Value Date/Time    SODIUM 138 12/27/2024 08:51 AM    POTASSIUM 4.1 12/27/2024 08:51 AM    CHLORIDE 102 12/27/2024 08:51 AM    CO2 25 12/27/2024 08:51 AM    GLUCOSE 97 12/27/2024 08:51 AM    BUN 17 12/27/2024 08:51 AM    CREATININE 0.96 12/27/2024 08:51 AM        Lab Results   Component Value Date/Time    PROTHROMBTM 12.5 11/18/2024 08:15 AM    INR 0.93 11/18/2024 08:15 AM        Lab Results   Component Value Date/Time    WBC 6.4 11/18/2024 08:15 AM    RBC 4.28 11/18/2024 08:15 AM    HEMOGLOBIN 13.1 11/18/2024 08:15 AM    HEMATOCRIT 40.5 11/18/2024 08:15 AM    MCV 94.6 11/18/2024 08:15 AM    MCH 30.6 11/18/2024 08:15 AM    MCHC 32.3 11/18/2024 08:15 AM    MPV 8.9 (L) 11/18/2024 08:15 AM    NEUTSPOLYS 81.90 (H) 10/17/2024 07:50 AM    LYMPHOCYTES 9.10 (L) 10/17/2024 07:50 AM    MONOCYTES 8.40 10/17/2024 07:50 AM    EOSINOPHILS 0.00 10/17/2024 07:50 AM    BASOPHILS 0.10 10/17/2024 07:50 AM        Lab Results   Component Value Date/Time    HBA1C 6.0 (H) 11/14/2024 07:30 AM           Imaging:   Prior personal imaging review:  X-ray lumbar spine 4/23/25: T3 to pelvis fusion hardware        IMAGING radiology reads: I reviewed the following radiology reports  X-ray bilateral hips 12/19/24:  IMPRESSION:     1.  No LEFT hip or pelvic fracture.  2.  Mild degenerative change of both hips.  3.  Postoperative changes as described.      MRI Lumbar Spine 4/29/25:   Impression:  Extensive posterior fusion and dorsal decompression from the thoracic spine to the S1 level along with bilateral iliac bolts.  Hardware appears intact.  There is no postoperative complication.  No central canal stenosis or neural foraminal narrowing.  Levoscoliosis of the lumbar spine.         Results for orders placed in visit on 04/23/25     DX-LUMBAR SPINE-4+ VIEWS  AP, lateral, flexion, and extension of the lumbar spine interpreted by me   today show evidence of hardware from a fusion beginning at T3 down to the   pelvis without signs of periprosthetic fracture or loosening.  Spondylosis   present throughout the spine.       DIAGNOSIS   Visit Diagnoses     ICD-10-CM   1. Lumbar radiculopathy  M54.16   2. Chronic left-sided low back pain with left-sided sciatica  M54.42    G89.29   3. Left leg numbness  R20.0         ASSESSMENT and PLAN:     Almaz Samuel is a 70 y.o. female who returns to clinic for follow-up of left leg pain and numbness.    Almaz was seen today for follow-up.    Diagnoses and all orders for this visit:    Lumbar radiculopathy  -     Pain Hospital Procedure; Future    Chronic left-sided low back pain with left-sided sciatica  -     Pain Hospital Procedure; Future    Left leg numbness  -     Pain Hospital Procedure; Future        Assessment & Plan  Left low back pain with radiation into the left lower extremity  Lumbar radiculopathy  Left leg numbness  Patient presents for follow-up of left leg pain with radiation into the left anterolateral thigh and leg with numbness in the same distribution. The MRI  "results do not indicate any current nerve impingement, but the recent EMG findings show a subacute to chronic left L4/5 radiculopathy indicating prior nerve damage. The symptoms align with an issue involving the L4 and L5 nerve roots. Given EMG-confirmed radiculopathy as well as lack of pain improvement with 3 months of physical therapy, an epidural injection targeting the L4 and L5 nerve roots was recommended which patient is interested in. The procedure involves numbing the skin and deeper tissues to access the epidural space, where numbing medicine and steroids will be administered. Risks include bleeding, infection, and damage to surrounding structures. Clearance from her cardiologist is required to temporarily discontinue aspirin and Plavix due to the increased risk of bleeding. In the interim she will continue her current regimen of baclofen, gabapentin, and amitriptyline.        Follow up: For left L5-S1 transforaminal epidural steroid injection with IV sedation, then 3 weeks after injection in clinic    Thank you for allowing me to participate in the care of this patient. If you have any questions please not hesitate to contact me.         Please note that this dictation was created using voice recognition software. I have made every reasonable attempt to correct obvious errors but there may be errors of grammar and content that I may have overlooked prior to finalization of this note.    Dodie Mohr MD  Interventional Spine and Sports Physiatry  Physical Medicine and Rehabilitation  [facility]         [1]   Past Medical History:  Diagnosis Date    Anesthesia 02/14/2011    PO N/V, \"slow to come out\", vasovagal response with last ablation/block on her neck    Aortic atherosclerosis (HCC)     Apnea, sleep     Arrhythmia 8/14    Arthritis 02/14/2011    spine    Backpain 02/14/2011    neck and abd. also,     Bowel habit changes 03/08/2023    constipation and diarrhea r/t IBS, medicated    Breath " shortness     with exertion    Bronchitis 05/2018    Cancer (Formerly [facility]) 07/18/2018    Skin - 15 years ago.    Cancer (Formerly [facility])     April/May 2018 Melanoma (nose)    Cataract 03/08/2023    in both eyes, no surgery at present    Complication of anesthesia     Coronary artery disease due to lipid rich plaque - PCI TO LAD for CHF 11/21/2024    Daytime sleepiness     Delayed emergence from general anesthesia     Diverticulitis     Endometriosis of uterus     Familial hypercholesteremia     Fusion of spine 2014    ROBERT (generalized anxiety disorder) 03/08/2023    medicated    H/O fall     when in hospital  with low O2 sats    H/O: CVA (cerebrovascular accident) 08/22/2014    Complex event associated with apparent medication reaction but with MRI suggesting possible multiple small infarcts of various ages, [facility]    Hair loss 12/06/2018    Heart burn 03/08/2023    medicated    Hiatus hernia syndrome     not repaired    High cholesterol 03/08/2023    medicated    HTN, goal below 130/80 10/24/2011    Indigestion     Infectious disease      Hep C +    Insomnia     Ischemic cardiomyopathy 11/21/2024    Lung collapse 02/14/2011    right lung 1/4 collpsed     Major depressive disorder with single episode, in full remission (HCC) 10/24/2011    Mixed hyperlipidemia 12/27/2011    Moderate major depression (HCC) 03/08/2023    medicated    MRSA exposure 08/2014    while in hospital    Myocardial infarct (Formerly [facility]) 10/24    PONV (postoperative nausea and vomiting) 03/08/2023    Post-menopause on HRT (hormone replacement therapy) 12/27/2011    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    exposed as a child, told not active    Scoliosis     Sleep apnea 03/08/2023    BIPAP, hasn't used in the last year    Snoring 03/08/2023    Stroke (Formerly [facility]) 03/08/2023    tia's times 3 in the past    Unspecified vitamin D deficiency 09/24/2012    Urinary incontinence 03/08/2023    at present, wears a pad   [2]   Past Surgical History:  Procedure Laterality Date    CATARACT EXTRACTION  WITH IOL  2024    OTHER SURGICAL PROCEDURE  04/2023    repair of rectal and bladder prolapse with Dr. Green    IL PELVIC EXAMINATION W ANESTH  03/27/2023    Procedure: PELVIC EXAM UNDER ANESTHESIA, POSTERIOR REPAIR, sacrospinous vault suspension, PERINEORRHAPHY,;  Surgeon: Rajinder Myles M.D.;  Location: SURGERY SAME DAY [facility];  Service: Gynecology    IL NEUROPLASTY & OR TRANSPOS MEDIAN NRV CARPAL SABRINA Right 05/31/2022    Procedure: RIGHT HAND OPEN CARPAL TUNNEL DECOMPRESSION;  Surgeon: Holly Martin M.D.;  Location: [facility] [place];  Service: Orthopedics    NISSEN FUNDOPLICATION LAPAROSCOPIC  07/25/2018    Procedure: NISSEN FUNDOPLICATION LAPAROSCOPIC;  Surgeon: Kenji Garcia M.D.;  Location: SURGERY [facility];  Service: General    NISSEN FUNDOPLICATION LAPAROSCOPIC N/A 06/30/2015    Procedure: NISSEN FUNDOPLICATION LAPAROSCOPIC;  Surgeon: Kenji Garcia M.D.;  Location: SURGERY SAME DAY [facility];  Service:     GASTROSCOPY-ENDO  06/24/2015    Procedure: GASTROSCOPY-ENDO;  Surgeon: Kenji Garcia M.D.;  Location: ENDOSCOPY [facility];  Service:     CARPAL TUNNEL RELEASE  11/14/2012    Performed by Holly Martin M.D. at SURGERY [facility] ORS    LOW ANTERIOR RESECTION LAPAROSCOPIC  03/02/2011    Performed by KENJI GARCIA at SURGERY [facility] ORS    CERVICAL DISK AND FUSION ANTERIOR  06/22/2010    Performed by JOSEPH DARLING at SURGERY [facility] ORS    TUBAL LIGATION  01/01/1988    TONSILLECTOMY AND ADENOIDECTOMY  01/01/1959    ABDOMINAL HYSTERECTOMY TOTAL      APPENDECTOMY      GYN SURGERY      partial hysterectomy    OTHER NEUROLOGICAL SURG  2014   [3]   Outpatient Medications Marked as Taking for the 5/30/25 encounter (Office Visit) with Dodie Mohr M.D.   Medication Sig Dispense Refill    amitriptyline (ELAVIL) 10 MG Tab Take 1 Tablet by mouth at bedtime as needed for Sleep. 90 Tablet 1    methylPREDNISolone (MEDROL DOSEPAK) 4 MG Tablet Therapy Pack Follow schedule on  package instructions. 21 Tablet 0    baclofen (LIORESAL) 10 MG Tab TAKE 1 TABLET BY MOUTH AT BEDTIME 90 Tablet 0    citalopram (CELEXA) 10 MG tablet Take 1 tablet by mouth once daily 90 Tablet 3    ezetimibe (ZETIA) 10 MG Tab Take 1 Tablet by mouth every day. 90 Tablet 3    sacubitril-valsartan (ENTRESTO) 24-26 MG Tab Take 1 Tablet by mouth 2 times a day. 180 Tablet 3    pantoprazole (PROTONIX) 20 MG tablet Take 1 Tablet by mouth every day. Further refills must come from primary care, thank you 90 Tablet 3    clopidogrel (PLAVIX) 75 MG Tab Take 1 Tablet by mouth every day. 100 Tablet 2    meloxicam (MOBIC) 7.5 MG Tab TAKE 1 TO 2 TABLETS BY MOUTH ONCE DAILY 90 Tablet 0    Evolocumab (REPATHA) 140 MG/ML Solution Auto-injector SubQ injection pen Inject 1 mL under the skin every 14 days. 2.1 Each 11    fluticasone (FLONASE ALLERGY RELIEF) 50 MCG/ACT nasal spray Administer 1 Spray into affected nostril(S) 1 time a day as needed.      furosemide (LASIX) 20 MG Tab Take 1 Tablet by mouth every day. 90 Tablet 3    bisoprolol (ZEBETA) 5 MG Tab Take 1 Tablet by mouth every day. 90 Tablet 3    aspirin 81 MG EC tablet Take 1 Tablet by mouth every day. Indications: Acute Heart Attack 100 Tablet 2    triamcinolone acetonide (KENALOG) 0.1 % Cream Apply 1 Application topically 2 times a day. 15 g 1    gabapentin (NEURONTIN) 300 MG Cap Take 1 capsule by mouth twice daily 180 Capsule 3    Azelastine (ASTELIN) 137 MCG/SPRAY Solution Administer 2 Sprays into affected nostril(S) 2 times a day. 30 mL 0    hydrOXYzine HCl (ATARAX) 25 MG Tab Take 1 Tablet by mouth 3 times a day as needed for Itching. 30 Tablet 1    ondansetron (ZOFRAN ODT) 4 MG TABLET DISPERSIBLE Take 1 Tablet by mouth every 6 hours as needed for Nausea/Vomiting. 10 Tablet 2    estradiol (ESTRACE VAGINAL) 0.1 MG/GM vaginal cream Apply 1g cream inside vagina twice per week 1 Each 3    dicyclomine (BENTYL) 10 MG Cap TAKE 1 CAPSULE BY MOUTH EVERY 6 HOURS AS NEEDED FOR  ABDOMINAL CRAMPS AND OR DISCOMFORT      Artificial Tear Solution (TEARS NATURALE OP) Place 2 Drops in both eyes 2 times a day as needed.     [4]   Current Outpatient Medications on File Prior to Visit   Medication Sig Dispense Refill    amitriptyline (ELAVIL) 10 MG Tab Take 1 Tablet by mouth at bedtime as needed for Sleep. 90 Tablet 1    methylPREDNISolone (MEDROL DOSEPAK) 4 MG Tablet Therapy Pack Follow schedule on package instructions. 21 Tablet 0    baclofen (LIORESAL) 10 MG Tab TAKE 1 TABLET BY MOUTH AT BEDTIME 90 Tablet 0    citalopram (CELEXA) 10 MG tablet Take 1 tablet by mouth once daily 90 Tablet 3    ezetimibe (ZETIA) 10 MG Tab Take 1 Tablet by mouth every day. 90 Tablet 3    sacubitril-valsartan (ENTRESTO) 24-26 MG Tab Take 1 Tablet by mouth 2 times a day. 180 Tablet 3    pantoprazole (PROTONIX) 20 MG tablet Take 1 Tablet by mouth every day. Further refills must come from primary care, thank you 90 Tablet 3    clopidogrel (PLAVIX) 75 MG Tab Take 1 Tablet by mouth every day. 100 Tablet 2    meloxicam (MOBIC) 7.5 MG Tab TAKE 1 TO 2 TABLETS BY MOUTH ONCE DAILY 90 Tablet 0    Evolocumab (REPATHA) 140 MG/ML Solution Auto-injector SubQ injection pen Inject 1 mL under the skin every 14 days. 2.1 Each 11    fluticasone (FLONASE ALLERGY RELIEF) 50 MCG/ACT nasal spray Administer 1 Spray into affected nostril(S) 1 time a day as needed.      furosemide (LASIX) 20 MG Tab Take 1 Tablet by mouth every day. 90 Tablet 3    bisoprolol (ZEBETA) 5 MG Tab Take 1 Tablet by mouth every day. 90 Tablet 3    aspirin 81 MG EC tablet Take 1 Tablet by mouth every day. Indications: Acute Heart Attack 100 Tablet 2    triamcinolone acetonide (KENALOG) 0.1 % Cream Apply 1 Application topically 2 times a day. 15 g 1    gabapentin (NEURONTIN) 300 MG Cap Take 1 capsule by mouth twice daily 180 Capsule 3    Azelastine (ASTELIN) 137 MCG/SPRAY Solution Administer 2 Sprays into affected nostril(S) 2 times a day. 30 mL 0    hydrOXYzine HCl  "(ATARAX) 25 MG Tab Take 1 Tablet by mouth 3 times a day as needed for Itching. 30 Tablet 1    ondansetron (ZOFRAN ODT) 4 MG TABLET DISPERSIBLE Take 1 Tablet by mouth every 6 hours as needed for Nausea/Vomiting. 10 Tablet 2    estradiol (ESTRACE VAGINAL) 0.1 MG/GM vaginal cream Apply 1g cream inside vagina twice per week 1 Each 3    dicyclomine (BENTYL) 10 MG Cap TAKE 1 CAPSULE BY MOUTH EVERY 6 HOURS AS NEEDED FOR ABDOMINAL CRAMPS AND OR DISCOMFORT      Artificial Tear Solution (TEARS NATURALE OP) Place 2 Drops in both eyes 2 times a day as needed.       No current facility-administered medications on file prior to visit.   [5]   Allergies  Allergen Reactions    Albumin Unspecified     Other reaction(s): Other (See Comments)  \"egg intolerance\"  Reaction:stomach cramps,throwing up for 12 hours,cold sweats    Rosuvastatin Nausea    Seasonal Itching     Pt states eye irritation, pollen     Simvastatin Nausea     "

## 2025-05-29 NOTE — PROGRESS NOTES
Verbal consent was acquired by the patient to use [employer] ambient listening note generation during this visit Yes     FOLLOW-UP PATIENT VISIT    Interventional Spine and Pain  Physiatry (Physical Medicine and Rehabilitation)     Date of Service: 25   Chief Complaint:   Chief Complaint   Patient presents with    Follow-Up     Bilateral hip pain      Patient Name: Almaz Samuel   : 1955   MRN: [id number]       PRIOR HISTORY    Please see new patient note by Dr. Mohr for more details.     Interval History  Patient identification: Almaz Samuel,  1955, with history of T3-pelvis fusion in , C5-6 ACDF in , depression, GERD, neuropathy, melanoma, CAD on aspirin and Plavix, heart failure, prediabetes, who presents today for follow-up of low back and left hip and leg pain.    History of Present Illness  The patient is a 70-year-old female who presents today for follow-up of low back and left hip and leg pain.    She reports stable radiating pain down her left anterolateral thigh and leg, which she describes as daily and constant, particularly noticeable when she begins to walk or step out. The pain intensifies during her cardiac rehabilitation exercises, especially when she attempts to walk to her car. She has been managing her pain with gabapentin and nightly doses of baclofen. She had previously discontinued amitriptyline but has since resumed its use as she was told it can be beneficial for nerve pain. She has been participating in physical therapy for the past 3 months and has an additional 4 weeks of therapy is scheduled. She recalls an incident where she may have exacerbated her condition by performing a home exercise incorrectly, resulting in increased stiffness and pain in the back. She attempted to alleviate the discomfort with alternating applications of ice and heat.    MEDICATIONS  Current: Gabapentin, baclofen, amitriptyline, aspirin, Plavix    Procedures:  25:  Bilateral lower extremity EMG, left subacute to chronic L4/5 (primarily L5) radiculopathy      PMHx:   Past Medical History[1]    PSHx:   Past Surgical History[2]    Family history   Family History   Problem Relation Age of Onset    Breast Cancer Mother     Diabetes Mother     Lung Disease Mother         copd    Hyperlipidemia Mother     Hypertension Mother     Glaucoma Mother     Cancer Father         Laryngeal CA    Heart Disease Father 50        CAD with bypasses x 3    Heart Attack Father     Hyperlipidemia Father     Hypertension Father     Diabetes Sister         type II diabetes    Hyperlipidemia Sister     Diabetes Other     Heart Disease Other     Hypertension Other     Lung Disease Other     Heart Disease Brother     Heart Disease Sister     Heart Disease Brother     Hyperlipidemia Sister     Hyperlipidemia Sister     Hyperlipidemia Sister     Hyperlipidemia Brother     Ovarian Cancer Neg Hx     Tubal Cancer Neg Hx     Peritoneal Cancer Neg Hx     Colorectal Cancer Neg Hx        Medications:   Active Medications[3]     Medications Ordered Prior to Encounter[4]    Allergies:   Allergies[5]    Social Hx:   Social History     Socioeconomic History    Marital status:      Spouse name: Not on file    Number of children: 1    Years of education: Not on file    Highest education level: Some college, no degree   Occupational History     Employer: Retired   Tobacco Use    Smoking status: Former     Current packs/day: 0.00     Average packs/day: 0.3 packs/day for 5.0 years (1.5 ttl pk-yrs)     Types: Cigarettes     Start date: 1970     Quit date: 1975     Years since quittin.4     Passive exposure: Past    Smokeless tobacco: Never    Tobacco comments:     quit    Vaping Use    Vaping status: Never Used   Substance and Sexual Activity    Alcohol use: No    Drug use: No    Sexual activity: Yes     Partners: Male     Comment: , 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[employer]   Other Topics Concern    ODEC  Service No    Blood Transfusions No    Caffeine Concern Not Asked    Occupational Exposure Not Asked    Hobby Hazards Not Asked    Sleep Concern Not Asked    Stress Concern Not Asked    Weight Concern Not Asked    Special Diet Not Asked    Back Care Not Asked    Exercise No    Bike Helmet No    Seat Belt Yes    Self-Exams Yes   Social History Narrative    Not on file     Social Drivers of Health     Financial Resource Strain: Medium Risk (5/3/2025)    Overall Financial Resource Strain (CARDIA)     Difficulty of Paying Living Expenses: Somewhat hard   Food Insecurity: Patient Declined (5/3/2025)    Hunger Vital Sign     Worried About Running Out of Food in the Last Year: Patient declined     Ran Out of Food in the Last Year: Patient declined   Transportation Needs: No Transportation Needs (5/3/2025)    PRAPARE - Transportation     Lack of Transportation (Medical): No     Lack of Transportation (Non-Medical): No   Physical Activity: Sufficiently Active (5/3/2025)    Exercise Vital Sign     Days of Exercise per Week: 3 days     Minutes of Exercise per Session: 60 min   Stress: Stress Concern Present (5/3/2025)    [employer] - Occupational Stress Questionnaire     Feeling of Stress : Rather much   Social Connections: Socially Integrated (5/3/2025)    Social Connection and Isolation Panel [NHANES]     Frequency of Communication with Friends and Family: More than three times a week     Frequency of Social Gatherings with Friends and Family: More than three times a week     Attends [employer] Services: More than 4 times per year     Active Member of Clubs or Organizations: Yes     Attends Club or Organization Meetings: 1 to 4 times per year     Marital Status:    Intimate Partner Violence: Not At Risk (10/17/2024)    Humiliation, Afraid, Rape, and Kick questionnaire     Fear of Current or Ex-Partner: No     Emotionally Abused: No     Physically Abused: No     Sexually Abused: No    Housing Stability: Unknown (5/3/2025)    Housing Stability Vital Sign     Unable to Pay for Housing in the Last Year: Patient declined     Number of Times Moved in the Last Year: 0     Homeless in the Last Year: No         EXAMINATION     Physical Exam:   Vitals: /72   Pulse 68   Temp 36.2 °C (97.2 °F) (Temporal)   Ht 1.524 m (5')   Wt 71.7 kg (158 lb 1.1 oz)   SpO2 98%       Constitutional:   Body Habitus: Body mass index is 30.87 kg/m².  Cooperation: Fully cooperates with exam  Appearance: Well-groomed, well-nourished  Respiratory:  Breathing comfortable on room air, no audible wheezing  Cardiovascular: Skin appears well-perfused  Psychiatric: Appropriate affect  Gait: normal gait, no use of ambulatory device, nonantalgic.   Neuro:  Sensation: Decreased sensation to light touch left L4/5 distribution  MSK:?No?pain with left hip internal or external rotation      MEDICAL DECISION MAKING    DATA    Labs:   Lab Results   Component Value Date/Time    SODIUM 138 12/27/2024 08:51 AM    POTASSIUM 4.1 12/27/2024 08:51 AM    CHLORIDE 102 12/27/2024 08:51 AM    CO2 25 12/27/2024 08:51 AM    GLUCOSE 97 12/27/2024 08:51 AM    BUN 17 12/27/2024 08:51 AM    CREATININE 0.96 12/27/2024 08:51 AM        Lab Results   Component Value Date/Time    PROTHROMBTM 12.5 11/18/2024 08:15 AM    INR 0.93 11/18/2024 08:15 AM        Lab Results   Component Value Date/Time    WBC 6.4 11/18/2024 08:15 AM    RBC 4.28 11/18/2024 08:15 AM    HEMOGLOBIN 13.1 11/18/2024 08:15 AM    HEMATOCRIT 40.5 11/18/2024 08:15 AM    MCV 94.6 11/18/2024 08:15 AM    MCH 30.6 11/18/2024 08:15 AM    MCHC 32.3 11/18/2024 08:15 AM    MPV 8.9 (L) 11/18/2024 08:15 AM    NEUTSPOLYS 81.90 (H) 10/17/2024 07:50 AM    LYMPHOCYTES 9.10 (L) 10/17/2024 07:50 AM    MONOCYTES 8.40 10/17/2024 07:50 AM    EOSINOPHILS 0.00 10/17/2024 07:50 AM    BASOPHILS 0.10 10/17/2024 07:50 AM        Lab Results   Component Value Date/Time    HBA1C 6.0 (H) 11/14/2024 07:30 AM           Imaging:   Prior personal imaging review:  X-ray lumbar spine 4/23/25: T3 to pelvis fusion hardware        IMAGING radiology reads: I reviewed the following radiology reports  X-ray bilateral hips 12/19/24:  IMPRESSION:     1.  No LEFT hip or pelvic fracture.  2.  Mild degenerative change of both hips.  3.  Postoperative changes as described.      MRI Lumbar Spine 4/29/25:   Impression:  Extensive posterior fusion and dorsal decompression from the thoracic spine to the S1 level along with bilateral iliac bolts.  Hardware appears intact.  There is no postoperative complication.  No central canal stenosis or neural foraminal narrowing.  Levoscoliosis of the lumbar spine.         Results for orders placed in visit on 04/23/25     DX-LUMBAR SPINE-4+ VIEWS  AP, lateral, flexion, and extension of the lumbar spine interpreted by me   today show evidence of hardware from a fusion beginning at T3 down to the   pelvis without signs of periprosthetic fracture or loosening.  Spondylosis   present throughout the spine.       DIAGNOSIS   Visit Diagnoses     ICD-10-CM   1. Lumbar radiculopathy  M54.16   2. Chronic left-sided low back pain with left-sided sciatica  M54.42    G89.29   3. Left leg numbness  R20.0         ASSESSMENT and PLAN:     Almaz Samuel is a 70 y.o. female who returns to clinic for follow-up of left leg pain and numbness.    Almaz was seen today for follow-up.    Diagnoses and all orders for this visit:    Lumbar radiculopathy  -     Pain Hospital Procedure; Future    Chronic left-sided low back pain with left-sided sciatica  -     Pain Hospital Procedure; Future    Left leg numbness  -     Pain Hospital Procedure; Future        Assessment & Plan  Left low back pain with radiation into the left lower extremity  Lumbar radiculopathy  Left leg numbness  Patient presents for follow-up of left leg pain with radiation into the left anterolateral thigh and leg with numbness in the same distribution. The MRI  "results do not indicate any current nerve impingement, but the recent EMG findings show a subacute to chronic left L4/5 radiculopathy indicating prior nerve damage. The symptoms align with an issue involving the L4 and L5 nerve roots. Given EMG-confirmed radiculopathy as well as lack of pain improvement with 3 months of physical therapy, an epidural injection targeting the L4 and L5 nerve roots was recommended which patient is interested in. The procedure involves numbing the skin and deeper tissues to access the epidural space, where numbing medicine and steroids will be administered. Risks include bleeding, infection, and damage to surrounding structures. Clearance from her cardiologist is required to temporarily discontinue aspirin and Plavix due to the increased risk of bleeding. In the interim she will continue her current regimen of baclofen, gabapentin, and amitriptyline.        Follow up: For left L5-S1 transforaminal epidural steroid injection with IV sedation, then 3 weeks after injection in clinic    Thank you for allowing me to participate in the care of this patient. If you have any questions please not hesitate to contact me.         Please note that this dictation was created using voice recognition software. I have made every reasonable attempt to correct obvious errors but there may be errors of grammar and content that I may have overlooked prior to finalization of this note.    Dodie Mohr MD  Interventional Spine and Sports Physiatry  Physical Medicine and Rehabilitation  [facility]         [1]   Past Medical History:  Diagnosis Date    Anesthesia 02/14/2011    PO N/V, \"slow to come out\", vasovagal response with last ablation/block on her neck    Aortic atherosclerosis (HCC)     Apnea, sleep     Arrhythmia 8/14    Arthritis 02/14/2011    spine    Backpain 02/14/2011    neck and abd. also,     Bowel habit changes 03/08/2023    constipation and diarrhea r/t IBS, medicated    Breath " shortness     with exertion    Bronchitis 05/2018    Cancer ([facility]) 07/18/2018    Skin - 15 years ago.    Cancer ([facility])     April/May 2018 Melanoma (nose)    Cataract 03/08/2023    in both eyes, no surgery at present    Complication of anesthesia     Coronary artery disease due to lipid rich plaque - PCI TO LAD for CHF 11/21/2024    Daytime sleepiness     Delayed emergence from general anesthesia     Diverticulitis     Endometriosis of uterus     Familial hypercholesteremia     Fusion of spine 2014    ROBERT (generalized anxiety disorder) 03/08/2023    medicated    H/O fall     when in hospital  with low O2 sats    H/O: CVA (cerebrovascular accident) 08/22/2014    Complex event associated with apparent medication reaction but with MRI suggesting possible multiple small infarcts of various ages, [facility]    Hair loss 12/06/2018    Heart burn 03/08/2023    medicated    Hiatus hernia syndrome     not repaired    High cholesterol 03/08/2023    medicated    HTN, goal below 130/80 10/24/2011    Indigestion     Infectious disease      Hep C +    Insomnia     Ischemic cardiomyopathy 11/21/2024    Lung collapse 02/14/2011    right lung 1/4 collpsed     Major depressive disorder with single episode, in full remission (HCC) 10/24/2011    Mixed hyperlipidemia 12/27/2011    Moderate major depression (HCC) 03/08/2023    medicated    MRSA exposure 08/2014    while in hospital    Myocardial infarct ([facility]) 10/24    PONV (postoperative nausea and vomiting) 03/08/2023    Post-menopause on HRT (hormone replacement therapy) 12/27/2011    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    exposed as a child, told not active    Scoliosis     Sleep apnea 03/08/2023    BIPAP, hasn't used in the last year    Snoring 03/08/2023    Stroke ([facility]) 03/08/2023    tia's times 3 in the past    Unspecified vitamin D deficiency 09/24/2012    Urinary incontinence 03/08/2023    at present, wears a pad   [2]   Past Surgical History:  Procedure Laterality Date    CATARACT EXTRACTION  WITH IOL  2024    OTHER SURGICAL PROCEDURE  04/2023    repair of rectal and bladder prolapse with Dr. Green    MD PELVIC EXAMINATION W ANESTH  03/27/2023    Procedure: PELVIC EXAM UNDER ANESTHESIA, POSTERIOR REPAIR, sacrospinous vault suspension, PERINEORRHAPHY,;  Surgeon: Rajinder Myles M.D.;  Location: SURGERY SAME DAY [facility];  Service: Gynecology    MD NEUROPLASTY & OR TRANSPOS MEDIAN NRV CARPAL SABRINA Right 05/31/2022    Procedure: RIGHT HAND OPEN CARPAL TUNNEL DECOMPRESSION;  Surgeon: Holly Martin M.D.;  Location: [facility] [place];  Service: Orthopedics    NISSEN FUNDOPLICATION LAPAROSCOPIC  07/25/2018    Procedure: NISSEN FUNDOPLICATION LAPAROSCOPIC;  Surgeon: Kenji Garcia M.D.;  Location: SURGERY [facility];  Service: General    NISSEN FUNDOPLICATION LAPAROSCOPIC N/A 06/30/2015    Procedure: NISSEN FUNDOPLICATION LAPAROSCOPIC;  Surgeon: Kenji Garcia M.D.;  Location: SURGERY SAME DAY [facility];  Service:     GASTROSCOPY-ENDO  06/24/2015    Procedure: GASTROSCOPY-ENDO;  Surgeon: Kenji Garcia M.D.;  Location: ENDOSCOPY [facility];  Service:     CARPAL TUNNEL RELEASE  11/14/2012    Performed by Holly Martin M.D. at SURGERY [facility] ORS    LOW ANTERIOR RESECTION LAPAROSCOPIC  03/02/2011    Performed by KENJI GARCIA at SURGERY [facility] ORS    CERVICAL DISK AND FUSION ANTERIOR  06/22/2010    Performed by JOSEPH DARLING at SURGERY [facility] ORS    TUBAL LIGATION  01/01/1988    TONSILLECTOMY AND ADENOIDECTOMY  01/01/1959    ABDOMINAL HYSTERECTOMY TOTAL      APPENDECTOMY      GYN SURGERY      partial hysterectomy    OTHER NEUROLOGICAL SURG  2014   [3]   Outpatient Medications Marked as Taking for the 5/30/25 encounter (Office Visit) with Dodie Mohr M.D.   Medication Sig Dispense Refill    amitriptyline (ELAVIL) 10 MG Tab Take 1 Tablet by mouth at bedtime as needed for Sleep. 90 Tablet 1    methylPREDNISolone (MEDROL DOSEPAK) 4 MG Tablet Therapy Pack Follow schedule on  package instructions. 21 Tablet 0    baclofen (LIORESAL) 10 MG Tab TAKE 1 TABLET BY MOUTH AT BEDTIME 90 Tablet 0    citalopram (CELEXA) 10 MG tablet Take 1 tablet by mouth once daily 90 Tablet 3    ezetimibe (ZETIA) 10 MG Tab Take 1 Tablet by mouth every day. 90 Tablet 3    sacubitril-valsartan (ENTRESTO) 24-26 MG Tab Take 1 Tablet by mouth 2 times a day. 180 Tablet 3    pantoprazole (PROTONIX) 20 MG tablet Take 1 Tablet by mouth every day. Further refills must come from primary care, thank you 90 Tablet 3    clopidogrel (PLAVIX) 75 MG Tab Take 1 Tablet by mouth every day. 100 Tablet 2    meloxicam (MOBIC) 7.5 MG Tab TAKE 1 TO 2 TABLETS BY MOUTH ONCE DAILY 90 Tablet 0    Evolocumab (REPATHA) 140 MG/ML Solution Auto-injector SubQ injection pen Inject 1 mL under the skin every 14 days. 2.1 Each 11    fluticasone (FLONASE ALLERGY RELIEF) 50 MCG/ACT nasal spray Administer 1 Spray into affected nostril(S) 1 time a day as needed.      furosemide (LASIX) 20 MG Tab Take 1 Tablet by mouth every day. 90 Tablet 3    bisoprolol (ZEBETA) 5 MG Tab Take 1 Tablet by mouth every day. 90 Tablet 3    aspirin 81 MG EC tablet Take 1 Tablet by mouth every day. Indications: Acute Heart Attack 100 Tablet 2    triamcinolone acetonide (KENALOG) 0.1 % Cream Apply 1 Application topically 2 times a day. 15 g 1    gabapentin (NEURONTIN) 300 MG Cap Take 1 capsule by mouth twice daily 180 Capsule 3    Azelastine (ASTELIN) 137 MCG/SPRAY Solution Administer 2 Sprays into affected nostril(S) 2 times a day. 30 mL 0    hydrOXYzine HCl (ATARAX) 25 MG Tab Take 1 Tablet by mouth 3 times a day as needed for Itching. 30 Tablet 1    ondansetron (ZOFRAN ODT) 4 MG TABLET DISPERSIBLE Take 1 Tablet by mouth every 6 hours as needed for Nausea/Vomiting. 10 Tablet 2    estradiol (ESTRACE VAGINAL) 0.1 MG/GM vaginal cream Apply 1g cream inside vagina twice per week 1 Each 3    dicyclomine (BENTYL) 10 MG Cap TAKE 1 CAPSULE BY MOUTH EVERY 6 HOURS AS NEEDED FOR  ABDOMINAL CRAMPS AND OR DISCOMFORT      Artificial Tear Solution (TEARS NATURALE OP) Place 2 Drops in both eyes 2 times a day as needed.     [4]   Current Outpatient Medications on File Prior to Visit   Medication Sig Dispense Refill    amitriptyline (ELAVIL) 10 MG Tab Take 1 Tablet by mouth at bedtime as needed for Sleep. 90 Tablet 1    methylPREDNISolone (MEDROL DOSEPAK) 4 MG Tablet Therapy Pack Follow schedule on package instructions. 21 Tablet 0    baclofen (LIORESAL) 10 MG Tab TAKE 1 TABLET BY MOUTH AT BEDTIME 90 Tablet 0    citalopram (CELEXA) 10 MG tablet Take 1 tablet by mouth once daily 90 Tablet 3    ezetimibe (ZETIA) 10 MG Tab Take 1 Tablet by mouth every day. 90 Tablet 3    sacubitril-valsartan (ENTRESTO) 24-26 MG Tab Take 1 Tablet by mouth 2 times a day. 180 Tablet 3    pantoprazole (PROTONIX) 20 MG tablet Take 1 Tablet by mouth every day. Further refills must come from primary care, thank you 90 Tablet 3    clopidogrel (PLAVIX) 75 MG Tab Take 1 Tablet by mouth every day. 100 Tablet 2    meloxicam (MOBIC) 7.5 MG Tab TAKE 1 TO 2 TABLETS BY MOUTH ONCE DAILY 90 Tablet 0    Evolocumab (REPATHA) 140 MG/ML Solution Auto-injector SubQ injection pen Inject 1 mL under the skin every 14 days. 2.1 Each 11    fluticasone (FLONASE ALLERGY RELIEF) 50 MCG/ACT nasal spray Administer 1 Spray into affected nostril(S) 1 time a day as needed.      furosemide (LASIX) 20 MG Tab Take 1 Tablet by mouth every day. 90 Tablet 3    bisoprolol (ZEBETA) 5 MG Tab Take 1 Tablet by mouth every day. 90 Tablet 3    aspirin 81 MG EC tablet Take 1 Tablet by mouth every day. Indications: Acute Heart Attack 100 Tablet 2    triamcinolone acetonide (KENALOG) 0.1 % Cream Apply 1 Application topically 2 times a day. 15 g 1    gabapentin (NEURONTIN) 300 MG Cap Take 1 capsule by mouth twice daily 180 Capsule 3    Azelastine (ASTELIN) 137 MCG/SPRAY Solution Administer 2 Sprays into affected nostril(S) 2 times a day. 30 mL 0    hydrOXYzine HCl  "(ATARAX) 25 MG Tab Take 1 Tablet by mouth 3 times a day as needed for Itching. 30 Tablet 1    ondansetron (ZOFRAN ODT) 4 MG TABLET DISPERSIBLE Take 1 Tablet by mouth every 6 hours as needed for Nausea/Vomiting. 10 Tablet 2    estradiol (ESTRACE VAGINAL) 0.1 MG/GM vaginal cream Apply 1g cream inside vagina twice per week 1 Each 3    dicyclomine (BENTYL) 10 MG Cap TAKE 1 CAPSULE BY MOUTH EVERY 6 HOURS AS NEEDED FOR ABDOMINAL CRAMPS AND OR DISCOMFORT      Artificial Tear Solution (TEARS NATURALE OP) Place 2 Drops in both eyes 2 times a day as needed.       No current facility-administered medications on file prior to visit.   [5]   Allergies  Allergen Reactions    Albumin Unspecified     Other reaction(s): Other (See Comments)  \"egg intolerance\"  Reaction:stomach cramps,throwing up for 12 hours,cold sweats    Rosuvastatin Nausea    Seasonal Itching     Pt states eye irritation, pollen     Simvastatin Nausea     "

## 2025-05-30 ENCOUNTER — OFFICE VISIT (OUTPATIENT)
Dept: PHYSICAL MEDICINE AND REHAB | Facility: MEDICAL CENTER | Age: 70
End: 2025-05-30
Payer: MEDICARE

## 2025-05-30 VITALS
TEMPERATURE: 97.2 F | HEART RATE: 68 BPM | DIASTOLIC BLOOD PRESSURE: 72 MMHG | HEIGHT: 60 IN | OXYGEN SATURATION: 98 % | BODY MASS INDEX: 31.03 KG/M2 | WEIGHT: 158.07 LBS | SYSTOLIC BLOOD PRESSURE: 116 MMHG

## 2025-05-30 DIAGNOSIS — M54.16 LUMBAR RADICULOPATHY: Primary | ICD-10-CM

## 2025-05-30 DIAGNOSIS — G89.29 CHRONIC LEFT-SIDED LOW BACK PAIN WITH LEFT-SIDED SCIATICA: ICD-10-CM

## 2025-05-30 DIAGNOSIS — R20.0 LEFT LEG NUMBNESS: ICD-10-CM

## 2025-05-30 DIAGNOSIS — M54.42 CHRONIC LEFT-SIDED LOW BACK PAIN WITH LEFT-SIDED SCIATICA: ICD-10-CM

## 2025-05-30 ASSESSMENT — PAIN SCALES - GENERAL: PAINLEVEL_OUTOF10: NO PAIN

## 2025-05-30 ASSESSMENT — FIBROSIS 4 INDEX: FIB4 SCORE: 1.06

## 2025-05-30 ASSESSMENT — PATIENT HEALTH QUESTIONNAIRE - PHQ9: CLINICAL INTERPRETATION OF PHQ2 SCORE: 0

## 2025-06-02 ENCOUNTER — TELEPHONE (OUTPATIENT)
Dept: CARDIOLOGY | Facility: MEDICAL CENTER | Age: 70
End: 2025-06-02
Payer: MEDICARE

## 2025-06-02 NOTE — LETTER
PROCEDURE/SURGERY CLEARANCE FORM      Encounter Date: 6/2/2025    Patient: Almaz Samuel  YOB: 1955    CARDIOLOGIST:   Leonel Bridegs M.D.                PROCEDURE/SURGERY CLEARANCE FORM    Date: 6/2/2025   Patient Name: Almaz Samuel    Dear Surgeon or Proceduralist,      Thank you for your request for cardiac stratification of our mutual patient Almaz Samuel 1955. We have reviewed their [facility] records; and to the best of our understanding this patient has not had stenting, ablation, watchman, cardiothoracic surgery or hospitalization for cardiovascular reasons in the past 6 months.  Almaz Samuel has been seen within the past 15 months and is considered to have non-modifiable cardiac risk for this low-risk procedure/surgery. They may proceed from a cardiovascular standpoint and may hold their antiplatelet/anticoagulation as briefly as possible. Please have patient resume this medication when hemodynamically stable to do so.     Aspirin or Prasugrel   - hold 7 days prior to procedure/surgery, resume when hemodynamically stable      Clopidrogrel or Ticagrelor  - hold 7 days for all neurological procedures, hold 5 days prior to all other procedure/surgery,  resume when hemodynamically stable     Warfarin - hold 7 days for all neurological procedures, hold 5 days prior to all other procedure/surgery and coordinate with [facility] Anticoagulation Clinic ([phone]) INR testing and dose management.      Pradaxa/Xarelto/Eliquis/Savesya - hold 1 day prior to procedure for low bleeding risk procedure, 2 days for high bleeding risk procedure, or consider holding 3 days or longer for patients with reduced kidney function (CrCl <30mL/min) or spinal/cranial surgeries/procedures.      If they have a mechanical heart valve, please coordinate with [facility] Anticoagulation Service ([phone]) the proper management of their anticoagulant in the periprocedural or perioperative period.       Some patients have higher risk for cardiovascular complications or holding medication. If our patient has had prior complications of holding antiplatelet or anticoagulants in the past and we have seen them after these events, we have addressed these concerns with the patient. They are at an unknown degree of increased risk for recurrent complication.  You may hold anticoagulation/antiplatelets for the procedure or surgery if the benefits of the procedure or surgery outweigh this nonmodifiable risk.      If Almaz Samuel 1955 has new symptoms of heart failure decompensation, unstable arrythmia, or angina please reach out and we will assess the patient.      If you have other patient-specific concerns, please feel free to reach out to the patient's cardiologist directly at [phone].     Thank you,   [facility]                ELECTRONICALLY SIGNED      MD Surya Bridges M.D.

## 2025-06-02 NOTE — Clinical Note
REFERRAL APPROVAL NOTICE         Sent on June 2, 2025                   Almaz Anthony Samuel  [address] [place] [address]                   Dear Ms. Samuel,    After a careful review of the medical information and benefit coverage, [facility] has processed your referral. See below for additional details.    If applicable, you must be actively enrolled with your insurance for coverage of the authorized service. If you have any questions regarding your coverage, please contact your insurance directly.    REFERRAL INFORMATION   Referral #:  [id number]  Referred-To Department    Referred-By Provider:  Pain Management    Dodie Mohr M.D.   Pain Management       [address]  [address]  [place] [address]  [phone] [address] [facility] [address]  [phone]    Referral Start Date:  06/02/2025  Referral End Date:   09/02/2025             SCHEDULING  If you do not already have an appointment, please call [phone] to make an appointment.     MORE INFORMATION  If you do not already have a [facility] account, sign up at: [email or web address]  You can access your medical information, make appointments, see lab results, billing information, and more.  If you have questions regarding this referral, please contact  the [facility] Referrals department at:             [phone]. Monday - Friday 8:00AM - 5:00PM.     Sincerely,    [facility]

## 2025-06-02 NOTE — TELEPHONE ENCOUNTER
"Last OV: 3/13/25  Proposed Surgery: LEFT L5-S1 TRANSFORAMINAL EPIDURAL STEROID INJECTION WITH   Surgery Date: TBD  Requesting Office Name: [facility] [address]   Fax Number: [phone]  Preference of Location (default is surgery center unless specified by Cardiologist or [facility])  Prior Clearance Addressed: No    Is this a general clearance? YES   Anticoags/Antiplatelets: Clopidogrel   Anticoags/Antiplatelet managed by Cardiology? YES    Outstanding Cardiac Imaging : No  Ablation, Cardioversion, Stent, Cardiac Devices, Catheterization, Watchman: Yes  Date : 11/21/24   TAVR/Valve, Mitral Clip, Watchman (including open heart),: N/A   Recent Cardiac Hospitalization: No            When: N/A  History (cardiac history): Past Medical History[1]        Is this a dental clearance? NO  Ablation, Cardioversion, Watchman, Stents, Cath, Devices within the last 3 months? No   If yes- Send dental wait letter, do not forward to provider for review.     TAVR / Valve, Mitral clip within the last 6 months? No  If yes- Send dental wait letter, do not forward to provider for review.     If completing a general clearance, continue per protocol.           Surgical Clearance Letter Sent: YES   **Scan clearance request letter into [facility].**         [1]   Past Medical History:  Diagnosis Date    Anesthesia 02/14/2011    PO N/V, \"slow to come out\", vasovagal response with last ablation/block on her neck    Aortic atherosclerosis (HCC)     Apnea, sleep     Arrhythmia 8/14    Arthritis 02/14/2011    spine    Backpain 02/14/2011    neck and abd. also,     Bowel habit changes 03/08/2023    constipation and diarrhea r/t IBS, medicated    Breath shortness     with exertion    Bronchitis 05/2018    Cancer (HCC) 07/18/2018    Skin - 15 years ago.    Cancer (HCC)     April/May 2018 Melanoma (nose)    Cataract 03/08/2023    in both eyes, no surgery at present    Complication of anesthesia     Coronary artery disease due to " lipid rich plaque - PCI TO LAD for CHF 11/21/2024    Daytime sleepiness     Delayed emergence from general anesthesia     Diverticulitis     Endometriosis of uterus     Familial hypercholesteremia     Fusion of spine 2014    ROBERT (generalized anxiety disorder) 03/08/2023    medicated    H/O fall     when in hospital  with low O2 sats    H/O: CVA (cerebrovascular accident) 08/22/2014    Complex event associated with apparent medication reaction but with MRI suggesting possible multiple small infarcts of various ages, [facility]    Hair loss 12/06/2018    Heart burn 03/08/2023    medicated    Hiatus hernia syndrome     not repaired    High cholesterol 03/08/2023    medicated    HTN, goal below 130/80 10/24/2011    Indigestion     Infectious disease      Hep C +    Insomnia     Ischemic cardiomyopathy 11/21/2024    Lung collapse 02/14/2011    right lung 1/4 collpsed     Major depressive disorder with single episode, in full remission (HCC) 10/24/2011    Mixed hyperlipidemia 12/27/2011    Moderate major depression (HCC) 03/08/2023    medicated    MRSA exposure 08/2014    while in hospital    Myocardial infarct (HCC) 10/24    PONV (postoperative nausea and vomiting) 03/08/2023    Post-menopause on HRT (hormone replacement therapy) 12/27/2011    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    exposed as a child, told not active    Scoliosis     Sleep apnea 03/08/2023    BIPAP, hasn't used in the last year    Snoring 03/08/2023    Stroke (HCC) 03/08/2023    tia's times 3 in the past    Unspecified vitamin D deficiency 09/24/2012    Urinary incontinence 03/08/2023    at present, wears a pad

## 2025-06-24 ENCOUNTER — HOSPITAL ENCOUNTER (OUTPATIENT)
Facility: REHABILITATION | Age: 70
End: 2025-06-24
Attending: STUDENT IN AN ORGANIZED HEALTH CARE EDUCATION/TRAINING PROGRAM | Admitting: STUDENT IN AN ORGANIZED HEALTH CARE EDUCATION/TRAINING PROGRAM
Payer: MEDICARE

## 2025-06-24 ENCOUNTER — APPOINTMENT (OUTPATIENT)
Dept: RADIOLOGY | Facility: REHABILITATION | Age: 70
End: 2025-06-24
Attending: STUDENT IN AN ORGANIZED HEALTH CARE EDUCATION/TRAINING PROGRAM
Payer: MEDICARE

## 2025-06-24 VITALS
WEIGHT: 158.73 LBS | OXYGEN SATURATION: 98 % | HEIGHT: 60 IN | HEART RATE: 69 BPM | SYSTOLIC BLOOD PRESSURE: 137 MMHG | BODY MASS INDEX: 31.16 KG/M2 | DIASTOLIC BLOOD PRESSURE: 76 MMHG | TEMPERATURE: 97.2 F | RESPIRATION RATE: 16 BRPM

## 2025-06-24 PROCEDURE — 700111 HCHG RX REV CODE 636 W/ 250 OVERRIDE (IP): Mod: JZ

## 2025-06-24 PROCEDURE — 99152 MOD SED SAME PHYS/QHP 5/>YRS: CPT

## 2025-06-24 PROCEDURE — 64483 NJX AA&/STRD TFRM EPI L/S 1: CPT

## 2025-06-24 PROCEDURE — 700117 HCHG RX CONTRAST REV CODE 255

## 2025-06-24 RX ORDER — MIDAZOLAM HYDROCHLORIDE 1 MG/ML
INJECTION INTRAMUSCULAR; INTRAVENOUS
Status: COMPLETED
Start: 2025-06-24 | End: 2025-06-24

## 2025-06-24 RX ORDER — DEXAMETHASONE SODIUM PHOSPHATE 10 MG/ML
INJECTION, SOLUTION INTRAMUSCULAR; INTRAVENOUS
Status: COMPLETED
Start: 2025-06-24 | End: 2025-06-24

## 2025-06-24 RX ORDER — LIDOCAINE HYDROCHLORIDE 10 MG/ML
INJECTION, SOLUTION EPIDURAL; INFILTRATION; INTRACAUDAL; PERINEURAL
Status: COMPLETED
Start: 2025-06-24 | End: 2025-06-24

## 2025-06-24 RX ADMIN — MIDAZOLAM HYDROCHLORIDE 0.5 MG: 1 INJECTION, SOLUTION INTRAMUSCULAR; INTRAVENOUS at 13:59

## 2025-06-24 RX ADMIN — LIDOCAINE HYDROCHLORIDE 10 ML: 10 INJECTION, SOLUTION EPIDURAL; INFILTRATION; INTRACAUDAL; PERINEURAL at 14:08

## 2025-06-24 RX ADMIN — IOHEXOL 5 ML: 240 INJECTION, SOLUTION INTRATHECAL; INTRAVASCULAR; INTRAVENOUS; ORAL at 14:09

## 2025-06-24 RX ADMIN — DEXAMETHASONE SODIUM PHOSPHATE 10 MG: 10 INJECTION, SOLUTION INTRAMUSCULAR; INTRAVENOUS at 14:09

## 2025-06-24 RX ADMIN — FENTANYL CITRATE 25 MCG: 50 INJECTION, SOLUTION INTRAMUSCULAR; INTRAVENOUS at 13:59

## 2025-06-24 ASSESSMENT — PAIN DESCRIPTION - PAIN TYPE
TYPE: CHRONIC PAIN

## 2025-06-24 ASSESSMENT — FIBROSIS 4 INDEX: FIB4 SCORE: 1.06

## 2025-06-24 NOTE — OP REPORT
Date of Service: 6/24/2025     Patient: Almaz Samuel 70 y.o. female     MRN: [id number]     Physician/s: Dodie Mohr MD    Pre-operative Diagnosis: Lumbar radiculopathy    Post-operative Diagnosis: Lumbar radiculopathy    Procedure: left Lumbar Transforaminal Epidural Steroid  at the L5-S1 levels.     Description of procedure:    The risks, benefits, and alternatives of the procedure were reviewed and discussed with the patient.  Written informed consent was freely obtained. A pre-procedural time-out was conducted by the physician verifying patient’s identity, procedure to be performed, procedure site and side, and allergy verification. Appropriate equipment was determined to be in place for the procedure.     Moderate sedation was achieved with Versed (0.5mg) and Fentanyl (25mcg). Monitoring of the patients vital signs and respiratory status was provided by trained independent registered nurse during the entire course of the procedures and under my supervision and recoded in the patient’s medical record. The duration of sedation was over 10 minutes.      The patient's vital signs were carefully monitored before, throughout, and after the procedure.     In the fluoroscopy suite the patient was placed in a prone position, a pillow placed underneath their umbilicus. The skin was prepped and draped in the usual sterile fashion.     The fluoroscope was placed over the lumbar spine and adjusted into the proper AP/Oblique view to enter the transforaminal space just adjacent to the pedicle at the levels below. The targets for injection were then marked at the left L5-S1. A 27g 1.5 inch needle was placed into the marked site, and 1mL of 1% Lidocaine was injected subcutaneously into the epidermal and dermal layers. The needle was removed intact.  A 22g 5 inch spinal needle was then placed and advanced under fluoroscopic guidance in an oblique view towards the epidural space of the levels noted above. The needle position  was confirmed to not be past the 6 o'clock position in the AP view and it was in the neuroforamen in the lateral view.       Under live fluoroscopic guidance in the AP view, contrast dye was used to highlight the epidural space spread of each level above. Final fluoroscopic images were saved.  Following negative aspiration, 1mL of 1% lidocaine preservative free with 10mg dexamethasone   was then injected at each level above, and the needles were removed intact after restyleted. The patient's back was covered with a 4x4 gauze, the area was cleansed with sterile normal saline, and a dressing was applied. There were no complications noted.     The patient was then evaluated post-procedure, and was hemodynamically stable prior to leaving the post-operative care unit.     Preprocedural pain: 2/10 NRS  Postprocedural pain: 0/10 NRS    Follow-up as scheduled    Dodie Mohr MD  Physical Medicine and Rehabilitation  Interventional Spine and Sports Physiatry  [employer]      CPT codes  Transforaminal epidural injection- lumbar or sacral (first level):  58675  moderate procedural sedation first 15 minutes: 44911

## 2025-06-24 NOTE — PROGRESS NOTES
1312 Pt received to pre procedure area. ID band and allergies verified. Vital signs taken and stable. Verified that patient has not taken NSAIDS, anticoagulants or blood thinners in past 5 days. Pt's history reviewed. Reviewed post op instructions with patient, questions answered, verbalized understanding. Pt seen by Dr. Mohr  pre procedure discussed, questions answered.     1422  Pt received to recovery area, report received from procedure RN Nafisa . Vitals taken and stable. Patient tolerated po fluids and snack without difficulty. Dressing clean, dry and intact. Ice pack applied over dressing.     1450 Pt seen by Dr. Mohr post procedure, orders received for discharge. Patient ambulatory without difficulty. Pt discharged to designated .

## 2025-06-24 NOTE — INTERVAL H&P NOTE
Consented Procedure: LEFT L5-S1 transforaminal epidural steroid injection with fluoroscopic guidance….Special Needs: Needs clearance to come off Plavix 7 days prior and aspirin 4 days prior. 22g 5 inch needle….Procedural Sedation: yes, plan for 0.5 mg midazolam and Fentanyl 25 mcg  I have examined the patient, provided the risks, benefits, and alternatives to the procedure(s) indicated on the signed consent form, and the patient wishes to proceed.    H&P reviewed. The patient was examined and there are no changes to the H&P      Dodie Mohr M.D.  06/24/25 1:32 PM

## 2025-06-26 ENCOUNTER — TELEPHONE (OUTPATIENT)
Dept: PHYSICAL MEDICINE AND REHAB | Facility: MEDICAL CENTER | Age: 70
End: 2025-06-26
Payer: MEDICARE

## 2025-06-26 NOTE — TELEPHONE ENCOUNTER
Called for post-sp check-up. Pt reported the following regarding the procedure site:    Change in pain?: LVM    Concerns?: LVM    Confirmed [facility] appt?: SATISH, 7/8

## 2025-07-01 DIAGNOSIS — M54.16 LUMBAR RADICULOPATHY, CHRONIC: ICD-10-CM

## 2025-07-02 NOTE — PROGRESS NOTES
Verbal consent was acquired by the patient to use FX Aligned ambient listening note generation during this visit Yes     FOLLOW-UP PATIENT VISIT    Interventional Spine and Pain  Physiatry (Physical Medicine and Rehabilitation)     Date of Service: 25   Chief Complaint:   Chief Complaint   Patient presents with    Follow-Up     Lumbar radiculopathy      Patient Name: Almaz Samuel   : 1955   MRN: [id number]       PRIOR HISTORY    Please see new patient note by Dr. Mohr for more details.     Interval History  Patient identification: Almaz Samuel,  1955, with history of T3-pelvis fusion in , C5-6 ACDF in , depression, GERD, neuropathy, melanoma, CAD on aspirin and Plavix, heart failure, prediabetes, who presents today for follow-up of low back and left hip and leg pain.     History of Present Illness  The patient is a 70-year-old female who presents today for follow-up of low back and left hip and leg pain. She underwent a left L5-S1 transforaminal epidural steroid injection on 2025. She reports a change in her pain pattern following the injection, with the discomfort now localized to the anterior lower left leg, though she does note improvement in her left hip and groin pain. She also continues to experiences pain in her legs when walking or lifting objects. She can walk for an hour with the aid of a shopping cart but experiences a burning sensation in her back once she stops. She underwent an ablation procedure a year ago, which provided some relief, but the pain in her mid back has since returned. She is considering the use of a walker as she struggles to walk a mile, which was her initial goal. She has a scheduled follow-up with her spine doctor on 2025.    MEDICATIONS  Current: Gabapentin, baclofen, amitriptyline, aspirin, Plavix     Procedures:  25: Bilateral lower extremity EMG, left subacute to chronic L4/5 (primarily L5) radiculopathy  25: Left L5-S1  transforaminal epidural steroid injection, improvement in left hip and groin pain but no improvement in left lower leg pain      PMHx:   Past Medical History[1]    PSHx:   Past Surgical History[2]    Family history   Family History   Problem Relation Age of Onset    Breast Cancer Mother     Diabetes Mother     Lung Disease Mother         copd    Hyperlipidemia Mother     Hypertension Mother     Glaucoma Mother     Cancer Father         Laryngeal CA    Heart Disease Father 50        CAD with bypasses x 3    Heart Attack Father     Hyperlipidemia Father     Hypertension Father     Diabetes Sister         type II diabetes    Hyperlipidemia Sister     Diabetes Other     Heart Disease Other     Hypertension Other     Lung Disease Other     Heart Disease Brother     Heart Disease Sister     Heart Disease Brother     Hyperlipidemia Sister     Hyperlipidemia Sister     Hyperlipidemia Sister     Hyperlipidemia Brother     Ovarian Cancer Neg Hx     Tubal Cancer Neg Hx     Peritoneal Cancer Neg Hx     Colorectal Cancer Neg Hx        Medications:   Active Medications[3]     Medications Ordered Prior to Encounter[4]    Allergies:   Allergies[5]    Social Hx:   Social History     Socioeconomic History    Marital status:      Spouse name: Not on file    Number of children: 1    Years of education: Not on file    Highest education level: Some college, no degree   Occupational History     Employer: Retired   Tobacco Use    Smoking status: Former     Current packs/day: 0.00     Average packs/day: 0.3 packs/day for 5.0 years (1.5 ttl pk-yrs)     Types: Cigarettes     Start date: 1970     Quit date: 1975     Years since quittin.5     Passive exposure: Past    Smokeless tobacco: Never    Tobacco comments:     quit    Vaping Use    Vaping status: Never Used   Substance and Sexual Activity    Alcohol use: No    Drug use: No    Sexual activity: Yes     Partners: Male     Comment: , 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[facility] [employer]    Other Topics Concern     Service No    Blood Transfusions No    Caffeine Concern Not Asked    Occupational Exposure Not Asked    Hobby Hazards Not Asked    Sleep Concern Not Asked    Stress Concern Not Asked    Weight Concern Not Asked    Special Diet Not Asked    Back Care Not Asked    Exercise No    Bike Helmet No    Seat Belt Yes    Self-Exams Yes   Social History Narrative    Not on file     Social Drivers of Health     Financial Resource Strain: Medium Risk (5/3/2025)    Overall Financial Resource Strain (CARDIA)     Difficulty of Paying Living Expenses: Somewhat hard   Food Insecurity: Patient Declined (5/3/2025)    Hunger Vital Sign     Worried About Running Out of Food in the Last Year: Patient declined     Ran Out of Food in the Last Year: Patient declined   Transportation Needs: No Transportation Needs (5/3/2025)    PRAPARE - Transportation     Lack of Transportation (Medical): No     Lack of Transportation (Non-Medical): No   Physical Activity: Sufficiently Active (5/3/2025)    Exercise Vital Sign     Days of Exercise per Week: 3 days     Minutes of Exercise per Session: 60 min   Stress: Stress Concern Present (5/3/2025)    [employer] [place] [employer] - Occupational Stress Questionnaire     Feeling of Stress : Rather much   Social Connections: Socially Integrated (5/3/2025)    Social Connection and Isolation Panel [NHANES]     Frequency of Communication with Friends and Family: More than three times a week     Frequency of Social Gatherings with Friends and Family: More than three times a week     Attends Mandaeism Services: More than 4 times per year     Active Member of Clubs or Organizations: Yes     Attends Club or Organization Meetings: 1 to 4 times per year     Marital Status:    Intimate Partner Violence: Not At Risk (10/17/2024)    Humiliation, Afraid, Rape, and Kick questionnaire     Fear of Current or Ex-Partner: No     Emotionally Abused: No     Physically Abused:  No     Sexually Abused: No   Housing Stability: Unknown (5/3/2025)    Housing Stability Vital Sign     Unable to Pay for Housing in the Last Year: Patient declined     Number of Times Moved in the Last Year: 0     Homeless in the Last Year: No         EXAMINATION     Physical Exam:   Vitals: /70   Pulse 61   Temp 36.9 °C (98.5 °F) (Temporal)   Ht 1.524 m (5')   Wt 72.7 kg (160 lb 4.4 oz)   SpO2 95%       Constitutional:   Body Habitus: Body mass index is 31.3 kg/m².  Cooperation: Fully cooperates with exam  Appearance: Well-groomed, well-nourished  Respiratory:  Breathing comfortable on room air, no audible wheezing  Cardiovascular: Skin appears well-perfused  Psychiatric: Appropriate affect  Gait: normal gait, no use of ambulatory device, nonantalgic.         MEDICAL DECISION MAKING    DATA    Labs:   Lab Results   Component Value Date/Time    SODIUM 138 12/27/2024 08:51 AM    POTASSIUM 4.1 12/27/2024 08:51 AM    CHLORIDE 102 12/27/2024 08:51 AM    CO2 25 12/27/2024 08:51 AM    GLUCOSE 97 12/27/2024 08:51 AM    BUN 17 12/27/2024 08:51 AM    CREATININE 0.96 12/27/2024 08:51 AM        Lab Results   Component Value Date/Time    PROTHROMBTM 12.5 11/18/2024 08:15 AM    INR 0.93 11/18/2024 08:15 AM        Lab Results   Component Value Date/Time    WBC 6.4 11/18/2024 08:15 AM    RBC 4.28 11/18/2024 08:15 AM    HEMOGLOBIN 13.1 11/18/2024 08:15 AM    HEMATOCRIT 40.5 11/18/2024 08:15 AM    MCV 94.6 11/18/2024 08:15 AM    MCH 30.6 11/18/2024 08:15 AM    MCHC 32.3 11/18/2024 08:15 AM    MPV 8.9 (L) 11/18/2024 08:15 AM    NEUTSPOLYS 81.90 (H) 10/17/2024 07:50 AM    LYMPHOCYTES 9.10 (L) 10/17/2024 07:50 AM    MONOCYTES 8.40 10/17/2024 07:50 AM    EOSINOPHILS 0.00 10/17/2024 07:50 AM    BASOPHILS 0.10 10/17/2024 07:50 AM        Lab Results   Component Value Date/Time    HBA1C 6.0 (H) 11/14/2024 07:30 AM          Imaging:   Prior personal imaging review:  X-ray lumbar spine 4/23/25: T3 to pelvis fusion hardware         IMAGING radiology reads: I reviewed the following radiology reports  X-ray bilateral hips 12/19/24:  IMPRESSION:     1.  No LEFT hip or pelvic fracture.  2.  Mild degenerative change of both hips.  3.  Postoperative changes as described.        MRI Lumbar Spine 4/29/25:   Impression:  Extensive posterior fusion and dorsal decompression from the thoracic spine to the S1 level along with bilateral iliac bolts.  Hardware appears intact.  There is no postoperative complication.  No central canal stenosis or neural foraminal narrowing.  Levoscoliosis of the lumbar spine.         Results for orders placed in visit on 04/23/25     DX-LUMBAR SPINE-4+ VIEWS  AP, lateral, flexion, and extension of the lumbar spine interpreted by me   today show evidence of hardware from a fusion beginning at T3 down to the   pelvis without signs of periprosthetic fracture or loosening.  Spondylosis   present throughout the spine.       DIAGNOSIS   Visit Diagnoses     ICD-10-CM   1. Lumbar radiculopathy  M54.16   2. Chronic left-sided low back pain with left-sided sciatica  M54.42    G89.29         ASSESSMENT and PLAN:     Almaz Samuel is a 70 y.o. female who returns to clinic for follow-up of low back and left leg pain.    Almaz was seen today for follow-up.    Diagnoses and all orders for this visit:    Lumbar radiculopathy    Chronic left-sided low back pain with left-sided sciatica        Assessment & Plan  Chronic low back and left leg pain  Lumbar radiculopathy  Patient presents for follow-up of low back and left leg pain consistent with chronic left L5 radiculopathy, as indicated by her symptoms and nerve test results. She underwent left L5-S1 transforaminal epidural steroid injection approximately 3 weeks ago but was beneficial for her left groin and hip pain but unfortunately she notes worsening of left anterior shin pain. Her symptoms do seem consistent with L5 radiculopathy but unfortunately this is likely residual as this  "radiculopathy was chronic on recent EMG. A spinal cord stimulator was discussed as a potential treatment option for her radicular or nerve root pain, she is not interested at this time but we discussed that depending on follow-up with her spine surgeon, I can send her to my colleague Dr. Mckeon in the future to further discuss this. In the interim she will continue current regimen of amitriptyline, baclofen, and gabapentin.    Mid back pain  Patient notes chronic mid back pain which was previously treated successfully with ablation but this pain has since returned. She was informed that there is no surgical option to eliminate the arthritis, and ablation procedures are the best available interventional treatment for her pain. She was advised that these procedures can be repeated every 6 months as the nerves grow back over time and was recommended to follow-up with Nurys for consideration of repeat of these procedures.        Follow up: As needed    Thank you for allowing me to participate in the care of this patient. If you have any questions please not hesitate to contact me.         Please note that this dictation was created using voice recognition software. I have made every reasonable attempt to correct obvious errors but there may be errors of grammar and content that I may have overlooked prior to finalization of this note.    Dodie Mohr MD  Interventional Spine and Sports Physiatry  Physical Medicine and Rehabilitation  [facility]         [1]   Past Medical History:  Diagnosis Date    Anesthesia 02/14/2011    PO N/V, \"slow to come out\", vasovagal response with last ablation/block on her neck    Aortic atherosclerosis (HCC)     Apnea, sleep     Arrhythmia 8/14    Arthritis 02/14/2011    spine    Backpain 02/14/2011    neck and abd. also,     Bowel habit changes 03/08/2023    constipation and diarrhea r/t IBS, medicated    Breath shortness     with exertion    Bronchitis 05/2018    Cancer " (HCC) 07/18/2018    Skin - 15 years ago.    Cancer (HCC)     April/May 2018 Melanoma (nose)    Cataract 03/08/2023    in both eyes, no surgery at present    Complication of anesthesia     Coronary artery disease due to lipid rich plaque - PCI TO LAD for CHF 11/21/2024    Daytime sleepiness     Delayed emergence from general anesthesia     Diverticulitis     Endometriosis of uterus     Familial hypercholesteremia     Fusion of spine 2014    ROBERT (generalized anxiety disorder) 03/08/2023    medicated    H/O fall     when in hospital  with low O2 sats    H/O: CVA (cerebrovascular accident) 08/22/2014    Complex event associated with apparent medication reaction but with MRI suggesting possible multiple small infarcts of various ages, [facility]    Hair loss 12/06/2018    Heart burn 03/08/2023    medicated    Hiatus hernia syndrome     not repaired    High cholesterol 03/08/2023    medicated    HTN, goal below 130/80 10/24/2011    Indigestion     Infectious disease      Hep C +    Insomnia     Ischemic cardiomyopathy 11/21/2024    Lung collapse 02/14/2011    right lung 1/4 collpsed     Major depressive disorder with single episode, in full remission (HCC) 10/24/2011    Mixed hyperlipidemia 12/27/2011    Moderate major depression (HCC) 03/08/2023    medicated    MRSA exposure 08/2014    while in hospital    Myocardial infarct (HCC) 10/24    PONV (postoperative nausea and vomiting) 03/08/2023    Post-menopause on HRT (hormone replacement therapy) 12/27/2011    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    exposed as a child, told not active    Scoliosis     Sleep apnea 03/08/2023    BIPAP, hasn't used in the last year    Snoring 03/08/2023    Stroke (HCC) 03/08/2023    tia's times 3 in the past    Unspecified vitamin D deficiency 09/24/2012    Urinary incontinence 03/08/2023    at present, wears a pad   [2]   Past Surgical History:  Procedure Laterality Date    ND NJX AA&/STRD TFRML EPI LUMBAR/SACRAL 1 LEVEL Left 6/24/2025    Procedure:  LEFT L5-S1 transforaminal epidural steroid injection with fluoroscopic guidance….Special Needs: Needs clearance to come off Plavix 7 days prior and aspirin 4 days prior. 22g 5 inch needle….Procedural Sedation: yes, plan for 0.5 mg midazolam and Fentanyl 25 mcg;  Surgeon: Dodie Mohr M.D.;  [facility];  Service: Pain Management    CATARACT EXTRACTION WITH IOL  2024    OTHER SURGICAL PROCEDURE  04/2023    repair of rectal and bladder prolapse with Dr. Green    MS PELVIC EXAMINATION W ANESTH  03/27/2023    Procedure: PELVIC EXAM UNDER ANESTHESIA, POSTERIOR REPAIR, sacrospinous vault suspension, PERINEORRHAPHY,;  Surgeon: Rajinder Myles M.D.;  Location: SURGERY SAME DAY [facility];  Service: Gynecology    MS NEUROPLASTY & OR TRANSPOS MEDIAN NRV CARPAL SABRINA Right 05/31/2022    Procedure: RIGHT HAND OPEN CARPAL TUNNEL DECOMPRESSION;  Surgeon: Holly Martin M.D.;  Location: [facility] [place];  Service: Orthopedics    NISSEN FUNDOPLICATION LAPAROSCOPIC  07/25/2018    Procedure: NISSEN FUNDOPLICATION LAPAROSCOPIC;  Surgeon: Ayaz Garcia M.D.;  Location: SURGERY [facility];  Service: General    NISSEN FUNDOPLICATION LAPAROSCOPIC N/A 06/30/2015    Procedure: NISSEN FUNDOPLICATION LAPAROSCOPIC;  Surgeon: Ayaz Garcia M.D.;  Location: SURGERY SAME DAY [facility];  Service:     GASTROSCOPY-ENDO  06/24/2015    Procedure: GASTROSCOPY-ENDO;  Surgeon: Ayaz Garcia M.D.;  Location: [facility];  Service:     CARPAL TUNNEL RELEASE  11/14/2012    Performed by Holly Martin M.D. at SURGERY [facility]    LOW ANTERIOR RESECTION LAPAROSCOPIC  03/02/2011    Performed by AYAZ GARCIA at SURGERY [facility]    CERVICAL DISK AND FUSION ANTERIOR  06/22/2010    Performed by JOSEPH DARLING at SURGERY [facility]    TUBAL LIGATION  01/01/1988    TONSILLECTOMY AND ADENOIDECTOMY  01/01/1959    ABDOMINAL HYSTERECTOMY TOTAL      APPENDECTOMY      GYN SURGERY       partial hysterectomy    OTHER NEUROLOGICAL SURG  2014   [3]   Outpatient Medications Marked as Taking for the 7/8/25 encounter (Office Visit) with Dodie Mohr M.D.   Medication Sig Dispense Refill    baclofen (LIORESAL) 10 MG Tab TAKE 1 TABLET BY MOUTH AT BEDTIME 90 Tablet 0    amitriptyline (ELAVIL) 10 MG Tab Take 1 Tablet by mouth at bedtime as needed for Sleep. 90 Tablet 1    citalopram (CELEXA) 10 MG tablet Take 1 tablet by mouth once daily 90 Tablet 3    ezetimibe (ZETIA) 10 MG Tab Take 1 Tablet by mouth every day. 90 Tablet 3    sacubitril-valsartan (ENTRESTO) 24-26 MG Tab Take 1 Tablet by mouth 2 times a day. 180 Tablet 3    pantoprazole (PROTONIX) 20 MG tablet Take 1 Tablet by mouth every day. Further refills must come from primary care, thank you 90 Tablet 3    clopidogrel (PLAVIX) 75 MG Tab Take 1 Tablet by mouth every day. 100 Tablet 2    meloxicam (MOBIC) 7.5 MG Tab TAKE 1 TO 2 TABLETS BY MOUTH ONCE DAILY 90 Tablet 0    Evolocumab (REPATHA) 140 MG/ML Solution Auto-injector SubQ injection pen Inject 1 mL under the skin every 14 days. 2.1 Each 11    fluticasone (FLONASE ALLERGY RELIEF) 50 MCG/ACT nasal spray Administer 1 Spray into affected nostril(S) 1 time a day as needed.      furosemide (LASIX) 20 MG Tab Take 1 Tablet by mouth every day. 90 Tablet 3    bisoprolol (ZEBETA) 5 MG Tab Take 1 Tablet by mouth every day. 90 Tablet 3    aspirin 81 MG EC tablet Take 1 Tablet by mouth every day. Indications: Acute Heart Attack 100 Tablet 2    triamcinolone acetonide (KENALOG) 0.1 % Cream Apply 1 Application topically 2 times a day. 15 g 1    gabapentin (NEURONTIN) 300 MG Cap Take 1 capsule by mouth twice daily 180 Capsule 3    Azelastine (ASTELIN) 137 MCG/SPRAY Solution Administer 2 Sprays into affected nostril(S) 2 times a day. 30 mL 0    hydrOXYzine HCl (ATARAX) 25 MG Tab Take 1 Tablet by mouth 3 times a day as needed for Itching. 30 Tablet 1    ondansetron (ZOFRAN ODT) 4 MG TABLET DISPERSIBLE Take 1  Tablet by mouth every 6 hours as needed for Nausea/Vomiting. 10 Tablet 2    estradiol (ESTRACE VAGINAL) 0.1 MG/GM vaginal cream Apply 1g cream inside vagina twice per week 1 Each 3    dicyclomine (BENTYL) 10 MG Cap TAKE 1 CAPSULE BY MOUTH EVERY 6 HOURS AS NEEDED FOR ABDOMINAL CRAMPS AND OR DISCOMFORT      Artificial Tear Solution (TEARS NATURALE OP) Place 2 Drops in both eyes 2 times a day as needed.     [4]   Current Outpatient Medications on File Prior to Visit   Medication Sig Dispense Refill    baclofen (LIORESAL) 10 MG Tab TAKE 1 TABLET BY MOUTH AT BEDTIME 90 Tablet 0    amitriptyline (ELAVIL) 10 MG Tab Take 1 Tablet by mouth at bedtime as needed for Sleep. 90 Tablet 1    citalopram (CELEXA) 10 MG tablet Take 1 tablet by mouth once daily 90 Tablet 3    ezetimibe (ZETIA) 10 MG Tab Take 1 Tablet by mouth every day. 90 Tablet 3    sacubitril-valsartan (ENTRESTO) 24-26 MG Tab Take 1 Tablet by mouth 2 times a day. 180 Tablet 3    pantoprazole (PROTONIX) 20 MG tablet Take 1 Tablet by mouth every day. Further refills must come from primary care, thank you 90 Tablet 3    clopidogrel (PLAVIX) 75 MG Tab Take 1 Tablet by mouth every day. 100 Tablet 2    meloxicam (MOBIC) 7.5 MG Tab TAKE 1 TO 2 TABLETS BY MOUTH ONCE DAILY 90 Tablet 0    Evolocumab (REPATHA) 140 MG/ML Solution Auto-injector SubQ injection pen Inject 1 mL under the skin every 14 days. 2.1 Each 11    fluticasone (FLONASE ALLERGY RELIEF) 50 MCG/ACT nasal spray Administer 1 Spray into affected nostril(S) 1 time a day as needed.      furosemide (LASIX) 20 MG Tab Take 1 Tablet by mouth every day. 90 Tablet 3    bisoprolol (ZEBETA) 5 MG Tab Take 1 Tablet by mouth every day. 90 Tablet 3    aspirin 81 MG EC tablet Take 1 Tablet by mouth every day. Indications: Acute Heart Attack 100 Tablet 2    triamcinolone acetonide (KENALOG) 0.1 % Cream Apply 1 Application topically 2 times a day. 15 g 1    gabapentin (NEURONTIN) 300 MG Cap Take 1 capsule by mouth twice daily 180  "Capsule 3    Azelastine (ASTELIN) 137 MCG/SPRAY Solution Administer 2 Sprays into affected nostril(S) 2 times a day. 30 mL 0    hydrOXYzine HCl (ATARAX) 25 MG Tab Take 1 Tablet by mouth 3 times a day as needed for Itching. 30 Tablet 1    ondansetron (ZOFRAN ODT) 4 MG TABLET DISPERSIBLE Take 1 Tablet by mouth every 6 hours as needed for Nausea/Vomiting. 10 Tablet 2    estradiol (ESTRACE VAGINAL) 0.1 MG/GM vaginal cream Apply 1g cream inside vagina twice per week 1 Each 3    dicyclomine (BENTYL) 10 MG Cap TAKE 1 CAPSULE BY MOUTH EVERY 6 HOURS AS NEEDED FOR ABDOMINAL CRAMPS AND OR DISCOMFORT      Artificial Tear Solution (TEARS NATURALE OP) Place 2 Drops in both eyes 2 times a day as needed.       No current facility-administered medications on file prior to visit.   [5]   Allergies  Allergen Reactions    Albumin Unspecified     Other reaction(s): Other (See Comments)  \"egg intolerance\"  Reaction:stomach cramps,throwing up for 12 hours,cold sweats    Rosuvastatin Nausea    Seasonal Itching     Pt states eye irritation, pollen     Simvastatin Nausea     "

## 2025-07-05 RX ORDER — BACLOFEN 10 MG/1
10 TABLET ORAL
Qty: 90 TABLET | Refills: 0 | Status: SHIPPED | OUTPATIENT
Start: 2025-07-05

## 2025-07-08 ENCOUNTER — APPOINTMENT (OUTPATIENT)
Dept: PHYSICAL MEDICINE AND REHAB | Facility: MEDICAL CENTER | Age: 70
End: 2025-07-08
Payer: MEDICARE

## 2025-07-08 VITALS
OXYGEN SATURATION: 95 % | DIASTOLIC BLOOD PRESSURE: 70 MMHG | HEART RATE: 61 BPM | BODY MASS INDEX: 31.47 KG/M2 | SYSTOLIC BLOOD PRESSURE: 118 MMHG | HEIGHT: 60 IN | WEIGHT: 160.27 LBS | TEMPERATURE: 98.5 F

## 2025-07-08 DIAGNOSIS — G89.29 CHRONIC LEFT-SIDED LOW BACK PAIN WITH LEFT-SIDED SCIATICA: ICD-10-CM

## 2025-07-08 DIAGNOSIS — M54.16 LUMBAR RADICULOPATHY: Primary | ICD-10-CM

## 2025-07-08 DIAGNOSIS — M54.42 CHRONIC LEFT-SIDED LOW BACK PAIN WITH LEFT-SIDED SCIATICA: ICD-10-CM

## 2025-07-08 PROCEDURE — 99214 OFFICE O/P EST MOD 30 MIN: CPT | Performed by: STUDENT IN AN ORGANIZED HEALTH CARE EDUCATION/TRAINING PROGRAM

## 2025-07-08 PROCEDURE — 3074F SYST BP LT 130 MM HG: CPT | Performed by: STUDENT IN AN ORGANIZED HEALTH CARE EDUCATION/TRAINING PROGRAM

## 2025-07-08 PROCEDURE — 3078F DIAST BP <80 MM HG: CPT | Performed by: STUDENT IN AN ORGANIZED HEALTH CARE EDUCATION/TRAINING PROGRAM

## 2025-07-08 PROCEDURE — 1125F AMNT PAIN NOTED PAIN PRSNT: CPT | Performed by: STUDENT IN AN ORGANIZED HEALTH CARE EDUCATION/TRAINING PROGRAM

## 2025-07-08 ASSESSMENT — PATIENT HEALTH QUESTIONNAIRE - PHQ9
CLINICAL INTERPRETATION OF PHQ2 SCORE: 2
SUM OF ALL RESPONSES TO PHQ QUESTIONS 1-9: 11
5. POOR APPETITE OR OVEREATING: 1 - SEVERAL DAYS

## 2025-07-08 ASSESSMENT — PAIN SCALES - GENERAL: PAINLEVEL_OUTOF10: 2=MINIMAL-SLIGHT

## 2025-07-08 ASSESSMENT — FIBROSIS 4 INDEX: FIB4 SCORE: 1.06

## 2025-07-10 ENCOUNTER — HOSPITAL ENCOUNTER (OUTPATIENT)
Dept: RADIOLOGY | Facility: MEDICAL CENTER | Age: 70
End: 2025-07-10
Attending: PHYSICIAN ASSISTANT
Payer: MEDICARE

## 2025-07-10 ENCOUNTER — OFFICE VISIT (OUTPATIENT)
Dept: SLEEP MEDICINE | Facility: MEDICAL CENTER | Age: 70
End: 2025-07-10
Attending: STUDENT IN AN ORGANIZED HEALTH CARE EDUCATION/TRAINING PROGRAM
Payer: MEDICARE

## 2025-07-10 VITALS
HEART RATE: 72 BPM | DIASTOLIC BLOOD PRESSURE: 62 MMHG | RESPIRATION RATE: 16 BRPM | OXYGEN SATURATION: 93 % | SYSTOLIC BLOOD PRESSURE: 106 MMHG | WEIGHT: 158 LBS | BODY MASS INDEX: 29.83 KG/M2 | HEIGHT: 61 IN

## 2025-07-10 DIAGNOSIS — M54.16 LUMBAR RADICULOPATHY: ICD-10-CM

## 2025-07-10 DIAGNOSIS — Z98.1 HX OF SPINAL FUSION: ICD-10-CM

## 2025-07-10 DIAGNOSIS — M53.3 SACROILIAC PAIN: ICD-10-CM

## 2025-07-10 DIAGNOSIS — G47.33 OSA (OBSTRUCTIVE SLEEP APNEA): Primary | ICD-10-CM

## 2025-07-10 PROCEDURE — 3078F DIAST BP <80 MM HG: CPT | Performed by: STUDENT IN AN ORGANIZED HEALTH CARE EDUCATION/TRAINING PROGRAM

## 2025-07-10 PROCEDURE — 99214 OFFICE O/P EST MOD 30 MIN: CPT | Mod: 25 | Performed by: STUDENT IN AN ORGANIZED HEALTH CARE EDUCATION/TRAINING PROGRAM

## 2025-07-10 PROCEDURE — 99213 OFFICE O/P EST LOW 20 MIN: CPT | Performed by: STUDENT IN AN ORGANIZED HEALTH CARE EDUCATION/TRAINING PROGRAM

## 2025-07-10 PROCEDURE — 3074F SYST BP LT 130 MM HG: CPT | Performed by: STUDENT IN AN ORGANIZED HEALTH CARE EDUCATION/TRAINING PROGRAM

## 2025-07-10 PROCEDURE — 72131 CT LUMBAR SPINE W/O DYE: CPT

## 2025-07-10 ASSESSMENT — FIBROSIS 4 INDEX: FIB4 SCORE: 1.06

## 2025-07-10 NOTE — PROGRESS NOTES
[facility] Follow-up Visit    CC: Follow-up regarding management of obstructive sleep apnea      HPI:  Almaz Samuel is a 70 y.o.female  with CAD status post stent placement, HFrEF, MDD, insomnia, GERD, history of CVA, and obstructive sleep apnea on BiPAP.    She reports having difficulty using her machine due to running out of supplies.  She does feel that the pressure can be quite high at times.  She does like her ramp feature.    They do go camping a fair bit.  Because of this they do have questions regarding batteries for CPAP.    DME provider: Jessa delacruz   Device: Resmed BiPAP  Mask: nasal pillows   Aerophagia: No   Snoring: No   Dry mouth: No   Leak: No   Skin irritation: No   Chin strap: Yes    Sleep History  Studies completed through outside facility [facility]:  4/10/24 PSG titration study showed improvement in respiratory events and oxygen saturations with BiPAP therapy.  Recommended BiPAP 13/9 5/16/2019 PSG split-night study showed severe obstructive sleep apnea with an overall AHI of 34 events an hour.  Patient appeared to respond best to BiPAP therapy    Patient Active Problem List    Diagnosis Date Noted    Osteopenia of forearm 01/13/2025    Chronic left hip pain 12/11/2024    Coronary artery disease due to lipid rich plaque - PCI TO LAD for CHF 11/21/2024    Status post insertion of drug-eluting stent into left anterior descending (LAD) artery 11/21/2024    Chronic systolic heart failure (HCC) 11/21/2024    Decompensated heart failure (HCC) 10/17/2024    Acute pyelonephritis 10/17/2024    Depression 10/17/2024    Anxiety 10/17/2024    History of non-ST elevation myocardial infarction (NSTEMI) 10/17/2024    Low back pain 10/17/2024    QT prolongation 10/17/2024    Chronic pain of left ankle 04/09/2024    History of malignant melanoma 10/13/2023    Benzodiazepine dependence (HCC) 10/13/2023    Prediabetes 10/13/2023    Hives 05/17/2023    Trigger thumb 05/17/2023     Post-operative state 03/27/2023    Urinary incontinence, mixed 01/26/2023    Atrophic vaginitis 01/26/2023    Lower abdominal pain 09/29/2022    Left lower quadrant pain 08/03/2021    IBS (irritable bowel syndrome) 07/17/2021    Aortic atherosclerosis (HCC)     Familial hypercholesteremia     Polyneuropathy associated with underlying disease ([facility]) 03/13/2018    Obesity (BMI 30-39.9) 03/13/2018    ROBERT (generalized anxiety disorder) 06/15/2017    Allergic rhinitis 05/10/2017    Fatigue 03/02/2017    S/P lumbar fusion 10/20/2016    Cervical radiculopathy 10/20/2016    Diaphragmatic hernia 06/30/2015    Mild mitral regurgitation 03/12/2015    H/O: CVA (cerebrovascular accident) 09/23/2014    Outlet dysfunction constipation 09/17/2014    GERD (gastroesophageal reflux disease) 09/17/2014    Lumbar radiculopathy 03/20/2014    Vitamin D deficiency disease 09/24/2012    Hyperlipidemia 12/27/2011    SCOTT (obstructive sleep apnea) 10/24/2011    Insomnia, persistent 10/24/2011    Major depressive disorder with single episode, in full remission ([facility]) 10/24/2011    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       Past Medical History[1]     Past Surgical History[2]    Family History   Problem Relation Age of Onset    Breast Cancer Mother     Diabetes Mother     Lung Disease Mother         copd    Hyperlipidemia Mother     Hypertension Mother     Glaucoma Mother     Cancer Father         Laryngeal CA    Heart Disease Father 50        CAD with bypasses x 3    Heart Attack Father     Hyperlipidemia Father     Hypertension Father     Diabetes Sister         type II diabetes    Hyperlipidemia Sister     Diabetes Other     Heart Disease Other     Hypertension Other     Lung Disease Other     Heart Disease Brother     Heart Disease Sister     Heart Disease Brother     Hyperlipidemia Sister     Hyperlipidemia Sister     Hyperlipidemia Sister     Hyperlipidemia Brother     Ovarian Cancer Neg Hx     Tubal Cancer Neg Hx     Peritoneal Cancer Neg Hx      Colorectal Cancer Neg Hx        Social History     Socioeconomic History    Marital status:      Spouse name: Not on file    Number of children: 1    Years of education: Not on file    Highest education level: Some college, no degree   Occupational History     Employer: Retired   Tobacco Use    Smoking status: Former     Current packs/day: 0.00     Average packs/day: 0.3 packs/day for 5.0 years (1.5 ttl pk-yrs)     Types: Cigarettes     Start date: 1970     Quit date: 1975     Years since quittin.5     Passive exposure: Past    Smokeless tobacco: Never    Tobacco comments:     quit    Vaping Use    Vaping status: Never Used   Substance and Sexual Activity    Alcohol use: No    Drug use: No    Sexual activity: Yes     Partners: Male     Comment: , 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[employer]   Other Topics Concern     Service No    Blood Transfusions No    Caffeine Concern Not Asked    Occupational Exposure Not Asked    Hobby Hazards Not Asked    Sleep Concern Not Asked    Stress Concern Not Asked    Weight Concern Not Asked    Special Diet Not Asked    Back Care Not Asked    Exercise No    Bike Helmet No    Seat Belt Yes    Self-Exams Yes   Social History Narrative    Not on file     Social Drivers of Health     Financial Resource Strain: Medium Risk (5/3/2025)    Overall Financial Resource Strain (CARDIA)     Difficulty of Paying Living Expenses: Somewhat hard   Food Insecurity: Patient Declined (5/3/2025)    Hunger Vital Sign     Worried About Running Out of Food in the Last Year: Patient declined     Ran Out of Food in the Last Year: Patient declined   Transportation Needs: No Transportation Needs (5/3/2025)    PRAPARE - Transportation     Lack of Transportation (Medical): No     Lack of Transportation (Non-Medical): No   Physical Activity: Sufficiently Active (5/3/2025)    Exercise Vital Sign     Days of Exercise per Week: 3 days     Minutes of Exercise per Session: 60 min   Stress: Stress  "Concern Present (5/3/2025)    [employer] [place] [employer] - Occupational Stress Questionnaire     Feeling of Stress : Rather much   Social Connections: Socially Integrated (5/3/2025)    Social Connection and Isolation Panel [NHANES]     Frequency of Communication with Friends and Family: More than three times a week     Frequency of Social Gatherings with Friends and Family: More than three times a week     Attends [employer] Services: More than 4 times per year     Active Member of Clubs or Organizations: Yes     Attends Club or Organization Meetings: 1 to 4 times per year     Marital Status:    Intimate Partner Violence: Not At Risk (10/17/2024)    Humiliation, Afraid, Rape, and Kick questionnaire     Fear of Current or Ex-Partner: No     Emotionally Abused: No     Physically Abused: No     Sexually Abused: No   Housing Stability: Unknown (5/3/2025)    Housing Stability Vital Sign     Unable to Pay for Housing in the Last Year: Patient declined     Number of Times Moved in the Last Year: 0     Homeless in the Last Year: No       Current Medications[3]     ALLERGIES: Albumin, Rosuvastatin, Seasonal, and Simvastatin    ROS  Constitutional: Denies fevers, Denies weight changes  Ears/Nose/Throat/Mouth: Denies nasal congestion or sore throat   Cardiovascular: Denies chest pain  Respiratory: Denies shortness of breath, Denies cough  Gastrointestinal/Hepatic: Denies nausea, vomiting  Sleep: see HPI      PHYSICAL EXAM  /62 (BP Location: Left arm, Patient Position: Sitting, BP Cuff Size: Adult)   Pulse 72   Resp 16   Ht 1.549 m (5' 1\")   Wt 71.7 kg (158 lb)   SpO2 93%   BMI 29.85 kg/m²   Appearance: Well-nourished, well-developed, no acute distress  Eyes:  No scleral icterus , EOMI  Musculoskeletal:  Grossly normal; gait and station normal; digits and nails normal  Skin:  No rashes, petechiae, cyanosis  Neurologic: without focal signs; oriented to person, time, place, and purpose; judgement " "intact      Medical Decision Making   Assessment and Plan  Almaz Samuel is a 70 y.o.female  with CAD status post stent placement, HFrEF, MDD, insomnia, GERD, history of CVA, and obstructive sleep apnea on BiPAP.    The medical record was reviewed.    Obstructive sleep apnea  Due to patient not having supplies at home she has not been using her machine recently.  She needs new supplies in order to resume use of her BiPAP machine.    Current Settings BiPAP 13/9    Given her difficulty with her current pressure settings changed to auto BPAP EPAP min 5 IPAP max 13 pressure support 4    PLAN:   -Order placed for mask and supplies   -Advised to reach out via MyChart with questions     Has been advised to continue the current BiPAP, clean equipment frequently, and get new mask and supplies as allowed by insurance and DME. Recommend an earlier appointment, if significant treatment barriers develop.    Patients with SCOTT are at increased risk of cardiovascular disease including coronary artery disease, systemic arterial hypertension, pulmonary arterial hypertension, cardiac arrythmias, and stroke.     Return in about 3 months (around 10/10/2025).      Please note portions of this record was created using voice recognition software. I have made every reasonable attempt to correct obvious errors, but I expect that there are errors of grammar and possibly content I did not discover before finalizing the note.           [1]   Past Medical History:  Diagnosis Date    Anesthesia 02/14/2011    PO N/V, \"slow to come out\", vasovagal response with last ablation/block on her neck    Aortic atherosclerosis (HCC)     Apnea, sleep     Arrhythmia 8/14    Arthritis 02/14/2011    spine    Backpain 02/14/2011    neck and abd. also,     Bowel habit changes 03/08/2023    constipation and diarrhea r/t IBS, medicated    Breath shortness     with exertion    Bronchitis 05/2018    Cancer (HCC) 07/18/2018    Skin - 15 years ago.    Cancer (HCC)  "    April/May 2018 Melanoma (nose)    Cataract 03/08/2023    in both eyes, no surgery at present    Complication of anesthesia     Coronary artery disease due to lipid rich plaque - PCI TO LAD for CHF 11/21/2024    Daytime sleepiness     Delayed emergence from general anesthesia     Diverticulitis     Endometriosis of uterus     Familial hypercholesteremia     Fusion of spine 2014    ROBERT (generalized anxiety disorder) 03/08/2023    medicated    H/O fall     when in hospital  with low O2 sats    H/O: CVA (cerebrovascular accident) 08/22/2014    Complex event associated with apparent medication reaction but with MRI suggesting possible multiple small infarcts of various ages, [facility]    Hair loss 12/06/2018    Heart burn 03/08/2023    medicated    Hiatus hernia syndrome     not repaired    High cholesterol 03/08/2023    medicated    HTN, goal below 130/80 10/24/2011    Indigestion     Infectious disease      Hep C +    Insomnia     Ischemic cardiomyopathy 11/21/2024    Lung collapse 02/14/2011    right lung 1/4 collpsed     Major depressive disorder with single episode, in full remission (HCC) 10/24/2011    Mixed hyperlipidemia 12/27/2011    Moderate major depression (HCC) 03/08/2023    medicated    MRSA exposure 08/2014    while in hospital    Myocardial infarct (HCC) 10/24    PONV (postoperative nausea and vomiting) 03/08/2023    Post-menopause on HRT (hormone replacement therapy) 12/27/2011    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    exposed as a child, told not active    Scoliosis     Sleep apnea 03/08/2023    BIPAP, hasn't used in the last year    Snoring 03/08/2023    Stroke (HCC) 03/08/2023    tia's times 3 in the past    Unspecified vitamin D deficiency 09/24/2012    Urinary incontinence 03/08/2023    at present, wears a pad   [2]   Past Surgical History:  Procedure Laterality Date    NM NJX AA&/STRD TFRML EPI LUMBAR/SACRAL 1 LEVEL Left 6/24/2025    Procedure: LEFT L5-S1 transforaminal epidural steroid injection with  fluoroscopic guidance….Special Needs: Needs clearance to come off Plavix 7 days prior and aspirin 4 days prior. 22g 5 inch needle….Procedural Sedation: yes, plan for 0.5 mg midazolam and Fentanyl 25 mcg;  Surgeon: Dodie Mohr M.D.;  [facility];  Service: Pain Management    CATARACT EXTRACTION WITH IOL  2024    OTHER SURGICAL PROCEDURE  04/2023    repair of rectal and bladder prolapse with Dr. Green    [place] PELVIC EXAMINATION W ANESTH  03/27/2023    Procedure: PELVIC EXAM UNDER ANESTHESIA, POSTERIOR REPAIR, sacrospinous vault suspension, PERINEORRHAPHY,;  Surgeon: Rajinder Myles M.D.;  Location: SURGERY SAME DAY [facility];  Service: Gynecology    [place] NEUROPLASTY & OR TRANSPOS MEDIAN NRV CARPAL SABRINA Right 05/31/2022    Procedure: RIGHT HAND OPEN CARPAL TUNNEL DECOMPRESSION;  Surgeon: Holly Martin M.D.;  Location: [facility];  Service: Orthopedics    NISSEN FUNDOPLICATION LAPAROSCOPIC  07/25/2018    Procedure: NISSEN FUNDOPLICATION LAPAROSCOPIC;  Surgeon: Ayaz Garcia M.D.;  Location: SURGERY [facility];  Service: General    NISSEN FUNDOPLICATION LAPAROSCOPIC N/A 06/30/2015    Procedure: NISSEN FUNDOPLICATION LAPAROSCOPIC;  Surgeon: Ayaz Garcia M.D.;  Location: SURGERY SAME DAY [facility];  Service:     GASTROSCOPY-ENDO  06/24/2015    Procedure: GASTROSCOPY-ENDO;  Surgeon: Ayaz Garcia M.D.;  Location: ENDOSCOPY [facility];  Service:     CARPAL TUNNEL RELEASE  11/14/2012    Performed by Holly Martin M.D. at SURGERY [facility]    LOW ANTERIOR RESECTION LAPAROSCOPIC  03/02/2011    Performed by AYAZ GARCIA at [facility]    CERVICAL DISK AND FUSION ANTERIOR  06/22/2010    Performed by JOSEPH DARLING at SURGERY [facility]    TUBAL LIGATION  01/01/1988    TONSILLECTOMY AND ADENOIDECTOMY  01/01/1959    ABDOMINAL HYSTERECTOMY TOTAL      APPENDECTOMY      GYN SURGERY      partial hysterectomy    OTHER NEUROLOGICAL SURG  2014   [3]    Current Outpatient Medications   Medication Sig Dispense Refill    baclofen (LIORESAL) 10 MG Tab TAKE 1 TABLET BY MOUTH AT BEDTIME 90 Tablet 0    amitriptyline (ELAVIL) 10 MG Tab Take 1 Tablet by mouth at bedtime as needed for Sleep. 90 Tablet 1    citalopram (CELEXA) 10 MG tablet Take 1 tablet by mouth once daily 90 Tablet 3    ezetimibe (ZETIA) 10 MG Tab Take 1 Tablet by mouth every day. 90 Tablet 3    sacubitril-valsartan (ENTRESTO) 24-26 MG Tab Take 1 Tablet by mouth 2 times a day. 180 Tablet 3    pantoprazole (PROTONIX) 20 MG tablet Take 1 Tablet by mouth every day. Further refills must come from primary care, thank you 90 Tablet 3    clopidogrel (PLAVIX) 75 MG Tab Take 1 Tablet by mouth every day. 100 Tablet 2    meloxicam (MOBIC) 7.5 MG Tab TAKE 1 TO 2 TABLETS BY MOUTH ONCE DAILY 90 Tablet 0    Evolocumab (REPATHA) 140 MG/ML Solution Auto-injector SubQ injection pen Inject 1 mL under the skin every 14 days. 2.1 Each 11    fluticasone (FLONASE ALLERGY RELIEF) 50 MCG/ACT nasal spray Administer 1 Spray into affected nostril(S) 1 time a day as needed.      furosemide (LASIX) 20 MG Tab Take 1 Tablet by mouth every day. 90 Tablet 3    bisoprolol (ZEBETA) 5 MG Tab Take 1 Tablet by mouth every day. 90 Tablet 3    aspirin 81 MG EC tablet Take 1 Tablet by mouth every day. Indications: Acute Heart Attack 100 Tablet 2    triamcinolone acetonide (KENALOG) 0.1 % Cream Apply 1 Application topically 2 times a day. 15 g 1    gabapentin (NEURONTIN) 300 MG Cap Take 1 capsule by mouth twice daily 180 Capsule 3    Azelastine (ASTELIN) 137 MCG/SPRAY Solution Administer 2 Sprays into affected nostril(S) 2 times a day. 30 mL 0    hydrOXYzine HCl (ATARAX) 25 MG Tab Take 1 Tablet by mouth 3 times a day as needed for Itching. 30 Tablet 1    ondansetron (ZOFRAN ODT) 4 MG TABLET DISPERSIBLE Take 1 Tablet by mouth every 6 hours as needed for Nausea/Vomiting. 10 Tablet 2    estradiol (ESTRACE VAGINAL) 0.1 MG/GM vaginal cream Apply 1g  cream inside vagina twice per week 1 Each 3    dicyclomine (BENTYL) 10 MG Cap TAKE 1 CAPSULE BY MOUTH EVERY 6 HOURS AS NEEDED FOR ABDOMINAL CRAMPS AND OR DISCOMFORT      Artificial Tear Solution (TEARS NATURALE OP) Place 2 Drops in both eyes 2 times a day as needed.       No current facility-administered medications for this visit.

## 2025-08-05 ENCOUNTER — OFFICE VISIT (OUTPATIENT)
Dept: MEDICAL GROUP | Facility: PHYSICIAN GROUP | Age: 70
End: 2025-08-05
Payer: MEDICARE

## 2025-08-05 VITALS
TEMPERATURE: 97.9 F | BODY MASS INDEX: 29.63 KG/M2 | OXYGEN SATURATION: 97 % | WEIGHT: 161 LBS | HEART RATE: 74 BPM | DIASTOLIC BLOOD PRESSURE: 64 MMHG | HEIGHT: 62 IN | SYSTOLIC BLOOD PRESSURE: 124 MMHG

## 2025-08-05 DIAGNOSIS — M54.16 LUMBAR RADICULOPATHY: Chronic | ICD-10-CM

## 2025-08-05 DIAGNOSIS — G47.00 INSOMNIA, PERSISTENT: Primary | Chronic | ICD-10-CM

## 2025-08-05 DIAGNOSIS — H61.23 BILATERAL IMPACTED CERUMEN: ICD-10-CM

## 2025-08-05 DIAGNOSIS — E78.01 FAMILIAL HYPERCHOLESTEREMIA: Chronic | ICD-10-CM

## 2025-08-05 DIAGNOSIS — E66.3 OVERWEIGHT: ICD-10-CM

## 2025-08-05 PROCEDURE — 99214 OFFICE O/P EST MOD 30 MIN: CPT | Performed by: STUDENT IN AN ORGANIZED HEALTH CARE EDUCATION/TRAINING PROGRAM

## 2025-08-05 PROCEDURE — 3078F DIAST BP <80 MM HG: CPT | Performed by: STUDENT IN AN ORGANIZED HEALTH CARE EDUCATION/TRAINING PROGRAM

## 2025-08-05 PROCEDURE — 3074F SYST BP LT 130 MM HG: CPT | Performed by: STUDENT IN AN ORGANIZED HEALTH CARE EDUCATION/TRAINING PROGRAM

## 2025-08-05 ASSESSMENT — FIBROSIS 4 INDEX: FIB4 SCORE: 1.06

## 2025-08-11 ENCOUNTER — OFFICE VISIT (OUTPATIENT)
Dept: MEDICAL GROUP | Facility: PHYSICIAN GROUP | Age: 70
End: 2025-08-11
Payer: MEDICARE

## 2025-08-11 DIAGNOSIS — H61.23 BILATERAL IMPACTED CERUMEN: Primary | ICD-10-CM

## 2025-08-20 ENCOUNTER — HOSPITAL ENCOUNTER (OUTPATIENT)
Dept: LAB | Facility: MEDICAL CENTER | Age: 70
End: 2025-08-20
Attending: INTERNAL MEDICINE
Payer: MEDICARE

## 2025-08-20 DIAGNOSIS — E78.01 FAMILIAL HYPERCHOLESTEREMIA: ICD-10-CM

## 2025-08-20 DIAGNOSIS — I50.22 CHRONIC SYSTOLIC HEART FAILURE (HCC): ICD-10-CM

## 2025-08-20 LAB
ALBUMIN SERPL BCP-MCNC: 4.1 G/DL (ref 3.2–4.9)
ALBUMIN/GLOB SERPL: 1.5 G/DL
ALP SERPL-CCNC: 69 U/L (ref 30–99)
ALT SERPL-CCNC: 25 U/L (ref 2–50)
ANION GAP SERPL CALC-SCNC: 10 MMOL/L (ref 7–16)
AST SERPL-CCNC: 27 U/L (ref 12–45)
BILIRUB SERPL-MCNC: 0.4 MG/DL (ref 0.1–1.5)
BUN SERPL-MCNC: 22 MG/DL (ref 8–22)
CALCIUM ALBUM COR SERPL-MCNC: 9.1 MG/DL (ref 8.5–10.5)
CALCIUM SERPL-MCNC: 9.2 MG/DL (ref 8.5–10.5)
CHLORIDE SERPL-SCNC: 105 MMOL/L (ref 96–112)
CHOLEST SERPL-MCNC: 131 MG/DL (ref 100–199)
CO2 SERPL-SCNC: 27 MMOL/L (ref 20–33)
CREAT SERPL-MCNC: 1.07 MG/DL (ref 0.5–1.4)
ERYTHROCYTE [DISTWIDTH] IN BLOOD BY AUTOMATED COUNT: 45.3 FL (ref 35.9–50)
FASTING STATUS PATIENT QL REPORTED: NORMAL
GFR SERPLBLD CREATININE-BSD FMLA CKD-EPI: 55 ML/MIN/1.73 M 2
GLOBULIN SER CALC-MCNC: 2.7 G/DL (ref 1.9–3.5)
GLUCOSE SERPL-MCNC: 103 MG/DL (ref 65–99)
HCT VFR BLD AUTO: 40.3 % (ref 37–47)
HDLC SERPL-MCNC: 53 MG/DL
HGB BLD-MCNC: 13.3 G/DL (ref 12–16)
LDLC SERPL CALC-MCNC: 55 MG/DL
MCH RBC QN AUTO: 30.8 PG (ref 27–33)
MCHC RBC AUTO-ENTMCNC: 33 G/DL (ref 32.2–35.5)
MCV RBC AUTO: 93.3 FL (ref 81.4–97.8)
PLATELET # BLD AUTO: 325 K/UL (ref 164–446)
PMV BLD AUTO: 9.1 FL (ref 9–12.9)
POTASSIUM SERPL-SCNC: 4.2 MMOL/L (ref 3.6–5.5)
PROT SERPL-MCNC: 6.8 G/DL (ref 6–8.2)
RBC # BLD AUTO: 4.32 M/UL (ref 4.2–5.4)
SODIUM SERPL-SCNC: 142 MMOL/L (ref 135–145)
TRIGL SERPL-MCNC: 115 MG/DL (ref 0–149)
WBC # BLD AUTO: 4.6 K/UL (ref 4.8–10.8)

## 2025-08-20 PROCEDURE — 85027 COMPLETE CBC AUTOMATED: CPT

## 2025-08-20 PROCEDURE — 80053 COMPREHEN METABOLIC PANEL: CPT

## 2025-08-20 PROCEDURE — 36415 COLL VENOUS BLD VENIPUNCTURE: CPT

## 2025-08-20 PROCEDURE — 80061 LIPID PANEL: CPT

## 2025-08-25 ENCOUNTER — APPOINTMENT (OUTPATIENT)
Dept: CARDIOLOGY | Facility: PHYSICIAN GROUP | Age: 70
End: 2025-08-25
Payer: MEDICARE

## 2025-08-25 PROBLEM — I50.22 CHRONIC SYSTOLIC HEART FAILURE (HCC): Chronic | Status: ACTIVE | Noted: 2024-10-17

## 2025-08-25 ASSESSMENT — FIBROSIS 4 INDEX: FIB4 SCORE: 1.2

## 2025-08-28 ENCOUNTER — PATIENT MESSAGE (OUTPATIENT)
Dept: PHYSICAL MEDICINE AND REHAB | Facility: MEDICAL CENTER | Age: 70
End: 2025-08-28
Payer: MEDICARE

## 2025-08-29 ENCOUNTER — PATIENT MESSAGE (OUTPATIENT)
Dept: PHYSICAL MEDICINE AND REHAB | Facility: MEDICAL CENTER | Age: 70
End: 2025-08-29
Payer: MEDICARE

## (undated) DEVICE — KIT ANESTHESIA W/CIRCUIT & 3/LT BAG W/FILTER (20EA/CA)

## (undated) DEVICE — TROCAR, CAN&SEAL 5X100MM C0509

## (undated) DEVICE — GLOVE BIOGEL SZ 7.5 SURGICAL PF LTX - (50PR/BX 4BX/CA)

## (undated) DEVICE — SUTURE 0 VICRYL PLUS CT-2 - 27 INCH (36/BX)

## (undated) DEVICE — DERMABOND ADVANCED - (12EA/BX)

## (undated) DEVICE — TOWEL STOP TIMEOUT SAFETY FLAG (40EA/CA)

## (undated) DEVICE — SET IRRIGATION CYSTOSCOPY TUBE L80 IN (20EA/CA)

## (undated) DEVICE — WATER IRRIGATION STERILE 1000ML (12EA/CA)

## (undated) DEVICE — Device

## (undated) DEVICE — CANISTER SUCTION 3000ML MECHANICAL FILTER AUTO SHUTOFF MEDI-VAC NONSTERILE LF DISP  (40EA/CA)

## (undated) DEVICE — SET SUCTION/IRRIGATION WITH DISPOSABLE TIP (6/CA )PART #0250-070-520 IS A SUB

## (undated) DEVICE — ELECTRODE DUAL RETURN W/ CORD - (50/PK)

## (undated) DEVICE — GLOVE, BIOGEL ECLIPSE, SZ 7.0, PF LTX (50/BX)

## (undated) DEVICE — SENSOR SPO2 NEO LNCS ADHESIVE (20/BX) SEE USER NOTES

## (undated) DEVICE — TUBE E-T HI-LO CUFF 6.5MM (10EA/BX)

## (undated) DEVICE — SEALER ARTICULATING TISSUE NSEAL (6/BX)

## (undated) DEVICE — CLOSURE SKIN STRIP 1/2 X 4 IN - (STERI STRIP) (50/BX 4BX/CA)

## (undated) DEVICE — CANISTER SUCTION RIGID RED 1500CC (40EA/CA)

## (undated) DEVICE — LACTATED RINGERS INJ 1000 ML - (14EA/CA 60CA/PF)

## (undated) DEVICE — SODIUM CHL IRRIGATION 0.9% 1000ML (12EA/CA)

## (undated) DEVICE — HEAD HOLDER JUNIOR/ADULT

## (undated) DEVICE — TROCAR Z THREAD12MM OPTICAL - NON BLADED (6/BX)

## (undated) DEVICE — PACK LAP CHOLE OR - (2EA/CA)

## (undated) DEVICE — PACKING VAG 2 X-RAY (24EA/CS)

## (undated) DEVICE — ENDOSTITCH  POLY 0 48 VIO DLU - (12EA/CA)

## (undated) DEVICE — MONODEK SUTURE - 12/BX

## (undated) DEVICE — SET EXTENSION WITH 2 PORTS (48EA/CA) ***PART #2C8610 IS A SUBSTITUTE*****

## (undated) DEVICE — SLEEVE VASO CALF MED - (10PR/CA)

## (undated) DEVICE — MASK OXYGEN VNYL ADLT MED CONC WITH 7 FOOT TUBING  - (50EA/CA)

## (undated) DEVICE — DEVICE SUTURE CAPTURING

## (undated) DEVICE — TUBE CONNECTING SUCTION - CLEAR PLASTIC STERILE 72 IN (50EA/CA)

## (undated) DEVICE — SET LEADWIRE 5 LEAD BEDSIDE DISPOSABLE ECG (1SET OF 5/EA)

## (undated) DEVICE — TROCAR 5X100 NON BLADED Z-TH - READ KII (6/BX)

## (undated) DEVICE — BLADE SURGICAL #10 - (50/BX)

## (undated) DEVICE — SUTURE 4-0 VICRYL PLUSFS-1 - 27 INCH (36/BX)

## (undated) DEVICE — CHLORAPREP 26 ML APPLICATOR - ORANGE TINT(25/CA)

## (undated) DEVICE — KIT  I.V. START (100EA/CA)

## (undated) DEVICE — TUBING CLEARLINK DUO-VENT - C-FLO (48EA/CA)

## (undated) DEVICE — WATER IRRIG. STER. 1500 ML - (9/CA)

## (undated) DEVICE — SUTURE GENERAL

## (undated) DEVICE — SUCTION INSTRUMENT YANKAUER BULBOUS TIP W/O VENT (50EA/CA)

## (undated) DEVICE — CANNULA W/ SUPPLY TUBING O2 - (50/CA)

## (undated) DEVICE — KIT SURGIFLO W/OUT THROMBIN - (6EA/CA)

## (undated) DEVICE — PAD SANITARY 11IN MAXI IND WRAPPED  (12EA/PK 24PK/CA)

## (undated) DEVICE — SUTURE 2-0 VICRYL PLUS SH - 27 INCH (36/BX)

## (undated) DEVICE — PROTECTOR ULNA NERVE - (36PR/CA)

## (undated) DEVICE — SCRUB SOLUTION EXIDINE 4% 40Z - 48/CS CHLORAHEXADINE GLUCONATE

## (undated) DEVICE — SUTURE 2-0 STRATAFIX SPIRAL PDS SH (12EA/BX)

## (undated) DEVICE — ENDOSTITCH10MM SUTURING DEVIC - (3/CA)

## (undated) DEVICE — GLOVE BIOGEL INDICATOR SZ 7.5 SURGICAL PF LTX - (50PR/BX 4BX/CA)

## (undated) DEVICE — GOWN WARMING STANDARD FLEX - (30/CA)

## (undated) DEVICE — NEPTUNE 4 PORT MANIFOLD - (20/PK)

## (undated) DEVICE — TUBE E-T HI-LO CUFF 7.0MM (10EA/PK)

## (undated) DEVICE — GLOVE BIOGEL SZ 7 SURGICAL PF LTX - (50PR/BX 4BX/CA)

## (undated) DEVICE — ENDOSTITCH LOAD UNIT 0 SURGI - 12/CA

## (undated) DEVICE — ELECTRODE 850 FOAM ADHESIVE - HYDROGEL RADIOTRNSPRNT (50/PK)

## (undated) DEVICE — KIT ROOM DECONTAMINATION

## (undated) DEVICE — SLEEVE, VASO, THIGH, MED

## (undated) DEVICE — TRAY FOLEY CATHETER STATLOCK 16FR SURESTEP  (10EA/CA)

## (undated) DEVICE — DRAPE UNDER BUTTOCKS FLUID - (20/CA)

## (undated) DEVICE — BANDAID SHEER STRIP 3/4 IN (100EA/BX 12BX/CA)

## (undated) DEVICE — SENSOR OXIMETER ADULT SPO2 RD SET (20EA/BX)

## (undated) DEVICE — MASK ANESTHESIA ADULT  - (100/CA)

## (undated) DEVICE — BLADE SURGICAL #11 - (50/BX)

## (undated) DEVICE — DRAPE VAGINAL BIB W/ POUCH (10EA/CA)

## (undated) DEVICE — NEEDLE MAYO CATGUT TPR POINT - (2EA/PK20PK/BX)